# Patient Record
Sex: MALE | Race: WHITE | NOT HISPANIC OR LATINO | Employment: FULL TIME | URBAN - METROPOLITAN AREA
[De-identification: names, ages, dates, MRNs, and addresses within clinical notes are randomized per-mention and may not be internally consistent; named-entity substitution may affect disease eponyms.]

---

## 2018-11-30 ENCOUNTER — HOSPITAL ENCOUNTER (OUTPATIENT)
Dept: GENERAL RADIOLOGY | Facility: HOSPITAL | Age: 53
Discharge: HOME | End: 2018-11-30

## 2018-11-30 ENCOUNTER — HOSPITAL ENCOUNTER (OUTPATIENT)
Dept: CT IMAGING | Facility: HOSPITAL | Age: 53
Discharge: HOME | End: 2018-11-30
Admitting: INTERNAL MEDICINE

## 2018-11-30 ENCOUNTER — TRANSCRIBE ORDERS (OUTPATIENT)
Dept: ADMINISTRATIVE | Facility: HOSPITAL | Age: 53
End: 2018-11-30

## 2018-11-30 DIAGNOSIS — C19 RECTOSIGMOID CANCER (HCC): Primary | ICD-10-CM

## 2018-11-30 DIAGNOSIS — C19 RECTOSIGMOID CANCER (HCC): ICD-10-CM

## 2018-11-30 PROCEDURE — 74177 CT ABD & PELVIS W/CONTRAST: CPT

## 2018-11-30 PROCEDURE — 82565 ASSAY OF CREATININE: CPT

## 2018-11-30 PROCEDURE — 0 DIATRIZOATE MEGLUMINE & SODIUM PER 1 ML: Performed by: INTERNAL MEDICINE

## 2018-11-30 PROCEDURE — 71046 X-RAY EXAM CHEST 2 VIEWS: CPT

## 2018-11-30 PROCEDURE — 25010000002 IOPAMIDOL 61 % SOLUTION: Performed by: INTERNAL MEDICINE

## 2018-11-30 RX ADMIN — Medication 15 ML: at 16:15

## 2018-11-30 RX ADMIN — IOPAMIDOL 95 ML: 612 INJECTION, SOLUTION INTRAVENOUS at 17:59

## 2018-12-03 LAB — CREAT BLDA-MCNC: 1 MG/DL (ref 0.6–1.3)

## 2018-12-05 ENCOUNTER — TRANSCRIBE ORDERS (OUTPATIENT)
Dept: ADMINISTRATIVE | Facility: HOSPITAL | Age: 53
End: 2018-12-05

## 2018-12-05 DIAGNOSIS — C20 RECTAL CANCER (HCC): Primary | ICD-10-CM

## 2018-12-06 ENCOUNTER — HOSPITAL ENCOUNTER (OUTPATIENT)
Dept: CT IMAGING | Facility: HOSPITAL | Age: 53
Discharge: HOME | End: 2018-12-06
Attending: COLON & RECTAL SURGERY | Admitting: COLON & RECTAL SURGERY

## 2018-12-06 ENCOUNTER — HOSPITAL ENCOUNTER (OUTPATIENT)
Dept: MRI IMAGING | Facility: HOSPITAL | Age: 53
Discharge: HOME | End: 2018-12-06
Attending: COLON & RECTAL SURGERY

## 2018-12-06 DIAGNOSIS — C20 RECTAL CANCER (HCC): ICD-10-CM

## 2018-12-06 PROCEDURE — 71250 CT THORAX DX C-: CPT

## 2018-12-06 PROCEDURE — 72195 MRI PELVIS W/O DYE: CPT

## 2018-12-07 ENCOUNTER — DOCUMENTATION (OUTPATIENT)
Dept: OTHER | Facility: HOSPITAL | Age: 53
End: 2018-12-07

## 2018-12-07 NOTE — PROGRESS NOTES
AT 10:06am today, I faxed a cover sheet to [facility] asking for path slides on patient. The path slides will be reviewed in GI Tumor Board on 12/13/2018. LEWIS

## 2018-12-07 NOTE — PROGRESS NOTES
Sherron at [facility] called me and said that patient's path slides were ready for pickup. I called Josh and talked with Kimberly. I notified her that path slides were ready for pickup at [facility] and gave the address and then asked that the slides be delivered to [facility] in the [address] on the 2nd floor of the Hospital. Kimberly verbalized understanding. AG

## 2018-12-10 ENCOUNTER — LAB REQUISITION (OUTPATIENT)
Dept: LAB | Facility: HOSPITAL | Age: 53
End: 2018-12-10

## 2018-12-10 DIAGNOSIS — C19 MALIGNANT NEOPLASM OF RECTOSIGMOID JUNCTION (HCC): ICD-10-CM

## 2018-12-10 LAB
LAB AP CASE REPORT: NORMAL
LAB AP CLINICAL INFORMATION: NORMAL
LAB AP DIAGNOSIS COMMENT: NORMAL
PATH REPORT.FINAL DX SPEC: NORMAL
PATH REPORT.GROSS SPEC: NORMAL

## 2018-12-10 PROCEDURE — 88321 CONSLTJ&REPRT SLD PREP ELSWR: CPT | Performed by: COLON & RECTAL SURGERY

## 2018-12-18 ENCOUNTER — CONSULT (OUTPATIENT)
Dept: ONCOLOGY | Facility: CLINIC | Age: 53
End: 2018-12-18

## 2018-12-18 VITALS
HEART RATE: 62 BPM | HEIGHT: 74 IN | TEMPERATURE: 98.4 F | RESPIRATION RATE: 16 BRPM | DIASTOLIC BLOOD PRESSURE: 75 MMHG | WEIGHT: 221 LBS | BODY MASS INDEX: 28.36 KG/M2 | SYSTOLIC BLOOD PRESSURE: 164 MMHG

## 2018-12-18 DIAGNOSIS — C20 RECTAL CARCINOMA (HCC): Primary | ICD-10-CM

## 2018-12-18 PROCEDURE — 99245 OFF/OP CONSLTJ NEW/EST HI 55: CPT | Performed by: INTERNAL MEDICINE

## 2018-12-18 RX ORDER — LISINOPRIL 40 MG/1
40 TABLET ORAL EVERY MORNING
Refills: 0 | COMMUNITY
Start: 2018-10-31 | End: 2023-03-20 | Stop reason: DRUGHIGH

## 2018-12-18 RX ORDER — LEVOTHYROXINE SODIUM 0.05 MG/1
50 TABLET ORAL EVERY MORNING
Refills: 0 | COMMUNITY
Start: 2018-12-15

## 2018-12-18 NOTE — PROGRESS NOTES
CHIEF COMPLAINT: Management of rectal carcinoma    REASON FOR REFERRAL: Rectal carcinoma      RECORDS OBTAINED  Records of the patients history including those obtained from  Dr. Kelly were reviewed and summarized in detail.    Oncology/Hematology History    1. Stage IIIB rectal carcinoma clinical T3b N1 aM0  -11/30/18 CT abdomen pelvis and chest x-ray negative for metastasis.  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    -12/6/18 MRI of rectum showed T3b with suspicious right internal iliac lymph node and CT chest noncontrast negative except for gallstones  -Per Dr. Kelly, CEA was normal.  -12/18/18 saw me for the first time.  I reviewed her case in our multidisciplinary conference and went over that in detail with her.  She had presented with hematochezia.  Dr. Kelly repeated endoscopy for more exact detailing of the primary location and this appears to be rectal on MRI and endoscopy.  Plan is for randomization on clinical trial N 1048 to neoadjuvant FOLFOX versus standard chemoradiation.  We'll get him to radiation and our research staff for randomization and get his baseline CBC and CMP.  If he ends up on the FOLFOX arm, he will need to get a port placed.            Rectal carcinoma (CMS/HCC)    12/18/2018 Initial Diagnosis     Rectal carcinoma (CMS/HCC)            HISTORY OF PRESENT ILLNESS:  The patient is a 53 y.o.  male, referred for clinical IIIB rectal carcinoma history as above.    REVIEW OF SYSTEMS:  A 14 point review of systems was performed and is negative except as noted above.    History reviewed. No pertinent past medical history.  Past Surgical History:   Procedure Laterality Date   • LIVER BIOPSY  2003   • VASECTOMY  1998       Current Outpatient Medications on File Prior to Visit   Medication Sig Dispense Refill   • levothyroxine (SYNTHROID, LEVOTHROID) 50 MCG tablet   0   • lisinopril (PRINIVIL,ZESTRIL) 40 MG tablet Take 40 mg by  "mouth Daily.  0     No current facility-administered medications on file prior to visit.        No Known Allergies    Social History     Socioeconomic History   • Marital status:      Spouse name: Not on file   • Number of children: Not on file   • Years of education: Not on file   • Highest education level: Not on file   Tobacco Use   • Smoking status: Former Smoker     Packs/day: 1.00     Years: 14.00     Pack years: 14.00     Last attempt to quit:      Years since quittin.9   Substance and Sexual Activity   • Alcohol use: Yes   • Drug use: No       Family History   Problem Relation Age of Onset   • Breast cancer Mother    • Cancer Father        PHYSICAL EXAM:    /75   Pulse 62   Temp 98.4 °F (36.9 °C)   Resp 16   Ht 186.7 cm (73.5\")   Wt 100 kg (221 lb)   BMI 28.76 kg/m²     ECOG: (0) Fully active, able to carry on all predisease performance without restriction  General: well appearing male in no acute distress  HEENT: sclera anicteric, oropharynx clear  Lymphatics: no cervical, supraclavicular, inguinal, or axillary adenopathy  Cardiovascular: regular rate and rhythm, no murmurs  Neck: Supple; No thyromegaly  Lungs: clear to auscultation bilaterally. No respiratory distress.   Abdomen: soft, nontender, nondistended.  No palpable organomegaly  Extremities: no cyanosis, clubbing, edema, or cords  Skin: no rashes, lesions, bruising, or petechiae  Neuro: Alert and oriented x 4; Moving all extremities.  Psych: No anxiety or depression    Lab Requisition on 12/10/2018   Component Date Value Ref Range Status   • Case Report 12/10/2018    Final                    Value:Surgical Pathology Report                         Case: [id number]                                  Authorizing Provider:  Pau Kelly MD        Collected:           12/10/2018 01:05 PM          Pathologist:           Jai Julien MD      Received:            12/10/2018 01:06 PM          Specimen:    Large Intestine, " Rectum, External Consult on [facility] case [id number] (11/30/18) for                Tumor Board at the request of Dr. Kelly                                                     • Clinical Information 12/10/2018    Final                    Value:This result contains rich text formatting which cannot be displayed here.   • Final Diagnosis 12/10/2018    Final                    Value:This result contains rich text formatting which cannot be displayed here.   • Comment 12/10/2018    Final                    Value:This result contains rich text formatting which cannot be displayed here.   • Gross Description 12/10/2018    Final                    Value:This result contains rich text formatting which cannot be displayed here.   Hospital Outpatient Visit on 11/30/2018   Component Date Value Ref Range Status   • Creatinine 11/30/2018 1.00  0.60 - 1.30 mg/dL Final    Serial Number: [id number]:  [id number]       Assessment/Plan     Stage IIIB rectal carcinoma clinical T3b N1 aM0  -11/30/18 CT abdomen pelvis and chest x-ray negative for metastasis.  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    -12/6/18 MRI of rectum showed T3b with suspicious right internal iliac lymph node and CT chest noncontrast negative except for gallstones  -Per Dr. Kelly, CEA was normal.  -12/18/18 saw me for the first time.  I reviewed her case in our multidisciplinary conference and went over that in detail with her.  She had presented with hematochezia.  Dr. Kelly repeated endoscopy for more exact detailing of the primary location and this appears to be rectal on MRI and endoscopy.  Plan is for randomization on clinical trial N 1048 to neoadjuvant FOLFOX versus standard chemoradiation.  We'll get him to radiation and our research staff for randomization and get his baseline CBC and CMP.  If he ends up on the FOLFOX arm, he will need to get a port placed.    I discussed with Dr. Kelly and  with my research assistant.  We'll see him back after his radiation oncology appointment with Dr. Lennon and his randomization and hopefully in that interim.    Ousmane Moeller MD    12/18/2018

## 2018-12-19 ENCOUNTER — HOSPITAL ENCOUNTER (OUTPATIENT)
Dept: RADIATION ONCOLOGY | Facility: HOSPITAL | Age: 53
Setting detail: RADIATION/ONCOLOGY SERIES
Discharge: HOME | End: 2018-12-19

## 2018-12-19 ENCOUNTER — DOCUMENTATION (OUTPATIENT)
Dept: RADIATION ONCOLOGY | Facility: HOSPITAL | Age: 53
End: 2018-12-19

## 2018-12-19 ENCOUNTER — OFFICE VISIT (OUTPATIENT)
Dept: RADIATION ONCOLOGY | Facility: HOSPITAL | Age: 53
End: 2018-12-19

## 2018-12-19 ENCOUNTER — RESEARCH ENCOUNTER (OUTPATIENT)
Dept: ONCOLOGY | Facility: CLINIC | Age: 53
End: 2018-12-19

## 2018-12-19 VITALS
SYSTOLIC BLOOD PRESSURE: 188 MMHG | BODY MASS INDEX: 28.94 KG/M2 | RESPIRATION RATE: 18 BRPM | DIASTOLIC BLOOD PRESSURE: 84 MMHG | TEMPERATURE: 98.1 F | WEIGHT: 222.4 LBS | OXYGEN SATURATION: 98 % | HEART RATE: 74 BPM

## 2018-12-19 DIAGNOSIS — C20 RECTAL CARCINOMA (HCC): ICD-10-CM

## 2018-12-19 LAB
ALBUMIN SERPL-MCNC: 4.6 G/DL (ref 3.5–5.5)
ALBUMIN/GLOB SERPL: 1.6 {RATIO} (ref 1.2–2.2)
ALP SERPL-CCNC: 59 IU/L (ref 39–117)
ALT SERPL-CCNC: 48 IU/L (ref 0–44)
AST SERPL-CCNC: 59 IU/L (ref 0–40)
BASOPHILS # BLD AUTO: 0.1 X10E3/UL (ref 0–0.2)
BASOPHILS NFR BLD AUTO: 1 %
BILIRUB SERPL-MCNC: 0.6 MG/DL (ref 0–1.2)
BUN SERPL-MCNC: 13 MG/DL (ref 6–24)
BUN/CREAT SERPL: 14 (ref 9–20)
CALCIUM SERPL-MCNC: 9.1 MG/DL (ref 8.7–10.2)
CHLORIDE SERPL-SCNC: 99 MMOL/L (ref 96–106)
CO2 SERPL-SCNC: 21 MMOL/L (ref 20–29)
CREAT SERPL-MCNC: 0.93 MG/DL (ref 0.76–1.27)
EOSINOPHIL # BLD AUTO: 0.3 X10E3/UL (ref 0–0.4)
EOSINOPHIL NFR BLD AUTO: 7 %
ERYTHROCYTE [DISTWIDTH] IN BLOOD BY AUTOMATED COUNT: 14.1 % (ref 12.3–15.4)
GLOBULIN SER CALC-MCNC: 2.9 G/DL (ref 1.5–4.5)
GLUCOSE SERPL-MCNC: 112 MG/DL (ref 65–99)
HCT VFR BLD AUTO: 43.8 % (ref 37.5–51)
HGB BLD-MCNC: 14.8 G/DL (ref 13–17.7)
IMM GRANULOCYTES # BLD: 0 X10E3/UL (ref 0–0.1)
IMM GRANULOCYTES NFR BLD: 0 %
LYMPHOCYTES # BLD AUTO: 1.1 X10E3/UL (ref 0.7–3.1)
LYMPHOCYTES NFR BLD AUTO: 27 %
MCH RBC QN AUTO: 29.1 PG (ref 26.6–33)
MCHC RBC AUTO-ENTMCNC: 33.8 G/DL (ref 31.5–35.7)
MCV RBC AUTO: 86 FL (ref 79–97)
MONOCYTES # BLD AUTO: 0.3 X10E3/UL (ref 0.1–0.9)
MONOCYTES NFR BLD AUTO: 8 %
NEUTROPHILS # BLD AUTO: 2.3 X10E3/UL (ref 1.4–7)
NEUTROPHILS NFR BLD AUTO: 57 %
PLATELET # BLD AUTO: 109 X10E3/UL (ref 150–379)
POTASSIUM SERPL-SCNC: 4.1 MMOL/L (ref 3.5–5.2)
PROT SERPL-MCNC: 7.5 G/DL (ref 6–8.5)
RBC # BLD AUTO: 5.09 X10E6/UL (ref 4.14–5.8)
SODIUM SERPL-SCNC: 140 MMOL/L (ref 134–144)
WBC # BLD AUTO: 4 X10E3/UL (ref 3.4–10.8)

## 2018-12-19 PROCEDURE — G0463 HOSPITAL OUTPT CLINIC VISIT: HCPCS | Performed by: RADIOLOGY

## 2018-12-19 PROCEDURE — G0463 HOSPITAL OUTPT CLINIC VISIT: HCPCS

## 2018-12-19 NOTE — RESEARCH
At the request of Dr. Lennon, I came to see Mr Arevalo today in regards to the  clinical trial in Rad Onc.  Nora, the primary CRC for the study, will be following this patient in the future.  Unfortunately, she was out of the office today, so I went to see this gentleman for her, as her backup.      Mr Arevalo had already discussed the study with Dr Kelly, Dr. Lennon, and Dr. Moeller and was fairly well versed in what the trial is looking at.  I did go over all 8 elements of the ICF with the patient and he had no questions concluding our discussion.  Mr Arevalo agreed to take part in the main study and also consented to having the study keep his blood, tissue, and scans for future research.  I explained to him that we would create a screening folder for him, confirm his eligibility with Dr. Moeller, and get him enrolled/randomized.  If he is eligible and meets all screening requirements, he will be able to begin treatment when he sees Dr. Moeller again in January.     Nora will call him tomorrow and follow up regarding any questions he may have in the mean time and introduce herself.  I also gave him her card and my number as well.      Kina Smith RN

## 2018-12-19 NOTE — PROGRESS NOTES
CONSULTATION NOTE      :                                                          1965  DATE OF CONSULTATION:                       2018   REQUESTING PHYSICIAN:                   Ousmane Moeller MD  REASON FOR CONSULTATION:            Cancer Staging  Rectal carcinoma (CMS/HCC)  Staging form: Colon And Rectum, AJCC 8th Edition  - Clinical stage from 2018: Stage IIIB (cT3, cN1a, cM0) - Signed by Ousmane Moeller MD on 2018    Thank you for requesting my services in evaluation of this pleasant individual.  I am seeing them in outpatient consultation regarding a diagnosis of rectal cancer.     BRIEF HISTORY:  The patient is a very pleasant 53 y.o. male  with no specific past medical history who recently presented after he developed progressive symptoms of small caliber stool, bright red blood per rectum, and occasional mucous and puss-like discharge.  He had never underwent a colonoscopy before, and these symptoms prompted a referral to gastroenterology.  He was found to have a nearly circumferential mass at the rectosigmoid junction, 7cm from the dentate line, which was biopsy-proven to be a poorly differentiated adenocarcinoma with intact MSI on immunohistochemistry.  This was then followed with an MRI of the rectum showing a T3b tumor with a suspicious right internal iliac lymph node.  The remainder of his workup showed no evidence of other regional or distant disease and his CEA was normal.  He has already been evaluated by Dr. Ousmane Moeller and Dr. Kelly, who discussed the current N 1048 clinical trial, which randomizes patients to preoperative FOLFOX versus Radiation with Xeloda.  He is interested, and presents to my clinic today to discuss his radiation options.  Symptomatically, he continues to have small caliber stool and occasional mucopurulent discharge and/or bleeding.  He denies any fevers or chills.  He denies weight loss.  He denies pain    No Known Allergies    Social History      Socioeconomic History   • Marital status:      Spouse name: Not on file   • Number of children: Not on file   • Years of education: Not on file   • Highest education level: Not on file   Tobacco Use   • Smoking status: Former Smoker     Packs/day: 1.00     Years: 14.00     Pack years: 14.00     Last attempt to quit:      Years since quittin.9   • Smokeless tobacco: Never Used   Substance and Sexual Activity   • Alcohol use: No     Frequency: Never   • Drug use: No   • Sexual activity: Defer       Past Medical History:   Diagnosis Date   • Disease of thyroid gland    • Fatty liver    • Hypertension    • Rectal cancer (CMS/HCC)        family history includes Breast cancer in his mother; Cancer in his father.     Past Surgical History:   Procedure Laterality Date   • COLONOSCOPY         • LIVER BIOPSY     • VASECTOMY          Review of Systems   Constitutional: Positive for fatigue.   Gastrointestinal: Positive for blood in stool (daily- prior to BM) and diarrhea (intermittent).   Musculoskeletal: Positive for arthralgias (knees).   All other systems reviewed and are negative.          Objective   VITAL SIGNS:   Vitals:    18 0904   BP: (!) 188/84   Pulse: 74   Resp: 18   Temp: 98.1 °F (36.7 °C)   TempSrc: Temporal   SpO2: 98%   Weight: 101 kg (222 lb 6.4 oz)   PainSc: 0-No pain        Karnofsky score: 90        Physical Exam   Constitutional: He is oriented to person, place, and time. He appears well-developed and well-nourished. No distress.   HENT:   Head: Normocephalic and atraumatic.   Mouth/Throat: Oropharynx is clear and moist.   Eyes: Conjunctivae and EOM are normal. Pupils are equal, round, and reactive to light.   Neck: Normal range of motion. Neck supple.   Cardiovascular: Normal rate and regular rhythm. Exam reveals no friction rub.   No murmur heard.  Pulmonary/Chest: Effort normal and breath sounds normal. He has no wheezes.   Abdominal: Soft. Bowel sounds are normal.  He exhibits no distension and no mass. There is no tenderness.   Genitourinary:   Genitourinary Comments: Mass is not palpable.   Musculoskeletal: Normal range of motion. He exhibits no edema.   Lymphadenopathy:     He has no cervical adenopathy.   Neurological: He is alert and oriented to person, place, and time.   Skin: Skin is warm and dry.   Psychiatric: He has a normal mood and affect. His behavior is normal. Judgment and thought content normal.   Nursing note and vitals reviewed.    IMAGING  I have personally reviewed the relevant imaging studies, as follows:  Ct Chest Without Contrast    Result Date: 12/6/2018  1. No acute parenchymal findings or dominant nodule to suggest intrathoracic metastatic involvement. 2. Cholelithiasis.  D:  12/06/2018 E:  12/06/2018   This report was finalized on 12/6/2018 11:34 AM by Dr. Jairo Rowland.      Mri Pelvis Without Contrast    Result Date: 12/10/2018     This report was finalized on 12/10/2018 11:38 AM by DR. Naun Cruz MD.      Ct Abdomen Pelvis With Contrast    Result Date: 12/3/2018  There is a soft tissue mass in the region of the rectosigmoid colon which displaces the lumen rightward and narrows the lumen significantly consistent with the known primary malignancy. There is no evidence of local or distant metastatic disease.  DICTATED:   11/30/2018 EDITED/ls :   11/30/2018   This report was finalized on 12/3/2018 8:31 AM by Dr. Pollo Rincon MD.       PATHOLOGY  Colonoscopy with Biopsy 12/10/2018:  1. MID-DISTAL TRANSVERSE COLON POLYP (OUTSIDE CONSULT CASE [id number]):  Serrated polyp with features compatible with sessile serrated adenoma.   No cytologic atypia or high-grade dysplasia identified.   2. RECTOSIGMOID MASS BIOPSY (OUTSIDE CONSULT CASE [id number]):  Invasive moderate to poorly differentiated adenocarcinoma appearing to arise in a tubulovillous adenoma.   Tumor invades at least into submucosa.   Polypectomy margin cannot be definitively assessed as specimen  received in multiple pieces. (See comment)    The following portions of the patient's history were reviewed and updated as appropriate: allergies, current medications, past family history, past medical history, past social history, past surgical history and problem list.    Assessment  Mr. Arevalo is a 53 year old male with a new diagnosis of a [id number] rectosignmoid cancer.  I spent approximately 45 minutes today with him discussing his diagnosis, his test results, and prognosis.  I discussed and compared the current standard of care of preoperative chemoradiation followed by surgical resection.  He is a candidate for the current N 1048 clinical trial randomizing patients to FOLFOX versus Radiation concurrent with Xeloda.  He is interested and was ready to sign consent today, so I coordinated for him to see our research coordinator.  She will complete the registration and randomization process.  He is scheduled to be back to see Dr. Moeller on January 4th.  If he ends up being randomized to the chemoradiation arm, I will have him back in my clinic for a radiation setup and simulation session.    RECOMMENDATIONS:      Return in about 1 week (around 12/26/2018) for Radiation Simulation pending clinical trial randomization.  Hesham was seen today for rectal cancer.    Diagnoses and all orders for this visit:    Rectal carcinoma (CMS/HCC)    Thank you for allowing me to participate in the care of this individual.    Sincerely,       Pepe Lennon MD

## 2018-12-20 DIAGNOSIS — C20 RECTAL CARCINOMA (HCC): Primary | ICD-10-CM

## 2018-12-21 DIAGNOSIS — C20 RECTAL CARCINOMA (HCC): Primary | ICD-10-CM

## 2018-12-21 DIAGNOSIS — C20 RECTAL CARCINOMA (HCC): ICD-10-CM

## 2018-12-21 LAB
CEA SERPL-MCNC: 1.4 NG/ML (ref 0–4.7)
WRITTEN AUTHORIZATION: NORMAL

## 2018-12-21 RX ORDER — ONDANSETRON HYDROCHLORIDE 8 MG/1
8 TABLET, FILM COATED ORAL 3 TIMES DAILY PRN
Qty: 30 TABLET | Refills: 5 | Status: SHIPPED | OUTPATIENT
Start: 2018-12-21 | End: 2019-04-16

## 2018-12-23 ENCOUNTER — RESULTS ENCOUNTER (OUTPATIENT)
Dept: ONCOLOGY | Facility: CLINIC | Age: 53
End: 2018-12-23

## 2018-12-23 DIAGNOSIS — C20 RECTAL CARCINOMA (HCC): ICD-10-CM

## 2018-12-24 ENCOUNTER — OFFICE VISIT (OUTPATIENT)
Dept: RADIATION ONCOLOGY | Facility: HOSPITAL | Age: 53
End: 2018-12-24

## 2018-12-24 ENCOUNTER — HOSPITAL ENCOUNTER (OUTPATIENT)
Dept: RADIATION ONCOLOGY | Facility: HOSPITAL | Age: 53
Discharge: HOME | End: 2018-12-24

## 2018-12-24 VITALS
BODY MASS INDEX: 28.76 KG/M2 | SYSTOLIC BLOOD PRESSURE: 176 MMHG | TEMPERATURE: 98.2 F | OXYGEN SATURATION: 98 % | RESPIRATION RATE: 18 BRPM | WEIGHT: 221 LBS | HEART RATE: 75 BPM | DIASTOLIC BLOOD PRESSURE: 88 MMHG

## 2018-12-24 DIAGNOSIS — C20 RECTAL CARCINOMA (HCC): ICD-10-CM

## 2018-12-24 PROCEDURE — G0463 HOSPITAL OUTPT CLINIC VISIT: HCPCS | Performed by: RADIOLOGY

## 2018-12-24 NOTE — PROGRESS NOTES
RE-EVALUATION    PATIENT:                                                      Hesham Arevalo  :                                                          1965  DATE:                          2018   DIAGNOSIS:     Rectal cancer  Cancer Staging  Stage IIIB (cT3, cN1a, cM0)     BRIEF HISTORY:  The patient is a very pleasant 53 y.o. male  with a new diagnosis of a [id number] rectal cancer involving the upper 1/3 adjacent to the rectosigmoid junction.  I saw him for initial consultation last week, and we discussed the [place]  clinical trial, which she was very agreeable and enrolled.  He has been randomized to concurrent radiation therapy with Xeloda.  Turns to clinic today for re-evaluation and to undergo a radiation setup and simulation.  He denies any change in symptoms.    No Known Allergies    Review of Systems   Constitutional: Positive for fatigue.   All other systems reviewed and are negative.          Objective   VITAL SIGNS:   Vitals:    18 0921   BP: 176/88   Pulse: 75   Resp: 18   Temp: 98.2 °F (36.8 °C)   TempSrc: Temporal   SpO2: 98%   Weight: 100 kg (221 lb)   PainSc: 0-No pain        KPS 90%    Physical Exam   Constitutional: He is oriented to person, place, and time. He appears well-developed and well-nourished. No distress.   HENT:   Head: Normocephalic and atraumatic.   Mouth/Throat: Oropharynx is clear and moist.   Eyes: Conjunctivae and EOM are normal. Pupils are equal, round, and reactive to light.   Neck: Normal range of motion. Neck supple.   Cardiovascular: Normal rate and regular rhythm. Exam reveals no friction rub.   No murmur heard.  Pulmonary/Chest: Effort normal and breath sounds normal. He has no wheezes.   Abdominal: Soft. Bowel sounds are normal. He exhibits no distension and no mass. There is no tenderness.   Musculoskeletal: Normal range of motion. He exhibits no edema.   Lymphadenopathy:     He has no cervical adenopathy.   Neurological: He is alert and oriented  to person, place, and time.   Skin: Skin is warm and dry.   Psychiatric: He has a normal mood and affect. His behavior is normal. Judgment and thought content normal.   Nursing note and vitals reviewed.           The following portions of the patient's history were reviewed and updated as appropriate: allergies, current medications, past family history, past medical history, past social history, past surgical history and problem list.    Diagnoses and all orders for this visit:    Rectal carcinoma (CMS/[facility])      IMPRESSION:  Mr. Arevalo is a 53-year-old gentleman with T3 N1 a M0 adenocarcinoma of the rectum.  He has been randomized to external beam radiation therapy concurrent with Xeloda, and will receive 50.4 of radiation to the primary tumor, rectum, and draining lymphatics.  Due to close proximity to the small intestines in the need for a dose escalation, I intend to treat him using intensity modulated radiation therapy.  After a full explanation of risks and benefits, he signed informed consent today and we completed a radiation setup session.  I anticipate that he should be ready to begin within the next 2 weeks pending insurance authorization.  I will work to coordinate a treatment start date with medical oncology.    RECOMMENDATIONS:    Return to clinic in approximately 2 weeks to begin radiation therapy.       Pepe Lennon MD

## 2018-12-26 ENCOUNTER — SPECIALTY PHARMACY (OUTPATIENT)
Dept: ONCOLOGY | Facility: HOSPITAL | Age: 53
End: 2018-12-26

## 2018-12-26 DIAGNOSIS — C20 RECTAL CARCINOMA (HCC): Primary | ICD-10-CM

## 2018-12-26 RX ORDER — CAPECITABINE 150 MG/1
TABLET, FILM COATED ORAL
Qty: 100 TABLET | Refills: 0 | Status: SHIPPED | OUTPATIENT
Start: 2018-12-26 | End: 2019-01-29 | Stop reason: SDUPTHER

## 2018-12-26 RX ORDER — CAPECITABINE 500 MG/1
TABLET, FILM COATED ORAL
Qty: 150 TABLET | Refills: 0 | Status: SHIPPED | OUTPATIENT
Start: 2018-12-26 | End: 2019-01-29 | Stop reason: SDUPTHER

## 2018-12-26 RX ORDER — CAPECITABINE 150 MG/1
TABLET, FILM COATED ORAL
Qty: 100 TABLET | Refills: 0 | Status: SHIPPED | OUTPATIENT
Start: 2018-12-26 | End: 2019-04-16

## 2018-12-26 RX ORDER — CAPECITABINE 500 MG/1
TABLET, FILM COATED ORAL
Qty: 150 TABLET | Refills: 0 | Status: SHIPPED | OUTPATIENT
Start: 2018-12-26 | End: 2019-04-16

## 2018-12-26 NOTE — PROGRESS NOTES
Oral Chemotherapy Teaching      Patient Name/:  Hesham Arevalo   1965  Oral Chemotherapy Regimen:  Capecitabine 1800mg PO BID Monday-Friday with RT x 5 weeks  Date Started Medication: Start capecitabine first day of radiation treatment      Additional Notes:  Patient is to take capecitabine 825mg/m2 PO BID Monday-Friday with RT.  Patient's current BSA is 2.25.  Total dosing is 1856mg BID and rounded to 1800mg BID.  E-scribed capecitabine 500mg -take 3 tablets BID and capecitabine 150mg - take 2 tablets BID to total a daily dose of 1800mg BID.  E-scribed both prescriptions to [facility] retail pharmacy to begin processing.  Patient is scheduled in the oral chemotherapy tomorrow and will provide further education.

## 2018-12-27 ENCOUNTER — SPECIALTY PHARMACY (OUTPATIENT)
Dept: PHARMACY | Facility: HOSPITAL | Age: 53
End: 2018-12-27

## 2018-12-27 ENCOUNTER — HOSPITAL ENCOUNTER (OUTPATIENT)
Dept: ONCOLOGY | Facility: HOSPITAL | Age: 53
Discharge: HOME | End: 2018-12-27

## 2018-12-27 NOTE — PROGRESS NOTES
Oral Chemotherapy Teaching      Patient Name/:  Hesham Arevalo   1965  Oral Chemotherapy Regimen:  Capecitabine (Xeloda) + radiation therapy   Date Started Medication: 16    Initial Teaching Follow Up Comments     Safety     Storage instructions (away from children; away from heat/cold, sunlight, or moisture), handling - use of gloves (caregivers), washing hands after touching pills, managing waste     “How are you storing your medications?”, reminders on storage, proper handling (caregivers using gloves, washing hands, away from children, managing waste, etc.), disposal of medication with D/C or dosage change    Educated patient on storing in a place away from hot/cold/humidity. Patient typically stores their medication in kitchen in plain view. Reviewed importance of using gloves/pill cup when handling due to trace chemo. Can dispose of extra at our outpatient pharmacy drug take back bin     Adherence      patient and/or caregiver on how to take medication, take with/without food, assess their adherence potential, stress importance of adherence, ways to manage adherence (pill boxes, phone reminders, calendars), what to do if miss a dose   “How are you taking your medication?” “How are you remembering to take your medication?”, “How many doses have you missed?”, determine reasons for non-adherence (not remembering, side effects, etc), ways to improve, overadherence? Remind patient of ways to improve/maintain adherence   Patient was counseled to take within 30 minutes of a meal with a glass of water. Reviewed adherence techniques including pill boxes, phone alarms, and storing in plain view. Reviewed what to do in the case of a missed dose. Patient endorsed occassionlly forgetting to take medication- wife is going to help him remember     Side Effects/Adverse Reactions      patient on potential side effects, s/s, ways to manage, when to call MD/seek help     Determine if patient experiencing  side effects, ways to manage  Patient is a new start on any chemotherapy. Reviewed potential side effects and over the counter solutions prior to calling. If severe, advised to call without hesitation though. Side effects reviewed include diarrhea, N/V, Stomatitis, fatigue, hand/foot syndrome     Miscellaneous     Food interactions, DDIs, financial issues Determine if patient started any new medications since being placed on oral chemo (analyze for DDI) No clinically significant drug-drug interactions exist. Patient has not started any new medications when compared to ones on record.      Additional Notes:  Patient was accompanied by his wife. Have not received medication yet, still working out payment issues. Patient was counseled to begin on first day of radiation. Patient plans to use an 8am and 8pm schedule, as he works nights and will be coming in for radiation in the mornings. Also reviewed total dose of 1800mg includes 3x 500mg tablets + 2x 150mg tablets twice daily M-F. Patient and wife demonstrated proper understanding of this. Both had many questions about what to expect for side effects. We reviewed immodium as an option for diarrhea, importance of staying hydrated, importance of exercise for fatigue, importance of gloves for clean up if nausea and vomiting occur (plus additional OTC options). Patient's wife had questions regarding using same toilet/bathroom due to chemo exposure. Discussed this is perfectly acceptable, but being aware of using gloves/protection if coming into contact with bodily fluids. Patient was provided with [facility] handout and calendar.     Thanks,  Arvind Grimm, PharmD  Pharmacy Resident  12/27/2018  12:57 PM

## 2019-01-02 ENCOUNTER — HOSPITAL ENCOUNTER (OUTPATIENT)
Dept: RADIATION ONCOLOGY | Facility: HOSPITAL | Age: 54
Setting detail: RADIATION/ONCOLOGY SERIES
Discharge: HOME | End: 2019-01-02

## 2019-01-02 PROCEDURE — 77300 RADIATION THERAPY DOSE PLAN: CPT | Performed by: RADIOLOGY

## 2019-01-02 PROCEDURE — 77338 DESIGN MLC DEVICE FOR IMRT: CPT | Performed by: RADIOLOGY

## 2019-01-02 PROCEDURE — 77301 RADIOTHERAPY DOSE PLAN IMRT: CPT | Performed by: RADIOLOGY

## 2019-01-04 ENCOUNTER — OFFICE VISIT (OUTPATIENT)
Dept: ONCOLOGY | Facility: CLINIC | Age: 54
End: 2019-01-04

## 2019-01-04 VITALS
HEART RATE: 72 BPM | WEIGHT: 220 LBS | HEIGHT: 74 IN | BODY MASS INDEX: 28.23 KG/M2 | DIASTOLIC BLOOD PRESSURE: 74 MMHG | RESPIRATION RATE: 18 BRPM | TEMPERATURE: 98 F | SYSTOLIC BLOOD PRESSURE: 157 MMHG

## 2019-01-04 DIAGNOSIS — C20 RECTAL CARCINOMA (HCC): Primary | ICD-10-CM

## 2019-01-04 PROCEDURE — 99213 OFFICE O/P EST LOW 20 MIN: CPT | Performed by: INTERNAL MEDICINE

## 2019-01-07 ENCOUNTER — LAB (OUTPATIENT)
Dept: LAB | Facility: HOSPITAL | Age: 54
End: 2019-01-07

## 2019-01-07 ENCOUNTER — HOSPITAL ENCOUNTER (OUTPATIENT)
Dept: RADIATION ONCOLOGY | Facility: HOSPITAL | Age: 54
Discharge: HOME | End: 2019-01-07

## 2019-01-07 DIAGNOSIS — C20 RECTAL CARCINOMA (HCC): ICD-10-CM

## 2019-01-07 LAB
ERYTHROCYTE [DISTWIDTH] IN BLOOD BY AUTOMATED COUNT: 13.4 % (ref 11.3–14.5)
HCT VFR BLD AUTO: 41.2 % (ref 38.9–50.9)
HGB BLD-MCNC: 14.3 G/DL (ref 13.1–17.5)
LYMPHOCYTES # BLD AUTO: 1.2 10*3/MM3 (ref 0.6–4.8)
LYMPHOCYTES NFR BLD AUTO: 27.5 % (ref 24–44)
MCH RBC QN AUTO: 30 PG (ref 27–31)
MCHC RBC AUTO-ENTMCNC: 34.7 G/DL (ref 32–36)
MCV RBC AUTO: 86.5 FL (ref 80–99)
MONOCYTES # BLD AUTO: 0.2 10*3/MM3 (ref 0–1)
MONOCYTES NFR BLD AUTO: 5.3 % (ref 0–12)
NEUTROPHILS # BLD AUTO: 2.9 10*3/MM3 (ref 1.5–8.3)
NEUTROPHILS NFR BLD AUTO: 67.2 % (ref 41–71)
PLATELET # BLD AUTO: 101 10*3/MM3 (ref 150–450)
PMV BLD AUTO: 7.7 FL (ref 6–12)
RBC # BLD AUTO: 4.77 10*6/MM3 (ref 4.2–5.76)
WBC NRBC COR # BLD: 4.3 10*3/MM3 (ref 3.5–10.8)

## 2019-01-07 PROCEDURE — 85025 COMPLETE CBC W/AUTO DIFF WBC: CPT

## 2019-01-07 PROCEDURE — 36415 COLL VENOUS BLD VENIPUNCTURE: CPT

## 2019-01-08 ENCOUNTER — HOSPITAL ENCOUNTER (OUTPATIENT)
Dept: RADIATION ONCOLOGY | Facility: HOSPITAL | Age: 54
Discharge: HOME | End: 2019-01-08

## 2019-01-08 ENCOUNTER — DOCUMENTATION (OUTPATIENT)
Dept: NUTRITION | Facility: HOSPITAL | Age: 54
End: 2019-01-08

## 2019-01-08 VITALS — BODY MASS INDEX: 28.63 KG/M2 | WEIGHT: 220 LBS

## 2019-01-08 PROCEDURE — 77386: CPT | Performed by: RADIOLOGY

## 2019-01-08 NOTE — PROGRESS NOTES
Oncology Nutrition Screening    Patient Name:  Hesham Arevalo  YOB: 1965  MRN: [id number]  Date:  01/08/19  Physician:  Dr. Moeller / Dr. Lennon    Type of Cancer Treatment:     Chemotherapy: Xeloda  Radiation - 25 treatments with boost (3 treatments)     Patient Active Problem List   Diagnosis   • Rectal carcinoma (CMS/HCC)   Stage IIIB    Current Outpatient Medications   Medication Sig Dispense Refill   • capecitabine (XELODA) 150 MG chemo tablet Take 2 tab(s) by mouth in the AM and 2 tab(s) by mouth in the PM on Monday - Friday with radiation. 100 tablet 0   • capecitabine (XELODA) 150 MG chemo tablet Take 2 tab(s) by mouth in the AM and 2 tab(s) by mouth in the PM on Monday - Friday with radiation. 100 tablet 0   • capecitabine (XELODA) 500 MG chemo tablet Take 3 tab(s) by mouth in the AM and 3 tab(s) by mouth in the PM on Monday - Friday with radiation. 150 tablet 0   • capecitabine (XELODA) 500 MG chemo tablet Take 3 tab(s) by mouth in the AM and 3 tab(s) by mouth in the PM on Monday - Friday with radiation. 150 tablet 0   • levothyroxine (SYNTHROID, LEVOTHROID) 50 MCG tablet   0   • lisinopril (PRINIVIL,ZESTRIL) 40 MG tablet Take 40 mg by mouth Daily.  0   • ondansetron (ZOFRAN) 8 MG tablet Take 1 tablet by mouth 3 (Three) Times a Day As Needed for Nausea or Vomiting. 30 tablet 5     No current facility-administered medications for this visit.      Glycemic Risk:   NA    Weight:   Height: 73.5 inches Weight: 220 lbs.   BMI: 28.6  Overweight  Weight has been stable    Oral Food Intake:  Regular Diet - No Restrictions    Hydration Status:   How many 8 ounce glass of water of fluid do you drink per day?  To be assessed with consultation    Enteral Feeding:   NA    Nutrition Symptoms:   Fatigue    Activity:   Normal with no limitations     reports that he quit smoking about 24 years ago. He has a 14.00 pack-year smoking history. he has never used smokeless tobacco. He reports that he does not drink  alcohol or use drugs.    Evaluation of Nutritional Risk:   Patient identified at nutritional risk related to diagnosis and treatment plan of chemoradiation.  Will consult and follow through course of treatment.        Electronically signed by:  Bailee Dai RD  3:14 PM

## 2019-01-09 ENCOUNTER — HOSPITAL ENCOUNTER (OUTPATIENT)
Dept: RADIATION ONCOLOGY | Facility: HOSPITAL | Age: 54
Discharge: HOME | End: 2019-01-09

## 2019-01-09 PROCEDURE — 77386: CPT | Performed by: RADIOLOGY

## 2019-01-10 ENCOUNTER — HOSPITAL ENCOUNTER (OUTPATIENT)
Dept: RADIATION ONCOLOGY | Facility: HOSPITAL | Age: 54
Discharge: HOME | End: 2019-01-10

## 2019-01-10 PROCEDURE — 77386: CPT | Performed by: RADIOLOGY

## 2019-01-10 PROCEDURE — 77336 RADIATION PHYSICS CONSULT: CPT | Performed by: RADIOLOGY

## 2019-01-11 ENCOUNTER — HOSPITAL ENCOUNTER (OUTPATIENT)
Dept: RADIATION ONCOLOGY | Facility: HOSPITAL | Age: 54
Discharge: HOME | End: 2019-01-11

## 2019-01-11 PROCEDURE — 77386: CPT | Performed by: RADIOLOGY

## 2019-01-14 ENCOUNTER — HOSPITAL ENCOUNTER (OUTPATIENT)
Dept: RADIATION ONCOLOGY | Facility: HOSPITAL | Age: 54
Discharge: HOME | End: 2019-01-14

## 2019-01-14 ENCOUNTER — LAB (OUTPATIENT)
Dept: LAB | Facility: HOSPITAL | Age: 54
End: 2019-01-14

## 2019-01-14 DIAGNOSIS — C20 RECTAL CARCINOMA (HCC): ICD-10-CM

## 2019-01-14 LAB
ERYTHROCYTE [DISTWIDTH] IN BLOOD BY AUTOMATED COUNT: 13.4 % (ref 11.3–14.5)
HCT VFR BLD AUTO: 40.7 % (ref 38.9–50.9)
HGB BLD-MCNC: 14.2 G/DL (ref 13.1–17.5)
LYMPHOCYTES # BLD AUTO: 1.2 10*3/MM3 (ref 0.6–4.8)
LYMPHOCYTES NFR BLD AUTO: 29.6 % (ref 24–44)
MCH RBC QN AUTO: 30.5 PG (ref 27–31)
MCHC RBC AUTO-ENTMCNC: 35 G/DL (ref 32–36)
MCV RBC AUTO: 87.2 FL (ref 80–99)
MONOCYTES # BLD AUTO: 0.3 10*3/MM3 (ref 0–1)
MONOCYTES NFR BLD AUTO: 8.7 % (ref 0–12)
NEUTROPHILS # BLD AUTO: 2.5 10*3/MM3 (ref 1.5–8.3)
NEUTROPHILS NFR BLD AUTO: 61.7 % (ref 41–71)
PLATELET # BLD AUTO: 112 10*3/MM3 (ref 150–450)
PMV BLD AUTO: 7.1 FL (ref 6–12)
RBC # BLD AUTO: 4.67 10*6/MM3 (ref 4.2–5.76)
WBC NRBC COR # BLD: 4 10*3/MM3 (ref 3.5–10.8)

## 2019-01-14 PROCEDURE — 85025 COMPLETE CBC W/AUTO DIFF WBC: CPT

## 2019-01-14 PROCEDURE — 77386: CPT | Performed by: RADIOLOGY

## 2019-01-14 PROCEDURE — 36415 COLL VENOUS BLD VENIPUNCTURE: CPT

## 2019-01-15 ENCOUNTER — HOSPITAL ENCOUNTER (OUTPATIENT)
Dept: RADIATION ONCOLOGY | Facility: HOSPITAL | Age: 54
Discharge: HOME | End: 2019-01-15

## 2019-01-15 VITALS — WEIGHT: 220.9 LBS | BODY MASS INDEX: 28.75 KG/M2

## 2019-01-15 PROCEDURE — 77386: CPT | Performed by: RADIOLOGY

## 2019-01-16 ENCOUNTER — APPOINTMENT (OUTPATIENT)
Dept: RADIATION ONCOLOGY | Facility: HOSPITAL | Age: 54
End: 2019-01-16

## 2019-01-16 ENCOUNTER — DOCUMENTATION (OUTPATIENT)
Dept: NUTRITION | Facility: HOSPITAL | Age: 54
End: 2019-01-16

## 2019-01-16 ENCOUNTER — HOSPITAL ENCOUNTER (OUTPATIENT)
Dept: RADIATION ONCOLOGY | Facility: HOSPITAL | Age: 54
Discharge: HOME | End: 2019-01-16

## 2019-01-16 PROCEDURE — 77386: CPT | Performed by: RADIOLOGY

## 2019-01-16 NOTE — PROGRESS NOTES
ONC Nutrition    Diagnosis:  Rectal Cancer  Chemotherapy: Xeloda - M-F with radiation  Radiation - Has completed 6/28 radiation treatments      Weight 220 lbs / weight stable    Consulted with patient during status checks educating on nutritional aspects related to oral chemotherapy and radiation.  He states that he is beginning to feel more frequent BM, with soft consistency; denies diarrhea stools. Patient states that appetite and oral intake remains good; encouraged smaller, more frequent meals and snacks throughout the day, focusing on high protein, nutrient dense intake; maintaining current weight; adequate hydration, especially if he begins with more frequent loose stools.  Discussed types of fiber, encouraging focus on soluble fiber and avoidance of insoluble fiber.  Discussed use of soluble fibers additives such as Benefiber or Metamucil for BM management.    Will continue to follow patient closely during status checks.

## 2019-01-17 ENCOUNTER — HOSPITAL ENCOUNTER (OUTPATIENT)
Dept: RADIATION ONCOLOGY | Facility: HOSPITAL | Age: 54
Discharge: HOME | End: 2019-01-17

## 2019-01-17 PROCEDURE — 77336 RADIATION PHYSICS CONSULT: CPT | Performed by: RADIOLOGY

## 2019-01-17 PROCEDURE — 77386: CPT | Performed by: RADIOLOGY

## 2019-01-18 ENCOUNTER — HOSPITAL ENCOUNTER (OUTPATIENT)
Dept: RADIATION ONCOLOGY | Facility: HOSPITAL | Age: 54
Discharge: HOME | End: 2019-01-18

## 2019-01-18 PROCEDURE — 77386: CPT | Performed by: RADIOLOGY

## 2019-01-21 ENCOUNTER — HOSPITAL ENCOUNTER (OUTPATIENT)
Dept: RADIATION ONCOLOGY | Facility: HOSPITAL | Age: 54
Discharge: HOME | End: 2019-01-21

## 2019-01-21 ENCOUNTER — LAB (OUTPATIENT)
Dept: LAB | Facility: HOSPITAL | Age: 54
End: 2019-01-21

## 2019-01-21 ENCOUNTER — TELEPHONE (OUTPATIENT)
Dept: ONCOLOGY | Facility: CLINIC | Age: 54
End: 2019-01-21

## 2019-01-21 DIAGNOSIS — C20 RECTAL CARCINOMA (HCC): ICD-10-CM

## 2019-01-21 LAB
BASOPHILS # BLD AUTO: 0.03 10*3/MM3 (ref 0–0.2)
BASOPHILS NFR BLD AUTO: 0.6 % (ref 0–1)
CEA SERPL-MCNC: 1 NG/ML (ref 0–2.5)
DEPRECATED RDW RBC AUTO: 44.2 FL (ref 37–54)
EOSINOPHIL # BLD AUTO: 0.27 10*3/MM3 (ref 0–0.3)
EOSINOPHIL NFR BLD AUTO: 5.1 % (ref 0–3)
ERYTHROCYTE [DISTWIDTH] IN BLOOD BY AUTOMATED COUNT: 14.2 % (ref 11.3–14.5)
HCT VFR BLD AUTO: 43.9 % (ref 38.9–50.9)
HGB BLD-MCNC: 14.8 G/DL (ref 13.1–17.5)
IMM GRANULOCYTES # BLD AUTO: 0.02 10*3/MM3 (ref 0–0.03)
IMM GRANULOCYTES NFR BLD AUTO: 0.4 % (ref 0–0.6)
LYMPHOCYTES # BLD AUTO: 1.02 10*3/MM3 (ref 0.6–4.8)
LYMPHOCYTES NFR BLD AUTO: 19.3 % (ref 24–44)
MCH RBC QN AUTO: 30 PG (ref 27–31)
MCHC RBC AUTO-ENTMCNC: 33.7 G/DL (ref 32–36)
MCV RBC AUTO: 89 FL (ref 80–99)
MONOCYTES # BLD AUTO: 0.5 10*3/MM3 (ref 0–1)
MONOCYTES NFR BLD AUTO: 9.5 % (ref 0–12)
NEUTROPHILS # BLD AUTO: 3.46 10*3/MM3 (ref 1.5–8.3)
NEUTROPHILS NFR BLD AUTO: 65.5 % (ref 41–71)
PLATELET # BLD AUTO: 86 10*3/MM3 (ref 150–450)
PMV BLD AUTO: 10.2 FL (ref 6–12)
RBC # BLD AUTO: 4.93 10*6/MM3 (ref 4.2–5.76)
WBC NRBC COR # BLD: 5.28 10*3/MM3 (ref 3.5–10.8)

## 2019-01-21 PROCEDURE — 85025 COMPLETE CBC W/AUTO DIFF WBC: CPT

## 2019-01-21 PROCEDURE — 36415 COLL VENOUS BLD VENIPUNCTURE: CPT | Performed by: INTERNAL MEDICINE

## 2019-01-21 PROCEDURE — 82378 CARCINOEMBRYONIC ANTIGEN: CPT | Performed by: INTERNAL MEDICINE

## 2019-01-21 PROCEDURE — 77386: CPT | Performed by: RADIOLOGY

## 2019-01-21 RX ORDER — CAPECITABINE 500 MG/1
TABLET, FILM COATED ORAL
Qty: 150 TABLET | Refills: 0 | Status: CANCELLED | OUTPATIENT
Start: 2019-01-21

## 2019-01-21 RX ORDER — CAPECITABINE 150 MG/1
TABLET, FILM COATED ORAL
Qty: 100 TABLET | Refills: 0 | Status: CANCELLED | OUTPATIENT
Start: 2019-01-21

## 2019-01-22 ENCOUNTER — HOSPITAL ENCOUNTER (OUTPATIENT)
Dept: RADIATION ONCOLOGY | Facility: HOSPITAL | Age: 54
Discharge: HOME | End: 2019-01-22

## 2019-01-22 VITALS — WEIGHT: 219 LBS | BODY MASS INDEX: 28.5 KG/M2

## 2019-01-22 PROCEDURE — 77386: CPT | Performed by: RADIOLOGY

## 2019-01-23 ENCOUNTER — HOSPITAL ENCOUNTER (OUTPATIENT)
Dept: RADIATION ONCOLOGY | Facility: HOSPITAL | Age: 54
Discharge: HOME | End: 2019-01-23

## 2019-01-23 PROCEDURE — 77386: CPT | Performed by: RADIOLOGY

## 2019-01-24 ENCOUNTER — HOSPITAL ENCOUNTER (OUTPATIENT)
Dept: RADIATION ONCOLOGY | Facility: HOSPITAL | Age: 54
Discharge: HOME | End: 2019-01-24

## 2019-01-24 ENCOUNTER — OFFICE VISIT (OUTPATIENT)
Dept: ONCOLOGY | Facility: CLINIC | Age: 54
End: 2019-01-24

## 2019-01-24 VITALS
DIASTOLIC BLOOD PRESSURE: 75 MMHG | WEIGHT: 220 LBS | HEART RATE: 74 BPM | SYSTOLIC BLOOD PRESSURE: 147 MMHG | TEMPERATURE: 97.8 F | HEIGHT: 74 IN | RESPIRATION RATE: 18 BRPM | BODY MASS INDEX: 28.23 KG/M2

## 2019-01-24 DIAGNOSIS — C20 RECTAL CARCINOMA (HCC): ICD-10-CM

## 2019-01-24 PROCEDURE — 99212 OFFICE O/P EST SF 10 MIN: CPT | Performed by: NURSE PRACTITIONER

## 2019-01-24 PROCEDURE — 77336 RADIATION PHYSICS CONSULT: CPT | Performed by: RADIOLOGY

## 2019-01-24 PROCEDURE — 77386: CPT | Performed by: RADIOLOGY

## 2019-01-24 NOTE — PROGRESS NOTES
CHIEF COMPLAINT: Rectal carcinoma    Problem List:  Oncology/Hematology History    1. Stage IIIB rectal carcinoma clinical T3b N1 aM0  -11/30/18 CT abdomen pelvis and chest x-ray negative for metastasis.  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    -12/6/18 MRI of rectum showed T3b with suspicious right internal iliac lymph node and CT chest noncontrast negative except for gallstones  -Per Dr. Kelly, CEA was normal.  -12/18/18 saw me for the first time.  I reviewed her case in our multidisciplinary conference and went over that in detail with her.  She had presented with hematochezia.  Dr. Kelly repeated endoscopy for more exact detailing of the primary location and this appears to be rectal on MRI and endoscopy.  Plan is for randomization on clinical trial N 1048 to neoadjuvant FOLFOX versus standard chemoradiation.  We'll get him to radiation and our research staff for randomization and get his baseline CBC and CMP.    -1/4/19 followed up: Randomized to the Xeloda radiation arm.  He will get that and then 4-6 weeks later had his surgery, and then follow that with 6 months of adjuvant FOLFOX per protocol.  My research assistant met with him today.  He has a Xeloda prescription.            Rectal carcinoma (CMS/HCC)    12/18/2018 Initial Diagnosis     Rectal carcinoma (CMS/HCC)            HISTORY OF PRESENT ILLNESS:  The patient is a 53 y.o. male, here for follow up on management of rectal carcinoma.  Overall tolerating Xeloda plus radiation without significant side effects.  Denies any pain.  Has had some dryness of his skin but otherwise no skin changes.  States that his stool is now more formed.  Continues to work and remains active.      Past Medical History:   Diagnosis Date   • Disease of thyroid gland    • Fatty liver    • Hypertension    • Rectal cancer (CMS/HCC)      Past Surgical History:   Procedure Laterality Date   • COLONOSCOPY       "2018   • LIVER BIOPSY  2003   • VASECTOMY  1998       No Known Allergies    Family History and Social History reviewed and changed as necessary      REVIEW OF SYSTEM:   Review of Systems   Constitutional: Negative for appetite change, chills, diaphoresis, fatigue, fever and unexpected weight change.   HENT:   Negative for mouth sores, sore throat and trouble swallowing.    Eyes: Negative for icterus.   Respiratory: Negative for cough, hemoptysis and shortness of breath.    Cardiovascular: Negative for chest pain, leg swelling and palpitations.   Gastrointestinal: Negative for abdominal distention, abdominal pain, blood in stool, constipation, diarrhea, nausea and vomiting.   Endocrine: Negative for hot flashes.   Genitourinary: Negative for bladder incontinence, difficulty urinating, dysuria, frequency and hematuria.    Musculoskeletal: Negative for gait problem, neck pain and neck stiffness.   Skin: Negative for rash.   Neurological: Negative for dizziness, gait problem, headaches, light-headedness and numbness.   Hematological: Negative for adenopathy. Does not bruise/bleed easily.   Psychiatric/Behavioral: Negative for depression. The patient is not nervous/anxious.    All other systems reviewed and are negative.       PHYSICAL EXAM    Vitals:    01/24/19 1024   BP: 147/75   Pulse: 74   Resp: 18   Temp: 97.8 °F (36.6 °C)   Weight: 99.8 kg (220 lb)   Height: 186.7 cm (73.5\")     Constitutional: Appears well-developed and well-nourished. No distress.   ECOG: (0) Fully active, able to carry on all predisease performance without restriction  HENT:   Head: Normocephalic.   Mouth/Throat: Oropharynx is clear and moist.   Eyes: Conjunctivae are normal. Pupils are equal, round, and reactive to light. No scleral icterus.   Neck: Neck supple. No JVD present. No thyromegaly present.   Cardiovascular: Normal rate, regular rhythm and normal heart sounds.    Pulmonary/Chest: Breath sounds normal. No respiratory distress. "   Abdominal: Soft. Exhibits no distension. There is no tenderness.    Musculoskeletal:Exhibits no edema, tenderness or deformity.   Neurological: Alert and oriented to person, place, and time. Exhibits normal muscle tone.   Skin: No ecchymosis, no petechiae and no rash noted. Not diaphoretic. No cyanosis.    Psychiatric: Normal mood and affect.   Vitals reviewed.  Labs reviewed.      ASSESSMENT & PLAN:    Stage IIIB rectal carcinoma clinical T3b N1 aM0  -11/30/18 CT abdomen pelvis and chest x-ray negative for metastasis.  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    -12/6/18 MRI of rectum showed T3b with suspicious right internal iliac lymph node and CT chest noncontrast negative except for gallstones  -Per Dr. Kelly, CEA was normal.  -12/18/18 saw me for the first time.  I reviewed her case in our multidisciplinary conference and went over that in detail with her.  She had presented with hematochezia.  Dr. Kelly repeated endoscopy for more exact detailing of the primary location and this appears to be rectal on MRI and endoscopy.  Plan is for randomization on clinical trial N 1048 to neoadjuvant FOLFOX versus standard chemoradiation.  We'll get him to radiation and our research staff for randomization and get his baseline CBC and CMP.    -1/4/19 followed up: Randomized to the Xeloda radiation arm.  He will get that and then 4-6 weeks later had his surgery, and then follow that with 6 months of adjuvant FOLFOX per protocol.  My research assistant met with him today.  He has a Xeloda prescription.  He will get Zofran just in case but I doubt he will need antiemetic with this.  The Xeloda as taken 3 of the 500 mg and 2 of the 150 mg tablets daily with radiation and holding on the weekends.    -1/24/2019 The patient is doing well, tolerating Xeloda and radiation with no significant side effects.  Has had no adverse events, no mouth sores, no diarrhea,  no skin changes other than just dryness of the skin.  States that his stool is now more formed.  He is having no pain.  His ECOG performance status is 0, he remains active and is working full time, he works third shift.  I will see him back in 2 weeks for follow-up.  He will continue with weekly CBC.    Ana Self, APRN    01/24/2019

## 2019-01-25 ENCOUNTER — HOSPITAL ENCOUNTER (OUTPATIENT)
Dept: RADIATION ONCOLOGY | Facility: HOSPITAL | Age: 54
Discharge: HOME | End: 2019-01-25

## 2019-01-25 PROCEDURE — 77386: CPT | Performed by: RADIOLOGY

## 2019-01-28 ENCOUNTER — HOSPITAL ENCOUNTER (OUTPATIENT)
Dept: RADIATION ONCOLOGY | Facility: HOSPITAL | Age: 54
Discharge: HOME | End: 2019-01-28

## 2019-01-28 ENCOUNTER — LAB (OUTPATIENT)
Dept: LAB | Facility: HOSPITAL | Age: 54
End: 2019-01-28

## 2019-01-28 DIAGNOSIS — C20 RECTAL CARCINOMA (HCC): ICD-10-CM

## 2019-01-28 LAB
ERYTHROCYTE [DISTWIDTH] IN BLOOD BY AUTOMATED COUNT: 14.9 % (ref 11.3–14.5)
HCT VFR BLD AUTO: 40.5 % (ref 38.9–50.9)
HGB BLD-MCNC: 13.8 G/DL (ref 13.1–17.5)
LYMPHOCYTES # BLD AUTO: 0.7 10*3/MM3 (ref 0.6–4.8)
LYMPHOCYTES NFR BLD AUTO: 16.9 % (ref 24–44)
MCH RBC QN AUTO: 30.4 PG (ref 27–31)
MCHC RBC AUTO-ENTMCNC: 34 G/DL (ref 32–36)
MCV RBC AUTO: 89.5 FL (ref 80–99)
MONOCYTES # BLD AUTO: 0.4 10*3/MM3 (ref 0–1)
MONOCYTES NFR BLD AUTO: 9.7 % (ref 0–12)
NEUTROPHILS # BLD AUTO: 2.9 10*3/MM3 (ref 1.5–8.3)
NEUTROPHILS NFR BLD AUTO: 73.4 % (ref 41–71)
PLATELET # BLD AUTO: 94 10*3/MM3 (ref 150–450)
PMV BLD AUTO: 6.6 FL (ref 6–12)
RBC # BLD AUTO: 4.53 10*6/MM3 (ref 4.2–5.76)
WBC NRBC COR # BLD: 4 10*3/MM3 (ref 3.5–10.8)

## 2019-01-28 PROCEDURE — 36415 COLL VENOUS BLD VENIPUNCTURE: CPT

## 2019-01-28 PROCEDURE — 85025 COMPLETE CBC W/AUTO DIFF WBC: CPT

## 2019-01-28 PROCEDURE — 77386: CPT | Performed by: RADIOLOGY

## 2019-01-29 ENCOUNTER — HOSPITAL ENCOUNTER (OUTPATIENT)
Dept: RADIATION ONCOLOGY | Facility: HOSPITAL | Age: 54
Discharge: HOME | End: 2019-01-29

## 2019-01-29 ENCOUNTER — SPECIALTY PHARMACY (OUTPATIENT)
Dept: ONCOLOGY | Facility: HOSPITAL | Age: 54
End: 2019-01-29

## 2019-01-29 VITALS — WEIGHT: 220.4 LBS | BODY MASS INDEX: 28.68 KG/M2

## 2019-01-29 DIAGNOSIS — C20 RECTAL CARCINOMA (HCC): ICD-10-CM

## 2019-01-29 PROCEDURE — 77386: CPT | Performed by: RADIOLOGY

## 2019-01-29 RX ORDER — CAPECITABINE 500 MG/1
TABLET, FILM COATED ORAL
Qty: 18 TABLET | Refills: 0 | Status: SHIPPED | OUTPATIENT
Start: 2019-01-29 | End: 2019-04-16

## 2019-01-29 RX ORDER — CAPECITABINE 150 MG/1
TABLET, FILM COATED ORAL
Qty: 12 TABLET | Refills: 0 | Status: SHIPPED | OUTPATIENT
Start: 2019-01-29 | End: 2019-04-16

## 2019-01-30 ENCOUNTER — HOSPITAL ENCOUNTER (OUTPATIENT)
Dept: RADIATION ONCOLOGY | Facility: HOSPITAL | Age: 54
Discharge: HOME | End: 2019-01-30

## 2019-01-30 PROCEDURE — 77386: CPT | Performed by: RADIOLOGY

## 2019-01-31 ENCOUNTER — HOSPITAL ENCOUNTER (OUTPATIENT)
Dept: RADIATION ONCOLOGY | Facility: HOSPITAL | Age: 54
Discharge: HOME | End: 2019-01-31

## 2019-01-31 PROCEDURE — 77336 RADIATION PHYSICS CONSULT: CPT | Performed by: RADIOLOGY

## 2019-01-31 PROCEDURE — 77386: CPT | Performed by: RADIOLOGY

## 2019-02-01 ENCOUNTER — HOSPITAL ENCOUNTER (OUTPATIENT)
Dept: RADIATION ONCOLOGY | Facility: HOSPITAL | Age: 54
Discharge: HOME | End: 2019-02-01

## 2019-02-01 ENCOUNTER — HOSPITAL ENCOUNTER (OUTPATIENT)
Dept: RADIATION ONCOLOGY | Facility: HOSPITAL | Age: 54
Setting detail: RADIATION/ONCOLOGY SERIES
Discharge: HOME | End: 2019-02-01

## 2019-02-01 PROCEDURE — 77386: CPT | Performed by: RADIOLOGY

## 2019-02-04 ENCOUNTER — HOSPITAL ENCOUNTER (OUTPATIENT)
Dept: RADIATION ONCOLOGY | Facility: HOSPITAL | Age: 54
Discharge: HOME | End: 2019-02-04

## 2019-02-04 ENCOUNTER — LAB (OUTPATIENT)
Dept: LAB | Facility: HOSPITAL | Age: 54
End: 2019-02-04

## 2019-02-04 DIAGNOSIS — C20 RECTAL CARCINOMA (HCC): ICD-10-CM

## 2019-02-04 LAB
ERYTHROCYTE [DISTWIDTH] IN BLOOD BY AUTOMATED COUNT: 16.4 % (ref 11.3–14.5)
HCT VFR BLD AUTO: 40.2 % (ref 38.9–50.9)
HGB BLD-MCNC: 15.1 G/DL (ref 13.1–17.5)
LYMPHOCYTES # BLD AUTO: 0.6 10*3/MM3 (ref 0.6–4.8)
LYMPHOCYTES NFR BLD AUTO: 11.6 % (ref 24–44)
MCH RBC QN AUTO: 33.5 PG (ref 27–31)
MCHC RBC AUTO-ENTMCNC: 37.6 G/DL (ref 32–36)
MCV RBC AUTO: 89.2 FL (ref 80–99)
MONOCYTES # BLD AUTO: 0.2 10*3/MM3 (ref 0–1)
MONOCYTES NFR BLD AUTO: 4.5 % (ref 0–12)
NEUTROPHILS # BLD AUTO: 4.3 10*3/MM3 (ref 1.5–8.3)
NEUTROPHILS NFR BLD AUTO: 83.9 % (ref 41–71)
PLATELET # BLD AUTO: 104 10*3/MM3 (ref 150–450)
PMV BLD AUTO: 6.2 FL (ref 6–12)
RBC # BLD AUTO: 4.5 10*6/MM3 (ref 4.2–5.76)
WBC NRBC COR # BLD: 5.1 10*3/MM3 (ref 3.5–10.8)

## 2019-02-04 PROCEDURE — 36415 COLL VENOUS BLD VENIPUNCTURE: CPT

## 2019-02-04 PROCEDURE — 85025 COMPLETE CBC W/AUTO DIFF WBC: CPT

## 2019-02-04 PROCEDURE — 77386: CPT | Performed by: RADIOLOGY

## 2019-02-05 ENCOUNTER — HOSPITAL ENCOUNTER (OUTPATIENT)
Dept: RADIATION ONCOLOGY | Facility: HOSPITAL | Age: 54
Discharge: HOME | End: 2019-02-05

## 2019-02-05 VITALS — WEIGHT: 219.7 LBS | BODY MASS INDEX: 28.59 KG/M2

## 2019-02-05 PROCEDURE — 77386: CPT | Performed by: RADIOLOGY

## 2019-02-06 ENCOUNTER — DOCUMENTATION (OUTPATIENT)
Dept: NUTRITION | Facility: HOSPITAL | Age: 54
End: 2019-02-06

## 2019-02-06 ENCOUNTER — HOSPITAL ENCOUNTER (OUTPATIENT)
Dept: RADIATION ONCOLOGY | Facility: HOSPITAL | Age: 54
Discharge: HOME | End: 2019-02-06

## 2019-02-06 PROCEDURE — 77386: CPT | Performed by: RADIOLOGY

## 2019-02-06 NOTE — PROGRESS NOTES
ONC Nutrition    Radiation:  Patient has 7 remaining radiation treatments.  Chemotherapy:  Xeloda    Weight 219.7 lbs.      Patient is doing well as he nears completion of radiation; fatigued and experiencing constipation.  Discussed management of constipation including slow gradual increase in fiber intake to a goal of 30-35 grams per day; adequate hydration; use of Metamucil and other soluble fibers.  Written information provided.  Will continue to follow.

## 2019-02-07 ENCOUNTER — HOSPITAL ENCOUNTER (OUTPATIENT)
Dept: RADIATION ONCOLOGY | Facility: HOSPITAL | Age: 54
Discharge: HOME | End: 2019-02-07

## 2019-02-07 DIAGNOSIS — C20 RECTAL CARCINOMA (HCC): ICD-10-CM

## 2019-02-07 PROCEDURE — 77386: CPT | Performed by: RADIOLOGY

## 2019-02-07 PROCEDURE — 77336 RADIATION PHYSICS CONSULT: CPT | Performed by: RADIOLOGY

## 2019-02-07 RX ORDER — CAPECITABINE 150 MG/1
TABLET, FILM COATED ORAL
Qty: 12 TABLET | Refills: 0 | Status: CANCELLED | OUTPATIENT
Start: 2019-02-07

## 2019-02-07 RX ORDER — CAPECITABINE 500 MG/1
TABLET, FILM COATED ORAL
Qty: 18 TABLET | Refills: 0 | Status: CANCELLED | OUTPATIENT
Start: 2019-02-07

## 2019-02-08 ENCOUNTER — OFFICE VISIT (OUTPATIENT)
Dept: ONCOLOGY | Facility: CLINIC | Age: 54
End: 2019-02-08

## 2019-02-08 ENCOUNTER — HOSPITAL ENCOUNTER (OUTPATIENT)
Dept: RADIATION ONCOLOGY | Facility: HOSPITAL | Age: 54
Discharge: HOME | End: 2019-02-08

## 2019-02-08 VITALS
WEIGHT: 220 LBS | BODY MASS INDEX: 28.23 KG/M2 | HEART RATE: 69 BPM | HEIGHT: 74 IN | TEMPERATURE: 98.1 F | RESPIRATION RATE: 18 BRPM | OXYGEN SATURATION: 98 % | DIASTOLIC BLOOD PRESSURE: 89 MMHG | SYSTOLIC BLOOD PRESSURE: 166 MMHG

## 2019-02-08 DIAGNOSIS — C20 RECTAL CARCINOMA (HCC): Primary | ICD-10-CM

## 2019-02-08 PROCEDURE — 77386: CPT | Performed by: RADIOLOGY

## 2019-02-08 PROCEDURE — 99212 OFFICE O/P EST SF 10 MIN: CPT | Performed by: NURSE PRACTITIONER

## 2019-02-08 NOTE — PROGRESS NOTES
CHIEF COMPLAINT: Rectal carcinoma    Problem List:  Oncology/Hematology History    1. Stage IIIB rectal carcinoma clinical T3b N1 aM0  -11/30/18 CT abdomen pelvis and chest x-ray negative for metastasis.  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    -12/6/18 MRI of rectum showed T3b with suspicious right internal iliac lymph node and CT chest noncontrast negative except for gallstones  -Per Dr. Kelly, CEA was normal.  -12/18/18 saw me for the first time.  I reviewed her case in our multidisciplinary conference and went over that in detail with her.  She had presented with hematochezia.  Dr. Kelly repeated endoscopy for more exact detailing of the primary location and this appears to be rectal on MRI and endoscopy.  Plan is for randomization on clinical trial N 1048 to neoadjuvant FOLFOX versus standard chemoradiation.  We'll get him to radiation and our research staff for randomization and get his baseline CBC and CMP.    -1/4/19 followed up: Randomized to the Xeloda radiation arm.  He will get that and then 4-6 weeks later had his surgery, and then follow that with 6 months of adjuvant FOLFOX per protocol.  My research assistant met with him today.  He has a Xeloda prescription.            Rectal carcinoma (CMS/HCC)    12/18/2018 Initial Diagnosis     Rectal carcinoma (CMS/HCC)            HISTORY OF PRESENT ILLNESS:  The patient is a 53 y.o. male, here for follow up on management of rectal carcinoma.  Overall tolerating Xeloda plus radiation without significant side effects.  Denies any pain.  Has had some dryness of his skin but otherwise no skin changes.  Has some constipation, eating prunes and drinking prune juice with some relief.  Continues to work and remains active.      Past Medical History:   Diagnosis Date   • Disease of thyroid gland    • Fatty liver    • Hypertension    • Rectal cancer (CMS/HCC)      Past Surgical History:   Procedure  Laterality Date   • COLONOSCOPY      2018   • LIVER BIOPSY  2003   • VASECTOMY  1998       No Known Allergies    Family History and Social History reviewed and changed as necessary      REVIEW OF SYSTEM:   Review of Systems   Constitutional: Negative for appetite change, chills, diaphoresis, fatigue, fever and unexpected weight change.   HENT:   Negative for mouth sores, sore throat and trouble swallowing.    Eyes: Negative for icterus.   Respiratory: Negative for cough, hemoptysis and shortness of breath.    Cardiovascular: Negative for chest pain, leg swelling and palpitations.   Gastrointestinal: Negative for abdominal distention, abdominal pain, blood in stool, constipation, diarrhea, nausea and vomiting.   Endocrine: Negative for hot flashes.   Genitourinary: Negative for bladder incontinence, difficulty urinating, dysuria, frequency and hematuria.    Musculoskeletal: Negative for gait problem, neck pain and neck stiffness.   Skin: Negative for rash.   Neurological: Negative for dizziness, gait problem, headaches, light-headedness and numbness.   Hematological: Negative for adenopathy. Does not bruise/bleed easily.   Psychiatric/Behavioral: Negative for depression. The patient is not nervous/anxious.    All other systems reviewed and are negative.       PHYSICAL EXAM    There were no vitals filed for this visit.  Constitutional: Appears well-developed and well-nourished. No distress.   ECOG: (0) Fully active, able to carry on all predisease performance without restriction  HENT:   Head: Normocephalic.   Mouth/Throat: Oropharynx is clear and moist.   Eyes: Conjunctivae are normal. Pupils are equal, round, and reactive to light. No scleral icterus.   Neck: Neck supple. No JVD present. No thyromegaly present.   Cardiovascular: Normal rate, regular rhythm and normal heart sounds.    Pulmonary/Chest: Breath sounds normal. No respiratory distress.   Abdominal: Soft. Exhibits no distension. There is no tenderness.     Musculoskeletal:Exhibits no edema, tenderness or deformity.   Neurological: Alert and oriented to person, place, and time. Exhibits normal muscle tone.   Skin: No ecchymosis, no petechiae and no rash noted. Not diaphoretic. No cyanosis.    Psychiatric: Normal mood and affect.   Vitals reviewed.  Labs reviewed.      ASSESSMENT & PLAN:    Stage IIIB rectal carcinoma clinical T3b N1 aM0  -11/30/18 CT abdomen pelvis and chest x-ray negative for metastasis.  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    -12/6/18 MRI of rectum showed T3b with suspicious right internal iliac lymph node and CT chest noncontrast negative except for gallstones  -Per Dr. Kelly, CEA was normal.  -12/18/18 saw me for the first time.  I reviewed his case in our multidisciplinary conference and went over that in detail with him.  He had presented with hematochezia.  Dr. Kelly repeated endoscopy for more exact detailing of the primary location and this appears to be rectal on MRI and endoscopy.  Plan is for randomization on clinical trial N 1048 to neoadjuvant FOLFOX versus standard chemoradiation.  We'll get him to radiation and our research staff for randomization and get his baseline CBC and CMP.    -1/4/19 followed up: Randomized to the Xeloda radiation arm.  He will get that and then 4-6 weeks later had his surgery, and then follow that with 6 months of adjuvant FOLFOX per protocol.  My research assistant met with him today.  He has a Xeloda prescription.  He will get Zofran just in case but I doubt he will need antiemetic with this.  The Xeloda as taken 3 of the 500 mg and 2 of the 150 mg tablets daily with radiation and holding on the weekends.    -1/24/2019 The patient is doing well, tolerating Xeloda and radiation with no significant side effects.  Has had no adverse events, no mouth sores, no diarrhea, no skin changes other than just dryness of the skin.  States that his  stool is now more formed.  He is having no pain.  His ECOG performance status is 0, he remains active and is working full time, he works third shift.  I will see him back in 2 weeks for follow-up.  He will continue with weekly CBC.  -2/8/2019 The patient continues to do well.  His ECOG performance status is 0.  He has some constipation for which she is drinking prune juice and eating prunes with some relief.  I discussed with him that he could try adding a stool softener if needed.  He will finish up with radiation on February 14.  I will get him scheduled for MRI of the pelvis without contrast, rectal protocol, about 3 weeks out from radiation completion.  I have asked the patient to get back in touch with Dr. Dugan for an appointment for follow-up to go over MRI and surgery planning.  He states understanding and will call her office to get that appointment.  I will have him back to Dr. Moeller in about 6 weeks for follow-up postoperatively.    Ana Self, APRN    02/08/2019

## 2019-02-11 ENCOUNTER — HOSPITAL ENCOUNTER (OUTPATIENT)
Dept: RADIATION ONCOLOGY | Facility: HOSPITAL | Age: 54
Discharge: HOME | End: 2019-02-11

## 2019-02-11 PROCEDURE — 77386: CPT | Performed by: RADIOLOGY

## 2019-02-12 ENCOUNTER — HOSPITAL ENCOUNTER (OUTPATIENT)
Dept: RADIATION ONCOLOGY | Facility: HOSPITAL | Age: 54
Discharge: HOME | End: 2019-02-12

## 2019-02-12 VITALS — WEIGHT: 219.3 LBS | BODY MASS INDEX: 28.54 KG/M2

## 2019-02-12 PROCEDURE — 77387 GUIDANCE FOR RADJ TX DLVR: CPT | Performed by: RADIOLOGY

## 2019-02-12 PROCEDURE — 77412 RADIATION TX DELIVERY LVL 3: CPT | Performed by: RADIOLOGY

## 2019-02-12 NOTE — PROGRESS NOTES
ONC Nutrition    Diagnosis:  Rectal Cancer  Chemotherapy: Xeloda - M-F with radiation  Radiation -   radiation treatments / 2 remaining treatments    Weight 219.3 / weight stable    Patient nearing completion of radiation; 2 remaining treatments.  Constipation continues to be an issue, but some improvement with the use of OTC stool softeners, increasing fiber intake and hydration.  Otherwise, patient denies any nutritional impact symptoms.

## 2019-02-13 ENCOUNTER — HOSPITAL ENCOUNTER (OUTPATIENT)
Dept: RADIATION ONCOLOGY | Facility: HOSPITAL | Age: 54
Discharge: HOME | End: 2019-02-13

## 2019-02-13 PROCEDURE — 77387 GUIDANCE FOR RADJ TX DLVR: CPT | Performed by: RADIOLOGY

## 2019-02-13 PROCEDURE — 77412 RADIATION TX DELIVERY LVL 3: CPT | Performed by: RADIOLOGY

## 2019-02-14 ENCOUNTER — HOSPITAL ENCOUNTER (OUTPATIENT)
Dept: RADIATION ONCOLOGY | Facility: HOSPITAL | Age: 54
Discharge: HOME | End: 2019-02-14

## 2019-02-14 PROCEDURE — 77387 GUIDANCE FOR RADJ TX DLVR: CPT | Performed by: RADIOLOGY

## 2019-02-14 PROCEDURE — 77336 RADIATION PHYSICS CONSULT: CPT | Performed by: RADIOLOGY

## 2019-02-14 PROCEDURE — 77412 RADIATION TX DELIVERY LVL 3: CPT | Performed by: RADIOLOGY

## 2019-02-28 NOTE — RADIATION COMPLETION NOTES
DATE OF COMPLETION: 2/14/2019  DIAGNOSIS: Rectal Cancer    REFERRING: Pau Page MD        Hesham Croft completed radiation therapy today.      BACKGROUND: Hesham Arevalo is a 53 year old gentleman who has been diagnosed with a locally advanced [id number] rectal cancer.  He received preoperative radiation therapy concurrent with Capecitabine, and his radiation treatments were as detailed below:    Treatment Summary     Dates of Therapy: 1/8/2019 - 2/14/2019  Treatment Site: Pelvis  Dose: 50.4 Gy in 28 fractions of 1.8 Gy each  Technique: Mr. Arevalo's treatments were delivered using 6X photons using intensity modulated radiation therapy to treat the entire sacral hollow, involved nodes, and the gross site of disease within the rectum to 45 Gy.  He then completed a boost of an additional 5.4 Gy in 3 fractions to treat the sites of gross disease and the entire rectum.    Treatment Course and Tolerance: Mr. Arevalo tolerated radiation well.  He developed only minor fatigue and gr1 bowel changes.  He will be re-evaluated within the next 3 weeks for surgical resection.    The initial follow up visit will be in 1 month.    Hesham Arevalo knows to call if any problems or concerns develop in the meantime.     Electronically signed by: Pepe Lennon MD                    Cc: Myke Mendoza MD

## 2019-03-04 ENCOUNTER — HOSPITAL ENCOUNTER (OUTPATIENT)
Dept: MRI IMAGING | Facility: HOSPITAL | Age: 54
Discharge: HOME | End: 2019-03-04
Admitting: NURSE PRACTITIONER

## 2019-03-04 DIAGNOSIS — C20 RECTAL CARCINOMA (HCC): ICD-10-CM

## 2019-03-04 PROCEDURE — 72195 MRI PELVIS W/O DYE: CPT

## 2019-03-13 ENCOUNTER — HOSPITAL ENCOUNTER (OUTPATIENT)
Dept: RADIATION ONCOLOGY | Facility: HOSPITAL | Age: 54
Setting detail: RADIATION/ONCOLOGY SERIES
Discharge: HOME | End: 2019-03-13

## 2019-03-13 ENCOUNTER — OFFICE VISIT (OUTPATIENT)
Dept: RADIATION ONCOLOGY | Facility: HOSPITAL | Age: 54
End: 2019-03-13

## 2019-03-13 VITALS
HEART RATE: 74 BPM | BODY MASS INDEX: 28.72 KG/M2 | TEMPERATURE: 98 F | SYSTOLIC BLOOD PRESSURE: 175 MMHG | RESPIRATION RATE: 18 BRPM | OXYGEN SATURATION: 98 % | WEIGHT: 220.7 LBS | DIASTOLIC BLOOD PRESSURE: 90 MMHG

## 2019-03-13 DIAGNOSIS — C20 RECTAL CARCINOMA (HCC): ICD-10-CM

## 2019-03-13 PROCEDURE — G0463 HOSPITAL OUTPT CLINIC VISIT: HCPCS | Performed by: RADIOLOGY

## 2019-03-22 ENCOUNTER — OFFICE VISIT (OUTPATIENT)
Dept: ONCOLOGY | Facility: CLINIC | Age: 54
End: 2019-03-22

## 2019-03-22 VITALS
BODY MASS INDEX: 28.5 KG/M2 | DIASTOLIC BLOOD PRESSURE: 82 MMHG | HEART RATE: 70 BPM | WEIGHT: 219 LBS | OXYGEN SATURATION: 99 % | TEMPERATURE: 97.4 F | SYSTOLIC BLOOD PRESSURE: 176 MMHG | RESPIRATION RATE: 16 BRPM

## 2019-03-22 DIAGNOSIS — C20 RECTAL CARCINOMA (HCC): Primary | ICD-10-CM

## 2019-03-22 DIAGNOSIS — C20 RECTAL CARCINOMA (HCC): Primary | Chronic | ICD-10-CM

## 2019-03-22 PROCEDURE — 99214 OFFICE O/P EST MOD 30 MIN: CPT | Performed by: INTERNAL MEDICINE

## 2019-03-22 NOTE — PROGRESS NOTES
CHIEF COMPLAINT: Rectal carcinoma status post standard neoadjuvant Xeloda radiation on clinical trial     Problem List:  Oncology/Hematology History    1. Stage IIIB rectal carcinoma clinical T3b N1 aM0  -11/30/18 CT abdomen pelvis and chest x-ray negative for metastasis.  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    -12/6/18 MRI of rectum showed T3b with suspicious right internal iliac lymph node and CT chest noncontrast negative except for gallstones  -Per Dr. Kelly, CEA was normal.  -12/18/18 saw me for the first time.  I reviewed her case in our multidisciplinary conference and went over that in detail with her.  She had presented with hematochezia.  Dr. Kelly repeated endoscopy for more exact detailing of the primary location and this appears to be rectal on MRI and endoscopy.  Plan is for randomization on clinical trial N 1048 to neoadjuvant FOLFOX versus standard chemoradiation.  We'll get him to radiation and our research staff for randomization and get his baseline CBC and CMP.    -1/4/19 followed up: Randomized to the Xeloda radiation arm.  He will get that and then 4-6 weeks later had his surgery, and then follow that with 6 months of adjuvant FOLFOX per protocol.  My research assistant met with him today.  He has a Xeloda prescription.  -3/4/2019 MRI: Good response with T2, less likely T3 with spiculation N0 disease.            Rectal carcinoma (CMS/HCC)    12/18/2018 Initial Diagnosis     Rectal carcinoma (CMS/HCC)         1/8/2019 - 2/14/2019 Radiation     Radiation OncologyTreatment Course:  Hesham Arevalo received 5040 cGy in 28 fractions to pelvis via External Beam Radiation - EBRT.         3/4/2019 Imaging     MRI of the pelvis:  IMPRESSIONS:  MRI rectal cancer T category is: T2, LESS LIKELY EARLY T3 SPICULATIONS  Maximum EMD of invasion is: 0  Minimum tumor to MRF distance is: 12 MM  Low rectal tumor component: NO  Mesorectal  nodes/tumor deposits: NO  EMVI: NO  Extramesorectal nodes: NO            HISTORY OF PRESENT ILLNESS:  The patient is a 53 y.o. male, here for follow up on management of Rectal carcinoma status post standard neoadjuvant Xeloda radiation on clinical trial       Past Medical History:   Diagnosis Date   • Disease of thyroid gland    • Fatty liver    • Hypertension    • Rectal cancer (CMS/HCC)      Past Surgical History:   Procedure Laterality Date   • COLONOSCOPY      2018   • LIVER BIOPSY  2003   • VASECTOMY  1998       No Known Allergies    Family History and Social History reviewed and changed as necessary      REVIEW OF SYSTEM:   Review of Systems   Constitutional: Negative for appetite change, chills, diaphoresis, fatigue, fever and unexpected weight change.   HENT:   Negative for mouth sores, sore throat and trouble swallowing.    Eyes: Negative for icterus.   Respiratory: Negative for cough, hemoptysis and shortness of breath.    Cardiovascular: Negative for chest pain, leg swelling and palpitations.   Gastrointestinal: Negative for abdominal distention, abdominal pain, blood in stool, constipation, diarrhea, nausea and vomiting.   Endocrine: Negative for hot flashes.   Genitourinary: Negative for bladder incontinence, difficulty urinating, dysuria, frequency and hematuria.    Musculoskeletal: Negative for gait problem, neck pain and neck stiffness.   Skin: Negative for rash.   Neurological: Negative for dizziness, gait problem, headaches, light-headedness and numbness.   Hematological: Negative for adenopathy. Does not bruise/bleed easily.   Psychiatric/Behavioral: Negative for depression. The patient is not nervous/anxious.    All other systems reviewed and are negative.       PHYSICAL EXAM    Vitals:    03/22/19 1457   BP: 176/82   Pulse: 70   Resp: 16   Temp: 97.4 °F (36.3 °C)   TempSrc: Temporal   SpO2: 99%   Weight: 99.3 kg (219 lb)     Constitutional: Appears well-developed and well-nourished. No  distress.   ECOG: (1) Restricted in physically strenuous activity, ambulatory and able to do work of light nature  HENT:   Head: Normocephalic.   Mouth/Throat: Oropharynx is clear and moist.   Eyes: Conjunctivae are normal. Pupils are equal, round, and reactive to light. No scleral icterus.   Neck: Neck supple. No JVD present. No thyromegaly present.   Cardiovascular: Normal rate, regular rhythm and normal heart sounds.    Pulmonary/Chest: Breath sounds normal. No respiratory distress.   Abdominal: Soft. Exhibits no distension and no mass. There is no hepatosplenomegaly. There is no tenderness. There is no rebound and no guarding.   Musculoskeletal:Exhibits no edema, tenderness or deformity.   Neurological: Alert and oriented to person, place, and time. Exhibits normal muscle tone.   Skin: No ecchymosis, no petechiae and no rash noted. Not diaphoretic. No cyanosis. Nails show no clubbing.   Psychiatric: Normal mood and affect.   Vitals reviewed.      Lab Results   Component Value Date    HGB 15.1 02/04/2019    HCT 40.2 02/04/2019    MCV 89.2 02/04/2019     (L) 02/04/2019    WBC 5.10 02/04/2019    NEUTROABS 4.30 02/04/2019    LYMPHSABS 0.60 02/04/2019    MONOSABS 0.20 02/04/2019    EOSABS 0.27 01/21/2019    BASOSABS 0.03 01/21/2019       Lab Results   Component Value Date    BUN 13 12/18/2018    CREATININE 0.93 12/18/2018     12/18/2018    K 4.1 12/18/2018    CL 99 12/18/2018    CO2 21 12/18/2018    CALCIUM 9.1 12/18/2018    ALBUMIN 4.6 12/18/2018    BILITOT 0.6 12/18/2018    ALKPHOS 59 12/18/2018    AST 59 (H) 12/18/2018    ALT 48 (H) 12/18/2018                   ASSESSMENT & PLAN:    1. Current clinical T2 N0 M0 rectal carcinoma status post neoadjuvant Xeloda radiation: He is due for surgery in the next few weeks and I will see him back in 6 weeks and at that point will resume adjuvant FOLFOX/xelox on clinical trial once he has healed adequately.  Gave him the good news today given his good response to  treatment.  Has already seen Dr. Lennon who also has given him this good news.    Discussed with patient 30 minutes greater than 50% spent counseling  Ousmane Moeller MD    03/22/2019

## 2019-04-16 ENCOUNTER — APPOINTMENT (OUTPATIENT)
Dept: PREADMISSION TESTING | Facility: HOSPITAL | Age: 54
End: 2019-04-16

## 2019-04-16 VITALS — HEIGHT: 73 IN | BODY MASS INDEX: 29.42 KG/M2 | WEIGHT: 222 LBS

## 2019-04-16 DIAGNOSIS — Z01.89 LABORATORY TEST: Primary | ICD-10-CM

## 2019-04-16 LAB
CEA SERPL-MCNC: 1.11 NG/ML
DEPRECATED RDW RBC AUTO: 46.6 FL (ref 37–54)
ERYTHROCYTE [DISTWIDTH] IN BLOOD BY AUTOMATED COUNT: 14.1 % (ref 12.3–15.4)
HBA1C MFR BLD: 6 % (ref 4.8–5.6)
HCT VFR BLD AUTO: 41.5 % (ref 37.5–51)
HGB BLD-MCNC: 13.9 G/DL (ref 13–17.7)
MCH RBC QN AUTO: 30.2 PG (ref 26.6–33)
MCHC RBC AUTO-ENTMCNC: 33.5 G/DL (ref 31.5–35.7)
MCV RBC AUTO: 90 FL (ref 79–97)
PLATELET # BLD AUTO: 81 10*3/MM3 (ref 140–450)
PMV BLD AUTO: 10.3 FL (ref 6–12)
RBC # BLD AUTO: 4.61 10*6/MM3 (ref 4.14–5.8)
WBC NRBC COR # BLD: 3.24 10*3/MM3 (ref 3.4–10.8)

## 2019-04-16 PROCEDURE — 82378 CARCINOEMBRYONIC ANTIGEN: CPT | Performed by: COLON & RECTAL SURGERY

## 2019-04-16 PROCEDURE — 93010 ELECTROCARDIOGRAM REPORT: CPT | Performed by: INTERNAL MEDICINE

## 2019-04-16 PROCEDURE — 93005 ELECTROCARDIOGRAM TRACING: CPT

## 2019-04-16 PROCEDURE — 85027 COMPLETE CBC AUTOMATED: CPT | Performed by: COLON & RECTAL SURGERY

## 2019-04-16 PROCEDURE — 36415 COLL VENOUS BLD VENIPUNCTURE: CPT

## 2019-04-16 PROCEDURE — 86901 BLOOD TYPING SEROLOGIC RH(D): CPT

## 2019-04-16 PROCEDURE — 86900 BLOOD TYPING SEROLOGIC ABO: CPT

## 2019-04-16 PROCEDURE — 83036 HEMOGLOBIN GLYCOSYLATED A1C: CPT | Performed by: COLON & RECTAL SURGERY

## 2019-04-16 RX ORDER — NEOMYCIN SULFATE 500 MG/1
1000 TABLET ORAL TAKE AS DIRECTED
Refills: 0 | COMMUNITY
Start: 2019-03-25 | End: 2019-04-26 | Stop reason: HOSPADM

## 2019-04-16 RX ORDER — METRONIDAZOLE 500 MG/1
500 TABLET ORAL TAKE AS DIRECTED
Refills: 0 | COMMUNITY
Start: 2019-03-25 | End: 2019-04-26 | Stop reason: HOSPADM

## 2019-04-16 NOTE — NURSING NOTE
[facility] nurse for ileostomy education in PAT.  Stoma marking to be done in Pre-Op.  Pt to have LAR with loop ileostomy next week with Dr Kelly.  [facility] ileostomy booklets given to patient and reviewed.  Appliances - one and two piece - shown to patient.  ERAS expectations reviewed.  All questions answered to the best of my ability.  Pt was referred to [facility] recovery program website and [employer] educational website for additional information.   Will do Stoma marking in pre-op.  Please call [facility] nurse at [phone] or [phone] when patient is ready for marking.  Thank you

## 2019-04-16 NOTE — PAT
Patient instructed to drink 20 ounces (or until full) of Gatorade or 20 ounces of G2 (if diabetic) and complete 1 hour before arrival time for procedure (NO RED Gatorade or G2)    Patient verbalized understanding.    Patient to apply Chlorhexadine wipes  to surgical area (as instructed) the night before procedure and the AM of procedure. Wipes provided.    Patient did not have bowel prep instructions.  Called and spoke with Pau Skaggs at Dr Kelly's office. She faxed over instruction sheet and gave it to patient after reviewing bowel prep instructions with patient.    Stoma nurse to see patient.    BMP hemolyzed x2.  Will need to drawn in preop since patient had already left department when lab called the second time.

## 2019-04-16 NOTE — DISCHARGE INSTRUCTIONS
The following information and instructions were given:    Do not eat, drink, smoke or chew gum after midnight the night before surgery. This also includes no mints.  Take all routine, prescribed medications including heart and blood pressure medicines with a sip of water unless otherwise instructed by your physician.   Do NOT take diabetic medication unless instructed by your physician.    If you were instructed to drink 20 ounces (or until full) of Gatorade or G2 (if diabetic) the morning of surgery, the Gatorade or G2 must be completed 1 hour before arrival time on the day of surgery .  No RED Gatorade or G2.      DO NOT shave for two days before your procedure.  Do not wear makeup.      DO NOT wear fingernail polish (gel/regular) and/or acrylic/artificial nails on the day of surgery.   If you have had a recent manicure and would rather not remove polish or artificial nails, then the minimum requirement is that the polish/artificial nails must be removed from the middle finger on each hand.      If you are having surgery/procedure on an upper extremity, then fingernail polish/artificial fingernails must be removed for surgery.  NO EXCEPTIONS.      If you are having surgery/procedure on a lower extremity, then toenail polish on both extremities must be removed for surgery.  NO EXCEPTIONS.    Remove all jewelry (advise to go to jeweler if unable to remove).  Jewelry, especially rings, can no longer be taped for surgery.    Leave anything you consider valuable at home.    Leave your suitcase in the car until after your surgery.    Bring the following with you the day of your procedure (when applicable)       -picture ID and insurance cards       -Co-pay/deductible required by insurance       -Medications in the original bottles (not a list) including all over-the-counter medications if not brought to PAT       -Copy of advance directive, living will or power of  documents if not brought to PAT       -CPAP or  BIPAP mask and tubing (do not bring machine)       -Skin prep instruction(s) sheet       -PAT Pass    Education booklet, brochure, handout or binder related to procedure given to patient.    When applicable, an ERAS booklet was given to patient.    Pain Control After Surgery handout given to patient.    Respirex use (handout given to patient) and pneumonia prevention education provided.    Signs and Symptoms of infection discussed.    DVT Prevention education given.  Stressing the importance of ambulation.    Please apply Chlorhexadine wipes to surgical area (if instructed) the night before procedure and the AM of procedure and document date/time of applications on skin prep instruction sheet.        Patient to apply Chlorhexadine wipes  to surgical area (as instructed) the night before procedure and the AM of procedure. Wipes provided.    Patient instructed to drink 20 ounces (or until full) of Gatorade or 20 ounces of G2 (if diabetic) and complete 1 hour before arrival time for procedure (NO RED Gatorade or G2)    Patient verbalized understanding.    The following information and handout were given during PAT visit:    ERAS Colon    ERAS stands for Enhanced Recovery After Surgery.  Enhanced recovery protocols are evidence-based designed to standardize medical care, improve outcomes and lower health costs.    ERAS focuses on early post-op oral intake, pain management options, decreased use of narcotics, and early ambulation or walking.    The benefits of ERAS include lower post-op complications, earlier return of bowel function, shorter stay at hospital, and improved patient satisfaction.    Patient Responsibilities include:   Before surgery   -clear liquids day before surgery   -bowel prep per physician instructions   -drink 20 ounces (or until full) of Gatorade or 20 ounces of G2 (if diabetic) and complete three hours before surgery.  (NO RED Gatorade or G2).     After surgery   -clear liquids 2 hours after  surgery   -soft diet the day after surgery and advance as tolerated   -out of bed the evening of surgery-ambulate twice or up in the chair   -out of bed and walking 5 times the day after surgery   -Bring 2 packs of chewing gum; chew gum for 30 minutes three times a day after surgery if not having ileostomy.    Keep in mind your efforts to drink and walk early contribute to greater success.      The following information and instructions were given:    NPO after MN except sips of water with routine prescribed medication (except blood thinner, diabetes, or weight reducing medication) unless otherwise instructed by your physician.  Do not eat, drink, smoke or chew gum after MN the night before surgery. This also includes no mints.    DO NOT shave for two days before surgery.  Do not wear makeup.    All nail polish, artificial nails, acrylic nails, gel nails/polish must be removed for surgery.  If you are having a surgery on a lower extremity, you must remove all polish from toenails on both feet for surgery.  (If you have had a recent manicure, you may remove polish/nails from the middle fingers on each hand for surgery).    Remove all jewelry (advised to go to jeweler if unable to remove).    Leave anything you consider valuable at home.    Leave your suitcase in the car until after your surgery.    Bring the following with you (if applicable)   -picture ID and insurance cards   -Co-pay/deductible required by insurance   -Medications in the original bottles (not a list) including all over-the-counter  medications if not brought to PAT   -Copy of advance directive, living will or power of  documents if not  brought to PAT   -CPAP or BIPAP mask and tubing (do not bring machine)   -Skin prep instructions sheet   -PAT Pass  Education booklet, brochure, handout or binder given to patient including ERAS booklet.    Pain Control After Surgery handout given to patient.    Respirex use (handout given to patient) and  pneumonia prevention.    Signs and Symptoms of infection.    DVT Prevention stressing the importance of ambulation.    Patient to apply Chlorhexadine wipes to surgical area (as instructed) the night before procedure and the AM of procedure.

## 2019-04-17 LAB
ABO GROUP BLD: NORMAL
RH BLD: NEGATIVE

## 2019-04-18 ENCOUNTER — TELEPHONE (OUTPATIENT)
Dept: ONCOLOGY | Facility: CLINIC | Age: 54
End: 2019-04-18

## 2019-04-18 DIAGNOSIS — D69.6 THROMBOCYTOPENIA (HCC): ICD-10-CM

## 2019-04-18 DIAGNOSIS — C20 RECTAL CARCINOMA (HCC): Primary | Chronic | ICD-10-CM

## 2019-04-18 RX ORDER — SODIUM CHLORIDE 9 MG/ML
250 INJECTION, SOLUTION INTRAVENOUS AS NEEDED
Status: CANCELLED | OUTPATIENT
Start: 2019-04-18

## 2019-04-18 NOTE — TELEPHONE ENCOUNTER
Patient notified.  Verbalized understanding.  Will ask to have scheduled early in the morning on Monday since he will be starting his bowel prep.

## 2019-04-18 NOTE — TELEPHONE ENCOUNTER
----- Message from Ousmane Moeller MD sent at 4/17/2019 12:08 PM EDT -----  Regarding: RE: CHANCE-PLATELET COUNT AND SURGERY  Contact: [phone]  Mireille, Have him get plt count Monday morning with 2 u platelet Pheresis transfusion that day. Do 10 minute post transfusion count as well. If plt over 99k, hold transfusion. Can you arrange?  ----- Message -----  From: Mireille Son RN  Sent: 4/17/2019   8:26 AM  To: Ousmane Moeller MD  Subject: FW: CHANCE-PLATELET COUNT AND SURGERY                 ----- Message -----  From: Olena Flower  Sent: 4/17/2019   8:04 AM  To: [facility]  Subject: CHANCE-PLATELET COUNT AND SURGERY                 Pau with Dr. Pau Kelly's office called patient is going to have surgery on Tuesday and his platelet count is 81 and typically she likes it to be over 100 for surgery she wants to know what Dr. Moeller thoughts are about this. Please call ASAP.

## 2019-04-22 ENCOUNTER — HOSPITAL ENCOUNTER (OUTPATIENT)
Dept: ONCOLOGY | Facility: HOSPITAL | Age: 54
Setting detail: INFUSION SERIES
Discharge: HOME | End: 2019-04-22

## 2019-04-22 ENCOUNTER — ANESTHESIA EVENT (OUTPATIENT)
Dept: PERIOP | Facility: HOSPITAL | Age: 54
End: 2019-04-22

## 2019-04-22 VITALS
TEMPERATURE: 97.9 F | BODY MASS INDEX: 28.71 KG/M2 | DIASTOLIC BLOOD PRESSURE: 90 MMHG | SYSTOLIC BLOOD PRESSURE: 177 MMHG | RESPIRATION RATE: 18 BRPM | HEART RATE: 70 BPM | WEIGHT: 217.6 LBS

## 2019-04-22 DIAGNOSIS — C20 RECTAL CARCINOMA (HCC): ICD-10-CM

## 2019-04-22 DIAGNOSIS — D69.6 THROMBOCYTOPENIA (HCC): ICD-10-CM

## 2019-04-22 LAB
ABO GROUP BLD: NORMAL
BLD GP AB SCN SERPL QL: NEGATIVE
ERYTHROCYTE [DISTWIDTH] IN BLOOD BY AUTOMATED COUNT: 14.6 % (ref 12.3–15.4)
HCT VFR BLD AUTO: 41.1 % (ref 37.5–51)
HGB BLD-MCNC: 13.9 G/DL (ref 13–17.7)
LYMPHOCYTES # BLD AUTO: 0.6 10*3/MM3 (ref 0.7–3.1)
LYMPHOCYTES NFR BLD AUTO: 17.9 % (ref 19.6–45.3)
MCH RBC QN AUTO: 30.5 PG (ref 26.6–33)
MCHC RBC AUTO-ENTMCNC: 33.9 G/DL (ref 31.5–35.7)
MCV RBC AUTO: 89.9 FL (ref 79–97)
MONOCYTES # BLD AUTO: 0.3 10*3/MM3 (ref 0.1–0.9)
MONOCYTES NFR BLD AUTO: 8.1 % (ref 5–12)
NEUTROPHILS # BLD AUTO: 2.4 10*3/MM3 (ref 1.7–7)
NEUTROPHILS NFR BLD AUTO: 74 % (ref 42.7–76)
PLATELET # BLD AUTO: 101 10*3/MM3 (ref 140–450)
PMV BLD AUTO: 7.6 FL (ref 6–12)
RBC # BLD AUTO: 4.57 10*6/MM3 (ref 4.14–5.8)
RH BLD: NEGATIVE
T&S EXPIRATION DATE: NORMAL
WBC NRBC COR # BLD: 3.3 10*3/MM3 (ref 3.4–10.8)

## 2019-04-22 PROCEDURE — 86901 BLOOD TYPING SEROLOGIC RH(D): CPT | Performed by: INTERNAL MEDICINE

## 2019-04-22 PROCEDURE — 86900 BLOOD TYPING SEROLOGIC ABO: CPT | Performed by: INTERNAL MEDICINE

## 2019-04-22 PROCEDURE — 85025 COMPLETE CBC W/AUTO DIFF WBC: CPT | Performed by: INTERNAL MEDICINE

## 2019-04-22 PROCEDURE — 96372 THER/PROPH/DIAG INJ SC/IM: CPT

## 2019-04-22 PROCEDURE — 36415 COLL VENOUS BLD VENIPUNCTURE: CPT

## 2019-04-22 PROCEDURE — 86850 RBC ANTIBODY SCREEN: CPT | Performed by: INTERNAL MEDICINE

## 2019-04-22 NOTE — PROGRESS NOTES
Type and screen process explained-- leave red armband on for surgery tomorrow.  V/a understanding.

## 2019-04-23 ENCOUNTER — ANESTHESIA (OUTPATIENT)
Dept: PERIOP | Facility: HOSPITAL | Age: 54
End: 2019-04-23

## 2019-04-23 ENCOUNTER — HOSPITAL ENCOUNTER (INPATIENT)
Facility: HOSPITAL | Age: 54
LOS: 3 days | Discharge: HOME | End: 2019-04-26
Attending: COLON & RECTAL SURGERY | Admitting: COLON & RECTAL SURGERY

## 2019-04-23 DIAGNOSIS — C20 RECTAL CANCER (HCC): ICD-10-CM

## 2019-04-23 PROBLEM — I10 HTN (HYPERTENSION): Status: ACTIVE | Noted: 2019-04-23

## 2019-04-23 PROBLEM — Z90.49 S/P COLON RESECTION: Status: ACTIVE | Noted: 2019-04-23

## 2019-04-23 PROBLEM — E03.9 HYPOTHYROID: Status: ACTIVE | Noted: 2019-04-23

## 2019-04-23 PROBLEM — R73.03 PREDIABETES: Status: ACTIVE | Noted: 2019-04-23

## 2019-04-23 LAB
ANION GAP SERPL CALCULATED.3IONS-SCNC: 11 MMOL/L
BUN BLD-MCNC: 12 MG/DL (ref 6–20)
BUN/CREAT SERPL: 12.6 (ref 7–25)
CALCIUM SPEC-SCNC: 9 MG/DL (ref 8.6–10.5)
CHLORIDE SERPL-SCNC: 100 MMOL/L (ref 98–107)
CO2 SERPL-SCNC: 27 MMOL/L (ref 22–29)
CREAT BLD-MCNC: 0.95 MG/DL (ref 0.76–1.27)
GFR SERPL CREATININE-BSD FRML MDRD: 82 ML/MIN/1.73
GLUCOSE BLD-MCNC: 127 MG/DL (ref 65–99)
GLUCOSE BLDC GLUCOMTR-MCNC: 144 MG/DL (ref 70–130)
GLUCOSE BLDC GLUCOMTR-MCNC: 215 MG/DL (ref 70–130)
HCT VFR BLD AUTO: 41.9 % (ref 37.5–51)
HGB BLD-MCNC: 13.9 G/DL (ref 13–17.7)
POTASSIUM BLD-SCNC: 3.8 MMOL/L (ref 3.5–5.2)
SODIUM BLD-SCNC: 138 MMOL/L (ref 136–145)

## 2019-04-23 PROCEDURE — 80048 BASIC METABOLIC PNL TOTAL CA: CPT | Performed by: COLON & RECTAL SURGERY

## 2019-04-23 PROCEDURE — 88309 TISSUE EXAM BY PATHOLOGIST: CPT | Performed by: COLON & RECTAL SURGERY

## 2019-04-23 PROCEDURE — 25010000002 NEOSTIGMINE 10 MG/10ML SOLUTION: Performed by: NURSE ANESTHETIST, CERTIFIED REGISTERED

## 2019-04-23 PROCEDURE — 25010000002 KETOROLAC TROMETHAMINE PER 15 MG: Performed by: COLON & RECTAL SURGERY

## 2019-04-23 PROCEDURE — 25010000002 ONDANSETRON PER 1 MG: Performed by: NURSE ANESTHETIST, CERTIFIED REGISTERED

## 2019-04-23 PROCEDURE — 0FB20ZX EXCISION OF LEFT LOBE LIVER, OPEN APPROACH, DIAGNOSTIC: ICD-10-PCS | Performed by: COLON & RECTAL SURGERY

## 2019-04-23 PROCEDURE — 25010000002 PROPOFOL 1000 MG/ML EMULSION: Performed by: NURSE ANESTHETIST, CERTIFIED REGISTERED

## 2019-04-23 PROCEDURE — 82962 GLUCOSE BLOOD TEST: CPT

## 2019-04-23 PROCEDURE — 0D1B0Z4 BYPASS ILEUM TO CUTANEOUS, OPEN APPROACH: ICD-10-PCS | Performed by: COLON & RECTAL SURGERY

## 2019-04-23 PROCEDURE — 85014 HEMATOCRIT: CPT | Performed by: COLON & RECTAL SURGERY

## 2019-04-23 PROCEDURE — 0DBN0ZZ EXCISION OF SIGMOID COLON, OPEN APPROACH: ICD-10-PCS | Performed by: COLON & RECTAL SURGERY

## 2019-04-23 PROCEDURE — 88313 SPECIAL STAINS GROUP 2: CPT | Performed by: COLON & RECTAL SURGERY

## 2019-04-23 PROCEDURE — 63710000001 INSULIN LISPRO (HUMAN) PER 5 UNITS: Performed by: NURSE PRACTITIONER

## 2019-04-23 PROCEDURE — 25010000002 BUPRENORPHINE PER 0.1 MG: Performed by: ANESTHESIOLOGY

## 2019-04-23 PROCEDURE — 25010000002 MIDAZOLAM PER 1 MG: Performed by: NURSE ANESTHETIST, CERTIFIED REGISTERED

## 2019-04-23 PROCEDURE — 88307 TISSUE EXAM BY PATHOLOGIST: CPT | Performed by: COLON & RECTAL SURGERY

## 2019-04-23 PROCEDURE — 25010000002 FENTANYL CITRATE (PF) 100 MCG/2ML SOLUTION: Performed by: NURSE ANESTHETIST, CERTIFIED REGISTERED

## 2019-04-23 PROCEDURE — 25010000002 HEPARIN (PORCINE) PER 1000 UNITS: Performed by: COLON & RECTAL SURGERY

## 2019-04-23 PROCEDURE — 0WJP4ZZ INSPECTION OF GASTROINTESTINAL TRACT, PERCUTANEOUS ENDOSCOPIC APPROACH: ICD-10-PCS | Performed by: COLON & RECTAL SURGERY

## 2019-04-23 PROCEDURE — 25010000002 PROPOFOL 10 MG/ML EMULSION: Performed by: NURSE ANESTHETIST, CERTIFIED REGISTERED

## 2019-04-23 PROCEDURE — 25010000002 DEXAMETHASONE SODIUM PHOSPHATE 10 MG/ML SOLUTION: Performed by: ANESTHESIOLOGY

## 2019-04-23 PROCEDURE — 25010000002 ERTAPENEM 1 GM/100ML SOLUTION: Performed by: COLON & RECTAL SURGERY

## 2019-04-23 PROCEDURE — 85018 HEMOGLOBIN: CPT | Performed by: COLON & RECTAL SURGERY

## 2019-04-23 PROCEDURE — 63710000001 INSULIN LISPRO (HUMAN) PER 5 UNITS: Performed by: COLON & RECTAL SURGERY

## 2019-04-23 DEVICE — THE ECHELON, ECHELON ENDOPATH™ AND ECHELON FLEX™ FAMILIES OF ENDOSCOPIC LINEAR CUTTERS AND RELOADS ARE STERILE, SINGLE PATIENT USE INSTRUMENTS THAT SIMULTANEOUSLY CUT AND STAPLE TISSUE. THERE ARE SIX STAGGERED ROWS OF STAPLES, THREE ON EITHER SIDE OF THE CUT LINE. THE 45 MM INSTRUMENTS HAVE A STAPLE LINE THATIS APPROXIMATELY 45 MM LONG AND A CUT LINE THAT IS APPROXIMATELY 42 MM LONG. THE SHAFT CAN ROTATE FREELY IN BOTH DIRECTIONS AND AN ARTICULATION MECHANISM ON ARTICULATING INSTRUMENTS ENABLES BENDING THE DISTAL PORTIONOF THE SHAFT TO FACILITATE LATERAL ACCESS OF THE OPERATIVE SITE.THE INSTRUMENTS ARE SHIPPED WITHOUT A RELOAD AND MUST BE LOADED PRIOR TO USE. A STAPLE RETAINING CAP ON THE RELOAD PROTECTS THE STAPLE LEG POINTS DURING SHIPPING AND TRANSPORTATION. THE INSTRUMENTS’ LOCK-OUT FEATURE IS DESIGNED TO PREVENT A USED RELOAD FROM BEING REFIRED.
Type: IMPLANTABLE DEVICE | Site: SIGMOID COLON | Status: FUNCTIONAL
Brand: ECHELON ENDOPATH

## 2019-04-23 RX ORDER — SODIUM CHLORIDE 0.9 % (FLUSH) 0.9 %
1-10 SYRINGE (ML) INJECTION AS NEEDED
Status: DISCONTINUED | OUTPATIENT
Start: 2019-04-23 | End: 2019-04-23 | Stop reason: HOSPADM

## 2019-04-23 RX ORDER — SCOLOPAMINE TRANSDERMAL SYSTEM 1 MG/1
1 PATCH, EXTENDED RELEASE TRANSDERMAL CONTINUOUS
Status: DISCONTINUED | OUTPATIENT
Start: 2019-04-23 | End: 2019-04-23 | Stop reason: HOSPADM

## 2019-04-23 RX ORDER — FENTANYL CITRATE 50 UG/ML
50 INJECTION, SOLUTION INTRAMUSCULAR; INTRAVENOUS
Status: DISCONTINUED | OUTPATIENT
Start: 2019-04-23 | End: 2019-04-23

## 2019-04-23 RX ORDER — MAGNESIUM HYDROXIDE 1200 MG/15ML
LIQUID ORAL AS NEEDED
Status: DISCONTINUED | OUTPATIENT
Start: 2019-04-23 | End: 2019-04-23 | Stop reason: HOSPADM

## 2019-04-23 RX ORDER — PREGABALIN 75 MG/1
75 CAPSULE ORAL ONCE
Status: COMPLETED | OUTPATIENT
Start: 2019-04-23 | End: 2019-04-23

## 2019-04-23 RX ORDER — ROCURONIUM BROMIDE 10 MG/ML
INJECTION, SOLUTION INTRAVENOUS AS NEEDED
Status: DISCONTINUED | OUTPATIENT
Start: 2019-04-23 | End: 2019-04-23 | Stop reason: SURG

## 2019-04-23 RX ORDER — DEXTROSE MONOHYDRATE 25 G/50ML
25 INJECTION, SOLUTION INTRAVENOUS
Status: DISCONTINUED | OUTPATIENT
Start: 2019-04-23 | End: 2019-04-26 | Stop reason: HOSPADM

## 2019-04-23 RX ORDER — BUPIVACAINE HYDROCHLORIDE 2.5 MG/ML
INJECTION, SOLUTION EPIDURAL; INFILTRATION; INTRACAUDAL
Status: COMPLETED | OUTPATIENT
Start: 2019-04-23 | End: 2019-04-23

## 2019-04-23 RX ORDER — DEXAMETHASONE SODIUM PHOSPHATE 10 MG/ML
INJECTION, SOLUTION INTRAMUSCULAR; INTRAVENOUS
Status: COMPLETED | OUTPATIENT
Start: 2019-04-23 | End: 2019-04-23

## 2019-04-23 RX ORDER — ALVIMOPAN 12 MG/1
12 CAPSULE ORAL ONCE
Status: COMPLETED | OUTPATIENT
Start: 2019-04-23 | End: 2019-04-23

## 2019-04-23 RX ORDER — PROMETHAZINE HYDROCHLORIDE 25 MG/ML
12.5 INJECTION, SOLUTION INTRAMUSCULAR; INTRAVENOUS ONCE AS NEEDED
Status: DISCONTINUED | OUTPATIENT
Start: 2019-04-23 | End: 2019-04-23

## 2019-04-23 RX ORDER — ONDANSETRON 2 MG/ML
4 INJECTION INTRAMUSCULAR; INTRAVENOUS EVERY 6 HOURS PRN
Status: DISCONTINUED | OUTPATIENT
Start: 2019-04-23 | End: 2019-04-26 | Stop reason: HOSPADM

## 2019-04-23 RX ORDER — NICOTINE POLACRILEX 4 MG
15 LOZENGE BUCCAL
Status: DISCONTINUED | OUTPATIENT
Start: 2019-04-23 | End: 2019-04-26 | Stop reason: HOSPADM

## 2019-04-23 RX ORDER — NEOSTIGMINE METHYLSULFATE 1 MG/ML
INJECTION, SOLUTION INTRAVENOUS AS NEEDED
Status: DISCONTINUED | OUTPATIENT
Start: 2019-04-23 | End: 2019-04-23 | Stop reason: SURG

## 2019-04-23 RX ORDER — MIDAZOLAM HYDROCHLORIDE 1 MG/ML
INJECTION INTRAMUSCULAR; INTRAVENOUS AS NEEDED
Status: DISCONTINUED | OUTPATIENT
Start: 2019-04-23 | End: 2019-04-23 | Stop reason: SURG

## 2019-04-23 RX ORDER — GLYCOPYRROLATE 0.2 MG/ML
INJECTION INTRAMUSCULAR; INTRAVENOUS AS NEEDED
Status: DISCONTINUED | OUTPATIENT
Start: 2019-04-23 | End: 2019-04-23 | Stop reason: SURG

## 2019-04-23 RX ORDER — SODIUM CHLORIDE 0.9 % (FLUSH) 0.9 %
3 SYRINGE (ML) INJECTION EVERY 12 HOURS SCHEDULED
Status: DISCONTINUED | OUTPATIENT
Start: 2019-04-23 | End: 2019-04-23 | Stop reason: HOSPADM

## 2019-04-23 RX ORDER — LABETALOL HYDROCHLORIDE 5 MG/ML
10 INJECTION, SOLUTION INTRAVENOUS EVERY 4 HOURS PRN
Status: DISCONTINUED | OUTPATIENT
Start: 2019-04-23 | End: 2019-04-26 | Stop reason: HOSPADM

## 2019-04-23 RX ORDER — KETOROLAC TROMETHAMINE 30 MG/ML
30 INJECTION, SOLUTION INTRAMUSCULAR; INTRAVENOUS EVERY 6 HOURS
Status: COMPLETED | OUTPATIENT
Start: 2019-04-23 | End: 2019-04-25

## 2019-04-23 RX ORDER — LEVOTHYROXINE SODIUM 0.05 MG/1
50 TABLET ORAL
Status: DISCONTINUED | OUTPATIENT
Start: 2019-04-24 | End: 2019-04-26 | Stop reason: HOSPADM

## 2019-04-23 RX ORDER — FAMOTIDINE 20 MG/1
20 TABLET, FILM COATED ORAL
Status: DISCONTINUED | OUTPATIENT
Start: 2019-04-23 | End: 2019-04-23 | Stop reason: HOSPADM

## 2019-04-23 RX ORDER — ATRACURIUM BESYLATE 10 MG/ML
INJECTION, SOLUTION INTRAVENOUS AS NEEDED
Status: DISCONTINUED | OUTPATIENT
Start: 2019-04-23 | End: 2019-04-23 | Stop reason: SURG

## 2019-04-23 RX ORDER — ACETAMINOPHEN 500 MG
1000 TABLET ORAL ONCE
Status: COMPLETED | OUTPATIENT
Start: 2019-04-23 | End: 2019-04-23

## 2019-04-23 RX ORDER — PROPOFOL 10 MG/ML
VIAL (ML) INTRAVENOUS AS NEEDED
Status: DISCONTINUED | OUTPATIENT
Start: 2019-04-23 | End: 2019-04-23 | Stop reason: SURG

## 2019-04-23 RX ORDER — BUPRENORPHINE HYDROCHLORIDE 0.32 MG/ML
INJECTION INTRAMUSCULAR; INTRAVENOUS
Status: COMPLETED | OUTPATIENT
Start: 2019-04-23 | End: 2019-04-23

## 2019-04-23 RX ORDER — PROMETHAZINE HYDROCHLORIDE 25 MG/1
25 TABLET ORAL ONCE AS NEEDED
Status: DISCONTINUED | OUTPATIENT
Start: 2019-04-23 | End: 2019-04-23

## 2019-04-23 RX ORDER — SODIUM CHLORIDE, SODIUM LACTATE, POTASSIUM CHLORIDE, CALCIUM CHLORIDE 600; 310; 30; 20 MG/100ML; MG/100ML; MG/100ML; MG/100ML
9 INJECTION, SOLUTION INTRAVENOUS CONTINUOUS PRN
Status: DISCONTINUED | OUTPATIENT
Start: 2019-04-23 | End: 2019-04-23 | Stop reason: HOSPADM

## 2019-04-23 RX ORDER — ALVIMOPAN 12 MG/1
12 CAPSULE ORAL 2 TIMES DAILY
Status: DISCONTINUED | OUTPATIENT
Start: 2019-04-23 | End: 2019-04-25

## 2019-04-23 RX ORDER — SODIUM CHLORIDE 9 MG/ML
INJECTION, SOLUTION INTRAVENOUS AS NEEDED
Status: DISCONTINUED | OUTPATIENT
Start: 2019-04-23 | End: 2019-04-23 | Stop reason: HOSPADM

## 2019-04-23 RX ORDER — LIDOCAINE HYDROCHLORIDE 10 MG/ML
INJECTION, SOLUTION EPIDURAL; INFILTRATION; INTRACAUDAL; PERINEURAL AS NEEDED
Status: DISCONTINUED | OUTPATIENT
Start: 2019-04-23 | End: 2019-04-23 | Stop reason: SURG

## 2019-04-23 RX ORDER — FENTANYL CITRATE 50 UG/ML
INJECTION, SOLUTION INTRAMUSCULAR; INTRAVENOUS AS NEEDED
Status: DISCONTINUED | OUTPATIENT
Start: 2019-04-23 | End: 2019-04-23 | Stop reason: SURG

## 2019-04-23 RX ORDER — PROMETHAZINE HYDROCHLORIDE 25 MG/1
25 SUPPOSITORY RECTAL ONCE AS NEEDED
Status: DISCONTINUED | OUTPATIENT
Start: 2019-04-23 | End: 2019-04-23

## 2019-04-23 RX ORDER — ONDANSETRON 2 MG/ML
INJECTION INTRAMUSCULAR; INTRAVENOUS AS NEEDED
Status: DISCONTINUED | OUTPATIENT
Start: 2019-04-23 | End: 2019-04-23 | Stop reason: SURG

## 2019-04-23 RX ORDER — HEPARIN SODIUM 5000 [USP'U]/ML
5000 INJECTION, SOLUTION INTRAVENOUS; SUBCUTANEOUS ONCE
Status: COMPLETED | OUTPATIENT
Start: 2019-04-23 | End: 2019-04-23

## 2019-04-23 RX ORDER — DEXTROSE, SODIUM CHLORIDE, AND POTASSIUM CHLORIDE 5; .45; .15 G/100ML; G/100ML; G/100ML
100 INJECTION INTRAVENOUS CONTINUOUS
Status: DISCONTINUED | OUTPATIENT
Start: 2019-04-23 | End: 2019-04-25

## 2019-04-23 RX ORDER — HYDROMORPHONE HYDROCHLORIDE 1 MG/ML
0.5 INJECTION, SOLUTION INTRAMUSCULAR; INTRAVENOUS; SUBCUTANEOUS
Status: DISCONTINUED | OUTPATIENT
Start: 2019-04-23 | End: 2019-04-23

## 2019-04-23 RX ORDER — ONDANSETRON 2 MG/ML
4 INJECTION INTRAMUSCULAR; INTRAVENOUS ONCE AS NEEDED
Status: DISCONTINUED | OUTPATIENT
Start: 2019-04-23 | End: 2019-04-23

## 2019-04-23 RX ORDER — NALOXONE HCL 0.4 MG/ML
0.4 VIAL (ML) INJECTION
Status: DISCONTINUED | OUTPATIENT
Start: 2019-04-23 | End: 2019-04-26 | Stop reason: HOSPADM

## 2019-04-23 RX ORDER — MELOXICAM 7.5 MG/1
15 TABLET ORAL ONCE
Status: COMPLETED | OUTPATIENT
Start: 2019-04-23 | End: 2019-04-23

## 2019-04-23 RX ORDER — LIDOCAINE HYDROCHLORIDE 10 MG/ML
0.5 INJECTION, SOLUTION EPIDURAL; INFILTRATION; INTRACAUDAL; PERINEURAL ONCE AS NEEDED
Status: COMPLETED | OUTPATIENT
Start: 2019-04-23 | End: 2019-04-23

## 2019-04-23 RX ORDER — MORPHINE SULFATE 4 MG/ML
2 INJECTION, SOLUTION INTRAMUSCULAR; INTRAVENOUS EVERY 4 HOURS PRN
Status: DISCONTINUED | OUTPATIENT
Start: 2019-04-23 | End: 2019-04-26 | Stop reason: HOSPADM

## 2019-04-23 RX ORDER — DIAZEPAM 5 MG/1
5 TABLET ORAL EVERY 6 HOURS PRN
Status: DISCONTINUED | OUTPATIENT
Start: 2019-04-23 | End: 2019-04-26 | Stop reason: HOSPADM

## 2019-04-23 RX ORDER — ACETAMINOPHEN 500 MG
1000 TABLET ORAL EVERY 6 HOURS
Status: COMPLETED | OUTPATIENT
Start: 2019-04-23 | End: 2019-04-25

## 2019-04-23 RX ADMIN — POTASSIUM CHLORIDE, DEXTROSE MONOHYDRATE AND SODIUM CHLORIDE 100 ML/HR: 150; 5; 450 INJECTION, SOLUTION INTRAVENOUS at 14:03

## 2019-04-23 RX ADMIN — POTASSIUM CHLORIDE, DEXTROSE MONOHYDRATE AND SODIUM CHLORIDE 100 ML/HR: 150; 5; 450 INJECTION, SOLUTION INTRAVENOUS at 23:36

## 2019-04-23 RX ADMIN — NEOSTIGMINE METHYLSULFATE 2.5 MG: 1 INJECTION, SOLUTION INTRAVENOUS at 11:51

## 2019-04-23 RX ADMIN — MIDAZOLAM HYDROCHLORIDE 2 MG: 1 INJECTION, SOLUTION INTRAMUSCULAR; INTRAVENOUS at 07:34

## 2019-04-23 RX ADMIN — ATRACURIUM BESYLATE 50 MG: 10 INJECTION, SOLUTION INTRAVENOUS at 07:40

## 2019-04-23 RX ADMIN — PREGABALIN 75 MG: 75 CAPSULE ORAL at 06:26

## 2019-04-23 RX ADMIN — GLYCOPYRROLATE 0.4 MG: 0.2 INJECTION, SOLUTION INTRAMUSCULAR; INTRAVENOUS at 11:51

## 2019-04-23 RX ADMIN — PROPOFOL 50 MG: 10 INJECTION, EMULSION INTRAVENOUS at 11:28

## 2019-04-23 RX ADMIN — ACETAMINOPHEN 1000 MG: 500 TABLET ORAL at 06:26

## 2019-04-23 RX ADMIN — SODIUM CHLORIDE, PRESERVATIVE FREE 3 ML: 5 INJECTION INTRAVENOUS at 14:11

## 2019-04-23 RX ADMIN — SODIUM CHLORIDE, POTASSIUM CHLORIDE, SODIUM LACTATE AND CALCIUM CHLORIDE: 600; 310; 30; 20 INJECTION, SOLUTION INTRAVENOUS at 11:14

## 2019-04-23 RX ADMIN — BUPIVACAINE HYDROCHLORIDE 60 ML: 2.5 INJECTION, SOLUTION EPIDURAL; INFILTRATION; INTRACAUDAL; PERINEURAL at 08:21

## 2019-04-23 RX ADMIN — SCOPALAMINE 1 PATCH: 1 PATCH, EXTENDED RELEASE TRANSDERMAL at 06:26

## 2019-04-23 RX ADMIN — FENTANYL CITRATE 50 MCG: 50 INJECTION, SOLUTION INTRAMUSCULAR; INTRAVENOUS at 07:40

## 2019-04-23 RX ADMIN — ATRACURIUM BESYLATE 10 MG: 10 INJECTION, SOLUTION INTRAVENOUS at 08:15

## 2019-04-23 RX ADMIN — FENTANYL CITRATE 25 MCG: 50 INJECTION, SOLUTION INTRAMUSCULAR; INTRAVENOUS at 10:52

## 2019-04-23 RX ADMIN — SODIUM CHLORIDE, POTASSIUM CHLORIDE, SODIUM LACTATE AND CALCIUM CHLORIDE 9 ML/HR: 600; 310; 30; 20 INJECTION, SOLUTION INTRAVENOUS at 06:27

## 2019-04-23 RX ADMIN — ONDANSETRON 4 MG: 2 INJECTION INTRAMUSCULAR; INTRAVENOUS at 11:45

## 2019-04-23 RX ADMIN — DEXAMETHASONE SODIUM PHOSPHATE 4 MG: 10 INJECTION INTRAMUSCULAR; INTRAVENOUS at 08:21

## 2019-04-23 RX ADMIN — ALVIMOPAN 12 MG: 12 CAPSULE ORAL at 20:58

## 2019-04-23 RX ADMIN — MELOXICAM 15 MG: 7.5 TABLET ORAL at 06:26

## 2019-04-23 RX ADMIN — FAMOTIDINE 20 MG: 20 TABLET ORAL at 06:25

## 2019-04-23 RX ADMIN — ATRACURIUM BESYLATE 10 MG: 10 INJECTION, SOLUTION INTRAVENOUS at 09:15

## 2019-04-23 RX ADMIN — ACETAMINOPHEN 1000 MG: 500 TABLET, FILM COATED ORAL at 20:58

## 2019-04-23 RX ADMIN — ACETAMINOPHEN 1000 MG: 500 TABLET, FILM COATED ORAL at 14:10

## 2019-04-23 RX ADMIN — ALVIMOPAN 12 MG: 12 CAPSULE ORAL at 06:26

## 2019-04-23 RX ADMIN — ERTAPENEM SODIUM 1 G: 1 INJECTION, POWDER, LYOPHILIZED, FOR SOLUTION INTRAMUSCULAR; INTRAVENOUS at 07:34

## 2019-04-23 RX ADMIN — ATRACURIUM BESYLATE 10 MG: 10 INJECTION, SOLUTION INTRAVENOUS at 08:45

## 2019-04-23 RX ADMIN — LIDOCAINE HYDROCHLORIDE 50 MG: 10 INJECTION, SOLUTION EPIDURAL; INFILTRATION; INTRACAUDAL; PERINEURAL at 07:40

## 2019-04-23 RX ADMIN — FENTANYL CITRATE 25 MCG: 50 INJECTION, SOLUTION INTRAMUSCULAR; INTRAVENOUS at 11:45

## 2019-04-23 RX ADMIN — ROCURONIUM BROMIDE 10 MG: 10 INJECTION INTRAVENOUS at 11:10

## 2019-04-23 RX ADMIN — HEPARIN SODIUM 5000 UNITS: 5000 INJECTION INTRAVENOUS; SUBCUTANEOUS at 06:27

## 2019-04-23 RX ADMIN — BUPRENORPHINE HYDROCHLORIDE 0.3 MG: 0.32 INJECTION INTRAMUSCULAR; INTRAVENOUS at 08:21

## 2019-04-23 RX ADMIN — KETOROLAC TROMETHAMINE 30 MG: 30 INJECTION, SOLUTION INTRAMUSCULAR; INTRAVENOUS at 14:10

## 2019-04-23 RX ADMIN — PROPOFOL 200 MG: 10 INJECTION, EMULSION INTRAVENOUS at 07:40

## 2019-04-23 RX ADMIN — LIDOCAINE HYDROCHLORIDE 0.5 ML: 10 INJECTION, SOLUTION EPIDURAL; INFILTRATION; INTRACAUDAL; PERINEURAL at 06:27

## 2019-04-23 RX ADMIN — PROPOFOL 25 MCG/KG/MIN: 10 INJECTION, EMULSION INTRAVENOUS at 07:48

## 2019-04-23 RX ADMIN — KETOROLAC TROMETHAMINE 30 MG: 30 INJECTION, SOLUTION INTRAMUSCULAR; INTRAVENOUS at 20:58

## 2019-04-23 NOTE — ANESTHESIA PROCEDURE NOTES
Peripheral Block      Patient location during procedure: OR  Reason for block: at surgeon's request and post-op pain management  Performed by  CRNA: Peterson Kilgore CRNA  Assisted by: Leslee Patel SRNA  Preanesthetic Checklist  Completed: patient identified, site marked, surgical consent, pre-op evaluation, timeout performed, IV checked, risks and benefits discussed and monitors and equipment checked  Prep:  Pt Position: supine  Sterile barriers:cap, gloves, sterile barriers and mask  Prep: ChloraPrep  Patient monitoring: blood pressure monitoring, continuous pulse oximetry and EKG  Procedure  Sedation:yes  Performed under: general  Guidance:ultrasound guided  Images:still images obtained    Laterality:Bilateral  Block Type:TAP  Injection Technique:single-shot  Needle Type:short-bevel and echogenic  Needle Gauge:20 G    Medications Used: buprenorphine (BUPRENEX) injection, 0.3 mg  dexamethasone sodium phosphate injection, 4 mg  bupivacaine PF (MARCAINE) 0.25 % injection, 60 mL  Medications  Comment:Block Injection:  LA dose divided between Right and Left block       Adjuncts:  Decadron 4mg PSF, Buprenex 0.3mg (Per total volume of LA)    Post Assessment  Injection Assessment: negative aspiration for heme, incremental injection and no paresthesia on injection  Patient Tolerance:comfortable throughout block  Complications:no  Additional Notes  10ml sub costal bilat, and 20ml std tap bilat    Under Ultrasound guidance, a BBraun 4inch 360 degree needle was advanced with Normal Saline hydro dissection of tissue.  The Internal Oblique and Transversus Abdominus muscles where visualized.  At or before the aponeurosis of Internal Oblique, local anesthetic spread was visualized in the Transversus Abdominus Plane. Injection was made incrementally with aspiration every 5 mls.  There was no  intravascular injection,  injection pressure was normal, there was no neural injection, and the procedure was completed without  difficulty.  Thank You.

## 2019-04-23 NOTE — ANESTHESIA PREPROCEDURE EVALUATION
Anesthesia Evaluation     Patient summary reviewed and Nursing notes reviewed   NPO Solid Status: > 8 hours  NPO Liquid Status: > 2 hours           Airway   Mallampati: III  TM distance: <3 FB  Neck ROM: limited  Possible difficult intubation  Dental      Pulmonary    (+) a smoker (quit 89 ) Former,   (-) COPD, asthma, shortness of breath, recent URI, sleep apnea  Cardiovascular     ECG reviewed    (+) hypertension,   (-) past MI, CAD, dysrhythmias, angina, cardiac stents, CABG    ROS comment: EKG NSR     Neuro/Psych  (-) seizures, CVA  GI/Hepatic/Renal/Endo    (+)   liver disease (fatty liver ) fatty liver disease, hypothyroidism (hashimotos ),   (-) hepatitis, no renal disease, diabetes, GI bleed    ROS Comment: RECTAL CA     Musculoskeletal     Abdominal    Substance History      OB/GYN          Other   (+) arthritis   history of cancer (Rectal )    ROS/Med Hx Other: PSORIASIS                   Anesthesia Plan    ASA 2     general with block   (TAPblocks , Propofol infusion,  Opiate sparing  )  intravenous induction   Anesthetic plan, all risks, benefits, and alternatives have been provided, discussed and informed consent has been obtained with: patient.    Plan discussed with CRNA.

## 2019-04-23 NOTE — ANESTHESIA POSTPROCEDURE EVALUATION
Patient: Hehsam Arevalo Jr.    Procedure Summary     Date:  04/23/19 Room / Location:   [facility]    Anesthesia Start:  0734 Anesthesia Stop:      Procedure:  LAPAROSCOPIC LOWER ANTERIOR RESECTION WITH DIVERTING LOOP ILEOOSTOMY, SPLENIC FLEIXURE MOBILIZATION AND LIVER BIOPSY, AND PROCTOSCOPY (N/A Abdomen) Diagnosis:      Surgeon:  Pau Kelly MD Provider:  Kevin Grace MD    Anesthesia Type:  general with block ASA Status:  2          Anesthesia Type: general with block  Last vitals  BP   100/63 (04/23/19 1200)   Temp   97.1 °F (36.2 °C) (04/23/19 1200)   Pulse   62 (04/23/19 1200)   Resp   16 (04/23/19 1200)     SpO2   100 % (04/23/19 1200)     Post Anesthesia Care and Evaluation    Patient location during evaluation: PACU  Patient participation: complete - patient participated  Level of consciousness: awake and alert  Pain score: 0  Pain management: adequate  Airway patency: patent  Anesthetic complications: No anesthetic complications  PONV Status: none  Cardiovascular status: hemodynamically stable and acceptable  Respiratory status: nonlabored ventilation, acceptable, nasal cannula and oral airway  Hydration status: acceptable

## 2019-04-23 NOTE — ANESTHESIA PROCEDURE NOTES
Airway  Urgency: elective    Airway not difficult    General Information and Staff    Patient location during procedure: OR    Indications and Patient Condition  Indications for airway management: airway protection    Preoxygenated: yes  MILS not maintained throughout  Mask difficulty assessment: 1 - vent by mask    Final Airway Details  Final airway type: endotracheal airway      Successful airway: ETT  Cuffed: yes   Successful intubation technique: direct laryngoscopy  Facilitating devices/methods: cricoid pressure  Endotracheal tube insertion site: oral  Blade: Adam  Blade size: 3  ETT size (mm): 7.5  Cormack-Lehane Classification: grade III - view of epiglottis only  Placement verified by: chest auscultation and capnometry   Measured from: lips  ETT to lips (cm): 22  Number of attempts at approach: 1    Additional Comments  Negative epigastric sounds, Breath sound equal bilaterally with symmetric chest rise and fall

## 2019-04-24 PROBLEM — G89.18 ACUTE POSTOPERATIVE PAIN: Status: ACTIVE | Noted: 2019-04-24

## 2019-04-24 LAB
ANION GAP SERPL CALCULATED.3IONS-SCNC: 10 MMOL/L
APTT PPP: 30.8 SECONDS (ref 24–37)
BASOPHILS # BLD AUTO: 0 10*3/MM3 (ref 0–0.2)
BASOPHILS NFR BLD AUTO: 0 % (ref 0–1.5)
BUN BLD-MCNC: 14 MG/DL (ref 6–20)
BUN/CREAT SERPL: 15.1 (ref 7–25)
CALCIUM SPEC-SCNC: 8.6 MG/DL (ref 8.6–10.5)
CHLORIDE SERPL-SCNC: 100 MMOL/L (ref 98–107)
CO2 SERPL-SCNC: 26 MMOL/L (ref 22–29)
CREAT BLD-MCNC: 0.93 MG/DL (ref 0.76–1.27)
DEPRECATED RDW RBC AUTO: 47 FL (ref 37–54)
EOSINOPHIL # BLD AUTO: 0.01 10*3/MM3 (ref 0–0.4)
EOSINOPHIL NFR BLD AUTO: 0.1 % (ref 0.3–6.2)
ERYTHROCYTE [DISTWIDTH] IN BLOOD BY AUTOMATED COUNT: 13.9 % (ref 12.3–15.4)
GFR SERPL CREATININE-BSD FRML MDRD: 84 ML/MIN/1.73
GLUCOSE BLD-MCNC: 109 MG/DL (ref 65–99)
GLUCOSE BLDC GLUCOMTR-MCNC: 104 MG/DL (ref 70–130)
GLUCOSE BLDC GLUCOMTR-MCNC: 124 MG/DL (ref 70–130)
GLUCOSE BLDC GLUCOMTR-MCNC: 142 MG/DL (ref 70–130)
GLUCOSE BLDC GLUCOMTR-MCNC: 150 MG/DL (ref 70–130)
HCT VFR BLD AUTO: 39.7 % (ref 37.5–51)
HGB BLD-MCNC: 13 G/DL (ref 13–17.7)
IMM GRANULOCYTES # BLD AUTO: 0.01 10*3/MM3 (ref 0–0.05)
IMM GRANULOCYTES NFR BLD AUTO: 0.1 % (ref 0–0.5)
INR PPP: 1.13 (ref 0.85–1.16)
LYMPHOCYTES # BLD AUTO: 0.6 10*3/MM3 (ref 0.7–3.1)
LYMPHOCYTES NFR BLD AUTO: 6.3 % (ref 19.6–45.3)
MAGNESIUM SERPL-MCNC: 1.9 MG/DL (ref 1.6–2.6)
MCH RBC QN AUTO: 30.1 PG (ref 26.6–33)
MCHC RBC AUTO-ENTMCNC: 32.7 G/DL (ref 31.5–35.7)
MCV RBC AUTO: 91.9 FL (ref 79–97)
MONOCYTES # BLD AUTO: 0.98 10*3/MM3 (ref 0.1–0.9)
MONOCYTES NFR BLD AUTO: 10.2 % (ref 5–12)
NEUTROPHILS # BLD AUTO: 7.98 10*3/MM3 (ref 1.7–7)
NEUTROPHILS NFR BLD AUTO: 83.4 % (ref 42.7–76)
PLATELET # BLD AUTO: 102 10*3/MM3 (ref 140–450)
PMV BLD AUTO: 10.5 FL (ref 6–12)
POTASSIUM BLD-SCNC: 4.8 MMOL/L (ref 3.5–5.2)
PROTHROMBIN TIME: 14 SECONDS (ref 11.2–14.3)
RBC # BLD AUTO: 4.32 10*6/MM3 (ref 4.14–5.8)
SODIUM BLD-SCNC: 136 MMOL/L (ref 136–145)
WBC NRBC COR # BLD: 9.57 10*3/MM3 (ref 3.4–10.8)

## 2019-04-24 PROCEDURE — 80048 BASIC METABOLIC PNL TOTAL CA: CPT | Performed by: COLON & RECTAL SURGERY

## 2019-04-24 PROCEDURE — 25010000002 KETOROLAC TROMETHAMINE PER 15 MG: Performed by: COLON & RECTAL SURGERY

## 2019-04-24 PROCEDURE — 85730 THROMBOPLASTIN TIME PARTIAL: CPT | Performed by: COLON & RECTAL SURGERY

## 2019-04-24 PROCEDURE — 83735 ASSAY OF MAGNESIUM: CPT | Performed by: COLON & RECTAL SURGERY

## 2019-04-24 PROCEDURE — 25010000002 ENOXAPARIN PER 10 MG: Performed by: COLON & RECTAL SURGERY

## 2019-04-24 PROCEDURE — 85025 COMPLETE CBC W/AUTO DIFF WBC: CPT | Performed by: COLON & RECTAL SURGERY

## 2019-04-24 PROCEDURE — 82962 GLUCOSE BLOOD TEST: CPT

## 2019-04-24 PROCEDURE — 85610 PROTHROMBIN TIME: CPT | Performed by: COLON & RECTAL SURGERY

## 2019-04-24 RX ORDER — OXYCODONE HYDROCHLORIDE 5 MG/1
10 TABLET ORAL EVERY 4 HOURS PRN
Status: DISCONTINUED | OUTPATIENT
Start: 2019-04-24 | End: 2019-04-26 | Stop reason: HOSPADM

## 2019-04-24 RX ORDER — OXYCODONE HYDROCHLORIDE 5 MG/1
5 TABLET ORAL EVERY 4 HOURS PRN
Status: DISCONTINUED | OUTPATIENT
Start: 2019-04-24 | End: 2019-04-26 | Stop reason: HOSPADM

## 2019-04-24 RX ADMIN — LEVOTHYROXINE SODIUM 50 MCG: 50 TABLET ORAL at 05:27

## 2019-04-24 RX ADMIN — KETOROLAC TROMETHAMINE 30 MG: 30 INJECTION, SOLUTION INTRAMUSCULAR; INTRAVENOUS at 03:01

## 2019-04-24 RX ADMIN — ALVIMOPAN 12 MG: 12 CAPSULE ORAL at 20:17

## 2019-04-24 RX ADMIN — KETOROLAC TROMETHAMINE 30 MG: 30 INJECTION, SOLUTION INTRAMUSCULAR; INTRAVENOUS at 20:17

## 2019-04-24 RX ADMIN — ACETAMINOPHEN 1000 MG: 500 TABLET, FILM COATED ORAL at 03:01

## 2019-04-24 RX ADMIN — ACETAMINOPHEN 1000 MG: 500 TABLET, FILM COATED ORAL at 20:17

## 2019-04-24 RX ADMIN — ACETAMINOPHEN 1000 MG: 500 TABLET, FILM COATED ORAL at 14:06

## 2019-04-24 RX ADMIN — KETOROLAC TROMETHAMINE 30 MG: 30 INJECTION, SOLUTION INTRAMUSCULAR; INTRAVENOUS at 14:07

## 2019-04-24 RX ADMIN — ENOXAPARIN SODIUM 30 MG: 30 INJECTION SUBCUTANEOUS at 08:27

## 2019-04-24 RX ADMIN — KETOROLAC TROMETHAMINE 30 MG: 30 INJECTION, SOLUTION INTRAMUSCULAR; INTRAVENOUS at 08:27

## 2019-04-24 RX ADMIN — ACETAMINOPHEN 1000 MG: 500 TABLET, FILM COATED ORAL at 08:27

## 2019-04-24 RX ADMIN — OXYCODONE HYDROCHLORIDE 10 MG: 5 TABLET ORAL at 15:23

## 2019-04-24 RX ADMIN — ALVIMOPAN 12 MG: 12 CAPSULE ORAL at 08:27

## 2019-04-25 ENCOUNTER — DOCUMENTATION (OUTPATIENT)
Dept: ONCOLOGY | Facility: CLINIC | Age: 54
End: 2019-04-25

## 2019-04-25 LAB
ANION GAP SERPL CALCULATED.3IONS-SCNC: 10 MMOL/L
BASOPHILS # BLD AUTO: 0.02 10*3/MM3 (ref 0–0.2)
BASOPHILS NFR BLD AUTO: 0.3 % (ref 0–1.5)
BUN BLD-MCNC: 11 MG/DL (ref 6–20)
BUN/CREAT SERPL: 11.7 (ref 7–25)
CALCIUM SPEC-SCNC: 8.5 MG/DL (ref 8.6–10.5)
CHLORIDE SERPL-SCNC: 100 MMOL/L (ref 98–107)
CO2 SERPL-SCNC: 27 MMOL/L (ref 22–29)
CREAT BLD-MCNC: 0.94 MG/DL (ref 0.76–1.27)
DEPRECATED RDW RBC AUTO: 47.9 FL (ref 37–54)
EOSINOPHIL # BLD AUTO: 0.11 10*3/MM3 (ref 0–0.4)
EOSINOPHIL NFR BLD AUTO: 1.4 % (ref 0.3–6.2)
ERYTHROCYTE [DISTWIDTH] IN BLOOD BY AUTOMATED COUNT: 13.9 % (ref 12.3–15.4)
GFR SERPL CREATININE-BSD FRML MDRD: 83 ML/MIN/1.73
GLUCOSE BLD-MCNC: 120 MG/DL (ref 65–99)
GLUCOSE BLDC GLUCOMTR-MCNC: 129 MG/DL (ref 70–130)
GLUCOSE BLDC GLUCOMTR-MCNC: 138 MG/DL (ref 70–130)
GLUCOSE BLDC GLUCOMTR-MCNC: 155 MG/DL (ref 70–130)
GLUCOSE BLDC GLUCOMTR-MCNC: 162 MG/DL (ref 70–130)
HCT VFR BLD AUTO: 37.2 % (ref 37.5–51)
HGB BLD-MCNC: 12.1 G/DL (ref 13–17.7)
IMM GRANULOCYTES # BLD AUTO: 0.02 10*3/MM3 (ref 0–0.05)
IMM GRANULOCYTES NFR BLD AUTO: 0.3 % (ref 0–0.5)
LYMPHOCYTES # BLD AUTO: 0.69 10*3/MM3 (ref 0.7–3.1)
LYMPHOCYTES NFR BLD AUTO: 9.1 % (ref 19.6–45.3)
MCH RBC QN AUTO: 30.3 PG (ref 26.6–33)
MCHC RBC AUTO-ENTMCNC: 32.5 G/DL (ref 31.5–35.7)
MCV RBC AUTO: 93.2 FL (ref 79–97)
MONOCYTES # BLD AUTO: 0.68 10*3/MM3 (ref 0.1–0.9)
MONOCYTES NFR BLD AUTO: 8.9 % (ref 5–12)
NEUTROPHILS # BLD AUTO: 6.12 10*3/MM3 (ref 1.7–7)
NEUTROPHILS NFR BLD AUTO: 80.3 % (ref 42.7–76)
PLATELET # BLD AUTO: 84 10*3/MM3 (ref 140–450)
PMV BLD AUTO: 10.2 FL (ref 6–12)
POTASSIUM BLD-SCNC: 4.3 MMOL/L (ref 3.5–5.2)
RBC # BLD AUTO: 3.99 10*6/MM3 (ref 4.14–5.8)
SODIUM BLD-SCNC: 137 MMOL/L (ref 136–145)
WBC NRBC COR # BLD: 7.62 10*3/MM3 (ref 3.4–10.8)

## 2019-04-25 PROCEDURE — 25010000002 KETOROLAC TROMETHAMINE PER 15 MG: Performed by: COLON & RECTAL SURGERY

## 2019-04-25 PROCEDURE — 25010000002 ENOXAPARIN PER 10 MG: Performed by: COLON & RECTAL SURGERY

## 2019-04-25 PROCEDURE — 85025 COMPLETE CBC W/AUTO DIFF WBC: CPT | Performed by: COLON & RECTAL SURGERY

## 2019-04-25 PROCEDURE — 82962 GLUCOSE BLOOD TEST: CPT

## 2019-04-25 PROCEDURE — 80048 BASIC METABOLIC PNL TOTAL CA: CPT | Performed by: COLON & RECTAL SURGERY

## 2019-04-25 RX ORDER — IBUPROFEN 600 MG/1
600 TABLET ORAL
Status: DISCONTINUED | OUTPATIENT
Start: 2019-04-25 | End: 2019-04-26 | Stop reason: HOSPADM

## 2019-04-25 RX ADMIN — IBUPROFEN 600 MG: 600 TABLET ORAL at 11:24

## 2019-04-25 RX ADMIN — ACETAMINOPHEN 1000 MG: 500 TABLET, FILM COATED ORAL at 08:32

## 2019-04-25 RX ADMIN — ENOXAPARIN SODIUM 30 MG: 30 INJECTION SUBCUTANEOUS at 08:32

## 2019-04-25 RX ADMIN — KETOROLAC TROMETHAMINE 30 MG: 30 INJECTION, SOLUTION INTRAMUSCULAR; INTRAVENOUS at 08:32

## 2019-04-25 RX ADMIN — OXYCODONE HYDROCHLORIDE 10 MG: 5 TABLET ORAL at 15:39

## 2019-04-25 RX ADMIN — IBUPROFEN 600 MG: 600 TABLET ORAL at 21:29

## 2019-04-25 RX ADMIN — KETOROLAC TROMETHAMINE 30 MG: 30 INJECTION, SOLUTION INTRAMUSCULAR; INTRAVENOUS at 03:23

## 2019-04-25 RX ADMIN — IBUPROFEN 600 MG: 600 TABLET ORAL at 17:16

## 2019-04-25 RX ADMIN — LEVOTHYROXINE SODIUM 50 MCG: 50 TABLET ORAL at 05:25

## 2019-04-25 RX ADMIN — ALVIMOPAN 12 MG: 12 CAPSULE ORAL at 08:32

## 2019-04-25 RX ADMIN — ACETAMINOPHEN 1000 MG: 500 TABLET, FILM COATED ORAL at 03:23

## 2019-04-26 ENCOUNTER — DOCUMENTATION (OUTPATIENT)
Dept: ONCOLOGY | Facility: CLINIC | Age: 54
End: 2019-04-26

## 2019-04-26 VITALS
SYSTOLIC BLOOD PRESSURE: 128 MMHG | OXYGEN SATURATION: 95 % | TEMPERATURE: 98.5 F | RESPIRATION RATE: 18 BRPM | HEIGHT: 73 IN | WEIGHT: 216.05 LBS | BODY MASS INDEX: 28.63 KG/M2 | HEART RATE: 80 BPM | DIASTOLIC BLOOD PRESSURE: 84 MMHG

## 2019-04-26 LAB
ANION GAP SERPL CALCULATED.3IONS-SCNC: 10 MMOL/L
BUN BLD-MCNC: 11 MG/DL (ref 6–20)
BUN/CREAT SERPL: 13.9 (ref 7–25)
CALCIUM SPEC-SCNC: 8.9 MG/DL (ref 8.6–10.5)
CHLORIDE SERPL-SCNC: 98 MMOL/L (ref 98–107)
CO2 SERPL-SCNC: 27 MMOL/L (ref 22–29)
CREAT BLD-MCNC: 0.79 MG/DL (ref 0.76–1.27)
GFR SERPL CREATININE-BSD FRML MDRD: 102 ML/MIN/1.73
GLUCOSE BLD-MCNC: 117 MG/DL (ref 65–99)
GLUCOSE BLDC GLUCOMTR-MCNC: 108 MG/DL (ref 70–130)
GLUCOSE BLDC GLUCOMTR-MCNC: 127 MG/DL (ref 70–130)
POTASSIUM BLD-SCNC: 4.5 MMOL/L (ref 3.5–5.2)
SODIUM BLD-SCNC: 135 MMOL/L (ref 136–145)

## 2019-04-26 PROCEDURE — 80048 BASIC METABOLIC PNL TOTAL CA: CPT | Performed by: COLON & RECTAL SURGERY

## 2019-04-26 PROCEDURE — 25010000002 ENOXAPARIN PER 10 MG: Performed by: COLON & RECTAL SURGERY

## 2019-04-26 PROCEDURE — 82962 GLUCOSE BLOOD TEST: CPT

## 2019-04-26 RX ORDER — OXYCODONE HYDROCHLORIDE 5 MG/1
5 TABLET ORAL EVERY 4 HOURS PRN
Qty: 50 TABLET | Refills: 0 | Status: SHIPPED | OUTPATIENT
Start: 2019-04-26 | End: 2019-05-04

## 2019-04-26 RX ADMIN — IBUPROFEN 600 MG: 600 TABLET ORAL at 11:20

## 2019-04-26 RX ADMIN — IBUPROFEN 600 MG: 600 TABLET ORAL at 08:01

## 2019-04-26 RX ADMIN — LEVOTHYROXINE SODIUM 50 MCG: 50 TABLET ORAL at 06:13

## 2019-04-26 RX ADMIN — OXYCODONE HYDROCHLORIDE 10 MG: 5 TABLET ORAL at 10:21

## 2019-04-26 RX ADMIN — ENOXAPARIN SODIUM 30 MG: 30 INJECTION SUBCUTANEOUS at 08:01

## 2019-04-26 NOTE — PROGRESS NOTES
I went to see patient in the hospital as requested per Dr. Kelly. I first spoke with his spouse while patient was walking in the halls with his daughter. Patient came back in the room and we talked. Patient said that he is sore, but moving around slowly. I introduced myself and explained what a Navigator does and provided him with my contact card. Patient is hoping to go home today. I will continue to be available as needed. AG

## 2019-04-27 ENCOUNTER — READMISSION MANAGEMENT (OUTPATIENT)
Dept: CALL CENTER | Facility: HOSPITAL | Age: 54
End: 2019-04-27

## 2019-04-27 NOTE — OUTREACH NOTE
Prep Survey      Responses   Facility patient discharged from?  [facility]   Is patient eligible?  Yes   Discharge diagnosis  S/P low anterior resection with loop ileostomy, liver biopsy and proctoscopy   Does the patient have one of the following disease processes/diagnoses(primary or secondary)?  General Surgery   Does the patient have Home health ordered?  No   Is there a DME ordered?  No   Prep survey completed?  Yes          Do Nicole RN

## 2019-04-29 ENCOUNTER — LAB REQUISITION (OUTPATIENT)
Dept: LAB | Facility: HOSPITAL | Age: 54
End: 2019-04-29

## 2019-04-29 DIAGNOSIS — C20 MALIGNANT NEOPLASM OF RECTUM (HCC): ICD-10-CM

## 2019-04-29 LAB
CYTO UR: NORMAL
LAB AP CASE REPORT: NORMAL
LAB AP CLINICAL INFORMATION: NORMAL
LAB AP DIAGNOSIS COMMENT: NORMAL
PATH REPORT.FINAL DX SPEC: NORMAL
PATH REPORT.GROSS SPEC: NORMAL

## 2019-04-29 PROCEDURE — 88321 CONSLTJ&REPRT SLD PREP ELSWR: CPT | Performed by: SURGERY

## 2019-04-29 NOTE — PROGRESS NOTES
Request sent to [facility] for path slides for GI TB on 5/2/19. Biopsy was done on 3/18/19 at [facility]. AG

## 2019-04-30 ENCOUNTER — READMISSION MANAGEMENT (OUTPATIENT)
Dept: CALL CENTER | Facility: HOSPITAL | Age: 54
End: 2019-04-30

## 2019-04-30 ENCOUNTER — MDT ASSESSMENT (OUTPATIENT)
Dept: ONCOLOGY | Facility: CLINIC | Age: 54
End: 2019-04-30

## 2019-04-30 LAB
CYTO UR: NORMAL
LAB AP CASE REPORT: NORMAL
LAB AP CLINICAL INFORMATION: NORMAL
PATH REPORT.FINAL DX SPEC: NORMAL
PATH REPORT.GROSS SPEC: NORMAL

## 2019-04-30 NOTE — OUTREACH NOTE
General Surgery Week 1 Survey      Responses   Facility patient discharged from?  [facility]   Does the patient have one of the following disease processes/diagnoses(primary or secondary)?  General Surgery   Is there a successful TCM telephone encounter documented?  No   Week 1 attempt successful?  Yes   Call start time  1538   Call end time  1542   Discharge diagnosis  S/P low anterior resection with loop ileostomy, liver biopsy and proctoscopy   Meds reviewed with patient/caregiver?  Yes   Is the patient having any side effects they believe may be caused by any medication additions or changes?  No   Does the patient have all medications related to this admission filled (includes all antibiotics, pain medications, etc.)  Yes   Is the patient taking all medications as directed (includes completed medication regime)?  Yes   Does the patient have a follow up appointment scheduled with their surgeon?  Yes   Has the patient kept scheduled appointments due by today?  Yes   Has home health visited the patient within 72 hours of discharge?  N/A   Psychosocial issues?  No   Did the patient receive a copy of their discharge instructions?  Yes   Nursing interventions  Reviewed instructions with patient   What is the patient's perception of their health status since discharge?  Improving   Nursing interventions  Nurse provided patient education   Is the patient /caregiver able to teach back basic post-op care?  Take showers only when approved by MD-sponge bathe until then, Lifting as instructed by MD in discharge instructions, No tub bath, swimming, or hot tub until instructed by MD, Keep incision areas clean,dry and protected   Is the patient/caregiver able to teach back signs and symptoms of incisional infection?  Increased drainage or bleeding, Incisional warmth, Pus or odor from incision, Increased redness, swelling or pain at the incisonal site, Fever   Is the patient/caregiver able to teach back steps to recovery at home?   Set small, achievable goals for return to baseline health, Rest and rebuild strength, gradually increase activity   Is the patient/caregiver able to teach back the hierarchy of who to call/visit for symptoms/problems? PCP, Specialist, Home health nurse, Urgent Care, ED, 911  Yes   Week 1 call completed?  Yes          Radames Ortega RN

## 2019-05-03 ENCOUNTER — OFFICE VISIT (OUTPATIENT)
Dept: ONCOLOGY | Facility: CLINIC | Age: 54
End: 2019-05-03

## 2019-05-03 VITALS
OXYGEN SATURATION: 98 % | TEMPERATURE: 97.5 F | BODY MASS INDEX: 26.64 KG/M2 | RESPIRATION RATE: 16 BRPM | HEIGHT: 73 IN | SYSTOLIC BLOOD PRESSURE: 161 MMHG | WEIGHT: 201 LBS | DIASTOLIC BLOOD PRESSURE: 89 MMHG | HEART RATE: 86 BPM

## 2019-05-03 DIAGNOSIS — C20 RECTAL CARCINOMA (HCC): Primary | ICD-10-CM

## 2019-05-03 PROCEDURE — 99214 OFFICE O/P EST MOD 30 MIN: CPT | Performed by: INTERNAL MEDICINE

## 2019-05-03 RX ORDER — PALONOSETRON 0.05 MG/ML
0.25 INJECTION, SOLUTION INTRAVENOUS ONCE
Status: CANCELLED | OUTPATIENT
Start: 2019-05-20

## 2019-05-03 RX ORDER — FAMOTIDINE 10 MG/ML
20 INJECTION, SOLUTION INTRAVENOUS AS NEEDED
Status: CANCELLED | OUTPATIENT
Start: 2019-07-22

## 2019-05-03 RX ORDER — ONDANSETRON HYDROCHLORIDE 8 MG/1
8 TABLET, FILM COATED ORAL 3 TIMES DAILY PRN
Qty: 30 TABLET | Refills: 5 | Status: SHIPPED | OUTPATIENT
Start: 2019-05-03 | End: 2019-08-12 | Stop reason: SDUPTHER

## 2019-05-03 RX ORDER — FLUOROURACIL 50 MG/ML
400 INJECTION, SOLUTION INTRAVENOUS ONCE
Status: CANCELLED | OUTPATIENT
Start: 2019-05-20

## 2019-05-03 RX ORDER — PALONOSETRON 0.05 MG/ML
0.25 INJECTION, SOLUTION INTRAVENOUS ONCE
Status: CANCELLED | OUTPATIENT
Start: 2019-07-22

## 2019-05-03 RX ORDER — LIDOCAINE AND PRILOCAINE 25; 25 MG/G; MG/G
CREAM TOPICAL AS NEEDED
Qty: 30 G | Refills: 3 | Status: SHIPPED | OUTPATIENT
Start: 2019-05-03 | End: 2022-09-16

## 2019-05-03 RX ORDER — DEXTROSE MONOHYDRATE 50 MG/ML
250 INJECTION, SOLUTION INTRAVENOUS ONCE
Status: CANCELLED | OUTPATIENT
Start: 2019-07-22

## 2019-05-03 RX ORDER — DIPHENHYDRAMINE HYDROCHLORIDE 50 MG/ML
50 INJECTION INTRAMUSCULAR; INTRAVENOUS AS NEEDED
Status: CANCELLED | OUTPATIENT
Start: 2019-07-22

## 2019-05-03 RX ORDER — FLUOROURACIL 50 MG/ML
400 INJECTION, SOLUTION INTRAVENOUS ONCE
Status: CANCELLED | OUTPATIENT
Start: 2019-06-17

## 2019-05-03 RX ORDER — DEXTROSE MONOHYDRATE 50 MG/ML
250 INJECTION, SOLUTION INTRAVENOUS ONCE
Status: CANCELLED | OUTPATIENT
Start: 2019-05-20

## 2019-05-03 NOTE — PROGRESS NOTES
CHIEF COMPLAINT: Stage IIIb rectal carcinoma    Problem List:  Oncology/Hematology History    1. Stage IIIB rectal carcinoma clinical T3b N1 aM0  -11/30/18 CT abdomen pelvis and chest x-ray negative for metastasis.  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    -12/6/18 MRI of rectum showed T3b with suspicious right internal iliac lymph node and CT chest noncontrast negative except for gallstones  -Per Dr. Kelly, CEA was normal.  -12/18/18 saw me for the first time.  I reviewed her case in our multidisciplinary conference and went over that in detail with her.  She had presented with hematochezia.  Dr. Kelly repeated endoscopy for more exact detailing of the primary location and this appears to be rectal on MRI and endoscopy.  Plan is for randomization on clinical trial N 1048 to neoadjuvant FOLFOX versus standard chemoradiation.  We'll get him to radiation and our research staff for randomization and get his baseline CBC and CMP.    -1/4/19 followed up: Randomized to the Xeloda radiation arm.  He will get that and then 4-6 weeks later had his surgery, and then follow that with 6 months of adjuvant FOLFOX per protocol.  My research assistant met with him today.  He has a Xeloda prescription.  -3/4/2019 MRI: Good response with T2, less likely T3 with spiculation N0 disease.  -4/23/2019 pathology: Laparoscopic assisted converted to lower anterior resection open with stapled coloanal anastomosis and diverting loop ileostomy with liver biopsy showed residual adenoma with focal high-grade dysplasia but no residual invasive carcinoma.  0 out of 17 lymph nodes.  Mild steatosis on liver biopsy with bridging fibrosis.            Rectal carcinoma (CMS/HCC)    12/18/2018 Initial Diagnosis     Rectal carcinoma (CMS/HCC)         1/7/2019 - 2/4/2019 Chemotherapy     Xeloda radiation on protocol         1/8/2019 - 2/14/2019 Radiation     Radiation OncologyTreatment  Course:  Hesham Arevalo received 5040 cGy in 28 fractions to pelvis via External Beam Radiation - EBRT.         3/4/2019 Imaging     MRI of the pelvis:  IMPRESSIONS:  MRI rectal cancer T category is: T2, LESS LIKELY EARLY T3 SPICULATIONS  Maximum EMD of invasion is: 0  Minimum tumor to MRF distance is: 12 MM  Low rectal tumor component: NO  Mesorectal nodes/tumor deposits: NO  EMVI: NO  Extramesorectal nodes: NO            HISTORY OF PRESENT ILLNESS:  The patient is a 53 y.o. male, here for follow up on management of stage IIIb rectal carcinoma clinically now with Y0 stage 0 disease post neoadjuvant Xeloda radiation followed by total mesorectal excision.      Past Medical History:   Diagnosis Date   • Arthritis     knees    • Disease of thyroid gland    • Fatty liver    • Fatty liver    • Hashimoto's disease    • Hypertension    • Psoriasis    • Rectal cancer (CMS/HCC) 11/2018    rectal; took chemo and radiation    • Wears glasses      Past Surgical History:   Procedure Laterality Date   • COLON RESECTION N/A 4/23/2019    Procedure: LAPAROSCOPIC LOWER ANTERIOR RESECTION WITH DIVERTING LOOP ILEOOSTOMY, SPLENIC FLEIXURE MOBILIZATION AND LIVER BIOPSY, AND PROCTOSCOPY;  Surgeon: Pau Kelly MD;  Location: [facility];  Service: General   • COLONOSCOPY      2018   • LIVER BIOPSY  2003   • VASECTOMY  1998       No Known Allergies    Family History and Social History reviewed and changed as necessary      REVIEW OF SYSTEM:   Review of Systems   Constitutional: Negative for appetite change, chills, diaphoresis, fatigue, fever and unexpected weight change.   HENT:   Negative for mouth sores, sore throat and trouble swallowing.    Eyes: Negative for icterus.   Respiratory: Negative for cough, hemoptysis and shortness of breath.    Cardiovascular: Negative for chest pain, leg swelling and palpitations.   Gastrointestinal: Negative for abdominal distention, abdominal pain, blood in stool, constipation, diarrhea, nausea and  "vomiting.   Endocrine: Negative for hot flashes.   Genitourinary: Negative for bladder incontinence, difficulty urinating, dysuria, frequency and hematuria.    Musculoskeletal: Negative for gait problem, neck pain and neck stiffness.   Skin: Negative for rash.   Neurological: Negative for dizziness, gait problem, headaches, light-headedness and numbness.   Hematological: Negative for adenopathy. Does not bruise/bleed easily.   Psychiatric/Behavioral: Negative for depression. The patient is not nervous/anxious.    All other systems reviewed and are negative.       PHYSICAL EXAM    Vitals:    05/03/19 1453   BP: 161/89   Pulse: 86   Resp: 16   Temp: 97.5 °F (36.4 °C)   SpO2: 98%   Weight: 91.2 kg (201 lb)   Height: 185.4 cm (73\")     Constitutional: Appears well-developed and well-nourished. No distress.   ECOG: (1) Restricted in physically strenuous activity, ambulatory and able to do work of light nature  HENT:   Head: Normocephalic.   Mouth/Throat: Oropharynx is clear and moist.   Eyes: Conjunctivae are normal. Pupils are equal, round, and reactive to light. No scleral icterus.   Neck: Neck supple. No JVD present. No thyromegaly present.   Cardiovascular: Normal rate, regular rhythm and normal heart sounds.    Pulmonary/Chest: Breath sounds normal. No respiratory distress.   Abdominal: Soft. Exhibits no distension and no mass. There is no hepatosplenomegaly. There is no tenderness. There is no rebound and no guarding. Right lower quadrant ileostomy functioning well.  Musculoskeletal:Exhibits no edema, tenderness or deformity.   Neurological: Alert and oriented to person, place, and time. Exhibits normal muscle tone.   Skin: No ecchymosis, no petechiae and no rash noted. Not diaphoretic. No cyanosis. Nails show no clubbing.   Psychiatric: Normal mood and affect.   Vitals reviewed.      Lab Results   Component Value Date    HGB 12.1 (L) 04/25/2019    HCT 37.2 (L) 04/25/2019    MCV 93.2 04/25/2019    PLT 84 (L) " 04/25/2019    WBC 7.62 04/25/2019    NEUTROABS 6.12 04/25/2019    LYMPHSABS 0.69 (L) 04/25/2019    MONOSABS 0.68 04/25/2019    EOSABS 0.11 04/25/2019    BASOSABS 0.02 04/25/2019       Lab Results   Component Value Date    GLUCOSE 117 (H) 04/26/2019    BUN 11 04/26/2019    CREATININE 0.79 04/26/2019     (L) 04/26/2019    K 4.5 04/26/2019    CL 98 04/26/2019    CO2 27.0 04/26/2019    CALCIUM 8.9 04/26/2019    ALBUMIN 4.6 12/18/2018    BILITOT 0.6 12/18/2018    ALKPHOS 59 12/18/2018    AST 59 (H) 12/18/2018    ALT 48 (H) 12/18/2018                   ASSESSMENT & PLAN:    1. Pathologic stage 0 rectal carcinoma/clinical stage III with no residual carcinoma invasive on pathology post total mesorectal excision after neoadjuvant Xeloda radiation  2. Fatty liver disease with early fibrosis on liver biopsy but no obvious cirrhosis  3. Mild thrombocytopenia due to problems 1 and 2    Discussion: tolerated his surgery well.  Per protocol needs 6 courses of FOLFOX.  We will get for chemotherapy ration visit and start treatment 5/20/2019 and follow-up with nurse practitioner 6/3/2019.  Discussed with patient 40 minutes greater than 50% spent counseling          Ousmane Moeller MD    05/03/2019

## 2019-05-07 ENCOUNTER — OFFICE VISIT (OUTPATIENT)
Dept: ONCOLOGY | Facility: CLINIC | Age: 54
End: 2019-05-07

## 2019-05-07 VITALS
HEART RATE: 90 BPM | DIASTOLIC BLOOD PRESSURE: 84 MMHG | HEIGHT: 73 IN | SYSTOLIC BLOOD PRESSURE: 150 MMHG | TEMPERATURE: 97.9 F | BODY MASS INDEX: 26.64 KG/M2 | OXYGEN SATURATION: 98 % | WEIGHT: 201 LBS | RESPIRATION RATE: 16 BRPM

## 2019-05-07 DIAGNOSIS — C20 RECTAL CARCINOMA (HCC): Primary | Chronic | ICD-10-CM

## 2019-05-07 PROCEDURE — 99215 OFFICE O/P EST HI 40 MIN: CPT | Performed by: NURSE PRACTITIONER

## 2019-05-07 NOTE — PROGRESS NOTES
CHEMOTHERAPY PREPARATION    Hesham Arevalo Jr.  [id number]  1965    Chief Complaint: Rectal carcinoma    History of present illness:  Hesham Arevalo Jr. is a 53 y.o. year old male who is here today for chemotherapy preparation and needs assessment. The patient has been diagnosed with rectal carcinoma and is scheduled to begin treatment with FOLFOX.     Oncology History:    Oncology/Hematology History    1. Stage IIIB rectal carcinoma clinical T3b N1 aM0  -11/30/18 CT abdomen pelvis and chest x-ray negative for metastasis.  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    -12/6/18 MRI of rectum showed T3b with suspicious right internal iliac lymph node and CT chest noncontrast negative except for gallstones  -Per Dr. Kelly, CEA was normal.  -12/18/18 saw me for the first time.  I reviewed her case in our multidisciplinary conference and went over that in detail with her.  She had presented with hematochezia.  Dr. Kelly repeated endoscopy for more exact detailing of the primary location and this appears to be rectal on MRI and endoscopy.  Plan is for randomization on clinical trial N 1048 to neoadjuvant FOLFOX versus standard chemoradiation.  We'll get him to radiation and our research staff for randomization and get his baseline CBC and CMP.    -1/4/19 followed up: Randomized to the Xeloda radiation arm.  He will get that and then 4-6 weeks later had his surgery, and then follow that with 6 months of adjuvant FOLFOX per protocol.  My research assistant met with him today.  He has a Xeloda prescription.  -3/4/2019 MRI: Good response with T2, less likely T3 with spiculation N0 disease.  -4/23/2019 pathology: Laparoscopic assisted converted to lower anterior resection open with stapled coloanal anastomosis and diverting loop ileostomy with liver biopsy showed residual adenoma with focal high-grade dysplasia but no residual invasive carcinoma.   0 out of 17 lymph nodes.  Mild steatosis on liver biopsy with bridging fibrosis.            Rectal carcinoma (CMS/HCC)    12/18/2018 Initial Diagnosis     Rectal carcinoma (CMS/HCC)         1/7/2019 - 2/4/2019 Chemotherapy     Xeloda radiation on protocol         1/8/2019 - 2/14/2019 Radiation     Radiation OncologyTreatment Course:  Hesham Arevalo received 5040 cGy in 28 fractions to pelvis via External Beam Radiation - EBRT.         3/4/2019 Imaging     MRI of the pelvis:  IMPRESSIONS:  MRI rectal cancer T category is: T2, LESS LIKELY EARLY T3 SPICULATIONS  Maximum EMD of invasion is: 0  Minimum tumor to MRF distance is: 12 MM  Low rectal tumor component: NO  Mesorectal nodes/tumor deposits: NO  EMVI: NO  Extramesorectal nodes: NO            Past Medical History:   Diagnosis Date   • Arthritis     knees    • Disease of thyroid gland    • Fatty liver    • Fatty liver    • Hashimoto's disease    • Hypertension    • Psoriasis    • Rectal cancer (CMS/HCC) 11/2018    rectal; took chemo and radiation    • Wears glasses        Past Surgical History:   Procedure Laterality Date   • COLON RESECTION N/A 4/23/2019    Procedure: LAPAROSCOPIC LOWER ANTERIOR RESECTION WITH DIVERTING LOOP ILEOOSTOMY, SPLENIC FLEIXURE MOBILIZATION AND LIVER BIOPSY, AND PROCTOSCOPY;  Surgeon: Pau Kelly MD;  Location: [facility];  Service: General   • COLONOSCOPY      2018   • LIVER BIOPSY  2003   • VASECTOMY  1998       MEDICATIONS: The current medication list was reviewed and reconciled.     Allergies:  has No Known Allergies.    Family History   Problem Relation Age of Onset   • Breast cancer Mother    • Cancer Father          Review of Systems   Constitutional: Positive for fatigue. Negative for fever and unexpected weight change.   HENT: Negative.  Negative for congestion, hearing loss, sore throat and trouble swallowing.    Eyes: Negative.  Negative for visual disturbance.   Respiratory: Positive for shortness of breath. Negative for  "cough and wheezing.    Cardiovascular: Negative.  Negative for chest pain and leg swelling.   Gastrointestinal: Negative.  Negative for abdominal distention, abdominal pain, constipation, diarrhea, nausea and vomiting.   Endocrine: Negative.    Genitourinary: Negative.    Musculoskeletal: Negative.  Negative for arthralgias, back pain and gait problem.   Skin: Negative.    Allergic/Immunologic: Negative.    Neurological: Negative for dizziness, weakness, numbness and headaches.   Hematological: Negative for adenopathy. Does not bruise/bleed easily.   Psychiatric/Behavioral: Negative.    All other systems reviewed and are negative.      Physical Exam  Vital Signs: /84 Comment: No BP meds this AM  Pulse 90   Temp 97.9 °F (36.6 °C)   Resp 16   Ht 185.4 cm (73\")   Wt 91.2 kg (201 lb)   SpO2 98%   BMI 26.52 kg/m²    General Appearance:  alert, cooperative, no apparent distress, appears stated age and normal weight   Neurologic/Psychiatric: A&O x 3, gait steady, appropriate affect   HEENT:  Normocephalic, without obvious abnormality, mucous membranes moist   Lungs:   Clear to auscultation bilaterally; respirations regular, even, and unlabored bilaterally   Heart:  Regular rate and rhythm, no murmurs appreciated   Extremities: Normal, atraumatic; no clubbing, cyanosis, or edema    Skin: No rashes, lesions, or abnormal coloration noted     ECOG Performance Status: (1) Restricted in physically strenuous activity, ambulatory and able to do work of light nature          NEEDS ASSESSMENTS    Genetics  The patient's new diagnosis and family history have been reviewed for genetic counseling needs. A genetic referral is not recommended.     Psychosocial  The patient has completed a PHQ-9 Depression Screening and the Distress Thermometer (DT) today.   PHQ-9 results show 1-4 (Minimal Depression). The patient scored their distress today as 2 on a scale of 0-10 with 0 being no distress and 10 being extreme distress. " "  Problems marked by the patient as being an issue for them within the last week include no problems.   Results were reviewed along with psychosocial resources offered by our cancer center. Our oncology social worker will be flagged for a DT score of 4 or above, and a same day call will be made for a score of 9 or 10. A mental health referral is not recommended at this time. The patient is not accepting of a referral to [facility].   Copies of patient's questionnaires will be scanned into EMR for details and further reference.    Barriers to care  A barriers form was also completed by the patient today. We discussed services offered by our facility to help him have adequate access to care. The patient was given the name and card for our Oncology Social Worker, Mary Anne Pang. Based upon barriers assessment today, the patient will not require a follow-up call from the  to further discuss needs.   A copy of the barriers form will also be scanned into EMR for details and further reference.     VAD Assessment  The patient and I discussed planned intervenous chemotherapy as well as other IV treatments that are often needed throughout the course of treatment. These may include, but are not limited to blood transfusions, antibiotics, and IV hydration. The vasculature does not appear to be adequate for multiple peripheral IVs throughout their treatment course. Discussed risks and benefits of VADs. The patient would like to pursue Port-A-Cath insertion prior to initiation of treatment.     Advanced Care Planning  The patient and I discussed advanced care planning, \"Conversations that Matter\".   This service was offered, free of charge, for development of advance directives with a certified ACP facilitator.  The patient does have an up-to-date advanced directive. This document is not on file with our office. The patient is not interested in an appointment with one of our facilitators to create or update " their advanced directives.      Palliative Care  The patient and I discussed palliative care services. Palliative care is not the same as Hospice care. This is specialized medical care for people living with serious illness with the goal of improving quality of life for the patient and their family. [facility] has partnered with [facility] to offer our patients outpatient palliative care early along with their treatment to assist in coordination of care, symptom management, pain management, and medical decision making.  Oncology criteria for palliative care referral is not met at this time. The patient is not interested in a palliative care consultation.     Additional Referral needs  none      CHEMOTHERAPY EDUCATION    Booklets Given: Chemotherapy and You [x]  Eating Hints [x]    Sexuality/Fertility Books []      Chemotherapy/Biotherapy Education Sheets: (list all that apply)  nausea management, acid reflux management, diarrhea management, Cancer resourse contacts information, skin and mouth care and vaccination information                                                                                                                                                                 Chemotherapy Regimen:   Treatment Plans     Name Type Plan dates Plan Provider         Active    OP COLON [id number] OXALIplatin / Leucovorin / Fluorouracil ONCOLOGY TREATMENT  5/19/2019 - Present Ousmane Moeller MD                    TOPICS EDUCATION PROVIDED COMMENTS   ANEMIA:  role of RBC, cause, s/s, ways to manage, role of transfusion [x]    THROMBOCYTOPENIA:  role of platelet, cause, s/s, ways to prevent bleeding, things to avoid, when to seek help [x]    NEUTROPENIA:  role of WBC, cause, infection precautions, s/s of infection, when to call MD [x]    NUTRITION & APPETITE CHANGES:  importance of maintaining healthy diet & weight, ways to manage to improve intake, dietary consult, exercise regimen [x]    DIARRHEA:  causes, s/s  of dehydration, ways to manage, dietary changes, when to call MD [x]    CONSTIPATION:  causes, ways to manage, dietary changes, when to call MD [x]    NAUSEA & VOMITING:  cause, use of antiemetics, dietary changes, when to call MD [x]    MOUTH SORES:  causes, oral care, ways to manage [x]    ALOPECIA:  cause, ways to manage, resources [x]    INFERTILITY & SEXUALITY:  causes, fertility preservation options, sexuality changes, ways to manage, importance of birth control []    NERVOUS SYSTEM CHANGES:  causes, s/s, neuropathies, cognitive changes, ways to manage [x]    PAIN:  causes, ways to manage [x] ????   SKIN & NAIL CHANGES:  cause, s/s, ways to manage [x]    ORGAN TOXICITIES:  cause, s/s, need for diagnostic tests, labs, when to notify MD [x]    HOME CARE:  use of spill kits, storing of PO chemo, how to manage bodily fluids [x]    MISCELLANEOUS:  drug interactions, administration, vesicant, et [x]        Assessment and Plan:    Diagnoses and all orders for this visit:    Rectal carcinoma (CMS/HCC)        This was a 50 minute face-to-face visit with 40 minutes spent in  counseling and coordination of care as documented above.   The patient and I have reviewed their new cancer diagnosis and scheduled treatment plan. Needs assessment was completed including genetics, psychosocial needs, barriers to care, VAD evaluation, advanced care planning, and palliative care services. Referrals have been ordered as appropriate based upon our evaluation and patient desires.     Chemotherapy teaching was also completed today as documented above. Adequate time was given to answer all questions to his satisfaction. Patient and family are aware of their care team members and contact information if they have questions or problems throughout the treatment course. Needs assessments and education has been completed. The patient is adequately prepared to begin treatment as scheduled.       Ana Self, APRN  05/07/2019

## 2019-05-08 ENCOUNTER — READMISSION MANAGEMENT (OUTPATIENT)
Dept: CALL CENTER | Facility: HOSPITAL | Age: 54
End: 2019-05-08

## 2019-05-08 ENCOUNTER — HOSPITAL ENCOUNTER (OUTPATIENT)
Facility: HOSPITAL | Age: 54
Setting detail: HOSPITAL OUTPATIENT SURGERY
End: 2019-05-08
Attending: SURGERY | Admitting: SURGERY

## 2019-05-08 NOTE — OUTREACH NOTE
General Surgery Week 2 Survey      Responses   Facility patient discharged from?  [facility]   Does the patient have one of the following disease processes/diagnoses(primary or secondary)?  General Surgery   Week 2 attempt successful?  Yes   Call start time  1524   Call end time  1530   Discharge diagnosis  S/P low anterior resection with loop ileostomy, liver biopsy and proctoscopy   Is patient permission given to speak with other caregiver?  Yes   List who call center can speak with  wife   Meds reviewed with patient/caregiver?  Yes   Is the patient having any side effects they believe may be caused by any medication additions or changes?  No   Is the patient taking all medications as directed (includes completed medication regime)?  Yes   Medication comments  has not used pain meds since DC   Does the patient have a follow up appointment scheduled with their surgeon?  Yes   Has the patient kept scheduled appointments due by today?  Yes   Psychosocial issues?  No   Comments  Having port placed 5-16 for chemo, will start chemo 5-20 x3mo. Appetite is steady. Have lost some wt. Having quite a bit of stool output but is soft.    What is the patient's perception of their health status since discharge?  Improving   Nursing interventions  Nurse provided patient education   Is the patient /caregiver able to teach back basic post-op care?  Practice 'cough and deep breath', Take showers only when approved by MD-sponge bathe until then, Keep incision areas clean,dry and protected   Is the patient/caregiver able to teach back signs and symptoms of incisional infection?  Incisional warmth, Increased redness, swelling or pain at the incisonal site   Is the patient/caregiver able to teach back the hierarchy of who to call/visit for symptoms/problems? PCP, Specialist, Home health nurse, Urgent Care, ED, 911  Yes   Week 2 call completed?  Yes   Revoked  No further contact(revokes)-requires comment          Elizabeth Cleary RN

## 2019-05-09 ENCOUNTER — APPOINTMENT (OUTPATIENT)
Dept: CT IMAGING | Facility: HOSPITAL | Age: 54
End: 2019-05-09

## 2019-05-09 ENCOUNTER — HOSPITAL ENCOUNTER (INPATIENT)
Facility: HOSPITAL | Age: 54
LOS: 6 days | Discharge: HOME | End: 2019-05-15
Attending: EMERGENCY MEDICINE | Admitting: INTERNAL MEDICINE

## 2019-05-09 DIAGNOSIS — N32.0 BLADDER OUTFLOW OBSTRUCTION: ICD-10-CM

## 2019-05-09 DIAGNOSIS — Z93.2 STATUS POST ILEOSTOMY (HCC): ICD-10-CM

## 2019-05-09 DIAGNOSIS — K65.1 INTRA-ABDOMINAL ABSCESS (HCC): Primary | ICD-10-CM

## 2019-05-09 LAB
ALBUMIN SERPL-MCNC: 4.1 G/DL (ref 3.5–5.2)
ALBUMIN/GLOB SERPL: 1 G/DL
ALP SERPL-CCNC: 116 U/L (ref 39–117)
ALT SERPL W P-5'-P-CCNC: 29 U/L (ref 1–41)
ANION GAP SERPL CALCULATED.3IONS-SCNC: 14 MMOL/L
AST SERPL-CCNC: 29 U/L (ref 1–40)
BASOPHILS # BLD AUTO: 0.02 10*3/MM3 (ref 0–0.2)
BASOPHILS NFR BLD AUTO: 0.2 % (ref 0–1.5)
BILIRUB SERPL-MCNC: 1.3 MG/DL (ref 0.2–1.2)
BILIRUB UR QL STRIP: NEGATIVE
BUN BLD-MCNC: 12 MG/DL (ref 6–20)
BUN/CREAT SERPL: 15.2 (ref 7–25)
CALCIUM SPEC-SCNC: 9.4 MG/DL (ref 8.6–10.5)
CHLORIDE SERPL-SCNC: 96 MMOL/L (ref 98–107)
CLARITY UR: CLEAR
CO2 SERPL-SCNC: 24 MMOL/L (ref 22–29)
COLOR UR: YELLOW
CREAT BLD-MCNC: 0.79 MG/DL (ref 0.76–1.27)
DEPRECATED RDW RBC AUTO: 41.3 FL (ref 37–54)
EOSINOPHIL # BLD AUTO: 0.01 10*3/MM3 (ref 0–0.4)
EOSINOPHIL NFR BLD AUTO: 0.1 % (ref 0.3–6.2)
ERYTHROCYTE [DISTWIDTH] IN BLOOD BY AUTOMATED COUNT: 13 % (ref 12.3–15.4)
GFR SERPL CREATININE-BSD FRML MDRD: 102 ML/MIN/1.73
GLOBULIN UR ELPH-MCNC: 4.1 GM/DL
GLUCOSE BLD-MCNC: 158 MG/DL (ref 65–99)
GLUCOSE UR STRIP-MCNC: NEGATIVE MG/DL
HCT VFR BLD AUTO: 39.8 % (ref 37.5–51)
HGB BLD-MCNC: 13.8 G/DL (ref 13–17.7)
HGB UR QL STRIP.AUTO: NEGATIVE
HOLD SPECIMEN: NORMAL
HOLD SPECIMEN: NORMAL
IMM GRANULOCYTES # BLD AUTO: 0.02 10*3/MM3 (ref 0–0.05)
IMM GRANULOCYTES NFR BLD AUTO: 0.2 % (ref 0–0.5)
KETONES UR QL STRIP: NEGATIVE
LEUKOCYTE ESTERASE UR QL STRIP.AUTO: NEGATIVE
LYMPHOCYTES # BLD AUTO: 0.74 10*3/MM3 (ref 0.7–3.1)
LYMPHOCYTES NFR BLD AUTO: 7.7 % (ref 19.6–45.3)
MCH RBC QN AUTO: 29.9 PG (ref 26.6–33)
MCHC RBC AUTO-ENTMCNC: 34.7 G/DL (ref 31.5–35.7)
MCV RBC AUTO: 86.3 FL (ref 79–97)
MONOCYTES # BLD AUTO: 0.48 10*3/MM3 (ref 0.1–0.9)
MONOCYTES NFR BLD AUTO: 5 % (ref 5–12)
NEUTROPHILS # BLD AUTO: 8.29 10*3/MM3 (ref 1.7–7)
NEUTROPHILS NFR BLD AUTO: 86.8 % (ref 42.7–76)
NITRITE UR QL STRIP: NEGATIVE
PH UR STRIP.AUTO: <=5 [PH] (ref 5–8)
PLATELET # BLD AUTO: 179 10*3/MM3 (ref 140–450)
PMV BLD AUTO: 8.5 FL (ref 6–12)
POTASSIUM BLD-SCNC: 3.9 MMOL/L (ref 3.5–5.2)
PROT SERPL-MCNC: 8.2 G/DL (ref 6–8.5)
PROT UR QL STRIP: NEGATIVE
RBC # BLD AUTO: 4.61 10*6/MM3 (ref 4.14–5.8)
SODIUM BLD-SCNC: 134 MMOL/L (ref 136–145)
SP GR UR STRIP: 1.02 (ref 1–1.03)
UROBILINOGEN UR QL STRIP: NORMAL
WBC NRBC COR # BLD: 9.56 10*3/MM3 (ref 3.4–10.8)
WHOLE BLOOD HOLD SPECIMEN: NORMAL
WHOLE BLOOD HOLD SPECIMEN: NORMAL

## 2019-05-09 PROCEDURE — 51702 INSERT TEMP BLADDER CATH: CPT

## 2019-05-09 PROCEDURE — 25010000002 ONDANSETRON PER 1 MG: Performed by: EMERGENCY MEDICINE

## 2019-05-09 PROCEDURE — 99285 EMERGENCY DEPT VISIT HI MDM: CPT

## 2019-05-09 PROCEDURE — 87040 BLOOD CULTURE FOR BACTERIA: CPT | Performed by: EMERGENCY MEDICINE

## 2019-05-09 PROCEDURE — 81003 URINALYSIS AUTO W/O SCOPE: CPT | Performed by: EMERGENCY MEDICINE

## 2019-05-09 PROCEDURE — 25010000002 PIPERACILLIN SOD-TAZOBACTAM PER 1 G: Performed by: EMERGENCY MEDICINE

## 2019-05-09 PROCEDURE — 25010000002 MORPHINE PER 10 MG: Performed by: EMERGENCY MEDICINE

## 2019-05-09 PROCEDURE — 85025 COMPLETE CBC W/AUTO DIFF WBC: CPT | Performed by: EMERGENCY MEDICINE

## 2019-05-09 PROCEDURE — 74176 CT ABD & PELVIS W/O CONTRAST: CPT

## 2019-05-09 PROCEDURE — 80053 COMPREHEN METABOLIC PANEL: CPT | Performed by: EMERGENCY MEDICINE

## 2019-05-09 PROCEDURE — 83605 ASSAY OF LACTIC ACID: CPT | Performed by: EMERGENCY MEDICINE

## 2019-05-09 RX ORDER — ONDANSETRON 2 MG/ML
4 INJECTION INTRAMUSCULAR; INTRAVENOUS ONCE
Status: COMPLETED | OUTPATIENT
Start: 2019-05-09 | End: 2019-05-09

## 2019-05-09 RX ORDER — CEFAZOLIN SODIUM 2 G/100ML
2 INJECTION, SOLUTION INTRAVENOUS ONCE
Status: DISCONTINUED | OUTPATIENT
Start: 2019-05-09 | End: 2019-05-09

## 2019-05-09 RX ORDER — MORPHINE SULFATE 4 MG/ML
4 INJECTION, SOLUTION INTRAMUSCULAR; INTRAVENOUS ONCE
Status: COMPLETED | OUTPATIENT
Start: 2019-05-09 | End: 2019-05-10

## 2019-05-09 RX ORDER — SODIUM CHLORIDE 0.9 % (FLUSH) 0.9 %
10 SYRINGE (ML) INJECTION AS NEEDED
Status: DISCONTINUED | OUTPATIENT
Start: 2019-05-09 | End: 2019-05-15 | Stop reason: HOSPADM

## 2019-05-09 RX ORDER — LORAZEPAM 2 MG/ML
0.5 INJECTION INTRAMUSCULAR ONCE
Status: COMPLETED | OUTPATIENT
Start: 2019-05-09 | End: 2019-05-10

## 2019-05-09 RX ORDER — MORPHINE SULFATE 4 MG/ML
4 INJECTION, SOLUTION INTRAMUSCULAR; INTRAVENOUS ONCE
Status: COMPLETED | OUTPATIENT
Start: 2019-05-09 | End: 2019-05-09

## 2019-05-09 RX ADMIN — ONDANSETRON 4 MG: 2 INJECTION INTRAMUSCULAR; INTRAVENOUS at 22:37

## 2019-05-09 RX ADMIN — SODIUM CHLORIDE 1000 ML: 9 INJECTION, SOLUTION INTRAVENOUS at 23:26

## 2019-05-09 RX ADMIN — TAZOBACTAM SODIUM AND PIPERACILLIN SODIUM 3.38 G: 375; 3 INJECTION, SOLUTION INTRAVENOUS at 23:34

## 2019-05-09 RX ADMIN — MORPHINE SULFATE 4 MG: 4 INJECTION INTRAVENOUS at 22:39

## 2019-05-10 ENCOUNTER — APPOINTMENT (OUTPATIENT)
Dept: CT IMAGING | Facility: HOSPITAL | Age: 54
End: 2019-05-10

## 2019-05-10 PROBLEM — K65.1 ABDOMINOPELVIC ABSCESS (HCC): Status: ACTIVE | Noted: 2019-05-10

## 2019-05-10 PROBLEM — R50.9 FEVER: Status: ACTIVE | Noted: 2019-05-10

## 2019-05-10 PROBLEM — R33.9 URINE RETENTION: Status: ACTIVE | Noted: 2019-05-10

## 2019-05-10 PROBLEM — K65.1 INTRA-ABDOMINAL ABSCESS (HCC): Status: ACTIVE | Noted: 2019-05-10

## 2019-05-10 LAB
D-LACTATE SERPL-SCNC: 1.8 MMOL/L (ref 0.5–2)
D-LACTATE SERPL-SCNC: 2.3 MMOL/L (ref 0.5–2)
GLUCOSE BLDC GLUCOMTR-MCNC: 72 MG/DL (ref 70–130)
GLUCOSE BLDC GLUCOMTR-MCNC: 79 MG/DL (ref 70–130)
GLUCOSE BLDC GLUCOMTR-MCNC: 89 MG/DL (ref 70–130)
HOLD SPECIMEN: NORMAL

## 2019-05-10 PROCEDURE — 87205 SMEAR GRAM STAIN: CPT | Performed by: INTERNAL MEDICINE

## 2019-05-10 PROCEDURE — C1729 CATH, DRAINAGE: HCPCS

## 2019-05-10 PROCEDURE — 63710000001 INSULIN LISPRO (HUMAN) PER 5 UNITS: Performed by: NURSE PRACTITIONER

## 2019-05-10 PROCEDURE — 82962 GLUCOSE BLOOD TEST: CPT

## 2019-05-10 PROCEDURE — 25010000002 ENOXAPARIN PER 10 MG: Performed by: NURSE PRACTITIONER

## 2019-05-10 PROCEDURE — 25010000002 LORAZEPAM PER 2 MG: Performed by: EMERGENCY MEDICINE

## 2019-05-10 PROCEDURE — 25010000002 PIPERACILLIN SOD-TAZOBACTAM PER 1 G: Performed by: INTERNAL MEDICINE

## 2019-05-10 PROCEDURE — 87102 FUNGUS ISOLATION CULTURE: CPT | Performed by: INTERNAL MEDICINE

## 2019-05-10 PROCEDURE — 0W9G30Z DRAINAGE OF PERITONEAL CAVITY WITH DRAINAGE DEVICE, PERCUTANEOUS APPROACH: ICD-10-PCS | Performed by: RADIOLOGY

## 2019-05-10 PROCEDURE — 87075 CULTR BACTERIA EXCEPT BLOOD: CPT | Performed by: COLON & RECTAL SURGERY

## 2019-05-10 PROCEDURE — 25010000002 PIPERACILLIN SOD-TAZOBACTAM PER 1 G: Performed by: PHYSICIAN ASSISTANT

## 2019-05-10 PROCEDURE — 87070 CULTURE OTHR SPECIMN AEROBIC: CPT | Performed by: INTERNAL MEDICINE

## 2019-05-10 PROCEDURE — 83605 ASSAY OF LACTIC ACID: CPT | Performed by: EMERGENCY MEDICINE

## 2019-05-10 PROCEDURE — 25010000002 VANCOMYCIN 10 G RECONSTITUTED SOLUTION

## 2019-05-10 PROCEDURE — 25010000002 MORPHINE PER 10 MG: Performed by: EMERGENCY MEDICINE

## 2019-05-10 PROCEDURE — 75989 ABSCESS DRAINAGE UNDER X-RAY: CPT

## 2019-05-10 PROCEDURE — 87076 CULTURE ANAEROBE IDENT EACH: CPT | Performed by: COLON & RECTAL SURGERY

## 2019-05-10 PROCEDURE — 25010000002 HYDROMORPHONE PER 4 MG: Performed by: INTERNAL MEDICINE

## 2019-05-10 RX ORDER — LEVOTHYROXINE SODIUM 0.05 MG/1
50 TABLET ORAL EVERY MORNING
Status: DISCONTINUED | OUTPATIENT
Start: 2019-05-10 | End: 2019-05-15 | Stop reason: HOSPADM

## 2019-05-10 RX ORDER — NICOTINE POLACRILEX 4 MG
15 LOZENGE BUCCAL
Status: DISCONTINUED | OUTPATIENT
Start: 2019-05-10 | End: 2019-05-15 | Stop reason: HOSPADM

## 2019-05-10 RX ORDER — LABETALOL HYDROCHLORIDE 5 MG/ML
10 INJECTION, SOLUTION INTRAVENOUS EVERY 4 HOURS PRN
Status: DISCONTINUED | OUTPATIENT
Start: 2019-05-10 | End: 2019-05-15 | Stop reason: HOSPADM

## 2019-05-10 RX ORDER — LIDOCAINE HYDROCHLORIDE 10 MG/ML
10 INJECTION, SOLUTION INFILTRATION; PERINEURAL ONCE
Status: COMPLETED | OUTPATIENT
Start: 2019-05-10 | End: 2019-05-10

## 2019-05-10 RX ORDER — FENTANYL CITRATE 50 UG/ML
50 INJECTION, SOLUTION INTRAMUSCULAR; INTRAVENOUS
Status: DISCONTINUED | OUTPATIENT
Start: 2019-05-10 | End: 2019-05-15 | Stop reason: HOSPADM

## 2019-05-10 RX ORDER — MIDAZOLAM HYDROCHLORIDE 1 MG/ML
2 INJECTION INTRAMUSCULAR; INTRAVENOUS
Status: DISCONTINUED | OUTPATIENT
Start: 2019-05-10 | End: 2019-05-15 | Stop reason: HOSPADM

## 2019-05-10 RX ORDER — ACETAMINOPHEN 325 MG/1
650 TABLET ORAL EVERY 6 HOURS SCHEDULED
Status: DISCONTINUED | OUTPATIENT
Start: 2019-05-10 | End: 2019-05-15 | Stop reason: HOSPADM

## 2019-05-10 RX ORDER — TAMSULOSIN HYDROCHLORIDE 0.4 MG/1
0.4 CAPSULE ORAL DAILY
Status: DISCONTINUED | OUTPATIENT
Start: 2019-05-10 | End: 2019-05-15 | Stop reason: HOSPADM

## 2019-05-10 RX ORDER — FLUCONAZOLE 200 MG/1
400 TABLET ORAL EVERY 24 HOURS
Status: DISCONTINUED | OUTPATIENT
Start: 2019-05-10 | End: 2019-05-12

## 2019-05-10 RX ORDER — ACETAMINOPHEN 500 MG
1000 TABLET ORAL ONCE
Status: COMPLETED | OUTPATIENT
Start: 2019-05-10 | End: 2019-05-10

## 2019-05-10 RX ORDER — ACETAMINOPHEN 500 MG
1000 TABLET ORAL ONCE
Status: DISCONTINUED | OUTPATIENT
Start: 2019-05-10 | End: 2019-05-10 | Stop reason: SDUPTHER

## 2019-05-10 RX ORDER — SODIUM CHLORIDE 0.9 % (FLUSH) 0.9 %
3-10 SYRINGE (ML) INJECTION AS NEEDED
Status: DISCONTINUED | OUTPATIENT
Start: 2019-05-10 | End: 2019-05-15 | Stop reason: HOSPADM

## 2019-05-10 RX ORDER — HYDROMORPHONE HYDROCHLORIDE 1 MG/ML
0.5 INJECTION, SOLUTION INTRAMUSCULAR; INTRAVENOUS; SUBCUTANEOUS
Status: DISCONTINUED | OUTPATIENT
Start: 2019-05-10 | End: 2019-05-15 | Stop reason: HOSPADM

## 2019-05-10 RX ORDER — ONDANSETRON 2 MG/ML
4 INJECTION INTRAMUSCULAR; INTRAVENOUS EVERY 6 HOURS PRN
Status: DISCONTINUED | OUTPATIENT
Start: 2019-05-10 | End: 2019-05-11

## 2019-05-10 RX ORDER — HYDROCODONE BITARTRATE AND ACETAMINOPHEN 7.5; 325 MG/1; MG/1
1 TABLET ORAL EVERY 6 HOURS PRN
Status: DISCONTINUED | OUTPATIENT
Start: 2019-05-10 | End: 2019-05-15 | Stop reason: HOSPADM

## 2019-05-10 RX ORDER — IBUPROFEN 600 MG/1
600 TABLET ORAL EVERY 6 HOURS PRN
Status: DISCONTINUED | OUTPATIENT
Start: 2019-05-10 | End: 2019-05-11

## 2019-05-10 RX ORDER — ONDANSETRON 2 MG/ML
4 INJECTION INTRAMUSCULAR; INTRAVENOUS EVERY 6 HOURS PRN
Status: DISCONTINUED | OUTPATIENT
Start: 2019-05-10 | End: 2019-05-15 | Stop reason: HOSPADM

## 2019-05-10 RX ORDER — ACETAMINOPHEN 500 MG
1000 TABLET ORAL ONCE
Status: DISCONTINUED | OUTPATIENT
Start: 2019-05-10 | End: 2019-05-10

## 2019-05-10 RX ORDER — DEXTROSE MONOHYDRATE 25 G/50ML
25 INJECTION, SOLUTION INTRAVENOUS
Status: DISCONTINUED | OUTPATIENT
Start: 2019-05-10 | End: 2019-05-15 | Stop reason: HOSPADM

## 2019-05-10 RX ORDER — SODIUM CHLORIDE 0.9 % (FLUSH) 0.9 %
3 SYRINGE (ML) INJECTION EVERY 12 HOURS SCHEDULED
Status: DISCONTINUED | OUTPATIENT
Start: 2019-05-10 | End: 2019-05-15 | Stop reason: HOSPADM

## 2019-05-10 RX ORDER — SODIUM CHLORIDE 9 MG/ML
125 INJECTION, SOLUTION INTRAVENOUS CONTINUOUS
Status: DISCONTINUED | OUTPATIENT
Start: 2019-05-10 | End: 2019-05-15 | Stop reason: HOSPADM

## 2019-05-10 RX ADMIN — HYDROMORPHONE HYDROCHLORIDE 0.5 MG: 1 INJECTION, SOLUTION INTRAMUSCULAR; INTRAVENOUS; SUBCUTANEOUS at 03:41

## 2019-05-10 RX ADMIN — FLUCONAZOLE 400 MG: 200 TABLET ORAL at 15:24

## 2019-05-10 RX ADMIN — SODIUM CHLORIDE 500 ML: 9 INJECTION, SOLUTION INTRAVENOUS at 10:56

## 2019-05-10 RX ADMIN — TAZOBACTAM SODIUM AND PIPERACILLIN SODIUM 3.38 G: 375; 3 INJECTION, SOLUTION INTRAVENOUS at 21:04

## 2019-05-10 RX ADMIN — TAZOBACTAM SODIUM AND PIPERACILLIN SODIUM 3.38 G: 375; 3 INJECTION, SOLUTION INTRAVENOUS at 05:55

## 2019-05-10 RX ADMIN — LIDOCAINE HYDROCHLORIDE 10 ML: 10 INJECTION, SOLUTION INFILTRATION; PERINEURAL at 14:25

## 2019-05-10 RX ADMIN — VANCOMYCIN HYDROCHLORIDE 2000 MG: 10 INJECTION, POWDER, LYOPHILIZED, FOR SOLUTION INTRAVENOUS at 07:44

## 2019-05-10 RX ADMIN — MORPHINE SULFATE 4 MG: 4 INJECTION INTRAVENOUS at 01:10

## 2019-05-10 RX ADMIN — ACETAMINOPHEN 1000 MG: 500 TABLET, FILM COATED ORAL at 07:44

## 2019-05-10 RX ADMIN — SODIUM CHLORIDE 150 ML/HR: 9 INJECTION, SOLUTION INTRAVENOUS at 03:41

## 2019-05-10 RX ADMIN — SODIUM CHLORIDE 1000 ML: 9 INJECTION, SOLUTION INTRAVENOUS at 00:26

## 2019-05-10 RX ADMIN — TAMSULOSIN HYDROCHLORIDE 0.4 MG: 0.4 CAPSULE ORAL at 11:33

## 2019-05-10 RX ADMIN — ACETAMINOPHEN 1000 MG: 500 TABLET, FILM COATED ORAL at 00:25

## 2019-05-10 RX ADMIN — ENOXAPARIN SODIUM 40 MG: 40 INJECTION SUBCUTANEOUS at 15:23

## 2019-05-10 RX ADMIN — SODIUM CHLORIDE 500 ML: 9 INJECTION, SOLUTION INTRAVENOUS at 18:23

## 2019-05-10 RX ADMIN — LEVOTHYROXINE SODIUM 50 MCG: 50 TABLET ORAL at 11:33

## 2019-05-10 RX ADMIN — ACETAMINOPHEN 650 MG: 325 TABLET ORAL at 11:33

## 2019-05-10 RX ADMIN — ACETAMINOPHEN 650 MG: 325 TABLET ORAL at 18:16

## 2019-05-10 RX ADMIN — TAZOBACTAM SODIUM AND PIPERACILLIN SODIUM 3.38 G: 375; 3 INJECTION, SOLUTION INTRAVENOUS at 15:24

## 2019-05-10 RX ADMIN — IBUPROFEN 600 MG: 600 TABLET ORAL at 03:40

## 2019-05-10 RX ADMIN — SODIUM CHLORIDE 500 ML: 9 INJECTION, SOLUTION INTRAVENOUS at 13:11

## 2019-05-10 RX ADMIN — SODIUM CHLORIDE, PRESERVATIVE FREE 3 ML: 5 INJECTION INTRAVENOUS at 20:24

## 2019-05-10 RX ADMIN — LORAZEPAM 0.5 MG: 2 INJECTION INTRAMUSCULAR; INTRAVENOUS at 00:01

## 2019-05-10 RX ADMIN — SODIUM CHLORIDE, PRESERVATIVE FREE 3 ML: 5 INJECTION INTRAVENOUS at 11:35

## 2019-05-10 RX ADMIN — HYDROCODONE BITARTRATE AND ACETAMINOPHEN 1 TABLET: 7.5; 325 TABLET ORAL at 20:35

## 2019-05-11 PROBLEM — E83.42 HYPOMAGNESEMIA: Status: ACTIVE | Noted: 2019-05-11

## 2019-05-11 PROBLEM — N17.9 ACUTE KIDNEY FAILURE (HCC): Status: ACTIVE | Noted: 2019-05-11

## 2019-05-11 LAB
ANION GAP SERPL CALCULATED.3IONS-SCNC: 13 MMOL/L
BUN BLD-MCNC: 24 MG/DL (ref 6–20)
BUN/CREAT SERPL: 10.9 (ref 7–25)
CALCIUM SPEC-SCNC: 7.4 MG/DL (ref 8.6–10.5)
CHLORIDE SERPL-SCNC: 101 MMOL/L (ref 98–107)
CO2 SERPL-SCNC: 20 MMOL/L (ref 22–29)
CREAT BLD-MCNC: 2.21 MG/DL (ref 0.76–1.27)
DEPRECATED RDW RBC AUTO: 44.6 FL (ref 37–54)
EOSINOPHIL SPEC QL MICRO: 0 % EOS/100 CELLS (ref 0–0)
ERYTHROCYTE [DISTWIDTH] IN BLOOD BY AUTOMATED COUNT: 13.6 % (ref 12.3–15.4)
GFR SERPL CREATININE-BSD FRML MDRD: 31 ML/MIN/1.73
GLUCOSE BLD-MCNC: 82 MG/DL (ref 65–99)
GLUCOSE BLDC GLUCOMTR-MCNC: 111 MG/DL (ref 70–130)
GLUCOSE BLDC GLUCOMTR-MCNC: 112 MG/DL (ref 70–130)
GLUCOSE BLDC GLUCOMTR-MCNC: 137 MG/DL (ref 70–130)
GLUCOSE BLDC GLUCOMTR-MCNC: 75 MG/DL (ref 70–130)
HCT VFR BLD AUTO: 31.9 % (ref 37.5–51)
HGB BLD-MCNC: 10.8 G/DL (ref 13–17.7)
LDH SERPL-CCNC: 144 U/L (ref 135–225)
MAGNESIUM SERPL-MCNC: 1.4 MG/DL (ref 1.6–2.6)
MCH RBC QN AUTO: 29.9 PG (ref 26.6–33)
MCHC RBC AUTO-ENTMCNC: 33.9 G/DL (ref 31.5–35.7)
MCV RBC AUTO: 88.4 FL (ref 79–97)
PLATELET # BLD AUTO: 70 10*3/MM3 (ref 140–450)
PMV BLD AUTO: 9.1 FL (ref 6–12)
POTASSIUM BLD-SCNC: 4.2 MMOL/L (ref 3.5–5.2)
RBC # BLD AUTO: 3.61 10*6/MM3 (ref 4.14–5.8)
SODIUM BLD-SCNC: 134 MMOL/L (ref 136–145)
WBC NRBC COR # BLD: 9.55 10*3/MM3 (ref 3.4–10.8)

## 2019-05-11 PROCEDURE — 82962 GLUCOSE BLOOD TEST: CPT

## 2019-05-11 PROCEDURE — 80048 BASIC METABOLIC PNL TOTAL CA: CPT | Performed by: NURSE PRACTITIONER

## 2019-05-11 PROCEDURE — 83735 ASSAY OF MAGNESIUM: CPT

## 2019-05-11 PROCEDURE — 25010000002 ENOXAPARIN PER 10 MG: Performed by: NURSE PRACTITIONER

## 2019-05-11 PROCEDURE — 83615 LACTATE (LD) (LDH) ENZYME: CPT | Performed by: NURSE PRACTITIONER

## 2019-05-11 PROCEDURE — 87205 SMEAR GRAM STAIN: CPT | Performed by: NURSE PRACTITIONER

## 2019-05-11 PROCEDURE — 25010000002 PIPERACILLIN SOD-TAZOBACTAM PER 1 G: Performed by: PHYSICIAN ASSISTANT

## 2019-05-11 PROCEDURE — 85027 COMPLETE CBC AUTOMATED: CPT | Performed by: NURSE PRACTITIONER

## 2019-05-11 PROCEDURE — 25010000002 MAGNESIUM SULFATE 2 GM/50ML SOLUTION

## 2019-05-11 PROCEDURE — 82570 ASSAY OF URINE CREATININE: CPT | Performed by: NURSE PRACTITIONER

## 2019-05-11 RX ORDER — MAGNESIUM SULFATE HEPTAHYDRATE 40 MG/ML
2 INJECTION, SOLUTION INTRAVENOUS
Status: DISCONTINUED | OUTPATIENT
Start: 2019-05-11 | End: 2019-05-11

## 2019-05-11 RX ORDER — CHOLECALCIFEROL (VITAMIN D3) 125 MCG
5 CAPSULE ORAL NIGHTLY PRN
Status: DISCONTINUED | OUTPATIENT
Start: 2019-05-11 | End: 2019-05-15 | Stop reason: HOSPADM

## 2019-05-11 RX ORDER — MAGNESIUM SULFATE HEPTAHYDRATE 40 MG/ML
2 INJECTION, SOLUTION INTRAVENOUS AS NEEDED
Status: DISCONTINUED | OUTPATIENT
Start: 2019-05-11 | End: 2019-05-12

## 2019-05-11 RX ORDER — MAGNESIUM SULFATE HEPTAHYDRATE 40 MG/ML
4 INJECTION, SOLUTION INTRAVENOUS AS NEEDED
Status: DISCONTINUED | OUTPATIENT
Start: 2019-05-11 | End: 2019-05-12

## 2019-05-11 RX ORDER — MAGNESIUM SULFATE HEPTAHYDRATE 40 MG/ML
2 INJECTION, SOLUTION INTRAVENOUS
Status: COMPLETED | OUTPATIENT
Start: 2019-05-11 | End: 2019-05-11

## 2019-05-11 RX ADMIN — ACETAMINOPHEN 650 MG: 325 TABLET ORAL at 12:23

## 2019-05-11 RX ADMIN — MELATONIN TAB 5 MG 5 MG: 5 TAB at 21:35

## 2019-05-11 RX ADMIN — ACETAMINOPHEN 650 MG: 325 TABLET ORAL at 05:37

## 2019-05-11 RX ADMIN — TAZOBACTAM SODIUM AND PIPERACILLIN SODIUM 3.38 G: 375; 3 INJECTION, SOLUTION INTRAVENOUS at 21:35

## 2019-05-11 RX ADMIN — ACETAMINOPHEN 650 MG: 325 TABLET ORAL at 17:26

## 2019-05-11 RX ADMIN — ACETAMINOPHEN 650 MG: 325 TABLET ORAL at 23:40

## 2019-05-11 RX ADMIN — MELATONIN TAB 5 MG 5 MG: 5 TAB at 00:43

## 2019-05-11 RX ADMIN — MAGNESIUM SULFATE HEPTAHYDRATE 2 G: 40 INJECTION, SOLUTION INTRAVENOUS at 15:39

## 2019-05-11 RX ADMIN — TAMSULOSIN HYDROCHLORIDE 0.4 MG: 0.4 CAPSULE ORAL at 08:50

## 2019-05-11 RX ADMIN — FLUCONAZOLE 400 MG: 200 TABLET ORAL at 12:23

## 2019-05-11 RX ADMIN — TAZOBACTAM SODIUM AND PIPERACILLIN SODIUM 3.38 G: 375; 3 INJECTION, SOLUTION INTRAVENOUS at 05:37

## 2019-05-11 RX ADMIN — SODIUM CHLORIDE 150 ML/HR: 9 INJECTION, SOLUTION INTRAVENOUS at 10:50

## 2019-05-11 RX ADMIN — LEVOTHYROXINE SODIUM 50 MCG: 50 TABLET ORAL at 08:50

## 2019-05-11 RX ADMIN — HYDROCODONE BITARTRATE AND ACETAMINOPHEN 1 TABLET: 7.5; 325 TABLET ORAL at 21:35

## 2019-05-11 RX ADMIN — MAGNESIUM SULFATE HEPTAHYDRATE 2 G: 40 INJECTION, SOLUTION INTRAVENOUS at 13:33

## 2019-05-11 RX ADMIN — ACETAMINOPHEN 650 MG: 325 TABLET ORAL at 00:27

## 2019-05-11 RX ADMIN — TAZOBACTAM SODIUM AND PIPERACILLIN SODIUM 3.38 G: 375; 3 INJECTION, SOLUTION INTRAVENOUS at 13:32

## 2019-05-11 RX ADMIN — ENOXAPARIN SODIUM 40 MG: 40 INJECTION SUBCUTANEOUS at 13:33

## 2019-05-11 RX ADMIN — SODIUM CHLORIDE, PRESERVATIVE FREE 3 ML: 5 INJECTION INTRAVENOUS at 20:08

## 2019-05-12 LAB
ANION GAP SERPL CALCULATED.3IONS-SCNC: 12 MMOL/L
BUN BLD-MCNC: 33 MG/DL (ref 6–20)
BUN/CREAT SERPL: 12.2 (ref 7–25)
CALCIUM SPEC-SCNC: 7.7 MG/DL (ref 8.6–10.5)
CHLORIDE SERPL-SCNC: 105 MMOL/L (ref 98–107)
CO2 SERPL-SCNC: 19 MMOL/L (ref 22–29)
CREAT BLD-MCNC: 2.71 MG/DL (ref 0.76–1.27)
CREAT UR-MCNC: 50.8 MG/DL
DEPRECATED RDW RBC AUTO: 44.4 FL (ref 37–54)
ERYTHROCYTE [DISTWIDTH] IN BLOOD BY AUTOMATED COUNT: 13.6 % (ref 12.3–15.4)
GFR SERPL CREATININE-BSD FRML MDRD: 25 ML/MIN/1.73
GLUCOSE BLD-MCNC: 92 MG/DL (ref 65–99)
GLUCOSE BLDC GLUCOMTR-MCNC: 108 MG/DL (ref 70–130)
GLUCOSE BLDC GLUCOMTR-MCNC: 116 MG/DL (ref 70–130)
GLUCOSE BLDC GLUCOMTR-MCNC: 135 MG/DL (ref 70–130)
GLUCOSE BLDC GLUCOMTR-MCNC: 162 MG/DL (ref 70–130)
HCT VFR BLD AUTO: 33.2 % (ref 37.5–51)
HGB BLD-MCNC: 11.2 G/DL (ref 13–17.7)
MAGNESIUM SERPL-MCNC: 2.8 MG/DL (ref 1.6–2.6)
MCH RBC QN AUTO: 29.9 PG (ref 26.6–33)
MCHC RBC AUTO-ENTMCNC: 33.7 G/DL (ref 31.5–35.7)
MCV RBC AUTO: 88.8 FL (ref 79–97)
PLATELET # BLD AUTO: 104 10*3/MM3 (ref 140–450)
PMV BLD AUTO: 9.3 FL (ref 6–12)
POTASSIUM BLD-SCNC: 3.5 MMOL/L (ref 3.5–5.2)
RBC # BLD AUTO: 3.74 10*6/MM3 (ref 4.14–5.8)
SODIUM BLD-SCNC: 136 MMOL/L (ref 136–145)
WBC NRBC COR # BLD: 8.02 10*3/MM3 (ref 3.4–10.8)

## 2019-05-12 PROCEDURE — 25010000002 ENOXAPARIN PER 10 MG: Performed by: INTERNAL MEDICINE

## 2019-05-12 PROCEDURE — 82962 GLUCOSE BLOOD TEST: CPT

## 2019-05-12 PROCEDURE — 85027 COMPLETE CBC AUTOMATED: CPT | Performed by: NURSE PRACTITIONER

## 2019-05-12 PROCEDURE — 83735 ASSAY OF MAGNESIUM: CPT

## 2019-05-12 PROCEDURE — 25010000002 PIPERACILLIN SOD-TAZOBACTAM PER 1 G: Performed by: PHYSICIAN ASSISTANT

## 2019-05-12 PROCEDURE — 25010000002 PIPERACILLIN SOD-TAZOBACTAM PER 1 G: Performed by: INTERNAL MEDICINE

## 2019-05-12 PROCEDURE — 25010000002 PIPERACILLIN-TAZOBACTAM: Performed by: INTERNAL MEDICINE

## 2019-05-12 PROCEDURE — 80048 BASIC METABOLIC PNL TOTAL CA: CPT | Performed by: NURSE PRACTITIONER

## 2019-05-12 RX ORDER — MAGNESIUM SULFATE 1 G/100ML
1 INJECTION INTRAVENOUS AS NEEDED
Status: DISCONTINUED | OUTPATIENT
Start: 2019-05-12 | End: 2019-05-15 | Stop reason: HOSPADM

## 2019-05-12 RX ORDER — MAGNESIUM SULFATE HEPTAHYDRATE 40 MG/ML
2 INJECTION, SOLUTION INTRAVENOUS AS NEEDED
Status: DISCONTINUED | OUTPATIENT
Start: 2019-05-12 | End: 2019-05-15 | Stop reason: HOSPADM

## 2019-05-12 RX ORDER — LOPERAMIDE HYDROCHLORIDE 2 MG/1
2 CAPSULE ORAL 4 TIMES DAILY PRN
Status: DISCONTINUED | OUTPATIENT
Start: 2019-05-12 | End: 2019-05-15 | Stop reason: HOSPADM

## 2019-05-12 RX ORDER — MAGNESIUM SULFATE HEPTAHYDRATE 40 MG/ML
4 INJECTION, SOLUTION INTRAVENOUS AS NEEDED
Status: DISCONTINUED | OUTPATIENT
Start: 2019-05-12 | End: 2019-05-15 | Stop reason: HOSPADM

## 2019-05-12 RX ORDER — FLUCONAZOLE 200 MG/1
200 TABLET ORAL EVERY 24 HOURS
Status: DISCONTINUED | OUTPATIENT
Start: 2019-05-12 | End: 2019-05-15 | Stop reason: HOSPADM

## 2019-05-12 RX ADMIN — ACETAMINOPHEN 650 MG: 325 TABLET ORAL at 17:19

## 2019-05-12 RX ADMIN — SODIUM CHLORIDE 125 ML/HR: 9 INJECTION, SOLUTION INTRAVENOUS at 23:14

## 2019-05-12 RX ADMIN — ACETAMINOPHEN 650 MG: 325 TABLET ORAL at 12:43

## 2019-05-12 RX ADMIN — SODIUM CHLORIDE 125 ML/HR: 9 INJECTION, SOLUTION INTRAVENOUS at 05:00

## 2019-05-12 RX ADMIN — TAMSULOSIN HYDROCHLORIDE 0.4 MG: 0.4 CAPSULE ORAL at 08:12

## 2019-05-12 RX ADMIN — TAZOBACTAM SODIUM AND PIPERACILLIN SODIUM 2.25 G: 250; 2 INJECTION, SOLUTION INTRAVENOUS at 13:57

## 2019-05-12 RX ADMIN — ACETAMINOPHEN 650 MG: 325 TABLET ORAL at 05:48

## 2019-05-12 RX ADMIN — ENOXAPARIN SODIUM 30 MG: 30 INJECTION SUBCUTANEOUS at 12:43

## 2019-05-12 RX ADMIN — ACETAMINOPHEN 650 MG: 325 TABLET ORAL at 23:14

## 2019-05-12 RX ADMIN — FLUCONAZOLE 200 MG: 200 TABLET ORAL at 12:44

## 2019-05-12 RX ADMIN — SODIUM CHLORIDE, PRESERVATIVE FREE 3 ML: 5 INJECTION INTRAVENOUS at 21:23

## 2019-05-12 RX ADMIN — PIPERACILLIN AND TAZOBACTAM 2.25 G: 2; .25 INJECTION, POWDER, LYOPHILIZED, FOR SOLUTION INTRAVENOUS; PARENTERAL at 18:29

## 2019-05-12 RX ADMIN — SODIUM CHLORIDE, POTASSIUM CHLORIDE, SODIUM LACTATE AND CALCIUM CHLORIDE 1000 ML: 600; 310; 30; 20 INJECTION, SOLUTION INTRAVENOUS at 12:44

## 2019-05-12 RX ADMIN — PIPERACILLIN AND TAZOBACTAM 2.25 G: 2; .25 INJECTION, POWDER, LYOPHILIZED, FOR SOLUTION INTRAVENOUS; PARENTERAL at 23:14

## 2019-05-12 RX ADMIN — TAZOBACTAM SODIUM AND PIPERACILLIN SODIUM 3.38 G: 375; 3 INJECTION, SOLUTION INTRAVENOUS at 05:47

## 2019-05-12 RX ADMIN — LEVOTHYROXINE SODIUM 50 MCG: 50 TABLET ORAL at 05:48

## 2019-05-13 PROBLEM — D69.6 THROMBOCYTOPENIA: Status: ACTIVE | Noted: 2019-05-13

## 2019-05-13 LAB
ANION GAP SERPL CALCULATED.3IONS-SCNC: 11 MMOL/L
BACTERIA SPEC AEROBE CULT: NORMAL
BASOPHILS # BLD AUTO: 0.01 10*3/MM3 (ref 0–0.2)
BASOPHILS NFR BLD AUTO: 0.2 % (ref 0–1.5)
BUN BLD-MCNC: 28 MG/DL (ref 6–20)
BUN/CREAT SERPL: 11.8 (ref 7–25)
CALCIUM SPEC-SCNC: 7.8 MG/DL (ref 8.6–10.5)
CHLORIDE SERPL-SCNC: 109 MMOL/L (ref 98–107)
CO2 SERPL-SCNC: 19 MMOL/L (ref 22–29)
CREAT BLD-MCNC: 2.37 MG/DL (ref 0.76–1.27)
DEPRECATED RDW RBC AUTO: 44.3 FL (ref 37–54)
EOSINOPHIL # BLD AUTO: 0.13 10*3/MM3 (ref 0–0.4)
EOSINOPHIL NFR BLD AUTO: 2 % (ref 0.3–6.2)
ERYTHROCYTE [DISTWIDTH] IN BLOOD BY AUTOMATED COUNT: 13.5 % (ref 12.3–15.4)
GFR SERPL CREATININE-BSD FRML MDRD: 29 ML/MIN/1.73
GLUCOSE BLD-MCNC: 90 MG/DL (ref 65–99)
GLUCOSE BLDC GLUCOMTR-MCNC: 163 MG/DL (ref 70–130)
GLUCOSE BLDC GLUCOMTR-MCNC: 87 MG/DL (ref 70–130)
GLUCOSE BLDC GLUCOMTR-MCNC: 92 MG/DL (ref 70–130)
GLUCOSE BLDC GLUCOMTR-MCNC: 96 MG/DL (ref 70–130)
GRAM STN SPEC: NORMAL
HCT VFR BLD AUTO: 33.6 % (ref 37.5–51)
HGB BLD-MCNC: 11.2 G/DL (ref 13–17.7)
IMM GRANULOCYTES # BLD AUTO: 0.01 10*3/MM3 (ref 0–0.05)
IMM GRANULOCYTES NFR BLD AUTO: 0.2 % (ref 0–0.5)
LYMPHOCYTES # BLD AUTO: 0.32 10*3/MM3 (ref 0.7–3.1)
LYMPHOCYTES NFR BLD AUTO: 4.8 % (ref 19.6–45.3)
MCH RBC QN AUTO: 29.6 PG (ref 26.6–33)
MCHC RBC AUTO-ENTMCNC: 33.3 G/DL (ref 31.5–35.7)
MCV RBC AUTO: 88.9 FL (ref 79–97)
MONOCYTES # BLD AUTO: 0.55 10*3/MM3 (ref 0.1–0.9)
MONOCYTES NFR BLD AUTO: 8.3 % (ref 5–12)
NEUTROPHILS # BLD AUTO: 5.59 10*3/MM3 (ref 1.7–7)
NEUTROPHILS NFR BLD AUTO: 84.5 % (ref 42.7–76)
PLATELET # BLD AUTO: 103 10*3/MM3 (ref 140–450)
PMV BLD AUTO: 9.4 FL (ref 6–12)
POTASSIUM BLD-SCNC: 4 MMOL/L (ref 3.5–5.2)
RBC # BLD AUTO: 3.78 10*6/MM3 (ref 4.14–5.8)
SODIUM BLD-SCNC: 139 MMOL/L (ref 136–145)
WBC NRBC COR # BLD: 6.61 10*3/MM3 (ref 3.4–10.8)

## 2019-05-13 PROCEDURE — 25010000002 ERTAPENEM PER 500 MG: Performed by: INTERNAL MEDICINE

## 2019-05-13 PROCEDURE — 25010000002 ENOXAPARIN PER 10 MG: Performed by: INTERNAL MEDICINE

## 2019-05-13 PROCEDURE — 80048 BASIC METABOLIC PNL TOTAL CA: CPT | Performed by: COLON & RECTAL SURGERY

## 2019-05-13 PROCEDURE — 85025 COMPLETE CBC W/AUTO DIFF WBC: CPT | Performed by: COLON & RECTAL SURGERY

## 2019-05-13 PROCEDURE — 82962 GLUCOSE BLOOD TEST: CPT

## 2019-05-13 PROCEDURE — 25010000002 PIPERACILLIN-TAZOBACTAM: Performed by: INTERNAL MEDICINE

## 2019-05-13 RX ADMIN — LOPERAMIDE HYDROCHLORIDE 2 MG: 2 CAPSULE ORAL at 11:17

## 2019-05-13 RX ADMIN — TAMSULOSIN HYDROCHLORIDE 0.4 MG: 0.4 CAPSULE ORAL at 08:21

## 2019-05-13 RX ADMIN — ENOXAPARIN SODIUM 30 MG: 30 INJECTION SUBCUTANEOUS at 13:12

## 2019-05-13 RX ADMIN — SODIUM CHLORIDE 125 ML/HR: 9 INJECTION, SOLUTION INTRAVENOUS at 06:33

## 2019-05-13 RX ADMIN — ACETAMINOPHEN 650 MG: 325 TABLET ORAL at 23:42

## 2019-05-13 RX ADMIN — ACETAMINOPHEN 650 MG: 325 TABLET ORAL at 13:12

## 2019-05-13 RX ADMIN — SODIUM CHLORIDE, PRESERVATIVE FREE 3 ML: 5 INJECTION INTRAVENOUS at 08:22

## 2019-05-13 RX ADMIN — ACETAMINOPHEN 650 MG: 325 TABLET ORAL at 17:47

## 2019-05-13 RX ADMIN — ERTAPENEM SODIUM 1 G: 1 INJECTION, POWDER, LYOPHILIZED, FOR SOLUTION INTRAMUSCULAR; INTRAVENOUS at 10:42

## 2019-05-13 RX ADMIN — FLUCONAZOLE 200 MG: 200 TABLET ORAL at 13:12

## 2019-05-13 RX ADMIN — LOPERAMIDE HYDROCHLORIDE 2 MG: 2 CAPSULE ORAL at 20:48

## 2019-05-13 RX ADMIN — SODIUM CHLORIDE 125 ML/HR: 9 INJECTION, SOLUTION INTRAVENOUS at 23:42

## 2019-05-13 RX ADMIN — SODIUM CHLORIDE, PRESERVATIVE FREE 3 ML: 5 INJECTION INTRAVENOUS at 23:42

## 2019-05-13 RX ADMIN — ACETAMINOPHEN 650 MG: 325 TABLET ORAL at 06:21

## 2019-05-13 RX ADMIN — LEVOTHYROXINE SODIUM 50 MCG: 50 TABLET ORAL at 06:21

## 2019-05-13 RX ADMIN — PIPERACILLIN AND TAZOBACTAM 2.25 G: 2; .25 INJECTION, POWDER, LYOPHILIZED, FOR SOLUTION INTRAVENOUS; PARENTERAL at 06:21

## 2019-05-14 ENCOUNTER — APPOINTMENT (OUTPATIENT)
Dept: PREADMISSION TESTING | Facility: HOSPITAL | Age: 54
End: 2019-05-14

## 2019-05-14 LAB
ANION GAP SERPL CALCULATED.3IONS-SCNC: 9 MMOL/L
BACTERIA SPEC AEROBE CULT: NORMAL
BACTERIA SPEC AEROBE CULT: NORMAL
BACTERIA SPEC ANAEROBE CULT: ABNORMAL
BUN BLD-MCNC: 25 MG/DL (ref 6–20)
BUN/CREAT SERPL: 10.5 (ref 7–25)
CALCIUM SPEC-SCNC: 8.2 MG/DL (ref 8.6–10.5)
CHLORIDE SERPL-SCNC: 112 MMOL/L (ref 98–107)
CO2 SERPL-SCNC: 21 MMOL/L (ref 22–29)
CREAT BLD-MCNC: 2.37 MG/DL (ref 0.76–1.27)
DEPRECATED RDW RBC AUTO: 44.5 FL (ref 37–54)
ERYTHROCYTE [DISTWIDTH] IN BLOOD BY AUTOMATED COUNT: 13.6 % (ref 12.3–15.4)
GFR SERPL CREATININE-BSD FRML MDRD: 29 ML/MIN/1.73
GLUCOSE BLD-MCNC: 91 MG/DL (ref 65–99)
GLUCOSE BLDC GLUCOMTR-MCNC: 103 MG/DL (ref 70–130)
GLUCOSE BLDC GLUCOMTR-MCNC: 85 MG/DL (ref 70–130)
GLUCOSE BLDC GLUCOMTR-MCNC: 85 MG/DL (ref 70–130)
GLUCOSE BLDC GLUCOMTR-MCNC: 91 MG/DL (ref 70–130)
HCT VFR BLD AUTO: 33.9 % (ref 37.5–51)
HGB BLD-MCNC: 11.1 G/DL (ref 13–17.7)
MCH RBC QN AUTO: 29.6 PG (ref 26.6–33)
MCHC RBC AUTO-ENTMCNC: 32.7 G/DL (ref 31.5–35.7)
MCV RBC AUTO: 90.4 FL (ref 79–97)
PLATELET # BLD AUTO: 101 10*3/MM3 (ref 140–450)
PMV BLD AUTO: 9.6 FL (ref 6–12)
POTASSIUM BLD-SCNC: 4.2 MMOL/L (ref 3.5–5.2)
RBC # BLD AUTO: 3.75 10*6/MM3 (ref 4.14–5.8)
SODIUM BLD-SCNC: 142 MMOL/L (ref 136–145)
WBC NRBC COR # BLD: 6.43 10*3/MM3 (ref 3.4–10.8)

## 2019-05-14 PROCEDURE — 80048 BASIC METABOLIC PNL TOTAL CA: CPT | Performed by: NURSE PRACTITIONER

## 2019-05-14 PROCEDURE — 25010000002 ENOXAPARIN PER 10 MG: Performed by: INTERNAL MEDICINE

## 2019-05-14 PROCEDURE — 25010000002 ERTAPENEM PER 500 MG: Performed by: INTERNAL MEDICINE

## 2019-05-14 PROCEDURE — 85027 COMPLETE CBC AUTOMATED: CPT | Performed by: NURSE PRACTITIONER

## 2019-05-14 PROCEDURE — 82962 GLUCOSE BLOOD TEST: CPT

## 2019-05-14 RX ADMIN — LEVOTHYROXINE SODIUM 50 MCG: 50 TABLET ORAL at 05:33

## 2019-05-14 RX ADMIN — SODIUM CHLORIDE 125 ML/HR: 9 INJECTION, SOLUTION INTRAVENOUS at 05:32

## 2019-05-14 RX ADMIN — ACETAMINOPHEN 650 MG: 325 TABLET ORAL at 13:52

## 2019-05-14 RX ADMIN — ERTAPENEM SODIUM 1 G: 1 INJECTION, POWDER, LYOPHILIZED, FOR SOLUTION INTRAMUSCULAR; INTRAVENOUS at 08:57

## 2019-05-14 RX ADMIN — LOPERAMIDE HYDROCHLORIDE 2 MG: 2 CAPSULE ORAL at 05:59

## 2019-05-14 RX ADMIN — ENOXAPARIN SODIUM 30 MG: 30 INJECTION SUBCUTANEOUS at 15:32

## 2019-05-14 RX ADMIN — TAMSULOSIN HYDROCHLORIDE 0.4 MG: 0.4 CAPSULE ORAL at 08:57

## 2019-05-14 RX ADMIN — ACETAMINOPHEN 650 MG: 325 TABLET ORAL at 23:47

## 2019-05-14 RX ADMIN — FLUCONAZOLE 200 MG: 200 TABLET ORAL at 15:35

## 2019-05-14 RX ADMIN — ACETAMINOPHEN 650 MG: 325 TABLET ORAL at 05:32

## 2019-05-15 ENCOUNTER — ANESTHESIA EVENT (OUTPATIENT)
Dept: PERIOP | Facility: HOSPITAL | Age: 54
End: 2019-05-15

## 2019-05-15 VITALS
OXYGEN SATURATION: 99 % | BODY MASS INDEX: 28.68 KG/M2 | HEIGHT: 73 IN | DIASTOLIC BLOOD PRESSURE: 88 MMHG | RESPIRATION RATE: 18 BRPM | SYSTOLIC BLOOD PRESSURE: 161 MMHG | WEIGHT: 216.38 LBS | HEART RATE: 66 BPM | TEMPERATURE: 98.5 F

## 2019-05-15 LAB
ANION GAP SERPL CALCULATED.3IONS-SCNC: 10 MMOL/L
BUN BLD-MCNC: 24 MG/DL (ref 6–20)
BUN/CREAT SERPL: 13.3 (ref 7–25)
CALCIUM SPEC-SCNC: 8.7 MG/DL (ref 8.6–10.5)
CHLORIDE SERPL-SCNC: 110 MMOL/L (ref 98–107)
CO2 SERPL-SCNC: 21 MMOL/L (ref 22–29)
CREAT BLD-MCNC: 1.81 MG/DL (ref 0.76–1.27)
GFR SERPL CREATININE-BSD FRML MDRD: 39 ML/MIN/1.73
GLUCOSE BLD-MCNC: 98 MG/DL (ref 65–99)
GLUCOSE BLDC GLUCOMTR-MCNC: 103 MG/DL (ref 70–130)
GLUCOSE BLDC GLUCOMTR-MCNC: 89 MG/DL (ref 70–130)
POTASSIUM BLD-SCNC: 4 MMOL/L (ref 3.5–5.2)
SODIUM BLD-SCNC: 141 MMOL/L (ref 136–145)

## 2019-05-15 PROCEDURE — 25010000002 ERTAPENEM PER 500 MG: Performed by: INTERNAL MEDICINE

## 2019-05-15 PROCEDURE — 82962 GLUCOSE BLOOD TEST: CPT

## 2019-05-15 PROCEDURE — 80048 BASIC METABOLIC PNL TOTAL CA: CPT | Performed by: INTERNAL MEDICINE

## 2019-05-15 RX ORDER — TAMSULOSIN HYDROCHLORIDE 0.4 MG/1
0.4 CAPSULE ORAL DAILY
Qty: 30 CAPSULE | Refills: 0 | Status: ON HOLD | OUTPATIENT
Start: 2019-05-16 | End: 2019-07-18

## 2019-05-15 RX ORDER — FAMOTIDINE 20 MG/1
20 TABLET, FILM COATED ORAL ONCE
Status: CANCELLED | OUTPATIENT
Start: 2019-05-15 | End: 2019-05-15

## 2019-05-15 RX ORDER — LIDOCAINE HYDROCHLORIDE 10 MG/ML
0.5 INJECTION, SOLUTION EPIDURAL; INFILTRATION; INTRACAUDAL; PERINEURAL ONCE AS NEEDED
Status: CANCELLED | OUTPATIENT
Start: 2019-05-15

## 2019-05-15 RX ORDER — FLUCONAZOLE 200 MG/1
200 TABLET ORAL EVERY 24 HOURS
Qty: 9 TABLET | Refills: 0
Start: 2019-05-15 | End: 2019-05-24

## 2019-05-15 RX ORDER — SODIUM CHLORIDE 0.9 % (FLUSH) 0.9 %
3 SYRINGE (ML) INJECTION EVERY 12 HOURS SCHEDULED
Status: CANCELLED | OUTPATIENT
Start: 2019-05-15

## 2019-05-15 RX ORDER — SODIUM CHLORIDE 0.9 % (FLUSH) 0.9 %
3-10 SYRINGE (ML) INJECTION AS NEEDED
Status: CANCELLED | OUTPATIENT
Start: 2019-05-15

## 2019-05-15 RX ORDER — SODIUM CHLORIDE, SODIUM LACTATE, POTASSIUM CHLORIDE, CALCIUM CHLORIDE 600; 310; 30; 20 MG/100ML; MG/100ML; MG/100ML; MG/100ML
9 INJECTION, SOLUTION INTRAVENOUS CONTINUOUS
Status: CANCELLED | OUTPATIENT
Start: 2019-05-15

## 2019-05-15 RX ORDER — FAMOTIDINE 10 MG/ML
20 INJECTION, SOLUTION INTRAVENOUS ONCE
Status: CANCELLED | OUTPATIENT
Start: 2019-05-15 | End: 2019-05-15

## 2019-05-15 RX ADMIN — ACETAMINOPHEN 650 MG: 325 TABLET ORAL at 06:09

## 2019-05-15 RX ADMIN — ACETAMINOPHEN 650 MG: 325 TABLET ORAL at 12:26

## 2019-05-15 RX ADMIN — FLUCONAZOLE 200 MG: 200 TABLET ORAL at 12:25

## 2019-05-15 RX ADMIN — TAMSULOSIN HYDROCHLORIDE 0.4 MG: 0.4 CAPSULE ORAL at 08:40

## 2019-05-15 RX ADMIN — LEVOTHYROXINE SODIUM 50 MCG: 50 TABLET ORAL at 06:09

## 2019-05-15 RX ADMIN — ERTAPENEM SODIUM 1 G: 1 INJECTION, POWDER, LYOPHILIZED, FOR SOLUTION INTRAMUSCULAR; INTRAVENOUS at 08:40

## 2019-05-16 ENCOUNTER — LAB (OUTPATIENT)
Dept: LAB | Facility: HOSPITAL | Age: 54
End: 2019-05-16

## 2019-05-16 ENCOUNTER — ANESTHESIA (OUTPATIENT)
Dept: PERIOP | Facility: HOSPITAL | Age: 54
End: 2019-05-16

## 2019-05-16 ENCOUNTER — TRANSCRIBE ORDERS (OUTPATIENT)
Dept: LAB | Facility: HOSPITAL | Age: 54
End: 2019-05-16

## 2019-05-16 ENCOUNTER — READMISSION MANAGEMENT (OUTPATIENT)
Dept: CALL CENTER | Facility: HOSPITAL | Age: 54
End: 2019-05-16

## 2019-05-16 DIAGNOSIS — E06.3 CHRONIC LYMPHOCYTIC THYROIDITIS: ICD-10-CM

## 2019-05-16 DIAGNOSIS — C18.9 MALIGNANT NEOPLASM OF COLON, UNSPECIFIED PART OF COLON (HCC): ICD-10-CM

## 2019-05-16 DIAGNOSIS — N17.0 ACUTE KIDNEY FAILURE WITH LESION OF TUBULAR NECROSIS (HCC): ICD-10-CM

## 2019-05-16 DIAGNOSIS — T81.43XD INFECTION OF ORGAN OR ORGAN SPACE AFTER SURGERY, SUBSEQUENT ENCOUNTER: Primary | ICD-10-CM

## 2019-05-16 DIAGNOSIS — T81.43XD INFECTION OF ORGAN OR ORGAN SPACE AFTER SURGERY, SUBSEQUENT ENCOUNTER: ICD-10-CM

## 2019-05-16 LAB
ALBUMIN SERPL-MCNC: 3.1 G/DL (ref 3.5–5.2)
ALBUMIN/GLOB SERPL: 0.8 G/DL
ALP SERPL-CCNC: 143 U/L (ref 39–117)
ALT SERPL W P-5'-P-CCNC: 21 U/L (ref 1–41)
ANION GAP SERPL CALCULATED.3IONS-SCNC: 14 MMOL/L
AST SERPL-CCNC: 31 U/L (ref 1–40)
BASOPHILS # BLD MANUAL: 0 10*3/MM3 (ref 0–0.2)
BASOPHILS NFR BLD AUTO: 0 % (ref 0–1.5)
BILIRUB SERPL-MCNC: 0.6 MG/DL (ref 0.2–1.2)
BUN BLD-MCNC: 26 MG/DL (ref 6–20)
BUN/CREAT SERPL: 15.2 (ref 7–25)
CALCIUM SPEC-SCNC: 9.4 MG/DL (ref 8.6–10.5)
CHLORIDE SERPL-SCNC: 105 MMOL/L (ref 98–107)
CO2 SERPL-SCNC: 22 MMOL/L (ref 22–29)
CREAT BLD-MCNC: 1.71 MG/DL (ref 0.76–1.27)
CRP SERPL-MCNC: 1.86 MG/DL (ref 0–0.5)
DEPRECATED RDW RBC AUTO: 43.6 FL (ref 37–54)
EOSINOPHIL # BLD MANUAL: 0 10*3/MM3 (ref 0–0.4)
EOSINOPHIL NFR BLD MANUAL: 0 % (ref 0.3–6.2)
ERYTHROCYTE [DISTWIDTH] IN BLOOD BY AUTOMATED COUNT: 13.6 % (ref 12.3–15.4)
ERYTHROCYTE [SEDIMENTATION RATE] IN BLOOD: 62 MM/HR (ref 0–20)
GFR SERPL CREATININE-BSD FRML MDRD: 42 ML/MIN/1.73
GLOBULIN UR ELPH-MCNC: 3.9 GM/DL
GLUCOSE BLD-MCNC: 96 MG/DL (ref 65–99)
HCT VFR BLD AUTO: 37.7 % (ref 37.5–51)
HGB BLD-MCNC: 12.5 G/DL (ref 13–17.7)
LYMPHOCYTES # BLD MANUAL: 0.44 10*3/MM3 (ref 0.7–3.1)
LYMPHOCYTES NFR BLD MANUAL: 14 % (ref 5–12)
LYMPHOCYTES NFR BLD MANUAL: 7 % (ref 19.6–45.3)
MCH RBC QN AUTO: 29.2 PG (ref 26.6–33)
MCHC RBC AUTO-ENTMCNC: 33.2 G/DL (ref 31.5–35.7)
MCV RBC AUTO: 88.1 FL (ref 79–97)
METAMYELOCYTES NFR BLD MANUAL: 3 % (ref 0–0)
MONOCYTES # BLD AUTO: 0.88 10*3/MM3 (ref 0.1–0.9)
MYELOCYTES NFR BLD MANUAL: 3 % (ref 0–0)
NEUTROPHILS # BLD AUTO: 4.51 10*3/MM3 (ref 1.7–7)
NEUTROPHILS NFR BLD MANUAL: 64 % (ref 42.7–76)
NEUTS BAND NFR BLD MANUAL: 8 % (ref 0–5)
PLAT MORPH BLD: NORMAL
PLATELET # BLD AUTO: 120 10*3/MM3 (ref 140–450)
PMV BLD AUTO: 8.8 FL (ref 6–12)
POTASSIUM BLD-SCNC: 4.2 MMOL/L (ref 3.5–5.2)
PROT SERPL-MCNC: 7 G/DL (ref 6–8.5)
RBC # BLD AUTO: 4.28 10*6/MM3 (ref 4.14–5.8)
RBC MORPH BLD: NORMAL
SODIUM BLD-SCNC: 141 MMOL/L (ref 136–145)
TOXIC GRANULATION: ABNORMAL
VARIANT LYMPHS NFR BLD MANUAL: 1 % (ref 0–5)
WBC NRBC COR # BLD: 6.26 10*3/MM3 (ref 3.4–10.8)

## 2019-05-16 PROCEDURE — 85652 RBC SED RATE AUTOMATED: CPT

## 2019-05-16 PROCEDURE — 36415 COLL VENOUS BLD VENIPUNCTURE: CPT | Performed by: INTERNAL MEDICINE

## 2019-05-16 PROCEDURE — 85025 COMPLETE CBC W/AUTO DIFF WBC: CPT

## 2019-05-16 PROCEDURE — 86140 C-REACTIVE PROTEIN: CPT | Performed by: INTERNAL MEDICINE

## 2019-05-16 PROCEDURE — 85007 BL SMEAR W/DIFF WBC COUNT: CPT

## 2019-05-16 PROCEDURE — 80053 COMPREHEN METABOLIC PANEL: CPT

## 2019-05-16 RX ORDER — CEFAZOLIN SODIUM 2 G/100ML
2 INJECTION, SOLUTION INTRAVENOUS ONCE
Status: CANCELLED | OUTPATIENT
Start: 2019-05-16

## 2019-05-16 NOTE — OUTREACH NOTE
Prep Survey      Responses   Facility patient discharged from?  [facility]   Is patient eligible?  Yes   Discharge diagnosis  Abdominopelvic abscess, Fever, Urine retention, HTN   Does the patient have one of the following disease processes/diagnoses(primary or secondary)?  General Surgery   Does the patient have Home health ordered?  Yes   What is the Home health agency?   pt to start infusion at [facility] office   Is there a DME ordered?  No   Prep survey completed?  Yes          Erika Hunter RN

## 2019-05-17 ENCOUNTER — READMISSION MANAGEMENT (OUTPATIENT)
Dept: CALL CENTER | Facility: HOSPITAL | Age: 54
End: 2019-05-17

## 2019-05-17 NOTE — OUTREACH NOTE
General Surgery Week 1 Survey      Responses   Facility patient discharged from?  [place]   Does the patient have one of the following disease processes/diagnoses(primary or secondary)?  General Surgery   Is there a successful TCM telephone encounter documented?  No   Week 1 attempt successful?  Yes   Call start time  1323   Call end time  1333   Discharge diagnosis  Abdominopelvic abscess, Fever, Urine retention, HTN   Meds reviewed with patient/caregiver?  Yes   Is the patient having any side effects they believe may be caused by any medication additions or changes?  No   Does the patient have all medications related to this admission filled (includes all antibiotics, pain medications, etc.)  Yes   Is the patient taking all medications as directed (includes completed medication regime)?  Yes   Medication comments  Flomax has improved length of urination.   Does the patient have a follow up appointment scheduled with their surgeon?  Yes   Has the patient kept scheduled appointments due by today?  Yes   Comments  Daily infusions   Has home health visited the patient within 72 hours of discharge?  N/A   Psychosocial issues?  No   Comments  Weight has come back, appetite is good.  Abdomen is improving.   Did the patient receive a copy of their discharge instructions?  Yes   Nursing interventions  Reviewed instructions with patient   What is the patient's perception of their health status since discharge?  Improving   Nursing interventions  Nurse provided patient education   Is the patient /caregiver able to teach back basic post-op care?  Practice 'cough and deep breath', Take showers only when approved by MD-sponge bathe until then, Keep incision areas clean,dry and protected, Lifting as instructed by MD in discharge instructions   Is the patient/caregiver able to teach back signs and symptoms of incisional infection?  Increased redness, swelling or pain at the incisonal site, Increased drainage or bleeding,  Incisional warmth, Pus or odor from incision, Fever   Is the patient/caregiver able to teach back steps to recovery at home?  Set small, achievable goals for return to baseline health, Rest and rebuild strength, gradually increase activity, Eat a well-balance diet, Make a list of questions for surgeon's appointment   Is the patient/caregiver able to teach back the hierarchy of who to call/visit for symptoms/problems? PCP, Specialist, Home health nurse, Urgent Care, ED, 911  Yes   Additional teach back comments  He says he is doing ok.  Port will happen after ABX are complete.  He drives daily from [place].   Week 1 call completed?  Yes          Do Nicole RN

## 2019-05-20 ENCOUNTER — APPOINTMENT (OUTPATIENT)
Dept: ONCOLOGY | Facility: HOSPITAL | Age: 54
End: 2019-05-20

## 2019-05-23 ENCOUNTER — LAB (OUTPATIENT)
Dept: LAB | Facility: HOSPITAL | Age: 54
End: 2019-05-23

## 2019-05-23 ENCOUNTER — TRANSCRIBE ORDERS (OUTPATIENT)
Dept: LAB | Facility: HOSPITAL | Age: 54
End: 2019-05-23

## 2019-05-23 DIAGNOSIS — E06.3 CHRONIC LYMPHOCYTIC THYROIDITIS: ICD-10-CM

## 2019-05-23 DIAGNOSIS — T81.43XD INFECTION OF ORGAN OR ORGAN SPACE AFTER SURGERY, SUBSEQUENT ENCOUNTER: ICD-10-CM

## 2019-05-23 DIAGNOSIS — N17.0 ACUTE KIDNEY FAILURE WITH LESION OF TUBULAR NECROSIS (HCC): ICD-10-CM

## 2019-05-23 DIAGNOSIS — C18.9 MALIGNANT NEOPLASM OF COLON, UNSPECIFIED PART OF COLON (HCC): ICD-10-CM

## 2019-05-23 DIAGNOSIS — T81.43XD INFECTION OF ORGAN OR ORGAN SPACE AFTER SURGERY, SUBSEQUENT ENCOUNTER: Primary | ICD-10-CM

## 2019-05-23 LAB
ALBUMIN SERPL-MCNC: 3.7 G/DL (ref 3.5–5.2)
ALBUMIN/GLOB SERPL: 0.9 G/DL
ALP SERPL-CCNC: 155 U/L (ref 39–117)
ALT SERPL W P-5'-P-CCNC: 39 U/L (ref 1–41)
ANION GAP SERPL CALCULATED.3IONS-SCNC: 12 MMOL/L
AST SERPL-CCNC: 45 U/L (ref 1–40)
BASOPHILS # BLD AUTO: 0.02 10*3/MM3 (ref 0–0.2)
BASOPHILS NFR BLD AUTO: 0.3 % (ref 0–1.5)
BILIRUB SERPL-MCNC: 0.5 MG/DL (ref 0.2–1.2)
BUN BLD-MCNC: 41 MG/DL (ref 6–20)
BUN/CREAT SERPL: 16.9 (ref 7–25)
CALCIUM SPEC-SCNC: 9.7 MG/DL (ref 8.6–10.5)
CHLORIDE SERPL-SCNC: 106 MMOL/L (ref 98–107)
CO2 SERPL-SCNC: 19 MMOL/L (ref 22–29)
CREAT BLD-MCNC: 2.43 MG/DL (ref 0.76–1.27)
CRP SERPL-MCNC: 0.55 MG/DL (ref 0–0.5)
DEPRECATED RDW RBC AUTO: 44.6 FL (ref 37–54)
EOSINOPHIL # BLD AUTO: 0.07 10*3/MM3 (ref 0–0.4)
EOSINOPHIL NFR BLD AUTO: 1.2 % (ref 0.3–6.2)
ERYTHROCYTE [DISTWIDTH] IN BLOOD BY AUTOMATED COUNT: 13.6 % (ref 12.3–15.4)
ERYTHROCYTE [SEDIMENTATION RATE] IN BLOOD: 46 MM/HR (ref 0–20)
GFR SERPL CREATININE-BSD FRML MDRD: 28 ML/MIN/1.73
GLOBULIN UR ELPH-MCNC: 4.3 GM/DL
GLUCOSE BLD-MCNC: 103 MG/DL (ref 65–99)
HCT VFR BLD AUTO: 39 % (ref 37.5–51)
HGB BLD-MCNC: 12.6 G/DL (ref 13–17.7)
IMM GRANULOCYTES # BLD AUTO: 0.03 10*3/MM3 (ref 0–0.05)
IMM GRANULOCYTES NFR BLD AUTO: 0.5 % (ref 0–0.5)
LYMPHOCYTES # BLD AUTO: 0.85 10*3/MM3 (ref 0.7–3.1)
LYMPHOCYTES NFR BLD AUTO: 14 % (ref 19.6–45.3)
MCH RBC QN AUTO: 28.8 PG (ref 26.6–33)
MCHC RBC AUTO-ENTMCNC: 32.3 G/DL (ref 31.5–35.7)
MCV RBC AUTO: 89 FL (ref 79–97)
MONOCYTES # BLD AUTO: 0.54 10*3/MM3 (ref 0.1–0.9)
MONOCYTES NFR BLD AUTO: 8.9 % (ref 5–12)
NEUTROPHILS # BLD AUTO: 4.55 10*3/MM3 (ref 1.7–7)
NEUTROPHILS NFR BLD AUTO: 75.1 % (ref 42.7–76)
PLATELET # BLD AUTO: 165 10*3/MM3 (ref 140–450)
PMV BLD AUTO: 9.4 FL (ref 6–12)
POTASSIUM BLD-SCNC: 5.3 MMOL/L (ref 3.5–5.2)
PROT SERPL-MCNC: 8 G/DL (ref 6–8.5)
RBC # BLD AUTO: 4.38 10*6/MM3 (ref 4.14–5.8)
SODIUM BLD-SCNC: 137 MMOL/L (ref 136–145)
WBC NRBC COR # BLD: 6.06 10*3/MM3 (ref 3.4–10.8)

## 2019-05-23 PROCEDURE — 85025 COMPLETE CBC W/AUTO DIFF WBC: CPT

## 2019-05-23 PROCEDURE — 80053 COMPREHEN METABOLIC PANEL: CPT

## 2019-05-23 PROCEDURE — 85652 RBC SED RATE AUTOMATED: CPT

## 2019-05-23 PROCEDURE — 86140 C-REACTIVE PROTEIN: CPT

## 2019-05-23 PROCEDURE — 36415 COLL VENOUS BLD VENIPUNCTURE: CPT

## 2019-05-24 ENCOUNTER — READMISSION MANAGEMENT (OUTPATIENT)
Dept: CALL CENTER | Facility: HOSPITAL | Age: 54
End: 2019-05-24

## 2019-05-24 NOTE — OUTREACH NOTE
General Surgery Week 2 Survey      Responses   Facility patient discharged from?  [place]   Does the patient have one of the following disease processes/diagnoses(primary or secondary)?  General Surgery   Week 2 attempt successful?  No   Unsuccessful attempts  Attempt 1          Treva Howard RN

## 2019-05-26 ENCOUNTER — LAB (OUTPATIENT)
Dept: LAB | Facility: HOSPITAL | Age: 54
End: 2019-05-26

## 2019-05-26 ENCOUNTER — TRANSCRIBE ORDERS (OUTPATIENT)
Dept: LAB | Facility: HOSPITAL | Age: 54
End: 2019-05-26

## 2019-05-26 DIAGNOSIS — C20 RECTAL CARCINOMA (HCC): ICD-10-CM

## 2019-05-26 DIAGNOSIS — C20 MALIGNANT NEOPLASM OF RECTUM (HCC): ICD-10-CM

## 2019-05-26 DIAGNOSIS — N17.0 ACUTE KIDNEY FAILURE WITH LESION OF TUBULAR NECROSIS (HCC): ICD-10-CM

## 2019-05-26 DIAGNOSIS — C18.9 MALIGNANT NEOPLASM OF COLON, UNSPECIFIED PART OF COLON (HCC): ICD-10-CM

## 2019-05-26 DIAGNOSIS — T81.43XD INFECTION OF ORGAN OR ORGAN SPACE AFTER SURGERY, SUBSEQUENT ENCOUNTER: Primary | ICD-10-CM

## 2019-05-26 DIAGNOSIS — C19 MALIGNANT NEOPLASM OF RECTOSIGMOID JUNCTION (HCC): ICD-10-CM

## 2019-05-26 DIAGNOSIS — E06.3 CHRONIC LYMPHOCYTIC THYROIDITIS: ICD-10-CM

## 2019-05-26 DIAGNOSIS — T81.43XD INFECTION OF ORGAN OR ORGAN SPACE AFTER SURGERY, SUBSEQUENT ENCOUNTER: ICD-10-CM

## 2019-05-26 LAB
ANION GAP SERPL CALCULATED.3IONS-SCNC: 12 MMOL/L
BUN BLD-MCNC: 36 MG/DL (ref 6–20)
BUN/CREAT SERPL: 20.5 (ref 7–25)
CALCIUM SPEC-SCNC: 9.7 MG/DL (ref 8.6–10.5)
CHLORIDE SERPL-SCNC: 101 MMOL/L (ref 98–107)
CO2 SERPL-SCNC: 20 MMOL/L (ref 22–29)
CREAT BLD-MCNC: 1.76 MG/DL (ref 0.76–1.27)
GFR SERPL CREATININE-BSD FRML MDRD: 40 ML/MIN/1.73
GLUCOSE BLD-MCNC: 114 MG/DL (ref 65–99)
POTASSIUM BLD-SCNC: 5.2 MMOL/L (ref 3.5–5.2)
SODIUM BLD-SCNC: 133 MMOL/L (ref 136–145)

## 2019-05-26 PROCEDURE — 36415 COLL VENOUS BLD VENIPUNCTURE: CPT | Performed by: INTERNAL MEDICINE

## 2019-05-26 PROCEDURE — 80048 BASIC METABOLIC PNL TOTAL CA: CPT | Performed by: INTERNAL MEDICINE

## 2019-05-28 ENCOUNTER — READMISSION MANAGEMENT (OUTPATIENT)
Dept: CALL CENTER | Facility: HOSPITAL | Age: 54
End: 2019-05-28

## 2019-05-28 NOTE — OUTREACH NOTE
General Surgery Week 2 Survey      Responses   Facility patient discharged from?  [place]   Does the patient have one of the following disease processes/diagnoses(primary or secondary)?  General Surgery   Week 2 attempt successful?  Yes   Call start time  1225   Call end time  1230   Discharge diagnosis  Abdominopelvic abscess, Fever, Urine retention, HTN   Medication alerts for this patient  IV Invanz. Pt reports that he is getting it thru an IV not a PICC.    Meds reviewed with patient/caregiver?  Yes   Is the patient having any side effects they believe may be caused by any medication additions or changes?  No   Is the patient taking all medications as directed (includes completed medication regime)?  Yes   Medication comments  .   Has the patient kept scheduled appointments due by today?  Yes   Psychosocial issues?  No   Comments  Denies fever or pain. Appetite is WNL.    What is the patient's perception of their health status since discharge?  Improving   Nursing interventions  Nurse provided patient education   Is the patient/caregiver able to teach back signs and symptoms of incisional infection?  Increased redness, swelling or pain at the incisonal site, Pus or odor from incision   Is the patient/caregiver able to teach back steps to recovery at home?  Set small, achievable goals for return to baseline health, Weigh daily   Is the patient/caregiver able to teach back the hierarchy of who to call/visit for symptoms/problems? PCP, Specialist, Home health nurse, Urgent Care, ED, 911  Yes   Additional teach back comments  .   Week 2 call completed?  Yes          Elizabeth Cleary RN

## 2019-05-30 ENCOUNTER — TRANSCRIBE ORDERS (OUTPATIENT)
Dept: LAB | Facility: HOSPITAL | Age: 54
End: 2019-05-30

## 2019-05-30 ENCOUNTER — LAB (OUTPATIENT)
Dept: LAB | Facility: HOSPITAL | Age: 54
End: 2019-05-30

## 2019-05-30 DIAGNOSIS — K65.1 PERITONEAL ABSCESS (HCC): ICD-10-CM

## 2019-05-30 DIAGNOSIS — T81.43XD INFECTION OF ORGAN OR ORGAN SPACE AFTER SURGERY, SUBSEQUENT ENCOUNTER: Primary | ICD-10-CM

## 2019-05-30 DIAGNOSIS — N17.0 ACUTE KIDNEY FAILURE WITH LESION OF TUBULAR NECROSIS (HCC): ICD-10-CM

## 2019-05-30 DIAGNOSIS — C18.9 MALIGNANT NEOPLASM OF COLON, UNSPECIFIED PART OF COLON (HCC): ICD-10-CM

## 2019-05-30 DIAGNOSIS — T81.43XD INFECTION OF ORGAN OR ORGAN SPACE AFTER SURGERY, SUBSEQUENT ENCOUNTER: ICD-10-CM

## 2019-05-30 LAB
ALBUMIN SERPL-MCNC: 3.9 G/DL (ref 3.5–5.2)
ALBUMIN/GLOB SERPL: 0.9 G/DL
ALP SERPL-CCNC: 147 U/L (ref 39–117)
ALT SERPL W P-5'-P-CCNC: 43 U/L (ref 1–41)
ANION GAP SERPL CALCULATED.3IONS-SCNC: 13 MMOL/L
AST SERPL-CCNC: 49 U/L (ref 1–40)
BASOPHILS # BLD AUTO: 0.05 10*3/MM3 (ref 0–0.2)
BASOPHILS NFR BLD AUTO: 1.2 % (ref 0–1.5)
BILIRUB SERPL-MCNC: 0.5 MG/DL (ref 0.2–1.2)
BUN BLD-MCNC: 22 MG/DL (ref 6–20)
BUN/CREAT SERPL: 13.5 (ref 7–25)
CALCIUM SPEC-SCNC: 9.6 MG/DL (ref 8.6–10.5)
CHLORIDE SERPL-SCNC: 97 MMOL/L (ref 98–107)
CO2 SERPL-SCNC: 21 MMOL/L (ref 22–29)
CREAT BLD-MCNC: 1.63 MG/DL (ref 0.76–1.27)
CRP SERPL-MCNC: 0.26 MG/DL (ref 0–0.5)
DEPRECATED RDW RBC AUTO: 43.4 FL (ref 37–54)
EOSINOPHIL # BLD AUTO: 0.15 10*3/MM3 (ref 0–0.4)
EOSINOPHIL NFR BLD AUTO: 3.6 % (ref 0.3–6.2)
ERYTHROCYTE [DISTWIDTH] IN BLOOD BY AUTOMATED COUNT: 13.4 % (ref 12.3–15.4)
ERYTHROCYTE [SEDIMENTATION RATE] IN BLOOD: 45 MM/HR (ref 0–20)
GFR SERPL CREATININE-BSD FRML MDRD: 44 ML/MIN/1.73
GLOBULIN UR ELPH-MCNC: 4.2 GM/DL
GLUCOSE BLD-MCNC: 94 MG/DL (ref 65–99)
HCT VFR BLD AUTO: 38 % (ref 37.5–51)
HGB BLD-MCNC: 12.5 G/DL (ref 13–17.7)
IMM GRANULOCYTES # BLD AUTO: 0.03 10*3/MM3 (ref 0–0.05)
IMM GRANULOCYTES NFR BLD AUTO: 0.7 % (ref 0–0.5)
LYMPHOCYTES # BLD AUTO: 0.67 10*3/MM3 (ref 0.7–3.1)
LYMPHOCYTES NFR BLD AUTO: 15.9 % (ref 19.6–45.3)
MCH RBC QN AUTO: 28.7 PG (ref 26.6–33)
MCHC RBC AUTO-ENTMCNC: 32.9 G/DL (ref 31.5–35.7)
MCV RBC AUTO: 87.4 FL (ref 79–97)
MONOCYTES # BLD AUTO: 0.47 10*3/MM3 (ref 0.1–0.9)
MONOCYTES NFR BLD AUTO: 11.1 % (ref 5–12)
NEUTROPHILS # BLD AUTO: 2.88 10*3/MM3 (ref 1.7–7)
NEUTROPHILS NFR BLD AUTO: 68.2 % (ref 42.7–76)
PLATELET # BLD AUTO: 123 10*3/MM3 (ref 140–450)
PMV BLD AUTO: 9.5 FL (ref 6–12)
POTASSIUM BLD-SCNC: 4.8 MMOL/L (ref 3.5–5.2)
PROT SERPL-MCNC: 8.1 G/DL (ref 6–8.5)
RBC # BLD AUTO: 4.35 10*6/MM3 (ref 4.14–5.8)
SODIUM BLD-SCNC: 131 MMOL/L (ref 136–145)
WBC NRBC COR # BLD: 4.22 10*3/MM3 (ref 3.4–10.8)

## 2019-05-30 PROCEDURE — 80053 COMPREHEN METABOLIC PANEL: CPT

## 2019-05-30 PROCEDURE — 85652 RBC SED RATE AUTOMATED: CPT

## 2019-05-30 PROCEDURE — 36415 COLL VENOUS BLD VENIPUNCTURE: CPT

## 2019-05-30 PROCEDURE — 85025 COMPLETE CBC W/AUTO DIFF WBC: CPT

## 2019-05-30 PROCEDURE — 86140 C-REACTIVE PROTEIN: CPT

## 2019-05-31 ENCOUNTER — TRANSCRIBE ORDERS (OUTPATIENT)
Dept: ADMINISTRATIVE | Facility: HOSPITAL | Age: 54
End: 2019-05-31

## 2019-05-31 ENCOUNTER — OFFICE VISIT (OUTPATIENT)
Dept: ONCOLOGY | Facility: CLINIC | Age: 54
End: 2019-05-31

## 2019-05-31 VITALS
OXYGEN SATURATION: 100 % | DIASTOLIC BLOOD PRESSURE: 89 MMHG | RESPIRATION RATE: 16 BRPM | BODY MASS INDEX: 25.05 KG/M2 | HEIGHT: 73 IN | WEIGHT: 189 LBS | HEART RATE: 77 BPM | SYSTOLIC BLOOD PRESSURE: 156 MMHG | TEMPERATURE: 97.6 F

## 2019-05-31 DIAGNOSIS — C20 RECTAL CARCINOMA (HCC): Primary | Chronic | ICD-10-CM

## 2019-05-31 DIAGNOSIS — K65.1 INTRA-ABDOMINAL ABSCESS (HCC): Primary | ICD-10-CM

## 2019-05-31 PROCEDURE — 99214 OFFICE O/P EST MOD 30 MIN: CPT | Performed by: INTERNAL MEDICINE

## 2019-05-31 RX ORDER — DM/P-EPHED/ACETAMINOPH/DOXYLAM 15-500/15
LIQUID (ML) ORAL
Refills: 0 | COMMUNITY
Start: 2019-05-22

## 2019-05-31 NOTE — PROGRESS NOTES
CHIEF COMPLAINT: Y0 resection for rectal carcinoma status post neoadjuvant Xeloda radiation  Clinical trial.    Problem List:  Oncology/Hematology History    1. Stage IIIB rectal carcinoma clinical T3b N1 aM0  -11/30/18 CT abdomen pelvis and chest x-ray negative for metastasis.  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    -12/6/18 MRI of rectum showed T3b with suspicious right internal iliac lymph node and CT chest noncontrast negative except for gallstones  -Per Dr. Kelly, CEA was normal.  -12/18/18 saw me for the first time.  I reviewed her case in our multidisciplinary conference and went over that in detail with her.  She had presented with hematochezia.  Dr. Kelly repeated endoscopy for more exact detailing of the primary location and this appears to be rectal on MRI and endoscopy.  Plan is for randomization on clinical trial N 1048 to neoadjuvant FOLFOX versus standard chemoradiation.  We'll get him to radiation and our research staff for randomization and get his baseline CBC and CMP.    -1/4/19 followed up: Randomized to the Xeloda radiation arm.  He will get that and then 4-6 weeks later had his surgery, and then follow that with 6 months of adjuvant FOLFOX per protocol.  My research assistant met with him today.  He has a Xeloda prescription.  -3/4/2019 MRI: Good response with T2, less likely T3 with spiculation N0 disease.  -4/23/2019 pathology: Laparoscopic assisted converted to lower anterior resection open with stapled coloanal anastomosis and diverting loop ileostomy with liver biopsy showed residual adenoma with focal high-grade dysplasia but no residual invasive carcinoma.  0 out of 17 lymph nodes.  Mild steatosis on liver biopsy with bridging fibrosis.  -5/9/2019 to 5/15/2019 admitted with abdominal pelvic abscess status post CT-guided percutaneous drain placement presenting with sepsis improved with antibiotics and drainage.  Temp  of 104 on presentation.  2 weeks of protracted IV antibiotics with Radames Sosa planned at discharge.          Rectal carcinoma (CMS/HCC)    12/18/2018 Initial Diagnosis     Rectal carcinoma (CMS/HCC)         1/7/2019 - 2/4/2019 Chemotherapy     Xeloda radiation on protocol         1/8/2019 - 2/14/2019 Radiation     Radiation OncologyTreatment Course:  Hesham Arevalo received 5040 cGy in 28 fractions to pelvis via External Beam Radiation - EBRT.         3/4/2019 Imaging     MRI of the pelvis:  IMPRESSIONS:  MRI rectal cancer T category is: T2, LESS LIKELY EARLY T3 SPICULATIONS  Maximum EMD of invasion is: 0  Minimum tumor to MRF distance is: 12 MM  Low rectal tumor component: NO  Mesorectal nodes/tumor deposits: NO  EMVI: NO  Extramesorectal nodes: NO         5/15/2019 Adverse Reaction     -5/9/2019 to 5/15/2019 admitted with abdominal pelvic abscess status post CT-guided percutaneous drain placement presenting with sepsis improved with antibiotics and drainage.  Temp of 104 on presentation.  2 weeks of protracted IV antibiotics with Radames Sosa planned at discharge            HISTORY OF PRESENT ILLNESS:  The patient is a 53 y.o. male, here for follow up on management of  Y0 resection for rectal carcinoma status post neoadjuvant Xeloda radiation  Clinical trial.  Postoperative course complicated by localized abscess/infection with sepsis subsequently improved with drainage and antibiotics.  Due to his protracted postoperative complications, he has lost down from 216 to the 180s.  He thinks his weight loss has stabilized now and he is eating well with no nausea or vomiting but is still fatigued.        Past Medical History:   Diagnosis Date   • Arthritis     knees    • Cancer (CMS/HCC) 11/2018    colon   • Disease of thyroid gland    • Fatty liver    • Fatty liver    • Hashimoto's disease    • Hypertension    • Psoriasis    • Wears glasses      Past Surgical History:   Procedure Laterality Date   • COLON  "RESECTION N/A 4/23/2019    Procedure: LAPAROSCOPIC LOWER ANTERIOR RESECTION WITH DIVERTING LOOP ILEOOSTOMY, SPLENIC FLEIXURE MOBILIZATION AND LIVER BIOPSY, AND PROCTOSCOPY;  Surgeon: Pau Kelly MD;  Location: [facility];  Service: General   • COLONOSCOPY      2018   • ILEOSTOMY  04/2019   • LIVER BIOPSY  2003   • VASECTOMY  1998       No Known Allergies    Family History and Social History reviewed and changed as necessary      REVIEW OF SYSTEM:   Review of Systems   Constitutional: Negative for appetite change, chills, diaphoresis, fatigue, fever and unexpected weight change.   HENT:   Negative for mouth sores, sore throat and trouble swallowing.    Eyes: Negative for icterus.   Respiratory: Negative for cough, hemoptysis and shortness of breath.    Cardiovascular: Negative for chest pain, leg swelling and palpitations.   Gastrointestinal: Negative for abdominal distention, abdominal pain, blood in stool, constipation, diarrhea, nausea and vomiting.   Endocrine: Negative for hot flashes.   Genitourinary: Negative for bladder incontinence, difficulty urinating, dysuria, frequency and hematuria.    Musculoskeletal: Negative for gait problem, neck pain and neck stiffness.   Skin: Negative for rash.   Neurological: Negative for dizziness, gait problem, headaches, light-headedness and numbness.   Hematological: Negative for adenopathy. Does not bruise/bleed easily.   Psychiatric/Behavioral: Negative for depression. The patient is not nervous/anxious.    All other systems reviewed and are negative.       PHYSICAL EXAM    Vitals:    05/31/19 1108   BP: 156/89   Pulse: 77   Resp: 16   Temp: 97.6 °F (36.4 °C)   SpO2: 100%   Weight: 85.7 kg (189 lb)   Height: 185.4 cm (73\")     Constitutional: Appears well-developed and well-nourished. No distress.   ECOG: (1) Restricted in physically strenuous activity, ambulatory and able to do work of light nature  HENT:   Head: Normocephalic.   Mouth/Throat: Oropharynx is clear and " moist.   Eyes: Conjunctivae are normal. Pupils are equal, round, and reactive to light. No scleral icterus.   Neck: Neck supple. No JVD present. No thyromegaly present.   Cardiovascular: Normal rate, regular rhythm and normal heart sounds.    Pulmonary/Chest: Breath sounds normal. No respiratory distress.   Abdominal: Soft. Exhibits no distension and no mass. There is no hepatosplenomegaly. There is no tenderness. There is no rebound and no guarding.   Musculoskeletal:Exhibits no edema, tenderness or deformity.   Neurological: Alert and oriented to person, place, and time. Exhibits normal muscle tone.   Skin: No ecchymosis, no petechiae and no rash noted. Not diaphoretic. No cyanosis. Nails show no clubbing.   Psychiatric: Normal mood and affect.   Vitals reviewed.      Lab Results   Component Value Date    HGB 12.5 (L) 05/30/2019    HCT 38.0 05/30/2019    MCV 87.4 05/30/2019     (L) 05/30/2019    WBC 4.22 05/30/2019    NEUTROABS 2.88 05/30/2019    LYMPHSABS 0.67 (L) 05/30/2019    MONOSABS 0.47 05/30/2019    EOSABS 0.15 05/30/2019    BASOSABS 0.05 05/30/2019       Lab Results   Component Value Date    GLUCOSE 94 05/30/2019    BUN 22 (H) 05/30/2019    CREATININE 1.63 (H) 05/30/2019     (L) 05/30/2019    K 4.8 05/30/2019    CL 97 (L) 05/30/2019    CO2 21.0 (L) 05/30/2019    CALCIUM 9.6 05/30/2019    PROTEINTOT 8.1 05/30/2019    ALBUMIN 3.90 05/30/2019    BILITOT 0.5 05/30/2019    ALKPHOS 147 (H) 05/30/2019    AST 49 (H) 05/30/2019    ALT 43 (H) 05/30/2019                   ASSESSMENT & PLAN:    1. Y0 resection of rectal carcinoma status post neoadjuvant Xeloda radiation on clinical trial  2. Postoperative abscess  3. Renal dysfunction    Discussion: assuming his weight loss has stabilized and his creatinine has stabilized (1.6 as of yesterday), we will plan adjuvant FOLFOX in a little over 2 weeks with my nurse practitioner.  In the meantime he will see Dr. Radames Sosa next week to be sure that his  infection has truly cleared and he is ordering follow-up scans to that end.  My nurse practitioner will also review those scans and assuming he is feeling up to it then we will start his adjuvant therapy on return to clinic a little over 2 weeks.  Discussed with patient 25 minutes greater than 50% spent counseling      Ousmane Moeller MD    05/31/2019

## 2019-06-02 ENCOUNTER — LAB (OUTPATIENT)
Dept: LAB | Facility: HOSPITAL | Age: 54
End: 2019-06-02

## 2019-06-02 ENCOUNTER — TRANSCRIBE ORDERS (OUTPATIENT)
Dept: LAB | Facility: HOSPITAL | Age: 54
End: 2019-06-02

## 2019-06-02 DIAGNOSIS — K91.89 POSTGASTRECTOMY PHYTOBEZOAR: ICD-10-CM

## 2019-06-02 DIAGNOSIS — E87.5 HYPERPOTASSEMIA: ICD-10-CM

## 2019-06-02 DIAGNOSIS — N17.9 ACUTE RENAL FAILURE, UNSPECIFIED ACUTE RENAL FAILURE TYPE (HCC): ICD-10-CM

## 2019-06-02 DIAGNOSIS — T18.9XXA POSTGASTRECTOMY PHYTOBEZOAR: ICD-10-CM

## 2019-06-02 DIAGNOSIS — E87.5 HYPERPOTASSEMIA: Primary | ICD-10-CM

## 2019-06-02 LAB
ANION GAP SERPL CALCULATED.3IONS-SCNC: 13 MMOL/L
BUN BLD-MCNC: 22 MG/DL (ref 6–20)
BUN/CREAT SERPL: 16.1 (ref 7–25)
CALCIUM SPEC-SCNC: 9.9 MG/DL (ref 8.6–10.5)
CHLORIDE SERPL-SCNC: 102 MMOL/L (ref 98–107)
CO2 SERPL-SCNC: 22 MMOL/L (ref 22–29)
CREAT BLD-MCNC: 1.37 MG/DL (ref 0.76–1.27)
GFR SERPL CREATININE-BSD FRML MDRD: 54 ML/MIN/1.73
GLUCOSE BLD-MCNC: 101 MG/DL (ref 65–99)
POTASSIUM BLD-SCNC: 4.8 MMOL/L (ref 3.5–5.2)
SODIUM BLD-SCNC: 137 MMOL/L (ref 136–145)

## 2019-06-02 PROCEDURE — 36415 COLL VENOUS BLD VENIPUNCTURE: CPT

## 2019-06-02 PROCEDURE — 80048 BASIC METABOLIC PNL TOTAL CA: CPT

## 2019-06-05 ENCOUNTER — READMISSION MANAGEMENT (OUTPATIENT)
Dept: CALL CENTER | Facility: HOSPITAL | Age: 54
End: 2019-06-05

## 2019-06-05 ENCOUNTER — HOSPITAL ENCOUNTER (OUTPATIENT)
Dept: CT IMAGING | Facility: HOSPITAL | Age: 54
Discharge: HOME | End: 2019-06-05
Admitting: INTERNAL MEDICINE

## 2019-06-05 DIAGNOSIS — K65.1 INTRA-ABDOMINAL ABSCESS (HCC): ICD-10-CM

## 2019-06-05 PROCEDURE — 25010000002 IOPAMIDOL 61 % SOLUTION: Performed by: INTERNAL MEDICINE

## 2019-06-05 PROCEDURE — 74177 CT ABD & PELVIS W/CONTRAST: CPT

## 2019-06-05 RX ADMIN — IOPAMIDOL 95 ML: 612 INJECTION, SOLUTION INTRAVENOUS at 11:32

## 2019-06-05 NOTE — OUTREACH NOTE
General Surgery Week 3 Survey      Responses   Facility patient discharged from?  [place]   Does the patient have one of the following disease processes/diagnoses(primary or secondary)?  General Surgery   Week 3 attempt successful?  No   Unsuccessful attempts  Attempt 1          Treva Howard RN

## 2019-06-07 LAB — FUNGUS WND CULT: NORMAL

## 2019-06-10 ENCOUNTER — LAB (OUTPATIENT)
Dept: LAB | Facility: HOSPITAL | Age: 54
End: 2019-06-10

## 2019-06-10 ENCOUNTER — TRANSCRIBE ORDERS (OUTPATIENT)
Dept: LAB | Facility: HOSPITAL | Age: 54
End: 2019-06-10

## 2019-06-10 DIAGNOSIS — N17.0 ACUTE KIDNEY FAILURE WITH LESION OF TUBULAR NECROSIS (HCC): ICD-10-CM

## 2019-06-10 DIAGNOSIS — N17.9 ACUTE RENAL FAILURE, UNSPECIFIED ACUTE RENAL FAILURE TYPE (HCC): ICD-10-CM

## 2019-06-10 DIAGNOSIS — I10 ESSENTIAL HYPERTENSION, BENIGN: ICD-10-CM

## 2019-06-10 DIAGNOSIS — K65.1 PERITONEAL ABSCESS (HCC): ICD-10-CM

## 2019-06-10 DIAGNOSIS — E87.5 HYPERPOTASSEMIA: Primary | ICD-10-CM

## 2019-06-10 DIAGNOSIS — E87.5 HYPERPOTASSEMIA: ICD-10-CM

## 2019-06-10 DIAGNOSIS — T81.43XD INFECTION OF ORGAN OR ORGAN SPACE AFTER SURGERY, SUBSEQUENT ENCOUNTER: ICD-10-CM

## 2019-06-10 LAB
ALBUMIN SERPL-MCNC: 3.7 G/DL (ref 3.5–5.2)
ALBUMIN/GLOB SERPL: 1.1 G/DL
ALP SERPL-CCNC: 108 U/L (ref 39–117)
ALT SERPL W P-5'-P-CCNC: 21 U/L (ref 1–41)
ANION GAP SERPL CALCULATED.3IONS-SCNC: 8 MMOL/L
AST SERPL-CCNC: 31 U/L (ref 1–40)
BASOPHILS # BLD AUTO: 0.04 10*3/MM3 (ref 0–0.2)
BASOPHILS NFR BLD AUTO: 0.6 % (ref 0–1.5)
BILIRUB SERPL-MCNC: 0.5 MG/DL (ref 0.2–1.2)
BUN BLD-MCNC: 13 MG/DL (ref 6–20)
BUN/CREAT SERPL: 10.7 (ref 7–25)
CALCIUM SPEC-SCNC: 9 MG/DL (ref 8.6–10.5)
CHLORIDE SERPL-SCNC: 105 MMOL/L (ref 98–107)
CO2 SERPL-SCNC: 27 MMOL/L (ref 22–29)
CREAT BLD-MCNC: 1.22 MG/DL (ref 0.76–1.27)
CRP SERPL-MCNC: 0.13 MG/DL (ref 0–0.5)
DEPRECATED RDW RBC AUTO: 43.8 FL (ref 37–54)
EOSINOPHIL # BLD AUTO: 0.25 10*3/MM3 (ref 0–0.4)
EOSINOPHIL NFR BLD AUTO: 3.8 % (ref 0.3–6.2)
ERYTHROCYTE [DISTWIDTH] IN BLOOD BY AUTOMATED COUNT: 13.6 % (ref 12.3–15.4)
ERYTHROCYTE [SEDIMENTATION RATE] IN BLOOD: 12 MM/HR (ref 0–20)
GFR SERPL CREATININE-BSD FRML MDRD: 62 ML/MIN/1.73
GLOBULIN UR ELPH-MCNC: 3.4 GM/DL
GLUCOSE BLD-MCNC: 95 MG/DL (ref 65–99)
HCT VFR BLD AUTO: 36.6 % (ref 37.5–51)
HGB BLD-MCNC: 12.2 G/DL (ref 13–17.7)
IMM GRANULOCYTES # BLD AUTO: 0.03 10*3/MM3 (ref 0–0.05)
IMM GRANULOCYTES NFR BLD AUTO: 0.5 % (ref 0–0.5)
LYMPHOCYTES # BLD AUTO: 0.85 10*3/MM3 (ref 0.7–3.1)
LYMPHOCYTES NFR BLD AUTO: 12.9 % (ref 19.6–45.3)
MCH RBC QN AUTO: 29.6 PG (ref 26.6–33)
MCHC RBC AUTO-ENTMCNC: 33.3 G/DL (ref 31.5–35.7)
MCV RBC AUTO: 88.8 FL (ref 79–97)
MONOCYTES # BLD AUTO: 0.59 10*3/MM3 (ref 0.1–0.9)
MONOCYTES NFR BLD AUTO: 9 % (ref 5–12)
NEUTROPHILS # BLD AUTO: 4.82 10*3/MM3 (ref 1.7–7)
NEUTROPHILS NFR BLD AUTO: 73.2 % (ref 42.7–76)
NRBC BLD AUTO-RTO: 0 /100 WBC (ref 0–0.2)
PLATELET # BLD AUTO: 111 10*3/MM3 (ref 140–450)
PMV BLD AUTO: 10.1 FL (ref 6–12)
POTASSIUM BLD-SCNC: 4.1 MMOL/L (ref 3.5–5.2)
PROT SERPL-MCNC: 7.1 G/DL (ref 6–8.5)
RBC # BLD AUTO: 4.12 10*6/MM3 (ref 4.14–5.8)
SODIUM BLD-SCNC: 140 MMOL/L (ref 136–145)
WBC NRBC COR # BLD: 6.58 10*3/MM3 (ref 3.4–10.8)

## 2019-06-10 PROCEDURE — 86140 C-REACTIVE PROTEIN: CPT

## 2019-06-10 PROCEDURE — 36415 COLL VENOUS BLD VENIPUNCTURE: CPT

## 2019-06-10 PROCEDURE — 80053 COMPREHEN METABOLIC PANEL: CPT

## 2019-06-10 PROCEDURE — 85652 RBC SED RATE AUTOMATED: CPT

## 2019-06-10 PROCEDURE — 85025 COMPLETE CBC W/AUTO DIFF WBC: CPT

## 2019-06-11 ENCOUNTER — TRANSCRIBE ORDERS (OUTPATIENT)
Dept: ADMINISTRATIVE | Facility: HOSPITAL | Age: 54
End: 2019-06-11

## 2019-06-11 DIAGNOSIS — C20 RECTAL CANCER (HCC): ICD-10-CM

## 2019-06-11 DIAGNOSIS — K65.1 ABSCESS OF MALE PELVIS (HCC): Primary | ICD-10-CM

## 2019-06-13 ENCOUNTER — TRANSCRIBE ORDERS (OUTPATIENT)
Dept: ADMINISTRATIVE | Facility: HOSPITAL | Age: 54
End: 2019-06-13

## 2019-06-13 ENCOUNTER — HOSPITAL ENCOUNTER (OUTPATIENT)
Dept: CT IMAGING | Facility: HOSPITAL | Age: 54
Discharge: HOME | End: 2019-06-13
Admitting: COLON & RECTAL SURGERY

## 2019-06-13 VITALS
RESPIRATION RATE: 18 BRPM | OXYGEN SATURATION: 99 % | SYSTOLIC BLOOD PRESSURE: 124 MMHG | DIASTOLIC BLOOD PRESSURE: 85 MMHG | WEIGHT: 194 LBS | TEMPERATURE: 98.3 F | HEART RATE: 77 BPM | BODY MASS INDEX: 25.71 KG/M2 | HEIGHT: 73 IN

## 2019-06-13 DIAGNOSIS — K65.1 PERITONEAL ABSCESS (HCC): Primary | ICD-10-CM

## 2019-06-13 DIAGNOSIS — C20 RECTAL CANCER (HCC): ICD-10-CM

## 2019-06-13 DIAGNOSIS — K65.1 ABSCESS OF MALE PELVIS (HCC): ICD-10-CM

## 2019-06-13 PROCEDURE — 77012 CT SCAN FOR NEEDLE BIOPSY: CPT

## 2019-06-13 NOTE — NURSING NOTE
0800- Pt. Here for possible aspiration pelvic abcess. Pt asymptomatic, states here to see if procedure needed, awaiting chemo related to abcess. Pt without fever or feeling poorly per pt.  Telephone report from Dr Cruz. Pelvic abcess resolving, smaller than last scan. No aspiration today. Pt dismissed, will be seeing Dr Sosa this a.m. And discuss further treatment plan.

## 2019-06-17 ENCOUNTER — LAB (OUTPATIENT)
Dept: LAB | Facility: HOSPITAL | Age: 54
End: 2019-06-17

## 2019-06-17 ENCOUNTER — TRANSCRIBE ORDERS (OUTPATIENT)
Dept: LAB | Facility: HOSPITAL | Age: 54
End: 2019-06-17

## 2019-06-17 DIAGNOSIS — K65.1 PERITONEAL ABSCESS (HCC): ICD-10-CM

## 2019-06-17 DIAGNOSIS — C18.9 MALIGNANT NEOPLASM OF COLON, UNSPECIFIED PART OF COLON (HCC): ICD-10-CM

## 2019-06-17 DIAGNOSIS — T81.43XD INFECTION OF ORGAN OR ORGAN SPACE AFTER SURGERY, SUBSEQUENT ENCOUNTER: ICD-10-CM

## 2019-06-17 DIAGNOSIS — T81.43XD INFECTION OF ORGAN OR ORGAN SPACE AFTER SURGERY, SUBSEQUENT ENCOUNTER: Primary | ICD-10-CM

## 2019-06-17 DIAGNOSIS — N17.0 ACUTE KIDNEY FAILURE WITH LESION OF TUBULAR NECROSIS (HCC): ICD-10-CM

## 2019-06-17 DIAGNOSIS — E06.3 CHRONIC LYMPHOCYTIC THYROIDITIS: ICD-10-CM

## 2019-06-17 LAB
ALBUMIN SERPL-MCNC: 3.8 G/DL (ref 3.5–5.2)
ALBUMIN/GLOB SERPL: 1.1 G/DL
ALP SERPL-CCNC: 106 U/L (ref 39–117)
ALT SERPL W P-5'-P-CCNC: 37 U/L (ref 1–41)
ANION GAP SERPL CALCULATED.3IONS-SCNC: 12 MMOL/L
AST SERPL-CCNC: 50 U/L (ref 1–40)
BASOPHILS # BLD AUTO: 0.04 10*3/MM3 (ref 0–0.2)
BASOPHILS NFR BLD AUTO: 0.7 % (ref 0–1.5)
BILIRUB SERPL-MCNC: 0.6 MG/DL (ref 0.2–1.2)
BUN BLD-MCNC: 15 MG/DL (ref 6–20)
BUN/CREAT SERPL: 12 (ref 7–25)
CALCIUM SPEC-SCNC: 9.1 MG/DL (ref 8.6–10.5)
CHLORIDE SERPL-SCNC: 102 MMOL/L (ref 98–107)
CO2 SERPL-SCNC: 24 MMOL/L (ref 22–29)
CREAT BLD-MCNC: 1.25 MG/DL (ref 0.76–1.27)
CRP SERPL-MCNC: 0.14 MG/DL (ref 0–0.5)
DEPRECATED RDW RBC AUTO: 44.9 FL (ref 37–54)
EOSINOPHIL # BLD AUTO: 0.31 10*3/MM3 (ref 0–0.4)
EOSINOPHIL NFR BLD AUTO: 5.1 % (ref 0.3–6.2)
ERYTHROCYTE [DISTWIDTH] IN BLOOD BY AUTOMATED COUNT: 14 % (ref 12.3–15.4)
ERYTHROCYTE [SEDIMENTATION RATE] IN BLOOD: 19 MM/HR (ref 0–20)
GFR SERPL CREATININE-BSD FRML MDRD: 60 ML/MIN/1.73
GLOBULIN UR ELPH-MCNC: 3.5 GM/DL
GLUCOSE BLD-MCNC: 109 MG/DL (ref 65–99)
HCT VFR BLD AUTO: 39.8 % (ref 37.5–51)
HGB BLD-MCNC: 13 G/DL (ref 13–17.7)
IMM GRANULOCYTES # BLD AUTO: 0.03 10*3/MM3 (ref 0–0.05)
IMM GRANULOCYTES NFR BLD AUTO: 0.5 % (ref 0–0.5)
LYMPHOCYTES # BLD AUTO: 0.95 10*3/MM3 (ref 0.7–3.1)
LYMPHOCYTES NFR BLD AUTO: 15.7 % (ref 19.6–45.3)
MCH RBC QN AUTO: 28.9 PG (ref 26.6–33)
MCHC RBC AUTO-ENTMCNC: 32.7 G/DL (ref 31.5–35.7)
MCV RBC AUTO: 88.4 FL (ref 79–97)
MONOCYTES # BLD AUTO: 0.59 10*3/MM3 (ref 0.1–0.9)
MONOCYTES NFR BLD AUTO: 9.8 % (ref 5–12)
NEUTROPHILS # BLD AUTO: 4.13 10*3/MM3 (ref 1.7–7)
NEUTROPHILS NFR BLD AUTO: 68.2 % (ref 42.7–76)
NRBC BLD AUTO-RTO: 0 /100 WBC (ref 0–0.2)
PLATELET # BLD AUTO: 164 10*3/MM3 (ref 140–450)
PMV BLD AUTO: 9.6 FL (ref 6–12)
POTASSIUM BLD-SCNC: 4.6 MMOL/L (ref 3.5–5.2)
PROT SERPL-MCNC: 7.3 G/DL (ref 6–8.5)
RBC # BLD AUTO: 4.5 10*6/MM3 (ref 4.14–5.8)
SODIUM BLD-SCNC: 138 MMOL/L (ref 136–145)
WBC NRBC COR # BLD: 6.05 10*3/MM3 (ref 3.4–10.8)

## 2019-06-17 PROCEDURE — 86140 C-REACTIVE PROTEIN: CPT

## 2019-06-17 PROCEDURE — 85025 COMPLETE CBC W/AUTO DIFF WBC: CPT

## 2019-06-17 PROCEDURE — 85652 RBC SED RATE AUTOMATED: CPT

## 2019-06-17 PROCEDURE — 80053 COMPREHEN METABOLIC PANEL: CPT

## 2019-06-17 PROCEDURE — 36415 COLL VENOUS BLD VENIPUNCTURE: CPT

## 2019-06-26 ENCOUNTER — TELEPHONE (OUTPATIENT)
Dept: ONCOLOGY | Facility: CLINIC | Age: 54
End: 2019-06-26

## 2019-06-26 ENCOUNTER — TRANSCRIBE ORDERS (OUTPATIENT)
Dept: ADMINISTRATIVE | Facility: HOSPITAL | Age: 54
End: 2019-06-26

## 2019-06-26 ENCOUNTER — HOSPITAL ENCOUNTER (OUTPATIENT)
Dept: CT IMAGING | Facility: HOSPITAL | Age: 54
Discharge: HOME | End: 2019-06-26
Admitting: INTERNAL MEDICINE

## 2019-06-26 DIAGNOSIS — K65.1 PERITONEAL ABSCESS (HCC): ICD-10-CM

## 2019-06-26 DIAGNOSIS — K57.20 PERFORATED DIVERTICULUM OF LARGE INTESTINE: Primary | ICD-10-CM

## 2019-06-26 PROCEDURE — 74177 CT ABD & PELVIS W/CONTRAST: CPT

## 2019-06-26 PROCEDURE — 25010000002 IOPAMIDOL 61 % SOLUTION: Performed by: INTERNAL MEDICINE

## 2019-06-26 RX ADMIN — IOPAMIDOL 85 ML: 612 INJECTION, SOLUTION INTRAVENOUS at 10:25

## 2019-06-26 NOTE — TELEPHONE ENCOUNTER
----- Message from Rosa Maria Ford sent at 6/26/2019  1:37 PM EDT -----  This patient is not having his CT and follow up with Dr. Sosa until 7/10 so he moved his follow up with Dr. Moeller to 7/11. ANNEL

## 2019-07-01 ENCOUNTER — APPOINTMENT (OUTPATIENT)
Dept: CT IMAGING | Facility: HOSPITAL | Age: 54
End: 2019-07-01

## 2019-07-10 ENCOUNTER — HOSPITAL ENCOUNTER (OUTPATIENT)
Dept: CT IMAGING | Facility: HOSPITAL | Age: 54
Discharge: HOME | End: 2019-07-10
Admitting: INTERNAL MEDICINE

## 2019-07-10 DIAGNOSIS — K57.20 PERFORATED DIVERTICULUM OF LARGE INTESTINE: ICD-10-CM

## 2019-07-10 LAB — CREAT BLDA-MCNC: 1.1 MG/DL (ref 0.6–1.3)

## 2019-07-10 PROCEDURE — 82565 ASSAY OF CREATININE: CPT

## 2019-07-10 PROCEDURE — 74177 CT ABD & PELVIS W/CONTRAST: CPT

## 2019-07-10 PROCEDURE — 25010000002 IOPAMIDOL 61 % SOLUTION: Performed by: INTERNAL MEDICINE

## 2019-07-10 RX ADMIN — IOPAMIDOL 85 ML: 612 INJECTION, SOLUTION INTRAVENOUS at 11:41

## 2019-07-11 ENCOUNTER — OFFICE VISIT (OUTPATIENT)
Dept: ONCOLOGY | Facility: CLINIC | Age: 54
End: 2019-07-11

## 2019-07-11 VITALS
RESPIRATION RATE: 16 BRPM | BODY MASS INDEX: 27.17 KG/M2 | HEIGHT: 73 IN | SYSTOLIC BLOOD PRESSURE: 165 MMHG | WEIGHT: 205 LBS | DIASTOLIC BLOOD PRESSURE: 81 MMHG | HEART RATE: 75 BPM | OXYGEN SATURATION: 99 % | TEMPERATURE: 98.2 F

## 2019-07-11 DIAGNOSIS — C20 RECTAL CARCINOMA (HCC): Primary | Chronic | ICD-10-CM

## 2019-07-11 PROCEDURE — 99214 OFFICE O/P EST MOD 30 MIN: CPT | Performed by: INTERNAL MEDICINE

## 2019-07-11 RX ORDER — FLUCONAZOLE 200 MG/1
400 TABLET ORAL DAILY
Refills: 0 | Status: ON HOLD | COMMUNITY
Start: 2019-06-17 | End: 2019-07-18

## 2019-07-11 RX ORDER — FLUOROURACIL 50 MG/ML
400 INJECTION, SOLUTION INTRAVENOUS ONCE
Status: CANCELLED | OUTPATIENT
Start: 2019-07-22

## 2019-07-11 NOTE — PROGRESS NOTES
CHIEF COMPLAINT: Management of adjuvant therapy rectal carcinoma    Problem List:  Oncology/Hematology History    1. Stage IIIB rectal carcinoma clinical T3b N1 aM0  -11/30/18 CT abdomen pelvis and chest x-ray negative for metastasis.  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    -12/6/18 MRI of rectum showed T3b with suspicious right internal iliac lymph node and CT chest noncontrast negative except for gallstones  -Per Dr. Kelly, CEA was normal.  -12/18/18 saw me for the first time.  I reviewed her case in our multidisciplinary conference and went over that in detail with her.  She had presented with hematochezia.  Dr. Kelly repeated endoscopy for more exact detailing of the primary location and this appears to be rectal on MRI and endoscopy.  Plan is for randomization on clinical trial N 1048 to neoadjuvant FOLFOX versus standard chemoradiation.  We'll get him to radiation and our research staff for randomization and get his baseline CBC and CMP.    -1/4/19 followed up: Randomized to the Xeloda radiation arm.  He will get that and then 4-6 weeks later had his surgery, and then follow that with 6 months of adjuvant FOLFOX per protocol.  My research assistant met with him today.  He has a Xeloda prescription.  -3/4/2019 MRI: Good response with T2, less likely T3 with spiculation N0 disease.  -4/23/2019 pathology: Laparoscopic assisted converted to lower anterior resection open with stapled coloanal anastomosis and diverting loop ileostomy with liver biopsy showed residual adenoma with focal high-grade dysplasia but no residual invasive carcinoma.  0 out of 17 lymph nodes.  Mild steatosis on liver biopsy with bridging fibrosis.  -5/9/2019 to 5/15/2019 admitted with abdominal pelvic abscess status post CT-guided percutaneous drain placement presenting with sepsis improved with antibiotics and drainage.  Temp of 104 on presentation.  2 weeks of  protracted IV antibiotics with Radames Sosa planned at discharge.  -7/11/2019 follow-up visit:Start of adjuvant therapy delayed due to the above postoperative pelvic abscess.  This was treated surgically initially but then by CT-guided percutaneous drainage as above.  Serial follow-up with Radames Sosa including CTs and antibiotics eventually cleared him adequately to consider resumption of plans for adjuvant therapy.  CTs of the pelvis done on 6/5/2019, 6/13/2019, 6/26/2019 monitoring for this abscess and possible drainage but not able to drain due to decreasing size and minimal residual edema with CT 7/10/2019 showingStable to slight decrease in size of presacral perirectal abscess with presacral edema measuring 3.9 x 1.9 previously 4.1 x 2.5 cm. No new sites of focal fluid collection or loculated fluid.  Hence, modified FOLFOX 6 started at this junction.        Rectal carcinoma (CMS/HCC)    12/18/2018 Initial Diagnosis     Rectal carcinoma (CMS/HCC)         1/7/2019 - 2/4/2019 Chemotherapy     Xeloda radiation on protocol         1/8/2019 - 2/14/2019 Radiation     Radiation OncologyTreatment Course:  Hesham Arevalo received 5040 cGy in 28 fractions to pelvis via External Beam Radiation - EBRT.         3/4/2019 Imaging     MRI of the pelvis:  IMPRESSIONS:  MRI rectal cancer T category is: T2, LESS LIKELY EARLY T3 SPICULATIONS  Maximum EMD of invasion is: 0  Minimum tumor to MRF distance is: 12 MM  Low rectal tumor component: NO  Mesorectal nodes/tumor deposits: NO  EMVI: NO  Extramesorectal nodes: NO         5/15/2019 Adverse Reaction     -5/9/2019 to 5/15/2019 admitted with abdominal pelvic abscess status post CT-guided percutaneous drain placement presenting with sepsis improved with antibiotics and drainage.  Temp of 104 on presentation.  2 weeks of protracted IV antibiotics with Radames Sosa planned at discharge         7/11/2019 -  Chemotherapy     OP COLON mFOLFOX6 OXALIplatin / Leucovorin /  Fluorouracil  Start of adjuvant therapy delayed due to the above postoperative pelvic abscess.  This was treated surgically initially but then by CT-guided percutaneous drainage as above.  Serial follow-up with Radames Sosa including CTs and antibiotics eventually cleared him adequately to consider resumption of plans for adjuvant therapy.  CTs of the pelvis done on 6/5/2019, 6/13/2019, 6/26/2019 monitoring for this abscess and possible drainage but not able to drain due to decreasing size and minimal residual edema with CT 7/10/2019 showingStable to slight decrease in size of presacral perirectal abscess with presacral edema measuring 3.9 x 1.9 previously 4.1 x 2.5 cm. No new sites of focal fluid collection or loculated fluid.            HISTORY OF PRESENT ILLNESS:  The patient is a 53 y.o. male, here for follow up on management of rectal carcinoma with delayed postoperative adjuvant therapy due to pelvic abscess postoperatively following neoadjuvant Xeloda radiation on clinical trial.  Now due for adjuvant FOLFOX.      Past Medical History:   Diagnosis Date   • Arthritis     knees    • Cancer (CMS/HCC) 11/2018    colon   • Disease of thyroid gland    • Fatty liver    • Fatty liver    • Hashimoto's disease    • Hypertension    • Psoriasis    • Wears glasses      Past Surgical History:   Procedure Laterality Date   • COLON RESECTION N/A 4/23/2019    Procedure: LAPAROSCOPIC LOWER ANTERIOR RESECTION WITH DIVERTING LOOP ILEOOSTOMY, SPLENIC FLEIXURE MOBILIZATION AND LIVER BIOPSY, AND PROCTOSCOPY;  Surgeon: Pau Kelly MD;  Location: [facility];  Service: General   • COLONOSCOPY      2018   • ILEOSTOMY  04/2019   • LIVER BIOPSY  2003   • VASECTOMY  1998       No Known Allergies    Family History and Social History reviewed and changed as necessary      REVIEW OF SYSTEM:   Review of Systems   Constitutional: Negative for appetite change, chills, diaphoresis, fatigue, fever and unexpected weight change.   HENT:    Negative for mouth sores, sore throat and trouble swallowing.    Eyes: Negative for icterus.   Respiratory: Negative for cough, hemoptysis and shortness of breath.    Cardiovascular: Negative for chest pain, leg swelling and palpitations.   Gastrointestinal: Negative for abdominal distention, abdominal pain, blood in stool, constipation, diarrhea, nausea and vomiting.   Endocrine: Negative for hot flashes.   Genitourinary: Negative for bladder incontinence, difficulty urinating, dysuria, frequency and hematuria.    Musculoskeletal: Negative for gait problem, neck pain and neck stiffness.   Skin: Negative for rash.   Neurological: Negative for dizziness, gait problem, headaches, light-headedness and numbness.   Hematological: Negative for adenopathy. Does not bruise/bleed easily.   Psychiatric/Behavioral: Negative for depression. The patient is not nervous/anxious.    All other systems reviewed and are negative.       PHYSICAL EXAM    There were no vitals filed for this visit.  Constitutional: Appears well-developed and well-nourished. No distress.   ECOG: (1) Restricted in physically strenuous activity, ambulatory and able to do work of light nature  HENT:   Head: Normocephalic.   Mouth/Throat: Oropharynx is clear and moist.   Eyes: Conjunctivae are normal. Pupils are equal, round, and reactive to light. No scleral icterus.   Neck: Neck supple. No JVD present. No thyromegaly present.   Cardiovascular: Normal rate, regular rhythm and normal heart sounds.    Pulmonary/Chest: Breath sounds normal. No respiratory distress.   Abdominal: Soft. Exhibits no distension and no mass. There is no hepatosplenomegaly. There is no tenderness. There is no rebound and no guarding.   Musculoskeletal:Exhibits no edema, tenderness or deformity.   Neurological: Alert and oriented to person, place, and time. Exhibits normal muscle tone.   Skin: No ecchymosis, no petechiae and no rash noted. Not diaphoretic. No cyanosis. Nails show no  clubbing.   Psychiatric: Normal mood and affect.   Vitals reviewed.      Lab Results   Component Value Date    HGB 13.0 06/17/2019    HCT 39.8 06/17/2019    MCV 88.4 06/17/2019     06/17/2019    WBC 6.05 06/17/2019    NEUTROABS 4.13 06/17/2019    LYMPHSABS 0.95 06/17/2019    MONOSABS 0.59 06/17/2019    EOSABS 0.31 06/17/2019    BASOSABS 0.04 06/17/2019       Lab Results   Component Value Date    GLUCOSE 109 (H) 06/17/2019    BUN 15 06/17/2019    CREATININE 1.10 07/10/2019     06/17/2019    K 4.6 06/17/2019     06/17/2019    CO2 24.0 06/17/2019    CALCIUM 9.1 06/17/2019    PROTEINTOT 7.3 06/17/2019    ALBUMIN 3.80 06/17/2019    BILITOT 0.6 06/17/2019    ALKPHOS 106 06/17/2019    AST 50 (H) 06/17/2019    ALT 37 06/17/2019                   ASSESSMENT & PLAN:    1. Rectal carcinoma with no residual cancer after adjuvant Xeloda radiation but with protracted postoperative course  2. Protracted postoperative course due to pelvic abscess    Discussion:Start of adjuvant therapy delayed due to the above postoperative pelvic abscess.  This was treated surgically initially but then by CT-guided percutaneous drainage as above.  Serial follow-up with Radames Sosa including CTs and antibiotics eventually cleared him adequately to consider resumption of plans for adjuvant therapy.  CTs of the pelvis done on 6/5/2019, 6/13/2019, 6/26/2019 monitoring for this abscess and possible drainage but not able to drain due to decreasing size and minimal residual edema with CT 7/10/2019 showingStable to slight decrease in size of presacral perirectal abscess with presacral edema measuring 3.9 x 1.9 previously 4.1 x 2.5 cm. No new sites of focal fluid collection or loculated fluid.  Hence, modified FOLFOX 6 started at this junction.  We need to get a port placed.  He has had enough abdominal scanning in recent months that I would plan on routine surveillance scans again in October.  That would go along with his CBC CMP  and CEA for routine cancer surveillance.  Discussed previously with Dr. Sosa and with patient 25 minutes greater than 50% spent counseling      Ousmane Moeller MD    07/11/2019

## 2019-07-18 ENCOUNTER — APPOINTMENT (OUTPATIENT)
Dept: GENERAL RADIOLOGY | Facility: HOSPITAL | Age: 54
End: 2019-07-18

## 2019-07-18 ENCOUNTER — HOSPITAL ENCOUNTER (OUTPATIENT)
Facility: HOSPITAL | Age: 54
Setting detail: HOSPITAL OUTPATIENT SURGERY
Discharge: HOME | End: 2019-07-18
Attending: SURGERY | Admitting: SURGERY

## 2019-07-18 ENCOUNTER — ANESTHESIA (OUTPATIENT)
Dept: PERIOP | Facility: HOSPITAL | Age: 54
End: 2019-07-18

## 2019-07-18 ENCOUNTER — ANESTHESIA EVENT (OUTPATIENT)
Dept: PERIOP | Facility: HOSPITAL | Age: 54
End: 2019-07-18

## 2019-07-18 VITALS
SYSTOLIC BLOOD PRESSURE: 145 MMHG | RESPIRATION RATE: 16 BRPM | HEIGHT: 73 IN | DIASTOLIC BLOOD PRESSURE: 84 MMHG | HEART RATE: 75 BPM | WEIGHT: 205 LBS | OXYGEN SATURATION: 100 % | BODY MASS INDEX: 27.17 KG/M2 | TEMPERATURE: 98 F

## 2019-07-18 LAB
ANION GAP SERPL CALCULATED.3IONS-SCNC: 12 MMOL/L (ref 5–15)
BUN BLD-MCNC: 12 MG/DL (ref 6–20)
BUN/CREAT SERPL: 11.2 (ref 7–25)
CALCIUM SPEC-SCNC: 9.6 MG/DL (ref 8.6–10.5)
CHLORIDE SERPL-SCNC: 103 MMOL/L (ref 98–107)
CO2 SERPL-SCNC: 26 MMOL/L (ref 22–29)
CREAT BLD-MCNC: 1.07 MG/DL (ref 0.76–1.27)
DEPRECATED RDW RBC AUTO: 48.3 FL (ref 37–54)
ERYTHROCYTE [DISTWIDTH] IN BLOOD BY AUTOMATED COUNT: 15 % (ref 12.3–15.4)
GFR SERPL CREATININE-BSD FRML MDRD: 72 ML/MIN/1.73
GLUCOSE BLD-MCNC: 118 MG/DL (ref 65–99)
HCT VFR BLD AUTO: 37 % (ref 37.5–51)
HGB BLD-MCNC: 12.1 G/DL (ref 13–17.7)
INR PPP: 1.01 (ref 0.85–1.16)
MCH RBC QN AUTO: 29.1 PG (ref 26.6–33)
MCHC RBC AUTO-ENTMCNC: 32.7 G/DL (ref 31.5–35.7)
MCV RBC AUTO: 88.9 FL (ref 79–97)
PLATELET # BLD AUTO: 100 10*3/MM3 (ref 140–450)
PMV BLD AUTO: 9 FL (ref 6–12)
POTASSIUM BLD-SCNC: 4.2 MMOL/L (ref 3.5–5.2)
PROTHROMBIN TIME: 12.8 SECONDS (ref 11.2–14.3)
RBC # BLD AUTO: 4.16 10*6/MM3 (ref 4.14–5.8)
SODIUM BLD-SCNC: 141 MMOL/L (ref 136–145)
WBC NRBC COR # BLD: 4.08 10*3/MM3 (ref 3.4–10.8)

## 2019-07-18 PROCEDURE — 25010000002 HEPARIN (PORCINE) PER 1000 UNITS: Performed by: SURGERY

## 2019-07-18 PROCEDURE — 85027 COMPLETE CBC AUTOMATED: CPT | Performed by: SURGERY

## 2019-07-18 PROCEDURE — 25010000002 PROPOFOL 1000 MG/ML EMULSION: Performed by: NURSE ANESTHETIST, CERTIFIED REGISTERED

## 2019-07-18 PROCEDURE — 25010000002 PROPOFOL 10 MG/ML EMULSION: Performed by: NURSE ANESTHETIST, CERTIFIED REGISTERED

## 2019-07-18 PROCEDURE — 76000 FLUOROSCOPY <1 HR PHYS/QHP: CPT

## 2019-07-18 PROCEDURE — C1788 PORT, INDWELLING, IMP: HCPCS | Performed by: SURGERY

## 2019-07-18 PROCEDURE — 80048 BASIC METABOLIC PNL TOTAL CA: CPT | Performed by: SURGERY

## 2019-07-18 PROCEDURE — 85610 PROTHROMBIN TIME: CPT | Performed by: SURGERY

## 2019-07-18 PROCEDURE — 71045 X-RAY EXAM CHEST 1 VIEW: CPT

## 2019-07-18 PROCEDURE — 25010000003 CEFAZOLIN IN DEXTROSE 2-4 GM/100ML-% SOLUTION: Performed by: SURGERY

## 2019-07-18 DEVICE — POWERPORT CLEARVUE ISP IMPLANTABLE PORT WITH ATTACHABLE 8F POLYURETHANE OPEN-ENDED SINGLE-LUMEN VENOUS CATHETER PROCEDURAL KIT
Type: IMPLANTABLE DEVICE | Site: CHEST | Status: FUNCTIONAL
Brand: POWERPORT CLEARVUE

## 2019-07-18 RX ORDER — SODIUM CHLORIDE 0.9 % (FLUSH) 0.9 %
3 SYRINGE (ML) INJECTION EVERY 12 HOURS SCHEDULED
Status: DISCONTINUED | OUTPATIENT
Start: 2019-07-18 | End: 2019-07-18

## 2019-07-18 RX ORDER — FAMOTIDINE 20 MG/1
20 TABLET, FILM COATED ORAL ONCE
Status: DISCONTINUED | OUTPATIENT
Start: 2019-07-18 | End: 2019-07-18

## 2019-07-18 RX ORDER — ONDANSETRON 2 MG/ML
4 INJECTION INTRAMUSCULAR; INTRAVENOUS ONCE AS NEEDED
Status: DISCONTINUED | OUTPATIENT
Start: 2019-07-18 | End: 2019-07-18 | Stop reason: HOSPADM

## 2019-07-18 RX ORDER — OXYCODONE AND ACETAMINOPHEN 7.5; 325 MG/1; MG/1
1 TABLET ORAL ONCE AS NEEDED
Status: DISCONTINUED | OUTPATIENT
Start: 2019-07-18 | End: 2019-07-18 | Stop reason: HOSPADM

## 2019-07-18 RX ORDER — SODIUM CHLORIDE 0.9 % (FLUSH) 0.9 %
3-10 SYRINGE (ML) INJECTION AS NEEDED
Status: DISCONTINUED | OUTPATIENT
Start: 2019-07-18 | End: 2019-07-18

## 2019-07-18 RX ORDER — BUPIVACAINE HYDROCHLORIDE AND EPINEPHRINE 5; 5 MG/ML; UG/ML
INJECTION, SOLUTION PERINEURAL AS NEEDED
Status: DISCONTINUED | OUTPATIENT
Start: 2019-07-18 | End: 2019-07-18 | Stop reason: HOSPADM

## 2019-07-18 RX ORDER — LIDOCAINE HYDROCHLORIDE 10 MG/ML
INJECTION, SOLUTION EPIDURAL; INFILTRATION; INTRACAUDAL; PERINEURAL AS NEEDED
Status: DISCONTINUED | OUTPATIENT
Start: 2019-07-18 | End: 2019-07-18 | Stop reason: SURG

## 2019-07-18 RX ORDER — LIDOCAINE HYDROCHLORIDE 10 MG/ML
0.5 INJECTION, SOLUTION EPIDURAL; INFILTRATION; INTRACAUDAL; PERINEURAL ONCE AS NEEDED
Status: COMPLETED | OUTPATIENT
Start: 2019-07-18 | End: 2019-07-18

## 2019-07-18 RX ORDER — CEFAZOLIN SODIUM 2 G/100ML
2 INJECTION, SOLUTION INTRAVENOUS ONCE
Status: COMPLETED | OUTPATIENT
Start: 2019-07-18 | End: 2019-07-18

## 2019-07-18 RX ORDER — FAMOTIDINE 10 MG/ML
20 INJECTION, SOLUTION INTRAVENOUS ONCE
Status: DISCONTINUED | OUTPATIENT
Start: 2019-07-18 | End: 2019-07-18

## 2019-07-18 RX ORDER — SODIUM CHLORIDE, SODIUM LACTATE, POTASSIUM CHLORIDE, CALCIUM CHLORIDE 600; 310; 30; 20 MG/100ML; MG/100ML; MG/100ML; MG/100ML
9 INJECTION, SOLUTION INTRAVENOUS CONTINUOUS PRN
Status: DISCONTINUED | OUTPATIENT
Start: 2019-07-18 | End: 2019-07-18 | Stop reason: HOSPADM

## 2019-07-18 RX ORDER — IBUPROFEN 600 MG/1
600 TABLET ORAL EVERY 6 HOURS PRN
Status: DISCONTINUED | OUTPATIENT
Start: 2019-07-18 | End: 2019-07-18 | Stop reason: HOSPADM

## 2019-07-18 RX ORDER — SODIUM CHLORIDE, SODIUM LACTATE, POTASSIUM CHLORIDE, CALCIUM CHLORIDE 600; 310; 30; 20 MG/100ML; MG/100ML; MG/100ML; MG/100ML
9 INJECTION, SOLUTION INTRAVENOUS CONTINUOUS
Status: DISCONTINUED | OUTPATIENT
Start: 2019-07-18 | End: 2019-07-18 | Stop reason: HOSPADM

## 2019-07-18 RX ORDER — FAMOTIDINE 20 MG/1
20 TABLET, FILM COATED ORAL
Status: COMPLETED | OUTPATIENT
Start: 2019-07-18 | End: 2019-07-18

## 2019-07-18 RX ORDER — FENTANYL CITRATE 50 UG/ML
50 INJECTION, SOLUTION INTRAMUSCULAR; INTRAVENOUS
Status: DISCONTINUED | OUTPATIENT
Start: 2019-07-18 | End: 2019-07-18 | Stop reason: HOSPADM

## 2019-07-18 RX ORDER — LIDOCAINE HYDROCHLORIDE 10 MG/ML
0.5 INJECTION, SOLUTION EPIDURAL; INFILTRATION; INTRACAUDAL; PERINEURAL ONCE AS NEEDED
Status: DISCONTINUED | OUTPATIENT
Start: 2019-07-18 | End: 2019-07-18

## 2019-07-18 RX ORDER — PROPOFOL 10 MG/ML
VIAL (ML) INTRAVENOUS AS NEEDED
Status: DISCONTINUED | OUTPATIENT
Start: 2019-07-18 | End: 2019-07-18 | Stop reason: SURG

## 2019-07-18 RX ADMIN — FAMOTIDINE 20 MG: 20 TABLET ORAL at 11:31

## 2019-07-18 RX ADMIN — LIDOCAINE HYDROCHLORIDE 0.5 ML: 10 INJECTION, SOLUTION EPIDURAL; INFILTRATION; INTRACAUDAL; PERINEURAL at 11:32

## 2019-07-18 RX ADMIN — CEFAZOLIN SODIUM 2 G: 2 INJECTION, SOLUTION INTRAVENOUS at 13:25

## 2019-07-18 RX ADMIN — SODIUM CHLORIDE, POTASSIUM CHLORIDE, SODIUM LACTATE AND CALCIUM CHLORIDE 9 ML/HR: 600; 310; 30; 20 INJECTION, SOLUTION INTRAVENOUS at 11:32

## 2019-07-18 RX ADMIN — LIDOCAINE HYDROCHLORIDE 50 MG: 10 INJECTION, SOLUTION EPIDURAL; INFILTRATION; INTRACAUDAL; PERINEURAL at 13:27

## 2019-07-18 RX ADMIN — PROPOFOL 100 MCG/KG/MIN: 10 INJECTION, EMULSION INTRAVENOUS at 13:27

## 2019-07-18 RX ADMIN — PROPOFOL 50 MG: 10 INJECTION, EMULSION INTRAVENOUS at 13:27

## 2019-07-18 NOTE — ANESTHESIA POSTPROCEDURE EVALUATION
Patient: Hesham Arevalo Jr.    Procedure Summary     Date:  07/18/19 Room / Location:   [facility] / [facility]    Anesthesia Start:  1322 Anesthesia Stop:      Procedure:  PORT PLACEMENT (N/A ) Diagnosis:      Surgeon:  Franky Cazares MD Provider:  Pollo High MD    Anesthesia Type:  MAC ASA Status:  2          Anesthesia Type: MAC  Last vitals  BP   118/66   Temp   98.6   Pulse   88   Resp   18     SpO2   98%     Post Anesthesia Care and Evaluation    Patient location during evaluation: PACU  Patient participation: complete - patient participated  Level of consciousness: awake  Pain score: 0  Pain management: adequate  Airway patency: patent  Anesthetic complications: No anesthetic complications  PONV Status: none  Cardiovascular status: acceptable and stable  Respiratory status: nasal cannula, unassisted, acceptable and spontaneous ventilation  Hydration status: acceptable

## 2019-07-18 NOTE — OP NOTE
INSERTION VENOUS ACCESS DEVICE  Procedure Report    Patient Name:  Hesham Arevalo Jr.  YOB: 1965    Date of Surgery:  07/18/19 2:01 PM     Indications: This patient was recently diagnosed with rectal cancer in need of durable central venous access for chemotherapy.  He was explained at length the risks and benefits of PowerPort placement.  He expressed understanding was anxious to proceed    Pre-op Diagnosis: Rectal cancer    Post-op Diagnosis: Same    Procedure:  Right subclavian PowerPort placement  Direct fluoroscopy    Surgeon: Franky Cazares MD    Staff:  Surgeon(s):  Franky Cazares MD         Anesthesia: Monitor Anesthesia Care    Estimated Blood Loss: minimal    Implants:    Implant Name Type Inv. Item Serial No.  Lot No. LRB No. Used   POWERPORT ISP CLEARVUE CATH [id number] [id number] - [id number] Implant POWERPORT ISP CLEARVUE CATH [id number] 45CM  [employer] [id number] Right 1       Specimen:          None      Findings: Right subclavian approach  Fluoroscopy guided modified Seldinger technique for placement  Port easily withdrew venous blood and flushed with heparinized saline at completion    Complications: None immediate    Description of Procedure: After obtaining informed consent for the patient he was brought to the operating where he underwent monitored anesthesia care with sedation.  A shoulder roll was placed in both arms were tucked.  All pressure points were cushioned.  Bilateral neck and chest were prepped and draped in sterile fashion.  Intravenous antibiotics were given prior to incision in his knee implant procedure verified prior to surgery.  Local anesthesia was infused and soft tissues of the right neck and chest.  Using an introducer needle the subclavian vein was accessed and a guidewire placed in the right heart system with its position verified with fluoroscopy.  A subcutaneous pocket was developed Bovie cautery.  2 stay sutures of Prolene  were placed at the 10 and 2:00 positions and secured to the port.  The catheter was then tunneled up to the exit site of the guidewire measured to appropriate length with fluoroscopy.  Then using constant fluoroscopic guidance and modified Seldinger technique an introducer sheath was placed over the wire to the right heart system.  The wire was removed and catheter placed through the sheath sheath was removed and fluoroscopy verified appropriate position with no kinking.  The port was accessed and was easily a withdrawal venous blood and flushed with heparinized saline therefore the 2 Prolene sutures were secured.  The incision closed with 2 layers of suture and skin affix.  Stab incision with a single suture and skin affix.  The patient awoke and was transferred to recovery room in stable condition.  All counts were correct at operation completion.  I performed the entire procedure      Franky Cazares MD     Date: 7/18/2019  Time: 2:01 PM

## 2019-07-18 NOTE — H&P
Pre-Op H&P  Hesham Arevalo Jr.  [id number]  1965    Chief complaint: Rectal cancer    HPI:    Patient is a 53 y.o.male who presents with Stage IIIB rectal carcinoma clinical T3b N1 aM0 s/p XRT, open LAR with ileostomy with liver biopsy showing residual adenoma with focal high grade dysplasia.  Prolonged post operative course due to abdominal pelvic abscess/ sepsis followed by ID requiring  Percutaneous drainage and IV antibiotic treatment.  Follows with Dr. Moeller and plans for initiation of chemotherapy FOLFOX 6.  Here today for port placement.       Review of Systems:  General ROS: negative for chills, fever or skin lesions;  No changes since last office visit  Cardiovascular ROS: no chest pain or dyspnea on exertion  Respiratory ROS: no cough, shortness of breath, or wheezing    Allergies: No Known Allergies    Home Meds:    No current facility-administered medications on file prior to encounter.      Current Outpatient Medications on File Prior to Encounter   Medication Sig Dispense Refill   • lisinopril (PRINIVIL,ZESTRIL) 40 MG tablet Take 40 mg by mouth Every Morning.  0   • Multiple Vitamins-Minerals (MULTIVITAMIN ADULT PO) Take 1 dose by mouth Every Morning.     • oxyCODONE HCl (OXYCONTIN PO) Take  by mouth. Prn pain     • levothyroxine (SYNTHROID, LEVOTHROID) 50 MCG tablet Take 50 mcg by mouth Every Morning.  0   • lidocaine-prilocaine (EMLA) 2.5-2.5 % cream Apply  topically to the appropriate area as directed As Needed (45-60 minutes prior to port access.  Cover with saran/plastic wrap.). 30 g 3   • ondansetron (ZOFRAN) 8 MG tablet Take 1 tablet by mouth 3 (Three) Times a Day As Needed for Nausea or Vomiting. 30 tablet 5   • RA ANTI-DIARRHEAL 2 MG tablet take 1 tablet by mouth before meals and at bedtime  0   • [DISCONTINUED] fluconazole (DIFLUCAN) 200 MG tablet Take 400 mg by mouth Daily.  0   • [DISCONTINUED] tamsulosin (FLOMAX) 0.4 MG capsule 24 hr capsule Take 1 capsule by mouth Daily. 30  "capsule 0       PMH:   Past Medical History:   Diagnosis Date   • Arthritis     knees    • Cancer (CMS/HCC) 2018    colon   • Disease of thyroid gland    • Fatty liver    • Fatty liver    • Hashimoto's disease    • Hypertension    • Psoriasis    • Wears glasses    Rectal cancer    PSH:    Past Surgical History:   Procedure Laterality Date   • COLON RESECTION N/A 2019    Procedure: LAPAROSCOPIC LOWER ANTERIOR RESECTION WITH DIVERTING LOOP ILEOOSTOMY, SPLENIC FLEIXURE MOBILIZATION AND LIVER BIOPSY, AND PROCTOSCOPY;  Surgeon: Pau Kelly MD;  Location: [facility];  Service: General   • COLONOSCOPY         • ILEOSTOMY  2019   • LIVER BIOPSY     • VASECTOMY       Social History:   Tobacco:   Social History     Tobacco Use   Smoking Status Former Smoker   • Packs/day: 1.00   • Years: 14.00   • Pack years: 14.00   • Types: Cigarettes   • Last attempt to quit:    • Years since quittin.5   Smokeless Tobacco Never Used      Alcohol:     Social History     Substance and Sexual Activity   Alcohol Use No   • Frequency: Never       Vitals:           Pulse 81   Temp 99 °F (37.2 °C) (Tympanic)   Resp 18   Ht 185.4 cm (73\")   Wt 93 kg (205 lb)   SpO2 100%   BMI 27.05 kg/m²     Physical Exam:  General Appearance:    Alert, cooperative, no distress, appears stated age   Head:    Normocephalic, without obvious abnormality, atraumatic   Lungs:     Clear to auscultation bilaterally, respirations unlabored    Heart:   Regular rate and rhythm, S1 and S2 normal, no murmur, rub    or gallop    Abdomen:    Soft, non-tender.  +bowel sounds.  +ileostomy   Breast Exam:    deferred   Genitalia:    deferred   Extremities:   Extremities normal, atraumatic, no cyanosis or edema   Skin:   Skin color, texture, turgor normal, no rashes or lesions   Neurologic:   Grossly intact   Results Review  LABS:  Lab Results   Component Value Date    WBC 4.08 2019    HGB 12.1 (L) 2019    HCT 37.0 (L) 2019 "    MCV 88.9 07/18/2019     (L) 07/18/2019    NEUTROABS 4.13 06/17/2019    GLUCOSE 118 (H) 07/18/2019    BUN 12 07/18/2019    CREATININE 1.07 07/18/2019    EGFRIFNONA 72 07/18/2019    EGFRIFAFRI 106 12/18/2018     07/18/2019    K 4.2 07/18/2019     07/18/2019    CO2 26.0 07/18/2019    MG 2.8 (H) 05/12/2019    CALCIUM 9.6 07/18/2019    ALBUMIN 3.80 06/17/2019    AST 50 (H) 06/17/2019    ALT 37 06/17/2019    BILITOT 0.6 06/17/2019       RADIOLOGY:  Imaging Results (last 72 hours)     ** No results found for the last 72 hours. **        I reviewed the patient's new clinical results.    Cancer Staging (if applicable)  Cancer Patient: _x_ yes __no __unknown; If yes, clinical stage T:__ N:__M:__, stage group or __N/A    Impression: Stage IIIB rectal carcinoma clinical T3b N1 aM0 s/p XRT/LAR 4/23/19    Plan: Port placement    Kelsey Joe, AMIRAH   7/18/2019   12:18 PM

## 2019-07-18 NOTE — ANESTHESIA PREPROCEDURE EVALUATION
Anesthesia Evaluation     Patient summary reviewed and Nursing notes reviewed   NPO Solid Status: > 8 hours  NPO Liquid Status: > 8 hours           Airway   Mallampati: II  TM distance: >3 FB  Neck ROM: full  No difficulty expected  Dental      Pulmonary - normal exam   Cardiovascular   Exercise tolerance: good (4-7 METS)    Rhythm: regular  Rate: normal    (+) hypertension,       Neuro/Psych  GI/Hepatic/Renal/Endo    (+)   liver disease,     Musculoskeletal     Abdominal    Substance History      OB/GYN          Other                      Anesthesia Plan    ASA 2     MAC

## 2019-07-19 LAB
ALBUMIN SERPL-MCNC: 4.1 G/DL (ref 3.5–5.2)
ALBUMIN/GLOB SERPL: 1.2 G/DL
ALP SERPL-CCNC: 85 U/L (ref 39–117)
ALT SERPL W P-5'-P-CCNC: 54 U/L (ref 1–41)
ANION GAP SERPL CALCULATED.3IONS-SCNC: 20 MMOL/L (ref 5–15)
AST SERPL-CCNC: 60 U/L (ref 1–40)
BILIRUB SERPL-MCNC: 1.1 MG/DL (ref 0.2–1.2)
BUN BLD-MCNC: 12 MG/DL (ref 6–20)
BUN/CREAT SERPL: 10.1 (ref 7–25)
CALCIUM SPEC-SCNC: 9.7 MG/DL (ref 8.6–10.5)
CHLORIDE SERPL-SCNC: 103 MMOL/L (ref 98–107)
CO2 SERPL-SCNC: 20 MMOL/L (ref 22–29)
CREAT BLD-MCNC: 1.19 MG/DL (ref 0.76–1.27)
GFR SERPL CREATININE-BSD FRML MDRD: 63 ML/MIN/1.73
GLOBULIN UR ELPH-MCNC: 3.5 GM/DL
GLUCOSE BLD-MCNC: 118 MG/DL (ref 65–99)
POTASSIUM BLD-SCNC: 4.3 MMOL/L (ref 3.5–5.2)
PROT SERPL-MCNC: 7.6 G/DL (ref 6–8.5)
SODIUM BLD-SCNC: 143 MMOL/L (ref 136–145)

## 2019-07-19 PROCEDURE — 80053 COMPREHEN METABOLIC PANEL: CPT | Performed by: INTERNAL MEDICINE

## 2019-07-22 ENCOUNTER — DOCUMENTATION (OUTPATIENT)
Dept: SOCIAL WORK | Facility: HOSPITAL | Age: 54
End: 2019-07-22

## 2019-07-22 ENCOUNTER — HOSPITAL ENCOUNTER (OUTPATIENT)
Dept: ONCOLOGY | Facility: HOSPITAL | Age: 54
Setting detail: INFUSION SERIES
Discharge: HOME | End: 2019-07-22

## 2019-07-22 ENCOUNTER — DOCUMENTATION (OUTPATIENT)
Dept: NUTRITION | Facility: HOSPITAL | Age: 54
End: 2019-07-22

## 2019-07-22 ENCOUNTER — EDUCATION (OUTPATIENT)
Dept: ONCOLOGY | Facility: HOSPITAL | Age: 54
End: 2019-07-22

## 2019-07-22 VITALS
HEIGHT: 73 IN | WEIGHT: 200 LBS | BODY MASS INDEX: 26.51 KG/M2 | HEART RATE: 78 BPM | RESPIRATION RATE: 20 BRPM | TEMPERATURE: 97.7 F | DIASTOLIC BLOOD PRESSURE: 77 MMHG | SYSTOLIC BLOOD PRESSURE: 139 MMHG

## 2019-07-22 DIAGNOSIS — C20 RECTAL CARCINOMA (HCC): Primary | ICD-10-CM

## 2019-07-22 LAB
ERYTHROCYTE [DISTWIDTH] IN BLOOD BY AUTOMATED COUNT: 15.5 % (ref 12.3–15.4)
HCT VFR BLD AUTO: 34.3 % (ref 37.5–51)
HGB BLD-MCNC: 12.2 G/DL (ref 13–17.7)
LYMPHOCYTES # BLD AUTO: 0.9 10*3/MM3 (ref 0.7–3.1)
LYMPHOCYTES NFR BLD AUTO: 14.7 % (ref 19.6–45.3)
MCH RBC QN AUTO: 30.7 PG (ref 26.6–33)
MCHC RBC AUTO-ENTMCNC: 35.4 G/DL (ref 31.5–35.7)
MCV RBC AUTO: 86.5 FL (ref 79–97)
MONOCYTES # BLD AUTO: 0.5 10*3/MM3 (ref 0.1–0.9)
MONOCYTES NFR BLD AUTO: 8.1 % (ref 5–12)
NEUTROPHILS # BLD AUTO: 4.6 10*3/MM3 (ref 1.7–7)
NEUTROPHILS NFR BLD AUTO: 77.2 % (ref 42.7–76)
PLATELET # BLD AUTO: 161 10*3/MM3 (ref 140–450)
PMV BLD AUTO: 6.9 FL (ref 6–12)
RBC # BLD AUTO: 3.97 10*6/MM3 (ref 4.14–5.8)
WBC NRBC COR # BLD: 5.9 10*3/MM3 (ref 3.4–10.8)

## 2019-07-22 PROCEDURE — 25010000002 LEUCOVORIN CALCIUM PER 50 MG: Performed by: INTERNAL MEDICINE

## 2019-07-22 PROCEDURE — 96368 THER/DIAG CONCURRENT INF: CPT

## 2019-07-22 PROCEDURE — 96416 CHEMO PROLONG INFUSE W/PUMP: CPT

## 2019-07-22 PROCEDURE — 96411 CHEMO IV PUSH ADDL DRUG: CPT

## 2019-07-22 PROCEDURE — 25010000002 DEXAMETHASONE PER 1 MG: Performed by: INTERNAL MEDICINE

## 2019-07-22 PROCEDURE — 96521 REFILL/MAINT PORTABLE PUMP: CPT

## 2019-07-22 PROCEDURE — 25010000002 PALONOSETRON 0.25 MG/5ML SOLUTION PREFILLED SYRINGE: Performed by: INTERNAL MEDICINE

## 2019-07-22 PROCEDURE — 25010000002 FLUOROURACIL PER 500 MG: Performed by: INTERNAL MEDICINE

## 2019-07-22 PROCEDURE — 96413 CHEMO IV INFUSION 1 HR: CPT

## 2019-07-22 PROCEDURE — 96415 CHEMO IV INFUSION ADDL HR: CPT

## 2019-07-22 PROCEDURE — 85025 COMPLETE CBC W/AUTO DIFF WBC: CPT | Performed by: INTERNAL MEDICINE

## 2019-07-22 PROCEDURE — 96375 TX/PRO/DX INJ NEW DRUG ADDON: CPT

## 2019-07-22 PROCEDURE — 25010000002 OXALIPLATIN PER 0.5 MG: Performed by: INTERNAL MEDICINE

## 2019-07-22 PROCEDURE — 96549 UNLISTED CHEMOTHERAPY PX: CPT

## 2019-07-22 RX ORDER — FLUOROURACIL 50 MG/ML
400 INJECTION, SOLUTION INTRAVENOUS ONCE
Status: COMPLETED | OUTPATIENT
Start: 2019-07-22 | End: 2019-07-22

## 2019-07-22 RX ORDER — PALONOSETRON 0.05 MG/ML
0.25 INJECTION, SOLUTION INTRAVENOUS ONCE
Status: COMPLETED | OUTPATIENT
Start: 2019-07-22 | End: 2019-07-22

## 2019-07-22 RX ORDER — DEXTROSE MONOHYDRATE 50 MG/ML
250 INJECTION, SOLUTION INTRAVENOUS ONCE
Status: COMPLETED | OUTPATIENT
Start: 2019-07-22 | End: 2019-07-22

## 2019-07-22 RX ADMIN — LEUCOVORIN CALCIUM 840 MG: 350 INJECTION, POWDER, LYOPHILIZED, FOR SOLUTION INTRAMUSCULAR; INTRAVENOUS at 09:55

## 2019-07-22 RX ADMIN — PALONOSETRON 0.25 MG: 0.25 INJECTION, SOLUTION INTRAVENOUS at 09:16

## 2019-07-22 RX ADMIN — FLUOROURACIL 840 MG: 50 INJECTION, SOLUTION INTRAVENOUS at 12:00

## 2019-07-22 RX ADMIN — DEXAMETHASONE SODIUM PHOSPHATE 12 MG: 4 INJECTION, SOLUTION INTRAMUSCULAR; INTRAVENOUS at 09:17

## 2019-07-22 RX ADMIN — OXALIPLATIN 180 MG: 5 INJECTION, SOLUTION, CONCENTRATE INTRAVENOUS at 09:55

## 2019-07-22 RX ADMIN — FLUOROURACIL 5000 MG: 50 INJECTION, SOLUTION INTRAVENOUS at 12:04

## 2019-07-22 RX ADMIN — DEXTROSE MONOHYDRATE 250 ML: 50 INJECTION, SOLUTION INTRAVENOUS at 09:53

## 2019-07-22 NOTE — PLAN OF CARE
Outpatient Infusion • [address] [facility] • [address] • [place] [id number] • [phone]      CHEMOTHERAPY EDUCATION SHEET    NAME:  Hesham Arevalo      : 1965           DATE: 19    Booklets Given: Chemotherapy and You []  Eating Hints []    Sexuality/Fertility Books []     Chemotherapy/Biotherapy Education Sheets: (list all that apply)  [email or web address] monographs for fluorouracil and oxaliplatin    Chemotherapy Regimen:  FOLFOX (fluorouracil, leucovorin and oxaliplatin)    TOPICS EDUCATION PROVIDED EDUCATION REINFORCED COMMENTS   ANEMIA:  role of RBC, cause, s/s, ways to manage, role of transfusion [] [x] Weakness and lethargy.   THROMBOCYTOPENIA:  role of platelet, cause, s/s, ways to prevent bleeding, things to avoid, when to seek help [] [x] Increased bruising and prolonged bleeding.   NEUTROPENIA:  role of WBC, cause, infection precautions, s/s of infection, when to call MD [] [x] Avoid sick contacts, call MD if temperature >100.4F.   NUTRITION & APPETITE CHANGES:  importance of maintaining healthy diet & weight, ways to manage to improve intake, dietary consult, exercise regimen [] [x] Patient may experience metallic taste, advised plastic utensils should this occur.   DIARRHEA:  causes, s/s of dehydration, ways to manage, dietary changes, when to call MD [] [x] May use loperamide OTC for diarrhea, maintain adequate fluid intake to avoid dehydration.   CONSTIPATION:  causes, ways to manage, dietary changes, when to call MD [] [x]    NAUSEA & VOMITING:  cause, use of antiemetics, dietary changes, when to call MD [] [x] Reviewed Zofran use.   MOUTH SORES:  causes, oral care, ways to manage [] [x] Patient can swish and spit water mixed with baking soda and salt. Avoid acidic, spicy and hot foods to avoid irritation.   ALOPECIA:  cause, ways to manage, resources [] [x]    INFERTILITY & SEXUALITY:  causes, fertility preservation options, sexuality changes, ways to manage, importance of birth  control [] [x] Condom use in all sexual activity to avoid accidental chemotherapy exposure.   NERVOUS SYSTEM CHANGES:  causes, s/s, neuropathies, cognitive changes, ways to manage [] [x] Cold induced peripheral neuropathy, avoid extremely cold climates and foods. Patient may experience confusion, mental fogginess.   PAIN:  causes, ways to manage [] [x] ????   SKIN & NAIL CHANGES:  cause, s/s, ways to manage [] [x] Increased sensitivity to sun. Use sun block and cover up when outdoors. Patient may experience rash.   ORGAN TOXICITIES:  cause, s/s, need for diagnostic tests, labs, when to notify MD [] [x] Will monitor liver and renal function as well as respiratory and neurologic symptoms.   SURVIVORSHIP:  distress, distress assessment, secondary malignancies, early/late effects, follow-up, social issues, social support [] [x]    HOME CARE:  use of spill kits, storing of PO chemo, how to manage bodily fluids [] [x]    MISCELLANEOUS:  drug interactions, administration, vesicant, et [] [x] Patient may experience shortness of breath and extremity swelling.     Referrals:    N/A    Notes:   Discussed common side effects and the management of such. Answered all questions. Patient was given [email or web address] handouts as well as treatment calendar.    Fior Fernandez, PharmD  Pharmacy Resident  7/22/2019  9:40 AM

## 2019-07-22 NOTE — PROGRESS NOTES
SW met with pt and his wife during his first infusion to provide support and resources.  Pt states that he has taken the oral chemotherapy and had radiation treatments, and after the IV chemotherapy, he will have his surgery.  Pt states that everything has gone well during his treatments and that he is anxious to get through this part.  SW provided support and informed pt of the role of oncology social work.  SW provided contact information for future needs or concerns. SW available for ongoing support and resource needs.

## 2019-07-22 NOTE — PROGRESS NOTES
"Onc Nutrition Follow Up     Patient:  Hesham Arevalo Jr.  YOB: 1965    Diagnosis: rectal carcinoma  Treatment: adjuvant FOLFOX - every 14 days x 6 cycles    Weight: 200#; patient reports he lost ~30# but has recently regained ~10#   Nutrition Symptoms:  none    Met with patient and his wife during his chemotherapy infusion appointment.  Note patient has started on his adjuvant treatment plan today.  Patient reports his appetite is improving recently which has resulted in weight gain as above.  He states his ostomy is functioning well and he denies significant nutritional complaints at this time.    Reviewed the importance of good nutrition during his treatment course.  Advised him to be eating smaller more frequent meals/snacks throughout the day with an emphasis on high protein foods and adequate hydration.  Provided and reviewed written diet material \"Oxaliplatin\" and discussed tips to avoid cold sensitivity.      Answered their questions and both voiced understanding of information discussed.  Encouraged them to call RD with questions.  Will monitor as needed during his treatment course.    Gayatri Hill RD  07/22/19    "

## 2019-07-24 ENCOUNTER — INFUSION (OUTPATIENT)
Dept: ONCOLOGY | Facility: HOSPITAL | Age: 54
End: 2019-07-24

## 2019-07-24 VITALS
BODY MASS INDEX: 26.76 KG/M2 | HEART RATE: 71 BPM | SYSTOLIC BLOOD PRESSURE: 131 MMHG | WEIGHT: 202.8 LBS | DIASTOLIC BLOOD PRESSURE: 60 MMHG | RESPIRATION RATE: 18 BRPM | TEMPERATURE: 97.9 F

## 2019-07-24 DIAGNOSIS — C20 RECTAL CANCER (HCC): Primary | ICD-10-CM

## 2019-07-24 PROCEDURE — 96523 IRRIG DRUG DELIVERY DEVICE: CPT

## 2019-07-24 RX ADMIN — HEPARIN 500 UNITS: 100 SYRINGE at 10:15

## 2019-08-05 ENCOUNTER — HOSPITAL ENCOUNTER (OUTPATIENT)
Dept: ONCOLOGY | Facility: HOSPITAL | Age: 54
Setting detail: INFUSION SERIES
Discharge: HOME | End: 2019-08-05

## 2019-08-05 ENCOUNTER — OFFICE VISIT (OUTPATIENT)
Dept: ONCOLOGY | Facility: CLINIC | Age: 54
End: 2019-08-05

## 2019-08-05 VITALS
HEIGHT: 73 IN | WEIGHT: 198 LBS | TEMPERATURE: 97.6 F | BODY MASS INDEX: 26.24 KG/M2 | OXYGEN SATURATION: 100 % | DIASTOLIC BLOOD PRESSURE: 64 MMHG | HEART RATE: 74 BPM | RESPIRATION RATE: 18 BRPM | SYSTOLIC BLOOD PRESSURE: 129 MMHG

## 2019-08-05 DIAGNOSIS — C20 RECTAL CARCINOMA (HCC): Primary | ICD-10-CM

## 2019-08-05 DIAGNOSIS — R11.0 CHEMOTHERAPY-INDUCED NAUSEA: ICD-10-CM

## 2019-08-05 DIAGNOSIS — T45.1X5A CHEMOTHERAPY-INDUCED NAUSEA: ICD-10-CM

## 2019-08-05 LAB
ALBUMIN SERPL-MCNC: 3.7 G/DL (ref 3.5–5.2)
ALBUMIN/GLOB SERPL: 1.1 G/DL
ALP SERPL-CCNC: 112 U/L (ref 39–117)
ALT SERPL W P-5'-P-CCNC: 56 U/L (ref 1–41)
ANION GAP SERPL CALCULATED.3IONS-SCNC: 11 MMOL/L (ref 5–15)
AST SERPL-CCNC: 65 U/L (ref 1–40)
BILIRUB SERPL-MCNC: 0.7 MG/DL (ref 0.2–1.2)
BUN BLD-MCNC: 19 MG/DL (ref 6–20)
BUN/CREAT SERPL: 12.4 (ref 7–25)
CALCIUM SPEC-SCNC: 9.3 MG/DL (ref 8.6–10.5)
CHLORIDE SERPL-SCNC: 102 MMOL/L (ref 98–107)
CO2 SERPL-SCNC: 22 MMOL/L (ref 22–29)
CREAT BLD-MCNC: 1.53 MG/DL (ref 0.76–1.27)
CREAT SERPL-MCNC: 1.8 MG/DL
ERYTHROCYTE [DISTWIDTH] IN BLOOD BY AUTOMATED COUNT: 16.5 % (ref 12.3–15.4)
GFR SERPL CREATININE-BSD FRML MDRD: 47 ML/MIN/1.73
GLOBULIN UR ELPH-MCNC: 3.4 GM/DL
GLUCOSE BLD-MCNC: 114 MG/DL (ref 65–99)
HCT VFR BLD AUTO: 30.8 % (ref 37.5–51)
HGB BLD-MCNC: 10.8 G/DL (ref 13–17.7)
LYMPHOCYTES # BLD AUTO: 0.7 10*3/MM3 (ref 0.7–3.1)
LYMPHOCYTES NFR BLD AUTO: 16.7 % (ref 19.6–45.3)
MCH RBC QN AUTO: 31 PG (ref 26.6–33)
MCHC RBC AUTO-ENTMCNC: 35.1 G/DL (ref 31.5–35.7)
MCV RBC AUTO: 88.4 FL (ref 79–97)
MONOCYTES # BLD AUTO: 0.4 10*3/MM3 (ref 0.1–0.9)
MONOCYTES NFR BLD AUTO: 9.1 % (ref 5–12)
NEUTROPHILS # BLD AUTO: 3 10*3/MM3 (ref 1.7–7)
NEUTROPHILS NFR BLD AUTO: 74.2 % (ref 42.7–76)
PLATELET # BLD AUTO: 104 10*3/MM3 (ref 140–450)
PMV BLD AUTO: 7 FL (ref 6–12)
POTASSIUM BLD-SCNC: 4.7 MMOL/L (ref 3.5–5.2)
PROT SERPL-MCNC: 7.1 G/DL (ref 6–8.5)
RBC # BLD AUTO: 3.48 10*6/MM3 (ref 4.14–5.8)
SODIUM BLD-SCNC: 135 MMOL/L (ref 136–145)
WBC NRBC COR # BLD: 4.1 10*3/MM3 (ref 3.4–10.8)

## 2019-08-05 PROCEDURE — 85025 COMPLETE CBC W/AUTO DIFF WBC: CPT | Performed by: INTERNAL MEDICINE

## 2019-08-05 PROCEDURE — 82565 ASSAY OF CREATININE: CPT

## 2019-08-05 PROCEDURE — 25010000002 LEUCOVORIN 500 MG RECONSTITUTED SOLUTION 1 EACH VIAL: Performed by: NURSE PRACTITIONER

## 2019-08-05 PROCEDURE — 25010000002 FLUOROURACIL PER 500 MG: Performed by: NURSE PRACTITIONER

## 2019-08-05 PROCEDURE — 25010000002 PALONOSETRON 0.25 MG/5ML SOLUTION PREFILLED SYRINGE: Performed by: NURSE PRACTITIONER

## 2019-08-05 PROCEDURE — 96368 THER/DIAG CONCURRENT INF: CPT

## 2019-08-05 PROCEDURE — 96413 CHEMO IV INFUSION 1 HR: CPT

## 2019-08-05 PROCEDURE — 99213 OFFICE O/P EST LOW 20 MIN: CPT | Performed by: NURSE PRACTITIONER

## 2019-08-05 PROCEDURE — 25010000002 LEUCOVORIN CALCIUM PER 50 MG: Performed by: NURSE PRACTITIONER

## 2019-08-05 PROCEDURE — 96375 TX/PRO/DX INJ NEW DRUG ADDON: CPT

## 2019-08-05 PROCEDURE — 96415 CHEMO IV INFUSION ADDL HR: CPT

## 2019-08-05 PROCEDURE — 96411 CHEMO IV PUSH ADDL DRUG: CPT

## 2019-08-05 PROCEDURE — 25010000002 OXALIPLATIN PER 0.5 MG: Performed by: NURSE PRACTITIONER

## 2019-08-05 PROCEDURE — 96416 CHEMO PROLONG INFUSE W/PUMP: CPT

## 2019-08-05 PROCEDURE — 80053 COMPREHEN METABOLIC PANEL: CPT | Performed by: INTERNAL MEDICINE

## 2019-08-05 PROCEDURE — 25010000002 DEXAMETHASONE PER 1 MG: Performed by: NURSE PRACTITIONER

## 2019-08-05 RX ORDER — DEXTROSE MONOHYDRATE 50 MG/ML
250 INJECTION, SOLUTION INTRAVENOUS ONCE
Status: CANCELLED | OUTPATIENT
Start: 2019-08-05

## 2019-08-05 RX ORDER — PALONOSETRON 0.05 MG/ML
0.25 INJECTION, SOLUTION INTRAVENOUS ONCE
Status: CANCELLED | OUTPATIENT
Start: 2019-08-05

## 2019-08-05 RX ORDER — FAMOTIDINE 10 MG/ML
20 INJECTION, SOLUTION INTRAVENOUS AS NEEDED
Status: CANCELLED | OUTPATIENT
Start: 2019-08-05

## 2019-08-05 RX ORDER — DEXTROSE MONOHYDRATE 50 MG/ML
250 INJECTION, SOLUTION INTRAVENOUS ONCE
Status: COMPLETED | OUTPATIENT
Start: 2019-08-05 | End: 2019-08-05

## 2019-08-05 RX ORDER — PALONOSETRON 0.05 MG/ML
0.25 INJECTION, SOLUTION INTRAVENOUS ONCE
Status: COMPLETED | OUTPATIENT
Start: 2019-08-05 | End: 2019-08-05

## 2019-08-05 RX ORDER — DIPHENHYDRAMINE HYDROCHLORIDE 50 MG/ML
50 INJECTION INTRAMUSCULAR; INTRAVENOUS AS NEEDED
Status: CANCELLED | OUTPATIENT
Start: 2019-08-05

## 2019-08-05 RX ORDER — FLUOROURACIL 50 MG/ML
400 INJECTION, SOLUTION INTRAVENOUS ONCE
Status: CANCELLED | OUTPATIENT
Start: 2019-08-05

## 2019-08-05 RX ORDER — FLUOROURACIL 50 MG/ML
400 INJECTION, SOLUTION INTRAVENOUS ONCE
Status: COMPLETED | OUTPATIENT
Start: 2019-08-05 | End: 2019-08-05

## 2019-08-05 RX ADMIN — FLUOROURACIL 5000 MG: 50 INJECTION, SOLUTION INTRAVENOUS at 11:52

## 2019-08-05 RX ADMIN — DEXAMETHASONE SODIUM PHOSPHATE 12 MG: 4 INJECTION, SOLUTION INTRAMUSCULAR; INTRAVENOUS at 09:16

## 2019-08-05 RX ADMIN — FLUOROURACIL 840 MG: 50 INJECTION, SOLUTION INTRAVENOUS at 11:50

## 2019-08-05 RX ADMIN — OXALIPLATIN 180 MG: 5 INJECTION, SOLUTION, CONCENTRATE INTRAVENOUS at 09:40

## 2019-08-05 RX ADMIN — PALONOSETRON 0.25 MG: 0.25 INJECTION, SOLUTION INTRAVENOUS at 09:14

## 2019-08-05 RX ADMIN — DEXTROSE MONOHYDRATE 250 ML: 50 INJECTION, SOLUTION INTRAVENOUS at 09:39

## 2019-08-05 RX ADMIN — LEUCOVORIN CALCIUM 840 MG: 500 INJECTION, POWDER, LYOPHILIZED, FOR SOLUTION INTRAMUSCULAR; INTRAVENOUS at 09:41

## 2019-08-05 NOTE — PROGRESS NOTES
CHIEF COMPLAINT: Management of adjuvant therapy rectal carcinoma    Problem List:  Oncology/Hematology History    1. Stage IIIB rectal carcinoma clinical T3b N1 aM0  2. Protracted postoperative course due to abdominal pelvic abscess following neoadjuvant Xeloda radiation  -11/30/18 CT abdomen pelvis and chest x-ray negative for metastasis.  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    -12/6/18 MRI of rectum showed T3b with suspicious right internal iliac lymph node and CT chest noncontrast negative except for gallstones  -Per Dr. Kelly, CEA was normal.  -12/18/18 saw me for the first time.  I reviewed her case in our multidisciplinary conference and went over that in detail with her.  She had presented with hematochezia.  Dr. Kelly repeated endoscopy for more exact detailing of the primary location and this appears to be rectal on MRI and endoscopy.  Plan is for randomization on clinical trial N 1048 to neoadjuvant FOLFOX versus standard chemoradiation.  We'll get him to radiation and our research staff for randomization and get his baseline CBC and CMP.    -1/4/19 followed up: Randomized to the Xeloda radiation arm.  He will get that and then 4-6 weeks later had his surgery, and then follow that with 6 months of adjuvant FOLFOX per protocol.  My research assistant met with him today.  He has a Xeloda prescription.  -3/4/2019 MRI: Good response with T2, less likely T3 with spiculation N0 disease.  -4/23/2019 pathology: Laparoscopic assisted converted to lower anterior resection open with stapled coloanal anastomosis and diverting loop ileostomy with liver biopsy showed residual adenoma with focal high-grade dysplasia but no residual invasive carcinoma.  0 out of 17 lymph nodes.  Mild steatosis on liver biopsy with bridging fibrosis.  -5/9/2019 to 5/15/2019 admitted with abdominal pelvic abscess status post CT-guided percutaneous drain placement  presenting with sepsis improved with antibiotics and drainage.  Temp of 104 on presentation.  2 weeks of protracted IV antibiotics with Radames Sosa planned at discharge.  -7/11/2019 follow-up visit:Start of adjuvant therapy delayed due to the above postoperative pelvic abscess.  This was treated surgically initially but then by CT-guided percutaneous drainage as above.  Serial follow-up with Radames Sosa including CTs and antibiotics eventually cleared him adequately to consider resumption of plans for adjuvant therapy.  CTs of the pelvis done on 6/5/2019, 6/13/2019, 6/26/2019 monitoring for this abscess and possible drainage but not able to drain due to decreasing size and minimal residual edema with CT 7/10/2019 showingStable to slight decrease in size of presacral perirectal abscess with presacral edema measuring 3.9 x 1.9 previously 4.1 x 2.5 cm. No new sites of focal fluid collection or loculated fluid.  Hence, modified FOLFOX 6 started at this junction.        Rectal carcinoma (CMS/HCC)    12/18/2018 Initial Diagnosis     Rectal carcinoma (CMS/HCC)         1/7/2019 - 2/4/2019 Chemotherapy     Xeloda radiation on protocol         1/8/2019 - 2/14/2019 Radiation     Radiation OncologyTreatment Course:  Hesham Arevalo received 5040 cGy in 28 fractions to pelvis via External Beam Radiation - EBRT.         3/4/2019 Imaging     MRI of the pelvis:  IMPRESSIONS:  MRI rectal cancer T category is: T2, LESS LIKELY EARLY T3 SPICULATIONS  Maximum EMD of invasion is: 0  Minimum tumor to MRF distance is: 12 MM  Low rectal tumor component: NO  Mesorectal nodes/tumor deposits: NO  EMVI: NO  Extramesorectal nodes: NO         4/23/2019 Surgery     Surgery rectosigmoidectomy pathology report:  Final Diagnosis    1. RECTOSIGMOID COLON, RECTOSIGMOID RESECTION:  Residual adenoma with focal high grade dysplasia with no residual invasive carcinoma identified post-treatment (see comment).  Seventeen lymph nodes negative for  carcinoma (0/17)   2. LIVER, CORE NEEDLE BIOPSY:  Mild steatosis with mild chronic inflammation and increased fibrosis including bridging fibrosis (Stage 3-4) (see comment)        COLON AND RECTUM TEMPLATE:  TYPE OF SPECIMEN/PROCEDURE: Rectosigmoid resection.   SPECIMEN INTEGRITY:  Intact.  TUMOR SITE:  Rectum.  TUMOR SIZE (greatest dimension):  Indeterminate  TUMOR LOCATION (applicable only to rectal primaries): Entirely below anterior peritoneal reflection.  MACROSCOPIC INTACTNESS OF MESORECTUM (If applicable): Intact.  MACROSCOPIC TUMOR PERFORATION: Not identified.  TUMOR CONFIGURATION:  Ulcerated.  HISTOLOGIC TYPE:  Adenocarcinoma.  HISTOLOGIC GRADE:  G3-4 (per previous biopsy).  MICROSCOPIC DEPTH OF TUMOR INVASION/EXTENSION:  No residual tumor identified.  PERITONEAL INVOLVEMENT:  Not identified.  LYMPHVASCULAR INVASION:  Not identified.  PERINEURAL INVASION: Not identified.  SURGICAL MARGINS: Uninvolved.  STATUS AND DISTANCE FROM PROXIMAL MUCOSAL MARGIN:  Uninvolved, 13.0 cm.  STATUS AND DISTANCE FROM DISTAL MUCOSAL MARGIN: Uninvolved, 1.2 cm.  STATUS AND DISTANCE FROM MESENTERIC MARGIN: Not applicable.  STATUS AND DISTANCE FROM RADIAL MARGIN: Uninvolved, 1.7 cm.  DISTANCE FROM DENTATE LINE (RECTAL TUMOR ONLY):  Indeterminate.  TUMOR DEPOSITS (DISCONTINUOUS EXTRAMURAL EXTENSION):  Not identified.  NUMBER OF LYMPH NODES INVOLVED BY METASTATIC NEOPLASM: 0.  NUMBER OF REGIONAL LYMPH NODES EXAMINED: 17.  EXTRANODAL EXTENSION:  Not applicable.  TREATMENT EFFECT:  Present, no viable cancer cells identified (complete response, score 0).  ADDITIONAL PATHOLOGIC FINDINGS:  None.  MSI TESTING:  No evidence of MSI based on reported IHC on outside biopsy  OTHER ANCILLARY STUDIES (BRAF, KRAS, ETC.):  Should be performed on initial diagnostic biopsy if needed.  AJCC PATHOLOGIC STAGE:  (COMPLETED BY PATHOLOGIST, BASED ONLY ON TISSUE FINDINGS, MORE EXTENSIVE DISEASE MAY NOT BE KNOWN TO THE PATHOLOGIST)  ypT=  0  ypN=  0  AJCC  PATHOLOGIC STAGE:  y0.                    5/15/2019 Adverse Reaction     -5/9/2019 to 5/15/2019 admitted with abdominal pelvic abscess status post CT-guided percutaneous drain placement presenting with sepsis improved with antibiotics and drainage.  Temp of 104 on presentation.  2 weeks of protracted IV antibiotics with Radames Sosa planned at discharge         7/11/2019 -  Chemotherapy     OP COLON mFOLFOX6 OXALIplatin / Leucovorin / Fluorouracil  Start of adjuvant therapy delayed due to the above postoperative pelvic abscess.  This was treated surgically initially but then by CT-guided percutaneous drainage as above.  Serial follow-up with Radames Sosa including CTs and antibiotics eventually cleared him adequately to consider resumption of plans for adjuvant therapy.  CTs of the pelvis done on 6/5/2019, 6/13/2019, 6/26/2019 monitoring for this abscess and possible drainage but not able to drain due to decreasing size and minimal residual edema with CT 7/10/2019 showingStable to slight decrease in size of presacral perirectal abscess with presacral edema measuring 3.9 x 1.9 previously 4.1 x 2.5 cm. No new sites of focal fluid collection or loculated fluid.            HISTORY OF PRESENT ILLNESS:  The patient is a 53 y.o. male, here for follow up on management of rectal carcinoma with delayed postoperative adjuvant therapy due to pelvic abscess postoperatively following neoadjuvant Xeloda radiation on clinical trial.  Now on adjuvant FOLFOX.  The patient tolerated his first cycle of therapy fairly well, he does report nausea that was better controlled once he started taking his Zofran twice a day.  He had some increased output from his ostomy that slow down with the use of Imodium.  No fevers or chills.  Denies any mouth sores.  Mild-moderate fatigue.      Past Medical History:   Diagnosis Date   • Arthritis     knees    • Cancer (CMS/HCC) 11/2018    colon   • Disease of thyroid gland    • Fatty liver    •  Fatty liver    • Hashimoto's disease    • Hypertension    • Psoriasis    • Wears glasses      Past Surgical History:   Procedure Laterality Date   • COLON RESECTION N/A 4/23/2019    Procedure: LAPAROSCOPIC LOWER ANTERIOR RESECTION WITH DIVERTING LOOP ILEOOSTOMY, SPLENIC FLEIXURE MOBILIZATION AND LIVER BIOPSY, AND PROCTOSCOPY;  Surgeon: Pau Kelly MD;  Location: [facility] OR;  Service: General   • COLONOSCOPY      2018   • ILEOSTOMY  04/2019   • LIVER BIOPSY  2003   • VASECTOMY  1998   • VENOUS ACCESS DEVICE (PORT) INSERTION N/A 7/18/2019    Procedure: PORT PLACEMENT;  Surgeon: Franky Cazares MD;  Location: [facility] OR;  Service: General       No Known Allergies    Family History and Social History reviewed and changed as necessary      REVIEW OF SYSTEM:   Review of Systems   Constitutional: Negative for appetite change, chills, diaphoresis, fever and unexpected weight change. Positive for fatigue.  HENT:   Negative for mouth sores, sore throat and trouble swallowing.    Eyes: Negative for icterus.   Respiratory: Negative for cough, hemoptysis and shortness of breath.    Cardiovascular: Negative for chest pain, leg swelling and palpitations.   Gastrointestinal: Negative for abdominal distention, abdominal pain, blood in stool, constipation, diarrhea.  Positive for nausea without vomiting.   Endocrine: Negative for hot flashes.   Genitourinary: Negative for bladder incontinence, difficulty urinating, dysuria, frequency and hematuria.    Musculoskeletal: Negative for gait problem, neck pain and neck stiffness.   Skin: Negative for rash.   Neurological: Negative for dizziness, gait problem, headaches, light-headedness and numbness.   Hematological: Negative for adenopathy. Does not bruise/bleed easily.   Psychiatric/Behavioral: Negative for depression. The patient is not nervous/anxious.    All other systems reviewed and are negative.       PHYSICAL EXAM    Vitals:    08/05/19 0800   BP: 129/64   Pulse: 74  "  Resp: 18   Temp: 97.6 °F (36.4 °C)   TempSrc: Temporal   SpO2: 100%   Weight: 89.8 kg (198 lb)   Height: 185.4 cm (73\")     Constitutional: Appears well-developed and well-nourished. No distress.   ECOG: (1) Restricted in physically strenuous activity, ambulatory and able to do work of light nature  HENT:   Head: Normocephalic.   Mouth/Throat: Oropharynx is clear and moist.   Eyes: Conjunctivae are normal. Pupils are equal, round, and reactive. No scleral icterus.   Neck: Neck supple. No JVD present.   Cardiovascular: Normal rate, regular rhythm and normal heart sounds.    Pulmonary/Chest: Breath sounds normal. No respiratory distress.   Abdominal: Soft. Exhibits no distension. There is no tenderness.   Musculoskeletal:Exhibits no edema, tenderness or deformity.   Neurological: Alert and oriented to person, place, and time. Exhibits normal muscle tone.   Skin: No ecchymosis, no petechiae and no rash noted. Not diaphoretic. No cyanosis.   Psychiatric: Normal mood and affect.   Vitals reviewed.  Labs reviewed.    Lab Results   Component Value Date    HGB 12.2 (L) 07/22/2019    HCT 34.3 (L) 07/22/2019    MCV 86.5 07/22/2019     07/22/2019    WBC 5.90 07/22/2019    NEUTROABS 4.60 07/22/2019    LYMPHSABS 0.90 07/22/2019    MONOSABS 0.50 07/22/2019    EOSABS 0.31 06/17/2019    BASOSABS 0.04 06/17/2019       Lab Results   Component Value Date    GLUCOSE 118 (H) 07/19/2019    BUN 12 07/19/2019    CREATININE 1.19 07/19/2019     07/19/2019    K 4.3 07/19/2019     07/19/2019    CO2 20.0 (L) 07/19/2019    CALCIUM 9.7 07/19/2019    PROTEINTOT 7.6 07/19/2019    ALBUMIN 4.10 07/19/2019    BILITOT 1.1 07/19/2019    ALKPHOS 85 07/19/2019    AST 60 (H) 07/19/2019    ALT 54 (H) 07/19/2019         ASSESSMENT & PLAN:    1. Rectal carcinoma with no residual cancer after adjuvant Xeloda radiation but with protracted postoperative course  2. Protracted postoperative course due to pelvic abscess  3. Chemotherapy " associated nausea    Discussion: Tolerating FOLFOX fairly well, did have some nausea without vomiting that improved once he took Zofran twice daily fairly regularly.  Had some increased output from his ostomy that slowed down with Imodium.  We will continue on with therapy unchanged today for cycle 2 of a planned 6 courses.  We plan on routine surveillance scans again in October.  That would go along with his CBC CMP and CEA for routine cancer surveillance.  I will see him back in 2 weeks for follow-up.    I spent 15 minutes with the patient. I spent > 50% percent of this time counseling and discussing prognosis, diagnostic testing, evaluation, current status and management.    Ana Self, APRN    08/05/2019

## 2019-08-07 ENCOUNTER — INFUSION (OUTPATIENT)
Dept: ONCOLOGY | Facility: HOSPITAL | Age: 54
End: 2019-08-07

## 2019-08-07 VITALS
BODY MASS INDEX: 26.31 KG/M2 | TEMPERATURE: 97.6 F | DIASTOLIC BLOOD PRESSURE: 58 MMHG | WEIGHT: 199.4 LBS | RESPIRATION RATE: 18 BRPM | SYSTOLIC BLOOD PRESSURE: 95 MMHG | HEART RATE: 77 BPM

## 2019-08-07 DIAGNOSIS — C20 RECTAL CANCER (HCC): Primary | ICD-10-CM

## 2019-08-07 DIAGNOSIS — C20 RECTAL CARCINOMA (HCC): ICD-10-CM

## 2019-08-07 PROCEDURE — G0463 HOSPITAL OUTPT CLINIC VISIT: HCPCS

## 2019-08-07 RX ADMIN — HEPARIN 500 UNITS: 100 SYRINGE at 10:05

## 2019-08-12 DIAGNOSIS — C20 RECTAL CARCINOMA (HCC): ICD-10-CM

## 2019-08-12 RX ORDER — ONDANSETRON HYDROCHLORIDE 8 MG/1
8 TABLET, FILM COATED ORAL 3 TIMES DAILY PRN
Qty: 30 TABLET | Refills: 5 | Status: SHIPPED | OUTPATIENT
Start: 2019-08-12 | End: 2020-10-29

## 2019-08-12 NOTE — TELEPHONE ENCOUNTER
[phone]    Spoke with Pharmacist and it is too soon for RX refill.  PT can obtain but will have to pay for it out of pocket.  They do not take Good RX coupons.    [phone]    Spoke with PT and he would like to send RX to [facility] in [place] and will use the Good RX coupon there.      [phone]    E-scribed RX and LMOM of Good RX coupon information (Pharmacy was closed until 1400).  Ondansetron 8mg  30 orally disintegrating tabs  Discounted price: 18.47  This is your estimated price at [facility]; the pharmacy will provide the exact pricing. Pharmacist Info: Member ID [id number]  [employer]  [id number]  Rx[facility]  [id number]    [facility]  [id number]    [phone]    Sent Good RX information to PT via TEXT    [phone] [id number]  ~Shell

## 2019-08-12 NOTE — TELEPHONE ENCOUNTER
----- Message from Jessica Conklin sent at 8/12/2019 11:52 AM EDT -----  Regarding: [facility]- RX   Contact: [phone]  PT CALLED NEEDS SCRIPT  FOR NAUSEA  MEDS ONDANSETRON MG. PT CALLED  THE PHARMACY AND THEY CANT FILL THIS PT USES  [facility]  IN [place] [phone].

## 2019-08-13 LAB — CREAT BLDA-MCNC: 1.8 MG/DL (ref 0.6–1.3)

## 2019-08-19 ENCOUNTER — OFFICE VISIT (OUTPATIENT)
Dept: ONCOLOGY | Facility: CLINIC | Age: 54
End: 2019-08-19

## 2019-08-19 ENCOUNTER — HOSPITAL ENCOUNTER (OUTPATIENT)
Dept: ONCOLOGY | Facility: HOSPITAL | Age: 54
Setting detail: INFUSION SERIES
Discharge: HOME | End: 2019-08-19

## 2019-08-19 VITALS
BODY MASS INDEX: 25.84 KG/M2 | OXYGEN SATURATION: 100 % | HEIGHT: 73 IN | WEIGHT: 195 LBS | DIASTOLIC BLOOD PRESSURE: 68 MMHG | RESPIRATION RATE: 16 BRPM | SYSTOLIC BLOOD PRESSURE: 91 MMHG | TEMPERATURE: 97.5 F | HEART RATE: 81 BPM

## 2019-08-19 DIAGNOSIS — C20 RECTAL CARCINOMA (HCC): ICD-10-CM

## 2019-08-19 DIAGNOSIS — C20 RECTAL CANCER (HCC): ICD-10-CM

## 2019-08-19 DIAGNOSIS — C20 RECTAL CARCINOMA (HCC): Primary | ICD-10-CM

## 2019-08-19 DIAGNOSIS — N28.9 RENAL INSUFFICIENCY: Primary | ICD-10-CM

## 2019-08-19 DIAGNOSIS — I95.9 HYPOTENSION, UNSPECIFIED HYPOTENSION TYPE: ICD-10-CM

## 2019-08-19 LAB
ALBUMIN SERPL-MCNC: 3.9 G/DL (ref 3.5–5.2)
ALBUMIN/GLOB SERPL: 1.2 G/DL
ALP SERPL-CCNC: 106 U/L (ref 39–117)
ALT SERPL W P-5'-P-CCNC: 34 U/L (ref 1–41)
ANION GAP SERPL CALCULATED.3IONS-SCNC: 13 MMOL/L (ref 5–15)
AST SERPL-CCNC: 55 U/L (ref 1–40)
BILIRUB SERPL-MCNC: 1.4 MG/DL (ref 0.2–1.2)
BUN BLD-MCNC: 14 MG/DL (ref 6–20)
BUN/CREAT SERPL: 12.5 (ref 7–25)
CALCIUM SPEC-SCNC: 9.5 MG/DL (ref 8.6–10.5)
CEA SERPL-MCNC: 1.98 NG/ML
CHLORIDE SERPL-SCNC: 98 MMOL/L (ref 98–107)
CO2 SERPL-SCNC: 24 MMOL/L (ref 22–29)
CREAT BLD-MCNC: 1.12 MG/DL (ref 0.76–1.27)
CREAT BLDA-MCNC: 1.1 MG/DL
CREAT BLDA-MCNC: 1.1 MG/DL (ref 0.6–1.3)
ERYTHROCYTE [DISTWIDTH] IN BLOOD BY AUTOMATED COUNT: 16.9 % (ref 12.3–15.4)
GFR SERPL CREATININE-BSD FRML MDRD: 68 ML/MIN/1.73
GLOBULIN UR ELPH-MCNC: 3.2 GM/DL
GLUCOSE BLD-MCNC: 102 MG/DL (ref 65–99)
HCT VFR BLD AUTO: 29.2 % (ref 37.5–51)
HGB BLD-MCNC: 9.9 G/DL (ref 13–17.7)
LYMPHOCYTES # BLD AUTO: 0.7 10*3/MM3 (ref 0.7–3.1)
LYMPHOCYTES NFR BLD AUTO: 21.8 % (ref 19.6–45.3)
MCH RBC QN AUTO: 31.7 PG (ref 26.6–33)
MCHC RBC AUTO-ENTMCNC: 33.9 G/DL (ref 31.5–35.7)
MCV RBC AUTO: 93.3 FL (ref 79–97)
MONOCYTES # BLD AUTO: 0.4 10*3/MM3 (ref 0.1–0.9)
MONOCYTES NFR BLD AUTO: 14.5 % (ref 5–12)
NEUTROPHILS # BLD AUTO: 1.9 10*3/MM3 (ref 1.7–7)
NEUTROPHILS NFR BLD AUTO: 63.7 % (ref 42.7–76)
PLATELET # BLD AUTO: 81 10*3/MM3 (ref 140–450)
PMV BLD AUTO: 6 FL (ref 6–12)
POTASSIUM BLD-SCNC: 3.9 MMOL/L (ref 3.5–5.2)
PROT SERPL-MCNC: 7.1 G/DL (ref 6–8.5)
RBC # BLD AUTO: 3.13 10*6/MM3 (ref 4.14–5.8)
SODIUM BLD-SCNC: 135 MMOL/L (ref 136–145)
WBC NRBC COR # BLD: 3 10*3/MM3 (ref 3.4–10.8)

## 2019-08-19 PROCEDURE — 99214 OFFICE O/P EST MOD 30 MIN: CPT | Performed by: NURSE PRACTITIONER

## 2019-08-19 PROCEDURE — 80053 COMPREHEN METABOLIC PANEL: CPT | Performed by: NURSE PRACTITIONER

## 2019-08-19 PROCEDURE — 85025 COMPLETE CBC W/AUTO DIFF WBC: CPT | Performed by: NURSE PRACTITIONER

## 2019-08-19 PROCEDURE — 82565 ASSAY OF CREATININE: CPT

## 2019-08-19 PROCEDURE — 36591 DRAW BLOOD OFF VENOUS DEVICE: CPT

## 2019-08-19 PROCEDURE — 82378 CARCINOEMBRYONIC ANTIGEN: CPT | Performed by: NURSE PRACTITIONER

## 2019-08-19 RX ORDER — DEXTROSE MONOHYDRATE 50 MG/ML
250 INJECTION, SOLUTION INTRAVENOUS ONCE
Status: CANCELLED | OUTPATIENT
Start: 2019-08-26

## 2019-08-19 RX ORDER — PALONOSETRON 0.05 MG/ML
0.25 INJECTION, SOLUTION INTRAVENOUS ONCE
Status: CANCELLED | OUTPATIENT
Start: 2019-08-26

## 2019-08-19 RX ORDER — EPINEPHRINE 0.3 MG/.3ML
INJECTION SUBCUTANEOUS
COMMUNITY
Start: 2019-07-19 | End: 2023-03-20

## 2019-08-19 RX ORDER — FLUOROURACIL 50 MG/ML
400 INJECTION, SOLUTION INTRAVENOUS ONCE
Status: CANCELLED | OUTPATIENT
Start: 2019-08-26

## 2019-08-19 RX ORDER — FAMOTIDINE 10 MG/ML
20 INJECTION, SOLUTION INTRAVENOUS AS NEEDED
Status: CANCELLED | OUTPATIENT
Start: 2019-08-26

## 2019-08-19 RX ORDER — DIPHENHYDRAMINE HYDROCHLORIDE 50 MG/ML
50 INJECTION INTRAMUSCULAR; INTRAVENOUS AS NEEDED
Status: CANCELLED | OUTPATIENT
Start: 2019-08-26

## 2019-08-19 RX ADMIN — HEPARIN 500 UNITS: 100 SYRINGE at 11:05

## 2019-08-19 NOTE — PROGRESS NOTES
CHIEF COMPLAINT: 1.  Hypotension  2.  Management of adjuvant therapy rectal carcinoma    Problem List:  Oncology/Hematology History    1. Stage IIIB rectal carcinoma clinical T3b N1 aM0  2. Protracted postoperative course due to abdominal pelvic abscess following neoadjuvant Xeloda radiation  -11/30/18 CT abdomen pelvis and chest x-ray negative for metastasis.  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    -12/6/18 MRI of rectum showed T3b with suspicious right internal iliac lymph node and CT chest noncontrast negative except for gallstones  -Per Dr. Kelly, CEA was normal.  -12/18/18 saw me for the first time.  I reviewed her case in our multidisciplinary conference and went over that in detail with her.  She had presented with hematochezia.  Dr. Kelly repeated endoscopy for more exact detailing of the primary location and this appears to be rectal on MRI and endoscopy.  Plan is for randomization on clinical trial N 1048 to neoadjuvant FOLFOX versus standard chemoradiation.  We'll get him to radiation and our research staff for randomization and get his baseline CBC and CMP.    -1/4/19 followed up: Randomized to the Xeloda radiation arm.  He will get that and then 4-6 weeks later had his surgery, and then follow that with 6 months of adjuvant FOLFOX per protocol.  My research assistant met with him today.  He has a Xeloda prescription.  -3/4/2019 MRI: Good response with T2, less likely T3 with spiculation N0 disease.  -4/23/2019 pathology: Laparoscopic assisted converted to lower anterior resection open with stapled coloanal anastomosis and diverting loop ileostomy with liver biopsy showed residual adenoma with focal high-grade dysplasia but no residual invasive carcinoma.  0 out of 17 lymph nodes.  Mild steatosis on liver biopsy with bridging fibrosis.  -5/9/2019 to 5/15/2019 admitted with abdominal pelvic abscess status post CT-guided percutaneous  drain placement presenting with sepsis improved with antibiotics and drainage.  Temp of 104 on presentation.  2 weeks of protracted IV antibiotics with Radames Sosa planned at discharge.  -7/11/2019 follow-up visit:Start of adjuvant therapy delayed due to the above postoperative pelvic abscess.  This was treated surgically initially but then by CT-guided percutaneous drainage as above.  Serial follow-up with Radames Sosa including CTs and antibiotics eventually cleared him adequately to consider resumption of plans for adjuvant therapy.  CTs of the pelvis done on 6/5/2019, 6/13/2019, 6/26/2019 monitoring for this abscess and possible drainage but not able to drain due to decreasing size and minimal residual edema with CT 7/10/2019 showingStable to slight decrease in size of presacral perirectal abscess with presacral edema measuring 3.9 x 1.9 previously 4.1 x 2.5 cm. No new sites of focal fluid collection or loculated fluid.  Hence, modified FOLFOX 6 started at this junction.        Rectal carcinoma (CMS/HCC)    12/18/2018 Initial Diagnosis     Rectal carcinoma (CMS/HCC)         1/7/2019 - 2/4/2019 Chemotherapy     Xeloda radiation on protocol         1/8/2019 - 2/14/2019 Radiation     Radiation OncologyTreatment Course:  Hesham Arevalo received 5040 cGy in 28 fractions to pelvis via External Beam Radiation - EBRT.         3/4/2019 Imaging     MRI of the pelvis:  IMPRESSIONS:  MRI rectal cancer T category is: T2, LESS LIKELY EARLY T3 SPICULATIONS  Maximum EMD of invasion is: 0  Minimum tumor to MRF distance is: 12 MM  Low rectal tumor component: NO  Mesorectal nodes/tumor deposits: NO  EMVI: NO  Extramesorectal nodes: NO         4/23/2019 Surgery     Surgery rectosigmoidectomy pathology report:  Final Diagnosis    1. RECTOSIGMOID COLON, RECTOSIGMOID RESECTION:  Residual adenoma with focal high grade dysplasia with no residual invasive carcinoma identified post-treatment (see comment).  Seventeen lymph nodes  negative for carcinoma (0/17)   2. LIVER, CORE NEEDLE BIOPSY:  Mild steatosis with mild chronic inflammation and increased fibrosis including bridging fibrosis (Stage 3-4) (see comment)        COLON AND RECTUM TEMPLATE:  TYPE OF SPECIMEN/PROCEDURE: Rectosigmoid resection.   SPECIMEN INTEGRITY:  Intact.  TUMOR SITE:  Rectum.  TUMOR SIZE (greatest dimension):  Indeterminate  TUMOR LOCATION (applicable only to rectal primaries): Entirely below anterior peritoneal reflection.  MACROSCOPIC INTACTNESS OF MESORECTUM (If applicable): Intact.  MACROSCOPIC TUMOR PERFORATION: Not identified.  TUMOR CONFIGURATION:  Ulcerated.  HISTOLOGIC TYPE:  Adenocarcinoma.  HISTOLOGIC GRADE:  G3-4 (per previous biopsy).  MICROSCOPIC DEPTH OF TUMOR INVASION/EXTENSION:  No residual tumor identified.  PERITONEAL INVOLVEMENT:  Not identified.  LYMPHVASCULAR INVASION:  Not identified.  PERINEURAL INVASION: Not identified.  SURGICAL MARGINS: Uninvolved.  STATUS AND DISTANCE FROM PROXIMAL MUCOSAL MARGIN:  Uninvolved, 13.0 cm.  STATUS AND DISTANCE FROM DISTAL MUCOSAL MARGIN: Uninvolved, 1.2 cm.  STATUS AND DISTANCE FROM MESENTERIC MARGIN: Not applicable.  STATUS AND DISTANCE FROM RADIAL MARGIN: Uninvolved, 1.7 cm.  DISTANCE FROM DENTATE LINE (RECTAL TUMOR ONLY):  Indeterminate.  TUMOR DEPOSITS (DISCONTINUOUS EXTRAMURAL EXTENSION):  Not identified.  NUMBER OF LYMPH NODES INVOLVED BY METASTATIC NEOPLASM: 0.  NUMBER OF REGIONAL LYMPH NODES EXAMINED: 17.  EXTRANODAL EXTENSION:  Not applicable.  TREATMENT EFFECT:  Present, no viable cancer cells identified (complete response, score 0).  ADDITIONAL PATHOLOGIC FINDINGS:  None.  MSI TESTING:  No evidence of MSI based on reported IHC on outside biopsy  OTHER ANCILLARY STUDIES (BRAF, KRAS, ETC.):  Should be performed on initial diagnostic biopsy if needed.  AJCC PATHOLOGIC STAGE:  (COMPLETED BY PATHOLOGIST, BASED ONLY ON TISSUE FINDINGS, MORE EXTENSIVE DISEASE MAY NOT BE KNOWN TO THE PATHOLOGIST)  ypT=   0  ypN=  0  AJCC PATHOLOGIC STAGE:  y0.                    5/15/2019 Adverse Reaction     -5/9/2019 to 5/15/2019 admitted with abdominal pelvic abscess status post CT-guided percutaneous drain placement presenting with sepsis improved with antibiotics and drainage.  Temp of 104 on presentation.  2 weeks of protracted IV antibiotics with Radames Sosa planned at discharge         7/11/2019 -  Chemotherapy     OP COLON [id number] OXALIplatin / Leucovorin / Fluorouracil  Start of adjuvant therapy delayed due to the above postoperative pelvic abscess.  This was treated surgically initially but then by CT-guided percutaneous drainage as above.  Serial follow-up with Radames Sosa including CTs and antibiotics eventually cleared him adequately to consider resumption of plans for adjuvant therapy.  CTs of the pelvis done on 6/5/2019, 6/13/2019, 6/26/2019 monitoring for this abscess and possible drainage but not able to drain due to decreasing size and minimal residual edema with CT 7/10/2019 showingStable to slight decrease in size of presacral perirectal abscess with presacral edema measuring 3.9 x 1.9 previously 4.1 x 2.5 cm. No new sites of focal fluid collection or loculated fluid.            HISTORY OF PRESENT ILLNESS:  The patient is a 53 y.o. male, here for follow up on management of rectal carcinoma with delayed postoperative adjuvant therapy due to pelvic abscess postoperatively following neoadjuvant Xeloda radiation on clinical trial.  Now on adjuvant FOLFOX.  The patient is tolerating therapy fairly well.  Does have some lightheadedness when standing at times.  Blood pressure running low this morning, 91/68.  Taking lisinopril 40 mg daily.  No significant neuropathies.  States ostomy output is normal.  No significant nausea.    Past Medical History:   Diagnosis Date   • Arthritis     knees    • Cancer (CMS/HCC) 11/2018    colon   • Disease of thyroid gland    • Fatty liver    • Fatty liver    • Hashimoto's  disease    • Hypertension    • Psoriasis    • Wears glasses      Past Surgical History:   Procedure Laterality Date   • COLON RESECTION N/A 4/23/2019    Procedure: LAPAROSCOPIC LOWER ANTERIOR RESECTION WITH DIVERTING LOOP ILEOOSTOMY, SPLENIC FLEIXURE MOBILIZATION AND LIVER BIOPSY, AND PROCTOSCOPY;  Surgeon: Pau Kelly MD;  Location: [facility] OR;  Service: General   • COLONOSCOPY      2018   • ILEOSTOMY  04/2019   • LIVER BIOPSY  2003   • VASECTOMY  1998   • VENOUS ACCESS DEVICE (PORT) INSERTION N/A 7/18/2019    Procedure: PORT PLACEMENT;  Surgeon: Franky Cazares MD;  Location: [facility] OR;  Service: General       No Known Allergies    Family History and Social History reviewed and changed as necessary      REVIEW OF SYSTEM:   Review of Systems   Constitutional: Negative for appetite change, chills, diaphoresis, fever and unexpected weight change. Positive for fatigue.  HENT:   Negative for mouth sores, sore throat and trouble swallowing.    Eyes: Negative for icterus.   Respiratory: Negative for cough, hemoptysis and shortness of breath.    Cardiovascular: Negative for chest pain, leg swelling and palpitations.   Gastrointestinal: Negative for abdominal distention, abdominal pain, blood in stool, constipation, diarrhea.    Endocrine: Negative for hot flashes.   Genitourinary: Negative for bladder incontinence, difficulty urinating, dysuria, frequency and hematuria.    Musculoskeletal: Negative for gait problem, neck pain and neck stiffness.   Skin: Negative for rash.   Neurological: Negative for dizziness, gait problem, headaches.  Positive for occasional light-headedness with position changes.   Hematological: Negative for adenopathy. Does not bruise/bleed easily.   Psychiatric/Behavioral: Negative for depression. The patient is not nervous/anxious.    All other systems reviewed and are negative.       PHYSICAL EXAM    Vitals:    08/19/19 0844   BP: 91/68   Pulse: 81   Resp: 16   Temp: 97.5 °F (36.4 °C)  "  SpO2: 100%   Weight: 88.5 kg (195 lb)   Height: 185.4 cm (73\")     Constitutional: Appears well-developed and well-nourished. No distress.   ECOG: (1) Restricted in physically strenuous activity, ambulatory and able to do work of light nature  HENT:   Head: Normocephalic.   Mouth/Throat: Oropharynx is clear and moist.   Eyes: Conjunctivae are normal. Pupils are equal, round, and reactive. No scleral icterus.   Neck: Neck supple. No JVD present.   Cardiovascular: Normal rate, regular rhythm and normal heart sounds.    Pulmonary/Chest: Breath sounds normal. No respiratory distress.   Abdominal: Soft. Exhibits no distension. There is no tenderness.   Musculoskeletal:Exhibits no edema, tenderness or deformity.   Neurological: Alert and oriented to person, place, and time. Exhibits normal muscle tone.   Skin: No ecchymosis, no petechiae and no rash noted. Not diaphoretic. No cyanosis.   Psychiatric: Normal mood and affect.   Vitals reviewed.  Labs reviewed.    Lab Results   Component Value Date    HGB 10.8 (L) 08/05/2019    HCT 30.8 (L) 08/05/2019    MCV 88.4 08/05/2019     (L) 08/05/2019    WBC 4.10 08/05/2019    NEUTROABS 3.00 08/05/2019    LYMPHSABS 0.70 08/05/2019    MONOSABS 0.40 08/05/2019    EOSABS 0.31 06/17/2019    BASOSABS 0.04 06/17/2019       Lab Results   Component Value Date    GLUCOSE 114 (H) 08/05/2019    BUN 19 08/05/2019    CREATININE 1.80 (H) 08/05/2019     (L) 08/05/2019    K 4.7 08/05/2019     08/05/2019    CO2 22.0 08/05/2019    CALCIUM 9.3 08/05/2019    PROTEINTOT 7.1 08/05/2019    ALBUMIN 3.70 08/05/2019    BILITOT 0.7 08/05/2019    ALKPHOS 112 08/05/2019    AST 65 (H) 08/05/2019    ALT 56 (H) 08/05/2019         ASSESSMENT & PLAN:    1. Rectal carcinoma with no residual cancer after adjuvant Xeloda radiation but with protracted postoperative course  2. Hypotension  3. Renal insufficiency.  4. Protracted postoperative course due to pelvic abscess, resolved  5. Chemotherapy " associated nausea, resolved    Discussion: Tolerating FOLFOX fairly well, no complaints of nausea after this last course.  He does have some mild lightheadedness occasionally with position changes, he is a little hypotensive today with blood pressure 91/68, heart rate normal at 81.  He is on lisinopril 40 mg daily.  I will have him hold that for now and we will recheck his blood pressure Wednesday when he comes in for his bulb unhooked.  I have asked him to get back in with Dr. Mendoza to see if he needs to continue antihypertensive medication or not.  The patient has lost a fair amount of weight since being diagnosed and states he was able to come off of his metformin, now may not need antihypertensives.  Creatinine was up with last treatment, will keep a close eye on recheck before treating today but otherwise will not make any adjustments at this time.  I will repeat BMP next week to ensure it is not continuing to increase.  Per research protocol I will add CEA to labs today.  We will continue on with therapy unchanged today for cycle 3 of a planned 6 courses.  We plan on routine surveillance scans again in October.    That would go along with his CBC CMP and CEA for routine cancer surveillance.  I will see him back in our [place] office next Thursday prior to his fourth cycle.    Ana Self, APRN    08/19/2019

## 2019-08-26 ENCOUNTER — HOSPITAL ENCOUNTER (OUTPATIENT)
Dept: ONCOLOGY | Facility: HOSPITAL | Age: 54
Setting detail: INFUSION SERIES
Discharge: HOME | End: 2019-08-26

## 2019-08-26 VITALS
WEIGHT: 201 LBS | SYSTOLIC BLOOD PRESSURE: 140 MMHG | RESPIRATION RATE: 16 BRPM | HEIGHT: 73 IN | HEART RATE: 77 BPM | DIASTOLIC BLOOD PRESSURE: 73 MMHG | BODY MASS INDEX: 26.64 KG/M2 | TEMPERATURE: 97.9 F

## 2019-08-26 DIAGNOSIS — C20 RECTAL CARCINOMA (HCC): Primary | ICD-10-CM

## 2019-08-26 LAB
ALBUMIN SERPL-MCNC: 3.3 G/DL (ref 3.5–5.2)
ALBUMIN/GLOB SERPL: 1.1 G/DL
ALP SERPL-CCNC: 101 U/L (ref 39–117)
ALT SERPL W P-5'-P-CCNC: 25 U/L (ref 1–41)
ANION GAP SERPL CALCULATED.3IONS-SCNC: 13 MMOL/L (ref 5–15)
AST SERPL-CCNC: 44 U/L (ref 1–40)
BILIRUB SERPL-MCNC: 0.8 MG/DL (ref 0.2–1.2)
BUN BLD-MCNC: 12 MG/DL (ref 6–20)
BUN/CREAT SERPL: 11.1 (ref 7–25)
CALCIUM SPEC-SCNC: 8.8 MG/DL (ref 8.6–10.5)
CEA SERPL-MCNC: 1.8 NG/ML
CHLORIDE SERPL-SCNC: 103 MMOL/L (ref 98–107)
CO2 SERPL-SCNC: 23 MMOL/L (ref 22–29)
CREAT BLD-MCNC: 1.08 MG/DL (ref 0.76–1.27)
CREAT BLDA-MCNC: 1.1 MG/DL (ref 0.6–1.3)
ERYTHROCYTE [DISTWIDTH] IN BLOOD BY AUTOMATED COUNT: 17.5 % (ref 12.3–15.4)
GFR SERPL CREATININE-BSD FRML MDRD: 71 ML/MIN/1.73
GLOBULIN UR ELPH-MCNC: 3.1 GM/DL
GLUCOSE BLD-MCNC: 104 MG/DL (ref 65–99)
HCT VFR BLD AUTO: 28.5 % (ref 37.5–51)
HGB BLD-MCNC: 9.7 G/DL (ref 13–17.7)
LYMPHOCYTES # BLD AUTO: 0.5 10*3/MM3 (ref 0.7–3.1)
LYMPHOCYTES NFR BLD AUTO: 22.6 % (ref 19.6–45.3)
MCH RBC QN AUTO: 32.1 PG (ref 26.6–33)
MCHC RBC AUTO-ENTMCNC: 34 G/DL (ref 31.5–35.7)
MCV RBC AUTO: 94.4 FL (ref 79–97)
MONOCYTES # BLD AUTO: 0.3 10*3/MM3 (ref 0.1–0.9)
MONOCYTES NFR BLD AUTO: 16.2 % (ref 5–12)
NEUTROPHILS # BLD AUTO: 1.2 10*3/MM3 (ref 1.7–7)
NEUTROPHILS NFR BLD AUTO: 61.2 % (ref 42.7–76)
PLATELET # BLD AUTO: 88 10*3/MM3 (ref 140–450)
PMV BLD AUTO: 6.2 FL (ref 6–12)
POTASSIUM BLD-SCNC: 4.4 MMOL/L (ref 3.5–5.2)
PROT SERPL-MCNC: 6.4 G/DL (ref 6–8.5)
RBC # BLD AUTO: 3.02 10*6/MM3 (ref 4.14–5.8)
SODIUM BLD-SCNC: 139 MMOL/L (ref 136–145)
WBC NRBC COR # BLD: 2 10*3/MM3 (ref 3.4–10.8)

## 2019-08-26 PROCEDURE — 80053 COMPREHEN METABOLIC PANEL: CPT | Performed by: NURSE PRACTITIONER

## 2019-08-26 PROCEDURE — 96368 THER/DIAG CONCURRENT INF: CPT

## 2019-08-26 PROCEDURE — 82378 CARCINOEMBRYONIC ANTIGEN: CPT | Performed by: INTERNAL MEDICINE

## 2019-08-26 PROCEDURE — 25010000002 PALONOSETRON 0.25 MG/5ML SOLUTION PREFILLED SYRINGE: Performed by: NURSE PRACTITIONER

## 2019-08-26 PROCEDURE — 96409 CHEMO IV PUSH SNGL DRUG: CPT

## 2019-08-26 PROCEDURE — 96416 CHEMO PROLONG INFUSE W/PUMP: CPT

## 2019-08-26 PROCEDURE — 96415 CHEMO IV INFUSION ADDL HR: CPT

## 2019-08-26 PROCEDURE — 96411 CHEMO IV PUSH ADDL DRUG: CPT

## 2019-08-26 PROCEDURE — 25010000002 LEUCOVORIN CALCIUM PER 50 MG: Performed by: NURSE PRACTITIONER

## 2019-08-26 PROCEDURE — 25010000002 LEUCOVORIN 500 MG RECONSTITUTED SOLUTION 1 EACH VIAL: Performed by: NURSE PRACTITIONER

## 2019-08-26 PROCEDURE — 96549 UNLISTED CHEMOTHERAPY PX: CPT

## 2019-08-26 PROCEDURE — 25010000002 OXALIPLATIN PER 0.5 MG: Performed by: NURSE PRACTITIONER

## 2019-08-26 PROCEDURE — 85025 COMPLETE CBC W/AUTO DIFF WBC: CPT | Performed by: NURSE PRACTITIONER

## 2019-08-26 PROCEDURE — 96375 TX/PRO/DX INJ NEW DRUG ADDON: CPT

## 2019-08-26 PROCEDURE — 82565 ASSAY OF CREATININE: CPT

## 2019-08-26 PROCEDURE — 96413 CHEMO IV INFUSION 1 HR: CPT

## 2019-08-26 PROCEDURE — 25010000002 DEXAMETHASONE PER 1 MG: Performed by: NURSE PRACTITIONER

## 2019-08-26 PROCEDURE — 25010000002 FLUOROURACIL PER 500 MG: Performed by: NURSE PRACTITIONER

## 2019-08-26 RX ORDER — DEXTROSE MONOHYDRATE 50 MG/ML
250 INJECTION, SOLUTION INTRAVENOUS ONCE
Status: COMPLETED | OUTPATIENT
Start: 2019-08-26 | End: 2019-08-26

## 2019-08-26 RX ORDER — PALONOSETRON 0.05 MG/ML
0.25 INJECTION, SOLUTION INTRAVENOUS ONCE
Status: COMPLETED | OUTPATIENT
Start: 2019-08-26 | End: 2019-08-26

## 2019-08-26 RX ORDER — FLUOROURACIL 50 MG/ML
400 INJECTION, SOLUTION INTRAVENOUS ONCE
Status: COMPLETED | OUTPATIENT
Start: 2019-08-26 | End: 2019-08-26

## 2019-08-26 RX ADMIN — FLUOROURACIL 5000 MG: 50 INJECTION, SOLUTION INTRAVENOUS at 15:27

## 2019-08-26 RX ADMIN — DEXAMETHASONE SODIUM PHOSPHATE 12 MG: 4 INJECTION, SOLUTION INTRAMUSCULAR; INTRAVENOUS at 12:28

## 2019-08-26 RX ADMIN — FLUOROURACIL 840 MG: 50 INJECTION, SOLUTION INTRAVENOUS at 15:12

## 2019-08-26 RX ADMIN — OXALIPLATIN 180 MG: 5 INJECTION, SOLUTION, CONCENTRATE INTRAVENOUS at 12:57

## 2019-08-26 RX ADMIN — LEUCOVORIN CALCIUM 840 MG: 100 INJECTION, POWDER, LYOPHILIZED, FOR SUSPENSION INTRAMUSCULAR; INTRAVENOUS at 12:57

## 2019-08-26 RX ADMIN — DEXTROSE MONOHYDRATE 250 ML: 50 INJECTION, SOLUTION INTRAVENOUS at 12:26

## 2019-08-26 RX ADMIN — PALONOSETRON 0.25 MG: 0.25 INJECTION, SOLUTION INTRAVENOUS at 12:19

## 2019-08-28 ENCOUNTER — INFUSION (OUTPATIENT)
Dept: ONCOLOGY | Facility: HOSPITAL | Age: 54
End: 2019-08-28

## 2019-08-28 VITALS
TEMPERATURE: 98 F | DIASTOLIC BLOOD PRESSURE: 67 MMHG | WEIGHT: 206.8 LBS | SYSTOLIC BLOOD PRESSURE: 141 MMHG | BODY MASS INDEX: 27.28 KG/M2 | RESPIRATION RATE: 16 BRPM | HEART RATE: 78 BPM

## 2019-08-28 DIAGNOSIS — C20 RECTAL CARCINOMA (HCC): Primary | ICD-10-CM

## 2019-08-28 DIAGNOSIS — C20 RECTAL CANCER (HCC): ICD-10-CM

## 2019-08-28 PROCEDURE — 96523 IRRIG DRUG DELIVERY DEVICE: CPT

## 2019-08-28 RX ADMIN — HEPARIN 500 UNITS: 100 SYRINGE at 13:30

## 2019-09-09 ENCOUNTER — APPOINTMENT (OUTPATIENT)
Dept: ONCOLOGY | Facility: HOSPITAL | Age: 54
End: 2019-09-09

## 2019-09-09 ENCOUNTER — LAB (OUTPATIENT)
Dept: LAB | Facility: HOSPITAL | Age: 54
End: 2019-09-09

## 2019-09-09 ENCOUNTER — OFFICE VISIT (OUTPATIENT)
Dept: ONCOLOGY | Facility: CLINIC | Age: 54
End: 2019-09-09

## 2019-09-09 VITALS
DIASTOLIC BLOOD PRESSURE: 76 MMHG | RESPIRATION RATE: 16 BRPM | TEMPERATURE: 98.8 F | HEART RATE: 82 BPM | BODY MASS INDEX: 27.17 KG/M2 | HEIGHT: 73 IN | SYSTOLIC BLOOD PRESSURE: 151 MMHG | WEIGHT: 205 LBS | OXYGEN SATURATION: 100 %

## 2019-09-09 DIAGNOSIS — C20 RECTAL CARCINOMA (HCC): ICD-10-CM

## 2019-09-09 DIAGNOSIS — I10 ESSENTIAL HYPERTENSION: ICD-10-CM

## 2019-09-09 DIAGNOSIS — T45.1X5A CHEMOTHERAPY-INDUCED THROMBOCYTOPENIA: ICD-10-CM

## 2019-09-09 DIAGNOSIS — N28.9 RENAL INSUFFICIENCY: ICD-10-CM

## 2019-09-09 DIAGNOSIS — D69.59 CHEMOTHERAPY-INDUCED THROMBOCYTOPENIA: ICD-10-CM

## 2019-09-09 LAB
ALBUMIN SERPL-MCNC: 3.3 G/DL (ref 3.5–5.2)
ALBUMIN/GLOB SERPL: 0.9 G/DL
ALP SERPL-CCNC: 115 U/L (ref 39–117)
ALT SERPL W P-5'-P-CCNC: 27 U/L (ref 1–41)
ANION GAP SERPL CALCULATED.3IONS-SCNC: 12 MMOL/L (ref 5–15)
AST SERPL-CCNC: 51 U/L (ref 1–40)
BILIRUB SERPL-MCNC: 1.6 MG/DL (ref 0.2–1.2)
BUN BLD-MCNC: 10 MG/DL (ref 6–20)
BUN/CREAT SERPL: 10.6 (ref 7–25)
CALCIUM SPEC-SCNC: 9.4 MG/DL (ref 8.6–10.5)
CHLORIDE SERPL-SCNC: 105 MMOL/L (ref 98–107)
CO2 SERPL-SCNC: 25 MMOL/L (ref 22–29)
CREAT BLD-MCNC: 0.94 MG/DL (ref 0.76–1.27)
ERYTHROCYTE [DISTWIDTH] IN BLOOD BY AUTOMATED COUNT: 16.6 % (ref 12.3–15.4)
GFR SERPL CREATININE-BSD FRML MDRD: 83 ML/MIN/1.73
GLOBULIN UR ELPH-MCNC: 3.6 GM/DL
GLUCOSE BLD-MCNC: 127 MG/DL (ref 65–99)
HCT VFR BLD AUTO: 30.6 % (ref 37.5–51)
HGB BLD-MCNC: 10 G/DL (ref 13–17.7)
LYMPHOCYTES # BLD AUTO: 0.5 10*3/MM3 (ref 0.7–3.1)
LYMPHOCYTES NFR BLD AUTO: 21.3 % (ref 19.6–45.3)
MCH RBC QN AUTO: 31 PG (ref 26.6–33)
MCHC RBC AUTO-ENTMCNC: 32.7 G/DL (ref 31.5–35.7)
MCV RBC AUTO: 94.8 FL (ref 79–97)
MONOCYTES # BLD AUTO: 0.4 10*3/MM3 (ref 0.1–0.9)
MONOCYTES NFR BLD AUTO: 16.2 % (ref 5–12)
NEUTROPHILS # BLD AUTO: 1.6 10*3/MM3 (ref 1.7–7)
NEUTROPHILS NFR BLD AUTO: 62.5 % (ref 42.7–76)
PLATELET # BLD AUTO: 61 10*3/MM3 (ref 140–450)
PMV BLD AUTO: 7.3 FL (ref 6–12)
POTASSIUM BLD-SCNC: 4.2 MMOL/L (ref 3.5–5.2)
PROT SERPL-MCNC: 6.9 G/DL (ref 6–8.5)
RBC # BLD AUTO: 3.23 10*6/MM3 (ref 4.14–5.8)
SODIUM BLD-SCNC: 142 MMOL/L (ref 136–145)
WBC NRBC COR # BLD: 2.5 10*3/MM3 (ref 3.4–10.8)

## 2019-09-09 PROCEDURE — 80053 COMPREHEN METABOLIC PANEL: CPT

## 2019-09-09 PROCEDURE — 85025 COMPLETE CBC W/AUTO DIFF WBC: CPT

## 2019-09-09 PROCEDURE — 99214 OFFICE O/P EST MOD 30 MIN: CPT | Performed by: NURSE PRACTITIONER

## 2019-09-09 PROCEDURE — 36415 COLL VENOUS BLD VENIPUNCTURE: CPT

## 2019-09-09 RX ORDER — PALONOSETRON 0.05 MG/ML
0.25 INJECTION, SOLUTION INTRAVENOUS ONCE
Status: CANCELLED | OUTPATIENT
Start: 2019-09-16

## 2019-09-09 RX ORDER — FLUOROURACIL 50 MG/ML
400 INJECTION, SOLUTION INTRAVENOUS ONCE
Status: CANCELLED | OUTPATIENT
Start: 2019-09-16

## 2019-09-09 RX ORDER — DIPHENHYDRAMINE HYDROCHLORIDE 50 MG/ML
50 INJECTION INTRAMUSCULAR; INTRAVENOUS AS NEEDED
Status: CANCELLED | OUTPATIENT
Start: 2019-09-16

## 2019-09-09 RX ORDER — FAMOTIDINE 10 MG/ML
20 INJECTION, SOLUTION INTRAVENOUS AS NEEDED
Status: CANCELLED | OUTPATIENT
Start: 2019-09-16

## 2019-09-09 RX ORDER — DEXTROSE MONOHYDRATE 50 MG/ML
250 INJECTION, SOLUTION INTRAVENOUS ONCE
Status: CANCELLED | OUTPATIENT
Start: 2019-09-16

## 2019-09-09 NOTE — PROGRESS NOTES
CHIEF COMPLAINT: 1.  Management of adjuvant therapy rectal carcinoma    Problem List:  Oncology/Hematology History    1. Stage IIIB rectal carcinoma clinical T3b N1 aM0  2. Protracted postoperative course due to abdominal pelvic abscess following neoadjuvant Xeloda radiation  -11/30/18 CT abdomen pelvis and chest x-ray negative for metastasis.  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    -12/6/18 MRI of rectum showed T3b with suspicious right internal iliac lymph node and CT chest noncontrast negative except for gallstones  -Per Dr. Kelly, CEA was normal.  -12/18/18 saw me for the first time.  I reviewed her case in our multidisciplinary conference and went over that in detail with her.  She had presented with hematochezia.  Dr. Kelly repeated endoscopy for more exact detailing of the primary location and this appears to be rectal on MRI and endoscopy.  Plan is for randomization on clinical trial N 1048 to neoadjuvant FOLFOX versus standard chemoradiation.  We'll get him to radiation and our research staff for randomization and get his baseline CBC and CMP.    -1/4/19 followed up: Randomized to the Xeloda radiation arm.  He will get that and then 4-6 weeks later had his surgery, and then follow that with 6 months of adjuvant FOLFOX per protocol.  My research assistant met with him today.  He has a Xeloda prescription.  -3/4/2019 MRI: Good response with T2, less likely T3 with spiculation N0 disease.  -4/23/2019 pathology: Laparoscopic assisted converted to lower anterior resection open with stapled coloanal anastomosis and diverting loop ileostomy with liver biopsy showed residual adenoma with focal high-grade dysplasia but no residual invasive carcinoma.  0 out of 17 lymph nodes.  Mild steatosis on liver biopsy with bridging fibrosis.  -5/9/2019 to 5/15/2019 admitted with abdominal pelvic abscess status post CT-guided percutaneous drain placement  presenting with sepsis improved with antibiotics and drainage.  Temp of 104 on presentation.  2 weeks of protracted IV antibiotics with Radames Sosa planned at discharge.  -7/11/2019 follow-up visit:Start of adjuvant therapy delayed due to the above postoperative pelvic abscess.  This was treated surgically initially but then by CT-guided percutaneous drainage as above.  Serial follow-up with Radames Sosa including CTs and antibiotics eventually cleared him adequately to consider resumption of plans for adjuvant therapy.  CTs of the pelvis done on 6/5/2019, 6/13/2019, 6/26/2019 monitoring for this abscess and possible drainage but not able to drain due to decreasing size and minimal residual edema with CT 7/10/2019 showingStable to slight decrease in size of presacral perirectal abscess with presacral edema measuring 3.9 x 1.9 previously 4.1 x 2.5 cm. No new sites of focal fluid collection or loculated fluid.  Hence, modified FOLFOX 6 started at this junction.        Rectal carcinoma (CMS/HCC)    12/18/2018 Initial Diagnosis     Rectal carcinoma (CMS/HCC)         1/7/2019 - 2/4/2019 Chemotherapy     Xeloda radiation on protocol         1/8/2019 - 2/14/2019 Radiation     Radiation OncologyTreatment Course:  Hesham Arevalo received 5040 cGy in 28 fractions to pelvis via External Beam Radiation - EBRT.         3/4/2019 Imaging     MRI of the pelvis:  IMPRESSIONS:  MRI rectal cancer T category is: T2, LESS LIKELY EARLY T3 SPICULATIONS  Maximum EMD of invasion is: 0  Minimum tumor to MRF distance is: 12 MM  Low rectal tumor component: NO  Mesorectal nodes/tumor deposits: NO  EMVI: NO  Extramesorectal nodes: NO         4/23/2019 Surgery     Surgery rectosigmoidectomy pathology report:  Final Diagnosis    1. RECTOSIGMOID COLON, RECTOSIGMOID RESECTION:  Residual adenoma with focal high grade dysplasia with no residual invasive carcinoma identified post-treatment (see comment).  Seventeen lymph nodes negative for  carcinoma (0/17)   2. LIVER, CORE NEEDLE BIOPSY:  Mild steatosis with mild chronic inflammation and increased fibrosis including bridging fibrosis (Stage 3-4) (see comment)        COLON AND RECTUM TEMPLATE:  TYPE OF SPECIMEN/PROCEDURE: Rectosigmoid resection.   SPECIMEN INTEGRITY:  Intact.  TUMOR SITE:  Rectum.  TUMOR SIZE (greatest dimension):  Indeterminate  TUMOR LOCATION (applicable only to rectal primaries): Entirely below anterior peritoneal reflection.  MACROSCOPIC INTACTNESS OF MESORECTUM (If applicable): Intact.  MACROSCOPIC TUMOR PERFORATION: Not identified.  TUMOR CONFIGURATION:  Ulcerated.  HISTOLOGIC TYPE:  Adenocarcinoma.  HISTOLOGIC GRADE:  G3-4 (per previous biopsy).  MICROSCOPIC DEPTH OF TUMOR INVASION/EXTENSION:  No residual tumor identified.  PERITONEAL INVOLVEMENT:  Not identified.  LYMPHVASCULAR INVASION:  Not identified.  PERINEURAL INVASION: Not identified.  SURGICAL MARGINS: Uninvolved.  STATUS AND DISTANCE FROM PROXIMAL MUCOSAL MARGIN:  Uninvolved, 13.0 cm.  STATUS AND DISTANCE FROM DISTAL MUCOSAL MARGIN: Uninvolved, 1.2 cm.  STATUS AND DISTANCE FROM MESENTERIC MARGIN: Not applicable.  STATUS AND DISTANCE FROM RADIAL MARGIN: Uninvolved, 1.7 cm.  DISTANCE FROM DENTATE LINE (RECTAL TUMOR ONLY):  Indeterminate.  TUMOR DEPOSITS (DISCONTINUOUS EXTRAMURAL EXTENSION):  Not identified.  NUMBER OF LYMPH NODES INVOLVED BY METASTATIC NEOPLASM: 0.  NUMBER OF REGIONAL LYMPH NODES EXAMINED: 17.  EXTRANODAL EXTENSION:  Not applicable.  TREATMENT EFFECT:  Present, no viable cancer cells identified (complete response, score 0).  ADDITIONAL PATHOLOGIC FINDINGS:  None.  MSI TESTING:  No evidence of MSI based on reported IHC on outside biopsy  OTHER ANCILLARY STUDIES (BRAF, KRAS, ETC.):  Should be performed on initial diagnostic biopsy if needed.  AJCC PATHOLOGIC STAGE:  (COMPLETED BY PATHOLOGIST, BASED ONLY ON TISSUE FINDINGS, MORE EXTENSIVE DISEASE MAY NOT BE KNOWN TO THE PATHOLOGIST)  ypT=  0  ypN=  0  AJCC  PATHOLOGIC STAGE:  y0.                    5/15/2019 Adverse Reaction     -5/9/2019 to 5/15/2019 admitted with abdominal pelvic abscess status post CT-guided percutaneous drain placement presenting with sepsis improved with antibiotics and drainage.  Temp of 104 on presentation.  2 weeks of protracted IV antibiotics with Radames Sosa planned at discharge         7/22/2019 -  Chemotherapy     OP COLON [id number] OXALIplatin / Leucovorin / Fluorouracil  Start of adjuvant therapy delayed due to the above postoperative pelvic abscess.  This was treated surgically initially but then by CT-guided percutaneous drainage as above.  Serial follow-up with Radames Sosa including CTs and antibiotics eventually cleared him adequately to consider resumption of plans for adjuvant therapy.  CTs of the pelvis done on 6/5/2019, 6/13/2019, 6/26/2019 monitoring for this abscess and possible drainage but not able to drain due to decreasing size and minimal residual edema with CT 7/10/2019 showingStable to slight decrease in size of presacral perirectal abscess with presacral edema measuring 3.9 x 1.9 previously 4.1 x 2.5 cm. No new sites of focal fluid collection or loculated fluid.            HISTORY OF PRESENT ILLNESS:  The patient is a 53 y.o. male, here for follow up on management of rectal carcinoma with delayed postoperative adjuvant therapy due to pelvic abscess postoperatively following neoadjuvant Xeloda radiation on clinical trial, now on adjuvant FOLFOX.  The patient is tolerating therapy well.  His blood pressure had been running low and he has been holding his lisinopril, no longer having any lightheadedness when standing.  Blood pressure this morning 151/76.  States he is feeling much better and having more energy.  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  No significant neuropathies.  States ostomy output is normal.  No nausea.  We had to delay his last treatment due to  thrombocytopenia, no bleeding issues.  We are also monitoring renal function closely as early in August it had increased but has since returned to normal.    Past Medical History:   Diagnosis Date   • Arthritis     knees    • Cancer (CMS/HCC) 11/2018    colon   • Disease of thyroid gland    • Fatty liver    • Fatty liver    • Hashimoto's disease    • Hypertension    • Psoriasis    • Wears glasses      Past Surgical History:   Procedure Laterality Date   • COLON RESECTION N/A 4/23/2019    Procedure: LAPAROSCOPIC LOWER ANTERIOR RESECTION WITH DIVERTING LOOP ILEOOSTOMY, SPLENIC FLEIXURE MOBILIZATION AND LIVER BIOPSY, AND PROCTOSCOPY;  Surgeon: Pau Kelly MD;  Location:  [facility];  Service: General   • COLONOSCOPY      2018   • ILEOSTOMY  04/2019   • LIVER BIOPSY  2003   • VASECTOMY  1998   • VENOUS ACCESS DEVICE (PORT) INSERTION N/A 7/18/2019    Procedure: PORT PLACEMENT;  Surgeon: Franky Cazares MD;  Location:  [facility];  Service: General       No Known Allergies    Family History and Social History reviewed and changed as necessary      REVIEW OF SYSTEM:   Review of Systems   Constitutional: Negative for appetite change, chills, diaphoresis, fever and unexpected weight change. Positive for mild fatigue.  HENT:   Negative for mouth sores, sore throat and trouble swallowing.    Eyes: Negative for icterus.   Respiratory: Negative for cough, hemoptysis and shortness of breath.    Cardiovascular: Negative for chest pain, leg swelling and palpitations.   Gastrointestinal: Negative for abdominal distention, abdominal pain, blood in stool, constipation, diarrhea.    Endocrine: Negative for hot flashes.   Genitourinary: Negative for bladder incontinence, difficulty urinating, dysuria, frequency and hematuria.    Musculoskeletal: Negative for gait problem, neck pain and neck stiffness.   Skin: Negative for rash.   Neurological: Negative for dizziness, gait problem, headaches.     Hematological: Negative for  "adenopathy. Does not bruise/bleed easily.   Psychiatric/Behavioral: Negative for depression. The patient is not nervous/anxious.    All other systems reviewed and are negative.       PHYSICAL EXAM    Vitals:    09/09/19 0837   BP: 151/76   Pulse: 82   Resp: 16   Temp: 98.8 °F (37.1 °C)   SpO2: 100%   Weight: 93 kg (205 lb)   Height: 185.4 cm (73\")     Constitutional: Appears well-developed and well-nourished. No distress.   ECOG: (1) Restricted in physically strenuous activity, ambulatory and able to do work of light nature  HENT:   Head: Normocephalic.   Mouth/Throat: Oropharynx is clear and moist.   Eyes: Conjunctivae are normal. Pupils are equal, round, and reactive. No scleral icterus.   Neck: Neck supple. No JVD present.   Cardiovascular: Normal rate, regular rhythm and normal heart sounds.    Pulmonary/Chest: Breath sounds normal. No respiratory distress.   Abdominal: Soft. Exhibits no distension. There is no tenderness.   Musculoskeletal:Exhibits no edema, tenderness or deformity.   Neurological: Alert and oriented to person, place, and time. Exhibits normal muscle tone.   Skin: No ecchymosis, no petechiae and no rash noted. Not diaphoretic. No cyanosis.   Psychiatric: Normal mood and affect.   Vitals reviewed.  Labs reviewed.    Lab Results   Component Value Date    HGB 9.7 (L) 08/26/2019    HCT 28.5 (L) 08/26/2019    MCV 94.4 08/26/2019    PLT 88 (L) 08/26/2019    WBC 2.00 (L) 08/26/2019    NEUTROABS 1.20 (L) 08/26/2019    LYMPHSABS 0.50 (L) 08/26/2019    MONOSABS 0.30 08/26/2019    EOSABS 0.31 06/17/2019    BASOSABS 0.04 06/17/2019       Lab Results   Component Value Date    GLUCOSE 104 (H) 08/26/2019    BUN 12 08/26/2019    CREATININE 1.10 08/26/2019     08/26/2019    K 4.4 08/26/2019     08/26/2019    CO2 23.0 08/26/2019    CALCIUM 8.8 08/26/2019    PROTEINTOT 6.4 08/26/2019    ALBUMIN 3.30 (L) 08/26/2019    BILITOT 0.8 08/26/2019    ALKPHOS 101 08/26/2019    AST 44 (H) 08/26/2019    ALT 25 " 08/26/2019         ASSESSMENT & PLAN:    1. Rectal carcinoma with no residual cancer after adjuvant Xeloda radiation but with protracted postoperative course  2. Hypotension, resolved  3. Renal insufficiency, improved.  4. Protracted postoperative course due to pelvic abscess, resolved  5.  Thrombocytopenia    Discussion: Tolerating FOLFOX well.  No lingering neuropathies.  No longer having any nausea.  Energy level is improving.  His lightheadedness has resolved after holding his lisinopril.  Blood pressure had been running in the 90s, today is 151/76.  I asked him to get in touch with Dr. Mendoza, he may need to go back on 20 mg daily dosage.  Creatinine was up early August, since has returned to normal, we will continue to monitor closely.  Per research protocol I will add CEA to labs today, was normal on 8/26 at 1.8.  We will continue on with therapy unchanged today for cycle 4 of a planned 6 courses if his counts allow.  We did have to delay the last treatment due to thrombocytopenia.  We plan on routine surveillance scans again in October.    I will see him back in 2 weeks for follow-up.    Interim: Patient CBC returned with platelet count of 61,000, ANC 1600.  Due to thrombocytopenia we will hold treatment this week and reschedule for 1 week, we will repeat CBC next week and will resume treatment if counts are adequate.    Ana Self, APRN    09/09/2019

## 2019-09-16 ENCOUNTER — DOCUMENTATION (OUTPATIENT)
Dept: NUTRITION | Facility: HOSPITAL | Age: 54
End: 2019-09-16

## 2019-09-16 ENCOUNTER — HOSPITAL ENCOUNTER (OUTPATIENT)
Dept: ONCOLOGY | Facility: HOSPITAL | Age: 54
Setting detail: INFUSION SERIES
Discharge: HOME | End: 2019-09-16

## 2019-09-16 VITALS
SYSTOLIC BLOOD PRESSURE: 148 MMHG | WEIGHT: 200 LBS | RESPIRATION RATE: 20 BRPM | HEIGHT: 73 IN | TEMPERATURE: 98.4 F | BODY MASS INDEX: 26.51 KG/M2 | HEART RATE: 78 BPM | DIASTOLIC BLOOD PRESSURE: 79 MMHG

## 2019-09-16 DIAGNOSIS — C20 RECTAL CANCER (HCC): ICD-10-CM

## 2019-09-16 DIAGNOSIS — C20 RECTAL CARCINOMA (HCC): Primary | ICD-10-CM

## 2019-09-16 LAB
ALBUMIN SERPL-MCNC: 3.3 G/DL (ref 3.5–5.2)
ALBUMIN/GLOB SERPL: 0.9 G/DL
ALP SERPL-CCNC: 166 U/L (ref 39–117)
ALT SERPL W P-5'-P-CCNC: 28 U/L (ref 1–41)
ANION GAP SERPL CALCULATED.3IONS-SCNC: 11 MMOL/L (ref 5–15)
AST SERPL-CCNC: 55 U/L (ref 1–40)
BILIRUB SERPL-MCNC: 1.3 MG/DL (ref 0.2–1.2)
BUN BLD-MCNC: 12 MG/DL (ref 6–20)
BUN/CREAT SERPL: 13.6 (ref 7–25)
CALCIUM SPEC-SCNC: 9.1 MG/DL (ref 8.6–10.5)
CEA SERPL-MCNC: 1.75 NG/ML
CHLORIDE SERPL-SCNC: 104 MMOL/L (ref 98–107)
CO2 SERPL-SCNC: 24 MMOL/L (ref 22–29)
CREAT BLD-MCNC: 0.88 MG/DL (ref 0.76–1.27)
CREAT BLDA-MCNC: 0.9 MG/DL
CREAT BLDA-MCNC: 0.9 MG/DL (ref 0.6–1.3)
ERYTHROCYTE [DISTWIDTH] IN BLOOD BY AUTOMATED COUNT: 16.1 % (ref 12.3–15.4)
GFR SERPL CREATININE-BSD FRML MDRD: 90 ML/MIN/1.73
GLOBULIN UR ELPH-MCNC: 3.8 GM/DL
GLUCOSE BLD-MCNC: 143 MG/DL (ref 65–99)
HCT VFR BLD AUTO: 31 % (ref 37.5–51)
HGB BLD-MCNC: 10.2 G/DL (ref 13–17.7)
LYMPHOCYTES # BLD AUTO: 0.5 10*3/MM3 (ref 0.7–3.1)
LYMPHOCYTES NFR BLD AUTO: 22.6 % (ref 19.6–45.3)
MCH RBC QN AUTO: 31.6 PG (ref 26.6–33)
MCHC RBC AUTO-ENTMCNC: 33 G/DL (ref 31.5–35.7)
MCV RBC AUTO: 95.7 FL (ref 79–97)
MONOCYTES # BLD AUTO: 0.4 10*3/MM3 (ref 0.1–0.9)
MONOCYTES NFR BLD AUTO: 19.3 % (ref 5–12)
NEUTROPHILS # BLD AUTO: 1.2 10*3/MM3 (ref 1.7–7)
NEUTROPHILS NFR BLD AUTO: 58.1 % (ref 42.7–76)
PLATELET # BLD AUTO: 75 10*3/MM3 (ref 140–450)
PMV BLD AUTO: 7 FL (ref 6–12)
POTASSIUM BLD-SCNC: 4.1 MMOL/L (ref 3.5–5.2)
PROT SERPL-MCNC: 7.1 G/DL (ref 6–8.5)
RBC # BLD AUTO: 3.24 10*6/MM3 (ref 4.14–5.8)
SODIUM BLD-SCNC: 139 MMOL/L (ref 136–145)
WBC NRBC COR # BLD: 2 10*3/MM3 (ref 3.4–10.8)

## 2019-09-16 PROCEDURE — 80053 COMPREHEN METABOLIC PANEL: CPT | Performed by: NURSE PRACTITIONER

## 2019-09-16 PROCEDURE — 96416 CHEMO PROLONG INFUSE W/PUMP: CPT

## 2019-09-16 PROCEDURE — 25010000002 FLUOROURACIL PER 500 MG: Performed by: NURSE PRACTITIONER

## 2019-09-16 PROCEDURE — 25010000002 OXALIPLATIN PER 0.5 MG: Performed by: NURSE PRACTITIONER

## 2019-09-16 PROCEDURE — 96368 THER/DIAG CONCURRENT INF: CPT

## 2019-09-16 PROCEDURE — 96413 CHEMO IV INFUSION 1 HR: CPT

## 2019-09-16 PROCEDURE — 96367 TX/PROPH/DG ADDL SEQ IV INF: CPT

## 2019-09-16 PROCEDURE — 25010000002 LEUCOVORIN 500 MG RECONSTITUTED SOLUTION 1 EACH VIAL: Performed by: NURSE PRACTITIONER

## 2019-09-16 PROCEDURE — 96411 CHEMO IV PUSH ADDL DRUG: CPT

## 2019-09-16 PROCEDURE — 96417 CHEMO IV INFUS EACH ADDL SEQ: CPT

## 2019-09-16 PROCEDURE — 82378 CARCINOEMBRYONIC ANTIGEN: CPT | Performed by: NURSE PRACTITIONER

## 2019-09-16 PROCEDURE — 85025 COMPLETE CBC W/AUTO DIFF WBC: CPT | Performed by: NURSE PRACTITIONER

## 2019-09-16 PROCEDURE — 82565 ASSAY OF CREATININE: CPT

## 2019-09-16 PROCEDURE — 96375 TX/PRO/DX INJ NEW DRUG ADDON: CPT

## 2019-09-16 PROCEDURE — 25010000002 DEXAMETHASONE PER 1 MG: Performed by: NURSE PRACTITIONER

## 2019-09-16 PROCEDURE — 96415 CHEMO IV INFUSION ADDL HR: CPT

## 2019-09-16 PROCEDURE — 25010000002 PALONOSETRON 0.25 MG/5ML SOLUTION PREFILLED SYRINGE: Performed by: NURSE PRACTITIONER

## 2019-09-16 PROCEDURE — 25010000002 LEUCOVORIN CALCIUM PER 50 MG: Performed by: NURSE PRACTITIONER

## 2019-09-16 RX ORDER — PALONOSETRON 0.05 MG/ML
0.25 INJECTION, SOLUTION INTRAVENOUS ONCE
Status: COMPLETED | OUTPATIENT
Start: 2019-09-16 | End: 2019-09-16

## 2019-09-16 RX ORDER — DEXTROSE MONOHYDRATE 50 MG/ML
250 INJECTION, SOLUTION INTRAVENOUS ONCE
Status: COMPLETED | OUTPATIENT
Start: 2019-09-16 | End: 2019-09-16

## 2019-09-16 RX ORDER — FLUOROURACIL 50 MG/ML
400 INJECTION, SOLUTION INTRAVENOUS ONCE
Status: COMPLETED | OUTPATIENT
Start: 2019-09-16 | End: 2019-09-16

## 2019-09-16 RX ADMIN — FLUOROURACIL 840 MG: 50 INJECTION, SOLUTION INTRAVENOUS at 12:02

## 2019-09-16 RX ADMIN — DEXTROSE MONOHYDRATE 250 ML: 50 INJECTION, SOLUTION INTRAVENOUS at 09:31

## 2019-09-16 RX ADMIN — FLUOROURACIL 5000 MG: 50 INJECTION, SOLUTION INTRAVENOUS at 12:12

## 2019-09-16 RX ADMIN — PALONOSETRON 0.25 MG: 0.25 INJECTION, SOLUTION INTRAVENOUS at 09:41

## 2019-09-16 RX ADMIN — DEXAMETHASONE SODIUM PHOSPHATE 12 MG: 4 INJECTION, SOLUTION INTRAMUSCULAR; INTRAVENOUS at 09:31

## 2019-09-16 RX ADMIN — LEUCOVORIN CALCIUM 840 MG: 500 INJECTION, POWDER, LYOPHILIZED, FOR SOLUTION INTRAMUSCULAR; INTRAVENOUS at 09:43

## 2019-09-16 RX ADMIN — OXALIPLATIN 180 MG: 5 INJECTION, SOLUTION, CONCENTRATE INTRAVENOUS at 09:43

## 2019-09-16 NOTE — ADDENDUM NOTE
Encounter addended by: Rae Murphy RN on: 9/16/2019 1:03 PM   Actions taken: Charge Capture section accepted

## 2019-09-17 NOTE — PROGRESS NOTES
Onc Nutrition    Patient: Hesham Arevalo Jr.  YOB: 1965    Weight: 200#; stable x 8 weeks  Nutrition Symptoms: occasional heartburn/reflux like symptoms    Met with patient and his wife during his infusion appointment.  Overall patient reports tolerating his chemotherapy pretty well.  He states his appetite has been good and no recent weight changes reported.  He does complain of occasional reflux type symptoms.    Encouraged him to be eating smaller portions more often throughout the day to aid with symptom management.  Advised him to avoid fried/fatty/greasy and spicy/acidic/heavily seasoned foods to aid with reflux symptoms.  Also recommended he avoid laying flat after meals/snacks.  Written diet material provided to reinforce information discussed.    Answered their questions and both voiced understanding of information discussed.  Encouraged to call RD with questions.  Will follow up as indicated.  RD available to assist prn.    Gayatri Hill, DIGNA  09/17/19

## 2019-09-18 ENCOUNTER — INFUSION (OUTPATIENT)
Dept: ONCOLOGY | Facility: HOSPITAL | Age: 54
End: 2019-09-18

## 2019-09-18 DIAGNOSIS — C20 RECTAL CARCINOMA (HCC): ICD-10-CM

## 2019-09-18 DIAGNOSIS — C20 RECTAL CANCER (HCC): Primary | ICD-10-CM

## 2019-09-18 PROCEDURE — 96523 IRRIG DRUG DELIVERY DEVICE: CPT

## 2019-09-18 RX ADMIN — HEPARIN 500 UNITS: 100 SYRINGE at 10:15

## 2019-09-30 ENCOUNTER — APPOINTMENT (OUTPATIENT)
Dept: LAB | Facility: HOSPITAL | Age: 54
End: 2019-09-30

## 2019-09-30 ENCOUNTER — OFFICE VISIT (OUTPATIENT)
Dept: ONCOLOGY | Facility: CLINIC | Age: 54
End: 2019-09-30

## 2019-09-30 ENCOUNTER — HOSPITAL ENCOUNTER (OUTPATIENT)
Dept: ONCOLOGY | Facility: HOSPITAL | Age: 54
Setting detail: INFUSION SERIES
Discharge: HOME | End: 2019-09-30

## 2019-09-30 VITALS
WEIGHT: 198 LBS | HEIGHT: 73 IN | HEART RATE: 78 BPM | BODY MASS INDEX: 26.24 KG/M2 | OXYGEN SATURATION: 100 % | SYSTOLIC BLOOD PRESSURE: 160 MMHG | RESPIRATION RATE: 16 BRPM | TEMPERATURE: 98.1 F | DIASTOLIC BLOOD PRESSURE: 80 MMHG

## 2019-09-30 DIAGNOSIS — D69.6 THROMBOCYTOPENIA (HCC): Primary | ICD-10-CM

## 2019-09-30 DIAGNOSIS — C20 RECTAL CARCINOMA (HCC): ICD-10-CM

## 2019-09-30 DIAGNOSIS — C20 RECTAL CARCINOMA (HCC): Primary | ICD-10-CM

## 2019-09-30 LAB
ALBUMIN SERPL-MCNC: 3.3 G/DL (ref 3.5–5.2)
ALBUMIN/GLOB SERPL: 0.8 G/DL
ALP SERPL-CCNC: 135 U/L (ref 39–117)
ALT SERPL W P-5'-P-CCNC: 24 U/L (ref 1–41)
ANION GAP SERPL CALCULATED.3IONS-SCNC: 12 MMOL/L (ref 5–15)
AST SERPL-CCNC: 57 U/L (ref 1–40)
BILIRUB SERPL-MCNC: 1.4 MG/DL (ref 0.2–1.2)
BUN BLD-MCNC: 12 MG/DL (ref 6–20)
BUN/CREAT SERPL: 13.2 (ref 7–25)
CALCIUM SPEC-SCNC: 9.4 MG/DL (ref 8.6–10.5)
CHLORIDE SERPL-SCNC: 103 MMOL/L (ref 98–107)
CO2 SERPL-SCNC: 25 MMOL/L (ref 22–29)
CREAT BLD-MCNC: 0.91 MG/DL (ref 0.76–1.27)
CREAT BLDA-MCNC: 0.8 MG/DL (ref 0.6–1.3)
ERYTHROCYTE [DISTWIDTH] IN BLOOD BY AUTOMATED COUNT: 14.8 % (ref 12.3–15.4)
GFR SERPL CREATININE-BSD FRML MDRD: 86 ML/MIN/1.73
GLOBULIN UR ELPH-MCNC: 3.9 GM/DL
GLUCOSE BLD-MCNC: 133 MG/DL (ref 65–99)
HCT VFR BLD AUTO: 31 % (ref 37.5–51)
HGB BLD-MCNC: 10.3 G/DL (ref 13–17.7)
LYMPHOCYTES # BLD AUTO: 0.5 10*3/MM3 (ref 0.7–3.1)
LYMPHOCYTES NFR BLD AUTO: 22 % (ref 19.6–45.3)
MCH RBC QN AUTO: 31.5 PG (ref 26.6–33)
MCHC RBC AUTO-ENTMCNC: 33.3 G/DL (ref 31.5–35.7)
MCV RBC AUTO: 94.6 FL (ref 79–97)
MONOCYTES # BLD AUTO: 0.3 10*3/MM3 (ref 0.1–0.9)
MONOCYTES NFR BLD AUTO: 14.3 % (ref 5–12)
NEUTROPHILS # BLD AUTO: 1.3 10*3/MM3 (ref 1.7–7)
NEUTROPHILS NFR BLD AUTO: 63.7 % (ref 42.7–76)
PLATELET # BLD AUTO: 59 10*3/MM3 (ref 140–450)
PMV BLD AUTO: 7 FL (ref 6–12)
POTASSIUM BLD-SCNC: 3.9 MMOL/L (ref 3.5–5.2)
PROT SERPL-MCNC: 7.2 G/DL (ref 6–8.5)
RBC # BLD AUTO: 3.28 10*6/MM3 (ref 4.14–5.8)
SODIUM BLD-SCNC: 140 MMOL/L (ref 136–145)
WBC NRBC COR # BLD: 2.1 10*3/MM3 (ref 3.4–10.8)

## 2019-09-30 PROCEDURE — 96416 CHEMO PROLONG INFUSE W/PUMP: CPT

## 2019-09-30 PROCEDURE — 36415 COLL VENOUS BLD VENIPUNCTURE: CPT | Performed by: INTERNAL MEDICINE

## 2019-09-30 PROCEDURE — 85025 COMPLETE CBC W/AUTO DIFF WBC: CPT | Performed by: INTERNAL MEDICINE

## 2019-09-30 PROCEDURE — 25010000002 FLUOROURACIL PER 500 MG: Performed by: NURSE PRACTITIONER

## 2019-09-30 PROCEDURE — 99214 OFFICE O/P EST MOD 30 MIN: CPT | Performed by: NURSE PRACTITIONER

## 2019-09-30 PROCEDURE — 96409 CHEMO IV PUSH SNGL DRUG: CPT

## 2019-09-30 PROCEDURE — 96375 TX/PRO/DX INJ NEW DRUG ADDON: CPT

## 2019-09-30 PROCEDURE — 80053 COMPREHEN METABOLIC PANEL: CPT | Performed by: INTERNAL MEDICINE

## 2019-09-30 PROCEDURE — 25010000002 LEUCOVORIN CALCIUM PER 50 MG: Performed by: NURSE PRACTITIONER

## 2019-09-30 PROCEDURE — 82565 ASSAY OF CREATININE: CPT

## 2019-09-30 PROCEDURE — 25010000002 DEXAMETHASONE PER 1 MG: Performed by: NURSE PRACTITIONER

## 2019-09-30 PROCEDURE — 25010000002 LEUCOVORIN 200 MG RECONSTITUTED SOLUTION 200 MG VIAL: Performed by: NURSE PRACTITIONER

## 2019-09-30 PROCEDURE — 25010000002 LEUCOVORIN 500 MG RECONSTITUTED SOLUTION 1 EACH VIAL: Performed by: NURSE PRACTITIONER

## 2019-09-30 PROCEDURE — 96367 TX/PROPH/DG ADDL SEQ IV INF: CPT

## 2019-09-30 RX ORDER — DEXTROSE MONOHYDRATE 50 MG/ML
250 INJECTION, SOLUTION INTRAVENOUS ONCE
Status: COMPLETED | OUTPATIENT
Start: 2019-09-30 | End: 2019-09-30

## 2019-09-30 RX ORDER — DEXTROSE MONOHYDRATE 50 MG/ML
250 INJECTION, SOLUTION INTRAVENOUS ONCE
Status: CANCELLED | OUTPATIENT
Start: 2019-09-30

## 2019-09-30 RX ORDER — FAMOTIDINE 10 MG/ML
20 INJECTION, SOLUTION INTRAVENOUS AS NEEDED
Status: CANCELLED | OUTPATIENT
Start: 2019-09-30

## 2019-09-30 RX ORDER — FLUOROURACIL 50 MG/ML
400 INJECTION, SOLUTION INTRAVENOUS ONCE
Status: COMPLETED | OUTPATIENT
Start: 2019-09-30 | End: 2019-09-30

## 2019-09-30 RX ORDER — FAMOTIDINE 10 MG/ML
20 INJECTION, SOLUTION INTRAVENOUS AS NEEDED
Status: CANCELLED | OUTPATIENT
Start: 2019-10-21

## 2019-09-30 RX ORDER — FLUOROURACIL 50 MG/ML
400 INJECTION, SOLUTION INTRAVENOUS ONCE
Status: CANCELLED | OUTPATIENT
Start: 2019-09-30

## 2019-09-30 RX ORDER — FLUOROURACIL 50 MG/ML
400 INJECTION, SOLUTION INTRAVENOUS ONCE
Status: CANCELLED | OUTPATIENT
Start: 2019-10-21

## 2019-09-30 RX ORDER — DIPHENHYDRAMINE HYDROCHLORIDE 50 MG/ML
50 INJECTION INTRAMUSCULAR; INTRAVENOUS AS NEEDED
Status: CANCELLED | OUTPATIENT
Start: 2019-09-30

## 2019-09-30 RX ORDER — PALONOSETRON 0.05 MG/ML
0.25 INJECTION, SOLUTION INTRAVENOUS ONCE
Status: CANCELLED | OUTPATIENT
Start: 2019-09-30

## 2019-09-30 RX ORDER — DIPHENHYDRAMINE HYDROCHLORIDE 50 MG/ML
50 INJECTION INTRAMUSCULAR; INTRAVENOUS AS NEEDED
Status: CANCELLED | OUTPATIENT
Start: 2019-10-21

## 2019-09-30 RX ORDER — DEXTROSE MONOHYDRATE 50 MG/ML
250 INJECTION, SOLUTION INTRAVENOUS ONCE
Status: CANCELLED | OUTPATIENT
Start: 2019-10-21

## 2019-09-30 RX ADMIN — LEUCOVORIN CALCIUM 840 MG: 100 INJECTION, POWDER, LYOPHILIZED, FOR SUSPENSION INTRAMUSCULAR; INTRAVENOUS at 10:23

## 2019-09-30 RX ADMIN — DEXTROSE MONOHYDRATE 250 ML: 50 INJECTION, SOLUTION INTRAVENOUS at 09:44

## 2019-09-30 RX ADMIN — DEXAMETHASONE SODIUM PHOSPHATE 12 MG: 4 INJECTION, SOLUTION INTRAMUSCULAR; INTRAVENOUS at 09:45

## 2019-09-30 RX ADMIN — FLUOROURACIL 840 MG: 50 INJECTION, SOLUTION INTRAVENOUS at 10:59

## 2019-09-30 RX ADMIN — FLUOROURACIL 5000 MG: 50 INJECTION, SOLUTION INTRAVENOUS at 11:06

## 2019-09-30 NOTE — PROGRESS NOTES
CHIEF COMPLAINT: 1.  Thrombocytopenia  2.  Management of adjuvant therapy rectal carcinoma    Problem List:  Oncology/Hematology History    1. Stage IIIB rectal carcinoma clinical T3b N1 aM0  2. Protracted postoperative course due to abdominal pelvic abscess following neoadjuvant Xeloda radiation  -11/30/18 CT abdomen pelvis and chest x-ray negative for metastasis.  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    -12/6/18 MRI of rectum showed T3b with suspicious right internal iliac lymph node and CT chest noncontrast negative except for gallstones  -Per Dr. Kelly, CEA was normal.  -12/18/18 saw me for the first time.  I reviewed her case in our multidisciplinary conference and went over that in detail with her.  She had presented with hematochezia.  Dr. Kelly repeated endoscopy for more exact detailing of the primary location and this appears to be rectal on MRI and endoscopy.  Plan is for randomization on clinical trial N 1048 to neoadjuvant FOLFOX versus standard chemoradiation.  We'll get him to radiation and our research staff for randomization and get his baseline CBC and CMP.    -1/4/19 followed up: Randomized to the Xeloda radiation arm.  He will get that and then 4-6 weeks later had his surgery, and then follow that with 6 months of adjuvant FOLFOX per protocol.  My research assistant met with him today.  He has a Xeloda prescription.  -3/4/2019 MRI: Good response with T2, less likely T3 with spiculation N0 disease.  -4/23/2019 pathology: Laparoscopic assisted converted to lower anterior resection open with stapled coloanal anastomosis and diverting loop ileostomy with liver biopsy showed residual adenoma with focal high-grade dysplasia but no residual invasive carcinoma.  0 out of 17 lymph nodes.  Mild steatosis on liver biopsy with bridging fibrosis.  -5/9/2019 to 5/15/2019 admitted with abdominal pelvic abscess status post CT-guided  percutaneous drain placement presenting with sepsis improved with antibiotics and drainage.  Temp of 104 on presentation.  2 weeks of protracted IV antibiotics with Radames Sosa planned at discharge.  -7/11/2019 follow-up visit:Start of adjuvant therapy delayed due to the above postoperative pelvic abscess.  This was treated surgically initially but then by CT-guided percutaneous drainage as above.  Serial follow-up with Radames Sosa including CTs and antibiotics eventually cleared him adequately to consider resumption of plans for adjuvant therapy.  CTs of the pelvis done on 6/5/2019, 6/13/2019, 6/26/2019 monitoring for this abscess and possible drainage but not able to drain due to decreasing size and minimal residual edema with CT 7/10/2019 showingStable to slight decrease in size of presacral perirectal abscess with presacral edema measuring 3.9 x 1.9 previously 4.1 x 2.5 cm. No new sites of focal fluid collection or loculated fluid.  Hence, modified FOLFOX 6 started at this junction.  -9/30/2019 office visit: Oxaliplatin discontinued due to ongoing thrombocytopenia.  Platelet count today 59,000.  We will continue course #5 and 6 with 5-FU and leucovorin alone to complete adjuvant therapy.        Rectal carcinoma (CMS/HCC)    12/18/2018 Initial Diagnosis     Rectal carcinoma (CMS/HCC)         1/7/2019 - 2/4/2019 Chemotherapy     Xeloda radiation on protocol         1/8/2019 - 2/14/2019 Radiation     Radiation OncologyTreatment Course:  Hesham Arevalo received 5040 cGy in 28 fractions to pelvis via External Beam Radiation - EBRT.         3/4/2019 Imaging     MRI of the pelvis:  IMPRESSIONS:  MRI rectal cancer T category is: T2, LESS LIKELY EARLY T3 SPICULATIONS  Maximum EMD of invasion is: 0  Minimum tumor to MRF distance is: 12 MM  Low rectal tumor component: NO  Mesorectal nodes/tumor deposits: NO  EMVI: NO  Extramesorectal nodes: NO         4/23/2019 Surgery     Surgery rectosigmoidectomy pathology  report:  Final Diagnosis    1. RECTOSIGMOID COLON, RECTOSIGMOID RESECTION:  Residual adenoma with focal high grade dysplasia with no residual invasive carcinoma identified post-treatment (see comment).  Seventeen lymph nodes negative for carcinoma (0/17)   2. LIVER, CORE NEEDLE BIOPSY:  Mild steatosis with mild chronic inflammation and increased fibrosis including bridging fibrosis (Stage 3-4) (see comment)        COLON AND RECTUM TEMPLATE:  TYPE OF SPECIMEN/PROCEDURE: Rectosigmoid resection.   SPECIMEN INTEGRITY:  Intact.  TUMOR SITE:  Rectum.  TUMOR SIZE (greatest dimension):  Indeterminate  TUMOR LOCATION (applicable only to rectal primaries): Entirely below anterior peritoneal reflection.  MACROSCOPIC INTACTNESS OF MESORECTUM (If applicable): Intact.  MACROSCOPIC TUMOR PERFORATION: Not identified.  TUMOR CONFIGURATION:  Ulcerated.  HISTOLOGIC TYPE:  Adenocarcinoma.  HISTOLOGIC GRADE:  G3-4 (per previous biopsy).  MICROSCOPIC DEPTH OF TUMOR INVASION/EXTENSION:  No residual tumor identified.  PERITONEAL INVOLVEMENT:  Not identified.  LYMPHVASCULAR INVASION:  Not identified.  PERINEURAL INVASION: Not identified.  SURGICAL MARGINS: Uninvolved.  STATUS AND DISTANCE FROM PROXIMAL MUCOSAL MARGIN:  Uninvolved, 13.0 cm.  STATUS AND DISTANCE FROM DISTAL MUCOSAL MARGIN: Uninvolved, 1.2 cm.  STATUS AND DISTANCE FROM MESENTERIC MARGIN: Not applicable.  STATUS AND DISTANCE FROM RADIAL MARGIN: Uninvolved, 1.7 cm.  DISTANCE FROM DENTATE LINE (RECTAL TUMOR ONLY):  Indeterminate.  TUMOR DEPOSITS (DISCONTINUOUS EXTRAMURAL EXTENSION):  Not identified.  NUMBER OF LYMPH NODES INVOLVED BY METASTATIC NEOPLASM: 0.  NUMBER OF REGIONAL LYMPH NODES EXAMINED: 17.  EXTRANODAL EXTENSION:  Not applicable.  TREATMENT EFFECT:  Present, no viable cancer cells identified (complete response, score 0).  ADDITIONAL PATHOLOGIC FINDINGS:  None.  MSI TESTING:  No evidence of MSI based on reported IHC on outside biopsy  OTHER ANCILLARY STUDIES (BRAF,  KRAS, ETC.):  Should be performed on initial diagnostic biopsy if needed.  AJCC PATHOLOGIC STAGE:  (COMPLETED BY PATHOLOGIST, BASED ONLY ON TISSUE FINDINGS, MORE EXTENSIVE DISEASE MAY NOT BE KNOWN TO THE PATHOLOGIST)  ypT=  0  ypN=  0  AJCC PATHOLOGIC STAGE:  y0.                    5/15/2019 Adverse Reaction     -5/9/2019 to 5/15/2019 admitted with abdominal pelvic abscess status post CT-guided percutaneous drain placement presenting with sepsis improved with antibiotics and drainage.  Temp of 104 on presentation.  2 weeks of protracted IV antibiotics with Radames Sosa planned at discharge         7/22/2019 -  Chemotherapy     OP COLON [id number] OXALIplatin / Leucovorin / Fluorouracil  Start of adjuvant therapy delayed due to the above postoperative pelvic abscess.  This was treated surgically initially but then by CT-guided percutaneous drainage as above.  Serial follow-up with Radames Sosa including CTs and antibiotics eventually cleared him adequately to consider resumption of plans for adjuvant therapy.  CTs of the pelvis done on 6/5/2019, 6/13/2019, 6/26/2019 monitoring for this abscess and possible drainage but not able to drain due to decreasing size and minimal residual edema with CT 7/10/2019 showingStable to slight decrease in size of presacral perirectal abscess with presacral edema measuring 3.9 x 1.9 previously 4.1 x 2.5 cm. No new sites of focal fluid collection or loculated fluid.         9/30/2019 Adverse Reaction     Oxaliplatin discontinued due to ongoing thrombocytopenia.            HISTORY OF PRESENT ILLNESS:  The patient is a 54 y.o. male, here for follow up on management of rectal carcinoma with delayed postoperative adjuvant therapy due to pelvic abscess postoperatively following neoadjuvant Xeloda radiation on clinical trial, now on adjuvant FOLFOX.  The patient is tolerating therapy well other than for thrombocytopenia were we have had to delay some cycles.  Platelet count today 59,000,  no bleeding issues.  No neuropathies.  States ostomy output is normal.  No nausea.  We are also monitoring renal function closely as early in August it had increased but has since returned to normal.  Blood pressure had been running low but today it is elevated at 160/80.  He had been holding his lisinopril as he had previously been hypotensive.    Past Medical History:   Diagnosis Date   • Arthritis     knees    • Cancer (CMS/HCC) 11/2018    colon   • Disease of thyroid gland    • Fatty liver    • Fatty liver    • Hashimoto's disease    • Hypertension    • Psoriasis    • Wears glasses      Past Surgical History:   Procedure Laterality Date   • COLON RESECTION N/A 4/23/2019    Procedure: LAPAROSCOPIC LOWER ANTERIOR RESECTION WITH DIVERTING LOOP ILEOOSTOMY, SPLENIC FLEIXURE MOBILIZATION AND LIVER BIOPSY, AND PROCTOSCOPY;  Surgeon: Pau Kelly MD;  Location:  [facility];  Service: General   • COLONOSCOPY      2018   • ILEOSTOMY  04/2019   • LIVER BIOPSY  2003   • VASECTOMY  1998   • VENOUS ACCESS DEVICE (PORT) INSERTION N/A 7/18/2019    Procedure: PORT PLACEMENT;  Surgeon: Franky Cazares MD;  Location:  [facility];  Service: General       No Known Allergies    Family History and Social History reviewed and changed as necessary      REVIEW OF SYSTEM:   Review of Systems   Constitutional: Negative for appetite change, chills, diaphoresis, fever and unexpected weight change. Positive for mild fatigue.  HENT:   Negative for mouth sores, sore throat and trouble swallowing.    Eyes: Negative for icterus.   Respiratory: Negative for cough, hemoptysis and shortness of breath.    Cardiovascular: Negative for chest pain, leg swelling and palpitations.   Gastrointestinal: Negative for abdominal distention, abdominal pain, blood in stool, constipation, diarrhea.    Endocrine: Negative for hot flashes.   Genitourinary: Negative for bladder incontinence, difficulty urinating, dysuria, frequency and hematuria.   "  Musculoskeletal: Negative for gait problem, neck pain and neck stiffness.   Skin: Negative for rash.   Neurological: Negative for dizziness, gait problem, headaches.     Hematological: Negative for adenopathy. Does not bruise/bleed easily.   Psychiatric/Behavioral: Negative for depression. The patient is not nervous/anxious.    All other systems reviewed and are negative.       PHYSICAL EXAM    Vitals:    09/30/19 0820   BP: 160/80   Pulse: 78   Resp: 16   Temp: 98.1 °F (36.7 °C)   SpO2: 100%   Weight: 89.8 kg (198 lb)   Height: 185.4 cm (73\")     Constitutional: Appears well-developed and well-nourished. No distress.   ECOG: (1) Restricted in physically strenuous activity, ambulatory and able to do work of light nature  HENT:   Head: Normocephalic.   Mouth/Throat: Oropharynx is clear and moist.   Eyes: Conjunctivae are normal. Pupils are equal, round, and reactive. No scleral icterus.   Neck: Neck supple. No JVD present.   Cardiovascular: Normal rate, regular rhythm and normal heart sounds.    Pulmonary/Chest: Breath sounds normal. No respiratory distress.   Abdominal: Soft. Exhibits no distension. There is no tenderness.   Musculoskeletal:Exhibits no edema, tenderness or deformity.   Neurological: Alert and oriented to person, place, and time. Exhibits normal muscle tone.   Skin: No ecchymosis, no petechiae and no rash noted. Not diaphoretic. No cyanosis.   Psychiatric: Normal mood and affect.   Vitals reviewed.  Labs reviewed.    Lab Results   Component Value Date    HGB 10.3 (L) 09/30/2019    HCT 31.0 (L) 09/30/2019    MCV 94.6 09/30/2019    PLT 59 (L) 09/30/2019    WBC 2.10 (L) 09/30/2019    NEUTROABS 1.30 (L) 09/30/2019    LYMPHSABS 0.50 (L) 09/30/2019    MONOSABS 0.30 09/30/2019    EOSABS 0.31 06/17/2019    BASOSABS 0.04 06/17/2019       Lab Results   Component Value Date    GLUCOSE 143 (H) 09/16/2019    BUN 12 09/16/2019    CREATININE 0.90 09/16/2019     09/16/2019    K 4.1 09/16/2019     " 09/16/2019    CO2 24.0 09/16/2019    CALCIUM 9.1 09/16/2019    PROTEINTOT 7.1 09/16/2019    ALBUMIN 3.30 (L) 09/16/2019    BILITOT 1.3 (H) 09/16/2019    ALKPHOS 166 (H) 09/16/2019    AST 55 (H) 09/16/2019    ALT 28 09/16/2019         ASSESSMENT & PLAN:    1. Rectal carcinoma with no residual cancer after adjuvant Xeloda radiation but with protracted postoperative course  2. Thrombocytopenia  3. Renal insufficiency, resolved  4. Hypertension  5.   Protracted postoperative course due to pelvic abscess, resolved    Discussion: Tolerating FOLFOX well but has continued thrombocytopenia.  He has 2 more courses to go to complete 6 courses of adjuvant therapy.  We have had to delay multiple times throughout his treatment due to thrombocytopenia.  Plan of care discussed with Dr. Ousmane Moeller, we will complete the last 2 courses without oxaliplatin.  Platelet count today is 59,000, we will go ahead and treat with 5-FU and leucovorin.  No lingering neuropathies.  Blood pressure had been running in the 90s, today is 160/80.  I asked him to get in touch with Dr. Mendoza, he may need to go back on 20 mg daily dosage.  Creatinine was up early August, since has returned to normal, we will continue to monitor closely.  CEA on 916 was normal at 1.75.  We will treat him again in 2 weeks which will be his sixth and final course of adjuvant therapy.  We will then repeat CT chest abdomen and pelvis with contrast and I have ordered those today.  We will see him back following that to go over scans and then will go to surveillance mode.  Per NCCN guidelines:                    Ana Self, APRN    09/30/2019

## 2019-10-02 ENCOUNTER — INFUSION (OUTPATIENT)
Dept: ONCOLOGY | Facility: HOSPITAL | Age: 54
End: 2019-10-02

## 2019-10-02 VITALS
HEART RATE: 77 BPM | WEIGHT: 202.8 LBS | SYSTOLIC BLOOD PRESSURE: 138 MMHG | RESPIRATION RATE: 16 BRPM | TEMPERATURE: 98.5 F | BODY MASS INDEX: 26.76 KG/M2 | DIASTOLIC BLOOD PRESSURE: 69 MMHG

## 2019-10-02 DIAGNOSIS — C20 RECTAL CANCER (HCC): Primary | ICD-10-CM

## 2019-10-02 PROCEDURE — 96523 IRRIG DRUG DELIVERY DEVICE: CPT

## 2019-10-02 RX ADMIN — HEPARIN SODIUM (PORCINE) LOCK FLUSH IV SOLN 100 UNIT/ML 500 UNITS: 100 SOLUTION at 09:15

## 2019-10-14 ENCOUNTER — HOSPITAL ENCOUNTER (OUTPATIENT)
Dept: ONCOLOGY | Facility: HOSPITAL | Age: 54
Setting detail: INFUSION SERIES
Discharge: HOME | End: 2019-10-14

## 2019-10-14 VITALS
HEART RATE: 82 BPM | BODY MASS INDEX: 25.84 KG/M2 | RESPIRATION RATE: 16 BRPM | SYSTOLIC BLOOD PRESSURE: 149 MMHG | HEIGHT: 73 IN | TEMPERATURE: 98.3 F | WEIGHT: 195 LBS | DIASTOLIC BLOOD PRESSURE: 74 MMHG

## 2019-10-14 DIAGNOSIS — C20 RECTAL CARCINOMA (HCC): Primary | ICD-10-CM

## 2019-10-14 DIAGNOSIS — C20 RECTAL CANCER (HCC): ICD-10-CM

## 2019-10-14 DIAGNOSIS — C20 RECTAL CARCINOMA (HCC): ICD-10-CM

## 2019-10-14 LAB
ALBUMIN SERPL-MCNC: 3.6 G/DL (ref 3.5–5.2)
ALBUMIN/GLOB SERPL: 1 G/DL
ALP SERPL-CCNC: 130 U/L (ref 39–117)
ALT SERPL W P-5'-P-CCNC: 26 U/L (ref 1–41)
ANION GAP SERPL CALCULATED.3IONS-SCNC: 9 MMOL/L (ref 5–15)
AST SERPL-CCNC: 51 U/L (ref 1–40)
BILIRUB SERPL-MCNC: 1.4 MG/DL (ref 0.2–1.2)
BUN BLD-MCNC: 11 MG/DL (ref 6–20)
BUN/CREAT SERPL: 12.9 (ref 7–25)
CALCIUM SPEC-SCNC: 9.2 MG/DL (ref 8.6–10.5)
CEA SERPL-MCNC: 1.58 NG/ML
CHLORIDE SERPL-SCNC: 105 MMOL/L (ref 98–107)
CO2 SERPL-SCNC: 26 MMOL/L (ref 22–29)
CREAT BLD-MCNC: 0.85 MG/DL (ref 0.76–1.27)
CREAT BLDA-MCNC: 0.8 MG/DL
CREAT BLDA-MCNC: 0.8 MG/DL (ref 0.6–1.3)
ERYTHROCYTE [DISTWIDTH] IN BLOOD BY AUTOMATED COUNT: 15.3 % (ref 12.3–15.4)
GFR SERPL CREATININE-BSD FRML MDRD: 93 ML/MIN/1.73
GLOBULIN UR ELPH-MCNC: 3.5 GM/DL
GLUCOSE BLD-MCNC: 149 MG/DL (ref 65–99)
HCT VFR BLD AUTO: 29.8 % (ref 37.5–51)
HGB BLD-MCNC: 10.1 G/DL (ref 13–17.7)
LYMPHOCYTES # BLD AUTO: 0.4 10*3/MM3 (ref 0.7–3.1)
LYMPHOCYTES NFR BLD AUTO: 12.6 % (ref 19.6–45.3)
MCH RBC QN AUTO: 31.7 PG (ref 26.6–33)
MCHC RBC AUTO-ENTMCNC: 34 G/DL (ref 31.5–35.7)
MCV RBC AUTO: 93.3 FL (ref 79–97)
MONOCYTES # BLD AUTO: 0.4 10*3/MM3 (ref 0.1–0.9)
MONOCYTES NFR BLD AUTO: 12.9 % (ref 5–12)
NEUTROPHILS # BLD AUTO: 2.2 10*3/MM3 (ref 1.7–7)
NEUTROPHILS NFR BLD AUTO: 74.5 % (ref 42.7–76)
PLATELET # BLD AUTO: 51 10*3/MM3 (ref 140–450)
PMV BLD AUTO: 7.3 FL (ref 6–12)
POTASSIUM BLD-SCNC: 4 MMOL/L (ref 3.5–5.2)
PROT SERPL-MCNC: 7.1 G/DL (ref 6–8.5)
RBC # BLD AUTO: 3.19 10*6/MM3 (ref 4.14–5.8)
SODIUM BLD-SCNC: 140 MMOL/L (ref 136–145)
WBC NRBC COR # BLD: 3 10*3/MM3 (ref 3.4–10.8)

## 2019-10-14 PROCEDURE — 36591 DRAW BLOOD OFF VENOUS DEVICE: CPT

## 2019-10-14 PROCEDURE — 80053 COMPREHEN METABOLIC PANEL: CPT | Performed by: NURSE PRACTITIONER

## 2019-10-14 PROCEDURE — 82378 CARCINOEMBRYONIC ANTIGEN: CPT | Performed by: NURSE PRACTITIONER

## 2019-10-14 PROCEDURE — 82565 ASSAY OF CREATININE: CPT

## 2019-10-14 PROCEDURE — 85025 COMPLETE CBC W/AUTO DIFF WBC: CPT | Performed by: NURSE PRACTITIONER

## 2019-10-14 RX ADMIN — HEPARIN 500 UNITS: 100 SYRINGE at 09:12

## 2019-10-14 NOTE — PROGRESS NOTES
Cycle 6 Day 1 held 10/14/2019. Platelet count 51. Return one week on 10/21/2019 for treatment. Repeated and verified. No further concerns. Rae Murphy RN

## 2019-10-21 ENCOUNTER — HOSPITAL ENCOUNTER (OUTPATIENT)
Dept: ONCOLOGY | Facility: HOSPITAL | Age: 54
Setting detail: INFUSION SERIES
Discharge: HOME | End: 2019-10-21

## 2019-10-21 VITALS
WEIGHT: 195.1 LBS | HEART RATE: 71 BPM | TEMPERATURE: 97.1 F | RESPIRATION RATE: 18 BRPM | SYSTOLIC BLOOD PRESSURE: 146 MMHG | DIASTOLIC BLOOD PRESSURE: 74 MMHG | BODY MASS INDEX: 25.74 KG/M2

## 2019-10-21 DIAGNOSIS — C20 RECTAL CARCINOMA (HCC): Primary | ICD-10-CM

## 2019-10-21 LAB
ALBUMIN SERPL-MCNC: 3.6 G/DL (ref 3.5–5.2)
ALBUMIN/GLOB SERPL: 1 G/DL
ALP SERPL-CCNC: 135 U/L (ref 39–117)
ALT SERPL W P-5'-P-CCNC: 22 U/L (ref 1–41)
ANION GAP SERPL CALCULATED.3IONS-SCNC: 10 MMOL/L (ref 5–15)
AST SERPL-CCNC: 46 U/L (ref 1–40)
BILIRUB SERPL-MCNC: 1.4 MG/DL (ref 0.2–1.2)
BUN BLD-MCNC: 12 MG/DL (ref 6–20)
BUN/CREAT SERPL: 13.3 (ref 7–25)
CALCIUM SPEC-SCNC: 9.2 MG/DL (ref 8.6–10.5)
CEA SERPL-MCNC: 1.53 NG/ML
CHLORIDE SERPL-SCNC: 104 MMOL/L (ref 98–107)
CO2 SERPL-SCNC: 21 MMOL/L (ref 22–29)
CREAT BLD-MCNC: 0.9 MG/DL (ref 0.76–1.27)
CREAT BLDA-MCNC: 1 MG/DL (ref 0.6–1.3)
ERYTHROCYTE [DISTWIDTH] IN BLOOD BY AUTOMATED COUNT: 14.8 % (ref 12.3–15.4)
GFR SERPL CREATININE-BSD FRML MDRD: 87 ML/MIN/1.73
GLOBULIN UR ELPH-MCNC: 3.6 GM/DL
GLUCOSE BLD-MCNC: 129 MG/DL (ref 65–99)
HCT VFR BLD AUTO: 29.3 % (ref 37.5–51)
HGB BLD-MCNC: 9.8 G/DL (ref 13–17.7)
LYMPHOCYTES # BLD AUTO: 0.4 10*3/MM3 (ref 0.7–3.1)
LYMPHOCYTES NFR BLD AUTO: 16.9 % (ref 19.6–45.3)
MCH RBC QN AUTO: 31.2 PG (ref 26.6–33)
MCHC RBC AUTO-ENTMCNC: 33.5 G/DL (ref 31.5–35.7)
MCV RBC AUTO: 93.1 FL (ref 79–97)
MONOCYTES # BLD AUTO: 0.3 10*3/MM3 (ref 0.1–0.9)
MONOCYTES NFR BLD AUTO: 10.1 % (ref 5–12)
NEUTROPHILS # BLD AUTO: 1.8 10*3/MM3 (ref 1.7–7)
NEUTROPHILS NFR BLD AUTO: 73 % (ref 42.7–76)
PLATELET # BLD AUTO: 62 10*3/MM3 (ref 140–450)
PMV BLD AUTO: 7 FL (ref 6–12)
POTASSIUM BLD-SCNC: 3.9 MMOL/L (ref 3.5–5.2)
PROT SERPL-MCNC: 7.2 G/DL (ref 6–8.5)
RBC # BLD AUTO: 3.15 10*6/MM3 (ref 4.14–5.8)
SODIUM BLD-SCNC: 135 MMOL/L (ref 136–145)
WBC NRBC COR # BLD: 2.5 10*3/MM3 (ref 3.4–10.8)

## 2019-10-21 PROCEDURE — 96375 TX/PRO/DX INJ NEW DRUG ADDON: CPT

## 2019-10-21 PROCEDURE — 25010000002 DEXAMETHASONE PER 1 MG: Performed by: NURSE PRACTITIONER

## 2019-10-21 PROCEDURE — 85025 COMPLETE CBC W/AUTO DIFF WBC: CPT | Performed by: NURSE PRACTITIONER

## 2019-10-21 PROCEDURE — 25010000002 LEUCOVORIN 500 MG RECONSTITUTED SOLUTION 1 EACH VIAL: Performed by: NURSE PRACTITIONER

## 2019-10-21 PROCEDURE — 25010000002 FLUOROURACIL PER 500 MG: Performed by: NURSE PRACTITIONER

## 2019-10-21 PROCEDURE — 82565 ASSAY OF CREATININE: CPT

## 2019-10-21 PROCEDURE — 96416 CHEMO PROLONG INFUSE W/PUMP: CPT

## 2019-10-21 PROCEDURE — 96409 CHEMO IV PUSH SNGL DRUG: CPT

## 2019-10-21 PROCEDURE — 96367 TX/PROPH/DG ADDL SEQ IV INF: CPT

## 2019-10-21 PROCEDURE — 25010000002 LEUCOVORIN 200 MG RECONSTITUTED SOLUTION 200 MG VIAL: Performed by: NURSE PRACTITIONER

## 2019-10-21 PROCEDURE — 80053 COMPREHEN METABOLIC PANEL: CPT | Performed by: NURSE PRACTITIONER

## 2019-10-21 PROCEDURE — 82378 CARCINOEMBRYONIC ANTIGEN: CPT | Performed by: NURSE PRACTITIONER

## 2019-10-21 RX ORDER — SODIUM CHLORIDE 9 MG/ML
250 INJECTION, SOLUTION INTRAVENOUS ONCE
Status: COMPLETED | OUTPATIENT
Start: 2019-10-21 | End: 2019-10-21

## 2019-10-21 RX ORDER — FLUOROURACIL 50 MG/ML
400 INJECTION, SOLUTION INTRAVENOUS ONCE
Status: COMPLETED | OUTPATIENT
Start: 2019-10-21 | End: 2019-10-21

## 2019-10-21 RX ADMIN — DEXAMETHASONE SODIUM PHOSPHATE 12 MG: 4 INJECTION, SOLUTION INTRAMUSCULAR; INTRAVENOUS at 09:14

## 2019-10-21 RX ADMIN — FLUOROURACIL 840 MG: 50 INJECTION, SOLUTION INTRAVENOUS at 10:19

## 2019-10-21 RX ADMIN — SODIUM CHLORIDE 250 ML: 9 INJECTION, SOLUTION INTRAVENOUS at 09:20

## 2019-10-21 RX ADMIN — LEUCOVORIN CALCIUM 840 MG: 500 INJECTION, POWDER, LYOPHILIZED, FOR SOLUTION INTRAMUSCULAR; INTRAVENOUS at 09:34

## 2019-10-21 RX ADMIN — FLUOROURACIL 5000 MG: 50 INJECTION, SOLUTION INTRAVENOUS at 10:26

## 2019-10-23 ENCOUNTER — INFUSION (OUTPATIENT)
Dept: ONCOLOGY | Facility: HOSPITAL | Age: 54
End: 2019-10-23

## 2019-10-23 VITALS
SYSTOLIC BLOOD PRESSURE: 154 MMHG | BODY MASS INDEX: 25.96 KG/M2 | DIASTOLIC BLOOD PRESSURE: 68 MMHG | TEMPERATURE: 98.2 F | WEIGHT: 196.8 LBS | RESPIRATION RATE: 20 BRPM | HEART RATE: 76 BPM

## 2019-10-23 DIAGNOSIS — C20 RECTAL CANCER (HCC): Primary | ICD-10-CM

## 2019-10-23 PROCEDURE — 96523 IRRIG DRUG DELIVERY DEVICE: CPT

## 2019-10-23 RX ADMIN — HEPARIN SODIUM (PORCINE) LOCK FLUSH IV SOLN 100 UNIT/ML 500 UNITS: 100 SOLUTION at 08:30

## 2019-10-28 ENCOUNTER — HOSPITAL ENCOUNTER (OUTPATIENT)
Dept: CT IMAGING | Facility: HOSPITAL | Age: 54
Discharge: HOME | End: 2019-10-28
Admitting: NURSE PRACTITIONER

## 2019-10-28 DIAGNOSIS — C20 RECTAL CARCINOMA (HCC): ICD-10-CM

## 2019-10-28 PROCEDURE — 71260 CT THORAX DX C+: CPT

## 2019-10-28 PROCEDURE — 74177 CT ABD & PELVIS W/CONTRAST: CPT

## 2019-10-28 PROCEDURE — 25010000002 IOPAMIDOL 61 % SOLUTION: Performed by: NURSE PRACTITIONER

## 2019-10-28 RX ADMIN — IOPAMIDOL 95 ML: 612 INJECTION, SOLUTION INTRAVENOUS at 09:44

## 2019-10-28 RX ADMIN — BARIUM SULFATE 450 ML: 21 SUSPENSION ORAL at 08:16

## 2019-10-28 RX ADMIN — BARIUM SULFATE 450 ML: 21 SUSPENSION ORAL at 09:42

## 2019-11-01 ENCOUNTER — OFFICE VISIT (OUTPATIENT)
Dept: ONCOLOGY | Facility: CLINIC | Age: 54
End: 2019-11-01

## 2019-11-01 ENCOUNTER — LAB (OUTPATIENT)
Dept: LAB | Facility: HOSPITAL | Age: 54
End: 2019-11-01

## 2019-11-01 ENCOUNTER — HOSPITAL ENCOUNTER (OUTPATIENT)
Dept: RADIATION ONCOLOGY | Facility: HOSPITAL | Age: 54
Setting detail: RADIATION/ONCOLOGY SERIES
Discharge: HOME | End: 2019-11-01

## 2019-11-01 ENCOUNTER — OFFICE VISIT (OUTPATIENT)
Dept: RADIATION ONCOLOGY | Facility: HOSPITAL | Age: 54
End: 2019-11-01

## 2019-11-01 VITALS
WEIGHT: 197 LBS | TEMPERATURE: 98.1 F | HEIGHT: 73 IN | DIASTOLIC BLOOD PRESSURE: 82 MMHG | BODY MASS INDEX: 26.11 KG/M2 | HEART RATE: 76 BPM | OXYGEN SATURATION: 99 % | RESPIRATION RATE: 20 BRPM | SYSTOLIC BLOOD PRESSURE: 159 MMHG

## 2019-11-01 VITALS
DIASTOLIC BLOOD PRESSURE: 77 MMHG | OXYGEN SATURATION: 98 % | SYSTOLIC BLOOD PRESSURE: 162 MMHG | RESPIRATION RATE: 18 BRPM | TEMPERATURE: 98.7 F | BODY MASS INDEX: 26.03 KG/M2 | WEIGHT: 197.3 LBS | HEART RATE: 87 BPM

## 2019-11-01 DIAGNOSIS — C20 RECTAL CARCINOMA (HCC): Chronic | ICD-10-CM

## 2019-11-01 DIAGNOSIS — C20 RECTAL CARCINOMA (HCC): Primary | Chronic | ICD-10-CM

## 2019-11-01 DIAGNOSIS — C20 RECTAL CARCINOMA (HCC): ICD-10-CM

## 2019-11-01 LAB
ALBUMIN SERPL-MCNC: 3.6 G/DL (ref 3.5–5.2)
ALBUMIN/GLOB SERPL: 0.9 G/DL
ALP SERPL-CCNC: 118 U/L (ref 39–117)
ALT SERPL W P-5'-P-CCNC: 39 U/L (ref 1–41)
ANION GAP SERPL CALCULATED.3IONS-SCNC: 11 MMOL/L (ref 5–15)
AST SERPL-CCNC: 52 U/L (ref 1–40)
BILIRUB SERPL-MCNC: 1.1 MG/DL (ref 0.2–1.2)
BUN BLD-MCNC: 12 MG/DL (ref 6–20)
BUN/CREAT SERPL: 15.4 (ref 7–25)
CALCIUM SPEC-SCNC: 9.4 MG/DL (ref 8.6–10.5)
CEA SERPL-MCNC: 1.55 NG/ML
CHLORIDE SERPL-SCNC: 106 MMOL/L (ref 98–107)
CO2 SERPL-SCNC: 25 MMOL/L (ref 22–29)
CREAT BLD-MCNC: 0.78 MG/DL (ref 0.76–1.27)
ERYTHROCYTE [DISTWIDTH] IN BLOOD BY AUTOMATED COUNT: 15.5 % (ref 12.3–15.4)
GFR SERPL CREATININE-BSD FRML MDRD: 103 ML/MIN/1.73
GLOBULIN UR ELPH-MCNC: 3.8 GM/DL
GLUCOSE BLD-MCNC: 191 MG/DL (ref 65–99)
HCT VFR BLD AUTO: 33.2 % (ref 37.5–51)
HGB BLD-MCNC: 11.1 G/DL (ref 13–17.7)
LYMPHOCYTES # BLD AUTO: 0.4 10*3/MM3 (ref 0.7–3.1)
LYMPHOCYTES NFR BLD AUTO: 13.6 % (ref 19.6–45.3)
MCH RBC QN AUTO: 30.8 PG (ref 26.6–33)
MCHC RBC AUTO-ENTMCNC: 33.4 G/DL (ref 31.5–35.7)
MCV RBC AUTO: 92.4 FL (ref 79–97)
MONOCYTES # BLD AUTO: 0.3 10*3/MM3 (ref 0.1–0.9)
MONOCYTES NFR BLD AUTO: 9.8 % (ref 5–12)
NEUTROPHILS # BLD AUTO: 2.4 10*3/MM3 (ref 1.7–7)
NEUTROPHILS NFR BLD AUTO: 76.6 % (ref 42.7–76)
PLATELET # BLD AUTO: 64 10*3/MM3 (ref 140–450)
PMV BLD AUTO: 6.4 FL (ref 6–12)
POTASSIUM BLD-SCNC: 4.2 MMOL/L (ref 3.5–5.2)
PROT SERPL-MCNC: 7.4 G/DL (ref 6–8.5)
RBC # BLD AUTO: 3.59 10*6/MM3 (ref 4.14–5.8)
SODIUM BLD-SCNC: 142 MMOL/L (ref 136–145)
WBC NRBC COR # BLD: 3.1 10*3/MM3 (ref 3.4–10.8)

## 2019-11-01 PROCEDURE — G0463 HOSPITAL OUTPT CLINIC VISIT: HCPCS | Performed by: RADIOLOGY

## 2019-11-01 PROCEDURE — 80053 COMPREHEN METABOLIC PANEL: CPT

## 2019-11-01 PROCEDURE — 36415 COLL VENOUS BLD VENIPUNCTURE: CPT

## 2019-11-01 PROCEDURE — 99214 OFFICE O/P EST MOD 30 MIN: CPT | Performed by: INTERNAL MEDICINE

## 2019-11-01 PROCEDURE — 82378 CARCINOEMBRYONIC ANTIGEN: CPT

## 2019-11-01 PROCEDURE — 85025 COMPLETE CBC W/AUTO DIFF WBC: CPT

## 2019-11-01 NOTE — PROGRESS NOTES
CHIEF COMPLAINT: Follow-up rectal carcinoma with possible portal hypertension as well.    Problem List:  Oncology/Hematology History    1. Stage IIIB rectal carcinoma clinical T3b N1 aM0  2. Protracted postoperative course due to abdominal pelvic abscess following neoadjuvant Xeloda radiation  3. 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    -12/6/18 MRI of rectum showed T3b with suspicious right internal iliac lymph node and CT chest noncontrast negative except for gallstones  -Per Dr. Kelly, CEA was normal.  -12/18/18 saw me for the first time.  I reviewed her case in our multidisciplinary conference and went over that in detail with her.  She had presented with hematochezia.  Dr. Kelly repeated endoscopy for more exact detailing of the primary location and this appears to be rectal on MRI and endoscopy.  Plan is for randomization on clinical trial N 1048 to neoadjuvant FOLFOX versus standard chemoradiation.  We'll get him to radiation and our research staff for randomization and get his baseline CBC and CMP.    -1/4/19 followed up: Randomized to the Xeloda radiation arm.  He will get that and then 4-6 weeks later had his surgery, and then follow that with 6 months of adjuvant FOLFOX per protocol.  My research assistant met with him today.  He has a Xeloda prescription.  -3/4/2019 MRI: Good response with T2, less likely T3 with spiculation N0 disease.  -4/23/2019 pathology: Laparoscopic assisted converted to lower anterior resection open with stapled coloanal anastomosis and diverting loop ileostomy with liver biopsy showed residual adenoma with focal high-grade dysplasia but no  residual invasive carcinoma.  0 out of 17 lymph nodes.  Mild steatosis on liver biopsy with bridging fibrosis.  -5/9/2019 to 5/15/2019 admitted with abdominal pelvic abscess status post CT-guided percutaneous drain placement presenting with sepsis improved with antibiotics and drainage.  Temp of 104 on presentation.  2 weeks of protracted IV antibiotics with Radames Sosa planned at discharge.  -7/11/2019 follow-up visit:Start of adjuvant therapy delayed due to the above postoperative pelvic abscess.  This was treated surgically initially but then by CT-guided percutaneous drainage as above.  Serial follow-up with Radames Sosa including CTs and antibiotics eventually cleared him adequately to consider resumption of plans for adjuvant therapy.  CTs of the pelvis done on 6/5/2019, 6/13/2019, 6/26/2019 monitoring for this abscess and possible drainage but not able to drain due to decreasing size and minimal residual edema with CT 7/10/2019 showingStable to slight decrease in size of presacral perirectal abscess with presacral edema measuring 3.9 x 1.9 previously 4.1 x 2.5 cm. No new sites of focal fluid collection or loculated fluid.  Hence, modified FOLFOX 6 started at this junction.  -9/30/2019 office visit: Oxaliplatin discontinued due to ongoing thrombocytopenia.  Platelet count today 59,000.  We will continue course #5 and 6 with 5-FU and leucovorin alone to complete adjuvant therapy.  -10/23/2019 completed adjuvant chemotherapy.  -10/28/2019 CT chest abdomen pelvis with contrast showed decreased and fluid in the presacral fat.  Decrease in surrounding soft tissue stranding.  Gallstone seen with enlargement of spleen.  Stranding of the mesenteric roots with varices in the mesentery slightly increased compared to July 2019.  Chest is unremarkable.  CEA 1.53 with bilirubin 1.4, AST 46, alkaline phosphatase 135, white count 2500, hemoglobin 9.8, and platelets 62,000 on 10/21/2019.        Rectal carcinoma  (CMS/HCC)    12/18/2018 Initial Diagnosis     Rectal carcinoma (CMS/HCC)         1/7/2019 - 2/4/2019 Chemotherapy     Xeloda radiation on protocol         1/8/2019 - 2/14/2019 Radiation     Radiation OncologyTreatment Course:  Hesham Arevalo received 5040 cGy in 28 fractions to pelvis via External Beam Radiation - EBRT.         3/4/2019 Imaging     MRI of the pelvis:  IMPRESSIONS:  MRI rectal cancer T category is: T2, LESS LIKELY EARLY T3 SPICULATIONS  Maximum EMD of invasion is: 0  Minimum tumor to MRF distance is: 12 MM  Low rectal tumor component: NO  Mesorectal nodes/tumor deposits: NO  EMVI: NO  Extramesorectal nodes: NO         4/23/2019 Surgery     Surgery rectosigmoidectomy pathology report:  Final Diagnosis    1. RECTOSIGMOID COLON, RECTOSIGMOID RESECTION:  Residual adenoma with focal high grade dysplasia with no residual invasive carcinoma identified post-treatment (see comment).  Seventeen lymph nodes negative for carcinoma (0/17)   2. LIVER, CORE NEEDLE BIOPSY:  Mild steatosis with mild chronic inflammation and increased fibrosis including bridging fibrosis (Stage 3-4) (see comment)        COLON AND RECTUM TEMPLATE:  TYPE OF SPECIMEN/PROCEDURE: Rectosigmoid resection.   SPECIMEN INTEGRITY:  Intact.  TUMOR SITE:  Rectum.  TUMOR SIZE (greatest dimension):  Indeterminate  TUMOR LOCATION (applicable only to rectal primaries): Entirely below anterior peritoneal reflection.  MACROSCOPIC INTACTNESS OF MESORECTUM (If applicable): Intact.  MACROSCOPIC TUMOR PERFORATION: Not identified.  TUMOR CONFIGURATION:  Ulcerated.  HISTOLOGIC TYPE:  Adenocarcinoma.  HISTOLOGIC GRADE:  G3-4 (per previous biopsy).  MICROSCOPIC DEPTH OF TUMOR INVASION/EXTENSION:  No residual tumor identified.  PERITONEAL INVOLVEMENT:  Not identified.  LYMPHVASCULAR INVASION:  Not identified.  PERINEURAL INVASION: Not identified.  SURGICAL MARGINS: Uninvolved.  STATUS AND DISTANCE FROM PROXIMAL MUCOSAL MARGIN:  Uninvolved, 13.0 cm.  STATUS AND  DISTANCE FROM DISTAL MUCOSAL MARGIN: Uninvolved, 1.2 cm.  STATUS AND DISTANCE FROM MESENTERIC MARGIN: Not applicable.  STATUS AND DISTANCE FROM RADIAL MARGIN: Uninvolved, 1.7 cm.  DISTANCE FROM DENTATE LINE (RECTAL TUMOR ONLY):  Indeterminate.  TUMOR DEPOSITS (DISCONTINUOUS EXTRAMURAL EXTENSION):  Not identified.  NUMBER OF LYMPH NODES INVOLVED BY METASTATIC NEOPLASM: 0.  NUMBER OF REGIONAL LYMPH NODES EXAMINED: 17.  EXTRANODAL EXTENSION:  Not applicable.  TREATMENT EFFECT:  Present, no viable cancer cells identified (complete response, score 0).  ADDITIONAL PATHOLOGIC FINDINGS:  None.  MSI TESTING:  No evidence of MSI based on reported IHC on outside biopsy  OTHER ANCILLARY STUDIES (BRAF, KRAS, ETC.):  Should be performed on initial diagnostic biopsy if needed.  AJCC PATHOLOGIC STAGE:  (COMPLETED BY PATHOLOGIST, BASED ONLY ON TISSUE FINDINGS, MORE EXTENSIVE DISEASE MAY NOT BE KNOWN TO THE PATHOLOGIST)  ypT=  0  ypN=  0  AJCC PATHOLOGIC STAGE:  y0.                    5/15/2019 Adverse Reaction     -5/9/2019 to 5/15/2019 admitted with abdominal pelvic abscess status post CT-guided percutaneous drain placement presenting with sepsis improved with antibiotics and drainage.  Temp of 104 on presentation.  2 weeks of protracted IV antibiotics with Radames Sosa planned at discharge         7/22/2019 - 10/23/2019 Chemotherapy     OP COLON [id number] OXALIplatin / Leucovorin / Fluorouracil  Start of adjuvant therapy delayed due to the above postoperative pelvic abscess.  This was treated surgically initially but then by CT-guided percutaneous drainage as above.  Serial follow-up with Radames Sosa including CTs and antibiotics eventually cleared him adequately to consider resumption of plans for adjuvant therapy.  CTs of the pelvis done on 6/5/2019, 6/13/2019, 6/26/2019 monitoring for this abscess and possible drainage but not able to drain due to decreasing size and minimal residual edema with CT 7/10/2019 showingStable  to slight decrease in size of presacral perirectal abscess with presacral edema measuring 3.9 x 1.9 previously 4.1 x 2.5 cm. No new sites of focal fluid collection or loculated fluid.         9/30/2019 Adverse Reaction     Oxaliplatin discontinued due to ongoing thrombocytopenia.         10/28/2019 Imaging     10/28/2019 CT chest abdomen pelvis with contrast showed decreased and fluid in the presacral fat.  Decrease in surrounding soft tissue stranding.  Gallstone seen with enlargement of spleen.  Stranding of the mesenteric roots with varices in the mesentery slightly increased compared to July 2019.  Chest is unremarkable.  CEA 1.53 with bilirubin 1.4, AST 46, alkaline phosphatase 135, white count 2500, hemoglobin 9.8, and platelets 62,000 on 10/21/2019.            HISTORY OF PRESENT ILLNESS:  The patient is a 54 y.o. male, here for follow up on management of rectal carcinoma.  No evidence of recurrence on current CTs.  Does have splenomegaly with varices slightly enlarging over time with pancytopenia on chemotherapy.Has completed his adjuvant therapy.      Past Medical History:   Diagnosis Date   • Arthritis     knees    • Cancer (CMS/HCC) 11/2018    colon   • Disease of thyroid gland    • Fatty liver    • Fatty liver    • Hashimoto's disease    • Hypertension    • Psoriasis    • Wears glasses      Past Surgical History:   Procedure Laterality Date   • COLON RESECTION N/A 4/23/2019    Procedure: LAPAROSCOPIC LOWER ANTERIOR RESECTION WITH DIVERTING LOOP ILEOOSTOMY, SPLENIC FLEIXURE MOBILIZATION AND LIVER BIOPSY, AND PROCTOSCOPY;  Surgeon: Pau Kelly MD;  Location:  [facility];  Service: General   • COLONOSCOPY      2018   • ILEOSTOMY  04/2019   • LIVER BIOPSY  2003   • VASECTOMY  1998   • VENOUS ACCESS DEVICE (PORT) INSERTION N/A 7/18/2019    Procedure: PORT PLACEMENT;  Surgeon: Franky Cazares MD;  Location:  [facility] OR;  Service: General       No Known Allergies    Family History and Social History  "reviewed and changed as necessary      REVIEW OF SYSTEM:   Review of Systems   Constitutional: Negative for appetite change, chills, diaphoresis, fatigue, fever and unexpected weight change.   HENT:   Negative for mouth sores, sore throat and trouble swallowing.    Eyes: Negative for icterus.   Respiratory: Negative for cough, hemoptysis and shortness of breath.    Cardiovascular: Negative for chest pain, leg swelling and palpitations.   Gastrointestinal: Negative for abdominal distention, abdominal pain, blood in stool, constipation, diarrhea, nausea and vomiting.   Endocrine: Negative for hot flashes.   Genitourinary: Negative for bladder incontinence, difficulty urinating, dysuria, frequency and hematuria.    Musculoskeletal: Negative for gait problem, neck pain and neck stiffness.   Skin: Negative for rash.   Neurological: Negative for dizziness, gait problem, headaches, light-headedness and numbness.   Hematological: Negative for adenopathy. Does not bruise/bleed easily.   Psychiatric/Behavioral: Negative for depression. The patient is not nervous/anxious.    All other systems reviewed and are negative.       PHYSICAL EXAM    Vitals:    11/01/19 0804   BP: 159/82   Pulse: 76   Resp: 20   Temp: 98.1 °F (36.7 °C)   TempSrc: Temporal   SpO2: 99%   Weight: 89.4 kg (197 lb)   Height: 185.4 cm (73\")     Constitutional: Appears well-developed and well-nourished. No distress.   ECOG: (0) Fully active, able to carry on all predisease performance without restriction  HENT:   Head: Normocephalic.   Mouth/Throat: Oropharynx is clear and moist.   Eyes: Conjunctivae are normal. Pupils are equal, round, and reactive to light. No scleral icterus.   Neck: Neck supple. No JVD present. No thyromegaly present.   Cardiovascular: Normal rate, regular rhythm and normal heart sounds.    Pulmonary/Chest: Breath sounds normal. No respiratory distress.   Abdominal: Soft. Exhibits no distension and no mass. There is no hepatosplenomegaly. " There is no tenderness. There is no rebound and no guarding.   Musculoskeletal:Exhibits no edema, tenderness or deformity.   Neurological: Alert and oriented to person, place, and time. Exhibits normal muscle tone.   Skin: No ecchymosis, no petechiae and no rash noted. Not diaphoretic. No cyanosis. Nails show no clubbing.   Psychiatric: Normal mood and affect.   Vitals reviewed.      Lab Results   Component Value Date    HGB 11.1 (L) 11/01/2019    HCT 33.2 (L) 11/01/2019    MCV 92.4 11/01/2019    PLT 64 (L) 11/01/2019    WBC 3.10 (L) 11/01/2019    NEUTROABS 2.40 11/01/2019    LYMPHSABS 0.40 (L) 11/01/2019    MONOSABS 0.30 11/01/2019    EOSABS 0.31 06/17/2019    BASOSABS 0.04 06/17/2019       Lab Results   Component Value Date    GLUCOSE 129 (H) 10/21/2019    BUN 12 10/21/2019    CREATININE 1.00 10/21/2019     (L) 10/21/2019    K 3.9 10/21/2019     10/21/2019    CO2 21.0 (L) 10/21/2019    CALCIUM 9.2 10/21/2019    PROTEINTOT 7.2 10/21/2019    ALBUMIN 3.60 10/21/2019    BILITOT 1.4 (H) 10/21/2019    ALKPHOS 135 (H) 10/21/2019    AST 46 (H) 10/21/2019    ALT 22 10/21/2019                   ASSESSMENT & PLAN:     1. Stage III rectal cancer status post neoadjuvant Xeloda radiation and resection followed by adjuvant Xeloda oxaliplatin  2. Bridging fibrosis with hepatic steatosis on liver biopsy  at time of colon surgeryand varices as well as splenomegaly on serial CT  3. Pancytopenia likely due to chemotherapy as well as portal hypertension.    Discussion: Today's white count slightly better 3100 with hemoglobin up to 11.1 with platelets of 64,000.  Suspect his pancytopenia not only related to the chemotherapy but due to hepatic steatosis with bridging fibrosis and splenomegaly and varices on CTs.  We will get liver spleen ultrasound and have him follow-up with Dr. Kelly and colleagues for colonoscopy as well as management of possible portal hypertension.  From a cancer standpoint we will repeat CAT scans again  in 3 months and will do so every 3 months for this first year and then follow NCCN guidelines.  He continues on clinical trial follow-up N-[id number].  We will see my nurse practitioner early March for survivorship visit.  I discussed with patient face-to-face greater than 25 minutes regarding complexity of the above findings, scan results which I reviewed the images and reports thereof and the plan for investigation of possible portal hypertension.  Ousmane Moeller MD    11/01/2019

## 2019-11-01 NOTE — PROGRESS NOTES
FOLLOW UP NOTE    PATIENT:                                                      Hesham Arevalo Jr.  MEDICAL RECORD #:                        [id number]  :                                                          1965  COMPLETION DATE:    2019    DIAGNOSIS:     Cancer Staging  Rectal carcinoma (CMS/HCC)  Staging form: Colon And Rectum, AJCC 8th Edition  - Clinical stage from 2018: Stage IIIB (cT3, cN1a, cM0) - Signed by Ousmane Moeller MD on 2018  - Pathologic: pT0, pN0, cM0 - Signed by Ousmane Moeller MD on 5/3/2019    BRIEF HISTORY:  Mr. Arevalo returns to clinic today for follow-up of his locally advanced and lymph node positive rectal cancer.  He completed a course of neoadjuvant chemoradiation therapy followed by a low anterior resection.  His surgery was in 2019 and showed a complete response to treatment with no residual invasive adenocarcinoma.  Unfortunately, he developed a pelvic abscess requiring placement of percutaneous drain and IV antibiotics that initially delayed adjuvant chemotherapy.  After his pelvic abscess improved, he was able to receive adjuvant FOLFOX chemotherapy that did require dose reductions particularly involving Oxaliplatin due to thrombocytopenia.  He completed all chemotherapy on 2019.  He returns today stating he is feeling much better. He denies any symptoms of pelvic pain or discomfort.  He denies having any fevers or chills.  His only complaint is related to chronic fatigue which he feels like is slowly improving as well.    MEDICATIONS: Medication reconciliation for the patient was reviewed and confirmed in the electronic medical record.    Review of Systems   Constitutional: Positive for fatigue.   Gastrointestinal:        Ileostomy   All other systems reviewed and are negative.      KPS 80%    Physical Exam   Constitutional: He is oriented to person, place, and time. He appears well-developed and well-nourished. No distress.   HENT:    Head: Normocephalic and atraumatic.   Mouth/Throat: Oropharynx is clear and moist.   Eyes: Conjunctivae and EOM are normal. Pupils are equal, round, and reactive to light.   Neck: Normal range of motion. Neck supple.   Cardiovascular: Normal rate and regular rhythm. Exam reveals no friction rub.   No murmur heard.  Pulmonary/Chest: Effort normal and breath sounds normal. He has no wheezes.   Abdominal: Soft. Bowel sounds are normal. He exhibits no distension and no mass. There is no tenderness.   RLQ ostomy   Musculoskeletal: Normal range of motion. He exhibits no edema.   Lymphadenopathy:     He has no cervical adenopathy.   Neurological: He is alert and oriented to person, place, and time.   Skin: Skin is warm and dry.   Psychiatric: He has a normal mood and affect. His behavior is normal. Judgment and thought content normal.   Nursing note and vitals reviewed.      VITAL SIGNS:   Vitals:    11/01/19 0903   BP: 162/77   Pulse: 87   Resp: 18   Temp: 98.7 °F (37.1 °C)   TempSrc: Temporal   SpO2: 98%   Weight: 89.5 kg (197 lb 4.8 oz)   PainSc: 0-No pain     IMAGING  I have personally reviewed the relevant imaging studies, as follows:  Ct Chest With Contrast    Result Date: 10/30/2019  Decrease seen in size of the fluid collection in the presacral fat. The surrounding stranding of the soft tissues is also improved. Stone again seen within the gallbladder with enlargement of the spleen. Stranding identified within the roots of the mesentery with varices identified again inferiorly within the mesentery. The varices have slightly increased in size when compared to the prior study. Imaging of the chest is unremarkable.  D:  10/28/2019 E:  10/28/2019  This report was finalized on 10/30/2019 4:46 PM by Dr. Gayatri Cabral MD.      Ct Abdomen Pelvis With Contrast    Result Date: 10/30/2019  Decrease seen in size of the fluid collection in the presacral fat. The surrounding stranding of the soft tissues is also improved.  Stone again seen within the gallbladder with enlargement of the spleen. Stranding identified within the roots of the mesentery with varices identified again inferiorly within the mesentery. The varices have slightly increased in size when compared to the prior study. Imaging of the chest is unremarkable.  D:  10/28/2019 E:  10/28/2019  This report was finalized on 10/30/2019 4:46 PM by Dr. Gayatri Cabral MD.      Ct Abdomen Pelvis With Contrast    Result Date: 7/10/2019  Stable to slight decrease in size of presacral perirectal abscess with presacral edema measuring 3.9 x 1.9 previously 4.1 x 2.5 cm. No new sites of focal fluid collection or loculated fluid.  D:  07/10/2019 E:  07/10/2019  This report was finalized on 7/10/2019 3:30 PM by Dr. Jairo Rowland.      The following portions of the patient's history were reviewed and updated as appropriate: allergies, current medications, past family history, past medical history, past social history, past surgical history and problem list.         Hesham was seen today for rectal cancer.    Diagnoses and all orders for this visit:    Rectal carcinoma (CMS/HCC)         IMPRESSION:  Mr. Arevalo is a 54 year old male with a clinical T3N1 rectal cancer, now treated with neoadjuvant chemotherapy and radiation with a complete response at the time of surgery.  He completed adjuvant chemotherapy earlier this week.  Clinically, he appears to be doing very well without evidence of recurrent disease. His CEA is very low and stable, and his scans do not show any evidence of recurrent disease.  He is being referred to vascular surgery by Dr. Moeller for evaluation of the varices and liver changes possibly related to portal hypertension.  Otherwise, he is very anxious to see if he can undergo a ileostomy takedown and is scheduled to see Dr. Kelly next week.  I discussed the ongoing follow-up plans with him. Considering he is still being followed very closely by Dr. Moeller and Dr. Kelly, I  scheduled him to return to my clinic in 1 year.    RECOMMENDATIONS:      Return in about 1 year (around 11/1/2020) for Office Visit.    Pepe Lennon MD

## 2019-11-06 ENCOUNTER — TRANSCRIBE ORDERS (OUTPATIENT)
Dept: ADMINISTRATIVE | Facility: HOSPITAL | Age: 54
End: 2019-11-06

## 2019-11-06 DIAGNOSIS — C20 RECTAL CANCER (HCC): Primary | ICD-10-CM

## 2019-11-07 ENCOUNTER — TELEPHONE (OUTPATIENT)
Dept: ONCOLOGY | Facility: CLINIC | Age: 54
End: 2019-11-07

## 2019-11-07 NOTE — TELEPHONE ENCOUNTER
Ok per Dr. Moeller to proceed now if they do not anticipate much excavating.  Otherwise, wait about 2 weeks.  Renetta notified.  Verbalized understanding.

## 2019-11-07 NOTE — TELEPHONE ENCOUNTER
Renetta with Dr. Cody's office called patient had last chemo on 11/28/19 wants to know when he can come in to get a teeth cleaning? Please call.

## 2019-11-18 ENCOUNTER — HOSPITAL ENCOUNTER (OUTPATIENT)
Dept: GENERAL RADIOLOGY | Facility: HOSPITAL | Age: 54
Discharge: HOME | End: 2019-11-18
Admitting: COLON & RECTAL SURGERY

## 2019-11-18 DIAGNOSIS — C20 RECTAL CANCER (HCC): ICD-10-CM

## 2019-11-18 PROCEDURE — 0 DIATRIZOATE MEGLUMINE & SODIUM PER 1 ML: Performed by: COLON & RECTAL SURGERY

## 2019-11-18 PROCEDURE — 74270 X-RAY XM COLON 1CNTRST STD: CPT

## 2019-11-18 RX ADMIN — DIATRIZOATE MEGLUMINE AND DIATRIZOATE SODIUM 480 ML: 660; 100 LIQUID ORAL; RECTAL at 08:50

## 2019-11-25 ENCOUNTER — TELEPHONE (OUTPATIENT)
Dept: ONCOLOGY | Facility: CLINIC | Age: 54
End: 2019-11-25

## 2019-11-25 NOTE — TELEPHONE ENCOUNTER
Patient called he needs an order to get his port flushed. Wants to get this done in [place]. Please call patient to set this up.

## 2019-11-25 NOTE — TELEPHONE ENCOUNTER
He already has orders for port flush.  He just needs an appointment.  Can you get him set up, please?  Thanks!

## 2019-11-26 ENCOUNTER — INFUSION (OUTPATIENT)
Dept: ONCOLOGY | Facility: HOSPITAL | Age: 54
End: 2019-11-26

## 2019-11-26 VITALS
TEMPERATURE: 97.9 F | BODY MASS INDEX: 26.73 KG/M2 | WEIGHT: 202.6 LBS | HEART RATE: 74 BPM | DIASTOLIC BLOOD PRESSURE: 65 MMHG | SYSTOLIC BLOOD PRESSURE: 127 MMHG | RESPIRATION RATE: 20 BRPM

## 2019-11-26 DIAGNOSIS — C20 RECTAL CANCER (HCC): Primary | ICD-10-CM

## 2019-11-26 PROCEDURE — 25010000003 HEPARIN LOCK FLUCH PER 10 UNITS: Performed by: INTERNAL MEDICINE

## 2019-11-26 PROCEDURE — 96523 IRRIG DRUG DELIVERY DEVICE: CPT

## 2019-11-26 RX ADMIN — HEPARIN SODIUM (PORCINE) LOCK FLUSH IV SOLN 100 UNIT/ML 500 UNITS: 100 SOLUTION at 08:30

## 2019-12-26 ENCOUNTER — INFUSION (OUTPATIENT)
Dept: ONCOLOGY | Facility: HOSPITAL | Age: 54
End: 2019-12-26

## 2019-12-26 VITALS
SYSTOLIC BLOOD PRESSURE: 132 MMHG | RESPIRATION RATE: 18 BRPM | HEART RATE: 62 BPM | DIASTOLIC BLOOD PRESSURE: 66 MMHG | TEMPERATURE: 98.4 F | BODY MASS INDEX: 26.65 KG/M2 | WEIGHT: 202 LBS

## 2019-12-26 DIAGNOSIS — C20 RECTAL CANCER (HCC): Primary | ICD-10-CM

## 2019-12-26 PROCEDURE — G0463 HOSPITAL OUTPT CLINIC VISIT: HCPCS

## 2019-12-26 RX ORDER — HEPARIN SODIUM (PORCINE) LOCK FLUSH IV SOLN 100 UNIT/ML 100 UNIT/ML
500 SOLUTION INTRAVENOUS AS NEEDED
Status: CANCELLED | OUTPATIENT
Start: 2019-12-26

## 2020-01-23 ENCOUNTER — INFUSION (OUTPATIENT)
Dept: ONCOLOGY | Facility: HOSPITAL | Age: 55
End: 2020-01-23

## 2020-01-23 VITALS
HEART RATE: 71 BPM | DIASTOLIC BLOOD PRESSURE: 49 MMHG | RESPIRATION RATE: 16 BRPM | TEMPERATURE: 98.2 F | WEIGHT: 197.7 LBS | BODY MASS INDEX: 26.08 KG/M2 | SYSTOLIC BLOOD PRESSURE: 113 MMHG

## 2020-01-23 DIAGNOSIS — C20 RECTAL CANCER (HCC): Primary | ICD-10-CM

## 2020-01-23 PROCEDURE — G0463 HOSPITAL OUTPT CLINIC VISIT: HCPCS

## 2020-01-23 PROCEDURE — 25010000003 HEPARIN LOCK FLUCH PER 10 UNITS: Performed by: INTERNAL MEDICINE

## 2020-01-23 PROCEDURE — 96523 IRRIG DRUG DELIVERY DEVICE: CPT

## 2020-01-23 RX ORDER — HEPARIN SODIUM (PORCINE) LOCK FLUSH IV SOLN 100 UNIT/ML 100 UNIT/ML
500 SOLUTION INTRAVENOUS AS NEEDED
Status: CANCELLED | OUTPATIENT
Start: 2020-01-23

## 2020-01-23 RX ORDER — HEPARIN SODIUM (PORCINE) LOCK FLUSH IV SOLN 100 UNIT/ML 100 UNIT/ML
500 SOLUTION INTRAVENOUS AS NEEDED
Status: DISCONTINUED | OUTPATIENT
Start: 2020-01-23 | End: 2020-01-23 | Stop reason: HOSPADM

## 2020-01-23 RX ADMIN — HEPARIN 500 UNITS: 100 SYRINGE at 08:10

## 2020-02-26 ENCOUNTER — HOSPITAL ENCOUNTER (OUTPATIENT)
Dept: CT IMAGING | Facility: HOSPITAL | Age: 55
Discharge: HOME | End: 2020-02-26
Admitting: INTERNAL MEDICINE

## 2020-02-26 ENCOUNTER — LAB (OUTPATIENT)
Dept: LAB | Facility: HOSPITAL | Age: 55
End: 2020-02-26

## 2020-02-26 DIAGNOSIS — C20 RECTAL CARCINOMA (HCC): ICD-10-CM

## 2020-02-26 DIAGNOSIS — C20 RECTAL CARCINOMA (HCC): Chronic | ICD-10-CM

## 2020-02-26 LAB
ALBUMIN SERPL-MCNC: 3.7 G/DL (ref 3.5–5.2)
ALBUMIN/GLOB SERPL: 1 G/DL
ALP SERPL-CCNC: 99 U/L (ref 39–117)
ALT SERPL W P-5'-P-CCNC: 33 U/L (ref 1–41)
ANION GAP SERPL CALCULATED.3IONS-SCNC: 11 MMOL/L (ref 5–15)
AST SERPL-CCNC: 51 U/L (ref 1–40)
BASOPHILS # BLD AUTO: 0.03 10*3/MM3 (ref 0–0.2)
BASOPHILS NFR BLD AUTO: 0.8 % (ref 0–1.5)
BILIRUB SERPL-MCNC: 1 MG/DL (ref 0.2–1.2)
BUN BLD-MCNC: 13 MG/DL (ref 6–20)
BUN/CREAT SERPL: 12.9 (ref 7–25)
CALCIUM SPEC-SCNC: 9.4 MG/DL (ref 8.6–10.5)
CEA SERPL-MCNC: 1.1 NG/ML
CHLORIDE SERPL-SCNC: 104 MMOL/L (ref 98–107)
CO2 SERPL-SCNC: 24 MMOL/L (ref 22–29)
CREAT BLD-MCNC: 1.01 MG/DL (ref 0.76–1.27)
DEPRECATED RDW RBC AUTO: 46.8 FL (ref 37–54)
EOSINOPHIL # BLD AUTO: 0.25 10*3/MM3 (ref 0–0.4)
EOSINOPHIL NFR BLD AUTO: 6.3 % (ref 0.3–6.2)
ERYTHROCYTE [DISTWIDTH] IN BLOOD BY AUTOMATED COUNT: 14.6 % (ref 12.3–15.4)
GFR SERPL CREATININE-BSD FRML MDRD: 76 ML/MIN/1.73
GLOBULIN UR ELPH-MCNC: 3.8 GM/DL
GLUCOSE BLD-MCNC: 120 MG/DL (ref 65–99)
HCT VFR BLD AUTO: 33.4 % (ref 37.5–51)
HGB BLD-MCNC: 10.9 G/DL (ref 13–17.7)
IMM GRANULOCYTES # BLD AUTO: 0.01 10*3/MM3 (ref 0–0.05)
IMM GRANULOCYTES NFR BLD AUTO: 0.3 % (ref 0–0.5)
LYMPHOCYTES # BLD AUTO: 0.49 10*3/MM3 (ref 0.7–3.1)
LYMPHOCYTES NFR BLD AUTO: 12.3 % (ref 19.6–45.3)
MCH RBC QN AUTO: 28.8 PG (ref 26.6–33)
MCHC RBC AUTO-ENTMCNC: 32.6 G/DL (ref 31.5–35.7)
MCV RBC AUTO: 88.4 FL (ref 79–97)
MONOCYTES # BLD AUTO: 0.45 10*3/MM3 (ref 0.1–0.9)
MONOCYTES NFR BLD AUTO: 11.3 % (ref 5–12)
NEUTROPHILS # BLD AUTO: 2.74 10*3/MM3 (ref 1.7–7)
NEUTROPHILS NFR BLD AUTO: 69 % (ref 42.7–76)
NRBC BLD AUTO-RTO: 0 /100 WBC (ref 0–0.2)
PLATELET # BLD AUTO: 81 10*3/MM3 (ref 140–450)
PMV BLD AUTO: 10.3 FL (ref 6–12)
POTASSIUM BLD-SCNC: 4.4 MMOL/L (ref 3.5–5.2)
PROT SERPL-MCNC: 7.5 G/DL (ref 6–8.5)
RBC # BLD AUTO: 3.78 10*6/MM3 (ref 4.14–5.8)
SODIUM BLD-SCNC: 139 MMOL/L (ref 136–145)
WBC NRBC COR # BLD: 3.97 10*3/MM3 (ref 3.4–10.8)

## 2020-02-26 PROCEDURE — 85025 COMPLETE CBC W/AUTO DIFF WBC: CPT

## 2020-02-26 PROCEDURE — 80053 COMPREHEN METABOLIC PANEL: CPT

## 2020-02-26 PROCEDURE — 25010000002 IOPAMIDOL 61 % SOLUTION: Performed by: INTERNAL MEDICINE

## 2020-02-26 PROCEDURE — 36415 COLL VENOUS BLD VENIPUNCTURE: CPT

## 2020-02-26 PROCEDURE — 82378 CARCINOEMBRYONIC ANTIGEN: CPT

## 2020-02-26 PROCEDURE — 71260 CT THORAX DX C+: CPT

## 2020-02-26 PROCEDURE — 74177 CT ABD & PELVIS W/CONTRAST: CPT

## 2020-02-26 PROCEDURE — 82565 ASSAY OF CREATININE: CPT

## 2020-02-26 RX ADMIN — IOPAMIDOL 99 ML: 612 INJECTION, SOLUTION INTRAVENOUS at 09:49

## 2020-03-02 ENCOUNTER — OFFICE VISIT (OUTPATIENT)
Dept: ONCOLOGY | Facility: CLINIC | Age: 55
End: 2020-03-02

## 2020-03-02 ENCOUNTER — APPOINTMENT (OUTPATIENT)
Dept: ONCOLOGY | Facility: HOSPITAL | Age: 55
End: 2020-03-02

## 2020-03-02 VITALS
HEIGHT: 73 IN | WEIGHT: 198 LBS | TEMPERATURE: 97.8 F | RESPIRATION RATE: 16 BRPM | BODY MASS INDEX: 26.24 KG/M2 | OXYGEN SATURATION: 100 % | DIASTOLIC BLOOD PRESSURE: 70 MMHG | SYSTOLIC BLOOD PRESSURE: 124 MMHG | HEART RATE: 59 BPM

## 2020-03-02 DIAGNOSIS — C20 RECTAL CARCINOMA (HCC): Primary | Chronic | ICD-10-CM

## 2020-03-02 LAB — CREAT BLDA-MCNC: 1 MG/DL (ref 0.6–1.3)

## 2020-03-02 PROCEDURE — 99213 OFFICE O/P EST LOW 20 MIN: CPT | Performed by: INTERNAL MEDICINE

## 2020-03-02 RX ORDER — NADOLOL 40 MG/1
40 TABLET ORAL DAILY
COMMUNITY
Start: 2020-02-12 | End: 2023-03-20

## 2020-03-02 RX ORDER — LISINOPRIL 10 MG/1
TABLET ORAL DAILY
COMMUNITY
Start: 2020-01-29

## 2020-03-02 RX ORDER — LEVOTHYROXINE SODIUM 0.03 MG/1
TABLET ORAL
COMMUNITY
Start: 2020-01-31 | End: 2023-03-20 | Stop reason: DRUGHIGH

## 2020-03-02 NOTE — PROGRESS NOTES
CHIEF COMPLAINT: Follow-up rectal cancer    Problem List:  Oncology/Hematology History    1. Stage IIIB rectal carcinoma clinical T3b N1 aM0  2. Protracted postoperative course due to abdominal pelvic abscess following neoadjuvant Xeloda radiation  3. 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    -12/6/18 MRI of rectum showed T3b with suspicious right internal iliac lymph node and CT chest noncontrast negative except for gallstones  -Per Dr. Kelly, CEA was normal.  -12/18/18 saw me for the first time.  I reviewed her case in our multidisciplinary conference and went over that in detail with her.  She had presented with hematochezia.  Dr. Kelly repeated endoscopy for more exact detailing of the primary location and this appears to be rectal on MRI and endoscopy.  Plan is for randomization on clinical trial N 1048 to neoadjuvant FOLFOX versus standard chemoradiation.  We'll get him to radiation and our research staff for randomization and get his baseline CBC and CMP.    -1/4/19 followed up: Randomized to the Xeloda radiation arm.  He will get that and then 4-6 weeks later had his surgery, and then follow that with 6 months of adjuvant FOLFOX per protocol.  My research assistant met with him today.  He has a Xeloda prescription.  -3/4/2019 MRI: Good response with T2, less likely T3 with spiculation N0 disease.  -4/23/2019 pathology: Laparoscopic assisted converted to lower anterior resection open with stapled coloanal anastomosis and diverting loop ileostomy with liver biopsy showed residual adenoma with focal high-grade dysplasia but no  residual invasive carcinoma.  0 out of 17 lymph nodes.  Mild steatosis on liver biopsy with bridging fibrosis.  -5/9/2019 to 5/15/2019 admitted with abdominal pelvic abscess status post CT-guided percutaneous drain placement presenting with sepsis improved with antibiotics and drainage.  Temp of 104 on presentation.  2 weeks of protracted IV antibiotics with Radames Sosa planned at discharge.  -7/11/2019 follow-up visit:Start of adjuvant therapy delayed due to the above postoperative pelvic abscess.  This was treated surgically initially but then by CT-guided percutaneous drainage as above.  Serial follow-up with Radames Sosa including CTs and antibiotics eventually cleared him adequately to consider resumption of plans for adjuvant therapy.  CTs of the pelvis done on 6/5/2019, 6/13/2019, 6/26/2019 monitoring for this abscess and possible drainage but not able to drain due to decreasing size and minimal residual edema with CT 7/10/2019 showingStable to slight decrease in size of presacral perirectal abscess with presacral edema measuring 3.9 x 1.9 previously 4.1 x 2.5 cm. No new sites of focal fluid collection or loculated fluid.  Hence, modified FOLFOX 6 started at this junction.  -9/30/2019 office visit: Oxaliplatin discontinued due to ongoing thrombocytopenia.  Platelet count today 59,000.  We will continue course #5 and 6 with 5-FU and leucovorin alone to complete adjuvant therapy.  -10/23/2019 completed adjuvant chemotherapy.  -10/28/2019 CT chest abdomen pelvis with contrast showed decreased and fluid in the presacral fat.  Decrease in surrounding soft tissue stranding.  Gallstone seen with enlargement of spleen.  Stranding of the mesenteric roots with varices in the mesentery slightly increased compared to July 2019.  Chest is unremarkable.  CEA 1.53 with bilirubin 1.4, AST 46, alkaline phosphatase 135, white count 2500, hemoglobin 9.8, and platelets 62,000 on 10/21/2019.  -2/23/2020: Saw Dr. Lebron  regarding [place] cirrhosis-induced portal hypertension.  Did not recommend TIPS.  Started nadolol  -2/25/20 20 CT chest abdomen pelvis negative for metastasis but with portal hypertension and varices.  CEA 1.1.  ALT 51.  White count 3970 with hemoglobin 10.9.        Rectal carcinoma (CMS/HCC)    12/18/2018 Initial Diagnosis     Rectal carcinoma (CMS/HCC)      1/7/2019 - 2/4/2019 Chemotherapy     Xeloda radiation on protocol      1/8/2019 - 2/14/2019 Radiation     Radiation OncologyTreatment Course:  Hesham Arevalo received 5040 cGy in 28 fractions to pelvis via External Beam Radiation - EBRT.      3/4/2019 Imaging     MRI of the pelvis:  IMPRESSIONS:  MRI rectal cancer T category is: T2, LESS LIKELY EARLY T3 SPICULATIONS  Maximum EMD of invasion is: 0  Minimum tumor to MRF distance is: 12 MM  Low rectal tumor component: NO  Mesorectal nodes/tumor deposits: NO  EMVI: NO  Extramesorectal nodes: NO      4/23/2019 Surgery     Surgery rectosigmoidectomy pathology report:  Final Diagnosis    1. RECTOSIGMOID COLON, RECTOSIGMOID RESECTION:  Residual adenoma with focal high grade dysplasia with no residual invasive carcinoma identified post-treatment (see comment).  Seventeen lymph nodes negative for carcinoma (0/17)   2. LIVER, CORE NEEDLE BIOPSY:  Mild steatosis with mild chronic inflammation and increased fibrosis including bridging fibrosis (Stage 3-4) (see comment)        COLON AND RECTUM TEMPLATE:  TYPE OF SPECIMEN/PROCEDURE: Rectosigmoid resection.   SPECIMEN INTEGRITY:  Intact.  TUMOR SITE:  Rectum.  TUMOR SIZE (greatest dimension):  Indeterminate  TUMOR LOCATION (applicable only to rectal primaries): Entirely below anterior peritoneal reflection.  MACROSCOPIC INTACTNESS OF MESORECTUM (If applicable): Intact.  MACROSCOPIC TUMOR PERFORATION: Not identified.  TUMOR CONFIGURATION:  Ulcerated.  HISTOLOGIC TYPE:  Adenocarcinoma.  HISTOLOGIC GRADE:  G3-4 (per previous biopsy).  MICROSCOPIC DEPTH OF TUMOR INVASION/EXTENSION:   No residual tumor identified.  PERITONEAL INVOLVEMENT:  Not identified.  LYMPHVASCULAR INVASION:  Not identified.  PERINEURAL INVASION: Not identified.  SURGICAL MARGINS: Uninvolved.  STATUS AND DISTANCE FROM PROXIMAL MUCOSAL MARGIN:  Uninvolved, 13.0 cm.  STATUS AND DISTANCE FROM DISTAL MUCOSAL MARGIN: Uninvolved, 1.2 cm.  STATUS AND DISTANCE FROM MESENTERIC MARGIN: Not applicable.  STATUS AND DISTANCE FROM RADIAL MARGIN: Uninvolved, 1.7 cm.  DISTANCE FROM DENTATE LINE (RECTAL TUMOR ONLY):  Indeterminate.  TUMOR DEPOSITS (DISCONTINUOUS EXTRAMURAL EXTENSION):  Not identified.  NUMBER OF LYMPH NODES INVOLVED BY METASTATIC NEOPLASM: 0.  NUMBER OF REGIONAL LYMPH NODES EXAMINED: 17.  EXTRANODAL EXTENSION:  Not applicable.  TREATMENT EFFECT:  Present, no viable cancer cells identified (complete response, score 0).  ADDITIONAL PATHOLOGIC FINDINGS:  None.  MSI TESTING:  No evidence of MSI based on reported IHC on outside biopsy  OTHER ANCILLARY STUDIES (BRAF, KRAS, ETC.):  Should be performed on initial diagnostic biopsy if needed.  AJCC PATHOLOGIC STAGE:  (COMPLETED BY PATHOLOGIST, BASED ONLY ON TISSUE FINDINGS, MORE EXTENSIVE DISEASE MAY NOT BE KNOWN TO THE PATHOLOGIST)  ypT=  0  ypN=  0  AJCC PATHOLOGIC STAGE:  y0.                 5/15/2019 Adverse Reaction     -5/9/2019 to 5/15/2019 admitted with abdominal pelvic abscess status post CT-guided percutaneous drain placement presenting with sepsis improved with antibiotics and drainage.  Temp of 104 on presentation.  2 weeks of protracted IV antibiotics with Radames Sosa planned at discharge      7/22/2019 - 10/23/2019 Chemotherapy     OP COLON mFOLFOX6 OXALIplatin / Leucovorin / Fluorouracil  Start of adjuvant therapy delayed due to the above postoperative pelvic abscess.  This was treated surgically initially but then by CT-guided percutaneous drainage as above.  Serial follow-up with Radames Sosa including CTs and antibiotics eventually cleared him adequately to  consider resumption of plans for adjuvant therapy.  CTs of the pelvis done on 6/5/2019, 6/13/2019, 6/26/2019 monitoring for this abscess and possible drainage but not able to drain due to decreasing size and minimal residual edema with CT 7/10/2019 showingStable to slight decrease in size of presacral perirectal abscess with presacral edema measuring 3.9 x 1.9 previously 4.1 x 2.5 cm. No new sites of focal fluid collection or loculated fluid.      9/30/2019 Adverse Reaction     Oxaliplatin discontinued due to ongoing thrombocytopenia.      10/28/2019 Imaging     10/28/2019 CT chest abdomen pelvis with contrast showed decreased and fluid in the presacral fat.  Decrease in surrounding soft tissue stranding.  Gallstone seen with enlargement of spleen.  Stranding of the mesenteric roots with varices in the mesentery slightly increased compared to July 2019.  Chest is unremarkable.  CEA 1.53 with bilirubin 1.4, AST 46, alkaline phosphatase 135, white count 2500, hemoglobin 9.8, and platelets 62,000 on 10/21/2019.      2/25/2020 Imaging     CT chest abdomen pelvis negative for metastasis but with portal hypertension and varices.  CEA 1.1.  ALT 51.  White count 3970 hemoglobin 10.9 platelets 81,000.         HISTORY OF PRESENT ILLNESS:  The patient is a 54 y.o. male, here for follow up on management of rectal cancer status post adjuvant therapy as outlined above with pancytopenia secondary to portal hypertension from [place] induced cirrhosis.  Has seen Dr. Colin Lebron who is watching the portal hypertension.  I reviewed his CTs and images thereof.  No evidence of recurrence.      Past Medical History:   Diagnosis Date   • Arthritis     knees    • Cancer (CMS/HCC) 11/2018    colon   • Disease of thyroid gland    • Fatty liver    • Fatty liver    • Hashimoto's disease    • Hypertension    • Ileostomy in place (CMS/HCC)    • Psoriasis    • Wears glasses      Past Surgical History:   Procedure Laterality Date   • COLON RESECTION  "N/A 4/23/2019    Procedure: LAPAROSCOPIC LOWER ANTERIOR RESECTION WITH DIVERTING LOOP ILEOOSTOMY, SPLENIC FLEIXURE MOBILIZATION AND LIVER BIOPSY, AND PROCTOSCOPY;  Surgeon: Pau Kelly MD;  Location: [facility] OR;  Service: General   • COLONOSCOPY      2018   • ILEOSTOMY  04/2019   • LIVER BIOPSY  2003   • VASECTOMY  1998   • VENOUS ACCESS DEVICE (PORT) INSERTION N/A 7/18/2019    Procedure: PORT PLACEMENT;  Surgeon: Franky Cazares MD;  Location: [facility] OR;  Service: General       No Known Allergies    Family History and Social History reviewed and changed as necessary      REVIEW OF SYSTEM:   Review of Systems   Constitutional: Negative for appetite change, chills, diaphoresis, fatigue, fever and unexpected weight change.   HENT:   Negative for mouth sores, sore throat and trouble swallowing.    Eyes: Negative for icterus.   Respiratory: Negative for cough, hemoptysis and shortness of breath.    Cardiovascular: Negative for chest pain, leg swelling and palpitations.   Gastrointestinal: Negative for abdominal distention, abdominal pain, blood in stool, constipation, diarrhea, nausea and vomiting.   Endocrine: Negative for hot flashes.   Genitourinary: Negative for bladder incontinence, difficulty urinating, dysuria, frequency and hematuria.    Musculoskeletal: Negative for gait problem, neck pain and neck stiffness.   Skin: Negative for rash.   Neurological: Negative for dizziness, gait problem, headaches, light-headedness and numbness.   Hematological: Negative for adenopathy. Does not bruise/bleed easily.   Psychiatric/Behavioral: Negative for depression. The patient is not nervous/anxious.    All other systems reviewed and are negative.       PHYSICAL EXAM    Vitals:    03/02/20 0818   BP: 124/70   Pulse: 59   Resp: 16   Temp: 97.8 °F (36.6 °C)   SpO2: 100%   Weight: 89.8 kg (198 lb)   Height: 185.4 cm (73\")     Constitutional: Appears well-developed and well-nourished. No distress.   ECOG: (0) Fully " Active - Able to Carry On All Pre-disease Performance Without Restriction  HENT:   Head: Normocephalic.   Mouth/Throat: Oropharynx is clear and moist.   Eyes: Conjunctivae are normal. Pupils are equal, round, and reactive to light. No scleral icterus.   Neck: Neck supple. No JVD present. No thyromegaly present.   Cardiovascular: Normal rate, regular rhythm and normal heart sounds.    Pulmonary/Chest: Breath sounds normal. No respiratory distress.   Abdominal: Soft. Exhibits no distension and no mass. There is no hepatosplenomegaly. There is no tenderness. There is no rebound and no guarding.   Musculoskeletal:Exhibits no edema, tenderness or deformity.   Neurological: Alert and oriented to person, place, and time. Exhibits normal muscle tone.   Skin: No ecchymosis, no petechiae and no rash noted. Not diaphoretic. No cyanosis. Nails show no clubbing.   Psychiatric: Normal mood and affect.   Vitals reviewed.      Lab Results   Component Value Date    HGB 10.9 (L) 02/26/2020    HCT 33.4 (L) 02/26/2020    MCV 88.4 02/26/2020    PLT 81 (L) 02/26/2020    WBC 3.97 02/26/2020    NEUTROABS 2.74 02/26/2020    LYMPHSABS 0.49 (L) 02/26/2020    MONOSABS 0.45 02/26/2020    EOSABS 0.25 02/26/2020    BASOSABS 0.03 02/26/2020       Lab Results   Component Value Date    GLUCOSE 120 (H) 02/26/2020    BUN 13 02/26/2020    CREATININE 1.01 02/26/2020     02/26/2020    K 4.4 02/26/2020     02/26/2020    CO2 24.0 02/26/2020    CALCIUM 9.4 02/26/2020    PROTEINTOT 7.5 02/26/2020    ALBUMIN 3.70 02/26/2020    BILITOT 1.0 02/26/2020    ALKPHOS 99 02/26/2020    AST 51 (H) 02/26/2020    ALT 33 02/26/2020                   ASSESSMENT & PLAN:  1. Stage III rectal cancer status post neoadjuvant Xeloda radiation resection followed by adjuvant Xeloda oxaliplatin  2. [place] induced cirrhosis with portal hypertension and varices with splenomegaly  3. Pancytopenia due to problem #2    Discussion: Still pancytopenic with white count 3970  hemoglobin 10.9 and platelets 81,000.  2/25/2020 CT chest abdomen pelvis negative for metastasis but has portal hypertension and varices.  CEA is 1.1.  ALT 51.  Continuing to follow-up on clinical trial .  Had colonoscopy in November with Dr. Lebron according to the patient.  Dr. Kelly and Dr. Lebron to decide on timing of reversal.  We will get CT chest abdomen pelvis, CEA, CBC, and CMP every 3 months until November 2020 and then every 6 months till November 2024.  We will see my nurse practitioner back in 3 months for survivorship visit.  Discussed with patient face-to-face 15 minutes greater than 50% spent counseling regarding this plan.        Ousmane Moeller MD    03/02/2020

## 2020-06-02 ENCOUNTER — HOSPITAL ENCOUNTER (OUTPATIENT)
Dept: CT IMAGING | Facility: HOSPITAL | Age: 55
Discharge: HOME | End: 2020-06-02
Admitting: INTERNAL MEDICINE

## 2020-06-02 ENCOUNTER — LAB (OUTPATIENT)
Dept: LAB | Facility: HOSPITAL | Age: 55
End: 2020-06-02

## 2020-06-02 DIAGNOSIS — C20 RECTAL CARCINOMA (HCC): Chronic | ICD-10-CM

## 2020-06-02 DIAGNOSIS — C20 RECTAL CARCINOMA (HCC): ICD-10-CM

## 2020-06-02 LAB
ALBUMIN SERPL-MCNC: 4.1 G/DL (ref 3.5–5.2)
ALBUMIN/GLOB SERPL: 1.1 G/DL
ALP SERPL-CCNC: 97 U/L (ref 39–117)
ALT SERPL W P-5'-P-CCNC: 22 U/L (ref 1–41)
ANION GAP SERPL CALCULATED.3IONS-SCNC: 15 MMOL/L (ref 5–15)
AST SERPL-CCNC: 59 U/L (ref 1–40)
BASOPHILS # BLD AUTO: 0.03 10*3/MM3 (ref 0–0.2)
BASOPHILS NFR BLD AUTO: 0.9 % (ref 0–1.5)
BILIRUB SERPL-MCNC: 1.2 MG/DL (ref 0.2–1.2)
BUN BLD-MCNC: 17 MG/DL (ref 6–20)
BUN/CREAT SERPL: 16 (ref 7–25)
CALCIUM SPEC-SCNC: 9.4 MG/DL (ref 8.6–10.5)
CEA SERPL-MCNC: 0.86 NG/ML
CHLORIDE SERPL-SCNC: 101 MMOL/L (ref 98–107)
CO2 SERPL-SCNC: 22 MMOL/L (ref 22–29)
CREAT BLD-MCNC: 1.06 MG/DL (ref 0.76–1.27)
CREAT BLDA-MCNC: 1.1 MG/DL (ref 0.6–1.3)
DEPRECATED RDW RBC AUTO: 48.3 FL (ref 37–54)
EOSINOPHIL # BLD AUTO: 0.26 10*3/MM3 (ref 0–0.4)
EOSINOPHIL NFR BLD AUTO: 7.9 % (ref 0.3–6.2)
ERYTHROCYTE [DISTWIDTH] IN BLOOD BY AUTOMATED COUNT: 15 % (ref 12.3–15.4)
GFR SERPL CREATININE-BSD FRML MDRD: 72 ML/MIN/1.73
GLOBULIN UR ELPH-MCNC: 3.6 GM/DL
GLUCOSE BLD-MCNC: 116 MG/DL (ref 65–99)
HCT VFR BLD AUTO: 35.6 % (ref 37.5–51)
HGB BLD-MCNC: 11.3 G/DL (ref 13–17.7)
IMM GRANULOCYTES # BLD AUTO: 0.01 10*3/MM3 (ref 0–0.05)
IMM GRANULOCYTES NFR BLD AUTO: 0.3 % (ref 0–0.5)
LYMPHOCYTES # BLD AUTO: 0.52 10*3/MM3 (ref 0.7–3.1)
LYMPHOCYTES NFR BLD AUTO: 15.9 % (ref 19.6–45.3)
MCH RBC QN AUTO: 27.8 PG (ref 26.6–33)
MCHC RBC AUTO-ENTMCNC: 31.7 G/DL (ref 31.5–35.7)
MCV RBC AUTO: 87.5 FL (ref 79–97)
MONOCYTES # BLD AUTO: 0.32 10*3/MM3 (ref 0.1–0.9)
MONOCYTES NFR BLD AUTO: 9.8 % (ref 5–12)
NEUTROPHILS # BLD AUTO: 2.14 10*3/MM3 (ref 1.7–7)
NEUTROPHILS NFR BLD AUTO: 65.2 % (ref 42.7–76)
NRBC BLD AUTO-RTO: 0 /100 WBC (ref 0–0.2)
PLATELET # BLD AUTO: 74 10*3/MM3 (ref 140–450)
PMV BLD AUTO: 10.3 FL (ref 6–12)
POTASSIUM BLD-SCNC: 4.6 MMOL/L (ref 3.5–5.2)
PROT SERPL-MCNC: 7.7 G/DL (ref 6–8.5)
RBC # BLD AUTO: 4.07 10*6/MM3 (ref 4.14–5.8)
SODIUM BLD-SCNC: 138 MMOL/L (ref 136–145)
WBC NRBC COR # BLD: 3.28 10*3/MM3 (ref 3.4–10.8)

## 2020-06-02 PROCEDURE — 71260 CT THORAX DX C+: CPT

## 2020-06-02 PROCEDURE — 36415 COLL VENOUS BLD VENIPUNCTURE: CPT

## 2020-06-02 PROCEDURE — 85025 COMPLETE CBC W/AUTO DIFF WBC: CPT

## 2020-06-02 PROCEDURE — 74177 CT ABD & PELVIS W/CONTRAST: CPT

## 2020-06-02 PROCEDURE — 80053 COMPREHEN METABOLIC PANEL: CPT

## 2020-06-02 PROCEDURE — 82565 ASSAY OF CREATININE: CPT

## 2020-06-02 PROCEDURE — 82378 CARCINOEMBRYONIC ANTIGEN: CPT

## 2020-06-02 PROCEDURE — 25010000002 IOPAMIDOL 61 % SOLUTION: Performed by: INTERNAL MEDICINE

## 2020-06-02 RX ADMIN — IOPAMIDOL 95 ML: 612 INJECTION, SOLUTION INTRAVENOUS at 09:42

## 2020-06-09 ENCOUNTER — CLINICAL SUPPORT (OUTPATIENT)
Dept: ONCOLOGY | Facility: CLINIC | Age: 55
End: 2020-06-09

## 2020-06-09 VITALS
OXYGEN SATURATION: 99 % | HEIGHT: 73 IN | WEIGHT: 200 LBS | RESPIRATION RATE: 16 BRPM | TEMPERATURE: 98 F | BODY MASS INDEX: 26.51 KG/M2 | HEART RATE: 54 BPM | SYSTOLIC BLOOD PRESSURE: 123 MMHG | DIASTOLIC BLOOD PRESSURE: 69 MMHG

## 2020-06-09 DIAGNOSIS — C20 RECTAL CARCINOMA (HCC): Primary | Chronic | ICD-10-CM

## 2020-06-09 DIAGNOSIS — K76.0 HEPATIC STEATOSIS: ICD-10-CM

## 2020-06-09 DIAGNOSIS — D61.818 PANCYTOPENIA (HCC): ICD-10-CM

## 2020-06-09 PROCEDURE — 99214 OFFICE O/P EST MOD 30 MIN: CPT | Performed by: NURSE PRACTITIONER

## 2020-06-09 NOTE — PROGRESS NOTES
MEDICAL ONCOLOGY CANCER SURVIVORSHIP VISIT    Hesham Arevalo Jr.  [id number]  1965    Chief Complaint: Rectal cancer      History of present illness:  Hesham Arevalo Jr. is a 54 y.o. year old male who is here today for the Cancer Survivorship visit, see oncology history below. The patient has been doing well since we saw him last with no new complaints.  No change in his health.  Still awaiting ileostomy reversal.  Follows up with Dr. Lebron in August regarding hepatic steatosis/cirrhosis.  Appetite is good.  No change in his bowel or bladder habits, ostomy is working without difficulty.  Here today to go over his recent CT scans and labs and for survivorship visit.     Cancer History:   Oncology/Hematology History    1. Stage IIIB rectal carcinoma clinical T3b N1 aM0  2. Protracted postoperative course due to abdominal pelvic abscess following neoadjuvant Xeloda radiation  3. 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    -12/6/18 MRI of rectum showed T3b with suspicious right internal iliac lymph node and CT chest noncontrast negative except for gallstones  -Per Dr. Kelly, CEA was normal.  -12/18/18 saw me for the first time.  I reviewed her case in our multidisciplinary conference and went over that in detail with her.  She had presented with hematochezia.  Dr. Kelly repeated endoscopy for more exact detailing of the primary location and this appears to be rectal on MRI and endoscopy.  Plan is for randomization on clinical trial N 1048 to neoadjuvant FOLFOX versus standard chemoradiation.  We'll get him to radiation and our  research staff for randomization and get his baseline CBC and CMP.    -1/4/19 followed up: Randomized to the Xeloda radiation arm.  He will get that and then 4-6 weeks later had his surgery, and then follow that with 6 months of adjuvant FOLFOX per protocol.  My research assistant met with him today.  He has a Xeloda prescription.  -3/4/2019 MRI: Good response with T2, less likely T3 with spiculation N0 disease.  -4/23/2019 pathology: Laparoscopic assisted converted to lower anterior resection open with stapled coloanal anastomosis and diverting loop ileostomy with liver biopsy showed residual adenoma with focal high-grade dysplasia but no residual invasive carcinoma.  0 out of 17 lymph nodes.  Mild steatosis on liver biopsy with bridging fibrosis.  -5/9/2019 to 5/15/2019 admitted with abdominal pelvic abscess status post CT-guided percutaneous drain placement presenting with sepsis improved with antibiotics and drainage.  Temp of 104 on presentation.  2 weeks of protracted IV antibiotics with Radames Sosa planned at discharge.  -7/11/2019 follow-up visit:Start of adjuvant therapy delayed due to the above postoperative pelvic abscess.  This was treated surgically initially but then by CT-guided percutaneous drainage as above.  Serial follow-up with Radames Sosa including CTs and antibiotics eventually cleared him adequately to consider resumption of plans for adjuvant therapy.  CTs of the pelvis done on 6/5/2019, 6/13/2019, 6/26/2019 monitoring for this abscess and possible drainage but not able to drain due to decreasing size and minimal residual edema with CT 7/10/2019 showingStable to slight decrease in size of presacral perirectal abscess with presacral edema measuring 3.9 x 1.9 previously 4.1 x 2.5 cm. No new sites of focal fluid collection or loculated fluid.  Hence, modified FOLFOX 6 started at this junction.  -9/30/2019 office visit: Oxaliplatin discontinued due to ongoing thrombocytopenia.  Platelet  count today 59,000.  We will continue course #5 and 6 with 5-FU and leucovorin alone to complete adjuvant therapy.  -10/23/2019 completed adjuvant chemotherapy.  -10/28/2019 CT chest abdomen pelvis with contrast showed decreased and fluid in the presacral fat.  Decrease in surrounding soft tissue stranding.  Gallstone seen with enlargement of spleen.  Stranding of the mesenteric roots with varices in the mesentery slightly increased compared to July 2019.  Chest is unremarkable.  CEA 1.53 with bilirubin 1.4, AST 46, alkaline phosphatase 135, white count 2500, hemoglobin 9.8, and platelets 62,000 on 10/21/2019.  -11/2019 colonoscopy with Dr. Lebron negative according to patient.  -2/23/2020: Saw Dr. Lebron regarding [place] cirrhosis-induced portal hypertension.  Did not recommend TIPS.  Started nadolol  -2/25/20 20 CT chest abdomen pelvis negative for metastasis but with portal hypertension and varices.  CEA 1.1.  ALT 51.  White count 3970 with hemoglobin 10.9.        Rectal carcinoma (CMS/HCC)    12/18/2018 Initial Diagnosis     Rectal carcinoma (CMS/HCC)      1/7/2019 - 2/4/2019 Chemotherapy     Xeloda radiation on protocol      1/8/2019 - 2/14/2019 Radiation     Radiation OncologyTreatment Course:  Hesham Arevlao received 5040 cGy in 28 fractions to pelvis via External Beam Radiation - EBRT.      3/4/2019 Imaging     MRI of the pelvis:  IMPRESSIONS:  MRI rectal cancer T category is: T2, LESS LIKELY EARLY T3 SPICULATIONS  Maximum EMD of invasion is: 0  Minimum tumor to MRF distance is: 12 MM  Low rectal tumor component: NO  Mesorectal nodes/tumor deposits: NO  EMVI: NO  Extramesorectal nodes: NO      4/23/2019 Surgery     Surgery rectosigmoidectomy pathology report:  Final Diagnosis    1. RECTOSIGMOID COLON, RECTOSIGMOID RESECTION:  Residual adenoma with focal high grade dysplasia with no residual invasive carcinoma identified post-treatment (see comment).  Seventeen lymph nodes negative for carcinoma (0/17)   2. LIVER,  CORE NEEDLE BIOPSY:  Mild steatosis with mild chronic inflammation and increased fibrosis including bridging fibrosis (Stage 3-4) (see comment)        COLON AND RECTUM TEMPLATE:  TYPE OF SPECIMEN/PROCEDURE: Rectosigmoid resection.   SPECIMEN INTEGRITY:  Intact.  TUMOR SITE:  Rectum.  TUMOR SIZE (greatest dimension):  Indeterminate  TUMOR LOCATION (applicable only to rectal primaries): Entirely below anterior peritoneal reflection.  MACROSCOPIC INTACTNESS OF MESORECTUM (If applicable): Intact.  MACROSCOPIC TUMOR PERFORATION: Not identified.  TUMOR CONFIGURATION:  Ulcerated.  HISTOLOGIC TYPE:  Adenocarcinoma.  HISTOLOGIC GRADE:  G3-4 (per previous biopsy).  MICROSCOPIC DEPTH OF TUMOR INVASION/EXTENSION:  No residual tumor identified.  PERITONEAL INVOLVEMENT:  Not identified.  LYMPHVASCULAR INVASION:  Not identified.  PERINEURAL INVASION: Not identified.  SURGICAL MARGINS: Uninvolved.  STATUS AND DISTANCE FROM PROXIMAL MUCOSAL MARGIN:  Uninvolved, 13.0 cm.  STATUS AND DISTANCE FROM DISTAL MUCOSAL MARGIN: Uninvolved, 1.2 cm.  STATUS AND DISTANCE FROM MESENTERIC MARGIN: Not applicable.  STATUS AND DISTANCE FROM RADIAL MARGIN: Uninvolved, 1.7 cm.  DISTANCE FROM DENTATE LINE (RECTAL TUMOR ONLY):  Indeterminate.  TUMOR DEPOSITS (DISCONTINUOUS EXTRAMURAL EXTENSION):  Not identified.  NUMBER OF LYMPH NODES INVOLVED BY METASTATIC NEOPLASM: 0.  NUMBER OF REGIONAL LYMPH NODES EXAMINED: 17.  EXTRANODAL EXTENSION:  Not applicable.  TREATMENT EFFECT:  Present, no viable cancer cells identified (complete response, score 0).  ADDITIONAL PATHOLOGIC FINDINGS:  None.  MSI TESTING:  No evidence of MSI based on reported IHC on outside biopsy  OTHER ANCILLARY STUDIES (BRAF, KRAS, ETC.):  Should be performed on initial diagnostic biopsy if needed.  AJCC PATHOLOGIC STAGE:  (COMPLETED BY PATHOLOGIST, BASED ONLY ON TISSUE FINDINGS, MORE EXTENSIVE DISEASE MAY NOT BE KNOWN TO THE PATHOLOGIST)  ypT=  0  ypN=  0  AJCC PATHOLOGIC STAGE:  y0.                  5/15/2019 Adverse Reaction     -5/9/2019 to 5/15/2019 admitted with abdominal pelvic abscess status post CT-guided percutaneous drain placement presenting with sepsis improved with antibiotics and drainage.  Temp of 104 on presentation.  2 weeks of protracted IV antibiotics with Radames Sosa planned at discharge      7/22/2019 - 10/23/2019 Chemotherapy     OP COLON mFOLFOX6 OXALIplatin / Leucovorin / Fluorouracil  Start of adjuvant therapy delayed due to the above postoperative pelvic abscess.  This was treated surgically initially but then by CT-guided percutaneous drainage as above.  Serial follow-up with Radames Sosa including CTs and antibiotics eventually cleared him adequately to consider resumption of plans for adjuvant therapy.  CTs of the pelvis done on 6/5/2019, 6/13/2019, 6/26/2019 monitoring for this abscess and possible drainage but not able to drain due to decreasing size and minimal residual edema with CT 7/10/2019 showingStable to slight decrease in size of presacral perirectal abscess with presacral edema measuring 3.9 x 1.9 previously 4.1 x 2.5 cm. No new sites of focal fluid collection or loculated fluid.      9/30/2019 Adverse Reaction     Oxaliplatin discontinued due to ongoing thrombocytopenia.      10/28/2019 Imaging     10/28/2019 CT chest abdomen pelvis with contrast showed decreased and fluid in the presacral fat.  Decrease in surrounding soft tissue stranding.  Gallstone seen with enlargement of spleen.  Stranding of the mesenteric roots with varices in the mesentery slightly increased compared to July 2019.  Chest is unremarkable.  CEA 1.53 with bilirubin 1.4, AST 46, alkaline phosphatase 135, white count 2500, hemoglobin 9.8, and platelets 62,000 on 10/21/2019.      2/25/2020 Imaging     CT chest abdomen pelvis negative for metastasis but with portal hypertension and varices.  CEA 1.1.  ALT 51.  White count 3970 hemoglobin 10.9 platelets 81,000.      6/2/2020  Imaging     CT chest, abdomen and pelvis IMPRESSION:  1. Stable appearance of the chest without metastatic involvement or  acute pathology.  2. Stable appearance of postsurgical changes in the region of the rectum  and presacral edema stable from prior without evidence for local  recurrence or progression of involvement.  3. Cholelithiasis.  4. Hepatic steatosis.  CEA 0.86         Past Medical History:   Diagnosis Date   • Arthritis     knees    • Cancer (CMS/HCC) 11/2018    colon   • Disease of thyroid gland    • Fatty liver    • Fatty liver    • Hashimoto's disease    • Hypertension    • Ileostomy in place (CMS/HCC)    • Psoriasis    • Wears glasses        Past Surgical History:   Procedure Laterality Date   • COLON RESECTION N/A 4/23/2019    Procedure: LAPAROSCOPIC LOWER ANTERIOR RESECTION WITH DIVERTING LOOP ILEOOSTOMY, SPLENIC FLEIXURE MOBILIZATION AND LIVER BIOPSY, AND PROCTOSCOPY;  Surgeon: Pau Kelly MD;  Location:  [facility];  Service: General   • COLONOSCOPY      2018   • ILEOSTOMY  04/2019   • LIVER BIOPSY  2003   • VASECTOMY  1998   • VENOUS ACCESS DEVICE (PORT) INSERTION N/A 7/18/2019    Procedure: PORT PLACEMENT;  Surgeon: Franky Cazares MD;  Location:  [facility];  Service: General       MEDICATIONS: The current medication list was reviewed and reconciled.     Allergies:  has No Known Allergies.    Family History   Problem Relation Age of Onset   • Breast cancer Mother    • Cancer Father          Review of Systems   Constitutional: Negative for fatigue, fever and unexpected weight change.   HENT: Negative for congestion, hearing loss, sore throat and trouble swallowing.    Eyes: Negative for visual disturbance.   Respiratory: Negative for cough, shortness of breath and wheezing.    Cardiovascular: Negative for chest pain and leg swelling.   Gastrointestinal: Negative for abdominal distention, abdominal pain, constipation, diarrhea, nausea and vomiting.   Endocrine: Negative.   "  Genitourinary: Negative.    Musculoskeletal: Negative for arthralgias, back pain and gait problem.   Skin: Negative.    Allergic/Immunologic: Negative.    Neurological: Negative for dizziness, weakness, numbness and headaches.   Hematological: Negative for adenopathy. Does not bruise/bleed easily.   Psychiatric/Behavioral: Negative.    All other systems reviewed and are negative.      Physical Exam  Vital Signs: /69   Pulse 54   Temp 98 °F (36.7 °C)   Resp 16   Ht 185.4 cm (73\")   Wt 90.7 kg (200 lb)   SpO2 99%   BMI 26.39 kg/m²    General Appearance:  alert, cooperative, no apparent distress, appears stated age and normal weight   Neurologic/Psychiatric: A&O x 3, gait steady, appropriate affect   HEENT:  Normocephalic, without obvious abnormality, mucous membranes moist   Neck: Supple, symmetrical, trachea midline, no adenopathy;  No thyromegaly, masses, or tenderness   Back:   Symmetric, no curvature, ROM normal, no CVA tenderness   Lungs:   Clear to auscultation bilaterally; respirations regular, even, and unlabored bilaterally   Heart:  Regular rate and rhythm, no murmurs appreciated   Abdomen:   Soft, non-tender and non-distended   Lymph nodes: No cervical or supraclavicular adenopathy noted   Extremities: Normal, atraumatic; no clubbing, cyanosis, or edema      ECOG Performance Status: (1) Restricted in Physically Strenuous Activity, Ambulatory & Able to Do Work of Light Nature    Recent Results (from the past 336 hour(s))   POC Creatinine    Collection Time: 06/02/20  9:37 AM   Result Value Ref Range    Creatinine 1.10 0.60 - 1.30 mg/dL   CEA    Collection Time: 06/02/20  9:42 AM   Result Value Ref Range    CEA 0.86 ng/mL   Comprehensive Metabolic Panel    Collection Time: 06/02/20  9:42 AM   Result Value Ref Range    Glucose 116 (H) 65 - 99 mg/dL    BUN 17 6 - 20 mg/dL    Creatinine 1.06 0.76 - 1.27 mg/dL    Sodium 138 136 - 145 mmol/L    Potassium 4.6 3.5 - 5.2 mmol/L    Chloride 101 98 - 107 " mmol/L    CO2 22.0 22.0 - 29.0 mmol/L    Calcium 9.4 8.6 - 10.5 mg/dL    Total Protein 7.7 6.0 - 8.5 g/dL    Albumin 4.10 3.50 - 5.20 g/dL    ALT (SGPT) 22 1 - 41 U/L    AST (SGOT) 59 (H) 1 - 40 U/L    Alkaline Phosphatase 97 39 - 117 U/L    Total Bilirubin 1.2 0.2 - 1.2 mg/dL    eGFR Non African Amer 72 >60 mL/min/1.73    Globulin 3.6 gm/dL    A/G Ratio 1.1 g/dL    BUN/Creatinine Ratio 16.0 7.0 - 25.0    Anion Gap 15.0 5.0 - 15.0 mmol/L   CBC Auto Differential    Collection Time: 06/02/20  9:42 AM   Result Value Ref Range    WBC 3.28 (L) 3.40 - 10.80 10*3/mm3    RBC 4.07 (L) 4.14 - 5.80 10*6/mm3    Hemoglobin 11.3 (L) 13.0 - 17.7 g/dL    Hematocrit 35.6 (L) 37.5 - 51.0 %    MCV 87.5 79.0 - 97.0 fL    MCH 27.8 26.6 - 33.0 pg    MCHC 31.7 31.5 - 35.7 g/dL    RDW 15.0 12.3 - 15.4 %    RDW-SD 48.3 37.0 - 54.0 fl    MPV 10.3 6.0 - 12.0 fL    Platelets 74 (L) 140 - 450 10*3/mm3    Neutrophil % 65.2 42.7 - 76.0 %    Lymphocyte % 15.9 (L) 19.6 - 45.3 %    Monocyte % 9.8 5.0 - 12.0 %    Eosinophil % 7.9 (H) 0.3 - 6.2 %    Basophil % 0.9 0.0 - 1.5 %    Immature Grans % 0.3 0.0 - 0.5 %    Neutrophils, Absolute 2.14 1.70 - 7.00 10*3/mm3    Lymphocytes, Absolute 0.52 (L) 0.70 - 3.10 10*3/mm3    Monocytes, Absolute 0.32 0.10 - 0.90 10*3/mm3    Eosinophils, Absolute 0.26 0.00 - 0.40 10*3/mm3    Basophils, Absolute 0.03 0.00 - 0.20 10*3/mm3    Immature Grans, Absolute 0.01 0.00 - 0.05 10*3/mm3    nRBC 0.0 0.0 - 0.2 /100 WBC     Ct Chest With Contrast    Result Date: 6/2/2020  1. Stable appearance of the chest without metastatic involvement or acute pathology. 2. Stable appearance of postsurgical changes in the region of the rectum and presacral edema stable from prior without evidence for local recurrence or progression of involvement. 3. Cholelithiasis. 4. Hepatic steatosis.  D:  06/02/2020 E:  06/02/2020  This report was finalized on 6/2/2020 7:12 PM by Dr. Jairo Rowland.      Ct Abdomen Pelvis With Contrast    Result Date:  6/2/2020  1. Stable appearance of the chest without metastatic involvement or acute pathology. 2. Stable appearance of postsurgical changes in the region of the rectum and presacral edema stable from prior without evidence for local recurrence or progression of involvement. 3. Cholelithiasis. 4. Hepatic steatosis.  D:  06/02/2020 E:  06/02/2020  This report was finalized on 6/2/2020 7:12 PM by Dr. Jairo Rowland.              Assessment and Plan:  Diagnoses and all orders for this visit:    Rectal carcinoma (CMS/HCC)  -     CBC & Differential; Future  -     CEA; Future  -     Comprehensive Metabolic Panel; Future  -     CT Chest With Contrast; Future  -     CT Abdomen Pelvis With Contrast; Future  -     Ambulatory Referral to Colorectal Surgery    Hepatic steatosis    Pancytopenia (CMS/HCC)        Discussion:    Current CT scans show no evidence of disease recurrence, he has no new worrisome symptoms.  CEA was normal at 0.86.  I will get him back to Dr. Kelly as he states he has not had his 1 year colonoscopy, he also would like to discuss potential ileostomy reversal.  He sees Dr. Lebron in August regarding hepatic steatosis/cirrhosis.  Liver function studies from 6/2/2020 showed normal ALT, AST slightly elevated at 59 with upper limits of normal being 40, normal bilirubin at 1.2.    His pancytopenia is secondary to [place] induced cirrhosis with portal hypertension and varices with splenomegaly.  Current CBC shows WBC low decreased at 3.28, normal ANC 2140.  Platelet count stable at 74,000, hemoglobin slightly improved at 11.3 with hematocrit of 35.6%.    Plan of care outlined previously by Dr. Moeller: we will get CT chest abdomen pelvis, CEA, CBC, and CMP every 3 months until November 2020 and then every 6 months till November 2024.  I have ordered repeat CT scans prior to return in 3 months, if those are negative and labs are stable along with normal CEA I will likely then go to every 6 months per NCCN guidelines.    The  patient and I have reviewed holdener personal Survivorship Care Plan in detail. We discussed diagnosis, pathology, histology, all treatments, and ongoing surveillance recommendations. All questions were answered to his satisfaction. The patient is in agreement with our plan for ongoing surveillance as outlined in the plan. A copy of this document was provided at the completion of our visit.  A copy has also been sent to the patient's primary care provider.    This was a 30 minute visit with 25 minutes spent in direct face to face review of the Survivorship Care Plan.    Return to clinic in 3 months for ongoing cancer surveillance.      Ana Self, APRN  06/09/2020

## 2020-09-08 ENCOUNTER — HOSPITAL ENCOUNTER (OUTPATIENT)
Dept: CT IMAGING | Facility: HOSPITAL | Age: 55
Discharge: HOME | End: 2020-09-08

## 2020-09-08 ENCOUNTER — LAB (OUTPATIENT)
Dept: LAB | Facility: HOSPITAL | Age: 55
End: 2020-09-08

## 2020-09-08 DIAGNOSIS — C20 RECTAL CARCINOMA (HCC): Chronic | ICD-10-CM

## 2020-09-08 DIAGNOSIS — C20 RECTAL CARCINOMA (HCC): ICD-10-CM

## 2020-09-08 LAB
ALBUMIN SERPL-MCNC: 3.8 G/DL (ref 3.5–5.2)
ALBUMIN/GLOB SERPL: 1.1 G/DL
ALP SERPL-CCNC: 92 U/L (ref 39–117)
ALT SERPL W P-5'-P-CCNC: 33 U/L (ref 1–41)
ANION GAP SERPL CALCULATED.3IONS-SCNC: 8 MMOL/L (ref 5–15)
AST SERPL-CCNC: 49 U/L (ref 1–40)
BASOPHILS # BLD AUTO: 0.02 10*3/MM3 (ref 0–0.2)
BASOPHILS NFR BLD AUTO: 0.7 % (ref 0–1.5)
BILIRUB SERPL-MCNC: 1.1 MG/DL (ref 0–1.2)
BUN SERPL-MCNC: 13 MG/DL (ref 6–20)
BUN/CREAT SERPL: 12.9 (ref 7–25)
CALCIUM SPEC-SCNC: 9.3 MG/DL (ref 8.6–10.5)
CEA SERPL-MCNC: 0.87 NG/ML
CHLORIDE SERPL-SCNC: 101 MMOL/L (ref 98–107)
CO2 SERPL-SCNC: 27 MMOL/L (ref 22–29)
CREAT BLDA-MCNC: 1 MG/DL (ref 0.6–1.3)
CREAT SERPL-MCNC: 1.01 MG/DL (ref 0.76–1.27)
DEPRECATED RDW RBC AUTO: 46.4 FL (ref 37–54)
EOSINOPHIL # BLD AUTO: 0.19 10*3/MM3 (ref 0–0.4)
EOSINOPHIL NFR BLD AUTO: 6.3 % (ref 0.3–6.2)
ERYTHROCYTE [DISTWIDTH] IN BLOOD BY AUTOMATED COUNT: 14.8 % (ref 12.3–15.4)
GFR SERPL CREATININE-BSD FRML MDRD: 76 ML/MIN/1.73
GLOBULIN UR ELPH-MCNC: 3.5 GM/DL
GLUCOSE SERPL-MCNC: 135 MG/DL (ref 65–99)
HCT VFR BLD AUTO: 35.2 % (ref 37.5–51)
HGB BLD-MCNC: 11 G/DL (ref 13–17.7)
IMM GRANULOCYTES # BLD AUTO: 0.01 10*3/MM3 (ref 0–0.05)
IMM GRANULOCYTES NFR BLD AUTO: 0.3 % (ref 0–0.5)
LYMPHOCYTES # BLD AUTO: 0.51 10*3/MM3 (ref 0.7–3.1)
LYMPHOCYTES NFR BLD AUTO: 16.9 % (ref 19.6–45.3)
MCH RBC QN AUTO: 26.9 PG (ref 26.6–33)
MCHC RBC AUTO-ENTMCNC: 31.3 G/DL (ref 31.5–35.7)
MCV RBC AUTO: 86.1 FL (ref 79–97)
MONOCYTES # BLD AUTO: 0.31 10*3/MM3 (ref 0.1–0.9)
MONOCYTES NFR BLD AUTO: 10.3 % (ref 5–12)
NEUTROPHILS NFR BLD AUTO: 1.98 10*3/MM3 (ref 1.7–7)
NEUTROPHILS NFR BLD AUTO: 65.5 % (ref 42.7–76)
NRBC BLD AUTO-RTO: 0 /100 WBC (ref 0–0.2)
PLATELET # BLD AUTO: 77 10*3/MM3 (ref 140–450)
PMV BLD AUTO: 10.7 FL (ref 6–12)
POTASSIUM SERPL-SCNC: 4.3 MMOL/L (ref 3.5–5.2)
PROT SERPL-MCNC: 7.3 G/DL (ref 6–8.5)
RBC # BLD AUTO: 4.09 10*6/MM3 (ref 4.14–5.8)
SODIUM SERPL-SCNC: 136 MMOL/L (ref 136–145)
WBC # BLD AUTO: 3.02 10*3/MM3 (ref 3.4–10.8)

## 2020-09-08 PROCEDURE — 80053 COMPREHEN METABOLIC PANEL: CPT

## 2020-09-08 PROCEDURE — 82378 CARCINOEMBRYONIC ANTIGEN: CPT

## 2020-09-08 PROCEDURE — 25010000002 IOPAMIDOL 61 % SOLUTION: Performed by: NURSE PRACTITIONER

## 2020-09-08 PROCEDURE — 85025 COMPLETE CBC W/AUTO DIFF WBC: CPT

## 2020-09-08 PROCEDURE — 71260 CT THORAX DX C+: CPT

## 2020-09-08 PROCEDURE — 74177 CT ABD & PELVIS W/CONTRAST: CPT

## 2020-09-08 PROCEDURE — 82565 ASSAY OF CREATININE: CPT

## 2020-09-08 PROCEDURE — 36415 COLL VENOUS BLD VENIPUNCTURE: CPT

## 2020-09-08 RX ADMIN — IOPAMIDOL 95 ML: 612 INJECTION, SOLUTION INTRAVENOUS at 09:52

## 2020-09-15 ENCOUNTER — OFFICE VISIT (OUTPATIENT)
Dept: ONCOLOGY | Facility: CLINIC | Age: 55
End: 2020-09-15

## 2020-09-15 VITALS
BODY MASS INDEX: 27.57 KG/M2 | HEART RATE: 59 BPM | SYSTOLIC BLOOD PRESSURE: 143 MMHG | HEIGHT: 73 IN | WEIGHT: 208 LBS | OXYGEN SATURATION: 100 % | RESPIRATION RATE: 14 BRPM | TEMPERATURE: 97.7 F | DIASTOLIC BLOOD PRESSURE: 78 MMHG

## 2020-09-15 DIAGNOSIS — C20 RECTAL CARCINOMA (HCC): Primary | ICD-10-CM

## 2020-09-15 PROCEDURE — 99213 OFFICE O/P EST LOW 20 MIN: CPT | Performed by: NURSE PRACTITIONER

## 2020-09-15 NOTE — PROGRESS NOTES
CHIEF COMPLAINT: Follow-up history of rectal cancer    Problem List:  Oncology/Hematology History Overview Note   1. Stage IIIB rectal carcinoma clinical T3b N1 aM0  2. Protracted postoperative course due to abdominal pelvic abscess following neoadjuvant Xeloda radiation  3. 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    -12/6/18 MRI of rectum showed T3b with suspicious right internal iliac lymph node and CT chest noncontrast negative except for gallstones  -Per Dr. Kelly, CEA was normal.  -12/18/18 saw me for the first time.  I reviewed her case in our multidisciplinary conference and went over that in detail with her.  She had presented with hematochezia.  Dr. Kelly repeated endoscopy for more exact detailing of the primary location and this appears to be rectal on MRI and endoscopy.  Plan is for randomization on clinical trial N 1048 to neoadjuvant FOLFOX versus standard chemoradiation.  We'll get him to radiation and our research staff for randomization and get his baseline CBC and CMP.    -1/4/19 followed up: Randomized to the Xeloda radiation arm.  He will get that and then 4-6 weeks later had his surgery, and then follow that with 6 months of adjuvant FOLFOX per protocol.  My research assistant met with him today.  He has a Xeloda prescription.  -3/4/2019 MRI: Good response with T2, less likely T3 with spiculation N0 disease.  -4/23/2019 pathology: Laparoscopic assisted converted to lower anterior resection open with stapled coloanal anastomosis and diverting loop ileostomy with liver biopsy showed residual adenoma with focal  high-grade dysplasia but no residual invasive carcinoma.  0 out of 17 lymph nodes.  Mild steatosis on liver biopsy with bridging fibrosis.  -5/9/2019 to 5/15/2019 admitted with abdominal pelvic abscess status post CT-guided percutaneous drain placement presenting with sepsis improved with antibiotics and drainage.  Temp of 104 on presentation.  2 weeks of protracted IV antibiotics with Radames Sosa planned at discharge.  -7/11/2019 follow-up visit:Start of adjuvant therapy delayed due to the above postoperative pelvic abscess.  This was treated surgically initially but then by CT-guided percutaneous drainage as above.  Serial follow-up with Radames Sosa including CTs and antibiotics eventually cleared him adequately to consider resumption of plans for adjuvant therapy.  CTs of the pelvis done on 6/5/2019, 6/13/2019, 6/26/2019 monitoring for this abscess and possible drainage but not able to drain due to decreasing size and minimal residual edema with CT 7/10/2019 showingStable to slight decrease in size of presacral perirectal abscess with presacral edema measuring 3.9 x 1.9 previously 4.1 x 2.5 cm. No new sites of focal fluid collection or loculated fluid.  Hence, modified FOLFOX 6 started at this junction.  -9/30/2019 office visit: Oxaliplatin discontinued due to ongoing thrombocytopenia.  Platelet count today 59,000.  We will continue course #5 and 6 with 5-FU and leucovorin alone to complete adjuvant therapy.  -10/23/2019 completed adjuvant chemotherapy.  -10/28/2019 CT chest abdomen pelvis with contrast showed decreased and fluid in the presacral fat.  Decrease in surrounding soft tissue stranding.  Gallstone seen with enlargement of spleen.  Stranding of the mesenteric roots with varices in the mesentery slightly increased compared to July 2019.  Chest is unremarkable.  CEA 1.53 with bilirubin 1.4, AST 46, alkaline phosphatase 135, white count 2500, hemoglobin 9.8, and platelets 62,000 on  10/21/2019.  -11/2019 colonoscopy with Dr. Lebron negative according to patient.  -2/23/2020: Saw Dr. Lebron regarding [place] cirrhosis-induced portal hypertension.  Did not recommend TIPS.  Started nadolol  -2/25/20 20 CT chest abdomen pelvis negative for metastasis but with portal hypertension and varices.  CEA 1.1.  ALT 51.  White count 3970 with hemoglobin 10.9.     Rectal carcinoma (CMS/HCC)   12/18/2018 Initial Diagnosis    Rectal carcinoma (CMS/HCC)     1/7/2019 - 2/4/2019 Chemotherapy    Xeloda radiation on protocol     1/8/2019 - 2/14/2019 Radiation    Radiation OncologyTreatment Course:  Hesham Arevalo received 5040 cGy in 28 fractions to pelvis via External Beam Radiation - EBRT.     3/4/2019 Imaging    MRI of the pelvis:  IMPRESSIONS:  MRI rectal cancer T category is: T2, LESS LIKELY EARLY T3 SPICULATIONS  Maximum EMD of invasion is: 0  Minimum tumor to MRF distance is: 12 MM  Low rectal tumor component: NO  Mesorectal nodes/tumor deposits: NO  EMVI: NO  Extramesorectal nodes: NO     4/23/2019 Surgery    Surgery rectosigmoidectomy pathology report:  Final Diagnosis    1. RECTOSIGMOID COLON, RECTOSIGMOID RESECTION:  Residual adenoma with focal high grade dysplasia with no residual invasive carcinoma identified post-treatment (see comment).  Seventeen lymph nodes negative for carcinoma (0/17)   2. LIVER, CORE NEEDLE BIOPSY:  Mild steatosis with mild chronic inflammation and increased fibrosis including bridging fibrosis (Stage 3-4) (see comment)        COLON AND RECTUM TEMPLATE:  TYPE OF SPECIMEN/PROCEDURE: Rectosigmoid resection.   SPECIMEN INTEGRITY:  Intact.  TUMOR SITE:  Rectum.  TUMOR SIZE (greatest dimension):  Indeterminate  TUMOR LOCATION (applicable only to rectal primaries): Entirely below anterior peritoneal reflection.  MACROSCOPIC INTACTNESS OF MESORECTUM (If applicable): Intact.  MACROSCOPIC TUMOR PERFORATION: Not identified.  TUMOR CONFIGURATION:  Ulcerated.  HISTOLOGIC TYPE:   Adenocarcinoma.  HISTOLOGIC GRADE:  G3-4 (per previous biopsy).  MICROSCOPIC DEPTH OF TUMOR INVASION/EXTENSION:  No residual tumor identified.  PERITONEAL INVOLVEMENT:  Not identified.  LYMPHVASCULAR INVASION:  Not identified.  PERINEURAL INVASION: Not identified.  SURGICAL MARGINS: Uninvolved.  STATUS AND DISTANCE FROM PROXIMAL MUCOSAL MARGIN:  Uninvolved, 13.0 cm.  STATUS AND DISTANCE FROM DISTAL MUCOSAL MARGIN: Uninvolved, 1.2 cm.  STATUS AND DISTANCE FROM MESENTERIC MARGIN: Not applicable.  STATUS AND DISTANCE FROM RADIAL MARGIN: Uninvolved, 1.7 cm.  DISTANCE FROM DENTATE LINE (RECTAL TUMOR ONLY):  Indeterminate.  TUMOR DEPOSITS (DISCONTINUOUS EXTRAMURAL EXTENSION):  Not identified.  NUMBER OF LYMPH NODES INVOLVED BY METASTATIC NEOPLASM: 0.  NUMBER OF REGIONAL LYMPH NODES EXAMINED: 17.  EXTRANODAL EXTENSION:  Not applicable.  TREATMENT EFFECT:  Present, no viable cancer cells identified (complete response, score 0).  ADDITIONAL PATHOLOGIC FINDINGS:  None.  MSI TESTING:  No evidence of MSI based on reported IHC on outside biopsy  OTHER ANCILLARY STUDIES (BRAF, KRAS, ETC.):  Should be performed on initial diagnostic biopsy if needed.  AJCC PATHOLOGIC STAGE:  (COMPLETED BY PATHOLOGIST, BASED ONLY ON TISSUE FINDINGS, MORE EXTENSIVE DISEASE MAY NOT BE KNOWN TO THE PATHOLOGIST)  ypT=  0  ypN=  0  AJCC PATHOLOGIC STAGE:  y0.                5/15/2019 Adverse Reaction    -5/9/2019 to 5/15/2019 admitted with abdominal pelvic abscess status post CT-guided percutaneous drain placement presenting with sepsis improved with antibiotics and drainage.  Temp of 104 on presentation.  2 weeks of protracted IV antibiotics with Radames Sosa planned at discharge     7/22/2019 - 10/23/2019 Chemotherapy    OP COLON [id number] OXALIplatin / Leucovorin / Fluorouracil  Start of adjuvant therapy delayed due to the above postoperative pelvic abscess.  This was treated surgically initially but then by CT-guided percutaneous drainage as  above.  Serial follow-up with Radames Sosa including CTs and antibiotics eventually cleared him adequately to consider resumption of plans for adjuvant therapy.  CTs of the pelvis done on 6/5/2019, 6/13/2019, 6/26/2019 monitoring for this abscess and possible drainage but not able to drain due to decreasing size and minimal residual edema with CT 7/10/2019 showingStable to slight decrease in size of presacral perirectal abscess with presacral edema measuring 3.9 x 1.9 previously 4.1 x 2.5 cm. No new sites of focal fluid collection or loculated fluid.     9/30/2019 Adverse Reaction    Oxaliplatin discontinued due to ongoing thrombocytopenia.     10/28/2019 Imaging    10/28/2019 CT chest abdomen pelvis with contrast showed decreased and fluid in the presacral fat.  Decrease in surrounding soft tissue stranding.  Gallstone seen with enlargement of spleen.  Stranding of the mesenteric roots with varices in the mesentery slightly increased compared to July 2019.  Chest is unremarkable.  CEA 1.53 with bilirubin 1.4, AST 46, alkaline phosphatase 135, white count 2500, hemoglobin 9.8, and platelets 62,000 on 10/21/2019.     2/25/2020 Imaging    CT chest abdomen pelvis negative for metastasis but with portal hypertension and varices.  CEA 1.1.  ALT 51.  White count 3970 hemoglobin 10.9 platelets 81,000.     6/2/2020 Imaging    CT chest, abdomen and pelvis IMPRESSION:  1. Stable appearance of the chest without metastatic involvement or  acute pathology.  2. Stable appearance of postsurgical changes in the region of the rectum  and presacral edema stable from prior without evidence for local  recurrence or progression of involvement.  3. Cholelithiasis.  4. Hepatic steatosis.  CEA 0.86     8/28/2020 Procedure    Ileocolonoscopy     9/8/2020 Imaging    CT chest, abdomen and pelvis IMPRESSION:  1. Stable appearance of the chest without metastatic involvement.  2. Stable appearance of the posterior cervical changes in the  regions of  the rectum and presacral edema without evidence for local recurrence,  nodularity or mass lesion.  3. Cholelithiasis.  4. Hepatic steatosis.  CEA 0.87         HISTORY OF PRESENT ILLNESS:  The patient is a 54 y.o. male, here for follow up on management of history of rectal cancer.  The patient has been doing well since we saw him last with no new complaints.  He is feeling well, energy level is returning to normal posttreatment.  He remains active.  Appetite is normal, no change in his bowel or bladder habits.  He underwent ileocolonoscopy 8/2820 with Dr. Lebron, he has been followed for , had portal vein study on 8/12/2020 that he reports was stable.      Past Medical History:   Diagnosis Date   • Arthritis     knees    • Cancer (CMS/HCC) 11/2018    colon   • Disease of thyroid gland    • Fatty liver    • Fatty liver    • Hashimoto's disease    • Hypertension    • Ileostomy in place (CMS/HCC)    • Psoriasis    • Wears glasses      Past Surgical History:   Procedure Laterality Date   • COLON RESECTION N/A 4/23/2019    Procedure: LAPAROSCOPIC LOWER ANTERIOR RESECTION WITH DIVERTING LOOP ILEOOSTOMY, SPLENIC FLEIXURE MOBILIZATION AND LIVER BIOPSY, AND PROCTOSCOPY;  Surgeon: Pau Kelly MD;  Location: [facility] OR;  Service: General   • COLONOSCOPY      2018   • ILEOSTOMY  04/2019   • LIVER BIOPSY  2003   • VASECTOMY  1998   • VENOUS ACCESS DEVICE (PORT) INSERTION N/A 7/18/2019    Procedure: PORT PLACEMENT;  Surgeon: Franky Cazares MD;  Location: [facility] OR;  Service: General       No Known Allergies    Family History and Social History reviewed and changed as necessary      REVIEW OF SYSTEM:   Review of Systems   Constitutional: Negative for appetite change, chills, diaphoresis, fatigue, fever and unexpected weight change.   HENT:   Negative for mouth sores, sore throat and trouble swallowing.    Eyes: Negative for icterus.   Respiratory: Negative for cough, hemoptysis and shortness of breath.   "  Cardiovascular: Negative for chest pain, leg swelling and palpitations.   Gastrointestinal: Negative for abdominal distention, abdominal pain, blood in stool, constipation, diarrhea, nausea and vomiting.   Endocrine: Negative for hot flashes.   Genitourinary: Negative for bladder incontinence, difficulty urinating, dysuria, frequency and hematuria.    Musculoskeletal: Negative for gait problem, neck pain and neck stiffness.   Skin: Negative for rash.   Neurological: Negative for dizziness, gait problem, headaches, light-headedness and numbness.   Hematological: Negative for adenopathy. Does not bruise/bleed easily.   Psychiatric/Behavioral: Negative for depression. The patient is not nervous/anxious.    All other systems reviewed and are negative.       PHYSICAL EXAM    Vitals:    09/15/20 0753   BP: 143/78   Pulse: 59   Resp: 14   Temp: 97.7 °F (36.5 °C)   SpO2: 100%   Weight: 94.3 kg (208 lb)   Height: 185.4 cm (73\")     Vitals:    09/15/20 0753   PainSc: 0-No pain        Constitutional: Appears well-developed and well-nourished. No distress.   ECOG: (0) Fully Active - Able to Carry On All Pre-disease Performance Without Restriction  HENT:   Head: Normocephalic.   Mouth/Throat: Oropharynx is clear and moist.   Eyes: Conjunctivae are normal. Pupils are equal, round, and reactive to light. No scleral icterus.   Neck: Neck supple. No JVD present.   Cardiovascular: Normal rate, regular rhythm and normal heart sounds.    Pulmonary/Chest: Breath sounds normal. No respiratory distress.   Abdominal: Soft. Exhibits no distension. There is no tenderness.   Musculoskeletal:Exhibits no edema, tenderness or deformity.   Neurological: Alert and oriented to person, place, and time. Exhibits normal muscle tone.   Skin: No ecchymosis, no petechiae and no rash noted. Not diaphoretic. No cyanosis. Nails show no clubbing.   Psychiatric: Normal mood and affect.   Vitals reviewed.  Labs reviewed.    Lab Results   Component Value Date "    HGB 11.0 (L) 09/08/2020    HCT 35.2 (L) 09/08/2020    MCV 86.1 09/08/2020    PLT 77 (L) 09/08/2020    WBC 3.02 (L) 09/08/2020    NEUTROABS 1.98 09/08/2020    LYMPHSABS 0.51 (L) 09/08/2020    MONOSABS 0.31 09/08/2020    EOSABS 0.19 09/08/2020    BASOSABS 0.02 09/08/2020       Lab Results   Component Value Date    GLUCOSE 135 (H) 09/08/2020    BUN 13 09/08/2020    CREATININE 1.01 09/08/2020     09/08/2020    K 4.3 09/08/2020     09/08/2020    CO2 27.0 09/08/2020    CALCIUM 9.3 09/08/2020    PROTEINTOT 7.3 09/08/2020    ALBUMIN 3.80 09/08/2020    BILITOT 1.1 09/08/2020    ALKPHOS 92 09/08/2020    AST 49 (H) 09/08/2020    ALT 33 09/08/2020     CEA 0.87    Ct Chest With Contrast    Result Date: 9/8/2020  1. Stable appearance of the chest without metastatic involvement. 2. Stable appearance of the posterior cervical changes in the regions of the rectum and presacral edema without evidence for local recurrence, nodularity or mass lesion. 3. Cholelithiasis. 4. Hepatic steatosis.  D:  09/08/2020 E:  09/08/2020  This report was finalized on 9/8/2020 3:46 PM by Dr. Jairo Rowland.      Ct Abdomen Pelvis With Contrast    Result Date: 9/8/2020  1. Stable appearance of the chest without metastatic involvement. 2. Stable appearance of the posterior cervical changes in the regions of the rectum and presacral edema without evidence for local recurrence, nodularity or mass lesion. 3. Cholelithiasis. 4. Hepatic steatosis.  D:  09/08/2020 E:  09/08/2020  This report was finalized on 9/8/2020 3:46 PM by Dr. Jairo Rowland.          ASSESSMENT & PLAN:    1.  History of stage III rectal cancer status post neoadjuvant Xeloda and radiation, resection followed by adjuvant Xeloda oxalic platinum.  2.   followed by Dr. Lebron  3.  Pancytopenia secondary to #2    Discussion: Patient is doing well, no evidence of disease on clinical exam, current CT chest, abdomen and pelvis were negative, CBC unremarkable, platelet count stable at  77,000.  CEA normal at 0.87.  We will continue to follow per NCCN guidelines with repeat CT chest, abdomen and pelvis in 6 months along with serum testing.  He is up-to-date having had ileocolonoscopy 8/28/2020, also had portal vein study with Dr. Head 8/12/2020, will continue to follow with Dr. Lebron for .    Return to clinic in 6 months.    I spent a total of 15 minutes in direct patient care, greater than 9  minutes face to face, time was spent in coordination of care, and counseling the patient regarding reviewing current CT scans and labs, discussion of plan of care going forward.  Answered any questions patient had regarding medications and plan of care.     Ana Self, APRN    09/15/2020

## 2020-10-29 ENCOUNTER — OFFICE VISIT (OUTPATIENT)
Dept: RADIATION ONCOLOGY | Facility: HOSPITAL | Age: 55
End: 2020-10-29

## 2020-10-29 ENCOUNTER — HOSPITAL ENCOUNTER (OUTPATIENT)
Dept: RADIATION ONCOLOGY | Facility: HOSPITAL | Age: 55
Setting detail: RADIATION/ONCOLOGY SERIES
Discharge: HOME | End: 2020-10-29

## 2020-10-29 VITALS
OXYGEN SATURATION: 99 % | DIASTOLIC BLOOD PRESSURE: 79 MMHG | HEIGHT: 73 IN | BODY MASS INDEX: 28.26 KG/M2 | RESPIRATION RATE: 16 BRPM | SYSTOLIC BLOOD PRESSURE: 125 MMHG | WEIGHT: 213.2 LBS | TEMPERATURE: 97.6 F | HEART RATE: 60 BPM

## 2020-10-29 DIAGNOSIS — C20 RECTAL CARCINOMA (HCC): ICD-10-CM

## 2020-10-29 PROCEDURE — G0463 HOSPITAL OUTPT CLINIC VISIT: HCPCS

## 2020-10-29 NOTE — PROGRESS NOTES
FOLLOW UP NOTE    PATIENT:                                                      Hesham Arevalo Jr.  MEDICAL RECORD #:                        [id number]  :                                                          1965  COMPLETION DATE:   2019  DIAGNOSIS:     Rectal carcinoma (CMS/HCC)  - Stage IIIB (cT3, cN1a, cM0)  - pT0, pN0, cM0      BRIEF HISTORY:  Mr. Arevalo is a 55 y.o. gentleman returning today for routine follow-up of his locally advanced and lymph node positive rectal cancer.  He completed a course of neoadjuvant chemoradiation therapy followed by a low anterior resection.  His surgery was in 2019 and showed a complete response to treatment with no residual invasive adenocarcinoma.  He has since completed adjuvant Xeloda chemotherapy.  He has done well since that time.  From a symptom standpoint, he notes a good appetite and stable weight.  His ileostomy is functioning well.  Output is soft and often affected by certain foods/liquids.  He takes Imodium as needed.  He denies hematochezia, nausea, vomiting, distention, or abdominal pain.  No issues with stoma or surrounding skin.  He does note occasional rectal urgency with leaking from the rectum about once per month.  Otherwise, he denies any acute concerns today.  He is also followed for .         MEDICATIONS: Medication reconciliation for the patient was reviewed and confirmed in the electronic medical record.    Review of Systems   Constitutional: Negative.    Respiratory: Negative.    Cardiovascular: Negative.    Gastrointestinal:        Ileostomy   Genitourinary: Negative.     Musculoskeletal: Negative.    Skin: Negative.    Neurological: Negative.    Psychiatric/Behavioral: Negative.    All other systems reviewed and are negative.      KPS 90%    Physical Exam  Vitals signs and nursing note reviewed.   Constitutional:       General: He is not in acute distress.     Appearance: Normal appearance. He is well-developed.   HENT:  "     Head: Normocephalic and atraumatic.   Eyes:      General: No scleral icterus.     Conjunctiva/sclera: Conjunctivae normal.      Pupils: Pupils are equal, round, and reactive to light.   Neck:      Musculoskeletal: Normal range of motion and neck supple.   Cardiovascular:      Rate and Rhythm: Normal rate and regular rhythm.      Heart sounds: No murmur. No friction rub.   Pulmonary:      Effort: Pulmonary effort is normal. No respiratory distress.      Breath sounds: Normal breath sounds. No wheezing.   Abdominal:      General: Bowel sounds are normal. There is no distension.      Palpations: Abdomen is soft. There is no mass.      Tenderness: There is no abdominal tenderness.      Comments: RLQ ileostomy   Musculoskeletal: Normal range of motion.   Lymphadenopathy:      Cervical: No cervical adenopathy.      Upper Body:      Right upper body: No supraclavicular adenopathy.      Left upper body: No supraclavicular adenopathy.   Skin:     General: Skin is warm and dry.      Coloration: Skin is not jaundiced.   Neurological:      Mental Status: He is alert and oriented to person, place, and time.   Psychiatric:         Behavior: Behavior normal.         Thought Content: Thought content normal.         Judgment: Judgment normal.         VITAL SIGNS:   Vitals:    10/29/20 0913   BP: 125/79   Pulse: 60   Resp: 16   Temp: 97.6 °F (36.4 °C)   TempSrc: Temporal   SpO2: 99%  Comment: RA   Weight: 96.7 kg (213 lb 3.2 oz)   Height: 185.4 cm (73\")   PainSc: 0-No pain       The following portions of the patient's history were reviewed and updated as appropriate: allergies, current medications, past family history, past medical history, past social history, past surgical history and problem list.    IMAGING:  I have personally reviewed the following imaging studies:    CT chest/abdomen/pelvis 9/8/2020:  FINDINGS: CHEST: Thyroid is homogeneous in attenuation. No bulky  mediastinal adenopathy. Central airways are patent. " Esophagus in normal  course and caliber. Patent nonaneurysmal thoracic aorta with patent  great vessel origins. Central pulmonary arteries are grossly patent.  Cardiac size within normal limits without pericardial effusion. Extended  lung windows without focal opacification or consolidation. No dominant  nodule or mass lesion. No pleural effusion. Degenerative changes of the  thoracic spine without aggressive osseous lesion. No soft tissue body  wall findings of concern.     ABDOMEN AND PELVIS: Liver demonstrates diffuse low-attenuation  throughout of hepatic steatosis without focal liver lesion. Gallbladder  contains multiple calcifications of cholelithiasis without focal wall  thickening evident. No biliary dilatation. Pancreas and spleen  unremarkable. Adrenals without distinct nodule. Kidneys without  hydronephrosis or hydroureter. No bulky retroperitoneal adenopathy.  Atherosclerotic nonaneurysmal patent abdominal aorta with patent celiac  and SMA origins. Portal veins and IVC patent. GI tract evaluation  without focal thickening or disproportionate dilatation of bowel to  suggest mechanical obstructive process, however, haziness at the root of  the mesentery with multiple dilated vessels in the central and right  lower quadrant mesentery as seen on prior. No free fluid or  intraabdominal free air with stable appearance of right lower quadrant  ostomy. Presacral edema adjacent to postsurgical changes in the rectum  similar to prior without bulky pelvic adenopathy or new fluid  collection. Degenerative changes of the spine without aggressive osseous  lesion.     IMPRESSION:  1. Stable appearance of the chest without metastatic involvement.  2. Stable appearance of the posterior cervical changes in the regions of  the rectum and presacral edema without evidence for local recurrence,  nodularity or mass lesion.  3. Cholelithiasis.  4. Hepatic steatosis.        This report was finalized on 9/8/2020 3:46 PM by   Jairo Rowland.         Diagnoses and all orders for this visit:    1. Rectal carcinoma (CMS/HCC)         IMPRESSION:  Mr. Arevalo is a 55 y.o. gentleman with history of a clinical T3N1 rectal cancer, treated with neoadjuvant Xeloda and radiation, resection, and adjuvant chemotherapy.  He has had a complete response to treatment based on recent clinical exam, ileocolonoscopy 8/28/2020, and CT chest/abdomen/pelvis 9/8/2020, all of which were without evidence of residual or recurrent disease.  CEA is normal at 0.87.  He continues routine endoscopic exams and follow-up with Dr. Lebron, with appointment next month.  He is hoping to discuss possibility of ileostomy takedown in the near future with Dr. Kelly.  6-month surveillance CT chest/abdomen/pelvis are scheduled for March 2021 prior to medical oncology follow-up.  He will have repeat labwork at that time.  We reviewed NCCN guidelines including follow-up intervals, surveillance imaging and serum testing, and endoscopic exams.    RECOMMENDATIONS:  Mr. Arevalo continues oncologic surveillance under the care of Dr. Moeller, with follow-up in March 2021.  He is followed by Dr. Lebron regarding  with appointment next month, and will continue routine colorectal follow-up with Dr. Kelly.  He is followed closely by his other physicians, and we will be happy to be available as needed.    Return if symptoms worsen or fail to improve, for Office Visit.    Philippe Borrero, APRN

## 2021-03-09 ENCOUNTER — LAB (OUTPATIENT)
Dept: LAB | Facility: HOSPITAL | Age: 56
End: 2021-03-09

## 2021-03-09 ENCOUNTER — HOSPITAL ENCOUNTER (OUTPATIENT)
Dept: CT IMAGING | Facility: HOSPITAL | Age: 56
Discharge: HOME | End: 2021-03-09

## 2021-03-09 DIAGNOSIS — C20 RECTAL CARCINOMA (HCC): ICD-10-CM

## 2021-03-09 LAB
ALBUMIN SERPL-MCNC: 3.9 G/DL (ref 3.5–5.2)
ALBUMIN/GLOB SERPL: 1.2 G/DL
ALP SERPL-CCNC: 83 U/L (ref 39–117)
ALT SERPL W P-5'-P-CCNC: 40 U/L (ref 1–41)
ANION GAP SERPL CALCULATED.3IONS-SCNC: 11 MMOL/L (ref 5–15)
AST SERPL-CCNC: 54 U/L (ref 1–40)
BASOPHILS # BLD AUTO: 0.02 10*3/MM3 (ref 0–0.2)
BASOPHILS NFR BLD AUTO: 0.7 % (ref 0–1.5)
BILIRUB SERPL-MCNC: 0.9 MG/DL (ref 0–1.2)
BUN SERPL-MCNC: 14 MG/DL (ref 6–20)
BUN/CREAT SERPL: 14.7 (ref 7–25)
CALCIUM SPEC-SCNC: 9 MG/DL (ref 8.6–10.5)
CEA SERPL-MCNC: 1.21 NG/ML
CHLORIDE SERPL-SCNC: 103 MMOL/L (ref 98–107)
CO2 SERPL-SCNC: 23 MMOL/L (ref 22–29)
CREAT BLDA-MCNC: 1 MG/DL (ref 0.6–1.3)
CREAT SERPL-MCNC: 0.95 MG/DL (ref 0.76–1.27)
DEPRECATED RDW RBC AUTO: 48.3 FL (ref 37–54)
EOSINOPHIL # BLD AUTO: 0.2 10*3/MM3 (ref 0–0.4)
EOSINOPHIL NFR BLD AUTO: 6.8 % (ref 0.3–6.2)
ERYTHROCYTE [DISTWIDTH] IN BLOOD BY AUTOMATED COUNT: 16 % (ref 12.3–15.4)
GFR SERPL CREATININE-BSD FRML MDRD: 82 ML/MIN/1.73
GLOBULIN UR ELPH-MCNC: 3.2 GM/DL
GLUCOSE SERPL-MCNC: 149 MG/DL (ref 65–99)
HCT VFR BLD AUTO: 34.6 % (ref 37.5–51)
HGB BLD-MCNC: 10.7 G/DL (ref 13–17.7)
IMM GRANULOCYTES # BLD AUTO: 0.01 10*3/MM3 (ref 0–0.05)
IMM GRANULOCYTES NFR BLD AUTO: 0.3 % (ref 0–0.5)
LYMPHOCYTES # BLD AUTO: 0.55 10*3/MM3 (ref 0.7–3.1)
LYMPHOCYTES NFR BLD AUTO: 18.6 % (ref 19.6–45.3)
MCH RBC QN AUTO: 25.8 PG (ref 26.6–33)
MCHC RBC AUTO-ENTMCNC: 30.9 G/DL (ref 31.5–35.7)
MCV RBC AUTO: 83.4 FL (ref 79–97)
MONOCYTES # BLD AUTO: 0.31 10*3/MM3 (ref 0.1–0.9)
MONOCYTES NFR BLD AUTO: 10.5 % (ref 5–12)
NEUTROPHILS NFR BLD AUTO: 1.87 10*3/MM3 (ref 1.7–7)
NEUTROPHILS NFR BLD AUTO: 63.1 % (ref 42.7–76)
NRBC BLD AUTO-RTO: 0 /100 WBC (ref 0–0.2)
PLATELET # BLD AUTO: 65 10*3/MM3 (ref 140–450)
PMV BLD AUTO: 10.7 FL (ref 6–12)
POTASSIUM SERPL-SCNC: 4.3 MMOL/L (ref 3.5–5.2)
PROT SERPL-MCNC: 7.1 G/DL (ref 6–8.5)
RBC # BLD AUTO: 4.15 10*6/MM3 (ref 4.14–5.8)
SODIUM SERPL-SCNC: 137 MMOL/L (ref 136–145)
WBC # BLD AUTO: 2.96 10*3/MM3 (ref 3.4–10.8)

## 2021-03-09 PROCEDURE — 82565 ASSAY OF CREATININE: CPT

## 2021-03-09 PROCEDURE — 74177 CT ABD & PELVIS W/CONTRAST: CPT

## 2021-03-09 PROCEDURE — 85025 COMPLETE CBC W/AUTO DIFF WBC: CPT

## 2021-03-09 PROCEDURE — 71260 CT THORAX DX C+: CPT

## 2021-03-09 PROCEDURE — 36415 COLL VENOUS BLD VENIPUNCTURE: CPT

## 2021-03-09 PROCEDURE — 82378 CARCINOEMBRYONIC ANTIGEN: CPT

## 2021-03-09 PROCEDURE — 25010000002 IOPAMIDOL 61 % SOLUTION: Performed by: NURSE PRACTITIONER

## 2021-03-09 PROCEDURE — 80053 COMPREHEN METABOLIC PANEL: CPT

## 2021-03-09 RX ADMIN — IOPAMIDOL 95 ML: 612 INJECTION, SOLUTION INTRAVENOUS at 09:22

## 2021-03-15 ENCOUNTER — OFFICE VISIT (OUTPATIENT)
Dept: ONCOLOGY | Facility: CLINIC | Age: 56
End: 2021-03-15

## 2021-03-15 VITALS
BODY MASS INDEX: 28.23 KG/M2 | TEMPERATURE: 96.9 F | OXYGEN SATURATION: 100 % | DIASTOLIC BLOOD PRESSURE: 75 MMHG | RESPIRATION RATE: 16 BRPM | HEIGHT: 73 IN | HEART RATE: 62 BPM | SYSTOLIC BLOOD PRESSURE: 145 MMHG | WEIGHT: 213 LBS

## 2021-03-15 DIAGNOSIS — Z85.048 HISTORY OF RECTAL CANCER: Primary | ICD-10-CM

## 2021-03-15 PROCEDURE — 99213 OFFICE O/P EST LOW 20 MIN: CPT | Performed by: NURSE PRACTITIONER

## 2021-03-15 RX ORDER — FENOFIBRATE 145 MG/1
145 TABLET, COATED ORAL DAILY
COMMUNITY
Start: 2021-02-02

## 2021-03-15 NOTE — PROGRESS NOTES
CHIEF COMPLAINT: History of rectal cancer    Problem List:  Oncology/Hematology History Overview Note   1. Stage IIIB rectal carcinoma clinical T3b N1 aM0  2. Protracted postoperative course due to abdominal pelvic abscess following neoadjuvant Xeloda radiation  3. 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    -12/6/18 MRI of rectum showed T3b with suspicious right internal iliac lymph node and CT chest noncontrast negative except for gallstones  -Per Dr. Kelly, CEA was normal.  -12/18/18 saw me for the first time.  I reviewed her case in our multidisciplinary conference and went over that in detail with her.  She had presented with hematochezia.  Dr. Kelly repeated endoscopy for more exact detailing of the primary location and this appears to be rectal on MRI and endoscopy.  Plan is for randomization on clinical trial N 1048 to neoadjuvant FOLFOX versus standard chemoradiation.  We'll get him to radiation and our research staff for randomization and get his baseline CBC and CMP.    -1/4/19 followed up: Randomized to the Xeloda radiation arm.  He will get that and then 4-6 weeks later had his surgery, and then follow that with 6 months of adjuvant FOLFOX per protocol.  My research assistant met with him today.  He has a Xeloda prescription.  -3/4/2019 MRI: Good response with T2, less likely T3 with spiculation N0 disease.  -4/23/2019 pathology: Laparoscopic assisted converted to lower anterior resection open with stapled coloanal anastomosis and diverting loop ileostomy with liver biopsy showed residual adenoma with focal high-grade  dysplasia but no residual invasive carcinoma.  0 out of 17 lymph nodes.  Mild steatosis on liver biopsy with bridging fibrosis.  -5/9/2019 to 5/15/2019 admitted with abdominal pelvic abscess status post CT-guided percutaneous drain placement presenting with sepsis improved with antibiotics and drainage.  Temp of 104 on presentation.  2 weeks of protracted IV antibiotics with Radames Sosa planned at discharge.  -7/11/2019 follow-up visit:Start of adjuvant therapy delayed due to the above postoperative pelvic abscess.  This was treated surgically initially but then by CT-guided percutaneous drainage as above.  Serial follow-up with Radames Sosa including CTs and antibiotics eventually cleared him adequately to consider resumption of plans for adjuvant therapy.  CTs of the pelvis done on 6/5/2019, 6/13/2019, 6/26/2019 monitoring for this abscess and possible drainage but not able to drain due to decreasing size and minimal residual edema with CT 7/10/2019 showingStable to slight decrease in size of presacral perirectal abscess with presacral edema measuring 3.9 x 1.9 previously 4.1 x 2.5 cm. No new sites of focal fluid collection or loculated fluid.  Hence, modified FOLFOX 6 started at this junction.  -9/30/2019 office visit: Oxaliplatin discontinued due to ongoing thrombocytopenia.  Platelet count today 59,000.  We will continue course #5 and 6 with 5-FU and leucovorin alone to complete adjuvant therapy.  -10/23/2019 completed adjuvant chemotherapy.  -10/28/2019 CT chest abdomen pelvis with contrast showed decreased and fluid in the presacral fat.  Decrease in surrounding soft tissue stranding.  Gallstone seen with enlargement of spleen.  Stranding of the mesenteric roots with varices in the mesentery slightly increased compared to July 2019.  Chest is unremarkable.  CEA 1.53 with bilirubin 1.4, AST 46, alkaline phosphatase 135, white count 2500, hemoglobin 9.8, and platelets 62,000 on 10/21/2019.  -11/2019  colonoscopy with Dr. Lebron negative according to patient.  -2/23/2020: Saw Dr. Lebron regarding [place] cirrhosis-induced portal hypertension.  Did not recommend TIPS.  Started nadolol  -2/25/20 20 CT chest abdomen pelvis negative for metastasis but with portal hypertension and varices.  CEA 1.1.  ALT 51.  White count 3970 with hemoglobin 10.9.     Rectal carcinoma (CMS/HCC)   12/18/2018 Initial Diagnosis    Rectal carcinoma (CMS/HCC)     1/7/2019 - 2/4/2019 Chemotherapy    Xeloda radiation on protocol     1/8/2019 - 2/14/2019 Radiation    Radiation OncologyTreatment Course:  Hesham Arevalo received 5040 cGy in 28 fractions to pelvis via External Beam Radiation - EBRT.     3/4/2019 Imaging    MRI of the pelvis:  IMPRESSIONS:  MRI rectal cancer T category is: T2, LESS LIKELY EARLY T3 SPICULATIONS  Maximum EMD of invasion is: 0  Minimum tumor to MRF distance is: 12 MM  Low rectal tumor component: NO  Mesorectal nodes/tumor deposits: NO  EMVI: NO  Extramesorectal nodes: NO     4/23/2019 Surgery    Surgery rectosigmoidectomy pathology report:  Final Diagnosis    1. RECTOSIGMOID COLON, RECTOSIGMOID RESECTION:  Residual adenoma with focal high grade dysplasia with no residual invasive carcinoma identified post-treatment (see comment).  Seventeen lymph nodes negative for carcinoma (0/17)   2. LIVER, CORE NEEDLE BIOPSY:  Mild steatosis with mild chronic inflammation and increased fibrosis including bridging fibrosis (Stage 3-4) (see comment)        COLON AND RECTUM TEMPLATE:  TYPE OF SPECIMEN/PROCEDURE: Rectosigmoid resection.   SPECIMEN INTEGRITY:  Intact.  TUMOR SITE:  Rectum.  TUMOR SIZE (greatest dimension):  Indeterminate  TUMOR LOCATION (applicable only to rectal primaries): Entirely below anterior peritoneal reflection.  MACROSCOPIC INTACTNESS OF MESORECTUM (If applicable): Intact.  MACROSCOPIC TUMOR PERFORATION: Not identified.  TUMOR CONFIGURATION:  Ulcerated.  HISTOLOGIC TYPE:  Adenocarcinoma.  HISTOLOGIC GRADE:  G3-4  (per previous biopsy).  MICROSCOPIC DEPTH OF TUMOR INVASION/EXTENSION:  No residual tumor identified.  PERITONEAL INVOLVEMENT:  Not identified.  LYMPHVASCULAR INVASION:  Not identified.  PERINEURAL INVASION: Not identified.  SURGICAL MARGINS: Uninvolved.  STATUS AND DISTANCE FROM PROXIMAL MUCOSAL MARGIN:  Uninvolved, 13.0 cm.  STATUS AND DISTANCE FROM DISTAL MUCOSAL MARGIN: Uninvolved, 1.2 cm.  STATUS AND DISTANCE FROM MESENTERIC MARGIN: Not applicable.  STATUS AND DISTANCE FROM RADIAL MARGIN: Uninvolved, 1.7 cm.  DISTANCE FROM DENTATE LINE (RECTAL TUMOR ONLY):  Indeterminate.  TUMOR DEPOSITS (DISCONTINUOUS EXTRAMURAL EXTENSION):  Not identified.  NUMBER OF LYMPH NODES INVOLVED BY METASTATIC NEOPLASM: 0.  NUMBER OF REGIONAL LYMPH NODES EXAMINED: 17.  EXTRANODAL EXTENSION:  Not applicable.  TREATMENT EFFECT:  Present, no viable cancer cells identified (complete response, score 0).  ADDITIONAL PATHOLOGIC FINDINGS:  None.  MSI TESTING:  No evidence of MSI based on reported IHC on outside biopsy  OTHER ANCILLARY STUDIES (BRAF, KRAS, ETC.):  Should be performed on initial diagnostic biopsy if needed.  AJCC PATHOLOGIC STAGE:  (COMPLETED BY PATHOLOGIST, BASED ONLY ON TISSUE FINDINGS, MORE EXTENSIVE DISEASE MAY NOT BE KNOWN TO THE PATHOLOGIST)  ypT=  0  ypN=  0  AJCC PATHOLOGIC STAGE:  y0.                5/15/2019 Adverse Reaction    -5/9/2019 to 5/15/2019 admitted with abdominal pelvic abscess status post CT-guided percutaneous drain placement presenting with sepsis improved with antibiotics and drainage.  Temp of 104 on presentation.  2 weeks of protracted IV antibiotics with Radames Sosa planned at discharge     7/22/2019 - 10/23/2019 Chemotherapy    OP COLON [id number] OXALIplatin / Leucovorin / Fluorouracil  Start of adjuvant therapy delayed due to the above postoperative pelvic abscess.  This was treated surgically initially but then by CT-guided percutaneous drainage as above.  Serial follow-up with Radames Sosa  including CTs and antibiotics eventually cleared him adequately to consider resumption of plans for adjuvant therapy.  CTs of the pelvis done on 6/5/2019, 6/13/2019, 6/26/2019 monitoring for this abscess and possible drainage but not able to drain due to decreasing size and minimal residual edema with CT 7/10/2019 showingStable to slight decrease in size of presacral perirectal abscess with presacral edema measuring 3.9 x 1.9 previously 4.1 x 2.5 cm. No new sites of focal fluid collection or loculated fluid.     9/30/2019 Adverse Reaction    Oxaliplatin discontinued due to ongoing thrombocytopenia.     10/28/2019 Imaging    10/28/2019 CT chest abdomen pelvis with contrast showed decreased and fluid in the presacral fat.  Decrease in surrounding soft tissue stranding.  Gallstone seen with enlargement of spleen.  Stranding of the mesenteric roots with varices in the mesentery slightly increased compared to July 2019.  Chest is unremarkable.  CEA 1.53 with bilirubin 1.4, AST 46, alkaline phosphatase 135, white count 2500, hemoglobin 9.8, and platelets 62,000 on 10/21/2019.     2/25/2020 Imaging    CT chest abdomen pelvis negative for metastasis but with portal hypertension and varices.  CEA 1.1.  ALT 51.  White count 3970 hemoglobin 10.9 platelets 81,000.     6/2/2020 Imaging    CT chest, abdomen and pelvis IMPRESSION:  1. Stable appearance of the chest without metastatic involvement or  acute pathology.  2. Stable appearance of postsurgical changes in the region of the rectum  and presacral edema stable from prior without evidence for local  recurrence or progression of involvement.  3. Cholelithiasis.  4. Hepatic steatosis.  CEA 0.86     8/28/2020 Procedure    Ileocolonoscopy     9/8/2020 Imaging    CT chest, abdomen and pelvis IMPRESSION:  1. Stable appearance of the chest without metastatic involvement.  2. Stable appearance of the posterior cervical changes in the regions of  the rectum and presacral edema without  evidence for local recurrence,  nodularity or mass lesion.  3. Cholelithiasis.  4. Hepatic steatosis.  CEA 0.87     3/9/2021 Imaging    CT chest, abdomen and pelvis IMPRESSION:  Stable changes seen in the presacral soft tissues. There is  no progression of disease. No new findings. No evidence of local  recurrence. Chest, abdomen and pelvis reveals no acute findings.  CEA 1.21         HISTORY OF PRESENT ILLNESS:  The patient is a 55 y.o. male, here for follow up on management of history of rectal cancer.  Mr. Arevalo has been doing well since we saw him last with no new concerns.  He has had no change in his health.  He is feeling well, denies any pain.  Ostomy is working normally.  He is following with Dr. Lebron for his .      Past Medical History:   Diagnosis Date   • Arthritis     knees    • Cancer (CMS/HCC) 11/2018    colon   • Disease of thyroid gland    • Fatty liver    • Fatty liver    • Hashimoto's disease    • History of radiation therapy 02/14/2019    rectal cancer   • Hypertension    • Ileostomy in place (CMS/HCC)    • Psoriasis    • Wears glasses      Past Surgical History:   Procedure Laterality Date   • COLON RESECTION N/A 4/23/2019    Procedure: LAPAROSCOPIC LOWER ANTERIOR RESECTION WITH DIVERTING LOOP ILEOOSTOMY, SPLENIC FLEIXURE MOBILIZATION AND LIVER BIOPSY, AND PROCTOSCOPY;  Surgeon: Pau Kelly MD;  Location:  [facility];  Service: General   • COLONOSCOPY      2018   • ILEOSTOMY  04/2019   • LIVER BIOPSY  2003   • VASECTOMY  1998   • VENOUS ACCESS DEVICE (PORT) INSERTION N/A 7/18/2019    Procedure: PORT PLACEMENT;  Surgeon: Franky Cazares MD;  Location:  [facility];  Service: General       No Known Allergies    Family History and Social History reviewed and changed as necessary    REVIEW OF SYSTEM:   Negative    PHYSICAL EXAM:  General: Well-developed, well-nourished appearing gentleman in no distress.  Nodes: No palpable lymphadenopathy  Abdomen: Nondistended, nontender.  Ileostomy  "intact.    Vitals:    03/15/21 0801   BP: 145/75   Pulse: 62   Resp: 16   Temp: 96.9 °F (36.1 °C)   SpO2: 100%   Weight: 96.6 kg (213 lb)   Height: 185.4 cm (73\")     Vitals:    03/15/21 0801   PainSc: 0-No pain          ECOG score: 0           Vitals reviewed.  Labs reviewed.      Lab Results   Component Value Date    HGB 10.7 (L) 03/09/2021    HCT 34.6 (L) 03/09/2021    MCV 83.4 03/09/2021    PLT 65 (L) 03/09/2021    WBC 2.96 (L) 03/09/2021    NEUTROABS 1.87 03/09/2021    LYMPHSABS 0.55 (L) 03/09/2021    MONOSABS 0.31 03/09/2021    EOSABS 0.20 03/09/2021    BASOSABS 0.02 03/09/2021       Lab Results   Component Value Date    GLUCOSE 149 (H) 03/09/2021    BUN 14 03/09/2021    CREATININE 0.95 03/09/2021     03/09/2021    K 4.3 03/09/2021     03/09/2021    CO2 23.0 03/09/2021    CALCIUM 9.0 03/09/2021    PROTEINTOT 7.1 03/09/2021    ALBUMIN 3.90 03/09/2021    BILITOT 0.9 03/09/2021    ALKPHOS 83 03/09/2021    AST 54 (H) 03/09/2021    ALT 40 03/09/2021     3/9/2021 CEA 1.21        ASSESSMENT & PLAN:    1.  History of stage III rectal cancer status post neoadjuvant Xeloda and radiation, resection followed by adjuvant Xeloda with oxaliplatin  2.   followed by Dr. Lebron  3.  Pancytopenia secondary to #2    Discussion: Mr. Arevalo is doing well, no evidence of disease on clinical exam and no new worrisome symptoms.  Current CT chest, abdomen and pelvis are negative, CBC unremarkable, CEA normal at 1.21.  He will be due for ileocolonoscopy in 6 months with Dr. Lebron.  He had recent ultrasound vascular study which showed no significant change, spleen remains enlarged and portal vein remains dilated consistent with portal hypertension with compensated liver cirrhosis.  No ascites.  We will continue to follow per NCCN guidelines with repeat CT chest, abdomen and pelvis in 6 months along with CEA, CBC and CMP and I have ordered those today.    This was a level 3, limited [facility] visit with stable chronic illness, " review of current CBC, CMP, CEA and CT chest, abdomen and pelvis and ordering of same for follow-up in 6 months.  Also reviewed outside notes from Dr. Lebron dated 2/9/2021 related to .  Ana Self, APRN    03/15/2021

## 2021-05-13 ENCOUNTER — TRANSCRIBE ORDERS (OUTPATIENT)
Dept: ADMINISTRATIVE | Facility: HOSPITAL | Age: 56
End: 2021-05-13

## 2021-05-13 ENCOUNTER — HOSPITAL ENCOUNTER (OUTPATIENT)
Dept: GENERAL RADIOLOGY | Facility: HOSPITAL | Age: 56
Discharge: HOME | End: 2021-05-13
Admitting: OPTOMETRIST

## 2021-05-13 DIAGNOSIS — C20 MALIGNANT NEOPLASM OF RECTUM (HCC): Primary | ICD-10-CM

## 2021-05-13 DIAGNOSIS — C20 MALIGNANT NEOPLASM OF RECTUM (HCC): ICD-10-CM

## 2021-05-13 PROCEDURE — 74270 X-RAY XM COLON 1CNTRST STD: CPT

## 2021-05-13 PROCEDURE — 0 DIATRIZOATE MEGLUMINE & SODIUM PER 1 ML: Performed by: OPTOMETRIST

## 2021-05-13 RX ADMIN — DIATRIZOATE MEGLUMINE AND DIATRIZOATE SODIUM 480 ML: 660; 100 LIQUID ORAL; RECTAL at 15:04

## 2021-08-30 ENCOUNTER — HOSPITAL ENCOUNTER (OUTPATIENT)
Dept: CT IMAGING | Facility: HOSPITAL | Age: 56
Discharge: HOME | End: 2021-08-30

## 2021-08-30 ENCOUNTER — LAB (OUTPATIENT)
Dept: LAB | Facility: HOSPITAL | Age: 56
End: 2021-08-30

## 2021-08-30 DIAGNOSIS — Z85.048 HISTORY OF RECTAL CANCER: ICD-10-CM

## 2021-08-30 LAB
ALBUMIN SERPL-MCNC: 4 G/DL (ref 3.5–5.2)
ALBUMIN/GLOB SERPL: 1.3 G/DL
ALP SERPL-CCNC: 60 U/L (ref 39–117)
ALT SERPL W P-5'-P-CCNC: 34 U/L (ref 1–41)
ANION GAP SERPL CALCULATED.3IONS-SCNC: 9 MMOL/L (ref 5–15)
AST SERPL-CCNC: 60 U/L (ref 1–40)
BASOPHILS # BLD AUTO: 0.04 10*3/MM3 (ref 0–0.2)
BASOPHILS NFR BLD AUTO: 1.1 % (ref 0–1.5)
BILIRUB SERPL-MCNC: 1.1 MG/DL (ref 0–1.2)
BUN SERPL-MCNC: 17 MG/DL (ref 6–20)
BUN/CREAT SERPL: 14.8 (ref 7–25)
CALCIUM SPEC-SCNC: 9.3 MG/DL (ref 8.6–10.5)
CEA SERPL-MCNC: 1.13 NG/ML
CHLORIDE SERPL-SCNC: 105 MMOL/L (ref 98–107)
CO2 SERPL-SCNC: 25 MMOL/L (ref 22–29)
CREAT BLDA-MCNC: 1.3 MG/DL (ref 0.6–1.3)
CREAT SERPL-MCNC: 1.15 MG/DL (ref 0.76–1.27)
DEPRECATED RDW RBC AUTO: 51.9 FL (ref 37–54)
EOSINOPHIL # BLD AUTO: 0.24 10*3/MM3 (ref 0–0.4)
EOSINOPHIL NFR BLD AUTO: 6.6 % (ref 0.3–6.2)
ERYTHROCYTE [DISTWIDTH] IN BLOOD BY AUTOMATED COUNT: 17.5 % (ref 12.3–15.4)
GFR SERPL CREATININE-BSD FRML MDRD: 66 ML/MIN/1.73
GLOBULIN UR ELPH-MCNC: 3.2 GM/DL
GLUCOSE SERPL-MCNC: 126 MG/DL (ref 65–99)
HCT VFR BLD AUTO: 34.6 % (ref 37.5–51)
HGB BLD-MCNC: 10.7 G/DL (ref 13–17.7)
IMM GRANULOCYTES # BLD AUTO: 0.01 10*3/MM3 (ref 0–0.05)
IMM GRANULOCYTES NFR BLD AUTO: 0.3 % (ref 0–0.5)
LYMPHOCYTES # BLD AUTO: 0.72 10*3/MM3 (ref 0.7–3.1)
LYMPHOCYTES NFR BLD AUTO: 19.8 % (ref 19.6–45.3)
MCH RBC QN AUTO: 25.7 PG (ref 26.6–33)
MCHC RBC AUTO-ENTMCNC: 30.9 G/DL (ref 31.5–35.7)
MCV RBC AUTO: 83 FL (ref 79–97)
MONOCYTES # BLD AUTO: 0.5 10*3/MM3 (ref 0.1–0.9)
MONOCYTES NFR BLD AUTO: 13.8 % (ref 5–12)
NEUTROPHILS NFR BLD AUTO: 2.12 10*3/MM3 (ref 1.7–7)
NEUTROPHILS NFR BLD AUTO: 58.4 % (ref 42.7–76)
NRBC BLD AUTO-RTO: 0 /100 WBC (ref 0–0.2)
PLATELET # BLD AUTO: 73 10*3/MM3 (ref 140–450)
PMV BLD AUTO: 10.2 FL (ref 6–12)
POTASSIUM SERPL-SCNC: 4.6 MMOL/L (ref 3.5–5.2)
PROT SERPL-MCNC: 7.2 G/DL (ref 6–8.5)
RBC # BLD AUTO: 4.17 10*6/MM3 (ref 4.14–5.8)
SODIUM SERPL-SCNC: 139 MMOL/L (ref 136–145)
WBC # BLD AUTO: 3.63 10*3/MM3 (ref 3.4–10.8)

## 2021-08-30 PROCEDURE — 82378 CARCINOEMBRYONIC ANTIGEN: CPT

## 2021-08-30 PROCEDURE — 25010000002 IOPAMIDOL 61 % SOLUTION: Performed by: NURSE PRACTITIONER

## 2021-08-30 PROCEDURE — 82565 ASSAY OF CREATININE: CPT

## 2021-08-30 PROCEDURE — 71260 CT THORAX DX C+: CPT

## 2021-08-30 PROCEDURE — 85025 COMPLETE CBC W/AUTO DIFF WBC: CPT

## 2021-08-30 PROCEDURE — 74177 CT ABD & PELVIS W/CONTRAST: CPT

## 2021-08-30 PROCEDURE — 80053 COMPREHEN METABOLIC PANEL: CPT

## 2021-08-30 PROCEDURE — 36415 COLL VENOUS BLD VENIPUNCTURE: CPT

## 2021-08-30 RX ADMIN — IOPAMIDOL 100 ML: 612 INJECTION, SOLUTION INTRAVENOUS at 09:40

## 2021-09-17 ENCOUNTER — OFFICE VISIT (OUTPATIENT)
Dept: ONCOLOGY | Facility: CLINIC | Age: 56
End: 2021-09-17

## 2021-09-17 VITALS
HEART RATE: 56 BPM | SYSTOLIC BLOOD PRESSURE: 152 MMHG | HEIGHT: 73 IN | WEIGHT: 215 LBS | DIASTOLIC BLOOD PRESSURE: 68 MMHG | RESPIRATION RATE: 18 BRPM | OXYGEN SATURATION: 100 % | TEMPERATURE: 97.7 F | BODY MASS INDEX: 28.49 KG/M2

## 2021-09-17 DIAGNOSIS — Z85.048 HISTORY OF RECTAL CANCER: Primary | ICD-10-CM

## 2021-09-17 PROCEDURE — 99214 OFFICE O/P EST MOD 30 MIN: CPT | Performed by: NURSE PRACTITIONER

## 2021-09-17 NOTE — PROGRESS NOTES
CHIEF COMPLAINT: Follow-up history of rectal carcinoma    Problem List:  Oncology/Hematology History Overview Note   1. Stage IIIB rectal carcinoma clinical T3b N1 aM0  2. Protracted postoperative course due to abdominal pelvic abscess following neoadjuvant Xeloda radiation  3. 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    -12/6/18 MRI of rectum showed T3b with suspicious right internal iliac lymph node and CT chest noncontrast negative except for gallstones  -Per Dr. Kelly, CEA was normal.  -12/18/18 saw me for the first time.  I reviewed her case in our multidisciplinary conference and went over that in detail with her.  She had presented with hematochezia.  Dr. Kelly repeated endoscopy for more exact detailing of the primary location and this appears to be rectal on MRI and endoscopy.  Plan is for randomization on clinical trial N 1048 to neoadjuvant FOLFOX versus standard chemoradiation.  We'll get him to radiation and our research staff for randomization and get his baseline CBC and CMP.    -1/4/19 followed up: Randomized to the Xeloda radiation arm.  He will get that and then 4-6 weeks later had his surgery, and then follow that with 6 months of adjuvant FOLFOX per protocol.  My research assistant met with him today.  He has a Xeloda prescription.  -3/4/2019 MRI: Good response with T2, less likely T3 with spiculation N0 disease.  -4/23/2019 pathology: Laparoscopic assisted converted to lower anterior resection open with stapled coloanal anastomosis and diverting loop ileostomy with liver biopsy showed residual adenoma with focal  high-grade dysplasia but no residual invasive carcinoma.  0 out of 17 lymph nodes.  Mild steatosis on liver biopsy with bridging fibrosis.  -5/9/2019 to 5/15/2019 admitted with abdominal pelvic abscess status post CT-guided percutaneous drain placement presenting with sepsis improved with antibiotics and drainage.  Temp of 104 on presentation.  2 weeks of protracted IV antibiotics with Radames Sosa planned at discharge.  -7/11/2019 follow-up visit:Start of adjuvant therapy delayed due to the above postoperative pelvic abscess.  This was treated surgically initially but then by CT-guided percutaneous drainage as above.  Serial follow-up with Radames Sosa including CTs and antibiotics eventually cleared him adequately to consider resumption of plans for adjuvant therapy.  CTs of the pelvis done on 6/5/2019, 6/13/2019, 6/26/2019 monitoring for this abscess and possible drainage but not able to drain due to decreasing size and minimal residual edema with CT 7/10/2019 showingStable to slight decrease in size of presacral perirectal abscess with presacral edema measuring 3.9 x 1.9 previously 4.1 x 2.5 cm. No new sites of focal fluid collection or loculated fluid.  Hence, modified FOLFOX 6 started at this junction.  -9/30/2019 office visit: Oxaliplatin discontinued due to ongoing thrombocytopenia.  Platelet count today 59,000.  We will continue course #5 and 6 with 5-FU and leucovorin alone to complete adjuvant therapy.  -10/23/2019 completed adjuvant chemotherapy.  -10/28/2019 CT chest abdomen pelvis with contrast showed decreased and fluid in the presacral fat.  Decrease in surrounding soft tissue stranding.  Gallstone seen with enlargement of spleen.  Stranding of the mesenteric roots with varices in the mesentery slightly increased compared to July 2019.  Chest is unremarkable.  CEA 1.53 with bilirubin 1.4, AST 46, alkaline phosphatase 135, white count 2500, hemoglobin 9.8, and platelets 62,000 on  10/21/2019.  -11/2019 colonoscopy with Dr. Lebron negative according to patient.  -2/23/2020: Saw Dr. Lebron regarding [place] cirrhosis-induced portal hypertension.  Did not recommend TIPS.  Started nadolol  -2/25/20 20 CT chest abdomen pelvis negative for metastasis but with portal hypertension and varices.  CEA 1.1.  ALT 51.  White count 3970 with hemoglobin 10.9.  -6/2/2020 CT abdomen and pelvis with stable postsurgical changes, no evidence of disease progression, CEA 0.86.  -8/28/2020 ileocolonoscopy  -9/8/2020 CT abdomen and pelvis without evidence of disease recurrence, CEA 0.87.  -3/9/2021 CT abdomen and pelvis stable changes, no evidence of progression of disease or recurrence.  CEA 1.21  -8/30/2021 CT chest, abdomen and pelvis with nonspecific small area of groundglass nodularity near the periphery of the right lower lobe favored infectious/inflammatory, otherwise CT scan stable without specific evidence of disease progression.  Of note, patient did have Covid infection 2 months ago.  Currently no respiratory concerns.  Has seen Dr. Gerardo at  for evaluation regarding his desire for ileostomy reversal, he reports that he had liver ultrasound that showed questionable liver lesion, he is scheduled for additional CT on 9/24 and then follow-up with Dr. Gerardo.  I discussed with the patient that current CT scan shows no abnormalities in the liver, he does have known cirrhosis with [place].  We will continue surveillance as per NCCN guidelines with repeat CT scans and serum testing in 6 months.  CEA currently normal at 1.13, platelet count stable at 73,000.     Rectal carcinoma (CMS/HCC)   12/18/2018 Initial Diagnosis    Rectal carcinoma (CMS/HCC)     1/7/2019 - 2/4/2019 Chemotherapy    Xeloda radiation on protocol     1/8/2019 - 2/14/2019 Radiation    Radiation OncologyTreatment Course:  Hesham Arevalo received 5040 cGy in 28 fractions to pelvis via External Beam Radiation - EBRT.     3/4/2019 Imaging    MRI of the  pelvis:  IMPRESSIONS:  MRI rectal cancer T category is: T2, LESS LIKELY EARLY T3 SPICULATIONS  Maximum EMD of invasion is: 0  Minimum tumor to MRF distance is: 12 MM  Low rectal tumor component: NO  Mesorectal nodes/tumor deposits: NO  EMVI: NO  Extramesorectal nodes: NO     4/23/2019 Surgery    Surgery rectosigmoidectomy pathology report:  Final Diagnosis    1. RECTOSIGMOID COLON, RECTOSIGMOID RESECTION:  Residual adenoma with focal high grade dysplasia with no residual invasive carcinoma identified post-treatment (see comment).  Seventeen lymph nodes negative for carcinoma (0/17)   2. LIVER, CORE NEEDLE BIOPSY:  Mild steatosis with mild chronic inflammation and increased fibrosis including bridging fibrosis (Stage 3-4) (see comment)        COLON AND RECTUM TEMPLATE:  TYPE OF SPECIMEN/PROCEDURE: Rectosigmoid resection.   SPECIMEN INTEGRITY:  Intact.  TUMOR SITE:  Rectum.  TUMOR SIZE (greatest dimension):  Indeterminate  TUMOR LOCATION (applicable only to rectal primaries): Entirely below anterior peritoneal reflection.  MACROSCOPIC INTACTNESS OF MESORECTUM (If applicable): Intact.  MACROSCOPIC TUMOR PERFORATION: Not identified.  TUMOR CONFIGURATION:  Ulcerated.  HISTOLOGIC TYPE:  Adenocarcinoma.  HISTOLOGIC GRADE:  G3-4 (per previous biopsy).  MICROSCOPIC DEPTH OF TUMOR INVASION/EXTENSION:  No residual tumor identified.  PERITONEAL INVOLVEMENT:  Not identified.  LYMPHVASCULAR INVASION:  Not identified.  PERINEURAL INVASION: Not identified.  SURGICAL MARGINS: Uninvolved.  STATUS AND DISTANCE FROM PROXIMAL MUCOSAL MARGIN:  Uninvolved, 13.0 cm.  STATUS AND DISTANCE FROM DISTAL MUCOSAL MARGIN: Uninvolved, 1.2 cm.  STATUS AND DISTANCE FROM MESENTERIC MARGIN: Not applicable.  STATUS AND DISTANCE FROM RADIAL MARGIN: Uninvolved, 1.7 cm.  DISTANCE FROM DENTATE LINE (RECTAL TUMOR ONLY):  Indeterminate.  TUMOR DEPOSITS (DISCONTINUOUS EXTRAMURAL EXTENSION):  Not identified.  NUMBER OF LYMPH NODES INVOLVED BY METASTATIC  NEOPLASM: 0.  NUMBER OF REGIONAL LYMPH NODES EXAMINED: 17.  EXTRANODAL EXTENSION:  Not applicable.  TREATMENT EFFECT:  Present, no viable cancer cells identified (complete response, score 0).  ADDITIONAL PATHOLOGIC FINDINGS:  None.  MSI TESTING:  No evidence of MSI based on reported IHC on outside biopsy  OTHER ANCILLARY STUDIES (BRAF, KRAS, ETC.):  Should be performed on initial diagnostic biopsy if needed.  AJCC PATHOLOGIC STAGE:  (COMPLETED BY PATHOLOGIST, BASED ONLY ON TISSUE FINDINGS, MORE EXTENSIVE DISEASE MAY NOT BE KNOWN TO THE PATHOLOGIST)  ypT=  0  ypN=  0  AJCC PATHOLOGIC STAGE:  y0.                5/15/2019 Adverse Reaction    -5/9/2019 to 5/15/2019 admitted with abdominal pelvic abscess status post CT-guided percutaneous drain placement presenting with sepsis improved with antibiotics and drainage.  Temp of 104 on presentation.  2 weeks of protracted IV antibiotics with Radames Sosa planned at discharge     7/22/2019 - 10/23/2019 Chemotherapy    OP COLON [id number] OXALIplatin / Leucovorin / Fluorouracil  Start of adjuvant therapy delayed due to the above postoperative pelvic abscess.  This was treated surgically initially but then by CT-guided percutaneous drainage as above.  Serial follow-up with Radames Sosa including CTs and antibiotics eventually cleared him adequately to consider resumption of plans for adjuvant therapy.  CTs of the pelvis done on 6/5/2019, 6/13/2019, 6/26/2019 monitoring for this abscess and possible drainage but not able to drain due to decreasing size and minimal residual edema with CT 7/10/2019 showingStable to slight decrease in size of presacral perirectal abscess with presacral edema measuring 3.9 x 1.9 previously 4.1 x 2.5 cm. No new sites of focal fluid collection or loculated fluid.     9/30/2019 Adverse Reaction    Oxaliplatin discontinued due to ongoing thrombocytopenia.     10/28/2019 Imaging    10/28/2019 CT chest abdomen pelvis with contrast showed decreased and  fluid in the presacral fat.  Decrease in surrounding soft tissue stranding.  Gallstone seen with enlargement of spleen.  Stranding of the mesenteric roots with varices in the mesentery slightly increased compared to July 2019.  Chest is unremarkable.  CEA 1.53 with bilirubin 1.4, AST 46, alkaline phosphatase 135, white count 2500, hemoglobin 9.8, and platelets 62,000 on 10/21/2019.     2/25/2020 Imaging    CT chest abdomen pelvis negative for metastasis but with portal hypertension and varices.  CEA 1.1.  ALT 51.  White count 3970 hemoglobin 10.9 platelets 81,000.     6/2/2020 Imaging    CT chest, abdomen and pelvis IMPRESSION:  1. Stable appearance of the chest without metastatic involvement or  acute pathology.  2. Stable appearance of postsurgical changes in the region of the rectum  and presacral edema stable from prior without evidence for local  recurrence or progression of involvement.  3. Cholelithiasis.  4. Hepatic steatosis.  CEA 0.86     8/28/2020 Procedure    Ileocolonoscopy     9/8/2020 Imaging    CT chest, abdomen and pelvis IMPRESSION:  1. Stable appearance of the chest without metastatic involvement.  2. Stable appearance of the posterior cervical changes in the regions of  the rectum and presacral edema without evidence for local recurrence,  nodularity or mass lesion.  3. Cholelithiasis.  4. Hepatic steatosis.  CEA 0.87     3/9/2021 Imaging    CT chest, abdomen and pelvis IMPRESSION:  Stable changes seen in the presacral soft tissues. There is  no progression of disease. No new findings. No evidence of local  recurrence. Chest, abdomen and pelvis reveals no acute findings.  CEA 1.21         HISTORY OF PRESENT ILLNESS:  The patient is a 55 y.o. male, here for follow up on management of history of rectal cancer.  Mr. Arevalo reports currently he feels well with no new concerns.  He does report having had Covid about 2 months ago after exposure to his son who was positive.  He states that he has had  "both Pfizer Covid vaccinations.  He had mild symptoms with Covid and did quarantine for the recommended amount of time.  Currently he is feeling well.  He denies any shortness of breath or cough.  No fevers or chills.  Ileostomy is, he has met with Dr. Kelly to discuss ileostomy reversal, she referred him to  where he has been seen by Dr. Gerardo for evaluation in light of his risk with known cirrhosis with .  He reports that an ultrasound was done that showed something questionable in his liver, he is scheduled for a CT liver with and without contrast 9/24/2021 at .    Past Medical History:   Diagnosis Date   • Arthritis     knees    • Cancer (CMS/HCC) 11/2018    colon   • Disease of thyroid gland    • Fatty liver    • Fatty liver    • Hashimoto's disease    • History of radiation therapy 02/14/2019    rectal cancer   • Hypertension    • Ileostomy in place (CMS/HCC)    • Psoriasis    • Wears glasses      Past Surgical History:   Procedure Laterality Date   • COLON RESECTION N/A 4/23/2019    Procedure: LAPAROSCOPIC LOWER ANTERIOR RESECTION WITH DIVERTING LOOP ILEOOSTOMY, SPLENIC FLEIXURE MOBILIZATION AND LIVER BIOPSY, AND PROCTOSCOPY;  Surgeon: Pau Kelly MD;  Location:  [facility];  Service: General   • COLONOSCOPY      2018   • ILEOSTOMY  04/2019   • LIVER BIOPSY  2003   • VASECTOMY  1998   • VENOUS ACCESS DEVICE (PORT) INSERTION N/A 7/18/2019    Procedure: PORT PLACEMENT;  Surgeon: Franky Cazares MD;  Location:  [facility];  Service: General       No Known Allergies    Family History and Social History reviewed and changed as necessary    REVIEW OF SYSTEM:   Negative for new concerns    PHYSICAL EXAM:  General: Pleasant, well-developed male in no distress  Respirations: Lungs clear, respirations even and unlabored    Vitals:    09/17/21 1026   BP: 152/68   Pulse: 56   Resp: 18   Temp: 97.7 °F (36.5 °C)   SpO2: 100%   Weight: 97.5 kg (215 lb)   Height: 185.4 cm (73\")     Vitals:    09/17/21 1026 "   PainSc: 0-No pain          ECOG score: 0           Vitals reviewed.    ECOG: (0) Fully Active - Able to Carry On All Pre-disease Performance Without Restriction    Lab Results   Component Value Date    HGB 10.7 (L) 08/30/2021    HCT 34.6 (L) 08/30/2021    MCV 83.0 08/30/2021    PLT 73 (L) 08/30/2021    WBC 3.63 08/30/2021    NEUTROABS 2.12 08/30/2021    LYMPHSABS 0.72 08/30/2021    MONOSABS 0.50 08/30/2021    EOSABS 0.24 08/30/2021    BASOSABS 0.04 08/30/2021       Lab Results   Component Value Date    GLUCOSE 126 (H) 08/30/2021    BUN 17 08/30/2021    CREATININE 1.15 08/30/2021     08/30/2021    K 4.6 08/30/2021     08/30/2021    CO2 25.0 08/30/2021    CALCIUM 9.3 08/30/2021    PROTEINTOT 7.2 08/30/2021    ALBUMIN 4.00 08/30/2021    BILITOT 1.1 08/30/2021    ALKPHOS 60 08/30/2021    AST 60 (H) 08/30/2021    ALT 34 08/30/2021     CEA 1.13 8/30/2021        ASSESSMENT & PLAN:    1.  Stage IIIB rectal carcinoma clinical T3b N1 aM0  2.  Protracted postoperative course due to abdominal pelvic abscess following neoadjuvant Xeloda radiation  3.  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  Thrombocytopenia secondary to #3    -11/30/18 CT abdomen pelvis and chest x-ray negative for metastasis.  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    -12/6/18 MRI of rectum showed T3b with suspicious right internal iliac lymph node and CT chest noncontrast negative except for gallstones  -Per Dr. Kelly, CEA was normal.  -12/18/18 saw me for the first time.  I reviewed her case in our multidisciplinary conference and went over that in detail with her.  She had presented with hematochezia.  Dr. Kelly repeated endoscopy for more exact detailing of the primary  location and this appears to be rectal on MRI and endoscopy.  Plan is for randomization on clinical trial N 1048 to neoadjuvant FOLFOX versus standard chemoradiation.  We'll get him to radiation and our research staff for randomization and get his baseline CBC and CMP.    -1/4/19 followed up: Randomized to the Xeloda radiation arm.  He will get that and then 4-6 weeks later had his surgery, and then follow that with 6 months of adjuvant FOLFOX per protocol.  My research assistant met with him today.  He has a Xeloda prescription.  -3/4/2019 MRI: Good response with T2, less likely T3 with spiculation N0 disease.  -4/23/2019 pathology: Laparoscopic assisted converted to lower anterior resection open with stapled coloanal anastomosis and diverting loop ileostomy with liver biopsy showed residual adenoma with focal high-grade dysplasia but no residual invasive carcinoma.  0 out of 17 lymph nodes.  Mild steatosis on liver biopsy with bridging fibrosis.  -5/9/2019 to 5/15/2019 admitted with abdominal pelvic abscess status post CT-guided percutaneous drain placement presenting with sepsis improved with antibiotics and drainage.  Temp of 104 on presentation.  2 weeks of protracted IV antibiotics with Radames Sosa planned at discharge.  -7/11/2019 follow-up visit:Start of adjuvant therapy delayed due to the above postoperative pelvic abscess.  This was treated surgically initially but then by CT-guided percutaneous drainage as above.  Serial follow-up with Radames Sosa including CTs and antibiotics eventually cleared him adequately to consider resumption of plans for adjuvant therapy.  CTs of the pelvis done on 6/5/2019, 6/13/2019, 6/26/2019 monitoring for this abscess and possible drainage but not able to drain due to decreasing size and minimal residual edema with CT 7/10/2019 showingStable to slight decrease in size of presacral perirectal abscess with presacral edema measuring 3.9 x 1.9 previously 4.1 x 2.5 cm. No  new sites of focal fluid collection or loculated fluid.  Hence, modified FOLFOX 6 started at this junction.  -9/30/2019 office visit: Oxaliplatin discontinued due to ongoing thrombocytopenia.  Platelet count today 59,000.  We will continue course #5 and 6 with 5-FU and leucovorin alone to complete adjuvant therapy.  -10/23/2019 completed adjuvant chemotherapy.  -10/28/2019 CT chest abdomen pelvis with contrast showed decreased and fluid in the presacral fat.  Decrease in surrounding soft tissue stranding.  Gallstone seen with enlargement of spleen.  Stranding of the mesenteric roots with varices in the mesentery slightly increased compared to July 2019.  Chest is unremarkable.  CEA 1.53 with bilirubin 1.4, AST 46, alkaline phosphatase 135, white count 2500, hemoglobin 9.8, and platelets 62,000 on 10/21/2019.  -11/2019 colonoscopy with Dr. Lebron negative according to patient.  -2/23/2020: Saw Dr. Lebron regarding [place] cirrhosis-induced portal hypertension.  Did not recommend TIPS.  Started nadolol  -2/25/20 20 CT chest abdomen pelvis negative for metastasis but with portal hypertension and varices.  CEA 1.1.  ALT 51.  White count 3970 with hemoglobin 10.9.  -6/2/2020 CT abdomen and pelvis with stable postsurgical changes, no evidence of disease progression, CEA 0.86.  -8/28/2020 ileocolonoscopy  -9/8/2020 CT abdomen and pelvis without evidence of disease recurrence, CEA 0.87.  -3/9/2021 CT abdomen and pelvis stable changes, no evidence of progression of disease or recurrence.  CEA 1.21    9/17/2021 [facility] follow-up: Mr. Arevalo overall is doing well, no evidence of disease recurrence on current CT scans and no new worrisome symptoms.  His 8/30/2021 CT chest, abdomen and pelvis did show nonspecific small area of groundglass nodularity near the periphery of the right lower lobe favored infectious/inflammatory, otherwise CT scan stable without specific evidence of disease progression.  Of note, patient did  have Covid infection 2 months ago and I suspect this is what is showing on current CT chest.  Currently no respiratory concerns, O2 saturation 100% on room air.    Has seen Dr. Gerardo at  for evaluation regarding his desire for ileostomy reversal, he reports that he had liver ultrasound that showed questionable liver lesion, he is scheduled for additional CT on 9/24 and then follow-up with Dr. Gerardo.  I discussed with the patient that current CT scan shows no abnormalities in the liver, he does have known cirrhosis with [place].    We will continue surveillance per NCCN guidelines with repeat CT scans and serum testing in 6 months.  CEA currently normal at 1.13, platelet count stable at 73,000.  We discussed that if anything is found on his repeat CT and work-up with Dr. Gerardo we would want to see him sooner, he states understanding and will keep us posted.    I spent 35 minutes caring for Hesham on this date of service. This time includes time spent by me in the following activities: preparing for the visit, reviewing tests, obtaining and/or reviewing a separately obtained history, performing a medically appropriate examination and/or evaluation, counseling and educating the patient/family/caregiver, ordering medications, tests, or procedures, referring and communicating with other health care professionals, documenting information in the medical record and Records available in [facility] Everywhere regarding abdominal ultrasound and follow-up with Dr. Gerardo reviewed at time of visit, plan of care also discussed with Dr. Ousmane Moeller.     Ana Self, APRN    09/17/2021

## 2021-10-21 ENCOUNTER — APPOINTMENT (OUTPATIENT)
Dept: CT IMAGING | Facility: HOSPITAL | Age: 56
End: 2021-10-21

## 2022-03-10 ENCOUNTER — HOSPITAL ENCOUNTER (OUTPATIENT)
Dept: CT IMAGING | Facility: HOSPITAL | Age: 57
Discharge: HOME | End: 2022-03-10

## 2022-03-10 ENCOUNTER — LAB (OUTPATIENT)
Dept: LAB | Facility: HOSPITAL | Age: 57
End: 2022-03-10

## 2022-03-10 DIAGNOSIS — Z85.048 HISTORY OF RECTAL CANCER: ICD-10-CM

## 2022-03-10 LAB
ALBUMIN SERPL-MCNC: 4 G/DL (ref 3.5–5.2)
ALBUMIN/GLOB SERPL: 1.3 G/DL
ALP SERPL-CCNC: 52 U/L (ref 39–117)
ALT SERPL W P-5'-P-CCNC: 28 U/L (ref 1–41)
ANION GAP SERPL CALCULATED.3IONS-SCNC: 9 MMOL/L (ref 5–15)
AST SERPL-CCNC: 48 U/L (ref 1–40)
BASOPHILS # BLD AUTO: 0.04 10*3/MM3 (ref 0–0.2)
BASOPHILS NFR BLD AUTO: 1.2 % (ref 0–1.5)
BILIRUB SERPL-MCNC: 1.4 MG/DL (ref 0–1.2)
BUN SERPL-MCNC: 15 MG/DL (ref 6–20)
BUN/CREAT SERPL: 12.9 (ref 7–25)
CALCIUM SPEC-SCNC: 9.1 MG/DL (ref 8.6–10.5)
CEA SERPL-MCNC: 1.22 NG/ML
CHLORIDE SERPL-SCNC: 104 MMOL/L (ref 98–107)
CO2 SERPL-SCNC: 24 MMOL/L (ref 22–29)
CREAT BLDA-MCNC: 1.2 MG/DL (ref 0.6–1.3)
CREAT SERPL-MCNC: 1.16 MG/DL (ref 0.76–1.27)
DEPRECATED RDW RBC AUTO: 48.3 FL (ref 37–54)
EGFRCR SERPLBLD CKD-EPI 2021: 73 ML/MIN/1.73
EOSINOPHIL # BLD AUTO: 0.22 10*3/MM3 (ref 0–0.4)
EOSINOPHIL NFR BLD AUTO: 6.8 % (ref 0.3–6.2)
ERYTHROCYTE [DISTWIDTH] IN BLOOD BY AUTOMATED COUNT: 17.4 % (ref 12.3–15.4)
GLOBULIN UR ELPH-MCNC: 3.2 GM/DL
GLUCOSE SERPL-MCNC: 156 MG/DL (ref 65–99)
HCT VFR BLD AUTO: 28.6 % (ref 37.5–51)
HGB BLD-MCNC: 8.5 G/DL (ref 13–17.7)
IMM GRANULOCYTES # BLD AUTO: 0 10*3/MM3 (ref 0–0.05)
IMM GRANULOCYTES NFR BLD AUTO: 0 % (ref 0–0.5)
LYMPHOCYTES # BLD AUTO: 0.6 10*3/MM3 (ref 0.7–3.1)
LYMPHOCYTES NFR BLD AUTO: 18.6 % (ref 19.6–45.3)
MCH RBC QN AUTO: 22.7 PG (ref 26.6–33)
MCHC RBC AUTO-ENTMCNC: 29.7 G/DL (ref 31.5–35.7)
MCV RBC AUTO: 76.3 FL (ref 79–97)
MONOCYTES # BLD AUTO: 0.31 10*3/MM3 (ref 0.1–0.9)
MONOCYTES NFR BLD AUTO: 9.6 % (ref 5–12)
NEUTROPHILS NFR BLD AUTO: 2.06 10*3/MM3 (ref 1.7–7)
NEUTROPHILS NFR BLD AUTO: 63.8 % (ref 42.7–76)
NRBC BLD AUTO-RTO: 0 /100 WBC (ref 0–0.2)
PLATELET # BLD AUTO: 72 10*3/MM3 (ref 140–450)
PMV BLD AUTO: 9.7 FL (ref 6–12)
POTASSIUM SERPL-SCNC: 4.4 MMOL/L (ref 3.5–5.2)
PROT SERPL-MCNC: 7.2 G/DL (ref 6–8.5)
RBC # BLD AUTO: 3.75 10*6/MM3 (ref 4.14–5.8)
SODIUM SERPL-SCNC: 137 MMOL/L (ref 136–145)
WBC NRBC COR # BLD: 3.23 10*3/MM3 (ref 3.4–10.8)

## 2022-03-10 PROCEDURE — 71260 CT THORAX DX C+: CPT

## 2022-03-10 PROCEDURE — 85025 COMPLETE CBC W/AUTO DIFF WBC: CPT

## 2022-03-10 PROCEDURE — 36415 COLL VENOUS BLD VENIPUNCTURE: CPT

## 2022-03-10 PROCEDURE — 25010000002 IOPAMIDOL 61 % SOLUTION: Performed by: NURSE PRACTITIONER

## 2022-03-10 PROCEDURE — 82565 ASSAY OF CREATININE: CPT

## 2022-03-10 PROCEDURE — 74177 CT ABD & PELVIS W/CONTRAST: CPT

## 2022-03-10 PROCEDURE — 80053 COMPREHEN METABOLIC PANEL: CPT

## 2022-03-10 PROCEDURE — 82378 CARCINOEMBRYONIC ANTIGEN: CPT

## 2022-03-10 RX ADMIN — IOPAMIDOL 98 ML: 612 INJECTION, SOLUTION INTRAVENOUS at 09:44

## 2022-03-18 ENCOUNTER — OFFICE VISIT (OUTPATIENT)
Dept: ONCOLOGY | Facility: CLINIC | Age: 57
End: 2022-03-18

## 2022-03-18 VITALS
SYSTOLIC BLOOD PRESSURE: 93 MMHG | WEIGHT: 208 LBS | BODY MASS INDEX: 27.57 KG/M2 | HEART RATE: 67 BPM | RESPIRATION RATE: 18 BRPM | TEMPERATURE: 98.4 F | DIASTOLIC BLOOD PRESSURE: 49 MMHG | OXYGEN SATURATION: 100 % | HEIGHT: 73 IN

## 2022-03-18 DIAGNOSIS — C20 RECTAL CARCINOMA: Primary | ICD-10-CM

## 2022-03-18 DIAGNOSIS — D51.3 OTHER DIETARY VITAMIN B12 DEFICIENCY ANEMIA: ICD-10-CM

## 2022-03-18 DIAGNOSIS — D50.0 IRON DEFICIENCY ANEMIA DUE TO CHRONIC BLOOD LOSS: ICD-10-CM

## 2022-03-18 PROCEDURE — 99215 OFFICE O/P EST HI 40 MIN: CPT | Performed by: INTERNAL MEDICINE

## 2022-03-18 RX ORDER — FERROUS SULFATE 325(65) MG
TABLET ORAL
Qty: 30 TABLET | Refills: 11 | Status: SHIPPED | OUTPATIENT
Start: 2022-03-18

## 2022-03-18 RX ORDER — WARFARIN SODIUM 5 MG/1
TABLET ORAL
COMMUNITY
Start: 2021-12-30 | End: 2023-03-20

## 2022-03-18 RX ORDER — CARVEDILOL 6.25 MG/1
TABLET ORAL
COMMUNITY
Start: 2022-02-24

## 2022-03-18 RX ORDER — EZETIMIBE 10 MG/1
10 TABLET ORAL DAILY
COMMUNITY
Start: 2022-02-11

## 2022-03-18 RX ORDER — CYANOCOBALAMIN 1000 UG/ML
INJECTION, SOLUTION INTRAMUSCULAR; SUBCUTANEOUS
COMMUNITY
Start: 2022-03-11

## 2022-03-18 NOTE — PROGRESS NOTES
CHIEF COMPLAINT: Stage IIIb rectal cancer    Problem List:  Oncology/Hematology History Overview Note   1.  Stage IIIB rectal carcinoma clinical T3b N1 aM0  2.  Protracted postoperative course due to abdominal pelvic abscess following neoadjuvant Xeloda radiation  3.  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  B12 deficiency anemia  5.  Iron deficiency anemia    -11/30/18 CT abdomen pelvis and chest x-ray negative for metastasis.  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    -12/6/18 MRI of rectum showed T3b with suspicious right internal iliac lymph node and CT chest noncontrast negative except for gallstones  -Per Dr. Kelly, CEA was normal.  -12/18/18 saw me for the first time.  I reviewed her case in our multidisciplinary conference and went over that in detail with her.  She had presented with hematochezia.  Dr. Kelly repeated endoscopy for more exact detailing of the primary location and this appears to be rectal on MRI and endoscopy.  Plan is for randomization on clinical trial N 1048 to neoadjuvant FOLFOX versus standard chemoradiation.  We'll get him to radiation and our research staff for randomization and get his baseline CBC and CMP.    -1/4/19 followed up: Randomized to the Xeloda radiation arm.  He will get that and then 4-6 weeks later had his surgery, and then follow that with 6 months of adjuvant FOLFOX per protocol.  My research assistant met with him today.  He has a Xeloda prescription.  -3/4/2019 MRI: Good response with T2, less likely T3 with spiculation N0 disease.  -4/23/2019 pathology: Laparoscopic assisted converted to lower anterior resection open with stapled coloanal anastomosis and diverting loop ileostomy with liver  biopsy showed residual adenoma with focal high-grade dysplasia but no residual invasive carcinoma.  0 out of 17 lymph nodes.  Mild steatosis on liver biopsy with bridging fibrosis.  -5/9/2019 to 5/15/2019 admitted with abdominal pelvic abscess status post CT-guided percutaneous drain placement presenting with sepsis improved with antibiotics and drainage.  Temp of 104 on presentation.  2 weeks of protracted IV antibiotics with Radames Sosa planned at discharge.  -7/11/2019 follow-up visit:Start of adjuvant therapy delayed due to the above postoperative pelvic abscess.  This was treated surgically initially but then by CT-guided percutaneous drainage as above.  Serial follow-up with Radames Sosa including CTs and antibiotics eventually cleared him adequately to consider resumption of plans for adjuvant therapy.  CTs of the pelvis done on 6/5/2019, 6/13/2019, 6/26/2019 monitoring for this abscess and possible drainage but not able to drain due to decreasing size and minimal residual edema with CT 7/10/2019 showingStable to slight decrease in size of presacral perirectal abscess with presacral edema measuring 3.9 x 1.9 previously 4.1 x 2.5 cm. No new sites of focal fluid collection or loculated fluid.  Hence, modified FOLFOX 6 started at this junction.  -9/30/2019 office visit: Oxaliplatin discontinued due to ongoing thrombocytopenia.  Platelet count today 59,000.  We will continue course #5 and 6 with 5-FU and leucovorin alone to complete adjuvant therapy.  -10/23/2019 completed adjuvant chemotherapy.  -10/28/2019 CT chest abdomen pelvis with contrast showed decreased and fluid in the presacral fat.  Decrease in surrounding soft tissue stranding.  Gallstone seen with enlargement of spleen.  Stranding of the mesenteric roots with varices in the mesentery slightly increased compared to July 2019.  Chest is unremarkable.  CEA 1.53 with bilirubin 1.4, AST 46, alkaline phosphatase 135, white count 2500, hemoglobin  9.8, and platelets 62,000 on 10/21/2019.  -11/2019 colonoscopy with Dr. Lebron negative according to patient.  -2/23/2020: Saw Dr. Lebron regarding [place] cirrhosis-induced portal hypertension.  Did not recommend TIPS.  Started nadolol  -2/25/20 20 CT chest abdomen pelvis negative for metastasis but with portal hypertension and varices.  CEA 1.1.  ALT 51.  White count 3970 with hemoglobin 10.9.  -6/2/2020 CT abdomen and pelvis with stable postsurgical changes, no evidence of disease progression, CEA 0.86.  -8/28/2020 ileocolonoscopy  -9/8/2020 CT abdomen and pelvis without evidence of disease recurrence, CEA 0.87.  -3/9/2021 CT abdomen and pelvis stable changes, no evidence of progression of disease or recurrence.  CEA 1.21  -8/30/2021 CT chest, abdomen and pelvis with nonspecific small area of groundglass nodularity near the periphery of the right lower lobe favored infectious/inflammatory, otherwise CT scan stable without specific evidence of disease progression.  Of note, patient did have Covid infection 2 months ago.  Currently no respiratory concerns.  Has seen Dr. Gerardo at  for evaluation regarding his desire for ileostomy reversal, he reports that he had liver ultrasound that showed questionable liver lesion, he is scheduled for additional CT on 9/24 and then follow-up with Dr. Gerardo.  I discussed with the patient that current CT scan shows no abnormalities in the liver, he does have known cirrhosis with [place].  We will continue surveillance as per NCCN guidelines with repeat CT scans and serum testing in 6 months.  CEA currently normal at 1.13, platelet count stable at 73,000.    -2/24/2022 follow-up Dr. Racheal Gerardo Hepatology.  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    -2/28/2022 data from : Ferritin low at 12 with iron low at 38 and decreased transferrin  saturation 8% with increased total iron-binding capacity 498 and increased transferrin 398 consistent with iron deficiency.  B12 level immeasurably low less than 150.  Folic acid normal 15.5.  Alpha-fetoprotein 2.5.  INR 2.3.  Hemoglobin 8.9 MCV 77 with white count 3250 and platelets 77k.    -3/10/2022 data:  White count stable 3230.  Hemoglobin down to 8.5 with MCV down to 76.3 with platelets stable 72,000.  CEA 1.22.  AST 48 with bilirubin 1.4Glucose 156.  Otherwise unremarkable CMP.   CT chest abdomen pelvisShows resolution of right lower lobe groundglass opacities.  Fatty liver infiltration.  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  Cholelithiasis    -3/18/2022 [facility] medical oncology follow-up visit: I reviewed multiple data sets from  and current CTs and labs and CEA from [facility] it both images of the scans as well as reports and went over the above with the patient.  He started B12 already and has iron deficiency anemia as well documented at  and I have started him on ferrous sulfate 325 twice a day every other day with vitamin C and we will in 6 months repeat his CTs and CBC and CEA per NCCN guidelines from the cancer standpoint.  He will continue to follow with  liver specialists from the portal hypertension standpoint and is currently on Coumadin.  He had EGD and colonoscopy according to the patient yesterday at  and the varices were improved.  There was still some thrombus in the current CT but I suspect it is improving.  Coumadin is a Catch-22 and may actually complicate variceal bleeding but it also may diminish the likelihood of bleeding if it helps decompress the portal system by treating the thrombus and I leave that decision to the capable hands of his gastroenterologist/liver specialist at      Rectal carcinoma (HCC)   12/18/2018 Initial Diagnosis    Rectal carcinoma  (CMS/HCC)     1/7/2019 - 2/4/2019 Chemotherapy    Xeloda radiation on protocol     1/8/2019 - 2/14/2019 Radiation    Radiation OncologyTreatment Course:  Hesham Arevalo received 5040 cGy in 28 fractions to pelvis via External Beam Radiation - EBRT.     3/4/2019 Imaging    MRI of the pelvis:  IMPRESSIONS:  MRI rectal cancer T category is: T2, LESS LIKELY EARLY T3 SPICULATIONS  Maximum EMD of invasion is: 0  Minimum tumor to MRF distance is: 12 MM  Low rectal tumor component: NO  Mesorectal nodes/tumor deposits: NO  EMVI: NO  Extramesorectal nodes: NO     4/23/2019 Surgery    Surgery rectosigmoidectomy pathology report:  Final Diagnosis    1. RECTOSIGMOID COLON, RECTOSIGMOID RESECTION:  Residual adenoma with focal high grade dysplasia with no residual invasive carcinoma identified post-treatment (see comment).  Seventeen lymph nodes negative for carcinoma (0/17)   2. LIVER, CORE NEEDLE BIOPSY:  Mild steatosis with mild chronic inflammation and increased fibrosis including bridging fibrosis (Stage 3-4) (see comment)        COLON AND RECTUM TEMPLATE:  TYPE OF SPECIMEN/PROCEDURE: Rectosigmoid resection.   SPECIMEN INTEGRITY:  Intact.  TUMOR SITE:  Rectum.  TUMOR SIZE (greatest dimension):  Indeterminate  TUMOR LOCATION (applicable only to rectal primaries): Entirely below anterior peritoneal reflection.  MACROSCOPIC INTACTNESS OF MESORECTUM (If applicable): Intact.  MACROSCOPIC TUMOR PERFORATION: Not identified.  TUMOR CONFIGURATION:  Ulcerated.  HISTOLOGIC TYPE:  Adenocarcinoma.  HISTOLOGIC GRADE:  G3-4 (per previous biopsy).  MICROSCOPIC DEPTH OF TUMOR INVASION/EXTENSION:  No residual tumor identified.  PERITONEAL INVOLVEMENT:  Not identified.  LYMPHVASCULAR INVASION:  Not identified.  PERINEURAL INVASION: Not identified.  SURGICAL MARGINS: Uninvolved.  STATUS AND DISTANCE FROM PROXIMAL MUCOSAL MARGIN:  Uninvolved, 13.0 cm.  STATUS AND DISTANCE FROM DISTAL MUCOSAL MARGIN: Uninvolved, 1.2 cm.  STATUS AND DISTANCE FROM  MESENTERIC MARGIN: Not applicable.  STATUS AND DISTANCE FROM RADIAL MARGIN: Uninvolved, 1.7 cm.  DISTANCE FROM DENTATE LINE (RECTAL TUMOR ONLY):  Indeterminate.  TUMOR DEPOSITS (DISCONTINUOUS EXTRAMURAL EXTENSION):  Not identified.  NUMBER OF LYMPH NODES INVOLVED BY METASTATIC NEOPLASM: 0.  NUMBER OF REGIONAL LYMPH NODES EXAMINED: 17.  EXTRANODAL EXTENSION:  Not applicable.  TREATMENT EFFECT:  Present, no viable cancer cells identified (complete response, score 0).  ADDITIONAL PATHOLOGIC FINDINGS:  None.  MSI TESTING:  No evidence of MSI based on reported IHC on outside biopsy  OTHER ANCILLARY STUDIES (BRAF, KRAS, ETC.):  Should be performed on initial diagnostic biopsy if needed.  AJCC PATHOLOGIC STAGE:  (COMPLETED BY PATHOLOGIST, BASED ONLY ON TISSUE FINDINGS, MORE EXTENSIVE DISEASE MAY NOT BE KNOWN TO THE PATHOLOGIST)  ypT=  0  ypN=  0  AJCC PATHOLOGIC STAGE:  y0.                5/15/2019 Adverse Reaction    -5/9/2019 to 5/15/2019 admitted with abdominal pelvic abscess status post CT-guided percutaneous drain placement presenting with sepsis improved with antibiotics and drainage.  Temp of 104 on presentation.  2 weeks of protracted IV antibiotics with Radames Sosa planned at discharge     7/22/2019 - 10/23/2019 Chemotherapy    OP COLON [id number] OXALIplatin / Leucovorin / Fluorouracil  Start of adjuvant therapy delayed due to the above postoperative pelvic abscess.  This was treated surgically initially but then by CT-guided percutaneous drainage as above.  Serial follow-up with Radames Sosa including CTs and antibiotics eventually cleared him adequately to consider resumption of plans for adjuvant therapy.  CTs of the pelvis done on 6/5/2019, 6/13/2019, 6/26/2019 monitoring for this abscess and possible drainage but not able to drain due to decreasing size and minimal residual edema with CT 7/10/2019 showingStable to slight decrease in size of presacral perirectal abscess with presacral edema measuring 3.9  x 1.9 previously 4.1 x 2.5 cm. No new sites of focal fluid collection or loculated fluid.     9/30/2019 Adverse Reaction    Oxaliplatin discontinued due to ongoing thrombocytopenia.     10/28/2019 Imaging    10/28/2019 CT chest abdomen pelvis with contrast showed decreased and fluid in the presacral fat.  Decrease in surrounding soft tissue stranding.  Gallstone seen with enlargement of spleen.  Stranding of the mesenteric roots with varices in the mesentery slightly increased compared to July 2019.  Chest is unremarkable.  CEA 1.53 with bilirubin 1.4, AST 46, alkaline phosphatase 135, white count 2500, hemoglobin 9.8, and platelets 62,000 on 10/21/2019.     8/28/2020 Procedure    Ileocolonoscopy     3/10/2022 Imaging    -3/10/2022 data:  White count stable 3230.  Hemoglobin down to 8.5 with MCV down to 76.3 with platelets stable 72,000.  CEA 1.22.  AST 48 with bilirubin 1.4Glucose 156.  Otherwise unremarkable CMP.   CT chest abdomen pelvisShows resolution of right lower lobe groundglass opacities.  Fatty liver infiltration.  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  Cholelithiasis         HISTORY OF PRESENT ILLNESS:  The patient is a 56 y.o. male, here for follow up on management of stage IIIb rectal cancer history with [place] and portal hypertension with variceal bleeding and B12 and folate deficiency    Past Medical History:   Diagnosis Date   • Arthritis     knees    • Cancer (HCC) 11/2018    colon   • Disease of thyroid gland    • Fatty liver    • Fatty liver    • Hashimoto's disease    • History of radiation therapy 02/14/2019    rectal cancer   • Hypertension    • Ileostomy in place (HCC)    • Psoriasis    • Wears glasses      Past Surgical History:   Procedure Laterality Date   • COLON RESECTION N/A 4/23/2019    Procedure: LAPAROSCOPIC LOWER ANTERIOR RESECTION WITH DIVERTING LOOP ILEOOSTOMY, SPLENIC  "FLEIXURE MOBILIZATION AND LIVER BIOPSY, AND PROCTOSCOPY;  Surgeon: Pau Kelly MD;  Location:  [facility];  Service: General   • COLONOSCOPY      2018   • ILEOSTOMY  04/2019   • LIVER BIOPSY  2003   • VASECTOMY  1998   • VENOUS ACCESS DEVICE (PORT) INSERTION N/A 7/18/2019    Procedure: PORT PLACEMENT;  Surgeon: Franky Cazares MD;  Location:  [facility];  Service: General       No Known Allergies    Family History and Social History reviewed and changed as necessary    REVIEW OF SYSTEM:   No new somatic complaints    PHYSICAL EXAM:  No jaundice or icterus.  No petechiae or ecchymoses.    Vitals:    03/18/22 0812   BP: 93/49   Pulse: 67   Resp: 18   Temp: 98.4 °F (36.9 °C)   SpO2: 100%   Weight: 94.3 kg (208 lb)   Height: 185.4 cm (73\")     Vitals:    03/18/22 0812   PainSc: 0-No pain          ECOG score: 0           Vitals reviewed.    ECOG: (0) Fully Active - Able to Carry On All Pre-disease Performance Without Restriction    Lab Results   Component Value Date    HGB 8.5 (L) 03/10/2022    HCT 28.6 (L) 03/10/2022    MCV 76.3 (L) 03/10/2022    PLT 72 (L) 03/10/2022    WBC 3.23 (L) 03/10/2022    NEUTROABS 2.06 03/10/2022    LYMPHSABS 0.60 (L) 03/10/2022    MONOSABS 0.31 03/10/2022    EOSABS 0.22 03/10/2022    BASOSABS 0.04 03/10/2022       Lab Results   Component Value Date    GLUCOSE 156 (H) 03/10/2022    BUN 15 03/10/2022    CREATININE 1.16 03/10/2022     03/10/2022    K 4.4 03/10/2022     03/10/2022    CO2 24.0 03/10/2022    CALCIUM 9.1 03/10/2022    PROTEINTOT 7.2 03/10/2022    ALBUMIN 4.00 03/10/2022    BILITOT 1.4 (H) 03/10/2022    ALKPHOS 52 03/10/2022    AST 48 (H) 03/10/2022    ALT 28 03/10/2022             ASSESSMENT & PLAN:  1.  Stage IIIB rectal carcinoma clinical T3b N1 aM0  2.  Protracted postoperative course due to abdominal pelvic abscess following neoadjuvant Xeloda radiation  3.  Hepatic steatosis/cirrhosis resulting in portal hypertension with bridging fibrosis on liver biopsy " at time of colon surgery and mesenteric varices on CT some splenic enlargement possibly exacerbating thrombocytopenia and anemia on chemotherapy  4.  B12 deficiency anemia  5.  Iron deficiency anemia    -11/30/18 CT abdomen pelvis and chest x-ray negative for metastasis.  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    -12/6/18 MRI of rectum showed T3b with suspicious right internal iliac lymph node and CT chest noncontrast negative except for gallstones  -Per Dr. Kelly, CEA was normal.  -12/18/18 saw me for the first time.  I reviewed her case in our multidisciplinary conference and went over that in detail with her.  She had presented with hematochezia.  Dr. Kelly repeated endoscopy for more exact detailing of the primary location and this appears to be rectal on MRI and endoscopy.  Plan is for randomization on clinical trial N 1048 to neoadjuvant FOLFOX versus standard chemoradiation.  We'll get him to radiation and our research staff for randomization and get his baseline CBC and CMP.    -1/4/19 followed up: Randomized to the Xeloda radiation arm.  He will get that and then 4-6 weeks later had his surgery, and then follow that with 6 months of adjuvant FOLFOX per protocol.  My research assistant met with him today.  He has a Xeloda prescription.  -3/4/2019 MRI: Good response with T2, less likely T3 with spiculation N0 disease.  -4/23/2019 pathology: Laparoscopic assisted converted to lower anterior resection open with stapled coloanal anastomosis and diverting loop ileostomy with liver biopsy showed residual adenoma with focal high-grade dysplasia but no residual invasive carcinoma.  0 out of 17 lymph nodes.  Mild steatosis on liver biopsy with bridging fibrosis.  -5/9/2019 to 5/15/2019 admitted with abdominal pelvic abscess status post CT-guided percutaneous drain placement presenting with sepsis improved with antibiotics and drainage.  Temp of 104  on presentation.  2 weeks of protracted IV antibiotics with Radames Sosa planned at discharge.  -7/11/2019 follow-up visit:Start of adjuvant therapy delayed due to the above postoperative pelvic abscess.  This was treated surgically initially but then by CT-guided percutaneous drainage as above.  Serial follow-up with Radames Sosa including CTs and antibiotics eventually cleared him adequately to consider resumption of plans for adjuvant therapy.  CTs of the pelvis done on 6/5/2019, 6/13/2019, 6/26/2019 monitoring for this abscess and possible drainage but not able to drain due to decreasing size and minimal residual edema with CT 7/10/2019 showingStable to slight decrease in size of presacral perirectal abscess with presacral edema measuring 3.9 x 1.9 previously 4.1 x 2.5 cm. No new sites of focal fluid collection or loculated fluid.  Hence, modified FOLFOX 6 started at this junction.  -9/30/2019 office visit: Oxaliplatin discontinued due to ongoing thrombocytopenia.  Platelet count today 59,000.  We will continue course #5 and 6 with 5-FU and leucovorin alone to complete adjuvant therapy.  -10/23/2019 completed adjuvant chemotherapy.  -10/28/2019 CT chest abdomen pelvis with contrast showed decreased and fluid in the presacral fat.  Decrease in surrounding soft tissue stranding.  Gallstone seen with enlargement of spleen.  Stranding of the mesenteric roots with varices in the mesentery slightly increased compared to July 2019.  Chest is unremarkable.  CEA 1.53 with bilirubin 1.4, AST 46, alkaline phosphatase 135, white count 2500, hemoglobin 9.8, and platelets 62,000 on 10/21/2019.  -11/2019 colonoscopy with Dr. Lebron negative according to patient.  -2/23/2020: Saw Dr. Lebron regarding [place] cirrhosis-induced portal hypertension.  Did not recommend TIPS.  Started nadolol  -2/25/20 20 CT chest abdomen pelvis negative for metastasis but with portal hypertension and varices.  CEA 1.1.  ALT 51.  White count 3970  with hemoglobin 10.9.  -6/2/2020 CT abdomen and pelvis with stable postsurgical changes, no evidence of disease progression, CEA 0.86.  -8/28/2020 ileocolonoscopy  -9/8/2020 CT abdomen and pelvis without evidence of disease recurrence, CEA 0.87.  -3/9/2021 CT abdomen and pelvis stable changes, no evidence of progression of disease or recurrence.  CEA 1.21  -8/30/2021 CT chest, abdomen and pelvis with nonspecific small area of groundglass nodularity near the periphery of the right lower lobe favored infectious/inflammatory, otherwise CT scan stable without specific evidence of disease progression.  Of note, patient did have Covid infection 2 months ago.  Currently no respiratory concerns.  Has seen Dr. Gerardo at  for evaluation regarding his desire for ileostomy reversal, he reports that he had liver ultrasound that showed questionable liver lesion, he is scheduled for additional CT on 9/24 and then follow-up with Dr. Gerardo.  I discussed with the patient that current CT scan shows no abnormalities in the liver, he does have known cirrhosis with [place].  We will continue surveillance as per NCCN guidelines with repeat CT scans and serum testing in 6 months.  CEA currently normal at 1.13, platelet count stable at 73,000.    -2/24/2022 follow-up Dr. Racheal Gerardo Hepatology.  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    -2/28/2022 data from : Ferritin low at 12 with iron low at 38 and decreased transferrin saturation 8% with increased total iron-binding capacity 498 and increased transferrin 398 consistent with iron deficiency.  B12 level immeasurably low less than 150.  Folic acid normal 15.5.  Alpha-fetoprotein 2.5.  INR 2.3.  Hemoglobin 8.9 MCV 77 with white count 3250 and platelets 77k.    -3/10/2022 data:  White count stable 3230.  Hemoglobin down to 8.5 with MCV  down to 76.3 with platelets stable 72,000.  CEA 1.22.  AST 48 with bilirubin 1.4Glucose 156.  Otherwise unremarkable CMP.   CT chest abdomen pelvisShows resolution of right lower lobe groundglass opacities.  Fatty liver infiltration.  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  Cholelithiasis    -3/18/2022 [facility] medical oncology follow-up visit: I reviewed multiple data sets from  and current CTs and labs and CEA from [facility] it both images of the scans as well as reports and went over the above with the patient.  He started B12 already and has iron deficiency anemia as well documented at  and I have started him on ferrous sulfate 325 twice a day every other day with vitamin C and we will in 6 months repeat his CTs and CBC and CEA per NCCN guidelines from the cancer standpoint.  He will continue to follow with  liver specialists from the portal hypertension standpoint and is currently on Coumadin.  He had EGD and colonoscopy according to the patient yesterday at  and the varices were improved.  There was still some thrombus in the current CT but I suspect it is improving.  Coumadin is a Catch-22 and may actually complicate variceal bleeding but it also may diminish the likelihood of bleeding if it helps decompress the portal system by treating the thrombus and I leave that decision to the capable hands of his gastroenterologist/liver specialist at     Total time of care today inclusive of time spent today prior to patient's arrival reviewing interval data both at  and [facility] and during visit translating all that to him and after visit putting forth the plan as outlined above took 45 minutes of total patient care time throughout the day today.    Ousmane Moeller MD    03/18/2022

## 2022-09-12 ENCOUNTER — HOSPITAL ENCOUNTER (OUTPATIENT)
Dept: CT IMAGING | Facility: HOSPITAL | Age: 57
Discharge: HOME | End: 2022-09-12
Admitting: INTERNAL MEDICINE

## 2022-09-12 DIAGNOSIS — D51.3 OTHER DIETARY VITAMIN B12 DEFICIENCY ANEMIA: ICD-10-CM

## 2022-09-12 DIAGNOSIS — D50.0 IRON DEFICIENCY ANEMIA DUE TO CHRONIC BLOOD LOSS: ICD-10-CM

## 2022-09-12 DIAGNOSIS — C20 RECTAL CARCINOMA: ICD-10-CM

## 2022-09-12 LAB — CREAT BLDA-MCNC: 1.2 MG/DL (ref 0.6–1.3)

## 2022-09-12 PROCEDURE — 71260 CT THORAX DX C+: CPT

## 2022-09-12 PROCEDURE — 25010000002 IOPAMIDOL 61 % SOLUTION: Performed by: INTERNAL MEDICINE

## 2022-09-12 PROCEDURE — 74177 CT ABD & PELVIS W/CONTRAST: CPT

## 2022-09-12 PROCEDURE — 82565 ASSAY OF CREATININE: CPT

## 2022-09-12 RX ADMIN — IOPAMIDOL 90 ML: 612 INJECTION, SOLUTION INTRAVENOUS at 09:13

## 2022-09-16 ENCOUNTER — LAB (OUTPATIENT)
Dept: LAB | Facility: HOSPITAL | Age: 57
End: 2022-09-16

## 2022-09-16 ENCOUNTER — OFFICE VISIT (OUTPATIENT)
Dept: ONCOLOGY | Facility: CLINIC | Age: 57
End: 2022-09-16

## 2022-09-16 VITALS
WEIGHT: 212 LBS | DIASTOLIC BLOOD PRESSURE: 63 MMHG | HEART RATE: 99 BPM | TEMPERATURE: 97.1 F | RESPIRATION RATE: 18 BRPM | OXYGEN SATURATION: 97 % | SYSTOLIC BLOOD PRESSURE: 133 MMHG | BODY MASS INDEX: 28.1 KG/M2 | HEIGHT: 73 IN

## 2022-09-16 DIAGNOSIS — C20 RECTAL CARCINOMA: ICD-10-CM

## 2022-09-16 DIAGNOSIS — C20 RECTAL CARCINOMA: Primary | ICD-10-CM

## 2022-09-16 DIAGNOSIS — D50.0 IRON DEFICIENCY ANEMIA DUE TO CHRONIC BLOOD LOSS: ICD-10-CM

## 2022-09-16 DIAGNOSIS — D51.3 OTHER DIETARY VITAMIN B12 DEFICIENCY ANEMIA: ICD-10-CM

## 2022-09-16 LAB
ALBUMIN SERPL-MCNC: 3.8 G/DL (ref 3.5–5.2)
ALBUMIN/GLOB SERPL: 1.2 G/DL
ALP SERPL-CCNC: 56 U/L (ref 39–117)
ALT SERPL W P-5'-P-CCNC: 35 U/L (ref 1–41)
ANION GAP SERPL CALCULATED.3IONS-SCNC: 13 MMOL/L (ref 5–15)
AST SERPL-CCNC: 53 U/L (ref 1–40)
BASOPHILS # BLD AUTO: 0.03 10*3/MM3 (ref 0–0.2)
BASOPHILS NFR BLD AUTO: 1.1 % (ref 0–1.5)
BILIRUB SERPL-MCNC: 1.2 MG/DL (ref 0–1.2)
BUN SERPL-MCNC: 17 MG/DL (ref 6–20)
BUN/CREAT SERPL: 13.9 (ref 7–25)
CALCIUM SPEC-SCNC: 8.9 MG/DL (ref 8.6–10.5)
CEA SERPL-MCNC: 0.87 NG/ML
CHLORIDE SERPL-SCNC: 104 MMOL/L (ref 98–107)
CO2 SERPL-SCNC: 21 MMOL/L (ref 22–29)
CREAT SERPL-MCNC: 1.22 MG/DL (ref 0.76–1.27)
DEPRECATED RDW RBC AUTO: 49.9 FL (ref 37–54)
EGFRCR SERPLBLD CKD-EPI 2021: 68.7 ML/MIN/1.73
EOSINOPHIL # BLD AUTO: 0.23 10*3/MM3 (ref 0–0.4)
EOSINOPHIL NFR BLD AUTO: 8.4 % (ref 0.3–6.2)
ERYTHROCYTE [DISTWIDTH] IN BLOOD BY AUTOMATED COUNT: 15.4 % (ref 12.3–15.4)
FERRITIN SERPL-MCNC: 22.79 NG/ML (ref 30–400)
GLOBULIN UR ELPH-MCNC: 3.3 GM/DL
GLUCOSE SERPL-MCNC: 146 MG/DL (ref 65–99)
HCT VFR BLD AUTO: 34.5 % (ref 37.5–51)
HGB BLD-MCNC: 11.3 G/DL (ref 13–17.7)
IMM GRANULOCYTES # BLD AUTO: 0 10*3/MM3 (ref 0–0.05)
IMM GRANULOCYTES NFR BLD AUTO: 0 % (ref 0–0.5)
IRON 24H UR-MRATE: 198 MCG/DL (ref 59–158)
IRON SATN MFR SERPL: 39 % (ref 20–50)
LYMPHOCYTES # BLD AUTO: 0.51 10*3/MM3 (ref 0.7–3.1)
LYMPHOCYTES NFR BLD AUTO: 18.6 % (ref 19.6–45.3)
MCH RBC QN AUTO: 28.8 PG (ref 26.6–33)
MCHC RBC AUTO-ENTMCNC: 32.8 G/DL (ref 31.5–35.7)
MCV RBC AUTO: 87.8 FL (ref 79–97)
MONOCYTES # BLD AUTO: 0.28 10*3/MM3 (ref 0.1–0.9)
MONOCYTES NFR BLD AUTO: 10.2 % (ref 5–12)
NEUTROPHILS NFR BLD AUTO: 1.69 10*3/MM3 (ref 1.7–7)
NEUTROPHILS NFR BLD AUTO: 61.7 % (ref 42.7–76)
PLATELET # BLD AUTO: 64 10*3/MM3 (ref 140–450)
PMV BLD AUTO: 9.8 FL (ref 6–12)
POTASSIUM SERPL-SCNC: 4.2 MMOL/L (ref 3.5–5.2)
PROT SERPL-MCNC: 7.1 G/DL (ref 6–8.5)
RBC # BLD AUTO: 3.93 10*6/MM3 (ref 4.14–5.8)
SODIUM SERPL-SCNC: 138 MMOL/L (ref 136–145)
TIBC SERPL-MCNC: 513 MCG/DL (ref 298–536)
TRANSFERRIN SERPL-MCNC: 344 MG/DL (ref 200–360)
VIT B12 BLD-MCNC: 1411 PG/ML (ref 211–946)
WBC NRBC COR # BLD: 2.74 10*3/MM3 (ref 3.4–10.8)

## 2022-09-16 PROCEDURE — 80053 COMPREHEN METABOLIC PANEL: CPT

## 2022-09-16 PROCEDURE — 83540 ASSAY OF IRON: CPT

## 2022-09-16 PROCEDURE — 85025 COMPLETE CBC W/AUTO DIFF WBC: CPT

## 2022-09-16 PROCEDURE — 82607 VITAMIN B-12: CPT

## 2022-09-16 PROCEDURE — 84466 ASSAY OF TRANSFERRIN: CPT

## 2022-09-16 PROCEDURE — 82378 CARCINOEMBRYONIC ANTIGEN: CPT

## 2022-09-16 PROCEDURE — 83921 ORGANIC ACID SINGLE QUANT: CPT

## 2022-09-16 PROCEDURE — 99215 OFFICE O/P EST HI 40 MIN: CPT | Performed by: INTERNAL MEDICINE

## 2022-09-16 PROCEDURE — 82728 ASSAY OF FERRITIN: CPT

## 2022-09-16 PROCEDURE — 36415 COLL VENOUS BLD VENIPUNCTURE: CPT

## 2022-09-16 NOTE — PROGRESS NOTES
CHIEF COMPLAINT: No new somatic complaints    Problem List:  Oncology/Hematology History Overview Note   1.  Stage IIIB rectal carcinoma clinical T3b N1 aM0  2.  Protracted postoperative course due to abdominal pelvic abscess following neoadjuvant Xeloda radiation  3.  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  B12 deficiency anemia  5.  Iron deficiency anemia  6.  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  7.  Intestinal metaplasia on EGD    -11/30/18 CT abdomen pelvis and chest x-ray negative for metastasis.  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    -12/6/18 MRI of rectum showed T3b with suspicious right internal iliac lymph node and CT chest noncontrast negative except for gallstones  -Per Dr. Kelly, CEA was normal.  -12/18/18 saw me for the first time.  I reviewed her case in our multidisciplinary conference and went over that in detail with her.  She had presented with hematochezia.  Dr. Kelly repeated endoscopy for more exact detailing of the primary location and this appears to be rectal on MRI and endoscopy.  Plan is for randomization on clinical trial N 1048 to neoadjuvant FOLFOX versus standard chemoradiation.  We'll get him to radiation and our research staff for randomization and get his baseline CBC and CMP.    -1/4/19 followed up: Randomized to the Xeloda radiation arm.  He will get that and then 4-6 weeks later had his surgery, and then follow that with 6 months of adjuvant FOLFOX per protocol.  My research assistant met with him today.  He has a Xeloda prescription.  -3/4/2019 MRI: Good response with T2, less likely T3 with spiculation N0 disease.  -4/23/2019 pathology:  Laparoscopic assisted converted to lower anterior resection open with stapled coloanal anastomosis and diverting loop ileostomy with liver biopsy showed residual adenoma with focal high-grade dysplasia but no residual invasive carcinoma.  0 out of 17 lymph nodes.  Mild steatosis on liver biopsy with bridging fibrosis.  -5/9/2019 to 5/15/2019 admitted with abdominal pelvic abscess status post CT-guided percutaneous drain placement presenting with sepsis improved with antibiotics and drainage.  Temp of 104 on presentation.  2 weeks of protracted IV antibiotics with Radames Sosa planned at discharge.  -7/11/2019 follow-up visit:Start of adjuvant therapy delayed due to the above postoperative pelvic abscess.  This was treated surgically initially but then by CT-guided percutaneous drainage as above.  Serial follow-up with Radames Sosa including CTs and antibiotics eventually cleared him adequately to consider resumption of plans for adjuvant therapy.  CTs of the pelvis done on 6/5/2019, 6/13/2019, 6/26/2019 monitoring for this abscess and possible drainage but not able to drain due to decreasing size and minimal residual edema with CT 7/10/2019 showingStable to slight decrease in size of presacral perirectal abscess with presacral edema measuring 3.9 x 1.9 previously 4.1 x 2.5 cm. No new sites of focal fluid collection or loculated fluid.  Hence, modified FOLFOX 6 started at this junction.  -9/30/2019 office visit: Oxaliplatin discontinued due to ongoing thrombocytopenia.  Platelet count today 59,000.  We will continue course #5 and 6 with 5-FU and leucovorin alone to complete adjuvant therapy.  -10/23/2019 completed adjuvant chemotherapy.  -10/28/2019 CT chest abdomen pelvis with contrast showed decreased and fluid in the presacral fat.  Decrease in surrounding soft tissue stranding.  Gallstone seen with enlargement of spleen.  Stranding of the mesenteric roots with varices in the mesentery slightly increased  compared to July 2019.  Chest is unremarkable.  CEA 1.53 with bilirubin 1.4, AST 46, alkaline phosphatase 135, white count 2500, hemoglobin 9.8, and platelets 62,000 on 10/21/2019.  -11/2019 colonoscopy with Dr. Lebron negative according to patient.  -2/23/2020: Saw Dr. Lebron regarding [place] cirrhosis-induced portal hypertension.  Did not recommend TIPS.  Started nadolol  -2/25/20 20 CT chest abdomen pelvis negative for metastasis but with portal hypertension and varices.  CEA 1.1.  ALT 51.  White count 3970 with hemoglobin 10.9.  -6/2/2020 CT abdomen and pelvis with stable postsurgical changes, no evidence of disease progression, CEA 0.86.  -8/28/2020 ileocolonoscopy  -9/8/2020 CT abdomen and pelvis without evidence of disease recurrence, CEA 0.87.  -3/9/2021 CT abdomen and pelvis stable changes, no evidence of progression of disease or recurrence.  CEA 1.21  -8/30/2021 CT chest, abdomen and pelvis with nonspecific small area of groundglass nodularity near the periphery of the right lower lobe favored infectious/inflammatory, otherwise CT scan stable without specific evidence of disease progression.  Of note, patient did have Covid infection 2 months ago.  Currently no respiratory concerns.  Has seen Dr. Gerardo at  for evaluation regarding his desire for ileostomy reversal, he reports that he had liver ultrasound that showed questionable liver lesion, he is scheduled for additional CT on 9/24 and then follow-up with Dr. Gerardo.  I discussed with the patient that current CT scan shows no abnormalities in the liver, he does have known cirrhosis with [place].  We will continue surveillance as per NCCN guidelines with repeat CT scans and serum testing in 6 months.  CEA currently normal at 1.13, platelet count stable at 73,000.    -2/24/2022 follow-up Dr. Racheal Gerardo Hepatology.  Given extension of thrombus into the superior mesenteric vein, they decided to start Coumadin November 2021 followed in the coagulation  clinic there with follow-up on repeat CT planned.    -2/28/2022 data from : Ferritin low at 12 with iron low at 38 and decreased transferrin saturation 8% with increased total iron-binding capacity 498 and increased transferrin 398 consistent with iron deficiency.  B12 level immeasurably low less than 150.  Folic acid normal 15.5.  Alpha-fetoprotein 2.5.  INR 2.3.  Hemoglobin 8.9 MCV 77 with white count 3250 and platelets 77k.    -3/10/2022 data:  White count stable 3230.  Hemoglobin down to 8.5 with MCV down to 76.3 with platelets stable 72,000.  CEA 1.22.  AST 48 with bilirubin 1.4Glucose 156.  Otherwise unremarkable CMP.   CT chest abdomen pelvisShows resolution of right lower lobe groundglass opacities.  Fatty liver infiltration.  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  Cholelithiasis    -3/18/2022 [facility] medical oncology follow-up visit: I reviewed multiple data sets from  and current CTs and labs and CEA from [facility].  Both images of the scans as well as reports reviewed and went over the above with the patient.  He started B12 already and has iron deficiency anemia as well documented at  and I have started him on ferrous sulfate 325 twice a day every other day with vitamin C and we will in 6 months repeat his CTs and CBC and CEA per NCCN guidelines from the cancer standpoint.  He will continue to follow with  liver specialists from the portal hypertension standpoint and is currently on Coumadin.  He had EGD and colonoscopy according to the patient yesterday at  and the varices were improved.  There was still some thrombus in the current CT but I suspect it is improving.  Coumadin is a Catch-22 and may actually complicate variceal bleeding but it also may diminish the likelihood of bleeding if it helps decompress the portal system by treating the thrombus and I leave that decision to  the capable hands of his gastroenterologist/liver specialist at     - 8/26/2022 [facility] liver clinic note.  [place] cirrhosis.  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  Following with anticoagulation clinic.  They plan to arrange for MRI liver protocol to assess for possible resolution of thrombus as surgeon planning ileostomy reversal once liver clinic clears him for surgery.  AST 49.  ALT 29.  Bilirubin 1.0.  Alkaline phosphatase 52.  White count 2980.  Hemoglobin 11.4.  Platelets 77,000.  Alpha-fetoprotein 2.6.  INR 2.0.    -9/12/2022 CT chest abdomen pelvis shows cirrhotic changes with portal hypertension stable no ascites.  No acute cardiopulmonary process.  No metastatic disease.  Stable presacral soft tissue thickening posttreatment related most likely.  Spleen is enlarged.      -9/16/2022 [facility], [place], operated by [facility] medical oncology follow-up visit: I reviewed the above liver clinic note and CT images and reports with patient.  No evidence of recurrence.  Today's white count is 2740 with hemoglobin 11.3 platelets 64,000 generally with platelets in the 70,000's over the last year.  MCV however has improved now 87.8 on iron and B12 in the hemoglobin has gone from 8.5 up to 11.3 in the last 6 months.  He did have gastritis and intestinal metaplasia without dysplasia with Dr. Lebron February 2022 with atrophic gastric body.  Iron, B12, folate, CEA pending at time of dictation.  We will have my nurse practitioner call him the results of the pending lab and if the CEA is rising he may need to see me sooner but otherwise the plan is to repeat his tumor markers and labs and CTs again in 6 months and will do so every 6 months through January 2024.  He is contemplating reversal of the ostomy and I left a message with Dr. Kelly that Avatrombopag might be helpful but his platelets are close to 60,000 which is the general goal for most preoperative clearances  but I will defer to her on that and of course the portal hypertension itself increases the risk of surgery simply from vascular engorgement and an MRI liver is planned by the liver surgeons to see if he can safely come off of the Coumadin that they are managing through the [facility].  Asked him to touch base with Dr. Lebron as to when to get the next EGD relative to the intestinal metaplasia on EGD.         Rectal carcinoma (HCC)   12/18/2018 Initial Diagnosis    Rectal carcinoma (CMS/HCC)     1/7/2019 - 2/4/2019 Chemotherapy    Xeloda radiation on protocol     1/8/2019 - 2/14/2019 Radiation    Radiation OncologyTreatment Course:  Hesham Arevalo received 5040 cGy in 28 fractions to pelvis via External Beam Radiation - EBRT.     3/4/2019 Imaging    MRI of the pelvis:  IMPRESSIONS:  MRI rectal cancer T category is: T2, LESS LIKELY EARLY T3 SPICULATIONS  Maximum EMD of invasion is: 0  Minimum tumor to MRF distance is: 12 MM  Low rectal tumor component: NO  Mesorectal nodes/tumor deposits: NO  EMVI: NO  Extramesorectal nodes: NO     4/23/2019 Surgery    Surgery rectosigmoidectomy pathology report:  Final Diagnosis    1. RECTOSIGMOID COLON, RECTOSIGMOID RESECTION:  Residual adenoma with focal high grade dysplasia with no residual invasive carcinoma identified post-treatment (see comment).  Seventeen lymph nodes negative for carcinoma (0/17)   2. LIVER, CORE NEEDLE BIOPSY:  Mild steatosis with mild chronic inflammation and increased fibrosis including bridging fibrosis (Stage 3-4) (see comment)        COLON AND RECTUM TEMPLATE:  TYPE OF SPECIMEN/PROCEDURE: Rectosigmoid resection.   SPECIMEN INTEGRITY:  Intact.  TUMOR SITE:  Rectum.  TUMOR SIZE (greatest dimension):  Indeterminate  TUMOR LOCATION (applicable only to rectal primaries): Entirely below anterior peritoneal reflection.  MACROSCOPIC INTACTNESS OF MESORECTUM (If applicable): Intact.  MACROSCOPIC TUMOR PERFORATION: Not identified.  TUMOR CONFIGURATION:   Ulcerated.  HISTOLOGIC TYPE:  Adenocarcinoma.  HISTOLOGIC GRADE:  G3-4 (per previous biopsy).  MICROSCOPIC DEPTH OF TUMOR INVASION/EXTENSION:  No residual tumor identified.  PERITONEAL INVOLVEMENT:  Not identified.  LYMPHVASCULAR INVASION:  Not identified.  PERINEURAL INVASION: Not identified.  SURGICAL MARGINS: Uninvolved.  STATUS AND DISTANCE FROM PROXIMAL MUCOSAL MARGIN:  Uninvolved, 13.0 cm.  STATUS AND DISTANCE FROM DISTAL MUCOSAL MARGIN: Uninvolved, 1.2 cm.  STATUS AND DISTANCE FROM MESENTERIC MARGIN: Not applicable.  STATUS AND DISTANCE FROM RADIAL MARGIN: Uninvolved, 1.7 cm.  DISTANCE FROM DENTATE LINE (RECTAL TUMOR ONLY):  Indeterminate.  TUMOR DEPOSITS (DISCONTINUOUS EXTRAMURAL EXTENSION):  Not identified.  NUMBER OF LYMPH NODES INVOLVED BY METASTATIC NEOPLASM: 0.  NUMBER OF REGIONAL LYMPH NODES EXAMINED: 17.  EXTRANODAL EXTENSION:  Not applicable.  TREATMENT EFFECT:  Present, no viable cancer cells identified (complete response, score 0).  ADDITIONAL PATHOLOGIC FINDINGS:  None.  MSI TESTING:  No evidence of MSI based on reported IHC on outside biopsy  OTHER ANCILLARY STUDIES (BRAF, KRAS, ETC.):  Should be performed on initial diagnostic biopsy if needed.  AJCC PATHOLOGIC STAGE:  (COMPLETED BY PATHOLOGIST, BASED ONLY ON TISSUE FINDINGS, MORE EXTENSIVE DISEASE MAY NOT BE KNOWN TO THE PATHOLOGIST)  ypT=  0  ypN=  0  AJCC PATHOLOGIC STAGE:  y0.                5/15/2019 Adverse Reaction    -5/9/2019 to 5/15/2019 admitted with abdominal pelvic abscess status post CT-guided percutaneous drain placement presenting with sepsis improved with antibiotics and drainage.  Temp of 104 on presentation.  2 weeks of protracted IV antibiotics with Radames Sosa planned at discharge     7/22/2019 - 10/23/2019 Chemotherapy    OP COLON [id number] OXALIplatin / Leucovorin / Fluorouracil  Start of adjuvant therapy delayed due to the above postoperative pelvic abscess.  This was treated surgically initially but then by CT-guided  percutaneous drainage as above.  Serial follow-up with Radames Sosa including CTs and antibiotics eventually cleared him adequately to consider resumption of plans for adjuvant therapy.  CTs of the pelvis done on 6/5/2019, 6/13/2019, 6/26/2019 monitoring for this abscess and possible drainage but not able to drain due to decreasing size and minimal residual edema with CT 7/10/2019 showingStable to slight decrease in size of presacral perirectal abscess with presacral edema measuring 3.9 x 1.9 previously 4.1 x 2.5 cm. No new sites of focal fluid collection or loculated fluid.     9/30/2019 Adverse Reaction    Oxaliplatin discontinued due to ongoing thrombocytopenia.     10/28/2019 Imaging    10/28/2019 CT chest abdomen pelvis with contrast showed decreased and fluid in the presacral fat.  Decrease in surrounding soft tissue stranding.  Gallstone seen with enlargement of spleen.  Stranding of the mesenteric roots with varices in the mesentery slightly increased compared to July 2019.  Chest is unremarkable.  CEA 1.53 with bilirubin 1.4, AST 46, alkaline phosphatase 135, white count 2500, hemoglobin 9.8, and platelets 62,000 on 10/21/2019.     8/28/2020 Procedure    Ileocolonoscopy     3/10/2022 Imaging    -3/10/2022 data:  White count stable 3230.  Hemoglobin down to 8.5 with MCV down to 76.3 with platelets stable 72,000.  CEA 1.22.  AST 48 with bilirubin 1.4Glucose 156.  Otherwise unremarkable CMP.   CT chest abdomen pelvisShows resolution of right lower lobe groundglass opacities.  Fatty liver infiltration.  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  Cholelithiasis     9/16/2022 Imaging    CT chest abdomen pelvis shows cirrhotic changes with portal hypertension stable no ascites.  No acute cardiopulmonary process.  No metastatic disease.  Stable presacral soft tissue thickening posttreatment related  "most likely.  Spleen is enlarged.         HISTORY OF PRESENT ILLNESS:  The patient is a 56 y.o. male, here for follow up on management of follow-up rectal cancer with [place] cirrhosis and portal vein and superior mesenteric vein thrombosis with portal hypertension and splenomegaly with pancytopenia persistent.  No mihir bleeding.    Past Medical History:   Diagnosis Date   • Arthritis     knees    • Cancer (HCC) 11/2018    colon   • Disease of thyroid gland    • Fatty liver    • Fatty liver    • Hashimoto's disease    • History of radiation therapy 02/14/2019    rectal cancer   • Hypertension    • Ileostomy in place (HCC)    • Psoriasis    • Wears glasses      Past Surgical History:   Procedure Laterality Date   • COLON RESECTION N/A 4/23/2019    Procedure: LAPAROSCOPIC LOWER ANTERIOR RESECTION WITH DIVERTING LOOP ILEOOSTOMY, SPLENIC FLEIXURE MOBILIZATION AND LIVER BIOPSY, AND PROCTOSCOPY;  Surgeon: Pau Kelly MD;  Location: [facility] OR;  Service: General   • COLONOSCOPY      2018   • ILEOSTOMY  04/2019   • LIVER BIOPSY  2003   • VASECTOMY  1998   • VENOUS ACCESS DEVICE (PORT) INSERTION N/A 7/18/2019    Procedure: PORT PLACEMENT;  Surgeon: Franky Cazares MD;  Location: [facility] OR;  Service: General       No Known Allergies    Family History and Social History reviewed and changed as necessary    REVIEW OF SYSTEM:   No new somatic complaints    PHYSICAL EXAM:  Lungs clear heart regular rate and rhythm.    Vitals:    09/16/22 0810   BP: 133/63   Pulse: 99   Resp: 18   Temp: 97.1 °F (36.2 °C)   TempSrc: Temporal   SpO2: 97%   Weight: 96.2 kg (212 lb)   Height: 185.4 cm (72.99\")     Vitals:    09/16/22 0810   PainSc: 0-No pain          ECOG score: 0           Vitals reviewed.    ECOG: (0) Fully Active - Able to Carry On All Pre-disease Performance Without Restriction    Lab Results   Component Value Date    HGB 11.3 (L) 09/16/2022    HCT 34.5 (L) 09/16/2022    MCV 87.8 09/16/2022    PLT 64 (L) 09/16/2022    " WBC 2.74 (L) 09/16/2022    NEUTROABS 1.69 (L) 09/16/2022    LYMPHSABS 0.51 (L) 09/16/2022    MONOSABS 0.28 09/16/2022    EOSABS 0.23 09/16/2022    BASOSABS 0.03 09/16/2022       Lab Results   Component Value Date    GLUCOSE 156 (H) 03/10/2022    BUN 15 03/10/2022    CREATININE 1.20 09/12/2022     03/10/2022    K 4.4 03/10/2022     03/10/2022    CO2 24.0 03/10/2022    CALCIUM 9.1 03/10/2022    PROTEINTOT 7.2 03/10/2022    ALBUMIN 4.00 03/10/2022    BILITOT 1.4 (H) 03/10/2022    ALKPHOS 52 03/10/2022    AST 48 (H) 03/10/2022    ALT 28 03/10/2022             ASSESSMENT & PLAN:  1.  Stage IIIB rectal carcinoma clinical T3b N1 aM0  2.  Protracted postoperative course due to abdominal pelvic abscess following neoadjuvant Xeloda radiation  3.  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  B12 deficiency anemia  5.  Iron deficiency anemia  6.  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  7.  Intestinal metaplasia on EGD    -11/30/18 CT abdomen pelvis and chest x-ray negative for metastasis.  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    -12/6/18 MRI of rectum showed T3b with suspicious right internal iliac lymph node and CT chest noncontrast negative except for gallstones  -Per Dr. Kelly, CEA was normal.  -12/18/18 saw me for the first time.  I reviewed her case in our multidisciplinary conference and went over that in detail with her.  She had presented with hematochezia.  Dr. Kelly repeated endoscopy for more exact detailing of the primary location and this appears to be rectal on MRI and endoscopy.  Plan is for randomization on clinical trial N 1048 to neoadjuvant FOLFOX versus standard  chemoradiation.  We'll get him to radiation and our research staff for randomization and get his baseline CBC and CMP.    -1/4/19 followed up: Randomized to the Xeloda radiation arm.  He will get that and then 4-6 weeks later had his surgery, and then follow that with 6 months of adjuvant FOLFOX per protocol.  My research assistant met with him today.  He has a Xeloda prescription.  -3/4/2019 MRI: Good response with T2, less likely T3 with spiculation N0 disease.  -4/23/2019 pathology: Laparoscopic assisted converted to lower anterior resection open with stapled coloanal anastomosis and diverting loop ileostomy with liver biopsy showed residual adenoma with focal high-grade dysplasia but no residual invasive carcinoma.  0 out of 17 lymph nodes.  Mild steatosis on liver biopsy with bridging fibrosis.  -5/9/2019 to 5/15/2019 admitted with abdominal pelvic abscess status post CT-guided percutaneous drain placement presenting with sepsis improved with antibiotics and drainage.  Temp of 104 on presentation.  2 weeks of protracted IV antibiotics with Radames Sosa planned at discharge.  -7/11/2019 follow-up visit:Start of adjuvant therapy delayed due to the above postoperative pelvic abscess.  This was treated surgically initially but then by CT-guided percutaneous drainage as above.  Serial follow-up with Radames Sosa including CTs and antibiotics eventually cleared him adequately to consider resumption of plans for adjuvant therapy.  CTs of the pelvis done on 6/5/2019, 6/13/2019, 6/26/2019 monitoring for this abscess and possible drainage but not able to drain due to decreasing size and minimal residual edema with CT 7/10/2019 showingStable to slight decrease in size of presacral perirectal abscess with presacral edema measuring 3.9 x 1.9 previously 4.1 x 2.5 cm. No new sites of focal fluid collection or loculated fluid.  Hence, modified FOLFOX 6 started at this junction.  -9/30/2019 office visit: Oxaliplatin  discontinued due to ongoing thrombocytopenia.  Platelet count today 59,000.  We will continue course #5 and 6 with 5-FU and leucovorin alone to complete adjuvant therapy.  -10/23/2019 completed adjuvant chemotherapy.  -10/28/2019 CT chest abdomen pelvis with contrast showed decreased and fluid in the presacral fat.  Decrease in surrounding soft tissue stranding.  Gallstone seen with enlargement of spleen.  Stranding of the mesenteric roots with varices in the mesentery slightly increased compared to July 2019.  Chest is unremarkable.  CEA 1.53 with bilirubin 1.4, AST 46, alkaline phosphatase 135, white count 2500, hemoglobin 9.8, and platelets 62,000 on 10/21/2019.  -11/2019 colonoscopy with Dr. Lebron negative according to patient.  -2/23/2020: Saw Dr. Lebron regarding [place] cirrhosis-induced portal hypertension.  Did not recommend TIPS.  Started nadolol  -2/25/20 20 CT chest abdomen pelvis negative for metastasis but with portal hypertension and varices.  CEA 1.1.  ALT 51.  White count 3970 with hemoglobin 10.9.  -6/2/2020 CT abdomen and pelvis with stable postsurgical changes, no evidence of disease progression, CEA 0.86.  -8/28/2020 ileocolonoscopy  -9/8/2020 CT abdomen and pelvis without evidence of disease recurrence, CEA 0.87.  -3/9/2021 CT abdomen and pelvis stable changes, no evidence of progression of disease or recurrence.  CEA 1.21  -8/30/2021 CT chest, abdomen and pelvis with nonspecific small area of groundglass nodularity near the periphery of the right lower lobe favored infectious/inflammatory, otherwise CT scan stable without specific evidence of disease progression.  Of note, patient did have Covid infection 2 months ago.  Currently no respiratory concerns.  Has seen Dr. Gerardo at  for evaluation regarding his desire for ileostomy reversal, he reports that he had liver ultrasound that showed questionable liver lesion, he is scheduled for additional CT on 9/24 and then follow-up with Dr. Gerardo.   I discussed with the patient that current CT scan shows no abnormalities in the liver, he does have known cirrhosis with [place].  We will continue surveillance as per NCCN guidelines with repeat CT scans and serum testing in 6 months.  CEA currently normal at 1.13, platelet count stable at 73,000.    -2/24/2022 follow-up Dr. Racheal Gerardo Hepatology.  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    -2/28/2022 data from : Ferritin low at 12 with iron low at 38 and decreased transferrin saturation 8% with increased total iron-binding capacity 498 and increased transferrin 398 consistent with iron deficiency.  B12 level immeasurably low less than 150.  Folic acid normal 15.5.  Alpha-fetoprotein 2.5.  INR 2.3.  Hemoglobin 8.9 MCV 77 with white count 3250 and platelets 77k.    -3/10/2022 data:  White count stable 3230.  Hemoglobin down to 8.5 with MCV down to 76.3 with platelets stable 72,000.  CEA 1.22.  AST 48 with bilirubin 1.4Glucose 156.  Otherwise unremarkable CMP.   CT chest abdomen pelvisShows resolution of right lower lobe groundglass opacities.  Fatty liver infiltration.  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  Cholelithiasis    -3/18/2022 [facility] [place] medical oncology follow-up visit: I reviewed multiple data sets from  and current CTs and labs and CEA from [facility], [place].  Both images of the scans as well as reports reviewed and went over the above with the patient.  He started B12 already and has iron deficiency anemia as well documented at  and I have started him on ferrous sulfate 325 twice a day every other day with vitamin C and we will in 6 months repeat his CTs and CBC and CEA per NCCN guidelines from the cancer standpoint.  He will continue to follow with   liver specialists from the portal hypertension standpoint and is currently on Coumadin.  He had EGD and colonoscopy according to the patient yesterday at  and the varices were improved.  There was still some thrombus in the current CT but I suspect it is improving.  Coumadin is a Catch-22 and may actually complicate variceal bleeding but it also may diminish the likelihood of bleeding if it helps decompress the portal system by treating the thrombus and I leave that decision to the capable hands of his gastroenterologist/liver specialist at     - 8/26/2022 [facility] liver clinic note.  [place] cirrhosis.  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  Following with anticoagulation clinic.  They plan to arrange for MRI liver protocol to assess for possible resolution of thrombus as surgeon planning ileostomy reversal once liver clinic clears him for surgery.  AST 49.  ALT 29.  Bilirubin 1.0.  Alkaline phosphatase 52.  White count 2980.  Hemoglobin 11.4.  Platelets 77,000.  Alpha-fetoprotein 2.6.  INR 2.0.    -9/12/2022 CT chest abdomen pelvis shows cirrhotic changes with portal hypertension stable no ascites.  No acute cardiopulmonary process.  No metastatic disease.  Stable presacral soft tissue thickening posttreatment related most likely.  Spleen is enlarged.    -9/16/2022 [facility] medical oncology follow-up visit: I reviewed the above liver clinic note and CT images and reports with patient.  No evidence of recurrence.  Today's white count is 2740 with hemoglobin 11.3 platelets 64,000 generally with platelets in the 70,000's over the last year.  MCV however has improved now 87.8 on iron and B12 in the hemoglobin has gone from 8.5 up to 11.3 in the last 6 months.  He did have gastritis and intestinal metaplasia without dysplasia with Dr. Lebron February 2022 with atrophic gastric body.  Iron, B12, folate, CEA pending at time of  dictation.  We will have my nurse practitioner call him the results of the pending lab and if the CEA is rising he may need to see me sooner but otherwise the plan is to repeat his tumor markers and labs and CTs again in 6 months and will do so every 6 months through January 2024.  He is contemplating reversal of the ostomy and I left a message with Dr. Kelly that Avatrombopag might be helpful but his platelets are close to 60,000 which is the general goal for most preoperative clearances but I will defer to her on that and of course the portal hypertension itself increases the risk of surgery simply from vascular engorgement and an MRI liver is planned by the liver surgeons to see if he can safely come off of the Coumadin that they are managing through the [facility].  Asked him to touch base with Dr. Lebron to follow-up on the intestinal metaplasia on EGD.    Total time of care today inclusive of time spent today prior to patient's arrival reviewing interval data from liver clinic and interval labs and CTs as outlined above and during visit translating that information to the patient and after visit putting forth a plan as outlined above and communicating a message to Dr. Kelly's phone took 45 minutes of patient care time throughout the day today.  Ousmane Moeller MD    09/16/2022

## 2022-09-21 LAB — METHYLMALONATE SERPL-SCNC: 149 NMOL/L (ref 0–378)

## 2023-03-14 ENCOUNTER — LAB (OUTPATIENT)
Dept: LAB | Facility: HOSPITAL | Age: 58
End: 2023-03-14
Payer: COMMERCIAL

## 2023-03-14 ENCOUNTER — HOSPITAL ENCOUNTER (OUTPATIENT)
Dept: CT IMAGING | Facility: HOSPITAL | Age: 58
Discharge: HOME | End: 2023-03-14
Payer: COMMERCIAL

## 2023-03-14 DIAGNOSIS — C20 RECTAL CARCINOMA: ICD-10-CM

## 2023-03-14 LAB
ALBUMIN SERPL-MCNC: 4.1 G/DL (ref 3.5–5.2)
ALBUMIN/GLOB SERPL: 1.1 G/DL
ALP SERPL-CCNC: 54 U/L (ref 39–117)
ALT SERPL W P-5'-P-CCNC: 33 U/L (ref 1–41)
ANION GAP SERPL CALCULATED.3IONS-SCNC: 10 MMOL/L (ref 5–15)
AST SERPL-CCNC: 34 U/L (ref 1–40)
BASOPHILS # BLD AUTO: 0.04 10*3/MM3 (ref 0–0.2)
BASOPHILS NFR BLD AUTO: 1 % (ref 0–1.5)
BILIRUB SERPL-MCNC: 1.3 MG/DL (ref 0–1.2)
BUN SERPL-MCNC: 22 MG/DL (ref 6–20)
BUN/CREAT SERPL: 17.2 (ref 7–25)
CALCIUM SPEC-SCNC: 9.6 MG/DL (ref 8.6–10.5)
CEA SERPL-MCNC: 1.05 NG/ML
CHLORIDE SERPL-SCNC: 103 MMOL/L (ref 98–107)
CO2 SERPL-SCNC: 23 MMOL/L (ref 22–29)
CREAT BLDA-MCNC: 1.5 MG/DL (ref 0.6–1.3)
CREAT SERPL-MCNC: 1.28 MG/DL (ref 0.76–1.27)
DEPRECATED RDW RBC AUTO: 46.2 FL (ref 37–54)
EGFRCR SERPLBLD CKD-EPI 2021: 64.5 ML/MIN/1.73
EOSINOPHIL # BLD AUTO: 0.3 10*3/MM3 (ref 0–0.4)
EOSINOPHIL NFR BLD AUTO: 7.7 % (ref 0.3–6.2)
ERYTHROCYTE [DISTWIDTH] IN BLOOD BY AUTOMATED COUNT: 14.4 % (ref 12.3–15.4)
GLOBULIN UR ELPH-MCNC: 3.6 GM/DL
GLUCOSE SERPL-MCNC: 128 MG/DL (ref 65–99)
HCT VFR BLD AUTO: 38.7 % (ref 37.5–51)
HGB BLD-MCNC: 12.8 G/DL (ref 13–17.7)
IMM GRANULOCYTES # BLD AUTO: 0.01 10*3/MM3 (ref 0–0.05)
IMM GRANULOCYTES NFR BLD AUTO: 0.3 % (ref 0–0.5)
LYMPHOCYTES # BLD AUTO: 0.76 10*3/MM3 (ref 0.7–3.1)
LYMPHOCYTES NFR BLD AUTO: 19.6 % (ref 19.6–45.3)
MCH RBC QN AUTO: 29.2 PG (ref 26.6–33)
MCHC RBC AUTO-ENTMCNC: 33.1 G/DL (ref 31.5–35.7)
MCV RBC AUTO: 88.4 FL (ref 79–97)
MONOCYTES # BLD AUTO: 0.3 10*3/MM3 (ref 0.1–0.9)
MONOCYTES NFR BLD AUTO: 7.7 % (ref 5–12)
NEUTROPHILS NFR BLD AUTO: 2.47 10*3/MM3 (ref 1.7–7)
NEUTROPHILS NFR BLD AUTO: 63.7 % (ref 42.7–76)
NRBC BLD AUTO-RTO: 0 /100 WBC (ref 0–0.2)
PLATELET # BLD AUTO: 75 10*3/MM3 (ref 140–450)
PMV BLD AUTO: 10.2 FL (ref 6–12)
POTASSIUM SERPL-SCNC: 4.4 MMOL/L (ref 3.5–5.2)
PROT SERPL-MCNC: 7.7 G/DL (ref 6–8.5)
RBC # BLD AUTO: 4.38 10*6/MM3 (ref 4.14–5.8)
SODIUM SERPL-SCNC: 136 MMOL/L (ref 136–145)
WBC NRBC COR # BLD: 3.88 10*3/MM3 (ref 3.4–10.8)

## 2023-03-14 PROCEDURE — 82565 ASSAY OF CREATININE: CPT

## 2023-03-14 PROCEDURE — 25510000001 IOPAMIDOL 61 % SOLUTION: Performed by: INTERNAL MEDICINE

## 2023-03-14 PROCEDURE — 74177 CT ABD & PELVIS W/CONTRAST: CPT

## 2023-03-14 PROCEDURE — 71260 CT THORAX DX C+: CPT

## 2023-03-14 PROCEDURE — 36415 COLL VENOUS BLD VENIPUNCTURE: CPT

## 2023-03-14 PROCEDURE — 80053 COMPREHEN METABOLIC PANEL: CPT

## 2023-03-14 PROCEDURE — 82378 CARCINOEMBRYONIC ANTIGEN: CPT

## 2023-03-14 PROCEDURE — 85025 COMPLETE CBC W/AUTO DIFF WBC: CPT

## 2023-03-14 RX ADMIN — IOPAMIDOL 95 ML: 612 INJECTION, SOLUTION INTRAVENOUS at 09:09

## 2023-03-20 ENCOUNTER — OFFICE VISIT (OUTPATIENT)
Dept: ONCOLOGY | Facility: CLINIC | Age: 58
End: 2023-03-20
Payer: COMMERCIAL

## 2023-03-20 VITALS
HEART RATE: 70 BPM | SYSTOLIC BLOOD PRESSURE: 116 MMHG | WEIGHT: 210.8 LBS | BODY MASS INDEX: 27.82 KG/M2 | OXYGEN SATURATION: 98 % | RESPIRATION RATE: 16 BRPM | DIASTOLIC BLOOD PRESSURE: 70 MMHG

## 2023-03-20 DIAGNOSIS — Z85.048 HISTORY OF RECTAL CANCER: Primary | ICD-10-CM

## 2023-03-20 PROBLEM — N17.9 ACUTE KIDNEY FAILURE (HCC): Status: RESOLVED | Noted: 2019-05-11 | Resolved: 2023-03-20

## 2023-03-20 PROCEDURE — 99214 OFFICE O/P EST MOD 30 MIN: CPT | Performed by: NURSE PRACTITIONER

## 2023-03-20 RX ORDER — CARVEDILOL 6.25 MG/1
6.25 TABLET ORAL
COMMUNITY
Start: 2023-02-23 | End: 2024-02-23

## 2023-03-20 NOTE — PROGRESS NOTES
CHIEF COMPLAINT: History of rectal cancer    Problem List:  Oncology/Hematology History Overview Note   1.  Stage IIIB rectal carcinoma clinical T3b N1 aM0  2.  Protracted postoperative course due to abdominal pelvic abscess following neoadjuvant Xeloda radiation  3.  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  B12 deficiency anemia  5.  Iron deficiency anemia  6.  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  7.  Intestinal metaplasia on EGD    -11/30/18 CT abdomen pelvis and chest x-ray negative for metastasis.  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    -12/6/18 MRI of rectum showed T3b with suspicious right internal iliac lymph node and CT chest noncontrast negative except for gallstones  -Per Dr. Kelly, CEA was normal.  -12/18/18 saw me for the first time.  I reviewed her case in our multidisciplinary conference and went over that in detail with her.  She had presented with hematochezia.  Dr. Kelly repeated endoscopy for more exact detailing of the primary location and this appears to be rectal on MRI and endoscopy.  Plan is for randomization on clinical trial N 1048 to neoadjuvant FOLFOX versus standard chemoradiation.  We'll get him to radiation and our research staff for randomization and get his baseline CBC and CMP.    -1/4/19 followed up: Randomized to the Xeloda radiation arm.  He will get that and then 4-6 weeks later had his surgery, and then follow that with 6 months of adjuvant FOLFOX per protocol.  My research assistant met with him today.  He has a Xeloda prescription.  -3/4/2019 MRI: Good response with T2, less likely T3 with spiculation N0 disease.  -4/23/2019 pathology:  Laparoscopic assisted converted to lower anterior resection open with stapled coloanal anastomosis and diverting loop ileostomy with liver biopsy showed residual adenoma with focal high-grade dysplasia but no residual invasive carcinoma.  0 out of 17 lymph nodes.  Mild steatosis on liver biopsy with bridging fibrosis.  -5/9/2019 to 5/15/2019 admitted with abdominal pelvic abscess status post CT-guided percutaneous drain placement presenting with sepsis improved with antibiotics and drainage.  Temp of 104 on presentation.  2 weeks of protracted IV antibiotics with Radames Sosa planned at discharge.  -7/11/2019 follow-up visit:Start of adjuvant therapy delayed due to the above postoperative pelvic abscess.  This was treated surgically initially but then by CT-guided percutaneous drainage as above.  Serial follow-up with Radames Sosa including CTs and antibiotics eventually cleared him adequately to consider resumption of plans for adjuvant therapy.  CTs of the pelvis done on 6/5/2019, 6/13/2019, 6/26/2019 monitoring for this abscess and possible drainage but not able to drain due to decreasing size and minimal residual edema with CT 7/10/2019 showingStable to slight decrease in size of presacral perirectal abscess with presacral edema measuring 3.9 x 1.9 previously 4.1 x 2.5 cm. No new sites of focal fluid collection or loculated fluid.  Hence, modified FOLFOX 6 started at this junction.  -9/30/2019 office visit: Oxaliplatin discontinued due to ongoing thrombocytopenia.  Platelet count today 59,000.  We will continue course #5 and 6 with 5-FU and leucovorin alone to complete adjuvant therapy.  -10/23/2019 completed adjuvant chemotherapy.  -10/28/2019 CT chest abdomen pelvis with contrast showed decreased and fluid in the presacral fat.  Decrease in surrounding soft tissue stranding.  Gallstone seen with enlargement of spleen.  Stranding of the mesenteric roots with varices in the mesentery slightly increased  compared to July 2019.  Chest is unremarkable.  CEA 1.53 with bilirubin 1.4, AST 46, alkaline phosphatase 135, white count 2500, hemoglobin 9.8, and platelets 62,000 on 10/21/2019.  -11/2019 colonoscopy with Dr. Lebron negative according to patient.  -2/23/2020: Saw Dr. Lebron regarding [place] cirrhosis-induced portal hypertension.  Did not recommend TIPS.  Started nadolol  -2/25/20 20 CT chest abdomen pelvis negative for metastasis but with portal hypertension and varices.  CEA 1.1.  ALT 51.  White count 3970 with hemoglobin 10.9.  -6/2/2020 CT abdomen and pelvis with stable postsurgical changes, no evidence of disease progression, CEA 0.86.  -8/28/2020 ileocolonoscopy  -9/8/2020 CT abdomen and pelvis without evidence of disease recurrence, CEA 0.87.  -3/9/2021 CT abdomen and pelvis stable changes, no evidence of progression of disease or recurrence.  CEA 1.21  -8/30/2021 CT chest, abdomen and pelvis with nonspecific small area of groundglass nodularity near the periphery of the right lower lobe favored infectious/inflammatory, otherwise CT scan stable without specific evidence of disease progression.  Of note, patient did have Covid infection 2 months ago.  Currently no respiratory concerns.  Has seen Dr. Gerardo at  for evaluation regarding his desire for ileostomy reversal, he reports that he had liver ultrasound that showed questionable liver lesion, he is scheduled for additional CT on 9/24 and then follow-up with Dr. Gerardo.  I discussed with the patient that current CT scan shows no abnormalities in the liver, he does have known cirrhosis with [place].  We will continue surveillance as per NCCN guidelines with repeat CT scans and serum testing in 6 months.  CEA currently normal at 1.13, platelet count stable at 73,000.    -2/24/2022 follow-up Dr. Racheal Gerardo Hepatology.  Given extension of thrombus into the superior mesenteric vein, they decided to start Coumadin November 2021 followed in the coagulation  clinic there with follow-up on repeat CT planned.    -2/28/2022 data from : Ferritin low at 12 with iron low at 38 and decreased transferrin saturation 8% with increased total iron-binding capacity 498 and increased transferrin 398 consistent with iron deficiency.  B12 level immeasurably low less than 150.  Folic acid normal 15.5.  Alpha-fetoprotein 2.5.  INR 2.3.  Hemoglobin 8.9 MCV 77 with white count 3250 and platelets 77k.    -3/10/2022 data:  White count stable 3230.  Hemoglobin down to 8.5 with MCV down to 76.3 with platelets stable 72,000.  CEA 1.22.  AST 48 with bilirubin 1.4Glucose 156.  Otherwise unremarkable CMP.   CT chest abdomen pelvisShows resolution of right lower lobe groundglass opacities.  Fatty liver infiltration.  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  Cholelithiasis    -3/18/2022 [facility] medical oncology follow-up visit: I reviewed multiple data sets from  and current CTs and labs and CEA from [facility].  Both images of the scans as well as reports reviewed and went over the above with the patient.  He started B12 already and has iron deficiency anemia as well documented at  and I have started him on ferrous sulfate 325 twice a day every other day with vitamin C and we will in 6 months repeat his CTs and CBC and CEA per NCCN guidelines from the cancer standpoint.  He will continue to follow with  liver specialists from the portal hypertension standpoint and is currently on Coumadin.  He had EGD and colonoscopy according to the patient yesterday at  and the varices were improved.  There was still some thrombus in the current CT but I suspect it is improving.  Coumadin is a Catch-22 and may actually complicate variceal bleeding but it also may diminish the likelihood of bleeding if it helps decompress the portal system by treating the thrombus and I leave that decision to  the capable hands of his gastroenterologist/liver specialist at     - 8/26/2022 [facility] liver clinic note.  [place] cirrhosis.  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  Following with anticoagulation clinic.  They plan to arrange for MRI liver protocol to assess for possible resolution of thrombus as surgeon planning ileostomy reversal once liver clinic clears him for surgery.  AST 49.  ALT 29.  Bilirubin 1.0.  Alkaline phosphatase 52.  White count 2980.  Hemoglobin 11.4.  Platelets 77,000.  Alpha-fetoprotein 2.6.  INR 2.0.    -9/12/2022 CT chest abdomen pelvis shows cirrhotic changes with portal hypertension stable no ascites.  No acute cardiopulmonary process.  No metastatic disease.  Stable presacral soft tissue thickening posttreatment related most likely.  Spleen is enlarged.      -9/16/2022 [facility] medical oncology follow-up visit: I reviewed the above liver clinic note and CT images and reports with patient.  No evidence of recurrence.  Today's white count is 2740 with hemoglobin 11.3 platelets 64,000 generally with platelets in the 70,000's over the last year.  MCV however has improved now 87.8 on iron and B12 in the hemoglobin has gone from 8.5 up to 11.3 in the last 6 months.  He did have gastritis and intestinal metaplasia without dysplasia with Dr. Lebron February 2022 with atrophic gastric body.  Iron, B12, folate, CEA pending at time of dictation.  We will have my nurse practitioner call him the results of the pending lab and if the CEA is rising he may need to see me sooner but otherwise the plan is to repeat his tumor markers and labs and CTs again in 6 months and will do so every 6 months through January 2024.  He is contemplating reversal of the ostomy and I left a message with Dr. Kelly that Avatrombopag might be helpful but his platelets are close to 60,000 which is the general goal for most preoperative clearances  but I will defer to her on that and of course the portal hypertension itself increases the risk of surgery simply from vascular engorgement and an MRI liver is planned by the liver surgeons to see if he can safely come off of the Coumadin that they are managing through the [facility].  Asked him to touch base with Dr. Lebron as to when to get the next EGD relative to the intestinal metaplasia on EGD.         Rectal carcinoma (HCC)   12/18/2018 Initial Diagnosis    Rectal carcinoma (CMS/HCC)     1/7/2019 - 2/4/2019 Chemotherapy    Xeloda radiation on protocol     1/8/2019 - 2/14/2019 Radiation    Radiation OncologyTreatment Course:  Hesham Arevalo received 5040 cGy in 28 fractions to pelvis via External Beam Radiation - EBRT.     3/4/2019 Imaging    MRI of the pelvis:  IMPRESSIONS:  MRI rectal cancer T category is: T2, LESS LIKELY EARLY T3 SPICULATIONS  Maximum EMD of invasion is: 0  Minimum tumor to MRF distance is: 12 MM  Low rectal tumor component: NO  Mesorectal nodes/tumor deposits: NO  EMVI: NO  Extramesorectal nodes: NO     4/23/2019 Surgery    Surgery rectosigmoidectomy pathology report:  Final Diagnosis    1. RECTOSIGMOID COLON, RECTOSIGMOID RESECTION:  Residual adenoma with focal high grade dysplasia with no residual invasive carcinoma identified post-treatment (see comment).  Seventeen lymph nodes negative for carcinoma (0/17)   2. LIVER, CORE NEEDLE BIOPSY:  Mild steatosis with mild chronic inflammation and increased fibrosis including bridging fibrosis (Stage 3-4) (see comment)        COLON AND RECTUM TEMPLATE:  TYPE OF SPECIMEN/PROCEDURE: Rectosigmoid resection.   SPECIMEN INTEGRITY:  Intact.  TUMOR SITE:  Rectum.  TUMOR SIZE (greatest dimension):  Indeterminate  TUMOR LOCATION (applicable only to rectal primaries): Entirely below anterior peritoneal reflection.  MACROSCOPIC INTACTNESS OF MESORECTUM (If applicable): Intact.  MACROSCOPIC TUMOR PERFORATION: Not identified.  TUMOR CONFIGURATION:   Ulcerated.  HISTOLOGIC TYPE:  Adenocarcinoma.  HISTOLOGIC GRADE:  G3-4 (per previous biopsy).  MICROSCOPIC DEPTH OF TUMOR INVASION/EXTENSION:  No residual tumor identified.  PERITONEAL INVOLVEMENT:  Not identified.  LYMPHVASCULAR INVASION:  Not identified.  PERINEURAL INVASION: Not identified.  SURGICAL MARGINS: Uninvolved.  STATUS AND DISTANCE FROM PROXIMAL MUCOSAL MARGIN:  Uninvolved, 13.0 cm.  STATUS AND DISTANCE FROM DISTAL MUCOSAL MARGIN: Uninvolved, 1.2 cm.  STATUS AND DISTANCE FROM MESENTERIC MARGIN: Not applicable.  STATUS AND DISTANCE FROM RADIAL MARGIN: Uninvolved, 1.7 cm.  DISTANCE FROM DENTATE LINE (RECTAL TUMOR ONLY):  Indeterminate.  TUMOR DEPOSITS (DISCONTINUOUS EXTRAMURAL EXTENSION):  Not identified.  NUMBER OF LYMPH NODES INVOLVED BY METASTATIC NEOPLASM: 0.  NUMBER OF REGIONAL LYMPH NODES EXAMINED: 17.  EXTRANODAL EXTENSION:  Not applicable.  TREATMENT EFFECT:  Present, no viable cancer cells identified (complete response, score 0).  ADDITIONAL PATHOLOGIC FINDINGS:  None.  MSI TESTING:  No evidence of MSI based on reported IHC on outside biopsy  OTHER ANCILLARY STUDIES (BRAF, KRAS, ETC.):  Should be performed on initial diagnostic biopsy if needed.  AJCC PATHOLOGIC STAGE:  (COMPLETED BY PATHOLOGIST, BASED ONLY ON TISSUE FINDINGS, MORE EXTENSIVE DISEASE MAY NOT BE KNOWN TO THE PATHOLOGIST)  ypT=  0  ypN=  0  AJCC PATHOLOGIC STAGE:  y0.                5/15/2019 Adverse Reaction    -5/9/2019 to 5/15/2019 admitted with abdominal pelvic abscess status post CT-guided percutaneous drain placement presenting with sepsis improved with antibiotics and drainage.  Temp of 104 on presentation.  2 weeks of protracted IV antibiotics with Radames Sosa planned at discharge     7/22/2019 - 10/23/2019 Chemotherapy    OP COLON [id number] OXALIplatin / Leucovorin / Fluorouracil  Start of adjuvant therapy delayed due to the above postoperative pelvic abscess.  This was treated surgically initially but then by CT-guided  percutaneous drainage as above.  Serial follow-up with Radames Sosa including CTs and antibiotics eventually cleared him adequately to consider resumption of plans for adjuvant therapy.  CTs of the pelvis done on 6/5/2019, 6/13/2019, 6/26/2019 monitoring for this abscess and possible drainage but not able to drain due to decreasing size and minimal residual edema with CT 7/10/2019 showingStable to slight decrease in size of presacral perirectal abscess with presacral edema measuring 3.9 x 1.9 previously 4.1 x 2.5 cm. No new sites of focal fluid collection or loculated fluid.     9/30/2019 Adverse Reaction    Oxaliplatin discontinued due to ongoing thrombocytopenia.     10/28/2019 Imaging    10/28/2019 CT chest abdomen pelvis with contrast showed decreased and fluid in the presacral fat.  Decrease in surrounding soft tissue stranding.  Gallstone seen with enlargement of spleen.  Stranding of the mesenteric roots with varices in the mesentery slightly increased compared to July 2019.  Chest is unremarkable.  CEA 1.53 with bilirubin 1.4, AST 46, alkaline phosphatase 135, white count 2500, hemoglobin 9.8, and platelets 62,000 on 10/21/2019.     8/28/2020 Procedure    Ileocolonoscopy     3/10/2022 Imaging    -3/10/2022 data:  White count stable 3230.  Hemoglobin down to 8.5 with MCV down to 76.3 with platelets stable 72,000.  CEA 1.22.  AST 48 with bilirubin 1.4Glucose 156.  Otherwise unremarkable CMP.   CT chest abdomen pelvisShows resolution of right lower lobe groundglass opacities.  Fatty liver infiltration.  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  Cholelithiasis     9/16/2022 Imaging    CT chest abdomen pelvis shows cirrhotic changes with portal hypertension stable no ascites.  No acute cardiopulmonary process.  No metastatic disease.  Stable presacral soft tissue thickening posttreatment related  most likely.  Spleen is enlarged.         HISTORY OF PRESENT ILLNESS:  The patient is a 57 y.o. male, here for follow up on management of follow-up rectal cancer with [place] cirrhosis and portal vein and superior mesenteric vein thrombosis with portal hypertension and splenomegaly with pancytopenia persistent.  Mr. Arevalo has been doing well since we saw him last.  He reports that he is now off of Coumadin under the direction of the GI clinic at  for which he follows for his cirrhosis.  Appetite is normal, weight is stable.  No change in his bowel or bladder habits.  He has not had ileostomy reversal.  Ostomy is functioning normally.  Has not had follow-up with Dr. Kelly since March of last year.    Past Medical History:   Diagnosis Date   • Arthritis     knees    • Cancer (HCC) 11/2018    colon   • Disease of thyroid gland    • Fatty liver    • Fatty liver    • Hashimoto's disease    • History of radiation therapy 02/14/2019    rectal cancer   • Hypertension    • Ileostomy in place (HCC)    • Psoriasis    • Wears glasses      Past Surgical History:   Procedure Laterality Date   • COLON RESECTION N/A 4/23/2019    Procedure: LAPAROSCOPIC LOWER ANTERIOR RESECTION WITH DIVERTING LOOP ILEOOSTOMY, SPLENIC FLEIXURE MOBILIZATION AND LIVER BIOPSY, AND PROCTOSCOPY;  Surgeon: Pau Kelly MD;  [facility];  Service: General   • COLONOSCOPY      2018   • ILEOSTOMY  04/2019   • LIVER BIOPSY  2003   • VASECTOMY  1998   • VENOUS ACCESS DEVICE (PORT) INSERTION N/A 7/18/2019    Procedure: PORT PLACEMENT;  Surgeon: Franky Cazares MD;  [facility];  Service: General       No Known Allergies    Family History and Social History reviewed and changed as necessary    REVIEW OF SYSTEM:   No new somatic complaints    PHYSICAL EXAM:  Well-developed, well-nourished male in no distress  Lungs clear   Heart regular rate and rhythm.    Vitals:    03/20/23 0809   BP: 116/70   Pulse: 70   Resp: 16   SpO2: 98%   Weight:  95.6 kg (210 lb 12.8 oz)     Vitals:    03/20/23 0809   PainSc: 0-No pain          ECOG score: 1           Vitals reviewed.  Labs and CT scans from 3/14/2023 reviewed at time of visit    ECOG: (0) Fully Active - Able to Carry On All Pre-disease Performance Without Restriction    Lab Results   Component Value Date    HGB 12.8 (L) 03/14/2023    HCT 38.7 03/14/2023    MCV 88.4 03/14/2023    PLT 75 (L) 03/14/2023    WBC 3.88 03/14/2023    NEUTROABS 2.47 03/14/2023    LYMPHSABS 0.76 03/14/2023    MONOSABS 0.30 03/14/2023    EOSABS 0.30 03/14/2023    BASOSABS 0.04 03/14/2023       Lab Results   Component Value Date    GLUCOSE 128 (H) 03/14/2023    BUN 22 (H) 03/14/2023    CREATININE 1.28 (H) 03/14/2023     03/14/2023    K 4.4 03/14/2023     03/14/2023    CO2 23.0 03/14/2023    CALCIUM 9.6 03/14/2023    PROTEINTOT 7.7 03/14/2023    ALBUMIN 4.1 03/14/2023    BILITOT 1.3 (H) 03/14/2023    ALKPHOS 54 03/14/2023    AST 34 03/14/2023    ALT 33 03/14/2023     Lab Results   Component Value Date    CEA 1.05 03/14/2023    CEA 0.87 09/16/2022    CEA 1.22 03/10/2022    CEA 1.13 08/30/2021    CEA 1.21 03/09/2021    CEA 0.87 09/08/2020     CT Chest With Contrast Diagnostic    Result Date: 3/15/2023  Impression: 1. No evidence of metastatic disease within the chest. Electronically Signed: Dominic Chauhan  3/15/2023 11:32 AM EDT  Workstation ID: [id number]    CT Abdomen Pelvis With Contrast    Result Date: 3/15/2023  Impression: 1.Stable postsurgical changes from bowel resection and right lower quadrant ostomy. Soft tissue in the presacral space is similar to the prior study. 2.Cirrhosis of the liver with splenomegaly and multiple right renal varices. 3.Cholelithiasis. Electronically Signed: Peterson Rosenberg  3/15/2023 11:38 AM EDT  Workstation ID: [id number]      ASSESSMENT & PLAN:  1.  Stage IIIB rectal carcinoma clinical T3b N1 aM0  2.  Protracted postoperative course due to abdominal pelvic abscess following neoadjuvant Xeloda  radiation  3.  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  B12 deficiency anemia  5.  Iron deficiency anemia  6.  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  7.  Intestinal metaplasia on EGD    -11/30/18 CT abdomen pelvis and chest x-ray negative for metastasis.  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    -12/6/18 MRI of rectum showed T3b with suspicious right internal iliac lymph node and CT chest noncontrast negative except for gallstones  -Per Dr. Kelly, CEA was normal.  -12/18/18 saw me for the first time.  I reviewed her case in our multidisciplinary conference and went over that in detail with her.  She had presented with hematochezia.  Dr. Kelly repeated endoscopy for more exact detailing of the primary location and this appears to be rectal on MRI and endoscopy.  Plan is for randomization on clinical trial N 1048 to neoadjuvant FOLFOX versus standard chemoradiation.  We'll get him to radiation and our research staff for randomization and get his baseline CBC and CMP.    -1/4/19 followed up: Randomized to the Xeloda radiation arm.  He will get that and then 4-6 weeks later had his surgery, and then follow that with 6 months of adjuvant FOLFOX per protocol.  My research assistant met with him today.  He has a Xeloda prescription.  -3/4/2019 MRI: Good response with T2, less likely T3 with spiculation N0 disease.  -4/23/2019 pathology: Laparoscopic assisted converted to lower anterior resection open with stapled coloanal anastomosis and diverting loop ileostomy with liver biopsy showed residual adenoma with focal high-grade dysplasia but no residual invasive carcinoma.  0 out of 17 lymph nodes.   Mild steatosis on liver biopsy with bridging fibrosis.  -5/9/2019 to 5/15/2019 admitted with abdominal pelvic abscess status post CT-guided percutaneous drain placement presenting with sepsis improved with antibiotics and drainage.  Temp of 104 on presentation.  2 weeks of protracted IV antibiotics with Radames Sosa planned at discharge.  -7/11/2019 follow-up visit:Start of adjuvant therapy delayed due to the above postoperative pelvic abscess.  This was treated surgically initially but then by CT-guided percutaneous drainage as above.  Serial follow-up with Radames Sosa including CTs and antibiotics eventually cleared him adequately to consider resumption of plans for adjuvant therapy.  CTs of the pelvis done on 6/5/2019, 6/13/2019, 6/26/2019 monitoring for this abscess and possible drainage but not able to drain due to decreasing size and minimal residual edema with CT 7/10/2019 showingStable to slight decrease in size of presacral perirectal abscess with presacral edema measuring 3.9 x 1.9 previously 4.1 x 2.5 cm. No new sites of focal fluid collection or loculated fluid.  Hence, modified FOLFOX 6 started at this junction.  -9/30/2019 office visit: Oxaliplatin discontinued due to ongoing thrombocytopenia.  Platelet count today 59,000.  We will continue course #5 and 6 with 5-FU and leucovorin alone to complete adjuvant therapy.  -10/23/2019 completed adjuvant chemotherapy.  -10/28/2019 CT chest abdomen pelvis with contrast showed decreased and fluid in the presacral fat.  Decrease in surrounding soft tissue stranding.  Gallstone seen with enlargement of spleen.  Stranding of the mesenteric roots with varices in the mesentery slightly increased compared to July 2019.  Chest is unremarkable.  CEA 1.53 with bilirubin 1.4, AST 46, alkaline phosphatase 135, white count 2500, hemoglobin 9.8, and platelets 62,000 on 10/21/2019.  -11/2019 colonoscopy with Dr. Lebron negative according to patient.  -2/23/2020: Saw  Dr. Lebron regarding [place] cirrhosis-induced portal hypertension.  Did not recommend TIPS.  Started nadolol  -2/25/20 20 CT chest abdomen pelvis negative for metastasis but with portal hypertension and varices.  CEA 1.1.  ALT 51.  White count 3970 with hemoglobin 10.9.  -6/2/2020 CT abdomen and pelvis with stable postsurgical changes, no evidence of disease progression, CEA 0.86.  -8/28/2020 ileocolonoscopy  -9/8/2020 CT abdomen and pelvis without evidence of disease recurrence, CEA 0.87.  -3/9/2021 CT abdomen and pelvis stable changes, no evidence of progression of disease or recurrence.  CEA 1.21  -8/30/2021 CT chest, abdomen and pelvis with nonspecific small area of groundglass nodularity near the periphery of the right lower lobe favored infectious/inflammatory, otherwise CT scan stable without specific evidence of disease progression.  Of note, patient did have Covid infection 2 months ago.  Currently no respiratory concerns.  Has seen Dr. Gerardo at  for evaluation regarding his desire for ileostomy reversal, he reports that he had liver ultrasound that showed questionable liver lesion, he is scheduled for additional CT on 9/24 and then follow-up with Dr. Gerardo.  I discussed with the patient that current CT scan shows no abnormalities in the liver, he does have known cirrhosis with [place].  We will continue surveillance as per NCCN guidelines with repeat CT scans and serum testing in 6 months.  CEA currently normal at 1.13, platelet count stable at 73,000.    -2/24/2022 follow-up Dr. Racheal Gerardo Hepatology.  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    -2/28/2022 data from : Ferritin low at 12 with iron low at 38 and decreased transferrin saturation 8% with increased total iron-binding capacity 498 and increased transferrin 398 consistent with iron deficiency.   B12 level immeasurably low less than 150.  Folic acid normal 15.5.  Alpha-fetoprotein 2.5.  INR 2.3.  Hemoglobin 8.9 MCV 77 with white count 3250 and platelets 77k.    -3/10/2022 data:  White count stable 3230.  Hemoglobin down to 8.5 with MCV down to 76.3 with platelets stable 72,000.  CEA 1.22.  AST 48 with bilirubin 1.4Glucose 156.  Otherwise unremarkable CMP.   CT chest abdomen pelvisShows resolution of right lower lobe groundglass opacities.  Fatty liver infiltration.  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  Cholelithiasis    -3/18/2022 [facility] medical oncology follow-up visit: I reviewed multiple data sets from  and current CTs and labs and CEA from [facility].  Both images of the scans as well as reports reviewed and went over the above with the patient.  He started B12 already and has iron deficiency anemia as well documented at  and I have started him on ferrous sulfate 325 twice a day every other day with vitamin C and we will in 6 months repeat his CTs and CBC and CEA per NCCN guidelines from the cancer standpoint.  He will continue to follow with  liver specialists from the portal hypertension standpoint and is currently on Coumadin.  He had EGD and colonoscopy according to the patient yesterday at  and the varices were improved.  There was still some thrombus in the current CT but I suspect it is improving.  Coumadin is a Catch-22 and may actually complicate variceal bleeding but it also may diminish the likelihood of bleeding if it helps decompress the portal system by treating the thrombus and I leave that decision to the capable hands of his gastroenterologist/liver specialist at     - 8/26/2022 [facility] liver clinic note.  [place] cirrhosis.  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  Following with  anticoagulation clinic.  They plan to arrange for MRI liver protocol to assess for possible resolution of thrombus as surgeon planning ileostomy reversal once liver clinic clears him for surgery.  AST 49.  ALT 29.  Bilirubin 1.0.  Alkaline phosphatase 52.  White count 2980.  Hemoglobin 11.4.  Platelets 77,000.  Alpha-fetoprotein 2.6.  INR 2.0.    -9/12/2022 CT chest abdomen pelvis shows cirrhotic changes with portal hypertension stable no ascites.  No acute cardiopulmonary process.  No metastatic disease.  Stable presacral soft tissue thickening posttreatment related most likely.  Spleen is enlarged.    -9/16/2022 [facility], [place], operated by [facility] medical oncology follow-up visit: I reviewed the above liver clinic note and CT images and reports with patient.  No evidence of recurrence.  Today's white count is 2740 with hemoglobin 11.3 platelets 64,000 generally with platelets in the 70,000's over the last year.  MCV however has improved now 87.8 on iron and B12 in the hemoglobin has gone from 8.5 up to 11.3 in the last 6 months.  He did have gastritis and intestinal metaplasia without dysplasia with Dr. Lebron February 2022 with atrophic gastric body.  Iron, B12, folate, CEA pending at time of dictation.  We will have my nurse practitioner call him the results of the pending lab and if the CEA is rising he may need to see me sooner but otherwise the plan is to repeat his tumor markers and labs and CTs again in 6 months and will do so every 6 months through January 2024.  He is contemplating reversal of the ostomy and I left a message with Dr. Kelly that Avatrombopag might be helpful but his platelets are close to 60,000 which is the general goal for most preoperative clearances but I will defer to her on that and of course the portal hypertension itself increases the risk of surgery simply from vascular engorgement and an MRI liver is planned by the liver surgeons to see if he can safely come off of the Coumadin that they are managing through  the  Coumadin clinic.  Asked him to touch base with Dr. Lebron to follow-up on the intestinal metaplasia on EGD.    -3/20/2023 [facility] clinic follow-up: Mr. Arevalo continues to do well, no evidence of recurrent rectal cancer on clinical exam and no new worrisome symptoms.  Current CT chest, abdomen and pelvis from 3/14/2023 showed no evidence of recurrence, stable postsurgical changes and cirrhosis.  CEA normal at 1.05.  Platelet count is stable at 75,000.  He continues to follow with GI clinic at  in light of his cirrhosis, he is now off of Coumadin.  He has not had follow-up with Dr. Kelly, he was due to see her in September for repeat flexible sigmoidoscopy but he states when he called the office they did not show that he needed an appointment.  I have asked him to contact her office now to get an appointment.  He also had questions regarding his ileostomy reversal but I defer to Dr. Kelly for that discussion.  He is now off of Coumadin.  He is asking whether or not he should consider having his reversal at  due to his high risk with cirrhosis but again I have asked him to discuss this with Dr. Kelly.  We will continue surveillance per NCCN guidelines with repeat CT chest, abdomen and pelvis and serum testing again in 6 months and I have ordered that today.  We plan on surveillance through January 2024.    I spent 30 minutes caring for Hesham on this date of service. This time includes time spent by me in the following activities: preparing for the visit, reviewing tests, obtaining and/or reviewing a separately obtained history, performing a medically appropriate examination and/or evaluation, ordering medications, tests, or procedures and documenting information in the medical record.     Ana Self, APRN    03/20/2023

## 2023-05-28 DIAGNOSIS — C20 RECTAL CARCINOMA: Primary | ICD-10-CM

## 2023-05-30 RX ORDER — FERROUS SULFATE 325(65) MG
TABLET ORAL
Qty: 30 TABLET | Refills: 11 | Status: SHIPPED | OUTPATIENT
Start: 2023-05-30

## 2023-09-13 ENCOUNTER — HOSPITAL ENCOUNTER (OUTPATIENT)
Dept: CT IMAGING | Facility: HOSPITAL | Age: 58
Discharge: HOME | End: 2023-09-13
Payer: COMMERCIAL

## 2023-09-13 ENCOUNTER — LAB (OUTPATIENT)
Dept: LAB | Facility: HOSPITAL | Age: 58
End: 2023-09-13
Payer: COMMERCIAL

## 2023-09-13 DIAGNOSIS — Z85.048 HISTORY OF RECTAL CANCER: ICD-10-CM

## 2023-09-13 LAB
ALBUMIN SERPL-MCNC: 3.9 G/DL (ref 3.5–5.2)
ALBUMIN/GLOB SERPL: 1.1 G/DL
ALP SERPL-CCNC: 55 U/L (ref 39–117)
ALT SERPL W P-5'-P-CCNC: 31 U/L (ref 1–41)
ANION GAP SERPL CALCULATED.3IONS-SCNC: 9 MMOL/L (ref 5–15)
AST SERPL-CCNC: 51 U/L (ref 1–40)
BASOPHILS # BLD AUTO: 0.04 10*3/MM3 (ref 0–0.2)
BASOPHILS NFR BLD AUTO: 1.2 % (ref 0–1.5)
BILIRUB SERPL-MCNC: 1.8 MG/DL (ref 0–1.2)
BUN SERPL-MCNC: 16 MG/DL (ref 6–20)
BUN/CREAT SERPL: 13.1 (ref 7–25)
CALCIUM SPEC-SCNC: 9.3 MG/DL (ref 8.6–10.5)
CEA SERPL-MCNC: 1.49 NG/ML
CHLORIDE SERPL-SCNC: 104 MMOL/L (ref 98–107)
CO2 SERPL-SCNC: 26 MMOL/L (ref 22–29)
CREAT SERPL-MCNC: 1.22 MG/DL (ref 0.76–1.27)
DEPRECATED RDW RBC AUTO: 46.1 FL (ref 37–54)
EGFRCR SERPLBLD CKD-EPI 2021: 68.3 ML/MIN/1.73
EOSINOPHIL # BLD AUTO: 0.24 10*3/MM3 (ref 0–0.4)
EOSINOPHIL NFR BLD AUTO: 7 % (ref 0.3–6.2)
ERYTHROCYTE [DISTWIDTH] IN BLOOD BY AUTOMATED COUNT: 14.1 % (ref 12.3–15.4)
GLOBULIN UR ELPH-MCNC: 3.6 GM/DL
GLUCOSE SERPL-MCNC: 189 MG/DL (ref 65–99)
HCT VFR BLD AUTO: 37.7 % (ref 37.5–51)
HGB BLD-MCNC: 12.6 G/DL (ref 13–17.7)
IMM GRANULOCYTES # BLD AUTO: 0.01 10*3/MM3 (ref 0–0.05)
IMM GRANULOCYTES NFR BLD AUTO: 0.3 % (ref 0–0.5)
LYMPHOCYTES # BLD AUTO: 0.54 10*3/MM3 (ref 0.7–3.1)
LYMPHOCYTES NFR BLD AUTO: 15.7 % (ref 19.6–45.3)
MCH RBC QN AUTO: 30.1 PG (ref 26.6–33)
MCHC RBC AUTO-ENTMCNC: 33.4 G/DL (ref 31.5–35.7)
MCV RBC AUTO: 90 FL (ref 79–97)
MONOCYTES # BLD AUTO: 0.35 10*3/MM3 (ref 0.1–0.9)
MONOCYTES NFR BLD AUTO: 10.2 % (ref 5–12)
NEUTROPHILS NFR BLD AUTO: 2.26 10*3/MM3 (ref 1.7–7)
NEUTROPHILS NFR BLD AUTO: 65.6 % (ref 42.7–76)
NRBC BLD AUTO-RTO: 0 /100 WBC (ref 0–0.2)
PLATELET # BLD AUTO: 69 10*3/MM3 (ref 140–450)
PMV BLD AUTO: 10.7 FL (ref 6–12)
POTASSIUM SERPL-SCNC: 4.6 MMOL/L (ref 3.5–5.2)
PROT SERPL-MCNC: 7.5 G/DL (ref 6–8.5)
RBC # BLD AUTO: 4.19 10*6/MM3 (ref 4.14–5.8)
SODIUM SERPL-SCNC: 139 MMOL/L (ref 136–145)
WBC NRBC COR # BLD: 3.44 10*3/MM3 (ref 3.4–10.8)

## 2023-09-13 PROCEDURE — 85025 COMPLETE CBC W/AUTO DIFF WBC: CPT

## 2023-09-13 PROCEDURE — 80053 COMPREHEN METABOLIC PANEL: CPT

## 2023-09-13 PROCEDURE — 71260 CT THORAX DX C+: CPT

## 2023-09-13 PROCEDURE — 82378 CARCINOEMBRYONIC ANTIGEN: CPT

## 2023-09-13 PROCEDURE — 74177 CT ABD & PELVIS W/CONTRAST: CPT

## 2023-09-13 PROCEDURE — 36415 COLL VENOUS BLD VENIPUNCTURE: CPT

## 2023-09-13 PROCEDURE — 25510000001 IOPAMIDOL 61 % SOLUTION: Performed by: NURSE PRACTITIONER

## 2023-09-13 RX ADMIN — IOPAMIDOL 85 ML: 612 INJECTION, SOLUTION INTRAVENOUS at 09:45

## 2023-09-18 LAB — CREAT BLDA-MCNC: 1.3 MG/DL (ref 0.6–1.3)

## 2023-09-21 ENCOUNTER — OFFICE VISIT (OUTPATIENT)
Dept: ONCOLOGY | Facility: CLINIC | Age: 58
End: 2023-09-21
Payer: COMMERCIAL

## 2023-09-21 VITALS
TEMPERATURE: 97.7 F | HEIGHT: 73 IN | OXYGEN SATURATION: 98 % | RESPIRATION RATE: 18 BRPM | WEIGHT: 214 LBS | BODY MASS INDEX: 28.36 KG/M2 | DIASTOLIC BLOOD PRESSURE: 65 MMHG | HEART RATE: 62 BPM | SYSTOLIC BLOOD PRESSURE: 138 MMHG

## 2023-09-21 DIAGNOSIS — C20 RECTAL CARCINOMA: Primary | ICD-10-CM

## 2023-09-21 PROCEDURE — 99214 OFFICE O/P EST MOD 30 MIN: CPT | Performed by: INTERNAL MEDICINE

## 2023-09-21 RX ORDER — LEVOTHYROXINE SODIUM 0.03 MG/1
25 TABLET ORAL DAILY
COMMUNITY
Start: 2023-08-26

## 2023-09-21 NOTE — PROGRESS NOTES
CHIEF COMPLAINT: No new somatic complaints    Problem List:  Oncology/Hematology History Overview Note   1.  Stage IIIB rectal carcinoma clinical T3b N1 aM0  2.  Protracted postoperative course due to abdominal pelvic abscess following neoadjuvant Xeloda radiation  3.  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  B12 deficiency anemia  5.  Iron deficiency anemia  6.  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  7.  Intestinal metaplasia on EGD    -11/30/18 CT abdomen pelvis and chest x-ray negative for metastasis.  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    -12/6/18 MRI of rectum showed T3b with suspicious right internal iliac lymph node and CT chest noncontrast negative except for gallstones  -Per Dr. Kelly, CEA was normal.  -12/18/18 saw me for the first time.  I reviewed her case in our multidisciplinary conference and went over that in detail with her.  She had presented with hematochezia.  Dr. Kelly repeated endoscopy for more exact detailing of the primary location and this appears to be rectal on MRI and endoscopy.  Plan is for randomization on clinical trial N 1048 to neoadjuvant FOLFOX versus standard chemoradiation.  We'll get him to radiation and our research staff for randomization and get his baseline CBC and CMP.    -1/4/19 followed up: Randomized to the Xeloda radiation arm.  He will get that and then 4-6 weeks later had his surgery, and then follow that with 6 months of adjuvant FOLFOX per protocol.  My research assistant met with him today.  He has a Xeloda prescription.  -3/4/2019 MRI: Good response with T2, less likely T3 with spiculation N0 disease.  -4/23/2019 pathology:  Laparoscopic assisted converted to lower anterior resection open with stapled coloanal anastomosis and diverting loop ileostomy with liver biopsy showed residual adenoma with focal high-grade dysplasia but no residual invasive carcinoma.  0 out of 17 lymph nodes.  Mild steatosis on liver biopsy with bridging fibrosis.  -5/9/2019 to 5/15/2019 admitted with abdominal pelvic abscess status post CT-guided percutaneous drain placement presenting with sepsis improved with antibiotics and drainage.  Temp of 104 on presentation.  2 weeks of protracted IV antibiotics with Radames Sosa planned at discharge.  -7/11/2019 follow-up visit:Start of adjuvant therapy delayed due to the above postoperative pelvic abscess.  This was treated surgically initially but then by CT-guided percutaneous drainage as above.  Serial follow-up with Radames Sosa including CTs and antibiotics eventually cleared him adequately to consider resumption of plans for adjuvant therapy.  CTs of the pelvis done on 6/5/2019, 6/13/2019, 6/26/2019 monitoring for this abscess and possible drainage but not able to drain due to decreasing size and minimal residual edema with CT 7/10/2019 showingStable to slight decrease in size of presacral perirectal abscess with presacral edema measuring 3.9 x 1.9 previously 4.1 x 2.5 cm. No new sites of focal fluid collection or loculated fluid.  Hence, modified FOLFOX 6 started at this junction.  -9/30/2019 office visit: Oxaliplatin discontinued due to ongoing thrombocytopenia.  Platelet count today 59,000.  We will continue course #5 and 6 with 5-FU and leucovorin alone to complete adjuvant therapy.  -10/23/2019 completed adjuvant chemotherapy.  -10/28/2019 CT chest abdomen pelvis with contrast showed decreased and fluid in the presacral fat.  Decrease in surrounding soft tissue stranding.  Gallstone seen with enlargement of spleen.  Stranding of the mesenteric roots with varices in the mesentery slightly increased  compared to July 2019.  Chest is unremarkable.  CEA 1.53 with bilirubin 1.4, AST 46, alkaline phosphatase 135, white count 2500, hemoglobin 9.8, and platelets 62,000 on 10/21/2019.  -11/2019 colonoscopy with Dr. Lebron negative according to patient.  -2/23/2020: Saw Dr. Lebron regarding [place] cirrhosis-induced portal hypertension.  Did not recommend TIPS.  Started nadolol  -2/25/20 20 CT chest abdomen pelvis negative for metastasis but with portal hypertension and varices.  CEA 1.1.  ALT 51.  White count 3970 with hemoglobin 10.9.  -6/2/2020 CT abdomen and pelvis with stable postsurgical changes, no evidence of disease progression, CEA 0.86.  -8/28/2020 ileocolonoscopy  -9/8/2020 CT abdomen and pelvis without evidence of disease recurrence, CEA 0.87.  -3/9/2021 CT abdomen and pelvis stable changes, no evidence of progression of disease or recurrence.  CEA 1.21  -8/30/2021 CT chest, abdomen and pelvis with nonspecific small area of groundglass nodularity near the periphery of the right lower lobe favored infectious/inflammatory, otherwise CT scan stable without specific evidence of disease progression.  Of note, patient did have Covid infection 2 months ago.  Currently no respiratory concerns.  Has seen Dr. Gerardo at  for evaluation regarding his desire for ileostomy reversal, he reports that he had liver ultrasound that showed questionable liver lesion, he is scheduled for additional CT on 9/24 and then follow-up with Dr. Gerardo.  I discussed with the patient that current CT scan shows no abnormalities in the liver, he does have known cirrhosis with [place].  We will continue surveillance as per NCCN guidelines with repeat CT scans and serum testing in 6 months.  CEA currently normal at 1.13, platelet count stable at 73,000.    -2/24/2022 follow-up Dr. Racheal Gerardo Hepatology.  Given extension of thrombus into the superior mesenteric vein, they decided to start Coumadin November 2021 followed in the coagulation  clinic there with follow-up on repeat CT planned.    -2/28/2022 data from : Ferritin low at 12 with iron low at 38 and decreased transferrin saturation 8% with increased total iron-binding capacity 498 and increased transferrin 398 consistent with iron deficiency.  B12 level immeasurably low less than 150.  Folic acid normal 15.5.  Alpha-fetoprotein 2.5.  INR 2.3.  Hemoglobin 8.9 MCV 77 with white count 3250 and platelets 77k.    -3/10/2022 data:  White count stable 3230.  Hemoglobin down to 8.5 with MCV down to 76.3 with platelets stable 72,000.  CEA 1.22.  AST 48 with bilirubin 1.4Glucose 156.  Otherwise unremarkable CMP.   CT chest abdomen pelvisShows resolution of right lower lobe groundglass opacities.  Fatty liver infiltration.  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  Cholelithiasis    -3/18/2022 [facility] medical oncology follow-up visit: I reviewed multiple data sets from  and current CTs and labs and CEA from [facility].  Both images of the scans as well as reports reviewed and went over the above with the patient.  He started B12 already and has iron deficiency anemia as well documented at  and I have started him on ferrous sulfate 325 twice a day every other day with vitamin C and we will in 6 months repeat his CTs and CBC and CEA per NCCN guidelines from the cancer standpoint.  He will continue to follow with  liver specialists from the portal hypertension standpoint and is currently on Coumadin.  He had EGD and colonoscopy according to the patient yesterday at  and the varices were improved.  There was still some thrombus in the current CT but I suspect it is improving.  Coumadin is a Catch-22 and may actually complicate variceal bleeding but it also may diminish the likelihood of bleeding if it helps decompress the portal system by treating the thrombus and I leave that decision to  the capable hands of his gastroenterologist/liver specialist at     - 8/26/2022 [facility] liver clinic note.  [place] cirrhosis.  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  Following with anticoagulation clinic.  They plan to arrange for MRI liver protocol to assess for possible resolution of thrombus as surgeon planning ileostomy reversal once liver clinic clears him for surgery.  AST 49.  ALT 29.  Bilirubin 1.0.  Alkaline phosphatase 52.  White count 2980.  Hemoglobin 11.4.  Platelets 77,000.  Alpha-fetoprotein 2.6.  INR 2.0.    -9/12/2022 CT chest abdomen pelvis shows cirrhotic changes with portal hypertension stable no ascites.  No acute cardiopulmonary process.  No metastatic disease.  Stable presacral soft tissue thickening posttreatment related most likely.  Spleen is enlarged.      -9/16/2022 [facility] medical oncology follow-up visit: I reviewed the above liver clinic note and CT images and reports with patient.  No evidence of recurrence.  Today's white count is 2740 with hemoglobin 11.3 platelets 64,000 generally with platelets in the 70,000's over the last year.  MCV however has improved now 87.8 on iron and B12 in the hemoglobin has gone from 8.5 up to 11.3 in the last 6 months.  He did have gastritis and intestinal metaplasia without dysplasia with Dr. Lebron February 2022 with atrophic gastric body.  Iron, B12, folate, CEA pending at time of dictation.  We will have my nurse practitioner call him the results of the pending lab and if the CEA is rising he may need to see me sooner but otherwise the plan is to repeat his tumor markers and labs and CTs again in 6 months and will do so every 6 months through January 2024.  He is contemplating reversal of the ostomy and I left a message with Dr. Kelly that Avatrombopag might be helpful but his platelets are close to 60,000 which is the general goal for most preoperative clearances  but I will defer to her on that and of course the portal hypertension itself increases the risk of surgery simply from vascular engorgement and an MRI liver is planned by the liver surgeons to see if he can safely come off of the Coumadin that they are managing through the  Coumadin clinic.  Asked him to touch base with Dr. Lebron as to when to get the next EGD relative to the intestinal metaplasia on EGD.    -10/13/2022 MRI of the abdomen at  was stable, no new or suspicious liver lesions.    -3/14/2023 CT chest, abdomen and pelvis with no evidence of recurrent or metastatic disease.  Stable postsurgical changes from bowel resection and right lower quadrant ostomy.  Soft tissue in the presacral space similar to prior study.  Cirrhosis of the liver with splenomegaly and multiple right renal varices.  Cholelithiasis. CEA 1.05.  CBC is unremarkable, platelet count stable at 75,000.  CMP creatinine stable at 1.28, normal AST and ALT of 34/33, total bilirubin 1.3.    -3/20/2023 [facility] Oncology clinic follow-up: Mr. Arevalo continues to do well, no evidence of recurrent rectal cancer on clinical exam and no new worrisome symptoms.  Current CT chest, abdomen and pelvis from 3/14/2023 showed no evidence of recurrence, stable postsurgical changes and cirrhosis.  CEA normal at 1.05.  Platelet count is stable at 75,000.  He continues to follow with GI clinic at  in light of his cirrhosis, he is now off of Coumadin.  He has not had follow-up with Dr. Kelly, he was due to see her in September for repeat flexible sigmoidoscopy but he states when he called the office they did not show that he needed an appointment.  I have asked him to contact her office now to get an appointment.  He also had questions regarding his ileostomy reversal but I defer to Dr. Kelly for that discussion.  He is now off of Coumadin.  He is asking whether or not he should consider having his reversal at  due to his high risk with cirrhosis but again I  have asked him to discuss this with Dr. Kelly.  We will continue surveillance per NCCN guidelines with repeat CT chest, abdomen and pelvis and serum testing again in 6 months and I have ordered that today.  We plan on surveillance through January 2024.    -[facility]20[facility]  cirrhosis off Coumadin since November 2022 had ultrasound abdomen this day.  MELD score 11.  There ordering alpha-fetoprotein and MRI abdomen along with iron indices.  Presumably  due to weight, diabetes, hyperlipidemia, hypertension.  Tylenol as needed.  They plan MRI abdomen in 3-4 months to assess liver RADS 3 lesion.  If stable x2 years then they will continue ultrasound abdomen for HCC screening.  They recommended follow-up EGD March 2024 with Dr. Lebron.  Alpha-fetoprotein this day normal 2.7 With normal ferritin 33 and iron normal 80 with normal total iron-binding capacity and normal saturation 19% with normal total iron-binding capacity and transferrin.    -9/13/2023 CT chest abdomen pelvis compared to March 2023 showed postsurgical changes without recurrence in the chest abdomen or pelvis.  Cirrhotic portal hypertensive findings including splenomegaly with large mesenteric veins draining into the right renal vein.  Hemoglobin 12.6 up from 11.6 in June and stable from March 12 0.8 with normochromic normocytic indices.  Platelet count 69,000 where it fluctuates around 70,000 give her take 10,000.  AST 51 bilirubin 1.8 glucose 189 otherwise unremarkable CMP.  CEA normal 1.49.  This is up from 0.87 a year ago but was as high as 1.55 November 2019.    - 9/21/2023 [facility] hematology oncology follow-up: Review of flexible sigmoidoscopy note from Dr. Kelly 3/17/2022 from last year states follow-up flexible sigmoidoscopy in 6 months unless otherwise indicated.  Ileostomy reversal not recommended while having ongoing work-up for blood clots.  Okay to resume Coumadin from her standpoint at that point.  EGD with   Vi at that point showed chronic gastritis but no varices and just diffuse nonerosive gastropathy.  I left message with Dr. Kelly today and asked patient to double back on the repeat sigmoidoscopy that was delineated to be done 6 months from the March 2022 endoscopy report from Dr. Kelly.  From the CAT scan and blood and tumor marker standpoint, nothing to suggest recurrence.  CEA waxes and wanes but no significant rise.  He has persistent mild anemia and thrombocytopenia with his portal hypertension from [place] cirrhosis being monitored by hepatology at .  They plan MRI as outlined above to follow-up on prior liver mass but nothing on current CTs other than chronic cirrhotic portal hypertensive variceal changes.  From the rectal cancer standpoint we will repeat his CBC CMP CEA and CTs again in 6 months and at that point he will be more than 5 years out from diagnosis and we would stop routine oncologic laboratory and imaging follow-up from the rectal cancer standpoint.     Rectal carcinoma   12/18/2018 Initial Diagnosis    Rectal carcinoma (CMS/HCC)     1/7/2019 - 2/4/2019 Chemotherapy    Xeloda radiation on protocol     1/8/2019 - 2/14/2019 Radiation    Radiation OncologyTreatment Course:  Hesham Arevalo received 5040 cGy in 28 fractions to pelvis via External Beam Radiation - EBRT.     3/4/2019 Imaging    MRI of the pelvis:  IMPRESSIONS:  MRI rectal cancer T category is: T2, LESS LIKELY EARLY T3 SPICULATIONS  Maximum EMD of invasion is: 0  Minimum tumor to MRF distance is: 12 MM  Low rectal tumor component: NO  Mesorectal nodes/tumor deposits: NO  EMVI: NO  Extramesorectal nodes: NO     4/23/2019 Surgery    Surgery rectosigmoidectomy pathology report:  Final Diagnosis    1. RECTOSIGMOID COLON, RECTOSIGMOID RESECTION:  Residual adenoma with focal high grade dysplasia with no residual invasive carcinoma identified post-treatment (see comment).  Seventeen lymph nodes negative for carcinoma (0/17)   2. LIVER, CORE NEEDLE  BIOPSY:  Mild steatosis with mild chronic inflammation and increased fibrosis including bridging fibrosis (Stage 3-4) (see comment)        COLON AND RECTUM TEMPLATE:  TYPE OF SPECIMEN/PROCEDURE: Rectosigmoid resection.   SPECIMEN INTEGRITY:  Intact.  TUMOR SITE:  Rectum.  TUMOR SIZE (greatest dimension):  Indeterminate  TUMOR LOCATION (applicable only to rectal primaries): Entirely below anterior peritoneal reflection.  MACROSCOPIC INTACTNESS OF MESORECTUM (If applicable): Intact.  MACROSCOPIC TUMOR PERFORATION: Not identified.  TUMOR CONFIGURATION:  Ulcerated.  HISTOLOGIC TYPE:  Adenocarcinoma.  HISTOLOGIC GRADE:  G3-4 (per previous biopsy).  MICROSCOPIC DEPTH OF TUMOR INVASION/EXTENSION:  No residual tumor identified.  PERITONEAL INVOLVEMENT:  Not identified.  LYMPHVASCULAR INVASION:  Not identified.  PERINEURAL INVASION: Not identified.  SURGICAL MARGINS: Uninvolved.  STATUS AND DISTANCE FROM PROXIMAL MUCOSAL MARGIN:  Uninvolved, 13.0 cm.  STATUS AND DISTANCE FROM DISTAL MUCOSAL MARGIN: Uninvolved, 1.2 cm.  STATUS AND DISTANCE FROM MESENTERIC MARGIN: Not applicable.  STATUS AND DISTANCE FROM RADIAL MARGIN: Uninvolved, 1.7 cm.  DISTANCE FROM DENTATE LINE (RECTAL TUMOR ONLY):  Indeterminate.  TUMOR DEPOSITS (DISCONTINUOUS EXTRAMURAL EXTENSION):  Not identified.  NUMBER OF LYMPH NODES INVOLVED BY METASTATIC NEOPLASM: 0.  NUMBER OF REGIONAL LYMPH NODES EXAMINED: 17.  EXTRANODAL EXTENSION:  Not applicable.  TREATMENT EFFECT:  Present, no viable cancer cells identified (complete response, score 0).  ADDITIONAL PATHOLOGIC FINDINGS:  None.  MSI TESTING:  No evidence of MSI based on reported IHC on outside biopsy  OTHER ANCILLARY STUDIES (BRAF, KRAS, ETC.):  Should be performed on initial diagnostic biopsy if needed.  AJCC PATHOLOGIC STAGE:  (COMPLETED BY PATHOLOGIST, BASED ONLY ON TISSUE FINDINGS, MORE EXTENSIVE DISEASE MAY NOT BE KNOWN TO THE PATHOLOGIST)  ypT=  0  ypN=  0  AJCC PATHOLOGIC STAGE:  y0.                 5/15/2019 Adverse Reaction    -5/9/2019 to 5/15/2019 admitted with abdominal pelvic abscess status post CT-guided percutaneous drain placement presenting with sepsis improved with antibiotics and drainage.  Temp of 104 on presentation.  2 weeks of protracted IV antibiotics with Radames Sosa planned at discharge     7/22/2019 - 10/23/2019 Chemotherapy    OP COLON [id number] OXALIplatin / Leucovorin / Fluorouracil  Start of adjuvant therapy delayed due to the above postoperative pelvic abscess.  This was treated surgically initially but then by CT-guided percutaneous drainage as above.  Serial follow-up with Radames Sosa including CTs and antibiotics eventually cleared him adequately to consider resumption of plans for adjuvant therapy.  CTs of the pelvis done on 6/5/2019, 6/13/2019, 6/26/2019 monitoring for this abscess and possible drainage but not able to drain due to decreasing size and minimal residual edema with CT 7/10/2019 showingStable to slight decrease in size of presacral perirectal abscess with presacral edema measuring 3.9 x 1.9 previously 4.1 x 2.5 cm. No new sites of focal fluid collection or loculated fluid.     9/30/2019 Adverse Reaction    Oxaliplatin discontinued due to ongoing thrombocytopenia.     10/28/2019 Imaging    10/28/2019 CT chest abdomen pelvis with contrast showed decreased and fluid in the presacral fat.  Decrease in surrounding soft tissue stranding.  Gallstone seen with enlargement of spleen.  Stranding of the mesenteric roots with varices in the mesentery slightly increased compared to July 2019.  Chest is unremarkable.  CEA 1.53 with bilirubin 1.4, AST 46, alkaline phosphatase 135, white count 2500, hemoglobin 9.8, and platelets 62,000 on 10/21/2019.     8/28/2020 Procedure    Ileocolonoscopy     3/10/2022 Imaging    -3/10/2022 data:  White count stable 3230.  Hemoglobin down to 8.5 with MCV down to 76.3 with platelets stable 72,000.  CEA 1.22.  AST 48 with bilirubin  1.4Glucose 156.  Otherwise unremarkable CMP.   CT chest abdomen pelvisShows resolution of right lower lobe groundglass opacities.  Fatty liver infiltration.  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  Cholelithiasis     9/16/2022 Imaging    CT chest abdomen pelvis shows cirrhotic changes with portal hypertension stable no ascites.  No acute cardiopulmonary process.  No metastatic disease.  Stable presacral soft tissue thickening posttreatment related most likely.  Spleen is enlarged.         HISTORY OF PRESENT ILLNESS:  The patient is a 57 y.o. male, here for follow up on management of rectal cancer.  No new somatic complaints    Past Medical History:   Diagnosis Date    Arthritis     knees     Cancer 11/2018    colon    Disease of thyroid gland     Fatty liver     Fatty liver     Hashimoto's disease     History of radiation therapy 02/14/2019    rectal cancer    Hypertension     Ileostomy in place     Psoriasis     Wears glasses      Past Surgical History:   Procedure Laterality Date    COLON RESECTION N/A 4/23/2019    Procedure: LAPAROSCOPIC LOWER ANTERIOR RESECTION WITH DIVERTING LOOP ILEOOSTOMY, SPLENIC FLEIXURE MOBILIZATION AND LIVER BIOPSY, AND PROCTOSCOPY;  Surgeon: Pau Kelly MD;  Location:  [facility] OR;  Service: General    COLONOSCOPY      2018    ILEOSTOMY  04/2019    LIVER BIOPSY  2003    VASECTOMY  1998    VENOUS ACCESS DEVICE (PORT) INSERTION N/A 7/18/2019    Procedure: PORT PLACEMENT;  Surgeon: Franky Cazares MD;  Location:  [facility] OR;  Service: General       No Known Allergies    Family History and Social History reviewed and changed as necessary    REVIEW OF SYSTEM:   No new somatic complaints    PHYSICAL EXAM:  No jaundice or icterus or pallor.    Vitals:    09/21/23 0756   BP: 138/65   Pulse: 62   Resp: 18   Temp: 97.7 °F (36.5 °C)   SpO2: 98%   Weight: 97.1 kg (214 lb)  "  Height: 185.4 cm (73\")     Vitals:    09/21/23 0756   PainSc: 0-No pain          ECOG score: 0           Vitals reviewed.    ECOG: (0) Fully Active - Able to Carry On All Pre-disease Performance Without Restriction    Lab Results   Component Value Date    HGB 12.6 (L) 09/13/2023    HCT 37.7 09/13/2023    MCV 90.0 09/13/2023    PLT 69 (L) 09/13/2023    WBC 3.44 09/13/2023    NEUTROABS 2.26 09/13/2023    LYMPHSABS 0.54 (L) 09/13/2023    MONOSABS 0.35 09/13/2023    EOSABS 0.24 09/13/2023    BASOSABS 0.04 09/13/2023       Lab Results   Component Value Date    GLUCOSE 189 (H) 09/13/2023    BUN 16 09/13/2023    CREATININE 1.22 09/13/2023     09/13/2023    K 4.6 09/13/2023     09/13/2023    CO2 26.0 09/13/2023    CALCIUM 9.3 09/13/2023    PROTEINTOT 7.5 09/13/2023    ALBUMIN 3.9 09/13/2023    BILITOT 1.8 (H) 09/13/2023    ALKPHOS 55 09/13/2023    AST 51 (H) 09/13/2023    ALT 31 09/13/2023             ASSESSMENT & PLAN:  1.  Stage IIIB rectal carcinoma clinical T3b N1 aM0  2.  Protracted postoperative course due to abdominal pelvic abscess following neoadjuvant Xeloda radiation  3.  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  B12 deficiency anemia  5.  Iron deficiency anemia  6.  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  7.  Intestinal metaplasia on EGD    -11/30/18 CT abdomen pelvis and chest x-ray negative for metastasis.  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    -12/6/18 MRI of rectum showed T3b with suspicious right internal iliac lymph node and CT chest noncontrast negative except for gallstones  -Per Dr. Ferguson, CEA was normal.  -12/18/18 saw me for the first time.  I reviewed her " case in our multidisciplinary conference and went over that in detail with her.  She had presented with hematochezia.  Dr. Kelly repeated endoscopy for more exact detailing of the primary location and this appears to be rectal on MRI and endoscopy.  Plan is for randomization on clinical trial N 1048 to neoadjuvant FOLFOX versus standard chemoradiation.  We'll get him to radiation and our research staff for randomization and get his baseline CBC and CMP.    -1/4/19 followed up: Randomized to the Xeloda radiation arm.  He will get that and then 4-6 weeks later had his surgery, and then follow that with 6 months of adjuvant FOLFOX per protocol.  My research assistant met with him today.  He has a Xeloda prescription.  -3/4/2019 MRI: Good response with T2, less likely T3 with spiculation N0 disease.  -4/23/2019 pathology: Laparoscopic assisted converted to lower anterior resection open with stapled coloanal anastomosis and diverting loop ileostomy with liver biopsy showed residual adenoma with focal high-grade dysplasia but no residual invasive carcinoma.  0 out of 17 lymph nodes.  Mild steatosis on liver biopsy with bridging fibrosis.  -5/9/2019 to 5/15/2019 admitted with abdominal pelvic abscess status post CT-guided percutaneous drain placement presenting with sepsis improved with antibiotics and drainage.  Temp of 104 on presentation.  2 weeks of protracted IV antibiotics with Radames Sosa planned at discharge.  -7/11/2019 follow-up visit:Start of adjuvant therapy delayed due to the above postoperative pelvic abscess.  This was treated surgically initially but then by CT-guided percutaneous drainage as above.  Serial follow-up with Radames Sosa including CTs and antibiotics eventually cleared him adequately to consider resumption of plans for adjuvant therapy.  CTs of the pelvis done on 6/5/2019, 6/13/2019, 6/26/2019 monitoring for this abscess and possible drainage but not able to drain due to decreasing size  and minimal residual edema with CT 7/10/2019 showingStable to slight decrease in size of presacral perirectal abscess with presacral edema measuring 3.9 x 1.9 previously 4.1 x 2.5 cm. No new sites of focal fluid collection or loculated fluid.  Hence, modified FOLFOX 6 started at this junction.  -9/30/2019 office visit: Oxaliplatin discontinued due to ongoing thrombocytopenia.  Platelet count today 59,000.  We will continue course #5 and 6 with 5-FU and leucovorin alone to complete adjuvant therapy.  -10/23/2019 completed adjuvant chemotherapy.  -10/28/2019 CT chest abdomen pelvis with contrast showed decreased and fluid in the presacral fat.  Decrease in surrounding soft tissue stranding.  Gallstone seen with enlargement of spleen.  Stranding of the mesenteric roots with varices in the mesentery slightly increased compared to July 2019.  Chest is unremarkable.  CEA 1.53 with bilirubin 1.4, AST 46, alkaline phosphatase 135, white count 2500, hemoglobin 9.8, and platelets 62,000 on 10/21/2019.  -11/2019 colonoscopy with Dr. Lebron negative according to patient.  -2/23/2020: Saw Dr. Lebron regarding [place] cirrhosis-induced portal hypertension.  Did not recommend TIPS.  Started nadolol  -2/25/20 20 CT chest abdomen pelvis negative for metastasis but with portal hypertension and varices.  CEA 1.1.  ALT 51.  White count 3970 with hemoglobin 10.9.  -6/2/2020 CT abdomen and pelvis with stable postsurgical changes, no evidence of disease progression, CEA 0.86.  -8/28/2020 ileocolonoscopy  -9/8/2020 CT abdomen and pelvis without evidence of disease recurrence, CEA 0.87.  -3/9/2021 CT abdomen and pelvis stable changes, no evidence of progression of disease or recurrence.  CEA 1.21  -8/30/2021 CT chest, abdomen and pelvis with nonspecific small area of groundglass nodularity near the periphery of the right lower lobe favored infectious/inflammatory, otherwise CT scan stable without specific evidence of disease progression.  Of  note, patient did have Covid infection 2 months ago.  Currently no respiratory concerns.  Has seen Dr. Gerardo at  for evaluation regarding his desire for ileostomy reversal, he reports that he had liver ultrasound that showed questionable liver lesion, he is scheduled for additional CT on 9/24 and then follow-up with Dr. Gerardo.  I discussed with the patient that current CT scan shows no abnormalities in the liver, he does have known cirrhosis with [place].  We will continue surveillance as per NCCN guidelines with repeat CT scans and serum testing in 6 months.  CEA currently normal at 1.13, platelet count stable at 73,000.    -2/24/2022 follow-up Dr. Racheal Gerardo Hepatology.  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    -2/28/2022 data from : Ferritin low at 12 with iron low at 38 and decreased transferrin saturation 8% with increased total iron-binding capacity 498 and increased transferrin 398 consistent with iron deficiency.  B12 level immeasurably low less than 150.  Folic acid normal 15.5.  Alpha-fetoprotein 2.5.  INR 2.3.  Hemoglobin 8.9 MCV 77 with white count 3250 and platelets 77k.    -3/10/2022 data:  White count stable 3230.  Hemoglobin down to 8.5 with MCV down to 76.3 with platelets stable 72,000.  CEA 1.22.  AST 48 with bilirubin 1.4Glucose 156.  Otherwise unremarkable CMP.   CT chest abdomen pelvisShows resolution of right lower lobe groundglass opacities.  Fatty liver infiltration.  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  Cholelithiasis    -3/18/2022 [facility] medical oncology follow-up visit: I reviewed multiple data sets from  and current CTs and labs and CEA from [facility].  Both images of the scans as well as reports reviewed and went  over the above with the patient.  He started B12 already and has iron deficiency anemia as well documented at  and I have started him on ferrous sulfate 325 twice a day every other day with vitamin C and we will in 6 months repeat his CTs and CBC and CEA per NCCN guidelines from the cancer standpoint.  He will continue to follow with  liver specialists from the portal hypertension standpoint and is currently on Coumadin.  He had EGD and colonoscopy according to the patient yesterday at  and the varices were improved.  There was still some thrombus in the current CT but I suspect it is improving.  Coumadin is a Catch-22 and may actually complicate variceal bleeding but it also may diminish the likelihood of bleeding if it helps decompress the portal system by treating the thrombus and I leave that decision to the capable hands of his gastroenterologist/liver specialist at     - 8/26/2022 [facility] liver clinic note.  [place] cirrhosis.  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  Following with anticoagulation clinic.  They plan to arrange for MRI liver protocol to assess for possible resolution of thrombus as surgeon planning ileostomy reversal once liver clinic clears him for surgery.  AST 49.  ALT 29.  Bilirubin 1.0.  Alkaline phosphatase 52.  White count 2980.  Hemoglobin 11.4.  Platelets 77,000.  Alpha-fetoprotein 2.6.  INR 2.0.    -9/12/2022 CT chest abdomen pelvis shows cirrhotic changes with portal hypertension stable no ascites.  No acute cardiopulmonary process.  No metastatic disease.  Stable presacral soft tissue thickening posttreatment related most likely.  Spleen is enlarged.      -9/16/2022 [facility] medical oncology follow-up visit: I reviewed the above liver clinic note and CT images and reports with patient.  No evidence of recurrence.  Today's white count is 2740 with hemoglobin 11.3 platelets 64,000 generally  with platelets in the 70,000's over the last year.  MCV however has improved now 87.8 on iron and B12 in the hemoglobin has gone from 8.5 up to 11.3 in the last 6 months.  He did have gastritis and intestinal metaplasia without dysplasia with Dr. Lebron February 2022 with atrophic gastric body.  Iron, B12, folate, CEA pending at time of dictation.  We will have my nurse practitioner call him the results of the pending lab and if the CEA is rising he may need to see me sooner but otherwise the plan is to repeat his tumor markers and labs and CTs again in 6 months and will do so every 6 months through January 2024.  He is contemplating reversal of the ostomy and I left a message with Dr. Kelly that Avatrombopag might be helpful but his platelets are close to 60,000 which is the general goal for most preoperative clearances but I will defer to her on that and of course the portal hypertension itself increases the risk of surgery simply from vascular engorgement and an MRI liver is planned by the liver surgeons to see if he can safely come off of the Coumadin that they are managing through the  Coumadin clinic.  Asked him to touch base with Dr. Lebron as to when to get the next EGD relative to the intestinal metaplasia on EGD.    -10/13/2022 MRI of the abdomen at  was stable, no new or suspicious liver lesions.    -3/14/2023 CT chest, abdomen and pelvis with no evidence of recurrent or metastatic disease.  Stable postsurgical changes from bowel resection and right lower quadrant ostomy.  Soft tissue in the presacral space similar to prior study.  Cirrhosis of the liver with splenomegaly and multiple right renal varices.  Cholelithiasis. CEA 1.05.  CBC is unremarkable, platelet count stable at 75,000.  CMP creatinine stable at 1.28, normal AST and ALT of 34/33, total bilirubin 1.3.    -3/20/2023 [facility] follow-up: Mr. Arevalo continues to do well, no evidence of recurrent rectal cancer on clinical exam and  no new worrisome symptoms.  Current CT chest, abdomen and pelvis from 3/14/2023 showed no evidence of recurrence, stable postsurgical changes and cirrhosis.  CEA normal at 1.05.  Platelet count is stable at 75,000.  He continues to follow with GI clinic at  in light of his cirrhosis, he is now off of Coumadin.  He has not had follow-up with Dr. Kelly, he was due to see her in September for repeat flexible sigmoidoscopy but he states when he called the office they did not show that he needed an appointment.  I have asked him to contact her office now to get an appointment.  He also had questions regarding his ileostomy reversal but I defer to Dr. Kelly for that discussion.  He is now off of Coumadin.  He is asking whether or not he should consider having his reversal at  due to his high risk with cirrhosis but again I have asked him to discuss this with Dr. Kelly.  We will continue surveillance per NCCN guidelines with repeat CT chest, abdomen and pelvis and serum testing again in 6 months and I have ordered that today.  We plan on surveillance through January 2024.    -[facility]20[facility]  cirrhosis off Coumadin since November 2022 had ultrasound abdomen this day.  MELD score 11.  There ordering alpha-fetoprotein and MRI abdomen along with iron indices.  Presumably  due to weight, diabetes, hyperlipidemia, hypertension.  Tylenol as needed.  They plan MRI abdomen in 3-4 months to assess liver RADS 3 lesion.  If stable x2 years then they will continue ultrasound abdomen for HCC screening.  They recommended follow-up EGD March 2024 with Dr. Lebron.  Alpha-fetoprotein this day normal 2.7 With normal ferritin 33 and iron normal 80 with normal total iron-binding capacity and normal saturation 19% with normal total iron-binding capacity and transferrin.    -9/13/2023 CT chest abdomen pelvis compared to March 2023 showed postsurgical changes without recurrence in the chest abdomen or pelvis.   Cirrhotic portal hypertensive findings including splenomegaly with large mesenteric veins draining into the right renal vein.  Hemoglobin 12.6 up from 11.6 in June and stable from March 12 0.8 with normochromic normocytic indices.  Platelet count 69,000 where it fluctuates around 70,000 give her take 10,000.  AST 51 bilirubin 1.8 glucose 189 otherwise unremarkable CMP.  CEA normal 1.49.  This is up from 0.87 a year ago but was as high as 1.55 November 2019.    - 9/21/2023 [facility] hematology oncology follow-up: Per patient, patient did flexible sigmoidoscopy in May.  From the CAT scan and blood and tumor marker standpoint, nothing to suggest recurrence.  CEA waxes and wanes but no significant rise.  He has persistent mild anemia and thrombocytopenia with his portal hypertension from [place] cirrhosis being monitored by hepatology at .  They plan MRI as outlined above to follow-up on prior liver mass but nothing on current CTs other than chronic cirrhotic portal hypertensive variceal changes.  From the rectal cancer standpoint we will repeat his CBC CMP CEA and CTs again in 6 months and at that point he will be more than 5 years out from diagnosis and we would stop routine oncologic laboratory and imaging follow-up from the rectal cancer standpoint.  Dr. Kelly is not inclined towards reversal of his ostomy.  I asked him to [facility] in [place] back with Dr. Kelly as to need for surveillance colonoscopy proximal to the ostomy and not just sigmoidoscopy.    Total time of care today inclusive of time spent today prior to his arrival reviewing interval notes from  and interval images and reports thereof as well as laboratories as outlined in during visit interviewing him as to any signs or symptoms of his cancer and cirrhosis and management thereof and after visit instituting the plan as outlined took 30 minutes of patient care time throughout the day today.      Ousmane Moeller MD    09/21/2023

## 2023-09-21 NOTE — LETTER
September 21, 2023       No Recipients    Patient: Hesham Arevalo Jr.   YOB: 1965   Date of Visit: 9/21/2023     Dear Myke Mendoza MD:       Thank you for referring Hesham Arevalo to me for evaluation. Below are the relevant portions of my assessment and plan of care.    If you have questions, please do not hesitate to call me. I look forward to following Hesham along with you.         Sincerely,        Ousmane Moeller MD        CC:   No Recipients    Ousmane Moeller MD  09/21/23 0837  Sign when Signing Visit  CHIEF COMPLAINT: No new somatic complaints    Problem List:  Oncology/Hematology History Overview Note   1.  Stage IIIB rectal carcinoma clinical T3b N1 aM0  2.  Protracted postoperative course due to abdominal pelvic abscess following neoadjuvant Xeloda radiation  3.  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  B12 deficiency anemia  5.  Iron deficiency anemia  6.  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  7.  Intestinal metaplasia on EGD    -11/30/18 CT abdomen pelvis and chest x-ray negative for metastasis.  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    -12/6/18 MRI of rectum showed T3b with suspicious right internal iliac lymph node and CT chest noncontrast negative except for gallstones  -Per Dr. Kelly, CEA was normal.  -12/18/18 saw me for the first time.  I reviewed her case in our multidisciplinary conference and went over that in detail with her.  She had presented with hematochezia.  Dr. Kelly repeated endoscopy for more exact detailing of the primary location and this appears to be rectal on MRI and endoscopy.  Plan is for randomization on clinical trial N 1048 to  neoadjuvant FOLFOX versus standard chemoradiation.  We'll get him to radiation and our research staff for randomization and get his baseline CBC and CMP.    -1/4/19 followed up: Randomized to the Xeloda radiation arm.  He will get that and then 4-6 weeks later had his surgery, and then follow that with 6 months of adjuvant FOLFOX per protocol.  My research assistant met with him today.  He has a Xeloda prescription.  -3/4/2019 MRI: Good response with T2, less likely T3 with spiculation N0 disease.  -4/23/2019 pathology: Laparoscopic assisted converted to lower anterior resection open with stapled coloanal anastomosis and diverting loop ileostomy with liver biopsy showed residual adenoma with focal high-grade dysplasia but no residual invasive carcinoma.  0 out of 17 lymph nodes.  Mild steatosis on liver biopsy with bridging fibrosis.  -5/9/2019 to 5/15/2019 admitted with abdominal pelvic abscess status post CT-guided percutaneous drain placement presenting with sepsis improved with antibiotics and drainage.  Temp of 104 on presentation.  2 weeks of protracted IV antibiotics with Radames Sosa planned at discharge.  -7/11/2019 follow-up visit:Start of adjuvant therapy delayed due to the above postoperative pelvic abscess.  This was treated surgically initially but then by CT-guided percutaneous drainage as above.  Serial follow-up with Radames Sosa including CTs and antibiotics eventually cleared him adequately to consider resumption of plans for adjuvant therapy.  CTs of the pelvis done on 6/5/2019, 6/13/2019, 6/26/2019 monitoring for this abscess and possible drainage but not able to drain due to decreasing size and minimal residual edema with CT 7/10/2019 showingStable to slight decrease in size of presacral perirectal abscess with presacral edema measuring 3.9 x 1.9 previously 4.1 x 2.5 cm. No new sites of focal fluid collection or loculated fluid.  Hence, modified FOLFOX 6 started at this  junction.  -9/30/2019 office visit: Oxaliplatin discontinued due to ongoing thrombocytopenia.  Platelet count today 59,000.  We will continue course #5 and 6 with 5-FU and leucovorin alone to complete adjuvant therapy.  -10/23/2019 completed adjuvant chemotherapy.  -10/28/2019 CT chest abdomen pelvis with contrast showed decreased and fluid in the presacral fat.  Decrease in surrounding soft tissue stranding.  Gallstone seen with enlargement of spleen.  Stranding of the mesenteric roots with varices in the mesentery slightly increased compared to July 2019.  Chest is unremarkable.  CEA 1.53 with bilirubin 1.4, AST 46, alkaline phosphatase 135, white count 2500, hemoglobin 9.8, and platelets 62,000 on 10/21/2019.  -11/2019 colonoscopy with Dr. Lebron negative according to patient.  -2/23/2020: Saw Dr. Lebron regarding Hatfield cirrhosis-induced portal hypertension.  Did not recommend TIPS.  Started nadolol  -2/25/20 20 CT chest abdomen pelvis negative for metastasis but with portal hypertension and varices.  CEA 1.1.  ALT 51.  White count 3970 with hemoglobin 10.9.  -6/2/2020 CT abdomen and pelvis with stable postsurgical changes, no evidence of disease progression, CEA 0.86.  -8/28/2020 ileocolonoscopy  -9/8/2020 CT abdomen and pelvis without evidence of disease recurrence, CEA 0.87.  -3/9/2021 CT abdomen and pelvis stable changes, no evidence of progression of disease or recurrence.  CEA 1.21  -8/30/2021 CT chest, abdomen and pelvis with nonspecific small area of groundglass nodularity near the periphery of the right lower lobe favored infectious/inflammatory, otherwise CT scan stable without specific evidence of disease progression.  Of note, patient did have Covid infection 2 months ago.  Currently no respiratory concerns.  Has seen Dr. Gerardo at  for evaluation regarding his desire for ileostomy reversal, he reports that he had liver ultrasound that showed questionable liver lesion, he is scheduled for additional  CT on 9/24 and then follow-up with Dr. Gerardo.  I discussed with the patient that current CT scan shows no abnormalities in the liver, he does have known cirrhosis with [place].  We will continue surveillance as per NCCN guidelines with repeat CT scans and serum testing in 6 months.  CEA currently normal at 1.13, platelet count stable at 73,000.    -2/24/2022 follow-up Dr. Racheal Gerardo Hepatology.  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    -2/28/2022 data from : Ferritin low at 12 with iron low at 38 and decreased transferrin saturation 8% with increased total iron-binding capacity 498 and increased transferrin 398 consistent with iron deficiency.  B12 level immeasurably low less than 150.  Folic acid normal 15.5.  Alpha-fetoprotein 2.5.  INR 2.3.  Hemoglobin 8.9 MCV 77 with white count 3250 and platelets 77k.    -3/10/2022 data:  White count stable 3230.  Hemoglobin down to 8.5 with MCV down to 76.3 with platelets stable 72,000.  CEA 1.22.  AST 48 with bilirubin 1.4Glucose 156.  Otherwise unremarkable CMP.   CT chest abdomen pelvisShows resolution of right lower lobe groundglass opacities.  Fatty liver infiltration.  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  Cholelithiasis    -3/18/2022 [facility] medical oncology follow-up visit: I reviewed multiple data sets from  and current CTs and labs and CEA from [facility].  Both images of the scans as well as reports reviewed and went over the above with the patient.  He started B12 already and has iron deficiency anemia as well documented at  and I have started him on ferrous sulfate 325 twice a day every other day with vitamin C and we will in 6 months repeat his CTs and CBC and CEA per NCCN guidelines from the  cancer standpoint.  He will continue to follow with  liver specialists from the portal hypertension standpoint and is currently on Coumadin.  He had EGD and colonoscopy according to the patient yesterday at  and the varices were improved.  There was still some thrombus in the current CT but I suspect it is improving.  Coumadin is a Catch-22 and may actually complicate variceal bleeding but it also may diminish the likelihood of bleeding if it helps decompress the portal system by treating the thrombus and I leave that decision to the capable hands of his gastroenterologist/liver specialist at     - 8/26/2022 [facility] liver clinic note.  [place]field cirrhosis.  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  Following with anticoagulation clinic.  They plan to arrange for MRI liver protocol to assess for possible resolution of thrombus as surgeon planning ileostomy reversal once liver clinic clears him for surgery.  AST 49.  ALT 29.  Bilirubin 1.0.  Alkaline phosphatase 52.  White count 2980.  Hemoglobin 11.4.  Platelets 77,000.  Alpha-fetoprotein 2.6.  INR 2.0.    -9/12/2022 CT chest abdomen pelvis shows cirrhotic changes with portal hypertension stable no ascites.  No acute cardiopulmonary process.  No metastatic disease.  Stable presacral soft tissue thickening posttreatment related most likely.  Spleen is enlarged.      -9/16/2022 [facility] medical oncology follow-up visit: I reviewed the above liver clinic note and CT images and reports with patient.  No evidence of recurrence.  Today's white count is 2740 with hemoglobin 11.3 platelets 64,000 generally with platelets in the 70,000's over the last year.  MCV however has improved now 87.8 on iron and B12 in the hemoglobin has gone from 8.5 up to 11.3 in the last 6 months.  He did have gastritis and intestinal metaplasia without dysplasia with Dr. Lebron February 2022 with atrophic gastric  body.  Iron, B12, folate, CEA pending at time of dictation.  We will have my nurse practitioner call him the results of the pending lab and if the CEA is rising he may need to see me sooner but otherwise the plan is to repeat his tumor markers and labs and CTs again in 6 months and will do so every 6 months through January 2024.  He is contemplating reversal of the ostomy and I left a message with Dr. Kelly that Avatrombopag might be helpful but his platelets are close to 60,000 which is the general goal for most preoperative clearances but I will defer to her on that and of course the portal hypertension itself increases the risk of surgery simply from vascular engorgement and an MRI liver is planned by the liver surgeons to see if he can safely come off of the Coumadin that they are managing through the  Coumadin clinic.  Asked him to touch base with Dr. Lebron as to when to get the next EGD relative to the intestinal metaplasia on EGD.    -10/13/2022 MRI of the abdomen at  was stable, no new or suspicious liver lesions.    -3/14/2023 CT chest, abdomen and pelvis with no evidence of recurrent or metastatic disease.  Stable postsurgical changes from bowel resection and right lower quadrant ostomy.  Soft tissue in the presacral space similar to prior study.  Cirrhosis of the liver with splenomegaly and multiple right renal varices.  Cholelithiasis. CEA 1.05.  CBC is unremarkable, platelet count stable at 75,000.  CMP creatinine stable at 1.28, normal AST and ALT of 34/33, total bilirubin 1.3.    -3/20/2023 [facility] follow-up: Mr. Arevalo continues to do well, no evidence of recurrent rectal cancer on clinical exam and no new worrisome symptoms.  Current CT chest, abdomen and pelvis from 3/14/2023 showed no evidence of recurrence, stable postsurgical changes and cirrhosis.  CEA normal at 1.05.  Platelet count is stable at 75,000.  He continues to follow with GI clinic at  in light of his cirrhosis, he  is now off of Coumadin.  He has not had follow-up with Dr. Kelly, he was due to see her in September for repeat flexible sigmoidoscopy but he states when he called the office they did not show that he needed an appointment.  I have asked him to contact her office now to get an appointment.  He also had questions regarding his ileostomy reversal but I defer to Dr. Kelly for that discussion.  He is now off of Coumadin.  He is asking whether or not he should consider having his reversal at  due to his high risk with cirrhosis but again I have asked him to discuss this with Dr. Kelly.  We will continue surveillance per NCCN guidelines with repeat CT chest, abdomen and pelvis and serum testing again in 6 months and I have ordered that today.  We plan on surveillance through January 2024.    -6/9/2023  hepatology  Cony MOOREN follow-up HATFIELD cirrhosis off Coumadin since November 2022 had ultrasound abdomen this day.  MELD score 11.  There ordering alpha-fetoprotein and MRI abdomen along with iron indices.  Presumably [place] due to weight, diabetes, hyperlipidemia, hypertension.  Tylenol as needed.  They plan MRI abdomen in 3-4 months to assess liver RADS 3 lesion.  If stable x2 years then they will continue ultrasound abdomen for HCC screening.  They recommended follow-up EGD March 2024 with Dr. Lebron.  Alpha-fetoprotein this day normal 2.7 With normal ferritin 33 and iron normal 80 with normal total iron-binding capacity and normal saturation 19% with normal total iron-binding capacity and transferrin.    -9/13/2023 CT chest abdomen pelvis compared to March 2023 showed postsurgical changes without recurrence in the chest abdomen or pelvis.  Cirrhotic portal hypertensive findings including splenomegaly with large mesenteric veins draining into the right renal vein.  Hemoglobin 12.6 up from 11.6 in June and stable from March 12 0.8 with normochromic normocytic indices.  Platelet count 69,000 where it fluctuates around 70,000  give her take 10,000.  AST 51 bilirubin 1.8 glucose 189 otherwise unremarkable CMP.  CEA normal 1.49.  This is up from 0.87 a year ago but was as high as 1.55 November 2019.    - 9/21/2023 [facility] hematology oncology follow-up: Review of flexible sigmoidoscopy note from Dr. Kelly 3/17/2022 from last year states follow-up flexible sigmoidoscopy in 6 months unless otherwise indicated.  Ileostomy reversal not recommended while having ongoing work-up for blood clots.  Okay to resume Coumadin from her standpoint at that point.  EGD with Dr. Lebron at that point showed chronic gastritis but no varices and just diffuse nonerosive gastropathy.  I left message with Dr. Kelly today and asked patient to double back on the repeat sigmoidoscopy that was delineated to be done 6 months from the March 2022 endoscopy report from Dr. Kelly.  From the CAT scan and blood and tumor marker standpoint, nothing to suggest recurrence.  CEA waxes and wanes but no significant rise.  He has persistent mild anemia and thrombocytopenia with his portal hypertension from [place] cirrhosis being monitored by hepatology at .  They plan MRI as outlined above to follow-up on prior liver mass but nothing on current CTs other than chronic cirrhotic portal hypertensive variceal changes.  From the rectal cancer standpoint we will repeat his CBC CMP CEA and CTs again in 6 months and at that point he will be more than 5 years out from diagnosis and we would stop routine oncologic laboratory and imaging follow-up from the rectal cancer standpoint.     Rectal carcinoma   12/18/2018 Initial Diagnosis    Rectal carcinoma (CMS/HCC)     1/7/2019 - 2/4/2019 Chemotherapy    Xeloda radiation on protocol     1/8/2019 - 2/14/2019 Radiation    Radiation OncologyTreatment Course:  Hesham Arevalo received 5040 cGy in 28 fractions to pelvis via External Beam Radiation - EBRT.     3/4/2019 Imaging    MRI of the pelvis:  IMPRESSIONS:  MRI rectal cancer T category is: T2, LESS LIKELY  EARLY T3 SPICULATIONS  Maximum EMD of invasion is: 0  Minimum tumor to MRF distance is: 12 MM  Low rectal tumor component: NO  Mesorectal nodes/tumor deposits: NO  EMVI: NO  Extramesorectal nodes: NO     4/23/2019 Surgery    Surgery rectosigmoidectomy pathology report:  Final Diagnosis    1. RECTOSIGMOID COLON, RECTOSIGMOID RESECTION:  Residual adenoma with focal high grade dysplasia with no residual invasive carcinoma identified post-treatment (see comment).  Seventeen lymph nodes negative for carcinoma (0/17)   2. LIVER, CORE NEEDLE BIOPSY:  Mild steatosis with mild chronic inflammation and increased fibrosis including bridging fibrosis (Stage 3-4) (see comment)        COLON AND RECTUM TEMPLATE:  TYPE OF SPECIMEN/PROCEDURE: Rectosigmoid resection.   SPECIMEN INTEGRITY:  Intact.  TUMOR SITE:  Rectum.  TUMOR SIZE (greatest dimension):  Indeterminate  TUMOR LOCATION (applicable only to rectal primaries): Entirely below anterior peritoneal reflection.  MACROSCOPIC INTACTNESS OF MESORECTUM (If applicable): Intact.  MACROSCOPIC TUMOR PERFORATION: Not identified.  TUMOR CONFIGURATION:  Ulcerated.  HISTOLOGIC TYPE:  Adenocarcinoma.  HISTOLOGIC GRADE:  G3-4 (per previous biopsy).  MICROSCOPIC DEPTH OF TUMOR INVASION/EXTENSION:  No residual tumor identified.  PERITONEAL INVOLVEMENT:  Not identified.  LYMPHVASCULAR INVASION:  Not identified.  PERINEURAL INVASION: Not identified.  SURGICAL MARGINS: Uninvolved.  STATUS AND DISTANCE FROM PROXIMAL MUCOSAL MARGIN:  Uninvolved, 13.0 cm.  STATUS AND DISTANCE FROM DISTAL MUCOSAL MARGIN: Uninvolved, 1.2 cm.  STATUS AND DISTANCE FROM MESENTERIC MARGIN: Not applicable.  STATUS AND DISTANCE FROM RADIAL MARGIN: Uninvolved, 1.7 cm.  DISTANCE FROM DENTATE LINE (RECTAL TUMOR ONLY):  Indeterminate.  TUMOR DEPOSITS (DISCONTINUOUS EXTRAMURAL EXTENSION):  Not identified.  NUMBER OF LYMPH NODES INVOLVED BY METASTATIC NEOPLASM: 0.  NUMBER OF REGIONAL LYMPH NODES EXAMINED: 17.  EXTRANODAL  EXTENSION:  Not applicable.  TREATMENT EFFECT:  Present, no viable cancer cells identified (complete response, score 0).  ADDITIONAL PATHOLOGIC FINDINGS:  None.  MSI TESTING:  No evidence of MSI based on reported IHC on outside biopsy  OTHER ANCILLARY STUDIES (BRAF, KRAS, ETC.):  Should be performed on initial diagnostic biopsy if needed.  AJCC PATHOLOGIC STAGE:  (COMPLETED BY PATHOLOGIST, BASED ONLY ON TISSUE FINDINGS, MORE EXTENSIVE DISEASE MAY NOT BE KNOWN TO THE PATHOLOGIST)  ypT=  0  ypN=  0  AJCC PATHOLOGIC STAGE:  y0.                5/15/2019 Adverse Reaction    -5/9/2019 to 5/15/2019 admitted with abdominal pelvic abscess status post CT-guided percutaneous drain placement presenting with sepsis improved with antibiotics and drainage.  Temp of 104 on presentation.  2 weeks of protracted IV antibiotics with Radames Sosa planned at discharge     7/22/2019 - 10/23/2019 Chemotherapy    OP COLON [id number] OXALIplatin / Leucovorin / Fluorouracil  Start of adjuvant therapy delayed due to the above postoperative pelvic abscess.  This was treated surgically initially but then by CT-guided percutaneous drainage as above.  Serial follow-up with Radames Sosa including CTs and antibiotics eventually cleared him adequately to consider resumption of plans for adjuvant therapy.  CTs of the pelvis done on 6/5/2019, 6/13/2019, 6/26/2019 monitoring for this abscess and possible drainage but not able to drain due to decreasing size and minimal residual edema with CT 7/10/2019 showingStable to slight decrease in size of presacral perirectal abscess with presacral edema measuring 3.9 x 1.9 previously 4.1 x 2.5 cm. No new sites of focal fluid collection or loculated fluid.     9/30/2019 Adverse Reaction    Oxaliplatin discontinued due to ongoing thrombocytopenia.     10/28/2019 Imaging    10/28/2019 CT chest abdomen pelvis with contrast showed decreased and fluid in the presacral fat.  Decrease in surrounding soft tissue  stranding.  Gallstone seen with enlargement of spleen.  Stranding of the mesenteric roots with varices in the mesentery slightly increased compared to July 2019.  Chest is unremarkable.  CEA 1.53 with bilirubin 1.4, AST 46, alkaline phosphatase 135, white count 2500, hemoglobin 9.8, and platelets 62,000 on 10/21/2019.     8/28/2020 Procedure    Ileocolonoscopy     3/10/2022 Imaging    -3/10/2022 data:  White count stable 3230.  Hemoglobin down to 8.5 with MCV down to 76.3 with platelets stable 72,000.  CEA 1.22.  AST 48 with bilirubin 1.4Glucose 156.  Otherwise unremarkable CMP.   CT chest abdomen pelvisShows resolution of right lower lobe groundglass opacities.  Fatty liver infiltration.  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  Cholelithiasis     9/16/2022 Imaging    CT chest abdomen pelvis shows cirrhotic changes with portal hypertension stable no ascites.  No acute cardiopulmonary process.  No metastatic disease.  Stable presacral soft tissue thickening posttreatment related most likely.  Spleen is enlarged.         HISTORY OF PRESENT ILLNESS:  The patient is a 57 y.o. male, here for follow up on management of rectal cancer.  No new somatic complaints    Past Medical History:   Diagnosis Date   • Arthritis     knees    • Cancer 11/2018    colon   • Disease of thyroid gland    • Fatty liver    • Fatty liver    • Hashimoto's disease    • History of radiation therapy 02/14/2019    rectal cancer   • Hypertension    • Ileostomy in place    • Psoriasis    • Wears glasses      Past Surgical History:   Procedure Laterality Date   • COLON RESECTION N/A 4/23/2019    Procedure: LAPAROSCOPIC LOWER ANTERIOR RESECTION WITH DIVERTING LOOP ILEOOSTOMY, SPLENIC FLEIXURE MOBILIZATION AND LIVER BIOPSY, AND PROCTOSCOPY;  Surgeon: Pau Kelly MD;  Location: [facility];  Service: General   • COLONOSCOPY      2018   •  "ILEOSTOMY  04/2019   • LIVER BIOPSY  2003   • VASECTOMY  1998   • VENOUS ACCESS DEVICE (PORT) INSERTION N/A 7/18/2019    Procedure: PORT PLACEMENT;  Surgeon: Franky Cazares MD;  Location: [facility] OR;  Service: General       No Known Allergies    Family History and Social History reviewed and changed as necessary    REVIEW OF SYSTEM:   No new somatic complaints    PHYSICAL EXAM:  No jaundice or icterus or pallor.    Vitals:    09/21/23 0756   BP: 138/65   Pulse: 62   Resp: 18   Temp: 97.7 °F (36.5 °C)   SpO2: 98%   Weight: 97.1 kg (214 lb)   Height: 185.4 cm (73\")     Vitals:    09/21/23 0756   PainSc: 0-No pain          ECOG score: 0           Vitals reviewed.    ECOG: (0) Fully Active - Able to Carry On All Pre-disease Performance Without Restriction    Lab Results   Component Value Date    HGB 12.6 (L) 09/13/2023    HCT 37.7 09/13/2023    MCV 90.0 09/13/2023    PLT 69 (L) 09/13/2023    WBC 3.44 09/13/2023    NEUTROABS 2.26 09/13/2023    LYMPHSABS 0.54 (L) 09/13/2023    MONOSABS 0.35 09/13/2023    EOSABS 0.24 09/13/2023    BASOSABS 0.04 09/13/2023       Lab Results   Component Value Date    GLUCOSE 189 (H) 09/13/2023    BUN 16 09/13/2023    CREATININE 1.22 09/13/2023     09/13/2023    K 4.6 09/13/2023     09/13/2023    CO2 26.0 09/13/2023    CALCIUM 9.3 09/13/2023    PROTEINTOT 7.5 09/13/2023    ALBUMIN 3.9 09/13/2023    BILITOT 1.8 (H) 09/13/2023    ALKPHOS 55 09/13/2023    AST 51 (H) 09/13/2023    ALT 31 09/13/2023             ASSESSMENT & PLAN:  1.  Stage IIIB rectal carcinoma clinical T3b N1 aM0  2.  Protracted postoperative course due to abdominal pelvic abscess following neoadjuvant Xeloda radiation  3.  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  B12 deficiency anemia  5.  Iron " deficiency anemia  6.  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  7.  Intestinal metaplasia on EGD    -11/30/18 CT abdomen pelvis and chest x-ray negative for metastasis.  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    -12/6/18 MRI of rectum showed T3b with suspicious right internal iliac lymph node and CT chest noncontrast negative except for gallstones  -Per Dr. Kelly, CEA was normal.  -12/18/18 saw me for the first time.  I reviewed her case in our multidisciplinary conference and went over that in detail with her.  She had presented with hematochezia.  Dr. Kelly repeated endoscopy for more exact detailing of the primary location and this appears to be rectal on MRI and endoscopy.  Plan is for randomization on clinical trial N 1048 to neoadjuvant FOLFOX versus standard chemoradiation.  We'll get him to radiation and our research staff for randomization and get his baseline CBC and CMP.    -1/4/19 followed up: Randomized to the Xeloda radiation arm.  He will get that and then 4-6 weeks later had his surgery, and then follow that with 6 months of adjuvant FOLFOX per protocol.  My research assistant met with him today.  He has a Xeloda prescription.  -3/4/2019 MRI: Good response with T2, less likely T3 with spiculation N0 disease.  -4/23/2019 pathology: Laparoscopic assisted converted to lower anterior resection open with stapled coloanal anastomosis and diverting loop ileostomy with liver biopsy showed residual adenoma with focal high-grade dysplasia but no residual invasive carcinoma.  0 out of 17 lymph nodes.  Mild steatosis on liver biopsy with bridging fibrosis.  -5/9/2019 to 5/15/2019 admitted with abdominal pelvic abscess status post CT-guided percutaneous drain placement presenting with sepsis improved with antibiotics and drainage.  Temp of 104 on presentation.  2 weeks of protracted IV  antibiotics with Radames Sosa planned at discharge.  -7/11/2019 follow-up visit:Start of adjuvant therapy delayed due to the above postoperative pelvic abscess.  This was treated surgically initially but then by CT-guided percutaneous drainage as above.  Serial follow-up with Radames Sosa including CTs and antibiotics eventually cleared him adequately to consider resumption of plans for adjuvant therapy.  CTs of the pelvis done on 6/5/2019, 6/13/2019, 6/26/2019 monitoring for this abscess and possible drainage but not able to drain due to decreasing size and minimal residual edema with CT 7/10/2019 showingStable to slight decrease in size of presacral perirectal abscess with presacral edema measuring 3.9 x 1.9 previously 4.1 x 2.5 cm. No new sites of focal fluid collection or loculated fluid.  Hence, modified FOLFOX 6 started at this junction.  -9/30/2019 office visit: Oxaliplatin discontinued due to ongoing thrombocytopenia.  Platelet count today 59,000.  We will continue course #5 and 6 with 5-FU and leucovorin alone to complete adjuvant therapy.  -10/23/2019 completed adjuvant chemotherapy.  -10/28/2019 CT chest abdomen pelvis with contrast showed decreased and fluid in the presacral fat.  Decrease in surrounding soft tissue stranding.  Gallstone seen with enlargement of spleen.  Stranding of the mesenteric roots with varices in the mesentery slightly increased compared to July 2019.  Chest is unremarkable.  CEA 1.53 with bilirubin 1.4, AST 46, alkaline phosphatase 135, white count 2500, hemoglobin 9.8, and platelets 62,000 on 10/21/2019.  -11/2019 colonoscopy with Dr. Lebron negative according to patient.  -2/23/2020: Saw Dr. Lebron regarding Hatfield cirrhosis-induced portal hypertension.  Did not recommend TIPS.  Started nadolol  -2/25/20 20 CT chest abdomen pelvis negative for metastasis but with portal hypertension and varices.  CEA 1.1.  ALT 51.  White count 3970 with hemoglobin 10.9.  -6/2/2020 CT abdomen  and pelvis with stable postsurgical changes, no evidence of disease progression, CEA 0.86.  -8/28/2020 ileocolonoscopy  -9/8/2020 CT abdomen and pelvis without evidence of disease recurrence, CEA 0.87.  -3/9/2021 CT abdomen and pelvis stable changes, no evidence of progression of disease or recurrence.  CEA 1.21  -8/30/2021 CT chest, abdomen and pelvis with nonspecific small area of groundglass nodularity near the periphery of the right lower lobe favored infectious/inflammatory, otherwise CT scan stable without specific evidence of disease progression.  Of note, patient did have Covid infection 2 months ago.  Currently no respiratory concerns.  Has seen Dr. Gerardo at  for evaluation regarding his desire for ileostomy reversal, he reports that he had liver ultrasound that showed questionable liver lesion, he is scheduled for additional CT on 9/24 and then follow-up with Dr. Gerardo.  I discussed with the patient that current CT scan shows no abnormalities in the liver, he does have known cirrhosis with Hatfield.  We will continue surveillance as per NCCN guidelines with repeat CT scans and serum testing in 6 months.  CEA currently normal at 1.13, platelet count stable at 73,000.    -2/24/2022 follow-up Dr. Racheal Gerardo Hepatology.  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    -2/28/2022 data from : Ferritin low at 12 with iron low at 38 and decreased transferrin saturation 8% with increased total iron-binding capacity 498 and increased transferrin 398 consistent with iron deficiency.  B12 level immeasurably low less than 150.  Folic acid normal 15.5.  Alpha-fetoprotein 2.5.  INR 2.3.  Hemoglobin 8.9 MCV 77 with white count 3250 and platelets 77k.    -3/10/2022 data:  White count stable 3230.  Hemoglobin down to 8.5 with MCV down to 76.3 with platelets stable  72,000.  CEA 1.22.  AST 48 with bilirubin 1.4Glucose 156.  Otherwise unremarkable CMP.   CT chest abdomen pelvisShows resolution of right lower lobe groundglass opacities.  Fatty liver infiltration.  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  Cholelithiasis    -3/18/2022 [facility] medical oncology follow-up visit: I reviewed multiple data sets from  and current CTs and labs and CEA from [facility].  Both images of the scans as well as reports reviewed and went over the above with the patient.  He started B12 already and has iron deficiency anemia as well documented at  and I have started him on ferrous sulfate 325 twice a day every other day with vitamin C and we will in 6 months repeat his CTs and CBC and CEA per NCCN guidelines from the cancer standpoint.  He will continue to follow with  liver specialists from the portal hypertension standpoint and is currently on Coumadin.  He had EGD and colonoscopy according to the patient yesterday at  and the varices were improved.  There was still some thrombus in the current CT but I suspect it is improving.  Coumadin is a Catch-22 and may actually complicate variceal bleeding but it also may diminish the likelihood of bleeding if it helps decompress the portal system by treating the thrombus and I leave that decision to the capable hands of his gastroenterologist/liver specialist at     - 8/26/2022 [facility] liver clinic note.  H[place]field cirrhosis.  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  Following with anticoagulation clinic.  They plan to arrange for MRI liver protocol to assess for possible resolution of thrombus as surgeon planning ileostomy reversal once liver clinic clears him for surgery.  AST 49.  ALT 29.  Bilirubin 1.0.  Alkaline phosphatase 52.  White count 2980.   Hemoglobin 11.4.  Platelets 77,000.  Alpha-fetoprotein 2.6.  INR 2.0.    -9/12/2022 CT chest abdomen pelvis shows cirrhotic changes with portal hypertension stable no ascites.  No acute cardiopulmonary process.  No metastatic disease.  Stable presacral soft tissue thickening posttreatment related most likely.  Spleen is enlarged.      -9/16/2022 [facility] [place], operated by [facility] medical oncology follow-up visit: I reviewed the above liver clinic note and CT images and reports with patient.  No evidence of recurrence.  Today's white count is 2740 with hemoglobin 11.3 platelets 64,000 generally with platelets in the 70,000's over the last year.  MCV however has improved now 87.8 on iron and B12 in the hemoglobin has gone from 8.5 up to 11.3 in the last 6 months.  He did have gastritis and intestinal metaplasia without dysplasia with Dr. Lebron February 2022 with atrophic gastric body.  Iron, B12, folate, CEA pending at time of dictation.  We will have my nurse practitioner call him the results of the pending lab and if the CEA is rising he may need to see me sooner but otherwise the plan is to repeat his tumor markers and labs and CTs again in 6 months and will do so every 6 months through January 2024.  He is contemplating reversal of the ostomy and I left a message with Dr. Kelly that Avatrombopag might be helpful but his platelets are close to 60,000 which is the general goal for most preoperative clearances but I will defer to her on that and of course the portal hypertension itself increases the risk of surgery simply from vascular engorgement and an MRI liver is planned by the liver surgeons to see if he can safely come off of the Coumadin that they are managing through the  Coumadin clinic.  Asked him to touch base with Dr. Lebron as to when to get the next EGD relative to the intestinal metaplasia on EGD.    -10/13/2022 MRI of the abdomen at  was stable, no new or suspicious liver lesions.    -3/14/2023 CT chest, abdomen and pelvis  with no evidence of recurrent or metastatic disease.  Stable postsurgical changes from bowel resection and right lower quadrant ostomy.  Soft tissue in the presacral space similar to prior study.  Cirrhosis of the liver with splenomegaly and multiple right renal varices.  Cholelithiasis. CEA 1.05.  CBC is unremarkable, platelet count stable at 75,000.  CMP creatinine stable at 1.28, normal AST and ALT of 34/33, total bilirubin 1.3.    -3/20/2023 [facility] Oncology clinic follow-up: Mr. Arevalo continues to do well, no evidence of recurrent rectal cancer on clinical exam and no new worrisome symptoms.  Current CT chest, abdomen and pelvis from 3/14/2023 showed no evidence of recurrence, stable postsurgical changes and cirrhosis.  CEA normal at 1.05.  Platelet count is stable at 75,000.  He continues to follow with GI clinic at  in light of his cirrhosis, he is now off of Coumadin.  He has not had follow-up with Dr. Kelly, he was due to see her in September for repeat flexible sigmoidoscopy but he states when he called the office they did not show that he needed an appointment.  I have asked him to contact her office now to get an appointment.  He also had questions regarding his ileostomy reversal but I defer to Dr. Kelly for that discussion.  He is now off of Coumadin.  He is asking whether or not he should consider having his reversal at  due to his high risk with cirrhosis but again I have asked him to discuss this with Dr. Kelly.  We will continue surveillance per NCCN guidelines with repeat CT chest, abdomen and pelvis and serum testing again in 6 months and I have ordered that today.  We plan on surveillance through January 2024.    -6/9/2023  hepatology  Cony APRN follow-up HATFIELD cirrhosis off Coumadin since November 2022 had ultrasound abdomen this day.  MELD score 11.  There ordering alpha-fetoprotein and MRI abdomen along with iron indices.  Presumably [place] due to weight, diabetes, hyperlipidemia,  hypertension.  Tylenol as needed.  They plan MRI abdomen in 3-4 months to assess liver RADS 3 lesion.  If stable x2 years then they will continue ultrasound abdomen for HCC screening.  They recommended follow-up EGD March 2024 with Dr. Lebron.  Alpha-fetoprotein this day normal 2.7 With normal ferritin 33 and iron normal 80 with normal total iron-binding capacity and normal saturation 19% with normal total iron-binding capacity and transferrin.    -9/13/2023 CT chest abdomen pelvis compared to March 2023 showed postsurgical changes without recurrence in the chest abdomen or pelvis.  Cirrhotic portal hypertensive findings including splenomegaly with large mesenteric veins draining into the right renal vein.  Hemoglobin 12.6 up from 11.6 in June and stable from March 12 0.8 with normochromic normocytic indices.  Platelet count 69,000 where it fluctuates around 70,000 give her take 10,000.  AST 51 bilirubin 1.8 glucose 189 otherwise unremarkable CMP.  CEA normal 1.49.  This is up from 0.87 a year ago but was as high as 1.55 November 2019.    - 9/21/2023 [facility] hematology oncology follow-up: Per patient, patient did flexible sigmoidoscopy in May.  From the CAT scan and blood and tumor marker standpoint, nothing to suggest recurrence.  CEA waxes and wanes but no significant rise.  He has persistent mild anemia and thrombocytopenia with his portal hypertension from [place] cirrhosis being monitored by hepatology at .  They plan MRI as outlined above to follow-up on prior liver mass but nothing on current CTs other than chronic cirrhotic portal hypertensive variceal changes.  From the rectal cancer standpoint we will repeat his CBC CMP CEA and CTs again in 6 months and at that point he will be more than 5 years out from diagnosis and we would stop routine oncologic laboratory and imaging follow-up from the rectal cancer standpoint.  Dr. Kelly is not inclined towards reversal of his ostomy.  I asked him to [place] back with   [place] as to need for surveillance colonoscopy proximal to the ostomy and not just sigmoidoscopy.    Total time of care today inclusive of time spent today prior to his arrival reviewing interval notes from [facility] and interval images and reports thereof as well as laboratories as outlined in during visit interviewing him as to any signs or symptoms of his cancer and cirrhosis and management thereof and after visit instituting the plan as outlined took 30 minutes of patient care time throughout the day today.      Ousmane Moeller MD    09/21/2023

## 2024-03-12 ENCOUNTER — TELEPHONE (OUTPATIENT)
Dept: ONCOLOGY | Facility: CLINIC | Age: 59
End: 2024-03-12
Payer: COMMERCIAL

## 2024-03-12 NOTE — TELEPHONE ENCOUNTER
Caller: MEENA ROSARIO [facility]    Relationship: Network    Best call back number: [phone]    What test/procedure requested: CT WAS ORDERED BUT DENIED    When is it needed: MEENA STATES CT WAS DENIED & SHE HAS PUT PEER TO PEER INFO. IN THE REF. UNDER COMMUNICATIONS & ALSO STATED THAT THE DENIAL IS UNDER MEDIA.    Where is the test/procedure going to be performed: [facility]

## 2024-03-12 NOTE — TELEPHONE ENCOUNTER
Discussed with Ana, AMIRAH insurance will not cover sans again until September so we will cancel scans and check patients labs at his follow up. Called and notified Silvia, she will cancel scans. Called patient and he verbalized understanding.

## 2024-03-28 ENCOUNTER — LAB (OUTPATIENT)
Dept: LAB | Facility: HOSPITAL | Age: 59
End: 2024-03-28
Payer: COMMERCIAL

## 2024-03-28 ENCOUNTER — TELEPHONE (OUTPATIENT)
Dept: ONCOLOGY | Facility: CLINIC | Age: 59
End: 2024-03-28

## 2024-03-28 ENCOUNTER — OFFICE VISIT (OUTPATIENT)
Dept: ONCOLOGY | Facility: CLINIC | Age: 59
End: 2024-03-28
Payer: COMMERCIAL

## 2024-03-28 VITALS
DIASTOLIC BLOOD PRESSURE: 66 MMHG | HEIGHT: 73 IN | RESPIRATION RATE: 18 BRPM | BODY MASS INDEX: 26.37 KG/M2 | TEMPERATURE: 98.2 F | HEART RATE: 62 BPM | OXYGEN SATURATION: 98 % | SYSTOLIC BLOOD PRESSURE: 132 MMHG | WEIGHT: 199 LBS

## 2024-03-28 DIAGNOSIS — C20 RECTAL CARCINOMA: Primary | ICD-10-CM

## 2024-03-28 DIAGNOSIS — C20 RECTAL CARCINOMA: ICD-10-CM

## 2024-03-28 LAB
ALBUMIN SERPL-MCNC: 3.9 G/DL (ref 3.5–5.2)
ALBUMIN/GLOB SERPL: 1.2 G/DL
ALP SERPL-CCNC: 78 U/L (ref 39–117)
ALT SERPL W P-5'-P-CCNC: <5 U/L (ref 1–41)
ANION GAP SERPL CALCULATED.3IONS-SCNC: 9 MMOL/L (ref 5–15)
AST SERPL-CCNC: 32 U/L (ref 1–40)
BASOPHILS # BLD AUTO: 0.03 10*3/MM3 (ref 0–0.2)
BASOPHILS NFR BLD AUTO: 1 % (ref 0–1.5)
BILIRUB SERPL-MCNC: 1.1 MG/DL (ref 0–1.2)
BUN SERPL-MCNC: 12 MG/DL (ref 8–23)
BUN/CREAT SERPL: 16.7 (ref 7–25)
CALCIUM SPEC-SCNC: 8.9 MG/DL (ref 8.6–10.5)
CEA SERPL-MCNC: 1.69 NG/ML
CHLORIDE SERPL-SCNC: 101 MMOL/L (ref 98–107)
CO2 SERPL-SCNC: 26 MMOL/L (ref 22–29)
CREAT SERPL-MCNC: 0.72 MG/DL (ref 0.76–1.27)
DEPRECATED RDW RBC AUTO: 44 FL (ref 37–54)
EGFRCR SERPLBLD CKD-EPI 2021: 104.6 ML/MIN/1.73
EOSINOPHIL # BLD AUTO: 0.21 10*3/MM3 (ref 0–0.4)
EOSINOPHIL NFR BLD AUTO: 6.9 % (ref 0.3–6.2)
ERYTHROCYTE [DISTWIDTH] IN BLOOD BY AUTOMATED COUNT: 13.8 % (ref 12.3–15.4)
GLOBULIN UR ELPH-MCNC: 3.2 GM/DL
GLUCOSE SERPL-MCNC: 299 MG/DL (ref 65–99)
HCT VFR BLD AUTO: 35.7 % (ref 37.5–51)
HGB BLD-MCNC: 13.3 G/DL (ref 13–17.7)
IMM GRANULOCYTES # BLD AUTO: 0.01 10*3/MM3 (ref 0–0.05)
IMM GRANULOCYTES NFR BLD AUTO: 0.3 % (ref 0–0.5)
LYMPHOCYTES # BLD AUTO: 0.62 10*3/MM3 (ref 0.7–3.1)
LYMPHOCYTES NFR BLD AUTO: 20.3 % (ref 19.6–45.3)
MCH RBC QN AUTO: 32.4 PG (ref 26.6–33)
MCHC RBC AUTO-ENTMCNC: 37.3 G/DL (ref 31.5–35.7)
MCV RBC AUTO: 86.9 FL (ref 79–97)
MONOCYTES # BLD AUTO: 0.35 10*3/MM3 (ref 0.1–0.9)
MONOCYTES NFR BLD AUTO: 11.5 % (ref 5–12)
NEUTROPHILS NFR BLD AUTO: 1.83 10*3/MM3 (ref 1.7–7)
NEUTROPHILS NFR BLD AUTO: 60 % (ref 42.7–76)
PLATELET # BLD AUTO: 62 10*3/MM3 (ref 140–450)
PMV BLD AUTO: 9.6 FL (ref 6–12)
POTASSIUM SERPL-SCNC: 4.4 MMOL/L (ref 3.5–5.2)
PROT SERPL-MCNC: 7.1 G/DL (ref 6–8.5)
RBC # BLD AUTO: 4.11 10*6/MM3 (ref 4.14–5.8)
SODIUM SERPL-SCNC: 136 MMOL/L (ref 136–145)
WBC NRBC COR # BLD AUTO: 3.05 10*3/MM3 (ref 3.4–10.8)

## 2024-03-28 PROCEDURE — 36415 COLL VENOUS BLD VENIPUNCTURE: CPT

## 2024-03-28 PROCEDURE — 99214 OFFICE O/P EST MOD 30 MIN: CPT | Performed by: INTERNAL MEDICINE

## 2024-03-28 PROCEDURE — 82378 CARCINOEMBRYONIC ANTIGEN: CPT

## 2024-03-28 PROCEDURE — 80053 COMPREHEN METABOLIC PANEL: CPT

## 2024-03-28 PROCEDURE — 85025 COMPLETE CBC W/AUTO DIFF WBC: CPT

## 2024-03-28 NOTE — PROGRESS NOTES
CHIEF COMPLAINT: No new somatic complaints    Problem List:  Oncology/Hematology History Overview Note   1.  Stage IIIB rectal carcinoma clinical T3b N1 aM0  2.  Protracted postoperative course due to abdominal pelvic abscess following neoadjuvant Xeloda radiation  3.  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  B12 deficiency anemia  5.  Iron deficiency anemia  6.  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  7.  Intestinal metaplasia on EGD    -11/30/18 CT abdomen pelvis and chest x-ray negative for metastasis.  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    -12/6/18 MRI of rectum showed T3b with suspicious right internal iliac lymph node and CT chest noncontrast negative except for gallstones  -Per Dr. Kelly, CEA was normal.  -12/18/18 saw me for the first time.  I reviewed her case in our multidisciplinary conference and went over that in detail with her.  She had presented with hematochezia.  Dr. Kelly repeated endoscopy for more exact detailing of the primary location and this appears to be rectal on MRI and endoscopy.  Plan is for randomization on clinical trial N 1048 to neoadjuvant FOLFOX versus standard chemoradiation.  We'll get him to radiation and our research staff for randomization and get his baseline CBC and CMP.    -1/4/19 followed up: Randomized to the Xeloda radiation arm.  He will get that and then 4-6 weeks later had his surgery, and then follow that with 6 months of adjuvant FOLFOX per protocol.  My research assistant met with him today.  He has a Xeloda prescription.  -3/4/2019 MRI: Good response with T2, less likely T3 with spiculation N0 disease.  -4/23/2019 pathology:  Laparoscopic assisted converted to lower anterior resection open with stapled coloanal anastomosis and diverting loop ileostomy with liver biopsy showed residual adenoma with focal high-grade dysplasia but no residual invasive carcinoma.  0 out of 17 lymph nodes.  Mild steatosis on liver biopsy with bridging fibrosis.  -5/9/2019 to 5/15/2019 admitted with abdominal pelvic abscess status post CT-guided percutaneous drain placement presenting with sepsis improved with antibiotics and drainage.  Temp of 104 on presentation.  2 weeks of protracted IV antibiotics with Radames Sosa planned at discharge.  -7/11/2019 follow-up visit:Start of adjuvant therapy delayed due to the above postoperative pelvic abscess.  This was treated surgically initially but then by CT-guided percutaneous drainage as above.  Serial follow-up with Radames Sosa including CTs and antibiotics eventually cleared him adequately to consider resumption of plans for adjuvant therapy.  CTs of the pelvis done on 6/5/2019, 6/13/2019, 6/26/2019 monitoring for this abscess and possible drainage but not able to drain due to decreasing size and minimal residual edema with CT 7/10/2019 showingStable to slight decrease in size of presacral perirectal abscess with presacral edema measuring 3.9 x 1.9 previously 4.1 x 2.5 cm. No new sites of focal fluid collection or loculated fluid.  Hence, modified FOLFOX 6 started at this junction.  -9/30/2019 office visit: Oxaliplatin discontinued due to ongoing thrombocytopenia.  Platelet count today 59,000.  We will continue course #5 and 6 with 5-FU and leucovorin alone to complete adjuvant therapy.  -10/23/2019 completed adjuvant chemotherapy.  -10/28/2019 CT chest abdomen pelvis with contrast showed decreased and fluid in the presacral fat.  Decrease in surrounding soft tissue stranding.  Gallstone seen with enlargement of spleen.  Stranding of the mesenteric roots with varices in the mesentery slightly increased  compared to July 2019.  Chest is unremarkable.  CEA 1.53 with bilirubin 1.4, AST 46, alkaline phosphatase 135, white count 2500, hemoglobin 9.8, and platelets 62,000 on 10/21/2019.  -11/2019 colonoscopy with Dr. Lebron negative according to patient.  -2/23/2020: Saw Dr. Lebron regarding [place] cirrhosis-induced portal hypertension.  Did not recommend TIPS.  Started nadolol  -2/25/20 20 CT chest abdomen pelvis negative for metastasis but with portal hypertension and varices.  CEA 1.1.  ALT 51.  White count 3970 with hemoglobin 10.9.  -6/2/2020 CT abdomen and pelvis with stable postsurgical changes, no evidence of disease progression, CEA 0.86.  -8/28/2020 ileocolonoscopy  -9/8/2020 CT abdomen and pelvis without evidence of disease recurrence, CEA 0.87.  -3/9/2021 CT abdomen and pelvis stable changes, no evidence of progression of disease or recurrence.  CEA 1.21  -8/30/2021 CT chest, abdomen and pelvis with nonspecific small area of groundglass nodularity near the periphery of the right lower lobe favored infectious/inflammatory, otherwise CT scan stable without specific evidence of disease progression.  Of note, patient did have Covid infection 2 months ago.  Currently no respiratory concerns.  Has seen Dr. Gerardo at  for evaluation regarding his desire for ileostomy reversal, he reports that he had liver ultrasound that showed questionable liver lesion, he is scheduled for additional CT on 9/24 and then follow-up with Dr. Gerardo.  I discussed with the patient that current CT scan shows no abnormalities in the liver, he does have known cirrhosis with [place].  We will continue surveillance as per NCCN guidelines with repeat CT scans and serum testing in 6 months.  CEA currently normal at 1.13, platelet count stable at 73,000.    -2/24/2022 follow-up Dr. Racheal Gerardo Hepatology.  Given extension of thrombus into the superior mesenteric vein, they decided to start Coumadin November 2021 followed in the coagulation  clinic there with follow-up on repeat CT planned.    -2/28/2022 data from : Ferritin low at 12 with iron low at 38 and decreased transferrin saturation 8% with increased total iron-binding capacity 498 and increased transferrin 398 consistent with iron deficiency.  B12 level immeasurably low less than 150.  Folic acid normal 15.5.  Alpha-fetoprotein 2.5.  INR 2.3.  Hemoglobin 8.9 MCV 77 with white count 3250 and platelets 77k.    -3/10/2022 data:  White count stable 3230.  Hemoglobin down to 8.5 with MCV down to 76.3 with platelets stable 72,000.  CEA 1.22.  AST 48 with bilirubin 1.4Glucose 156.  Otherwise unremarkable CMP.   CT chest abdomen pelvisShows resolution of right lower lobe groundglass opacities.  Fatty liver infiltration.  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  Cholelithiasis    -3/18/2022 [facility] medical oncology follow-up visit: I reviewed multiple data sets from  and current CTs and labs and CEA from [facility].  Both images of the scans as well as reports reviewed and went over the above with the patient.  He started B12 already and has iron deficiency anemia as well documented at  and I have started him on ferrous sulfate 325 twice a day every other day with vitamin C and we will in 6 months repeat his CTs and CBC and CEA per NCCN guidelines from the cancer standpoint.  He will continue to follow with  liver specialists from the portal hypertension standpoint and is currently on Coumadin.  He had EGD and colonoscopy according to the patient yesterday at  and the varices were improved.  There was still some thrombus in the current CT but I suspect it is improving.  Coumadin is a Catch-22 and may actually complicate variceal bleeding but it also may diminish the likelihood of bleeding if it helps decompress the portal system by treating the thrombus and I leave that decision to  the capable hands of his gastroenterologist/liver specialist at     - 8/26/2022 [facility] liver clinic note.  [place] cirrhosis.  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  Following with anticoagulation clinic.  They plan to arrange for MRI liver protocol to assess for possible resolution of thrombus as surgeon planning ileostomy reversal once liver clinic clears him for surgery.  AST 49.  ALT 29.  Bilirubin 1.0.  Alkaline phosphatase 52.  White count 2980.  Hemoglobin 11.4.  Platelets 77,000.  Alpha-fetoprotein 2.6.  INR 2.0.    -9/12/2022 CT chest abdomen pelvis shows cirrhotic changes with portal hypertension stable no ascites.  No acute cardiopulmonary process.  No metastatic disease.  Stable presacral soft tissue thickening posttreatment related most likely.  Spleen is enlarged.      -9/16/2022 [facility] medical oncology follow-up visit: I reviewed the above liver clinic note and CT images and reports with patient.  No evidence of recurrence.  Today's white count is 2740 with hemoglobin 11.3 platelets 64,000 generally with platelets in the 70,000's over the last year.  MCV however has improved now 87.8 on iron and B12 in the hemoglobin has gone from 8.5 up to 11.3 in the last 6 months.  He did have gastritis and intestinal metaplasia without dysplasia with Dr. Lebron February 2022 with atrophic gastric body.  Iron, B12, folate, CEA pending at time of dictation.  We will have my nurse practitioner call him the results of the pending lab and if the CEA is rising he may need to see me sooner but otherwise the plan is to repeat his tumor markers and labs and CTs again in 6 months and will do so every 6 months through January 2024.  He is contemplating reversal of the ostomy and I left a message with Dr. Kelly that Avatrombopag might be helpful but his platelets are close to 60,000 which is the general goal for most preoperative clearances  but I will defer to her on that and of course the portal hypertension itself increases the risk of surgery simply from vascular engorgement and an MRI liver is planned by the liver surgeons to see if he can safely come off of the Coumadin that they are managing through the  Coumadin clinic.  Asked him to touch base with Dr. Lebron as to when to get the next EGD relative to the intestinal metaplasia on EGD.    -10/13/2022 MRI of the abdomen at  was stable, no new or suspicious liver lesions.    -3/14/2023 CT chest, abdomen and pelvis with no evidence of recurrent or metastatic disease.  Stable postsurgical changes from bowel resection and right lower quadrant ostomy.  Soft tissue in the presacral space similar to prior study.  Cirrhosis of the liver with splenomegaly and multiple right renal varices.  Cholelithiasis. CEA 1.05.  CBC is unremarkable, platelet count stable at 75,000.  CMP creatinine stable at 1.28, normal AST and ALT of 34/33, total bilirubin 1.3.    -3/20/2023 [facility] Oncology clinic follow-up: Mr. Arevalo continues to do well, no evidence of recurrent rectal cancer on clinical exam and no new worrisome symptoms.  Current CT chest, abdomen and pelvis from 3/14/2023 showed no evidence of recurrence, stable postsurgical changes and cirrhosis.  CEA normal at 1.05.  Platelet count is stable at 75,000.  He continues to follow with GI clinic at  in light of his cirrhosis, he is now off of Coumadin.  He has not had follow-up with Dr. Kelly, he was due to see her in September for repeat flexible sigmoidoscopy but he states when he called the office they did not show that he needed an appointment.  I have asked him to contact her office now to get an appointment.  He also had questions regarding his ileostomy reversal but I defer to Dr. Kelly for that discussion.  He is now off of Coumadin.  He is asking whether or not he should consider having his reversal at  due to his high risk with cirrhosis but again I  have asked him to discuss this with Dr. Kelly.  We will continue surveillance per NCCN guidelines with repeat CT chest, abdomen and pelvis and serum testing again in 6 months and I have ordered that today.  We plan on surveillance through January 2024.    -6/9/2023  hepatology  [facility] APRN follow-up  cirrhosis off Coumadin since November 2022 had ultrasound abdomen this day.  MELD score 11.  There ordering alpha-fetoprotein and MRI abdomen along with iron indices.  Presumably  due to weight, diabetes, hyperlipidemia, hypertension.  Tylenol as needed.  They plan MRI abdomen in 3-4 months to assess liver RADS 3 lesion.  If stable x2 years then they will continue ultrasound abdomen for HCC screening.  They recommended follow-up EGD March 2024 with Dr. Lebron.  Alpha-fetoprotein this day normal 2.7 With normal ferritin 33 and iron normal 80 with normal total iron-binding capacity and normal saturation 19% with normal total iron-binding capacity and transferrin.    -9/13/2023 CT chest abdomen pelvis compared to March 2023 showed postsurgical changes without recurrence in the chest abdomen or pelvis.  Cirrhotic portal hypertensive findings including splenomegaly with large mesenteric veins draining into the right renal vein.  Hemoglobin 12.6 up from 11.6 in June and stable from March 12 0.8 with normochromic normocytic indices.  Platelet count 69,000 where it fluctuates around 70,000 give her take 10,000.  AST 51 bilirubin 1.8 glucose 189 otherwise unremarkable CMP.  CEA normal 1.49.  This is up from 0.87 a year ago but was as high as 1.55 November 2019.    - 9/21/2023 [facility] hematology oncology follow-up: Per patient, patient did flexible sigmoidoscopy in May.  From the CAT scan and blood and tumor marker standpoint, nothing to suggest recurrence.  CEA waxes and wanes but no significant rise.  He has persistent mild anemia and thrombocytopenia with his portal hypertension from  cirrhosis being monitored by  hepatology at .  They plan MRI as outlined above to follow-up on prior liver mass but nothing on current CTs other than chronic cirrhotic portal hypertensive variceal changes.  From the rectal cancer standpoint we will repeat his CBC CMP CEA and CTs again in 6 months and at that point he will be more than 5 years out from diagnosis and we would stop routine oncologic laboratory and imaging follow-up from the rectal cancer standpoint.  Dr. Kelly is not inclined towards reversal of his ostomy.  I asked him to [place] back with Dr. Kelly as to need for surveillance colonoscopy proximal to the ostomy and not just sigmoidoscopy.    -11/17/2023 MRI abdomen 10 mm liver RADS 3 intermediate probability for hepatocellular carcinoma with splenomegaly and no ascites.    -3/28/2024 white count 3050 hemoglobin 13.3 platelets 62,000.  Glucose 299 creatinine 0.72 otherwise unremarkable CMP.  CEA pending.    -3/28/2024 [facility] hematology oncology follow-up: With reference to his cirrhosis and screening for hepatocellular carcinoma he is following with hepatology at  and his labs are fairly stable.  We will call him his CEA results from today and if rising we will push for CTs which this time around were denied and we will check his tumor marker with labs and CT prior to return in 6 months.  I have asked him to get back with Dr. Kelly for repeat endoscopy.     Rectal carcinoma   12/18/2018 Initial Diagnosis    Rectal carcinoma (CMS/HCC)     1/7/2019 - 2/4/2019 Chemotherapy    Xeloda radiation on protocol     1/8/2019 - 2/14/2019 Radiation    Radiation OncologyTreatment Course:  Hesham Arevalo received 5040 cGy in 28 fractions to pelvis via External Beam Radiation - EBRT.     3/4/2019 Imaging    MRI of the pelvis:  IMPRESSIONS:  MRI rectal cancer T category is: T2, LESS LIKELY EARLY T3 SPICULATIONS  Maximum EMD of invasion is: 0  Minimum tumor to MRF distance is: 12 MM  Low rectal tumor component: NO  Mesorectal nodes/tumor deposits:  NO  EMVI: NO  Extramesorectal nodes: NO     4/23/2019 Surgery    Surgery rectosigmoidectomy pathology report:  Final Diagnosis    1. RECTOSIGMOID COLON, RECTOSIGMOID RESECTION:  Residual adenoma with focal high grade dysplasia with no residual invasive carcinoma identified post-treatment (see comment).  Seventeen lymph nodes negative for carcinoma (0/17)   2. LIVER, CORE NEEDLE BIOPSY:  Mild steatosis with mild chronic inflammation and increased fibrosis including bridging fibrosis (Stage 3-4) (see comment)        COLON AND RECTUM TEMPLATE:  TYPE OF SPECIMEN/PROCEDURE: Rectosigmoid resection.   SPECIMEN INTEGRITY:  Intact.  TUMOR SITE:  Rectum.  TUMOR SIZE (greatest dimension):  Indeterminate  TUMOR LOCATION (applicable only to rectal primaries): Entirely below anterior peritoneal reflection.  MACROSCOPIC INTACTNESS OF MESORECTUM (If applicable): Intact.  MACROSCOPIC TUMOR PERFORATION: Not identified.  TUMOR CONFIGURATION:  Ulcerated.  HISTOLOGIC TYPE:  Adenocarcinoma.  HISTOLOGIC GRADE:  G3-4 (per previous biopsy).  MICROSCOPIC DEPTH OF TUMOR INVASION/EXTENSION:  No residual tumor identified.  PERITONEAL INVOLVEMENT:  Not identified.  LYMPHVASCULAR INVASION:  Not identified.  PERINEURAL INVASION: Not identified.  SURGICAL MARGINS: Uninvolved.  STATUS AND DISTANCE FROM PROXIMAL MUCOSAL MARGIN:  Uninvolved, 13.0 cm.  STATUS AND DISTANCE FROM DISTAL MUCOSAL MARGIN: Uninvolved, 1.2 cm.  STATUS AND DISTANCE FROM MESENTERIC MARGIN: Not applicable.  STATUS AND DISTANCE FROM RADIAL MARGIN: Uninvolved, 1.7 cm.  DISTANCE FROM DENTATE LINE (RECTAL TUMOR ONLY):  Indeterminate.  TUMOR DEPOSITS (DISCONTINUOUS EXTRAMURAL EXTENSION):  Not identified.  NUMBER OF LYMPH NODES INVOLVED BY METASTATIC NEOPLASM: 0.  NUMBER OF REGIONAL LYMPH NODES EXAMINED: 17.  EXTRANODAL EXTENSION:  Not applicable.  TREATMENT EFFECT:  Present, no viable cancer cells identified (complete response, score 0).  ADDITIONAL PATHOLOGIC FINDINGS:  None.  MSI  TESTING:  No evidence of MSI based on reported IHC on outside biopsy  OTHER ANCILLARY STUDIES (BRAF, KRAS, ETC.):  Should be performed on initial diagnostic biopsy if needed.  AJCC PATHOLOGIC STAGE:  (COMPLETED BY PATHOLOGIST, BASED ONLY ON TISSUE FINDINGS, MORE EXTENSIVE DISEASE MAY NOT BE KNOWN TO THE PATHOLOGIST)  ypT=  0  ypN=  0  AJCC PATHOLOGIC STAGE:  y0.                5/15/2019 Adverse Reaction    -5/9/2019 to 5/15/2019 admitted with abdominal pelvic abscess status post CT-guided percutaneous drain placement presenting with sepsis improved with antibiotics and drainage.  Temp of 104 on presentation.  2 weeks of protracted IV antibiotics with Radames Sosa planned at discharge     7/22/2019 - 10/23/2019 Chemotherapy    OP COLON [id number] OXALIplatin / Leucovorin / Fluorouracil  Start of adjuvant therapy delayed due to the above postoperative pelvic abscess.  This was treated surgically initially but then by CT-guided percutaneous drainage as above.  Serial follow-up with Radames Sosa including CTs and antibiotics eventually cleared him adequately to consider resumption of plans for adjuvant therapy.  CTs of the pelvis done on 6/5/2019, 6/13/2019, 6/26/2019 monitoring for this abscess and possible drainage but not able to drain due to decreasing size and minimal residual edema with CT 7/10/2019 showingStable to slight decrease in size of presacral perirectal abscess with presacral edema measuring 3.9 x 1.9 previously 4.1 x 2.5 cm. No new sites of focal fluid collection or loculated fluid.     9/30/2019 Adverse Reaction    Oxaliplatin discontinued due to ongoing thrombocytopenia.     10/28/2019 Imaging    10/28/2019 CT chest abdomen pelvis with contrast showed decreased and fluid in the presacral fat.  Decrease in surrounding soft tissue stranding.  Gallstone seen with enlargement of spleen.  Stranding of the mesenteric roots with varices in the mesentery slightly increased compared to July 2019.  Chest is  unremarkable.  CEA 1.53 with bilirubin 1.4, AST 46, alkaline phosphatase 135, white count 2500, hemoglobin 9.8, and platelets 62,000 on 10/21/2019.     8/28/2020 Procedure    Ileocolonoscopy     3/10/2022 Imaging    -3/10/2022 data:  White count stable 3230.  Hemoglobin down to 8.5 with MCV down to 76.3 with platelets stable 72,000.  CEA 1.22.  AST 48 with bilirubin 1.4Glucose 156.  Otherwise unremarkable CMP.   CT chest abdomen pelvisShows resolution of right lower lobe groundglass opacities.  Fatty liver infiltration.  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  Cholelithiasis     9/16/2022 Imaging    CT chest abdomen pelvis shows cirrhotic changes with portal hypertension stable no ascites.  No acute cardiopulmonary process.  No metastatic disease.  Stable presacral soft tissue thickening posttreatment related most likely.  Spleen is enlarged.         HISTORY OF PRESENT ILLNESS:  The patient is a 58 y.o. male, here for follow up on management of history of rectal cancer no new somatic complaint    Past Medical History:   Diagnosis Date    Arthritis     knees     Cancer 11/2018    colon    Disease of thyroid gland     Fatty liver     Fatty liver     Hashimoto's disease     History of radiation therapy 02/14/2019    rectal cancer    Hypertension     Ileostomy in place     Psoriasis     Wears glasses      Past Surgical History:   Procedure Laterality Date    COLON RESECTION N/A 4/23/2019    Procedure: LAPAROSCOPIC LOWER ANTERIOR RESECTION WITH DIVERTING LOOP ILEOOSTOMY, SPLENIC FLEIXURE MOBILIZATION AND LIVER BIOPSY, AND PROCTOSCOPY;  Surgeon: Pau Kelly MD;  Location[facility];  Service: General    COLONOSCOPY      2018    ILEOSTOMY  04/2019    LIVER BIOPSY  2003    VASECTOMY  1998    VENOUS ACCESS DEVICE (PORT) INSERTION N/A 7/18/2019    Procedure: PORT PLACEMENT;  Surgeon: Franky Cazares,  "MD;  Location: [facility];  Service: General       No Known Allergies    Family History and Social History reviewed and changed as necessary    REVIEW OF SYSTEM:   No new somatic complaints    PHYSICAL EXAM:  No jaundice icterus pallor or respiratory distress.    Vitals:    03/28/24 0826   BP: 132/66   Pulse: 62   Resp: 18   Temp: 98.2 °F (36.8 °C)   SpO2: 98%   Weight: 90.3 kg (199 lb)   Height: 185.4 cm (73\")     Vitals:    03/28/24 0826   PainSc: 0-No pain          ECOG score: 0           Vitals reviewed.    ECOG: (0) Fully Active - Able to Carry On All Pre-disease Performance Without Restriction    Lab Results   Component Value Date    HGB 13.3 03/28/2024    HCT 35.7 (L) 03/28/2024    MCV 86.9 03/28/2024    PLT 62 (L) 03/28/2024    WBC 3.05 (L) 03/28/2024    NEUTROABS 1.83 03/28/2024    LYMPHSABS 0.62 (L) 03/28/2024    MONOSABS 0.35 03/28/2024    EOSABS 0.21 03/28/2024    BASOSABS 0.03 03/28/2024       Lab Results   Component Value Date    GLUCOSE 299 (H) 03/28/2024    BUN 12 03/28/2024    CREATININE 0.72 (L) 03/28/2024     03/28/2024    K 4.4 03/28/2024     03/28/2024    CO2 26.0 03/28/2024    CALCIUM 8.9 03/28/2024    PROTEINTOT 7.1 03/28/2024    ALBUMIN 3.9 03/28/2024    BILITOT 1.1 03/28/2024    ALKPHOS 78 03/28/2024    AST 32 03/28/2024    ALT <5 03/28/2024             ASSESSMENT & PLAN:  1.  Stage IIIB rectal carcinoma clinical T3b N1 aM0  2.  Protracted postoperative course due to abdominal pelvic abscess following neoadjuvant Xeloda radiation  3.  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  B12 deficiency anemia  5.  Iron deficiency anemia  6.  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  7.  Intestinal metaplasia on EGD    -11/30/18 CT abdomen " pelvis and chest x-ray negative for metastasis.  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    -12/6/18 MRI of rectum showed T3b with suspicious right internal iliac lymph node and CT chest noncontrast negative except for gallstones  -Per Dr. Kelly, CEA was normal.  -12/18/18 saw me for the first time.  I reviewed her case in our multidisciplinary conference and went over that in detail with her.  She had presented with hematochezia.  Dr. Kelly repeated endoscopy for more exact detailing of the primary location and this appears to be rectal on MRI and endoscopy.  Plan is for randomization on clinical trial N 1048 to neoadjuvant FOLFOX versus standard chemoradiation.  We'll get him to radiation and our research staff for randomization and get his baseline CBC and CMP.    -1/4/19 followed up: Randomized to the Xeloda radiation arm.  He will get that and then 4-6 weeks later had his surgery, and then follow that with 6 months of adjuvant FOLFOX per protocol.  My research assistant met with him today.  He has a Xeloda prescription.  -3/4/2019 MRI: Good response with T2, less likely T3 with spiculation N0 disease.  -4/23/2019 pathology: Laparoscopic assisted converted to lower anterior resection open with stapled coloanal anastomosis and diverting loop ileostomy with liver biopsy showed residual adenoma with focal high-grade dysplasia but no residual invasive carcinoma.  0 out of 17 lymph nodes.  Mild steatosis on liver biopsy with bridging fibrosis.  -5/9/2019 to 5/15/2019 admitted with abdominal pelvic abscess status post CT-guided percutaneous drain placement presenting with sepsis improved with antibiotics and drainage.  Temp of 104 on presentation.  2 weeks of protracted IV antibiotics with Radames Sosa planned at discharge.  -7/11/2019 follow-up visit:Start of adjuvant therapy delayed due to the above postoperative pelvic abscess.  This was  treated surgically initially but then by CT-guided percutaneous drainage as above.  Serial follow-up with Radames Sosa including CTs and antibiotics eventually cleared him adequately to consider resumption of plans for adjuvant therapy.  CTs of the pelvis done on 6/5/2019, 6/13/2019, 6/26/2019 monitoring for this abscess and possible drainage but not able to drain due to decreasing size and minimal residual edema with CT 7/10/2019 showingStable to slight decrease in size of presacral perirectal abscess with presacral edema measuring 3.9 x 1.9 previously 4.1 x 2.5 cm. No new sites of focal fluid collection or loculated fluid.  Hence, modified FOLFOX 6 started at this junction.  -9/30/2019 office visit: Oxaliplatin discontinued due to ongoing thrombocytopenia.  Platelet count today 59,000.  We will continue course #5 and 6 with 5-FU and leucovorin alone to complete adjuvant therapy.  -10/23/2019 completed adjuvant chemotherapy.  -10/28/2019 CT chest abdomen pelvis with contrast showed decreased and fluid in the presacral fat.  Decrease in surrounding soft tissue stranding.  Gallstone seen with enlargement of spleen.  Stranding of the mesenteric roots with varices in the mesentery slightly increased compared to July 2019.  Chest is unremarkable.  CEA 1.53 with bilirubin 1.4, AST 46, alkaline phosphatase 135, white count 2500, hemoglobin 9.8, and platelets 62,000 on 10/21/2019.  -11/2019 colonoscopy with Dr. Lebron negative according to patient.  -2/23/2020: Saw Dr. Lebron regarding Hatfield cirrhosis-induced portal hypertension.  Did not recommend TIPS.  Started nadolol  -2/25/20 20 CT chest abdomen pelvis negative for metastasis but with portal hypertension and varices.  CEA 1.1.  ALT 51.  White count 3970 with hemoglobin 10.9.  -6/2/2020 CT abdomen and pelvis with stable postsurgical changes, no evidence of disease progression, CEA 0.86.  -8/28/2020 ileocolonoscopy  -9/8/2020 CT abdomen and pelvis without evidence of  disease recurrence, CEA 0.87.  -3/9/2021 CT abdomen and pelvis stable changes, no evidence of progression of disease or recurrence.  CEA 1.21  -8/30/2021 CT chest, abdomen and pelvis with nonspecific small area of groundglass nodularity near the periphery of the right lower lobe favored infectious/inflammatory, otherwise CT scan stable without specific evidence of disease progression.  Of note, patient did have Covid infection 2 months ago.  Currently no respiratory concerns.  Has seen Dr. Gerardo at  for evaluation regarding his desire for ileostomy reversal, he reports that he had liver ultrasound that showed questionable liver lesion, he is scheduled for additional CT on 9/24 and then follow-up with Dr. Gerardo.  I discussed with the patient that current CT scan shows no abnormalities in the liver, he does have known cirrhosis with Hatfield.  We will continue surveillance as per NCCN guidelines with repeat CT scans and serum testing in 6 months.  CEA currently normal at 1.13, platelet count stable at 73,000.    -2/24/2022 follow-up Dr. Racheal Gerardo Hepatology.  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    -2/28/2022 data from : Ferritin low at 12 with iron low at 38 and decreased transferrin saturation 8% with increased total iron-binding capacity 498 and increased transferrin 398 consistent with iron deficiency.  B12 level immeasurably low less than 150.  Folic acid normal 15.5.  Alpha-fetoprotein 2.5.  INR 2.3.  Hemoglobin 8.9 MCV 77 with white count 3250 and platelets 77k.    -3/10/2022 data:  White count stable 3230.  Hemoglobin down to 8.5 with MCV down to 76.3 with platelets stable 72,000.  CEA 1.22.  AST 48 with bilirubin 1.4Glucose 156.  Otherwise unremarkable CMP.   CT chest abdomen pelvisShows resolution of right lower lobe groundglass opacities.  Fatty  liver infiltration.  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  Cholelithiasis    -3/18/2022 [facility] [place] [facility] medical oncology follow-up visit: I reviewed multiple data sets from  and current CTs and labs and CEA from [facility], [place] by [facility].  Both images of the scans as well as reports reviewed and went over the above with the patient.  He started B12 already and has iron deficiency anemia as well documented at  and I have started him on ferrous sulfate 325 twice a day every other day with vitamin C and we will in 6 months repeat his CTs and CBC and CEA per NCCN guidelines from the cancer standpoint.  He will continue to follow with  liver specialists from the portal hypertension standpoint and is currently on Coumadin.  He had EGD and colonoscopy according to the patient yesterday at  and the varices were improved.  There was still some thrombus in the current CT but I suspect it is improving.  Coumadin is a Catch-22 and may actually complicate variceal bleeding but it also may diminish the likelihood of bleeding if it helps decompress the portal system by treating the thrombus and I leave that decision to the capable hands of his gastroenterologist/liver specialist at     - 8/26/2022 [facility] liver clinic note.  Hatfield cirrhosis.  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  Following with anticoagulation clinic.  They plan to arrange for MRI liver protocol to assess for possible resolution of thrombus as surgeon planning ileostomy reversal once liver clinic clears him for surgery.  AST 49.  ALT 29.  Bilirubin 1.0.  Alkaline phosphatase 52.  White count 2980.  Hemoglobin 11.4.  Platelets 77,000.  Alpha-fetoprotein 2.6.  INR 2.0.    -9/12/2022 CT chest abdomen pelvis shows cirrhotic changes with portal hypertension stable no ascites.  No  acute cardiopulmonary process.  No metastatic disease.  Stable presacral soft tissue thickening posttreatment related most likely.  Spleen is enlarged.      -9/16/2022 [facility] medical oncology follow-up visit: I reviewed the above liver clinic note and CT images and reports with patient.  No evidence of recurrence.  Today's white count is 2740 with hemoglobin 11.3 platelets 64,000 generally with platelets in the 70,000's over the last year.  MCV however has improved now 87.8 on iron and B12 in the hemoglobin has gone from 8.5 up to 11.3 in the last 6 months.  He did have gastritis and intestinal metaplasia without dysplasia with Dr. Lebron February 2022 with atrophic gastric body.  Iron, B12, folate, CEA pending at time of dictation.  We will have my nurse practitioner call him the results of the pending lab and if the CEA is rising he may need to see me sooner but otherwise the plan is to repeat his tumor markers and labs and CTs again in 6 months and will do so every 6 months through January 2024.  He is contemplating reversal of the ostomy and I left a message with Dr. Kelly that Avatrombopag might be helpful but his platelets are close to 60,000 which is the general goal for most preoperative clearances but I will defer to her on that and of course the portal hypertension itself increases the risk of surgery simply from vascular engorgement and an MRI liver is planned by the liver surgeons to see if he can safely come off of the Coumadin that they are managing through the  Coumadin clinic.  Asked him to touch base with Dr. Lebron as to when to get the next EGD relative to the intestinal metaplasia on EGD.    -10/13/2022 MRI of the abdomen at  was stable, no new or suspicious liver lesions.    -3/14/2023 CT chest, abdomen and pelvis with no evidence of recurrent or metastatic disease.  Stable postsurgical changes from bowel resection and right lower quadrant ostomy.  Soft tissue in the presacral space similar to  prior study.  Cirrhosis of the liver with splenomegaly and multiple right renal varices.  Cholelithiasis. CEA 1.05.  CBC is unremarkable, platelet count stable at 75,000.  CMP creatinine stable at 1.28, normal AST and ALT of 34/33, total bilirubin 1.3.    -3/20/2023 [facility] clinic follow-up: Mr. Arevalo continues to do well, no evidence of recurrent rectal cancer on clinical exam and no new worrisome symptoms.  Current CT chest, abdomen and pelvis from 3/14/2023 showed no evidence of recurrence, stable postsurgical changes and cirrhosis.  CEA normal at 1.05.  Platelet count is stable at 75,000.  He continues to follow with GI clinic at  in light of his cirrhosis, he is now off of Coumadin.  He has not had follow-up with Dr. Kelly, he was due to see her in September for repeat flexible sigmoidoscopy but he states when he called the office they did not show that he needed an appointment.  I have asked him to contact her office now to get an appointment.  He also had questions regarding his ileostomy reversal but I defer to Dr. Kelly for that discussion.  He is now off of Coumadin.  He is asking whether or not he should consider having his reversal at  due to his high risk with cirrhosis but again I have asked him to discuss this with Dr. Kelly.  We will continue surveillance per NCCN guidelines with repeat CT chest, abdomen and pelvis and serum testing again in 6 months and I have ordered that today.  We plan on surveillance through January 2024.    -6/9/2023  hepatology  Cony MACARIO follow-up HATFIELD cirrhosis off Coumadin since November 2022 had ultrasound abdomen this day.  MELD score 11.  There ordering alpha-fetoprotein and MRI abdomen along with iron indices.  Presumably [place]field due to weight, diabetes, hyperlipidemia, hypertension.  Tylenol as needed.  They plan MRI abdomen in 3-4 months to assess liver RADS 3 lesion.  If stable x2 years then they will continue ultrasound abdomen for HCC screening.  They  recommended follow-up EGD March 2024 with Dr. Lebron.  Alpha-fetoprotein this day normal 2.7 With normal ferritin 33 and iron normal 80 with normal total iron-binding capacity and normal saturation 19% with normal total iron-binding capacity and transferrin.    -9/13/2023 CT chest abdomen pelvis compared to March 2023 showed postsurgical changes without recurrence in the chest abdomen or pelvis.  Cirrhotic portal hypertensive findings including splenomegaly with large mesenteric veins draining into the right renal vein.  Hemoglobin 12.6 up from 11.6 in June and stable from March 12 0.8 with normochromic normocytic indices.  Platelet count 69,000 where it fluctuates around 70,000 give her take 10,000.  AST 51 bilirubin 1.8 glucose 189 otherwise unremarkable CMP.  CEA normal 1.49.  This is up from 0.87 a year ago but was as high as 1.55 November 2019.    - 9/21/2023 [facility] hematology oncology follow-up: Per patient, patient did flexible sigmoidoscopy in May.  From the CAT scan and blood and tumor marker standpoint, nothing to suggest recurrence.  CEA waxes and wanes but no significant rise.  He has persistent mild anemia and thrombocytopenia with his portal hypertension from [place] cirrhosis being monitored by hepatology at .  They plan MRI as outlined above to follow-up on prior liver mass but nothing on current CTs other than chronic cirrhotic portal hypertensive variceal changes.  From the rectal cancer standpoint we will repeat his CBC CMP CEA and CTs again in 6 months and at that point he will be more than 5 years out from diagnosis and we would stop routine oncologic laboratory and imaging follow-up from the rectal cancer standpoint.  Dr. Kelly is not inclined towards reversal of his ostomy.  I asked him to [place] back with Dr. Kelly as to need for surveillance colonoscopy proximal to the ostomy and not just sigmoidoscopy.    -11/17/2023 MRI abdomen 10 mm liver RADS 3 intermediate probability for hepatocellular  carcinoma with splenomegaly and no ascites.    -3/28/2024 white count 3050 hemoglobin 13.3 platelets 62,000.  Glucose 299 creatinine 0.72 otherwise unremarkable CMP.  CEA pending.    -3/28/2024 [facility] hematology oncology follow-up: With reference to his cirrhosis and screening for hepatocellular carcinoma he is following with hepatology at  and his labs are fairly stable.  We will call him his CEA results from today and if rising we will push for CTs which this time around were denied and we will check his tumor marker with labs and CT prior to return in 6 months.  I have asked him to get back with Dr. Kelly for repeat endoscopy.    Total time of care today inclusive of time spent today prior to patient's arrival reviewing interval MRI and interval labs and during visit interviewing him as to signs or symptoms of his disease and management thereof and after ordering the plan as outlined took 35 minutes patient care time throughout the day today.  Ousmane Moeller MD    03/28/2024

## 2024-03-28 NOTE — LETTER
March 28, 2024       No Recipients    Patient: Hesham Arevalo Jr.   YOB: 1965   Date of Visit: 3/28/2024     Dear Myke Mendoza MD:       Thank you for referring Hesham Arevalo to me for evaluation. Below are the relevant portions of my assessment and plan of care.    If you have questions, please do not hesitate to call me. I look forward to following Hesham along with you.         Sincerely,        Ousmane Moeller MD        CC:   No Recipients    Ousmane Moeller MD  03/28/24 1031  Sign when Signing Visit  CHIEF COMPLAINT: No new somatic complaints    Problem List:  Oncology/Hematology History Overview Note   1.  Stage IIIB rectal carcinoma clinical T3b N1 aM0  2.  Protracted postoperative course due to abdominal pelvic abscess following neoadjuvant Xeloda radiation  3.  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  B12 deficiency anemia  5.  Iron deficiency anemia  6.  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  7.  Intestinal metaplasia on EGD    -11/30/18 CT abdomen pelvis and chest x-ray negative for metastasis.  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    -12/6/18 MRI of rectum showed T3b with suspicious right internal iliac lymph node and CT chest noncontrast negative except for gallstones  -Per Dr. Kelly, CEA was normal.  -12/18/18 saw me for the first time.  I reviewed her case in our multidisciplinary conference and went over that in detail with her.  She had presented with hematochezia.  Dr. Kelly repeated endoscopy for more exact detailing of the primary location and this appears to be rectal on MRI and endoscopy.  Plan is for randomization on clinical trial N 1048 to  neoadjuvant FOLFOX versus standard chemoradiation.  We'll get him to radiation and our research staff for randomization and get his baseline CBC and CMP.    -1/4/19 followed up: Randomized to the Xeloda radiation arm.  He will get that and then 4-6 weeks later had his surgery, and then follow that with 6 months of adjuvant FOLFOX per protocol.  My research assistant met with him today.  He has a Xeloda prescription.  -3/4/2019 MRI: Good response with T2, less likely T3 with spiculation N0 disease.  -4/23/2019 pathology: Laparoscopic assisted converted to lower anterior resection open with stapled coloanal anastomosis and diverting loop ileostomy with liver biopsy showed residual adenoma with focal high-grade dysplasia but no residual invasive carcinoma.  0 out of 17 lymph nodes.  Mild steatosis on liver biopsy with bridging fibrosis.  -5/9/2019 to 5/15/2019 admitted with abdominal pelvic abscess status post CT-guided percutaneous drain placement presenting with sepsis improved with antibiotics and drainage.  Temp of 104 on presentation.  2 weeks of protracted IV antibiotics with Radames Sosa planned at discharge.  -7/11/2019 follow-up visit:Start of adjuvant therapy delayed due to the above postoperative pelvic abscess.  This was treated surgically initially but then by CT-guided percutaneous drainage as above.  Serial follow-up with Radames Sosa including CTs and antibiotics eventually cleared him adequately to consider resumption of plans for adjuvant therapy.  CTs of the pelvis done on 6/5/2019, 6/13/2019, 6/26/2019 monitoring for this abscess and possible drainage but not able to drain due to decreasing size and minimal residual edema with CT 7/10/2019 showingStable to slight decrease in size of presacral perirectal abscess with presacral edema measuring 3.9 x 1.9 previously 4.1 x 2.5 cm. No new sites of focal fluid collection or loculated fluid.  Hence, modified FOLFOX 6 started at this  junction.  -9/30/2019 office visit: Oxaliplatin discontinued due to ongoing thrombocytopenia.  Platelet count today 59,000.  We will continue course #5 and 6 with 5-FU and leucovorin alone to complete adjuvant therapy.  -10/23/2019 completed adjuvant chemotherapy.  -10/28/2019 CT chest abdomen pelvis with contrast showed decreased and fluid in the presacral fat.  Decrease in surrounding soft tissue stranding.  Gallstone seen with enlargement of spleen.  Stranding of the mesenteric roots with varices in the mesentery slightly increased compared to July 2019.  Chest is unremarkable.  CEA 1.53 with bilirubin 1.4, AST 46, alkaline phosphatase 135, white count 2500, hemoglobin 9.8, and platelets 62,000 on 10/21/2019.  -11/2019 colonoscopy with Dr. Lebron negative according to patient.  -2/23/2020: Saw Dr. Lebron regarding [place] cirrhosis-induced portal hypertension.  Did not recommend TIPS.  Started nadolol  -2/25/20 20 CT chest abdomen pelvis negative for metastasis but with portal hypertension and varices.  CEA 1.1.  ALT 51.  White count 3970 with hemoglobin 10.9.  -6/2/2020 CT abdomen and pelvis with stable postsurgical changes, no evidence of disease progression, CEA 0.86.  -8/28/2020 ileocolonoscopy  -9/8/2020 CT abdomen and pelvis without evidence of disease recurrence, CEA 0.87.  -3/9/2021 CT abdomen and pelvis stable changes, no evidence of progression of disease or recurrence.  CEA 1.21  -8/30/2021 CT chest, abdomen and pelvis with nonspecific small area of groundglass nodularity near the periphery of the right lower lobe favored infectious/inflammatory, otherwise CT scan stable without specific evidence of disease progression.  Of note, patient did have Covid infection 2 months ago.  Currently no respiratory concerns.  Has seen Dr. Gerardo at  for evaluation regarding his desire for ileostomy reversal, he reports that he had liver ultrasound that showed questionable liver lesion, he is scheduled for additional  CT on 9/24 and then follow-up with Dr. Gerardo.  I discussed with the patient that current CT scan shows no abnormalities in the liver, he does have known cirrhosis with [place].  We will continue surveillance as per NCCN guidelines with repeat CT scans and serum testing in 6 months.  CEA currently normal at 1.13, platelet count stable at 73,000.    -2/24/2022 follow-up Dr. Racheal Gerardo Hepatology.  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    -2/28/2022 data from : Ferritin low at 12 with iron low at 38 and decreased transferrin saturation 8% with increased total iron-binding capacity 498 and increased transferrin 398 consistent with iron deficiency.  B12 level immeasurably low less than 150.  Folic acid normal 15.5.  Alpha-fetoprotein 2.5.  INR 2.3.  Hemoglobin 8.9 MCV 77 with white count 3250 and platelets 77k.    -3/10/2022 data:  White count stable 3230.  Hemoglobin down to 8.5 with MCV down to 76.3 with platelets stable 72,000.  CEA 1.22.  AST 48 with bilirubin 1.4Glucose 156.  Otherwise unremarkable CMP.   CT chest abdomen pelvisShows resolution of right lower lobe groundglass opacities.  Fatty liver infiltration.  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  Cholelithiasis    -3/18/2022 [facility] medical oncology follow-up visit: I reviewed multiple data sets from  and current CTs and labs and CEA from [facility].  Both images of the scans as well as reports reviewed and went over the above with the patient.  He started B12 already and has iron deficiency anemia as well documented at  and I have started him on ferrous sulfate 325 twice a day every other day with vitamin C and we will in 6 months repeat his CTs and CBC and CEA per NCCN guidelines from the  cancer standpoint.  He will continue to follow with  liver specialists from the portal hypertension standpoint and is currently on Coumadin.  He had EGD and colonoscopy according to the patient yesterday at  and the varices were improved.  There was still some thrombus in the current CT but I suspect it is improving.  Coumadin is a Catch-22 and may actually complicate variceal bleeding but it also may diminish the likelihood of bleeding if it helps decompress the portal system by treating the thrombus and I leave that decision to the capable hands of his gastroenterologist/liver specialist at     - 8/26/2022 [facility] liver clinic note.  [place] cirrhosis.  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  Following with anticoagulation clinic.  They plan to arrange for MRI liver protocol to assess for possible resolution of thrombus as surgeon planning ileostomy reversal once liver clinic clears him for surgery.  AST 49.  ALT 29.  Bilirubin 1.0.  Alkaline phosphatase 52.  White count 2980.  Hemoglobin 11.4.  Platelets 77,000.  Alpha-fetoprotein 2.6.  INR 2.0.    -9/12/2022 CT chest abdomen pelvis shows cirrhotic changes with portal hypertension stable no ascites.  No acute cardiopulmonary process.  No metastatic disease.  Stable presacral soft tissue thickening posttreatment related most likely.  Spleen is enlarged.      -9/16/2022 [facility] medical oncology follow-up visit: I reviewed the above liver clinic note and CT images and reports with patient.  No evidence of recurrence.  Today's white count is 2740 with hemoglobin 11.3 platelets 64,000 generally with platelets in the 70,000's over the last year.  MCV however has improved now 87.8 on iron and B12 in the hemoglobin has gone from 8.5 up to 11.3 in the last 6 months.  He did have gastritis and intestinal metaplasia without dysplasia with Dr. Lebron February 2022 with atrophic gastric  body.  Iron, B12, folate, CEA pending at time of dictation.  We will have my nurse practitioner call him the results of the pending lab and if the CEA is rising he may need to see me sooner but otherwise the plan is to repeat his tumor markers and labs and CTs again in 6 months and will do so every 6 months through January 2024.  He is contemplating reversal of the ostomy and I left a message with Dr. Kelly that Avatrombopag might be helpful but his platelets are close to 60,000 which is the general goal for most preoperative clearances but I will defer to her on that and of course the portal hypertension itself increases the risk of surgery simply from vascular engorgement and an MRI liver is planned by the liver surgeons to see if he can safely come off of the Coumadin that they are managing through the  Coumadin clinic.  Asked him to touch base with Dr. Lebron as to when to get the next EGD relative to the intestinal metaplasia on EGD.    -10/13/2022 MRI of the abdomen at  was stable, no new or suspicious liver lesions.    -3/14/2023 CT chest, abdomen and pelvis with no evidence of recurrent or metastatic disease.  Stable postsurgical changes from bowel resection and right lower quadrant ostomy.  Soft tissue in the presacral space similar to prior study.  Cirrhosis of the liver with splenomegaly and multiple right renal varices.  Cholelithiasis. CEA 1.05.  CBC is unremarkable, platelet count stable at 75,000.  CMP creatinine stable at 1.28, normal AST and ALT of 34/33, total bilirubin 1.3.    -3/20/2023 [facility] follow-up: Mr. Arevalo continues to do well, no evidence of recurrent rectal cancer on clinical exam and no new worrisome symptoms.  Current CT chest, abdomen and pelvis from 3/14/2023 showed no evidence of recurrence, stable postsurgical changes and cirrhosis.  CEA normal at 1.05.  Platelet count is stable at 75,000.  He continues to follow with GI clinic at  in light of his cirrhosis, he  is now off of Coumadin.  He has not had follow-up with Dr. Kelly, he was due to see her in September for repeat flexible sigmoidoscopy but he states when he called the office they did not show that he needed an appointment.  I have asked him to contact her office now to get an appointment.  He also had questions regarding his ileostomy reversal but I defer to Dr. Kelly for that discussion.  He is now off of Coumadin.  He is asking whether or not he should consider having his reversal at  due to his high risk with cirrhosis but again I have asked him to discuss this with Dr. Kelly.  We will continue surveillance per NCCN guidelines with repeat CT chest, abdomen and pelvis and serum testing again in 6 months and I have ordered that today.  We plan on surveillance through January 2024.    -6/9/2023  hepatology  Cony MOOREN follow-up H[place] cirrhosis off Coumadin since November 2022 had ultrasound abdomen this day.  MELD score 11.  There ordering alpha-fetoprotein and MRI abdomen along with iron indices.  Presumably [place] due to weight, diabetes, hyperlipidemia, hypertension.  Tylenol as needed.  They plan MRI abdomen in 3-4 months to assess liver RADS 3 lesion.  If stable x2 years then they will continue ultrasound abdomen for HCC screening.  They recommended follow-up EGD March 2024 with Dr. Lebron.  Alpha-fetoprotein this day normal 2.7 With normal ferritin 33 and iron normal 80 with normal total iron-binding capacity and normal saturation 19% with normal total iron-binding capacity and transferrin.    -9/13/2023 CT chest abdomen pelvis compared to March 2023 showed postsurgical changes without recurrence in the chest abdomen or pelvis.  Cirrhotic portal hypertensive findings including splenomegaly with large mesenteric veins draining into the right renal vein.  Hemoglobin 12.6 up from 11.6 in June and stable from March 12 0.8 with normochromic normocytic indices.  Platelet count 69,000 where it fluctuates around 70,000  give her take 10,000.  AST 51 bilirubin 1.8 glucose 189 otherwise unremarkable CMP.  CEA normal 1.49.  This is up from 0.87 a year ago but was as high as 1.55 November 2019.    - 9/21/2023 [facility] hematology oncology follow-up: Per patient, patient did flexible sigmoidoscopy in May.  From the CAT scan and blood and tumor marker standpoint, nothing to suggest recurrence.  CEA waxes and wanes but no significant rise.  He has persistent mild anemia and thrombocytopenia with his portal hypertension from [place] cirrhosis being monitored by hepatology at .  They plan MRI as outlined above to follow-up on prior liver mass but nothing on current CTs other than chronic cirrhotic portal hypertensive variceal changes.  From the rectal cancer standpoint we will repeat his CBC CMP CEA and CTs again in 6 months and at that point he will be more than 5 years out from diagnosis and we would stop routine oncologic laboratory and imaging follow-up from the rectal cancer standpoint.  Dr. Kelly is not inclined towards reversal of his ostomy.  I asked him to [place] back with Dr. Kelly as to need for surveillance colonoscopy proximal to the ostomy and not just sigmoidoscopy.    -11/17/2023 MRI abdomen 10 mm liver RADS 3 intermediate probability for hepatocellular carcinoma with splenomegaly and no ascites.    -3/28/2024 white count 3050 hemoglobin 13.3 platelets 62,000.  Glucose 299 creatinine 0.72 otherwise unremarkable CMP.  CEA pending.    -3/28/2024 [facility] hematology oncology follow-up: With reference to his cirrhosis and screening for hepatocellular carcinoma he is following with hepatology at  and his labs are fairly stable.  We will call him his CEA results from today and if rising we will push for CTs which this time around were denied and we will check his tumor marker with labs and CT prior to return in 6 months.  I have asked him to get back with Dr. Kelly for repeat endoscopy.     Rectal carcinoma   12/18/2018 Initial Diagnosis     Rectal carcinoma (CMS/HCC)     1/7/2019 - 2/4/2019 Chemotherapy    Xeloda radiation on protocol     1/8/2019 - 2/14/2019 Radiation    Radiation OncologyTreatment Course:  Hesham Arevalo received 5040 cGy in 28 fractions to pelvis via External Beam Radiation - EBRT.     3/4/2019 Imaging    MRI of the pelvis:  IMPRESSIONS:  MRI rectal cancer T category is: T2, LESS LIKELY EARLY T3 SPICULATIONS  Maximum EMD of invasion is: 0  Minimum tumor to MRF distance is: 12 MM  Low rectal tumor component: NO  Mesorectal nodes/tumor deposits: NO  EMVI: NO  Extramesorectal nodes: NO     4/23/2019 Surgery    Surgery rectosigmoidectomy pathology report:  Final Diagnosis    1. RECTOSIGMOID COLON, RECTOSIGMOID RESECTION:  Residual adenoma with focal high grade dysplasia with no residual invasive carcinoma identified post-treatment (see comment).  Seventeen lymph nodes negative for carcinoma (0/17)   2. LIVER, CORE NEEDLE BIOPSY:  Mild steatosis with mild chronic inflammation and increased fibrosis including bridging fibrosis (Stage 3-4) (see comment)        COLON AND RECTUM TEMPLATE:  TYPE OF SPECIMEN/PROCEDURE: Rectosigmoid resection.   SPECIMEN INTEGRITY:  Intact.  TUMOR SITE:  Rectum.  TUMOR SIZE (greatest dimension):  Indeterminate  TUMOR LOCATION (applicable only to rectal primaries): Entirely below anterior peritoneal reflection.  MACROSCOPIC INTACTNESS OF MESORECTUM (If applicable): Intact.  MACROSCOPIC TUMOR PERFORATION: Not identified.  TUMOR CONFIGURATION:  Ulcerated.  HISTOLOGIC TYPE:  Adenocarcinoma.  HISTOLOGIC GRADE:  G3-4 (per previous biopsy).  MICROSCOPIC DEPTH OF TUMOR INVASION/EXTENSION:  No residual tumor identified.  PERITONEAL INVOLVEMENT:  Not identified.  LYMPHVASCULAR INVASION:  Not identified.  PERINEURAL INVASION: Not identified.  SURGICAL MARGINS: Uninvolved.  STATUS AND DISTANCE FROM PROXIMAL MUCOSAL MARGIN:  Uninvolved, 13.0 cm.  STATUS AND DISTANCE FROM DISTAL MUCOSAL MARGIN: Uninvolved, 1.2 cm.  STATUS  AND DISTANCE FROM MESENTERIC MARGIN: Not applicable.  STATUS AND DISTANCE FROM RADIAL MARGIN: Uninvolved, 1.7 cm.  DISTANCE FROM DENTATE LINE (RECTAL TUMOR ONLY):  Indeterminate.  TUMOR DEPOSITS (DISCONTINUOUS EXTRAMURAL EXTENSION):  Not identified.  NUMBER OF LYMPH NODES INVOLVED BY METASTATIC NEOPLASM: 0.  NUMBER OF REGIONAL LYMPH NODES EXAMINED: 17.  EXTRANODAL EXTENSION:  Not applicable.  TREATMENT EFFECT:  Present, no viable cancer cells identified (complete response, score 0).  ADDITIONAL PATHOLOGIC FINDINGS:  None.  MSI TESTING:  No evidence of MSI based on reported IHC on outside biopsy  OTHER ANCILLARY STUDIES (BRAF, KRAS, ETC.):  Should be performed on initial diagnostic biopsy if needed.  AJCC PATHOLOGIC STAGE:  (COMPLETED BY PATHOLOGIST, BASED ONLY ON TISSUE FINDINGS, MORE EXTENSIVE DISEASE MAY NOT BE KNOWN TO THE PATHOLOGIST)  ypT=  0  ypN=  0  AJCC PATHOLOGIC STAGE:  y0.                5/15/2019 Adverse Reaction    -5/9/2019 to 5/15/2019 admitted with abdominal pelvic abscess status post CT-guided percutaneous drain placement presenting with sepsis improved with antibiotics and drainage.  Temp of 104 on presentation.  2 weeks of protracted IV antibiotics with Radames Sosa planned at discharge     7/22/2019 - 10/23/2019 Chemotherapy    OP COLON [id number] OXALIplatin / Leucovorin / Fluorouracil  Start of adjuvant therapy delayed due to the above postoperative pelvic abscess.  This was treated surgically initially but then by CT-guided percutaneous drainage as above.  Serial follow-up with Radames Sosa including CTs and antibiotics eventually cleared him adequately to consider resumption of plans for adjuvant therapy.  CTs of the pelvis done on 6/5/2019, 6/13/2019, 6/26/2019 monitoring for this abscess and possible drainage but not able to drain due to decreasing size and minimal residual edema with CT 7/10/2019 showingStable to slight decrease in size of presacral perirectal abscess with presacral  edema measuring 3.9 x 1.9 previously 4.1 x 2.5 cm. No new sites of focal fluid collection or loculated fluid.     9/30/2019 Adverse Reaction    Oxaliplatin discontinued due to ongoing thrombocytopenia.     10/28/2019 Imaging    10/28/2019 CT chest abdomen pelvis with contrast showed decreased and fluid in the presacral fat.  Decrease in surrounding soft tissue stranding.  Gallstone seen with enlargement of spleen.  Stranding of the mesenteric roots with varices in the mesentery slightly increased compared to July 2019.  Chest is unremarkable.  CEA 1.53 with bilirubin 1.4, AST 46, alkaline phosphatase 135, white count 2500, hemoglobin 9.8, and platelets 62,000 on 10/21/2019.     8/28/2020 Procedure    Ileocolonoscopy     3/10/2022 Imaging    -3/10/2022 data:  White count stable 3230.  Hemoglobin down to 8.5 with MCV down to 76.3 with platelets stable 72,000.  CEA 1.22.  AST 48 with bilirubin 1.4Glucose 156.  Otherwise unremarkable CMP.   CT chest abdomen pelvisShows resolution of right lower lobe groundglass opacities.  Fatty liver infiltration.  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  Cholelithiasis     9/16/2022 Imaging    CT chest abdomen pelvis shows cirrhotic changes with portal hypertension stable no ascites.  No acute cardiopulmonary process.  No metastatic disease.  Stable presacral soft tissue thickening posttreatment related most likely.  Spleen is enlarged.         HISTORY OF PRESENT ILLNESS:  The patient is a 58 y.o. male, here for follow up on management of history of rectal cancer no new somatic complaint    Past Medical History:   Diagnosis Date   • Arthritis     knees    • Cancer 11/2018    colon   • Disease of thyroid gland    • Fatty liver    • Fatty liver    • Hashimoto's disease    • History of radiation therapy 02/14/2019    rectal cancer   • Hypertension    • Ileostomy in place    •  "Psoriasis    • Wears glasses      Past Surgical History:   Procedure Laterality Date   • COLON RESECTION N/A 4/23/2019    Procedure: LAPAROSCOPIC LOWER ANTERIOR RESECTION WITH DIVERTING LOOP ILEOOSTOMY, SPLENIC FLEIXURE MOBILIZATION AND LIVER BIOPSY, AND PROCTOSCOPY;  Surgeon: Pau Kelly MD;  Location: [facility] OR;  Service: General   • COLONOSCOPY      2018   • ILEOSTOMY  04/2019   • LIVER BIOPSY  2003   • VASECTOMY  1998   • VENOUS ACCESS DEVICE (PORT) INSERTION N/A 7/18/2019    Procedure: PORT PLACEMENT;  Surgeon: Franky Cazares MD;  Location: [facility] OR;  Service: General       No Known Allergies    Family History and Social History reviewed and changed as necessary    REVIEW OF SYSTEM:   No new somatic complaints    PHYSICAL EXAM:  No jaundice icterus pallor or respiratory distress.    Vitals:    03/28/24 0826   BP: 132/66   Pulse: 62   Resp: 18   Temp: 98.2 °F (36.8 °C)   SpO2: 98%   Weight: 90.3 kg (199 lb)   Height: 185.4 cm (73\")     Vitals:    03/28/24 0826   PainSc: 0-No pain          ECOG score: 0           Vitals reviewed.    ECOG: (0) Fully Active - Able to Carry On All Pre-disease Performance Without Restriction    Lab Results   Component Value Date    HGB 13.3 03/28/2024    HCT 35.7 (L) 03/28/2024    MCV 86.9 03/28/2024    PLT 62 (L) 03/28/2024    WBC 3.05 (L) 03/28/2024    NEUTROABS 1.83 03/28/2024    LYMPHSABS 0.62 (L) 03/28/2024    MONOSABS 0.35 03/28/2024    EOSABS 0.21 03/28/2024    BASOSABS 0.03 03/28/2024       Lab Results   Component Value Date    GLUCOSE 299 (H) 03/28/2024    BUN 12 03/28/2024    CREATININE 0.72 (L) 03/28/2024     03/28/2024    K 4.4 03/28/2024     03/28/2024    CO2 26.0 03/28/2024    CALCIUM 8.9 03/28/2024    PROTEINTOT 7.1 03/28/2024    ALBUMIN 3.9 03/28/2024    BILITOT 1.1 03/28/2024    ALKPHOS 78 03/28/2024    AST 32 03/28/2024    ALT <5 03/28/2024             ASSESSMENT & PLAN:  1.  Stage IIIB rectal carcinoma clinical T3b N1 aM0  2.  Protracted " postoperative course due to abdominal pelvic abscess following neoadjuvant Xeloda radiation  3.  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  B12 deficiency anemia  5.  Iron deficiency anemia  6.  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  7.  Intestinal metaplasia on EGD    -11/30/18 CT abdomen pelvis and chest x-ray negative for metastasis.  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    -12/6/18 MRI of rectum showed T3b with suspicious right internal iliac lymph node and CT chest noncontrast negative except for gallstones  -Per Dr. Kelly, CEA was normal.  -12/18/18 saw me for the first time.  I reviewed her case in our multidisciplinary conference and went over that in detail with her.  She had presented with hematochezia.  Dr. Kelly repeated endoscopy for more exact detailing of the primary location and this appears to be rectal on MRI and endoscopy.  Plan is for randomization on clinical trial N 1048 to neoadjuvant FOLFOX versus standard chemoradiation.  We'll get him to radiation and our research staff for randomization and get his baseline CBC and CMP.    -1/4/19 followed up: Randomized to the Xeloda radiation arm.  He will get that and then 4-6 weeks later had his surgery, and then follow that with 6 months of adjuvant FOLFOX per protocol.  My research assistant met with him today.  He has a Xeloda prescription.  -3/4/2019 MRI: Good response with T2, less likely T3 with spiculation N0 disease.  -4/23/2019 pathology: Laparoscopic assisted converted to lower anterior resection open with stapled coloanal anastomosis and diverting loop ileostomy with liver biopsy showed residual adenoma with focal  high-grade dysplasia but no residual invasive carcinoma.  0 out of 17 lymph nodes.  Mild steatosis on liver biopsy with bridging fibrosis.  -5/9/2019 to 5/15/2019 admitted with abdominal pelvic abscess status post CT-guided percutaneous drain placement presenting with sepsis improved with antibiotics and drainage.  Temp of 104 on presentation.  2 weeks of protracted IV antibiotics with Radames Sosa planned at discharge.  -7/11/2019 follow-up visit:Start of adjuvant therapy delayed due to the above postoperative pelvic abscess.  This was treated surgically initially but then by CT-guided percutaneous drainage as above.  Serial follow-up with Radames Sosa including CTs and antibiotics eventually cleared him adequately to consider resumption of plans for adjuvant therapy.  CTs of the pelvis done on 6/5/2019, 6/13/2019, 6/26/2019 monitoring for this abscess and possible drainage but not able to drain due to decreasing size and minimal residual edema with CT 7/10/2019 showingStable to slight decrease in size of presacral perirectal abscess with presacral edema measuring 3.9 x 1.9 previously 4.1 x 2.5 cm. No new sites of focal fluid collection or loculated fluid.  Hence, modified FOLFOX 6 started at this junction.  -9/30/2019 office visit: Oxaliplatin discontinued due to ongoing thrombocytopenia.  Platelet count today 59,000.  We will continue course #5 and 6 with 5-FU and leucovorin alone to complete adjuvant therapy.  -10/23/2019 completed adjuvant chemotherapy.  -10/28/2019 CT chest abdomen pelvis with contrast showed decreased and fluid in the presacral fat.  Decrease in surrounding soft tissue stranding.  Gallstone seen with enlargement of spleen.  Stranding of the mesenteric roots with varices in the mesentery slightly increased compared to July 2019.  Chest is unremarkable.  CEA 1.53 with bilirubin 1.4, AST 46, alkaline phosphatase 135, white count 2500, hemoglobin 9.8, and platelets 62,000 on  10/21/2019.  -11/2019 colonoscopy with Dr. Lebron negative according to patient.  -2/23/2020: Saw Dr. Lebron regarding [place] cirrhosis-induced portal hypertension.  Did not recommend TIPS.  Started nadolol  -2/25/20 20 CT chest abdomen pelvis negative for metastasis but with portal hypertension and varices.  CEA 1.1.  ALT 51.  White count 3970 with hemoglobin 10.9.  -6/2/2020 CT abdomen and pelvis with stable postsurgical changes, no evidence of disease progression, CEA 0.86.  -8/28/2020 ileocolonoscopy  -9/8/2020 CT abdomen and pelvis without evidence of disease recurrence, CEA 0.87.  -3/9/2021 CT abdomen and pelvis stable changes, no evidence of progression of disease or recurrence.  CEA 1.21  -8/30/2021 CT chest, abdomen and pelvis with nonspecific small area of groundglass nodularity near the periphery of the right lower lobe favored infectious/inflammatory, otherwise CT scan stable without specific evidence of disease progression.  Of note, patient did have Covid infection 2 months ago.  Currently no respiratory concerns.  Has seen Dr. Gerardo at  for evaluation regarding his desire for ileostomy reversal, he reports that he had liver ultrasound that showed questionable liver lesion, he is scheduled for additional CT on 9/24 and then follow-up with Dr. Gerardo.  I discussed with the patient that current CT scan shows no abnormalities in the liver, he does have known cirrhosis with [place].  We will continue surveillance as per NCCN guidelines with repeat CT scans and serum testing in 6 months.  CEA currently normal at 1.13, platelet count stable at 73,000.    -2/24/2022 follow-up Dr. Racheal Gerardo Hepatology.  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    -2/28/2022 data from : Ferritin low at 12 with iron low at 38 and decreased transferrin saturation 8% with increased  total iron-binding capacity 498 and increased transferrin 398 consistent with iron deficiency.  B12 level immeasurably low less than 150.  Folic acid normal 15.5.  Alpha-fetoprotein 2.5.  INR 2.3.  Hemoglobin 8.9 MCV 77 with white count 3250 and platelets 77k.    -3/10/2022 data:  White count stable 3230.  Hemoglobin down to 8.5 with MCV down to 76.3 with platelets stable 72,000.  CEA 1.22.  AST 48 with bilirubin 1.4Glucose 156.  Otherwise unremarkable CMP.   CT chest abdomen pelvisShows resolution of right lower lobe groundglass opacities.  Fatty liver infiltration.  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  Cholelithiasis    -3/18/2022 [facility] medical oncology follow-up visit: I reviewed multiple data sets from  and current CTs and labs and CEA from [facility].  Both images of the scans as well as reports reviewed and went over the above with the patient.  He started B12 already and has iron deficiency anemia as well documented at  and I have started him on ferrous sulfate 325 twice a day every other day with vitamin C and we will in 6 months repeat his CTs and CBC and CEA per NCCN guidelines from the cancer standpoint.  He will continue to follow with  liver specialists from the portal hypertension standpoint and is currently on Coumadin.  He had EGD and colonoscopy according to the patient yesterday at  and the varices were improved.  There was still some thrombus in the current CT but I suspect it is improving.  Coumadin is a Catch-22 and may actually complicate variceal bleeding but it also may diminish the likelihood of bleeding if it helps decompress the portal system by treating the thrombus and I leave that decision to the capable hands of his gastroenterologist/liver specialist at     - 8/26/2022 [facility] liver clinic note.  [place] cirrhosis.  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  Following with anticoagulation clinic.  They plan to arrange for MRI liver protocol to assess for possible resolution of thrombus as surgeon planning ileostomy reversal once liver clinic clears him for surgery.  AST 49.  ALT 29.  Bilirubin 1.0.  Alkaline phosphatase 52.  White count 2980.  Hemoglobin 11.4.  Platelets 77,000.  Alpha-fetoprotein 2.6.  INR 2.0.    -9/12/2022 CT chest abdomen pelvis shows cirrhotic changes with portal hypertension stable no ascites.  No acute cardiopulmonary process.  No metastatic disease.  Stable presacral soft tissue thickening posttreatment related most likely.  Spleen is enlarged.      -9/16/2022 [facility] medical oncology follow-up visit: I reviewed the above liver clinic note and CT images and reports with patient.  No evidence of recurrence.  Today's white count is 2740 with hemoglobin 11.3 platelets 64,000 generally with platelets in the 70,000's over the last year.  MCV however has improved now 87.8 on iron and B12 in the hemoglobin has gone from 8.5 up to 11.3 in the last 6 months.  He did have gastritis and intestinal metaplasia without dysplasia with Dr. Lebron February 2022 with atrophic gastric body.  Iron, B12, folate, CEA pending at time of dictation.  We will have my nurse practitioner call him the results of the pending lab and if the CEA is rising he may need to see me sooner but otherwise the plan is to repeat his tumor markers and labs and CTs again in 6 months and will do so every 6 months through January 2024.  He is contemplating reversal of the ostomy and I left a message with Dr. Kelly that Avatrombopag might be helpful but his platelets are close to 60,000 which is the general goal for most preoperative clearances but I will defer to her on that and of course the portal hypertension itself increases the risk of surgery simply from vascular engorgement and an MRI liver is planned  by the liver surgeons to see if he can safely come off of the Coumadin that they are managing through the  Coumadin clinic.  Asked him to touch base with Dr. Lebron as to when to get the next EGD relative to the intestinal metaplasia on EGD.    -10/13/2022 MRI of the abdomen at  was stable, no new or suspicious liver lesions.    -3/14/2023 CT chest, abdomen and pelvis with no evidence of recurrent or metastatic disease.  Stable postsurgical changes from bowel resection and right lower quadrant ostomy.  Soft tissue in the presacral space similar to prior study.  Cirrhosis of the liver with splenomegaly and multiple right renal varices.  Cholelithiasis. CEA 1.05.  CBC is unremarkable, platelet count stable at 75,000.  CMP creatinine stable at 1.28, normal AST and ALT of 34/33, total bilirubin 1.3.    -3/20/2023 [facility] Oncology clinic follow-up: Mr. Arevalo continues to do well, no evidence of recurrent rectal cancer on clinical exam and no new worrisome symptoms.  Current CT chest, abdomen and pelvis from 3/14/2023 showed no evidence of recurrence, stable postsurgical changes and cirrhosis.  CEA normal at 1.05.  Platelet count is stable at 75,000.  He continues to follow with GI clinic at  in light of his cirrhosis, he is now off of Coumadin.  He has not had follow-up with Dr. Kelly, he was due to see her in September for repeat flexible sigmoidoscopy but he states when he called the office they did not show that he needed an appointment.  I have asked him to contact her office now to get an appointment.  He also had questions regarding his ileostomy reversal but I defer to Dr. Kelly for that discussion.  He is now off of Coumadin.  He is asking whether or not he should consider having his reversal at  due to his high risk with cirrhosis but again I have asked him to discuss this with Dr. Kelly.  We will continue surveillance per NCCN guidelines with repeat CT chest, abdomen and pelvis and serum testing again in 6  months and I have ordered that today.  We plan on surveillance through January 2024.    -6/9/2023  hepatology  Cony MACARIO follow-up  cirrhosis off Coumadin since November 2022 had ultrasound abdomen this day.  MELD score 11.  There ordering alpha-fetoprotein and MRI abdomen along with iron indices.  Presumably  due to weight, diabetes, hyperlipidemia, hypertension.  Tylenol as needed.  They plan MRI abdomen in 3-4 months to assess liver RADS 3 lesion.  If stable x2 years then they will continue ultrasound abdomen for HCC screening.  They recommended follow-up EGD March 2024 with Dr. Lebron.  Alpha-fetoprotein this day normal 2.7 With normal ferritin 33 and iron normal 80 with normal total iron-binding capacity and normal saturation 19% with normal total iron-binding capacity and transferrin.    -9/13/2023 CT chest abdomen pelvis compared to March 2023 showed postsurgical changes without recurrence in the chest abdomen or pelvis.  Cirrhotic portal hypertensive findings including splenomegaly with large mesenteric veins draining into the right renal vein.  Hemoglobin 12.6 up from 11.6 in June and stable from March 12 0.8 with normochromic normocytic indices.  Platelet count 69,000 where it fluctuates around 70,000 give her take 10,000.  AST 51 bilirubin 1.8 glucose 189 otherwise unremarkable CMP.  CEA normal 1.49.  This is up from 0.87 a year ago but was as high as 1.55 November 2019.    - 9/21/2023 [facility] hematology oncology follow-up: Per patient, patient did flexible sigmoidoscopy in May.  From the CAT scan and blood and tumor marker standpoint, nothing to suggest recurrence.  CEA waxes and wanes but no significant rise.  He has persistent mild anemia and thrombocytopenia with his portal hypertension from  cirrhosis being monitored by hepatology at .  They plan MRI as outlined above to follow-up on prior liver mass but nothing on current CTs other than chronic cirrhotic portal hypertensive  variceal changes.  From the rectal cancer standpoint we will repeat his CBC CMP CEA and CTs again in 6 months and at that point he will be more than 5 years out from diagnosis and we would stop routine oncologic laboratory and imaging follow-up from the rectal cancer standpoint.  Dr. Kelly is not inclined towards reversal of his ostomy.  I asked him to [place] back with Dr. Kelly as to need for surveillance colonoscopy proximal to the ostomy and not just sigmoidoscopy.    -11/17/2023 MRI abdomen 10 mm liver RADS 3 intermediate probability for hepatocellular carcinoma with splenomegaly and no ascites.    -3/28/2024 white count 3050 hemoglobin 13.3 platelets 62,000.  Glucose 299 creatinine 0.72 otherwise unremarkable CMP.  CEA pending.    -3/28/2024 [facility] hematology oncology follow-up: With reference to his cirrhosis and screening for hepatocellular carcinoma he is following with hepatology at  and his labs are fairly stable.  We will call him his CEA results from today and if rising we will push for CTs which this time around were denied and we will check his tumor marker with labs and CT prior to return in 6 months.  I have asked him to get back with Dr. Kelly for repeat endoscopy.    Total time of care today inclusive of time spent today prior to patient's arrival reviewing interval MRI and interval labs and during visit interviewing him as to signs or symptoms of his disease and management thereof and after ordering the plan as outlined took 35 minutes patient care time throughout the day today.  Ousmane Moeller MD    03/28/2024

## 2024-03-28 NOTE — TELEPHONE ENCOUNTER
Called and notified patient per Dr. Moeller that his CEA is normal and we will do scans in 6 months as planned. Patient verbalized understanding.

## 2024-05-02 ENCOUNTER — APPOINTMENT (OUTPATIENT)
Dept: ULTRASOUND IMAGING | Facility: HOSPITAL | Age: 59
End: 2024-05-02
Payer: COMMERCIAL

## 2024-05-02 ENCOUNTER — APPOINTMENT (OUTPATIENT)
Dept: CT IMAGING | Facility: HOSPITAL | Age: 59
End: 2024-05-02
Payer: COMMERCIAL

## 2024-05-02 ENCOUNTER — HOSPITAL ENCOUNTER (OUTPATIENT)
Facility: HOSPITAL | Age: 59
Setting detail: OBSERVATION
Discharge: HOME | End: 2024-05-03
Attending: EMERGENCY MEDICINE | Admitting: INTERNAL MEDICINE
Payer: COMMERCIAL

## 2024-05-02 DIAGNOSIS — R10.9 INTRACTABLE ABDOMINAL PAIN: Primary | ICD-10-CM

## 2024-05-02 DIAGNOSIS — Z98.890 HISTORY OF ILEOSTOMY: ICD-10-CM

## 2024-05-02 DIAGNOSIS — R11.2 NAUSEA AND VOMITING, UNSPECIFIED VOMITING TYPE: ICD-10-CM

## 2024-05-02 PROBLEM — E80.6 HYPERBILIRUBINEMIA: Status: ACTIVE | Noted: 2024-05-02

## 2024-05-02 PROBLEM — R11.10 ABDOMINAL PAIN WITH NONBILIOUS VOMITING: Status: ACTIVE | Noted: 2024-05-02

## 2024-05-02 PROBLEM — K80.20 CHOLELITHIASIS: Status: ACTIVE | Noted: 2024-05-02

## 2024-05-02 LAB
ADV 40+41 DNA STL QL NAA+NON-PROBE: NOT DETECTED
ALBUMIN SERPL-MCNC: 3.8 G/DL (ref 3.5–5.2)
ALBUMIN/GLOB SERPL: 1.2 G/DL
ALP SERPL-CCNC: 59 U/L (ref 39–117)
ALT SERPL W P-5'-P-CCNC: 26 U/L (ref 1–41)
ANION GAP SERPL CALCULATED.3IONS-SCNC: 9 MMOL/L (ref 5–15)
AST SERPL-CCNC: 45 U/L (ref 1–40)
ASTRO TYP 1-8 RNA STL QL NAA+NON-PROBE: NOT DETECTED
BACTERIA UR QL AUTO: ABNORMAL /HPF
BASOPHILS # BLD AUTO: 0.04 10*3/MM3 (ref 0–0.2)
BASOPHILS NFR BLD AUTO: 0.6 % (ref 0–1.5)
BILIRUB SERPL-MCNC: 3.3 MG/DL (ref 0–1.2)
BILIRUB UR QL STRIP: ABNORMAL
BUN SERPL-MCNC: 16 MG/DL (ref 8–23)
BUN/CREAT SERPL: 16.8 (ref 7–25)
C CAYETANENSIS DNA STL QL NAA+NON-PROBE: NOT DETECTED
C COLI+JEJ+UPSA DNA STL QL NAA+NON-PROBE: NOT DETECTED
CALCIUM SPEC-SCNC: 9.1 MG/DL (ref 8.6–10.5)
CHLORIDE SERPL-SCNC: 101 MMOL/L (ref 98–107)
CLARITY UR: CLEAR
CO2 SERPL-SCNC: 25 MMOL/L (ref 22–29)
COLOR UR: ABNORMAL
CREAT BLDA-MCNC: 1 MG/DL (ref 0.6–1.3)
CREAT BLDA-MCNC: 1 MG/DL (ref 0.6–1.3)
CREAT SERPL-MCNC: 0.95 MG/DL (ref 0.76–1.27)
CRYPTOSP DNA STL QL NAA+NON-PROBE: NOT DETECTED
D-LACTATE SERPL-SCNC: 1.8 MMOL/L (ref 0.5–2)
DEPRECATED RDW RBC AUTO: 42.8 FL (ref 37–54)
E HISTOLYT DNA STL QL NAA+NON-PROBE: NOT DETECTED
EAEC PAA PLAS AGGR+AATA ST NAA+NON-PRB: NOT DETECTED
EC STX1+STX2 GENES STL QL NAA+NON-PROBE: NOT DETECTED
EGFRCR SERPLBLD CKD-EPI 2021: 91.6 ML/MIN/1.73
EOSINOPHIL # BLD AUTO: 0.12 10*3/MM3 (ref 0–0.4)
EOSINOPHIL NFR BLD AUTO: 1.7 % (ref 0.3–6.2)
EPEC EAE GENE STL QL NAA+NON-PROBE: NOT DETECTED
ERYTHROCYTE [DISTWIDTH] IN BLOOD BY AUTOMATED COUNT: 13.5 % (ref 12.3–15.4)
ETEC LTA+ST1A+ST1B TOX ST NAA+NON-PROBE: NOT DETECTED
G LAMBLIA DNA STL QL NAA+NON-PROBE: NOT DETECTED
GLOBULIN UR ELPH-MCNC: 3.3 GM/DL
GLUCOSE BLDC GLUCOMTR-MCNC: 167 MG/DL (ref 70–130)
GLUCOSE BLDC GLUCOMTR-MCNC: 168 MG/DL (ref 70–130)
GLUCOSE SERPL-MCNC: 215 MG/DL (ref 65–99)
GLUCOSE UR STRIP-MCNC: ABNORMAL MG/DL
HCT VFR BLD AUTO: 36.2 % (ref 37.5–51)
HGB BLD-MCNC: 12.8 G/DL (ref 13–17.7)
HGB UR QL STRIP.AUTO: ABNORMAL
HYALINE CASTS UR QL AUTO: ABNORMAL /LPF
IMM GRANULOCYTES # BLD AUTO: 0.02 10*3/MM3 (ref 0–0.05)
IMM GRANULOCYTES NFR BLD AUTO: 0.3 % (ref 0–0.5)
KETONES UR QL STRIP: ABNORMAL
LEUKOCYTE ESTERASE UR QL STRIP.AUTO: NEGATIVE
LIPASE SERPL-CCNC: 60 U/L (ref 13–60)
LYMPHOCYTES # BLD AUTO: 0.71 10*3/MM3 (ref 0.7–3.1)
LYMPHOCYTES NFR BLD AUTO: 10 % (ref 19.6–45.3)
MCH RBC QN AUTO: 30.8 PG (ref 26.6–33)
MCHC RBC AUTO-ENTMCNC: 35.4 G/DL (ref 31.5–35.7)
MCV RBC AUTO: 87 FL (ref 79–97)
MONOCYTES # BLD AUTO: 0.46 10*3/MM3 (ref 0.1–0.9)
MONOCYTES NFR BLD AUTO: 6.5 % (ref 5–12)
NEUTROPHILS NFR BLD AUTO: 5.77 10*3/MM3 (ref 1.7–7)
NEUTROPHILS NFR BLD AUTO: 80.9 % (ref 42.7–76)
NITRITE UR QL STRIP: NEGATIVE
NOROVIRUS GI+II RNA STL QL NAA+NON-PROBE: NOT DETECTED
NRBC BLD AUTO-RTO: 0 /100 WBC (ref 0–0.2)
P SHIGELLOIDES DNA STL QL NAA+NON-PROBE: NOT DETECTED
PH UR STRIP.AUTO: <=5 [PH] (ref 5–8)
PLATELET # BLD AUTO: 77 10*3/MM3 (ref 140–450)
PMV BLD AUTO: 10.1 FL (ref 6–12)
POTASSIUM SERPL-SCNC: 4.2 MMOL/L (ref 3.5–5.2)
PROT SERPL-MCNC: 7.1 G/DL (ref 6–8.5)
PROT UR QL STRIP: ABNORMAL
RBC # BLD AUTO: 4.16 10*6/MM3 (ref 4.14–5.8)
RBC # UR STRIP: ABNORMAL /HPF
REF LAB TEST METHOD: ABNORMAL
RVA RNA STL QL NAA+NON-PROBE: NOT DETECTED
S ENT+BONG DNA STL QL NAA+NON-PROBE: NOT DETECTED
SAPO I+II+IV+V RNA STL QL NAA+NON-PROBE: NOT DETECTED
SHIGELLA SP+EIEC IPAH ST NAA+NON-PROBE: NOT DETECTED
SODIUM SERPL-SCNC: 135 MMOL/L (ref 136–145)
SP GR UR STRIP: 1.04 (ref 1–1.03)
SQUAMOUS #/AREA URNS HPF: ABNORMAL /HPF
TROPONIN T SERPL HS-MCNC: 12 NG/L
UROBILINOGEN UR QL STRIP: ABNORMAL
V CHOL+PARA+VUL DNA STL QL NAA+NON-PROBE: NOT DETECTED
V CHOLERAE DNA STL QL NAA+NON-PROBE: NOT DETECTED
WBC # UR STRIP: ABNORMAL /HPF
WBC NRBC COR # BLD AUTO: 7.12 10*3/MM3 (ref 3.4–10.8)
Y ENTEROCOL DNA STL QL NAA+NON-PROBE: NOT DETECTED

## 2024-05-02 PROCEDURE — 84484 ASSAY OF TROPONIN QUANT: CPT | Performed by: PHYSICIAN ASSISTANT

## 2024-05-02 PROCEDURE — 25010000002 ONDANSETRON PER 1 MG: Performed by: PHYSICIAN ASSISTANT

## 2024-05-02 PROCEDURE — 85025 COMPLETE CBC W/AUTO DIFF WBC: CPT | Performed by: PHYSICIAN ASSISTANT

## 2024-05-02 PROCEDURE — 99285 EMERGENCY DEPT VISIT HI MDM: CPT

## 2024-05-02 PROCEDURE — 25010000002 MORPHINE PER 10 MG: Performed by: EMERGENCY MEDICINE

## 2024-05-02 PROCEDURE — 25810000003 SODIUM CHLORIDE 0.9 % SOLUTION: Performed by: PHYSICIAN ASSISTANT

## 2024-05-02 PROCEDURE — G0378 HOSPITAL OBSERVATION PER HR: HCPCS

## 2024-05-02 PROCEDURE — 83605 ASSAY OF LACTIC ACID: CPT | Performed by: PHYSICIAN ASSISTANT

## 2024-05-02 PROCEDURE — 81001 URINALYSIS AUTO W/SCOPE: CPT | Performed by: PHYSICIAN ASSISTANT

## 2024-05-02 PROCEDURE — 93005 ELECTROCARDIOGRAM TRACING: CPT | Performed by: PHYSICIAN ASSISTANT

## 2024-05-02 PROCEDURE — 25810000003 SODIUM CHLORIDE 0.9 % SOLUTION: Performed by: NURSE PRACTITIONER

## 2024-05-02 PROCEDURE — 25510000001 IOPAMIDOL 61 % SOLUTION: Performed by: EMERGENCY MEDICINE

## 2024-05-02 PROCEDURE — 82565 ASSAY OF CREATININE: CPT

## 2024-05-02 PROCEDURE — 83690 ASSAY OF LIPASE: CPT | Performed by: PHYSICIAN ASSISTANT

## 2024-05-02 PROCEDURE — 96376 TX/PRO/DX INJ SAME DRUG ADON: CPT

## 2024-05-02 PROCEDURE — 80053 COMPREHEN METABOLIC PANEL: CPT | Performed by: PHYSICIAN ASSISTANT

## 2024-05-02 PROCEDURE — 82948 REAGENT STRIP/BLOOD GLUCOSE: CPT

## 2024-05-02 PROCEDURE — 96374 THER/PROPH/DIAG INJ IV PUSH: CPT

## 2024-05-02 PROCEDURE — 25010000002 MORPHINE PER 10 MG: Performed by: NURSE PRACTITIONER

## 2024-05-02 PROCEDURE — 76705 ECHO EXAM OF ABDOMEN: CPT

## 2024-05-02 PROCEDURE — 25010000002 ENOXAPARIN PER 10 MG: Performed by: NURSE PRACTITIONER

## 2024-05-02 PROCEDURE — 96372 THER/PROPH/DIAG INJ SC/IM: CPT

## 2024-05-02 PROCEDURE — 96375 TX/PRO/DX INJ NEW DRUG ADDON: CPT

## 2024-05-02 PROCEDURE — 93010 ELECTROCARDIOGRAM REPORT: CPT | Performed by: INTERNAL MEDICINE

## 2024-05-02 PROCEDURE — 25010000002 HYDROMORPHONE PER 4 MG: Performed by: EMERGENCY MEDICINE

## 2024-05-02 PROCEDURE — 74177 CT ABD & PELVIS W/CONTRAST: CPT

## 2024-05-02 PROCEDURE — 99222 1ST HOSP IP/OBS MODERATE 55: CPT | Performed by: NURSE PRACTITIONER

## 2024-05-02 PROCEDURE — 87507 IADNA-DNA/RNA PROBE TQ 12-25: CPT | Performed by: NURSE PRACTITIONER

## 2024-05-02 RX ORDER — SODIUM CHLORIDE 9 MG/ML
100 INJECTION, SOLUTION INTRAVENOUS CONTINUOUS
Status: DISCONTINUED | OUTPATIENT
Start: 2024-05-02 | End: 2024-05-03 | Stop reason: HOSPADM

## 2024-05-02 RX ORDER — MULTIPLE VITAMINS W/ MINERALS TAB 9MG-400MCG
1 TAB ORAL EVERY MORNING
Status: DISCONTINUED | OUTPATIENT
Start: 2024-05-03 | End: 2024-05-03 | Stop reason: HOSPADM

## 2024-05-02 RX ORDER — MORPHINE SULFATE 2 MG/ML
1 INJECTION, SOLUTION INTRAMUSCULAR; INTRAVENOUS EVERY 4 HOURS PRN
Status: DISCONTINUED | OUTPATIENT
Start: 2024-05-02 | End: 2024-05-03 | Stop reason: HOSPADM

## 2024-05-02 RX ORDER — SODIUM CHLORIDE 0.9 % (FLUSH) 0.9 %
10 SYRINGE (ML) INJECTION AS NEEDED
Status: DISCONTINUED | OUTPATIENT
Start: 2024-05-02 | End: 2024-05-03 | Stop reason: HOSPADM

## 2024-05-02 RX ORDER — FERROUS SULFATE 325(65) MG
325 TABLET ORAL EVERY OTHER DAY
Status: DISCONTINUED | OUTPATIENT
Start: 2024-05-02 | End: 2024-05-03 | Stop reason: HOSPADM

## 2024-05-02 RX ORDER — INSULIN LISPRO 100 [IU]/ML
2-7 INJECTION, SOLUTION INTRAVENOUS; SUBCUTANEOUS
Status: DISCONTINUED | OUTPATIENT
Start: 2024-05-02 | End: 2024-05-03 | Stop reason: HOSPADM

## 2024-05-02 RX ORDER — ONDANSETRON 2 MG/ML
4 INJECTION INTRAMUSCULAR; INTRAVENOUS EVERY 6 HOURS PRN
Status: DISCONTINUED | OUTPATIENT
Start: 2024-05-02 | End: 2024-05-03 | Stop reason: HOSPADM

## 2024-05-02 RX ORDER — ONDANSETRON 4 MG/1
4 TABLET, ORALLY DISINTEGRATING ORAL EVERY 6 HOURS PRN
Status: DISCONTINUED | OUTPATIENT
Start: 2024-05-02 | End: 2024-05-03 | Stop reason: HOSPADM

## 2024-05-02 RX ORDER — MORPHINE SULFATE 2 MG/ML
2 INJECTION, SOLUTION INTRAMUSCULAR; INTRAVENOUS ONCE
Status: COMPLETED | OUTPATIENT
Start: 2024-05-02 | End: 2024-05-02

## 2024-05-02 RX ORDER — IBUPROFEN 600 MG/1
1 TABLET ORAL
Status: DISCONTINUED | OUTPATIENT
Start: 2024-05-02 | End: 2024-05-03 | Stop reason: HOSPADM

## 2024-05-02 RX ORDER — ENOXAPARIN SODIUM 100 MG/ML
40 INJECTION SUBCUTANEOUS EVERY 24 HOURS
Status: DISCONTINUED | OUTPATIENT
Start: 2024-05-02 | End: 2024-05-03 | Stop reason: HOSPADM

## 2024-05-02 RX ORDER — FAMOTIDINE 20 MG/1
40 TABLET, FILM COATED ORAL DAILY
Status: DISCONTINUED | OUTPATIENT
Start: 2024-05-02 | End: 2024-05-03 | Stop reason: HOSPADM

## 2024-05-02 RX ORDER — HYDROMORPHONE HYDROCHLORIDE 1 MG/ML
0.5 INJECTION, SOLUTION INTRAMUSCULAR; INTRAVENOUS; SUBCUTANEOUS ONCE
Status: COMPLETED | OUTPATIENT
Start: 2024-05-02 | End: 2024-05-02

## 2024-05-02 RX ORDER — LEVOTHYROXINE SODIUM 0.03 MG/1
25 TABLET ORAL
Status: DISCONTINUED | OUTPATIENT
Start: 2024-05-03 | End: 2024-05-03 | Stop reason: HOSPADM

## 2024-05-02 RX ORDER — NICOTINE POLACRILEX 4 MG
15 LOZENGE BUCCAL
Status: DISCONTINUED | OUTPATIENT
Start: 2024-05-02 | End: 2024-05-03 | Stop reason: HOSPADM

## 2024-05-02 RX ORDER — SODIUM CHLORIDE 0.9 % (FLUSH) 0.9 %
10 SYRINGE (ML) INJECTION EVERY 12 HOURS SCHEDULED
Status: DISCONTINUED | OUTPATIENT
Start: 2024-05-02 | End: 2024-05-03 | Stop reason: HOSPADM

## 2024-05-02 RX ORDER — ONDANSETRON 2 MG/ML
4 INJECTION INTRAMUSCULAR; INTRAVENOUS ONCE
Status: COMPLETED | OUTPATIENT
Start: 2024-05-02 | End: 2024-05-02

## 2024-05-02 RX ORDER — DEXTROSE MONOHYDRATE 25 G/50ML
25 INJECTION, SOLUTION INTRAVENOUS
Status: DISCONTINUED | OUTPATIENT
Start: 2024-05-02 | End: 2024-05-03 | Stop reason: HOSPADM

## 2024-05-02 RX ORDER — LISINOPRIL 10 MG/1
10 TABLET ORAL DAILY
Status: DISCONTINUED | OUTPATIENT
Start: 2024-05-03 | End: 2024-05-03 | Stop reason: HOSPADM

## 2024-05-02 RX ORDER — NALOXONE HCL 0.4 MG/ML
0.4 VIAL (ML) INJECTION
Status: DISCONTINUED | OUTPATIENT
Start: 2024-05-02 | End: 2024-05-03 | Stop reason: HOSPADM

## 2024-05-02 RX ORDER — SODIUM CHLORIDE 9 MG/ML
40 INJECTION, SOLUTION INTRAVENOUS AS NEEDED
Status: DISCONTINUED | OUTPATIENT
Start: 2024-05-02 | End: 2024-05-03 | Stop reason: HOSPADM

## 2024-05-02 RX ADMIN — HYDROMORPHONE HYDROCHLORIDE 0.5 MG: 1 INJECTION, SOLUTION INTRAMUSCULAR; INTRAVENOUS; SUBCUTANEOUS at 14:37

## 2024-05-02 RX ADMIN — MORPHINE SULFATE 1 MG: 2 INJECTION, SOLUTION INTRAMUSCULAR; INTRAVENOUS at 17:28

## 2024-05-02 RX ADMIN — IOPAMIDOL 85 ML: 612 INJECTION, SOLUTION INTRAVENOUS at 12:41

## 2024-05-02 RX ADMIN — Medication 10 ML: at 20:57

## 2024-05-02 RX ADMIN — FAMOTIDINE 40 MG: 20 TABLET, FILM COATED ORAL at 17:25

## 2024-05-02 RX ADMIN — ENOXAPARIN SODIUM 40 MG: 100 INJECTION SUBCUTANEOUS at 17:26

## 2024-05-02 RX ADMIN — ONDANSETRON 4 MG: 2 INJECTION INTRAMUSCULAR; INTRAVENOUS at 12:09

## 2024-05-02 RX ADMIN — FERROUS SULFATE TAB 325 MG (65 MG ELEMENTAL FE) 325 MG: 325 (65 FE) TAB at 17:26

## 2024-05-02 RX ADMIN — MORPHINE SULFATE 2 MG: 2 INJECTION, SOLUTION INTRAMUSCULAR; INTRAVENOUS at 12:10

## 2024-05-02 RX ADMIN — SODIUM CHLORIDE 1000 ML: 9 INJECTION, SOLUTION INTRAVENOUS at 12:09

## 2024-05-02 RX ADMIN — SODIUM CHLORIDE 100 ML/HR: 9 INJECTION, SOLUTION INTRAVENOUS at 17:27

## 2024-05-02 NOTE — ED PROVIDER NOTES
Subjective   History of Present Illness  Mr. Arevalo is a 58 year old male with history of prior colorectal cancer, s/p resection and ileostomy 5 years ago, HTN, hypothyroidism, pre-DM, who presents with diffuse abdominal pain, nausea and vomiting since yesterday.  He states he has had very little stool output in his ileostomy bag.  Normal urination.  He states he is scheduled to have colonoscopy tomorrow with Dr. Lebron and had only clear liquids today.  No fever.  His colorectal doctor is Pau Kelly.          Review of Systems   Constitutional:  Negative for chills and fever.   HENT:  Negative for sore throat.    Respiratory:  Negative for cough and shortness of breath.    Cardiovascular:  Negative for chest pain.   Gastrointestinal:  Positive for abdominal pain, nausea and vomiting.   Genitourinary:  Negative for decreased urine volume and dysuria.   Musculoskeletal:  Negative for back pain.   Skin:  Negative for color change.   Neurological:  Positive for weakness (generalized) and light-headedness. Negative for headaches.   Psychiatric/Behavioral:  Negative for confusion.        Past Medical History:   Diagnosis Date    Arthritis     knees     Cancer 11/2018    colon    Disease of thyroid gland     Fatty liver     Fatty liver     Hashimoto's disease     History of radiation therapy 02/14/2019    rectal cancer    Hypertension     Ileostomy in place     Psoriasis     Wears glasses        No Known Allergies    Past Surgical History:   Procedure Laterality Date    COLON RESECTION N/A 4/23/2019    Procedure: LAPAROSCOPIC LOWER ANTERIOR RESECTION WITH DIVERTING LOOP ILEOOSTOMY, SPLENIC FLEIXURE MOBILIZATION AND LIVER BIOPSY, AND PROCTOSCOPY;  Surgeon: Pau Kelly MD;  Location: [facility];  Service: General    COLONOSCOPY      2018    ILEOSTOMY  04/2019    LIVER BIOPSY  2003    VASECTOMY  1998    VENOUS ACCESS DEVICE (PORT) INSERTION N/A 7/18/2019    Procedure: PORT PLACEMENT;  Surgeon: Franky Cazares,  MD;  Location: [facility];  Service: General       Family History   Problem Relation Age of Onset    Breast cancer Mother     Cancer Father        Social History     Socioeconomic History    Marital status:    Tobacco Use    Smoking status: Former     Current packs/day: 0.00     Average packs/day: 1 pack/day for 14.0 years (14.0 ttl pk-yrs)     Types: Cigarettes     Start date:      Quit date:      Years since quittin.3    Smokeless tobacco: Never   Substance and Sexual Activity    Alcohol use: No    Drug use: No    Sexual activity: Defer           Objective   Physical Exam  Constitutional:       Appearance: He is well-developed.   HENT:      Head: Normocephalic.      Mouth/Throat:      Mouth: Mucous membranes are moist.   Eyes:      Extraocular Movements: Extraocular movements intact.      Pupils: Pupils are equal, round, and reactive to light.   Cardiovascular:      Rate and Rhythm: Normal rate and regular rhythm.      Heart sounds: Normal heart sounds.   Pulmonary:      Effort: Pulmonary effort is normal.      Breath sounds: Normal breath sounds.   Abdominal:      General: Bowel sounds are decreased.      Palpations: Abdomen is soft.      Tenderness: There is abdominal tenderness (mild, generalized).      Comments: Small amount of stool in ileostomy bag.     Musculoskeletal:      Right lower leg: No edema.      Left lower leg: No edema.   Skin:     General: Skin is warm and dry.      Coloration: Skin is not pale.      Findings: No rash.   Neurological:      Mental Status: He is alert and oriented to person, place, and time.   Psychiatric:         Mood and Affect: Mood normal.         Procedures           ED Course      In summary, 58 year old male with hx of colorectal cancer, s/p resection with ileostomy, presents with diffuse abdominal pain, nausea, vomiting and has been having very little stool out in his ileostomy bag past 24 hrs.  Pt scheduled for colonoscopy tomorrow with Dr. Lebron, has  been on clear liquids today.  Normal urination.  No fever.      MDM:  differential includes  bowel obstruction, ileus, malignancy, pancreatitis, cholecystitis, etc.    Labs, UA, and CT abd/pelvis ordered.  Pt started on fluids and given Zofran for nausea and Morphine for pain.      Labs are bland.  White count is normal at 7.12.  H&H is 12.8/36.2.  Creatinine is normal at 0.95.  Normal chemistries.  Bilirubin is up a bit at 3.3 with essentially normal LFTs.  Lipase is 60.  Urinalysis shows no evidence of infection.  Lactic acid is 1.8.                             CT abdomen pelvis with contrast:  Liver: The liver is cirrhotic in morphology. No focal liver lesion is seen. No biliary dilation is seen.     Gallbladder: Cholelithiasis with gallstone noted in the gallbladder neck. Mild gallbladder wall indistinctness is nonspecific in the setting of cirrhosis. The gallbladder is otherwise grossly unremarkable.     Pancreas: Unremarkable.     Spleen: The spleen is enlarged, measuring 17 cm in length. Several splenic granulomas are seen.     Adrenal glands: Unremarkable.     Genitourinary tract: There are 2 nonobstructing calculi at the lower pole of the left kidney measuring 4 to 5 mm. Kidneys are otherwise unremarkable. No hydronephrosis is seen. The visualized portions of the ureters are unremarkable. Urinary bladder is   decompressed which limits its evaluation.     Gastrointestinal tract: There is a surgical anastomosis of the low rectum, and there is a right-sided ileostomy. Hollow viscera appear otherwise unremarkable. No findings to suggest bowel obstruction.     Appendix: No findings to suggest acute appendicitis.     Other findings: There is unchanged ill-defined soft tissue density and questionable trace extraluminal air within the presacral space (see series 2, image 128). These findings are similar since 9/13/2023. No pathologically enlarged lymph nodes are seen.   Mild vascular calcifications are present. The  IVC is grossly unremarkable.     Bones and soft tissues: No acute or suspicious osseous or soft tissue lesion is identified.     Lung bases: The visualized lung bases are clear.     IMPRESSION:  Impression:  1.Cholelithiasis with gallstone noted in the gallbladder neck. Mild gallbladder wall indistinctness is nonspecific in the setting of cirrhosis. If patient has right upper quadrant pain, consider further evaluation with gallbladder ultrasound.  2.Cirrhosis with splenomegaly.  3.Surgical changes of the low rectum with right-sided ileostomy. No evidence of bowel obstruction.  4.There is unchanged ill-defined soft tissue density and questionable trace extraluminal air within the presacral space (see series 2, image 128). These chronic findings are similar in comparison with multiple prior studies.  5.Left nonobstructive nephrolithiasis.  6.Additional findings as detailed above.      Pt sent for GB ultrasound.    Gallbladder ultrasound:  1.No ultrasound evidence of acute cholecystitis.  2.Gallstones on CT are obscured currently.  3.Cirrhosis     EKG read by me shows sinus bradycardia with rate of 52.  No ischemia noted.    HS troponin is normal at 12.    Pt still having a lot of abdominal pain and no stool in his ileostomy bag.  We discussed his pending colonoscopy but he states Dr. Lebron has now cancelled it due to him being in the ED with abdominal pain.  Given that he is still in pain (7/10) in spite of pain meds, and is not putting out much in the ileostomy bag, I think he warrants admission for pain control and IV fluids.          Medical Decision Making  Problems Addressed:  History of ileostomy: complicated acute illness or injury  Intractable abdominal pain: complicated acute illness or injury  Nausea and vomiting, unspecified vomiting type: complicated acute illness or injury    Amount and/or Complexity of Data Reviewed  Labs: ordered.  Radiology: ordered.  ECG/medicine tests: ordered.    Risk  Prescription  drug management.  Decision regarding hospitalization.        Final diagnoses:   Intractable abdominal pain   Nausea and vomiting, unspecified vomiting type   History of ileostomy       ED Disposition  ED Disposition       ED Disposition   Decision to Admit    Condition   --    Comment   --               No follow-up provider specified.       Medication List      No changes were made to your prescriptions during this visit.            Foreign Chapa, PA  05/02/24 1507       Foreign Chapa, PA  05/02/24 2622

## 2024-05-02 NOTE — ED NOTES
Hesham Arevalo Jr.    Nursing Report ED to Floor:  Mental status: a&ox4  Ambulatory status: stand by  Oxygen Therapy:  ra  Cardiac Rhythm: sinus liane  Admitted from: home  Safety Concerns:  none  Social Issues: none  ED Room #:  10    ED Nurse Phone Extension - [phone] or may call [phone].      HPI:   Chief Complaint   Patient presents with    Abdominal Pain       Past Medical History:  Past Medical History:   Diagnosis Date    Arthritis     knees     Cancer 11/2018    colon    Disease of thyroid gland     Fatty liver     Fatty liver     Hashimoto's disease     History of radiation therapy 02/14/2019    rectal cancer    Hypertension     Ileostomy in place     Psoriasis     Wears glasses         Past Surgical History:  Past Surgical History:   Procedure Laterality Date    COLON RESECTION N/A 4/23/2019    Procedure: LAPAROSCOPIC LOWER ANTERIOR RESECTION WITH DIVERTING LOOP ILEOOSTOMY, SPLENIC FLEIXURE MOBILIZATION AND LIVER BIOPSY, AND PROCTOSCOPY;  Surgeon: Pau Kelly MD;  Location: [facility] OR;  Service: General    COLONOSCOPY      2018    ILEOSTOMY  04/2019    LIVER BIOPSY  2003    VASECTOMY  1998    VENOUS ACCESS DEVICE (PORT) INSERTION N/A 7/18/2019    Procedure: PORT PLACEMENT;  Surgeon: Franky Cazares MD;  Location: [facility] OR;  Service: General        Admitting Doctor:   Brody Polo MD    Consulting Provider(s):  Consults       No orders found from 4/3/2024 to 5/3/2024.             Admitting Diagnosis:   The primary encounter diagnosis was Intractable abdominal pain. Diagnoses of Nausea and vomiting, unspecified vomiting type and History of ileostomy were also pertinent to this visit.    Most Recent Vitals:   Vitals:    05/02/24 1300 05/02/24 1330 05/02/24 1445 05/02/24 1500   BP: 134/69 141/73  142/72   BP Location:       Patient Position:       Pulse: 56 57 57 55   Resp:       Temp:       TempSrc:       SpO2: 98% 98% 91% 95%   Weight:       Height:           Active LDAs/IV Access:   Lines,  Drains & Airways       Active LDAs       Name Placement date Placement time Site Days    Peripheral IV 05/02/24 1209 Right Antecubital 05/02/24  1209  Antecubital  less than 1    Ileostomy Loop RLQ 04/23/19  1124  RLQ  1836                    Labs (abnormal labs have a star):   Labs Reviewed   URINALYSIS W/ MICROSCOPIC IF INDICATED (NO CULTURE) - Abnormal; Notable for the following components:       Result Value    Color, UA Orange (*)     Specific Gravity, UA 1.036 (*)     Glucose, UA >=1000 mg/dL (3+) (*)     Ketones, UA Trace (*)     Bilirubin, UA Small (1+) (*)     Blood, UA Large (3+) (*)     Protein, UA Trace (*)     All other components within normal limits   COMPREHENSIVE METABOLIC PANEL - Abnormal; Notable for the following components:    Glucose 215 (*)     Sodium 135 (*)     AST (SGOT) 45 (*)     Total Bilirubin 3.3 (*)     All other components within normal limits    Narrative:     GFR Normal >60  Chronic Kidney Disease <60  Kidney Failure <15     CBC WITH AUTO DIFFERENTIAL - Abnormal; Notable for the following components:    Hemoglobin 12.8 (*)     Hematocrit 36.2 (*)     Platelets 77 (*)     Neutrophil % 80.9 (*)     Lymphocyte % 10.0 (*)     All other components within normal limits   URINALYSIS, MICROSCOPIC ONLY - Abnormal; Notable for the following components:    RBC, UA 3-5 (*)     All other components within normal limits   LIPASE - Normal   LACTIC ACID, PLASMA - Normal   SINGLE HS TROPONIN T - Normal    Narrative:     High Sensitive Troponin T Reference Range:  <14.0 ng/L- Negative Female for AMI  <22.0 ng/L- Negative Male for AMI  >=14 - Abnormal Female indicating possible myocardial injury.  >=22 - Abnormal Male indicating possible myocardial injury.   Clinicians would have to utilize clinical acumen, EKG, Troponin, and serial changes to determine if it is an Acute Myocardial Infarction or myocardial injury due to an underlying chronic condition.        POCT CREATININE - Normal   POCT  CREATININE - Normal   CBC AND DIFFERENTIAL    Narrative:     The following orders were created for panel order CBC & Differential.  Procedure                               Abnormality         Status                     ---------                               -----------         ------                     CBC Auto Differential[[id number]]        Abnormal            Final result               Scan Slide[[id number]]                                                                    Please view results for these tests on the individual orders.       Meds Given in ED:   Medications   sodium chloride 0.9 % flush 10 mL (has no administration in time range)   ondansetron (ZOFRAN) injection 4 mg (4 mg Intravenous Given 5/2/24 1209)   sodium chloride 0.9 % bolus 1,000 mL (0 mL Intravenous Stopped 5/2/24 1436)   morphine injection 2 mg (2 mg Intravenous Given 5/2/24 1210)   iopamidol (ISOVUE-300) 61 % injection 85 mL (85 mL Intravenous Given 5/2/24 1241)   HYDROmorphone (DILAUDID) injection 0.5 mg (0.5 mg Intravenous Given 5/2/24 1437)           Last NIH score:                                                          Dysphagia screening results:        Gadiel Coma Scale:  No data recorded     CIWA:        Restraint Type:            Isolation Status:  No active isolations

## 2024-05-02 NOTE — H&P
[facility] Medicine Services  HISTORY AND PHYSICAL    Patient Name: Hesham Arevalo Jr.  : 1965  MRN: [id number]  Primary Care Physician: Myke Mendoza MD  Date of admission: 2024    Subjective   Subjective     Chief Complaint:  N/V, decreased ostomy output    HPI:  Hesham Arevalo Jr. is a 58 y.o. male with PMH of colorectal CA with ileostomy, cirrhosis, hypothyroidism, and HTN who presented to the ED with complaints of N/V and decreased ostomy ouput since approximately 6pm 24.  He denies fever.  Denies sick contacts.  States he stopped on the way home from work yesterday and got a ham sandwich and started vomiting 2h later.  He also reports abdominal pain and back pain.  He is being admitted to the hospitalist service for further treatment and hydration.         Review of Systems   Constitutional:  Positive for appetite change, chills and diaphoresis. Negative for fever.   HENT:  Negative for hearing loss.    Eyes:  Negative for visual disturbance.   Respiratory:  Negative for cough and shortness of breath.    Cardiovascular:  Negative for chest pain and palpitations.   Gastrointestinal:  Positive for abdominal pain, nausea and vomiting. Negative for blood in stool and diarrhea.   Genitourinary:  Negative for dysuria.   Musculoskeletal:  Negative for back pain.   Neurological:  Negative for dizziness and weakness.   Psychiatric/Behavioral:  Negative for behavioral problems and confusion.       Personal History     Past Medical History:   Diagnosis Date    Arthritis     knees     Cancer 2018    colon    Disease of thyroid gland     Fatty liver     Hashimoto's disease     History of radiation therapy 2019    rectal cancer    Hypertension     Ileostomy in place     Psoriasis     Wears glasses        Oncology Problem List:  Rectal cancer (2019; Status: Active)  Rectal carcinoma (2018; Status: Active)  Oncology/Hematology History   Rectal  carcinoma   12/18/2018 Initial Diagnosis    Rectal carcinoma (CMS/HCC)     1/7/2019 - 2/4/2019 Chemotherapy    Xeloda radiation on protocol     1/8/2019 - 2/14/2019 Radiation    Radiation OncologyTreatment Course:  Hesham Arevalo received 5040 cGy in 28 fractions to pelvis via External Beam Radiation - EBRT.     4/23/2019 Surgery    Surgery rectosigmoidectomy pathology report:  Final Diagnosis    1. RECTOSIGMOID COLON, RECTOSIGMOID RESECTION:  Residual adenoma with focal high grade dysplasia with no residual invasive carcinoma identified post-treatment (see comment).  Seventeen lymph nodes negative for carcinoma (0/17)   2. LIVER, CORE NEEDLE BIOPSY:  Mild steatosis with mild chronic inflammation and increased fibrosis including bridging fibrosis (Stage 3-4) (see comment)        COLON AND RECTUM TEMPLATE:  TYPE OF SPECIMEN/PROCEDURE: Rectosigmoid resection.   SPECIMEN INTEGRITY:  Intact.  TUMOR SITE:  Rectum.  TUMOR SIZE (greatest dimension):  Indeterminate  TUMOR LOCATION (applicable only to rectal primaries): Entirely below anterior peritoneal reflection.  MACROSCOPIC INTACTNESS OF MESORECTUM (If applicable): Intact.  MACROSCOPIC TUMOR PERFORATION: Not identified.  TUMOR CONFIGURATION:  Ulcerated.  HISTOLOGIC TYPE:  Adenocarcinoma.  HISTOLOGIC GRADE:  G3-4 (per previous biopsy).  MICROSCOPIC DEPTH OF TUMOR INVASION/EXTENSION:  No residual tumor identified.  PERITONEAL INVOLVEMENT:  Not identified.  LYMPHVASCULAR INVASION:  Not identified.  PERINEURAL INVASION: Not identified.  SURGICAL MARGINS: Uninvolved.  STATUS AND DISTANCE FROM PROXIMAL MUCOSAL MARGIN:  Uninvolved, 13.0 cm.  STATUS AND DISTANCE FROM DISTAL MUCOSAL MARGIN: Uninvolved, 1.2 cm.  STATUS AND DISTANCE FROM MESENTERIC MARGIN: Not applicable.  STATUS AND DISTANCE FROM RADIAL MARGIN: Uninvolved, 1.7 cm.  DISTANCE FROM DENTATE LINE (RECTAL TUMOR ONLY):  Indeterminate.  TUMOR DEPOSITS (DISCONTINUOUS EXTRAMURAL EXTENSION):  Not identified.  NUMBER OF LYMPH  NODES INVOLVED BY METASTATIC NEOPLASM: 0.  NUMBER OF REGIONAL LYMPH NODES EXAMINED: 17.  EXTRANODAL EXTENSION:  Not applicable.  TREATMENT EFFECT:  Present, no viable cancer cells identified (complete response, score 0).  ADDITIONAL PATHOLOGIC FINDINGS:  None.  MSI TESTING:  No evidence of MSI based on reported IHC on outside biopsy  OTHER ANCILLARY STUDIES (BRAF, KRAS, ETC.):  Should be performed on initial diagnostic biopsy if needed.  AJCC PATHOLOGIC STAGE:  (COMPLETED BY PATHOLOGIST, BASED ONLY ON TISSUE FINDINGS, MORE EXTENSIVE DISEASE MAY NOT BE KNOWN TO THE PATHOLOGIST)  ypT=  0  ypN=  0  AJCC PATHOLOGIC STAGE:  y0.                7/22/2019 - 10/23/2019 Chemotherapy    OP COLON [id number] OXALIplatin / Leucovorin / Fluorouracil  Start of adjuvant therapy delayed due to the above postoperative pelvic abscess.  This was treated surgically initially but then by CT-guided percutaneous drainage as above.  Serial follow-up with Radames Sosa including CTs and antibiotics eventually cleared him adequately to consider resumption of plans for adjuvant therapy.  CTs of the pelvis done on 6/5/2019, 6/13/2019, 6/26/2019 monitoring for this abscess and possible drainage but not able to drain due to decreasing size and minimal residual edema with CT 7/10/2019 showingStable to slight decrease in size of presacral perirectal abscess with presacral edema measuring 3.9 x 1.9 previously 4.1 x 2.5 cm. No new sites of focal fluid collection or loculated fluid.         Past Surgical History:   Procedure Laterality Date    COLON RESECTION N/A 4/23/2019    Procedure: LAPAROSCOPIC LOWER ANTERIOR RESECTION WITH DIVERTING LOOP ILEOOSTOMY, SPLENIC FLEIXURE MOBILIZATION AND LIVER BIOPSY, AND PROCTOSCOPY;  Surgeon: Pau Kelly MD;  Location: [facility];  Service: General    COLONOSCOPY      2018    ILEOSTOMY  04/2019    LIVER BIOPSY  2003    VASECTOMY  1998    VENOUS ACCESS DEVICE (PORT) INSERTION N/A 7/18/2019    Procedure: PORT  PLACEMENT;  Surgeon: Franky Cazares MD;  Location: [facility];  Service: General       Family History: family history includes Breast cancer in his mother; Cancer in his father.     Social History:  reports that he quit smoking about 31 years ago. His smoking use included cigarettes. He started smoking about 45 years ago. He has a 14 pack-year smoking history. He has never used smokeless tobacco. He reports that he does not drink alcohol and does not use drugs.  Social History     Social History Narrative    Lives in [place] with wife and son    Still works, drives a van for [employer]       Medications:  carvedilol, cyanocobalamin, ezetimibe, fenofibrate, ferrous sulfate, levothyroxine, lisinopril, loperamide, metFORMIN, and multivitamin with minerals    No Known Allergies    Objective   Objective     Vital Signs:   Temp:  [97.9 °F (36.6 °C)] 97.9 °F (36.6 °C)  Heart Rate:  [54-58] 58  Resp:  [18] 18  BP: (134-157)/(69-90) 142/75    Physical Exam   Constitutional: Awake, alert, wife at bedside, drowsy from previous medications  Eyes: PERRLA, sclerae anicteric, no conjunctival injection  HENT: NCAT, mucous membranes moist  Neck: Supple, no thyromegaly, no lymphadenopathy, trachea midline  Respiratory: Clear to auscultation bilaterally, nonlabored respirations   Cardiovascular: RRR, no murmurs, rubs, or gallops, palpable pedal pulses bilaterally  Gastrointestinal: Positive bowel sounds, soft, nontender, nondistended, small amount of liquid brown stool in ostomy bag  Musculoskeletal: No bilateral ankle edema, no clubbing or cyanosis to extremities  Psychiatric: Appropriate affect, cooperative  Neurologic: Oriented x 3, BUTTS, speech clear  Skin: No rashes noted      Result Review:  I have personally reviewed the results from the time of this admission to 5/2/2024 16:15 EDT and agree with these findings:  [x]  Laboratory list / accordion  []  Microbiology  []  Radiology  [x]  EKG/Telemetry EKG without  ischemic changes  []  Cardiology/Vascular   []  Pathology  [x]  Old records  []  Other:  Most notable findings include: Total bili 3.3, GI PCR pending  LAB RESULTS:      Lab 05/02/24  1158   WBC 7.12   HEMOGLOBIN 12.8*   HEMATOCRIT 36.2*   PLATELETS 77*   NEUTROS ABS 5.77   IMMATURE GRANS (ABS) 0.02   LYMPHS ABS 0.71   MONOS ABS 0.46   EOS ABS 0.12   MCV 87.0   LACTATE 1.8         Lab 05/02/24  1226 05/02/24  1211 05/02/24  1158   SODIUM  --   --  135*   POTASSIUM  --   --  4.2   CHLORIDE  --   --  101   CO2  --   --  25.0   ANION GAP  --   --  9.0   BUN  --   --  16   CREATININE 1.00 1.00 0.95   EGFR  --   --  91.6   GLUCOSE  --   --  215*   CALCIUM  --   --  9.1         Lab 05/02/24  1158   TOTAL PROTEIN 7.1   ALBUMIN 3.8   GLOBULIN 3.3   ALT (SGPT) 26   AST (SGOT) 45*   BILIRUBIN 3.3*   ALK PHOS 59   LIPASE 60         Lab 05/02/24  1158   HSTROP T 12       Brief Urine Lab Results  (Last result in the past 365 days)        Color   Clarity   Blood   Leuk Est   Nitrite   Protein   CREAT   Urine HCG        05/02/24 1200 Orange   Clear   Large (3+)   Negative   Negative   Trace                 Microbiology Results (last 10 days)       ** No results found for the last 240 hours. **            US Gallbladder    Result Date: 5/2/2024  US GALLBLADDER Date of Exam: 5/2/2024 1:40 PM EDT Indication: Diffuse abd pain. Comparison: CT 5/2/2024 Technique: Grayscale and color Doppler ultrasound evaluation of the right upper quadrant was performed. Findings: LIVER: Cirrhotic morphology. No focal lesions identified. Normal flow is present within the hepatic vasculature. GALLBLADDER: Gallstones on CT are not identified, potentially obscured by the duodenum given their location on CT. No abnormal gallbladder wall thickening, pericholecystic fluid, nor reported sonographic Rea sign. PANCREAS: Primarily obscured RIGHT KIDNEY:  Normal in size with no focal lesions. No evidence of nephrolithiasis or hydronephrosis.     Impression:  Impression: 1.No ultrasound evidence of acute cholecystitis. 2.Gallstones on CT are obscured currently. 3.Cirrhosis Electronically Signed: Isaiah Merlos MD  5/2/2024 2:02 PM EDT  Workstation ID: [id number]    CT Abdomen Pelvis With Contrast    Result Date: 5/2/2024  CT ABDOMEN PELVIS W CONTRAST Date of Exam: 5/2/2024 12:40 PM EDT Indication: diffuse abdominal pain, N/V, little to no output in ileostomy bag.. Comparison: CT of the abdomen and pelvis dated 9/13/2023 Technique: Axial CT images were obtained of the abdomen and pelvis following the uneventful intravenous administration of 85 mL Isovue-300. Reconstructed coronal and sagittal images were also obtained. Automated exposure control and iterative construction methods were used. Findings: Liver: The liver is cirrhotic in morphology. No focal liver lesion is seen. No biliary dilation is seen. Gallbladder: Cholelithiasis with gallstone noted in the gallbladder neck. Mild gallbladder wall indistinctness is nonspecific in the setting of cirrhosis. The gallbladder is otherwise grossly unremarkable. Pancreas: Unremarkable. Spleen: The spleen is enlarged, measuring 17 cm in length. Several splenic granulomas are seen. Adrenal glands: Unremarkable. Genitourinary tract: There are 2 nonobstructing calculi at the lower pole of the left kidney measuring 4 to 5 mm. Kidneys are otherwise unremarkable. No hydronephrosis is seen. The visualized portions of the ureters are unremarkable. Urinary bladder is decompressed which limits its evaluation. Gastrointestinal tract: There is a surgical anastomosis of the low rectum, and there is a right-sided ileostomy. Hollow viscera appear otherwise unremarkable. No findings to suggest bowel obstruction. Appendix: No findings to suggest acute appendicitis. Other findings: There is unchanged ill-defined soft tissue density and questionable trace extraluminal air within the presacral space (see series 2, image 128). These findings are similar  since 9/13/2023. No pathologically enlarged lymph nodes are seen. Mild vascular calcifications are present. The IVC is grossly unremarkable. Bones and soft tissues: No acute or suspicious osseous or soft tissue lesion is identified. Lung bases: The visualized lung bases are clear.     Impression: Impression: 1.Cholelithiasis with gallstone noted in the gallbladder neck. Mild gallbladder wall indistinctness is nonspecific in the setting of cirrhosis. If patient has right upper quadrant pain, consider further evaluation with gallbladder ultrasound. 2.Cirrhosis with splenomegaly. 3.Surgical changes of the low rectum with right-sided ileostomy. No evidence of bowel obstruction. 4.There is unchanged ill-defined soft tissue density and questionable trace extraluminal air within the presacral space (see series 2, image 128). These chronic findings are similar in comparison with multiple prior studies. 5.Left nonobstructive nephrolithiasis. 6.Additional findings as detailed above. 7. Electronically Signed: Walt Melgar MD  5/2/2024 12:57 PM EDT  Workstation ID: [id number]         Assessment & Plan   Assessment & Plan       Abdominal pain with nonbilious vomiting    Rectal carcinoma    Hyperbilirubinemia    Cholelithiasis    N/V  Abdominal pain  Cholelithiasis  --pain control/antiemetics as needed  --hydrate overnight  --GI PCR pending  --CT and GB US reviewed    Hyperbilirubinemia  --IVF overnight  --recheck in am    History of rectal cancer with ileostomy    Total time spent: 45 minutes  Time spent includes time reviewing chart, face-to-face time, counseling patient/family/caregiver, ordering medications/tests/procedures, communicating with other health care professionals, documenting clinical information in the electronic health record, and coordination of care.      DVT prophylaxis:  Lovenox    CODE STATUS:    Code Status (Patient has no pulse and is not breathing): CPR (Attempt to Resuscitate)  Medical Interventions  (Patient has pulse or is breathing): Full Support      Expected Discharge  Expected discharge date/ time has not been documented.        Signature: Electronically signed by AMIRAH Herrera, 05/02/24, 4:48 PM EDT

## 2024-05-03 VITALS
SYSTOLIC BLOOD PRESSURE: 131 MMHG | BODY MASS INDEX: 26.63 KG/M2 | HEIGHT: 73 IN | RESPIRATION RATE: 18 BRPM | TEMPERATURE: 99.9 F | HEART RATE: 60 BPM | DIASTOLIC BLOOD PRESSURE: 62 MMHG | OXYGEN SATURATION: 96 % | WEIGHT: 200.9 LBS

## 2024-05-03 LAB
ALBUMIN SERPL-MCNC: 3.2 G/DL (ref 3.5–5.2)
ALBUMIN/GLOB SERPL: 1 G/DL
ALP SERPL-CCNC: 50 U/L (ref 39–117)
ALT SERPL W P-5'-P-CCNC: 20 U/L (ref 1–41)
ANION GAP SERPL CALCULATED.3IONS-SCNC: 9 MMOL/L (ref 5–15)
AST SERPL-CCNC: 30 U/L (ref 1–40)
BILIRUB SERPL-MCNC: 3 MG/DL (ref 0–1.2)
BUN SERPL-MCNC: 16 MG/DL (ref 8–23)
BUN/CREAT SERPL: 15.4 (ref 7–25)
CALCIUM SPEC-SCNC: 8.3 MG/DL (ref 8.6–10.5)
CHLORIDE SERPL-SCNC: 102 MMOL/L (ref 98–107)
CO2 SERPL-SCNC: 25 MMOL/L (ref 22–29)
CREAT SERPL-MCNC: 1.04 MG/DL (ref 0.76–1.27)
EGFRCR SERPLBLD CKD-EPI 2021: 82.2 ML/MIN/1.73
GLOBULIN UR ELPH-MCNC: 3.1 GM/DL
GLUCOSE BLDC GLUCOMTR-MCNC: 173 MG/DL (ref 70–130)
GLUCOSE BLDC GLUCOMTR-MCNC: 182 MG/DL (ref 70–130)
GLUCOSE SERPL-MCNC: 158 MG/DL (ref 65–99)
POTASSIUM SERPL-SCNC: 3.7 MMOL/L (ref 3.5–5.2)
PROT SERPL-MCNC: 6.3 G/DL (ref 6–8.5)
SODIUM SERPL-SCNC: 136 MMOL/L (ref 136–145)

## 2024-05-03 PROCEDURE — 25010000002 MORPHINE PER 10 MG: Performed by: NURSE PRACTITIONER

## 2024-05-03 PROCEDURE — 96376 TX/PRO/DX INJ SAME DRUG ADON: CPT

## 2024-05-03 PROCEDURE — G0378 HOSPITAL OBSERVATION PER HR: HCPCS

## 2024-05-03 PROCEDURE — 82948 REAGENT STRIP/BLOOD GLUCOSE: CPT

## 2024-05-03 PROCEDURE — 80053 COMPREHEN METABOLIC PANEL: CPT | Performed by: NURSE PRACTITIONER

## 2024-05-03 PROCEDURE — 63710000001 ONDANSETRON ODT 4 MG TABLET DISPERSIBLE: Performed by: NURSE PRACTITIONER

## 2024-05-03 PROCEDURE — 25810000003 SODIUM CHLORIDE 0.9 % SOLUTION: Performed by: NURSE PRACTITIONER

## 2024-05-03 PROCEDURE — 99239 HOSP IP/OBS DSCHRG MGMT >30: CPT | Performed by: INTERNAL MEDICINE

## 2024-05-03 RX ADMIN — FAMOTIDINE 40 MG: 20 TABLET, FILM COATED ORAL at 08:40

## 2024-05-03 RX ADMIN — LISINOPRIL 10 MG: 10 TABLET ORAL at 08:40

## 2024-05-03 RX ADMIN — ONDANSETRON 4 MG: 4 TABLET, ORALLY DISINTEGRATING ORAL at 08:45

## 2024-05-03 RX ADMIN — MORPHINE SULFATE 1 MG: 2 INJECTION, SOLUTION INTRAMUSCULAR; INTRAVENOUS at 00:37

## 2024-05-03 RX ADMIN — LEVOTHYROXINE SODIUM 25 MCG: 25 TABLET ORAL at 06:52

## 2024-05-03 RX ADMIN — Medication 1 TABLET: at 06:52

## 2024-05-03 RX ADMIN — SODIUM CHLORIDE 100 ML/HR: 9 INJECTION, SOLUTION INTRAVENOUS at 00:40

## 2024-05-03 NOTE — DISCHARGE SUMMARY
[facility] Medicine Services  DISCHARGE SUMMARY    Patient Name: Hesham Arevalo Jr.  : 1965  MRN: [id number]    Date of Admission: 2024 11:20 AM  Date of Discharge:  5/3/2024  Primary Care Physician: Myke Mendoza MD    Consults       No orders found for last 30 day(s).            Hospital Course     Presenting Problem: abdominal pain w vomiting    Active Hospital Problems    Diagnosis  POA    **Abdominal pain with nonbilious vomiting [R10.9, R11.10]  Yes    Hyperbilirubinemia [E80.6]  Yes    Cholelithiasis [K80.20]  Yes    Rectal carcinoma [C20]  Yes      Resolved Hospital Problems   No resolved problems to display.          Hospital Course:  Hesham Arevalo Jr. is a 58 y.o. male w colorectal cancer s/p ileostomy who presented with acute nausea/vomiting, epigastric abdominal pain radiating to the back after eating a ham sandwich.  CT a/p reassuring without obstruction/pancreatitis/cholecystitis. RUQ u/s with gallstones without s/s of cholecystitis. Mildly elevated bilirubin (3.3) on admission which downtrended without CBD dilation appreciated and without elev alk phos or AST/ALT elevation. Troponin also negative on arrival with normal ECG.    Patient symptoms resolved entirely 5/3, tolerating diet well and hopeful for home. Having ostomy output and gas output from below without s/s of bowel obstruction. Unrevealing exam.    Discharge Follow Up Recommendations for outpatient labs/diagnostics:   F/u PCP 1 week    Day of Discharge     HPI:   Feeling much better  No abdominal pain now  No vomiting  Soft ostomy output, gas output  Vital Signs:   Temp:  [98 °F (36.7 °C)-99.9 °F (37.7 °C)] 99.9 °F (37.7 °C)  Heart Rate:  [55-60] 60  Resp:  [16-18] 18  BP: (130-162)/(62-77) 131/62      Physical Exam:  Constitutional: No acute distress, awake, alert  HENT: NCAT, mucous membranes moist  Respiratory: Clear to auscultation bilaterally, respiratory effort normal    Cardiovascular: RRR, no murmurs, rubs, or gallops  Gastrointestinal: Soft, nontender, nondistended  Musculoskeletal: Muscle tone within normal limits, no joint effusions appreciated  Psychiatric: Appropriate affect, cooperative  Neurologic: Alert and oriented, facial movements symmetric and spontaneous movement of all 4 extremities grossly equal bilaterally, speech clear  Skin: No rashes    Pertinent  and/or Most Recent Results     LAB RESULTS:      Lab 05/02/24  1158   WBC 7.12   HEMOGLOBIN 12.8*   HEMATOCRIT 36.2*   PLATELETS 77*   NEUTROS ABS 5.77   IMMATURE GRANS (ABS) 0.02   LYMPHS ABS 0.71   MONOS ABS 0.46   EOS ABS 0.12   MCV 87.0   LACTATE 1.8         Lab 05/03/24  0429 05/02/24  1226 05/02/24  1211 05/02/24  1158   SODIUM 136  --   --  135*   POTASSIUM 3.7  --   --  4.2   CHLORIDE 102  --   --  101   CO2 25.0  --   --  25.0   ANION GAP 9.0  --   --  9.0   BUN 16  --   --  16   CREATININE 1.04 1.00 1.00 0.95   EGFR 82.2  --   --  91.6   GLUCOSE 158*  --   --  215*   CALCIUM 8.3*  --   --  9.1         Lab 05/03/24  0429 05/02/24  1158   TOTAL PROTEIN 6.3 7.1   ALBUMIN 3.2* 3.8   GLOBULIN 3.1 3.3   ALT (SGPT) 20 26   AST (SGOT) 30 45*   BILIRUBIN 3.0* 3.3*   ALK PHOS 50 59   LIPASE  --  60         Lab 05/02/24  1158   HSTROP T 12                 Brief Urine Lab Results  (Last result in the past 365 days)        Color   Clarity   Blood   Leuk Est   Nitrite   Protein   CREAT   Urine HCG        05/02/24 1200 Orange   Clear   Large (3+)   Negative   Negative   Trace                 Microbiology Results (last 10 days)       Procedure Component Value - Date/Time    Gastrointestinal Panel, PCR - Stool, Per Rectum [[id number]]  (Normal) Collected: 05/02/24 1628    Lab Status: Final result Specimen: Stool from Per Rectum Updated: 05/02/24 1802     Campylobacter Not Detected     Plesiomonas shigelloides Not Detected     Salmonella Not Detected     Vibrio Not Detected     Vibrio cholerae Not Detected     Yersinia  enterocolitica Not Detected     Enteroaggregative E. coli (EAEC) Not Detected     Enteropathogenic E. coli (EPEC) Not Detected     Enterotoxigenic E. coli (ETEC) lt/st Not Detected     Shiga-like toxin-producing E. coli (STEC) stx1/stx2 Not Detected     Shigella/Enteroinvasive E. coli (EIEC) Not Detected     Cryptosporidium Not Detected     Cyclospora cayetanensis Not Detected     Entamoeba histolytica Not Detected     Giardia lamblia Not Detected     Adenovirus F40/41 Not Detected     Astrovirus Not Detected     Norovirus GI/GII Not Detected     Rotavirus A Not Detected     Sapovirus (I, II, IV or V) Not Detected            US Gallbladder    Result Date: 5/2/2024  US GALLBLADDER Date of Exam: 5/2/2024 1:40 PM EDT Indication: Diffuse abd pain. Comparison: CT 5/2/2024 Technique: Grayscale and color Doppler ultrasound evaluation of the right upper quadrant was performed. Findings: LIVER: Cirrhotic morphology. No focal lesions identified. Normal flow is present within the hepatic vasculature. GALLBLADDER: Gallstones on CT are not identified, potentially obscured by the duodenum given their location on CT. No abnormal gallbladder wall thickening, pericholecystic fluid, nor reported sonographic Rea sign. PANCREAS: Primarily obscured RIGHT KIDNEY:  Normal in size with no focal lesions. No evidence of nephrolithiasis or hydronephrosis.     Impression: 1.No ultrasound evidence of acute cholecystitis. 2.Gallstones on CT are obscured currently. 3.Cirrhosis Electronically Signed: Isaiah Merlos MD  5/2/2024 2:02 PM EDT  Workstation ID: [id number]    CT Abdomen Pelvis With Contrast    Result Date: 5/2/2024  CT ABDOMEN PELVIS W CONTRAST Date of Exam: 5/2/2024 12:40 PM EDT Indication: diffuse abdominal pain, N/V, little to no output in ileostomy bag.. Comparison: CT of the abdomen and pelvis dated 9/13/2023 Technique: Axial CT images were obtained of the abdomen and pelvis following the uneventful intravenous administration of 85  mL Isovue-300. Reconstructed coronal and sagittal images were also obtained. Automated exposure control and iterative construction methods were used. Findings: Liver: The liver is cirrhotic in morphology. No focal liver lesion is seen. No biliary dilation is seen. Gallbladder: Cholelithiasis with gallstone noted in the gallbladder neck. Mild gallbladder wall indistinctness is nonspecific in the setting of cirrhosis. The gallbladder is otherwise grossly unremarkable. Pancreas: Unremarkable. Spleen: The spleen is enlarged, measuring 17 cm in length. Several splenic granulomas are seen. Adrenal glands: Unremarkable. Genitourinary tract: There are 2 nonobstructing calculi at the lower pole of the left kidney measuring 4 to 5 mm. Kidneys are otherwise unremarkable. No hydronephrosis is seen. The visualized portions of the ureters are unremarkable. Urinary bladder is decompressed which limits its evaluation. Gastrointestinal tract: There is a surgical anastomosis of the low rectum, and there is a right-sided ileostomy. Hollow viscera appear otherwise unremarkable. No findings to suggest bowel obstruction. Appendix: No findings to suggest acute appendicitis. Other findings: There is unchanged ill-defined soft tissue density and questionable trace extraluminal air within the presacral space (see series 2, image 128). These findings are similar since 9/13/2023. No pathologically enlarged lymph nodes are seen. Mild vascular calcifications are present. The IVC is grossly unremarkable. Bones and soft tissues: No acute or suspicious osseous or soft tissue lesion is identified. Lung bases: The visualized lung bases are clear.     Impression: 1.Cholelithiasis with gallstone noted in the gallbladder neck. Mild gallbladder wall indistinctness is nonspecific in the setting of cirrhosis. If patient has right upper quadrant pain, consider further evaluation with gallbladder ultrasound. 2.Cirrhosis with splenomegaly. 3.Surgical changes  of the low rectum with right-sided ileostomy. No evidence of bowel obstruction. 4.There is unchanged ill-defined soft tissue density and questionable trace extraluminal air within the presacral space (see series 2, image 128). These chronic findings are similar in comparison with multiple prior studies. 5.Left nonobstructive nephrolithiasis. 6.Additional findings as detailed above. 7. Electronically Signed: Walt Melgar MD  5/2/2024 12:57 PM EDT  Workstation ID: [id number]                 Plan for Follow-up of Pending Labs/Results:     Discharge Details        Discharge Medications        Continue These Medications        Instructions Start Date   carvedilol 6.25 MG tablet  Commonly known as: COREG   6.25 mg, Oral      cyanocobalamin 1000 MCG/ML injection   No dose, route, or frequency recorded.      ezetimibe 10 MG tablet  Commonly known as: ZETIA   10 mg, Oral, Daily      fenofibrate 145 MG tablet  Commonly known as: TRICOR   145 mg, Oral, Daily      FeroSul 325 (65 Fe) MG tablet  Generic drug: ferrous sulfate   TAKE 1 TABLET BY MOUTH TWICE DAILY EVERY OTHER DAY WITH VITAMINC      levothyroxine 25 MCG tablet  Commonly known as: SYNTHROID, LEVOTHROID   25 mcg, Oral, Daily      lisinopril 10 MG tablet  Commonly known as: PRINIVIL,ZESTRIL   Oral, Daily      metFORMIN 1000 MG tablet  Commonly known as: GLUCOPHAGE   1,000 mg, Oral, 2 Times Daily      multivitamin with minerals tablet tablet   1 dose, Oral, Every Morning      RA Anti-Diarrheal 2 MG tablet  Generic drug: loperamide   take 1 tablet by mouth before meals and at bedtime               No Known Allergies      Discharge Disposition:      Diet:  Hospital:  Diet Order   Procedures    Diet: Gastrointestinal; Fiber-Restricted; Texture: Soft to Chew (NDD 3); Soft to Chew: Whole Meat; Fluid Consistency: Thin (IDDSI 0)            Activity:      Restrictions or Other Recommendations:         CODE STATUS:    Code Status and Medical Interventions:   Ordered at:  05/02/24 1554     Code Status (Patient has no pulse and is not breathing):    CPR (Attempt to Resuscitate)     Medical Interventions (Patient has pulse or is breathing):    Full Support       Future Appointments   Date Time Provider Department Center   9/23/2024  9:30 AM [facility] [address] [facility]   9/27/2024  9:00 AM Ana Self, [facility]       Additional Instructions for the Follow-ups that You Need to Schedule       Discharge Follow-up with PCP   As directed       Currently Documented PCP:    Myke Mendoza MD    PCP Phone Number:    [phone]     Follow Up Details: 1 week                      Bárbara Alfaro MD  05/03/24      Time Spent on Discharge:  I spent  35  minutes on this discharge activity which included: face-to-face encounter with the patient, reviewing the data in the system, coordination of the care with the nursing staff as well as consultants, documentation, and entering orders.

## 2024-05-03 NOTE — CASE MANAGEMENT/SOCIAL WORK
Discharge Planning Assessment  [facility]     Patient Name: Hesham Arevalo Jr.  MRN: [id number]  Today's Date: 5/3/2024    Admit Date: 5/2/2024    Plan: Home at DC   Discharge Needs Assessment       Row Name 05/03/24 1047       Living Environment    People in Home spouse    Current Living Arrangements home       Discharge Needs Assessment    Equipment Currently Used at Home none    Equipment Needed After Discharge none    Discharge Coordination/Progress Denies being current with DME, HH or outpt services.                   Discharge Plan       Row Name 05/03/24 1048       Plan    Plan Home at DC    Patient/Family in Agreement with Plan yes    Plan Comments I spoke with the pt and his spouse. They deny any DC needs at this time.    Final Discharge Disposition Code 01 - home or self-care      Row Name 05/03/24 0818       Plan    Final Discharge Disposition Code 01 - home or self-care                  Continued Care and Services - Admitted Since 5/2/2024    No active coordination exists for this encounter.       Expected Discharge Date and Time       Expected Discharge Date Expected Discharge Time    May 3, 2024            Demographic Summary    No documentation.                  Functional Status       Row Name 05/03/24 1047       Functional Status    Usual Activity Tolerance good       Functional Status, IADL    Medications independent    Meal Preparation independent    Housekeeping independent    Laundry independent    Shopping independent                   Psychosocial    No documentation.                  Abuse/Neglect    No documentation.                  Legal    No documentation.                  Substance Abuse    No documentation.                  Patient Forms    No documentation.                     Nakia Sarmiento RN

## 2024-05-03 NOTE — PLAN OF CARE
Problem: Adult Inpatient Plan of Care  Goal: Absence of Hospital-Acquired Illness or Injury  Intervention: Identify and Manage Fall Risk  Recent Flowsheet Documentation  Taken 5/3/2024 0450 by Janessa Mccain, RN  Safety Promotion/Fall Prevention:   activity supervised   assistive device/personal items within reach   clutter free environment maintained   fall prevention program maintained   lighting adjusted   nonskid shoes/slippers when out of bed   room organization consistent   safety round/check completed  Taken 5/3/2024 0250 by Janessa Mccain, RN  Safety Promotion/Fall Prevention:   activity supervised   assistive device/personal items within reach   clutter free environment maintained   fall prevention program maintained   lighting adjusted   nonskid shoes/slippers when out of bed   room organization consistent   safety round/check completed  Taken 5/3/2024 0050 by Janessa Mccain, RN  Safety Promotion/Fall Prevention:   activity supervised   assistive device/personal items within reach   clutter free environment maintained   fall prevention program maintained   lighting adjusted   nonskid shoes/slippers when out of bed   room organization consistent   safety round/check completed  Taken 5/2/2024 2250 by Janessa Mccain, RN  Safety Promotion/Fall Prevention:   activity supervised   clutter free environment maintained   assistive device/personal items within reach   fall prevention program maintained   lighting adjusted   nonskid shoes/slippers when out of bed   room organization consistent   safety round/check completed  Taken 5/2/2024 2057 by Janessa Mccain, RN  Safety Promotion/Fall Prevention:   activity supervised   assistive device/personal items within reach   clutter free environment maintained   fall prevention program maintained   lighting adjusted   nonskid shoes/slippers when out of bed   room organization consistent   safety round/check completed  Intervention: Prevent Skin Injury  Recent  Flowsheet Documentation  Taken 5/3/2024 0450 by Janessa Mccain RN  Body Position: position changed independently  Taken 5/3/2024 0250 by Janessa Mccain RN  Body Position: position changed independently  Taken 5/3/2024 0050 by Janessa Mccain RN  Body Position: position changed independently  Taken 5/2/2024 2250 by Janessa Mccain RN  Body Position: position changed independently  Taken 5/2/2024 2057 by Janessa Mccain RN  Body Position: position changed independently  Intervention: Prevent and Manage VTE (Venous Thromboembolism) Risk  Recent Flowsheet Documentation  Taken 5/2/2024 2057 by Janessa Mccain RN  Activity Management: activity minimized  Intervention: Prevent Infection  Recent Flowsheet Documentation  Taken 5/3/2024 0450 by Janessa Mccain RN  Infection Prevention:   environmental surveillance performed   hand hygiene promoted   rest/sleep promoted   single patient room provided  Taken 5/3/2024 0250 by Janessa Mccain RN  Infection Prevention:   environmental surveillance performed   hand hygiene promoted   rest/sleep promoted   single patient room provided  Taken 5/3/2024 0050 by Janessa Mccain RN  Infection Prevention:   environmental surveillance performed   hand hygiene promoted   rest/sleep promoted   single patient room provided  Taken 5/2/2024 2250 by Janessa Mccain RN  Infection Prevention:   environmental surveillance performed   hand hygiene promoted   rest/sleep promoted   single patient room provided  Taken 5/2/2024 2057 by Janessa Mccain RN  Infection Prevention:   environmental surveillance performed   hand hygiene promoted   rest/sleep promoted   single patient room provided  Goal: Optimal Comfort and Wellbeing  Intervention: Provide Person-Centered Care  Recent Flowsheet Documentation  Taken 5/2/2024 2057 by Janessa Mccain RN  Trust Relationship/Rapport:   care explained   choices provided   questions answered   questions encouraged   thoughts/feelings  acknowledged   Goal Outcome Evaluation:   Pt A&Ox4, RA, VSS. Pt had a complaint of pain during shift, see MAR. Denies any needs at this time.

## 2024-05-03 NOTE — PLAN OF CARE
Problem: Adult Inpatient Plan of Care  Goal: Plan of Care Review  Outcome: Met  Goal: Patient-Specific Goal (Individualized)  Outcome: Met  Goal: Absence of Hospital-Acquired Illness or Injury  Outcome: Met  Intervention: Identify and Manage Fall Risk  Recent Flowsheet Documentation  Taken 5/3/2024 1600 by Dennis Figueroa RN  Safety Promotion/Fall Prevention: safety round/check completed  Taken 5/3/2024 1400 by Dennis Figueroa RN  Safety Promotion/Fall Prevention: safety round/check completed  Taken 5/3/2024 1200 by Dennis Figueroa RN  Safety Promotion/Fall Prevention: safety round/check completed  Taken 5/3/2024 1000 by Dennis Figueroa RN  Safety Promotion/Fall Prevention: safety round/check completed  Taken 5/3/2024 0802 by Dennis Figueroa RN  Safety Promotion/Fall Prevention: safety round/check completed  Taken 5/3/2024 0800 by Dennis Figueroa RN  Safety Promotion/Fall Prevention: safety round/check completed  Intervention: Prevent Skin Injury  Recent Flowsheet Documentation  Taken 5/3/2024 0802 by Dennis Figueroa RN  Body Position: position changed independently  Intervention: Prevent and Manage VTE (Venous Thromboembolism) Risk  Recent Flowsheet Documentation  Taken 5/3/2024 0802 by Dennis Figueroa RN  Activity Management: up ad roscoe  Intervention: Prevent Infection  Recent Flowsheet Documentation  Taken 5/3/2024 1600 by Dennis Figueroa RN  Infection Prevention: single patient room provided  Taken 5/3/2024 1400 by Dennis Figueroa RN  Infection Prevention: single patient room provided  Taken 5/3/2024 1200 by Dennis Figueroa RN  Infection Prevention: single patient room provided  Taken 5/3/2024 1000 by Dennis Figueroa RN  Infection Prevention: single patient room provided  Taken 5/3/2024 0802 by Dennis Figueroa RN  Infection Prevention: single patient room provided  Taken 5/3/2024 0800 by Dennis Figueroa RN  Infection Prevention: single patient room provided  Goal: Optimal Comfort and  Wellbeing  Outcome: Met  Goal: Readiness for Transition of Care  Outcome: Met     Problem: Pain Acute  Goal: Acceptable Pain Control and Functional Ability  Outcome: Met  Intervention: Prevent or Manage Pain  Recent Flowsheet Documentation  Taken 5/3/2024 1600 by Dennis Figueroa RN  Medication Review/Management: medications reviewed  Taken 5/3/2024 1400 by Dennis Figueroa RN  Medication Review/Management: medications reviewed  Taken 5/3/2024 1200 by Dennis Figueroa RN  Medication Review/Management: medications reviewed  Taken 5/3/2024 1000 by Dennis Figueroa RN  Medication Review/Management: medications reviewed  Taken 5/3/2024 0802 by Dennis Figueroa RN  Medication Review/Management: medications reviewed  Taken 5/3/2024 0800 by Dennis Figueroa RN  Medication Review/Management: medications reviewed   Goal Outcome Evaluation:

## 2024-05-04 NOTE — PROGRESS NOTES
"          Clinical Nutrition Assessment     Patient Name: Hesham Arevalo Jr.  YOB: 1965  MRN: [id number]  Date of Encounter: 05/03/24 21:11 EDT  Admission date: 5/2/2024  Reason for Visit: Identified at risk by screening criteria, MST score 2+    Assessment   Nutrition Assessment   Admission Diagnosis:  Abdominal pain with nonbilious vomiting [R10.9, R11.10]    Problem List:    Abdominal pain with nonbilious vomiting    Rectal carcinoma    Hyperbilirubinemia    Cholelithiasis      PMH:   He  has a past medical history of Arthritis, Cancer (11/2018), Disease of thyroid gland, Fatty liver, Hashimoto's disease, History of radiation therapy (02/14/2019), Hypertension, Ileostomy in place, Psoriasis, and Wears glasses.    PSH:  He  has a past surgical history that includes Vasectomy (1998); Liver biopsy (2003); Colonoscopy; Colectomy (N/A, 4/23/2019); Ileostomy (04/2019); and Venous Access Device (Port) (N/A, 7/18/2019).    Applicable Nutrition History:   5/3 symptoms resolved w no further N/V or pain; having ostomy output - no obstruction     Anthropometrics     Height: Height: 185.4 cm (73\")  Last Filed Weight: Weight: 91.1 kg (200 lb 14.4 oz) (05/02/24 1652)  Method: Weight Method: Bed scale  BMI: BMI (Calculated): 26.5    UBW:  Per EMR wt of 199 lbs on 3/28/24   Weight change: none recent documented.    Nutrition Focused Physical Exam    Date: 5/3    Pt does not meet criteria for malnutrition diagnosis, at this time.    Only mild wasting detected at clavicle and calf areas.    Subjective   Reported/Observed/Food/Nutrition Related History:     5/3  Pt allows  -200 lbs - has been stable. Was eating ok at home.      Current Nutrition Prescription   PO: Gastrointestinal Soft to Chew whole meat  Oral Nutrition Supplement:   Intake: 1 Day: 100% x 1 meal recorded    Assessment & Plan   Nutrition Diagnosis   Date:  5/3            Updated:    Problem No nutrition diagnosis at this time    Etiology  "   Signs/Symptoms    Status:       Goal / Objectives:   Nutrition to support treatment and Maintain intake per meal documented      Nutrition Intervention      Follow treatment progress, Care plan reviewed, Advise alternate selection, Menu provided        Monitoring/Evaluation:   Per protocol, I&O, PO intake, Pertinent labs, Weight, Symptoms    Janet Brunner RD  Time Spent: 30 min

## 2024-05-05 LAB
QT INTERVAL: 460 MS
QTC INTERVAL: 427 MS

## 2024-05-12 ENCOUNTER — HOSPITAL ENCOUNTER (INPATIENT)
Facility: HOSPITAL | Age: 59
LOS: 4 days | Discharge: HOME | End: 2024-05-16
Attending: EMERGENCY MEDICINE | Admitting: STUDENT IN AN ORGANIZED HEALTH CARE EDUCATION/TRAINING PROGRAM
Payer: COMMERCIAL

## 2024-05-12 ENCOUNTER — APPOINTMENT (OUTPATIENT)
Dept: CT IMAGING | Facility: HOSPITAL | Age: 59
End: 2024-05-12
Payer: COMMERCIAL

## 2024-05-12 ENCOUNTER — APPOINTMENT (OUTPATIENT)
Dept: MRI IMAGING | Facility: HOSPITAL | Age: 59
End: 2024-05-12
Payer: COMMERCIAL

## 2024-05-12 ENCOUNTER — APPOINTMENT (OUTPATIENT)
Dept: ULTRASOUND IMAGING | Facility: HOSPITAL | Age: 59
End: 2024-05-12
Payer: COMMERCIAL

## 2024-05-12 DIAGNOSIS — Z85.038 HISTORY OF COLON CANCER: ICD-10-CM

## 2024-05-12 DIAGNOSIS — K75.81 LIVER CIRRHOSIS SECONDARY TO NASH: ICD-10-CM

## 2024-05-12 DIAGNOSIS — Z86.79 HISTORY OF HYPERTENSION: ICD-10-CM

## 2024-05-12 DIAGNOSIS — R17 JAUNDICE: ICD-10-CM

## 2024-05-12 DIAGNOSIS — R10.9 RIGHT SIDED ABDOMINAL PAIN: ICD-10-CM

## 2024-05-12 DIAGNOSIS — K81.0 ACUTE CHOLECYSTITIS: Primary | ICD-10-CM

## 2024-05-12 DIAGNOSIS — R74.8 ELEVATED LIPASE: ICD-10-CM

## 2024-05-12 DIAGNOSIS — C20 RECTAL CARCINOMA: ICD-10-CM

## 2024-05-12 DIAGNOSIS — E80.6 HYPERBILIRUBINEMIA: ICD-10-CM

## 2024-05-12 DIAGNOSIS — Z93.2 ILEOSTOMY IN PLACE: ICD-10-CM

## 2024-05-12 DIAGNOSIS — K74.60 LIVER CIRRHOSIS SECONDARY TO NASH: ICD-10-CM

## 2024-05-12 DIAGNOSIS — R11.0 NAUSEA: ICD-10-CM

## 2024-05-12 PROBLEM — R73.9 HYPERGLYCEMIA: Status: RESOLVED | Noted: 2024-05-12 | Resolved: 2024-05-12

## 2024-05-12 PROBLEM — E87.1 HYPONATREMIA: Status: ACTIVE | Noted: 2024-05-12

## 2024-05-12 PROBLEM — E03.8 OTHER SPECIFIED HYPOTHYROIDISM: Status: ACTIVE | Noted: 2019-04-23

## 2024-05-12 PROBLEM — E11.9 TYPE 2 DIABETES MELLITUS WITHOUT COMPLICATION, WITHOUT LONG-TERM CURRENT USE OF INSULIN: Status: ACTIVE | Noted: 2024-05-12

## 2024-05-12 PROBLEM — R73.9 HYPERGLYCEMIA: Status: ACTIVE | Noted: 2024-05-12

## 2024-05-12 LAB
ALBUMIN SERPL-MCNC: 3.7 G/DL (ref 3.5–5.2)
ALBUMIN/GLOB SERPL: 1 G/DL
ALP SERPL-CCNC: 122 U/L (ref 39–117)
ALT SERPL W P-5'-P-CCNC: 102 U/L (ref 1–41)
ANION GAP SERPL CALCULATED.3IONS-SCNC: 12 MMOL/L (ref 5–15)
AST SERPL-CCNC: 207 U/L (ref 1–40)
BACTERIA UR QL AUTO: NORMAL /HPF
BASOPHILS # BLD AUTO: 0.04 10*3/MM3 (ref 0–0.2)
BASOPHILS NFR BLD AUTO: 0.5 % (ref 0–1.5)
BILIRUB SERPL-MCNC: 11.2 MG/DL (ref 0–1.2)
BILIRUB UR QL STRIP: ABNORMAL
BUN SERPL-MCNC: 13 MG/DL (ref 8–23)
BUN/CREAT SERPL: 13.4 (ref 7–25)
CALCIUM SPEC-SCNC: 9.1 MG/DL (ref 8.6–10.5)
CHLORIDE SERPL-SCNC: 90 MMOL/L (ref 98–107)
CLARITY UR: CLEAR
CO2 SERPL-SCNC: 26 MMOL/L (ref 22–29)
COLOR UR: ABNORMAL
CREAT SERPL-MCNC: 0.97 MG/DL (ref 0.76–1.27)
D-LACTATE SERPL-SCNC: 2 MMOL/L (ref 0.5–2)
DEPRECATED RDW RBC AUTO: 44.5 FL (ref 37–54)
EGFRCR SERPLBLD CKD-EPI 2021: 89.4 ML/MIN/1.73
EOSINOPHIL # BLD AUTO: 0.08 10*3/MM3 (ref 0–0.4)
EOSINOPHIL NFR BLD AUTO: 1 % (ref 0.3–6.2)
ERYTHROCYTE [DISTWIDTH] IN BLOOD BY AUTOMATED COUNT: 13.6 % (ref 12.3–15.4)
GLOBULIN UR ELPH-MCNC: 3.8 GM/DL
GLUCOSE SERPL-MCNC: 164 MG/DL (ref 65–99)
GLUCOSE UR STRIP-MCNC: ABNORMAL MG/DL
HAV IGM SERPL QL IA: NORMAL
HBV CORE IGM SERPL QL IA: NORMAL
HBV SURFACE AG SERPL QL IA: NORMAL
HCT VFR BLD AUTO: 36.6 % (ref 37.5–51)
HCV AB SER QL: NORMAL
HGB BLD-MCNC: 12.5 G/DL (ref 13–17.7)
HGB UR QL STRIP.AUTO: NEGATIVE
HYALINE CASTS UR QL AUTO: NORMAL /LPF
IMM GRANULOCYTES # BLD AUTO: 0.04 10*3/MM3 (ref 0–0.05)
IMM GRANULOCYTES NFR BLD AUTO: 0.5 % (ref 0–0.5)
KETONES UR QL STRIP: ABNORMAL
LEUKOCYTE ESTERASE UR QL STRIP.AUTO: ABNORMAL
LIPASE SERPL-CCNC: >3000 U/L (ref 13–60)
LYMPHOCYTES # BLD AUTO: 0.49 10*3/MM3 (ref 0.7–3.1)
LYMPHOCYTES NFR BLD AUTO: 5.9 % (ref 19.6–45.3)
MAGNESIUM SERPL-MCNC: 1.6 MG/DL (ref 1.6–2.4)
MCH RBC QN AUTO: 30.3 PG (ref 26.6–33)
MCHC RBC AUTO-ENTMCNC: 34.2 G/DL (ref 31.5–35.7)
MCV RBC AUTO: 88.6 FL (ref 79–97)
MONOCYTES # BLD AUTO: 0.54 10*3/MM3 (ref 0.1–0.9)
MONOCYTES NFR BLD AUTO: 6.5 % (ref 5–12)
NEUTROPHILS NFR BLD AUTO: 7.09 10*3/MM3 (ref 1.7–7)
NEUTROPHILS NFR BLD AUTO: 85.6 % (ref 42.7–76)
NITRITE UR QL STRIP: POSITIVE
NRBC BLD AUTO-RTO: 0 /100 WBC (ref 0–0.2)
PH UR STRIP.AUTO: 5.5 [PH] (ref 5–8)
PLATELET # BLD AUTO: 111 10*3/MM3 (ref 140–450)
PMV BLD AUTO: 9.3 FL (ref 6–12)
POTASSIUM SERPL-SCNC: 3.6 MMOL/L (ref 3.5–5.2)
PROT SERPL-MCNC: 7.5 G/DL (ref 6–8.5)
PROT UR QL STRIP: ABNORMAL
QT INTERVAL: 370 MS
QTC INTERVAL: 440 MS
RBC # BLD AUTO: 4.13 10*6/MM3 (ref 4.14–5.8)
RBC # UR STRIP: NORMAL /HPF
REF LAB TEST METHOD: NORMAL
SODIUM SERPL-SCNC: 128 MMOL/L (ref 136–145)
SP GR UR STRIP: >=1.03 (ref 1–1.03)
SQUAMOUS #/AREA URNS HPF: NORMAL /HPF
TRIGL SERPL-MCNC: 97 MG/DL (ref 0–150)
TROPONIN T SERPL HS-MCNC: 11 NG/L
UROBILINOGEN UR QL STRIP: ABNORMAL
WBC # UR STRIP: NORMAL /HPF
WBC NRBC COR # BLD AUTO: 8.28 10*3/MM3 (ref 3.4–10.8)

## 2024-05-12 PROCEDURE — 83690 ASSAY OF LIPASE: CPT | Performed by: EMERGENCY MEDICINE

## 2024-05-12 PROCEDURE — G0378 HOSPITAL OBSERVATION PER HR: HCPCS

## 2024-05-12 PROCEDURE — 93005 ELECTROCARDIOGRAM TRACING: CPT | Performed by: EMERGENCY MEDICINE

## 2024-05-12 PROCEDURE — 74177 CT ABD & PELVIS W/CONTRAST: CPT

## 2024-05-12 PROCEDURE — 83605 ASSAY OF LACTIC ACID: CPT | Performed by: EMERGENCY MEDICINE

## 2024-05-12 PROCEDURE — 25010000002 HYDROMORPHONE PER 4 MG: Performed by: EMERGENCY MEDICINE

## 2024-05-12 PROCEDURE — 85025 COMPLETE CBC W/AUTO DIFF WBC: CPT | Performed by: EMERGENCY MEDICINE

## 2024-05-12 PROCEDURE — 36415 COLL VENOUS BLD VENIPUNCTURE: CPT

## 2024-05-12 PROCEDURE — 99285 EMERGENCY DEPT VISIT HI MDM: CPT

## 2024-05-12 PROCEDURE — 84478 ASSAY OF TRIGLYCERIDES: CPT | Performed by: EMERGENCY MEDICINE

## 2024-05-12 PROCEDURE — 80053 COMPREHEN METABOLIC PANEL: CPT | Performed by: EMERGENCY MEDICINE

## 2024-05-12 PROCEDURE — 99223 1ST HOSP IP/OBS HIGH 75: CPT | Performed by: INTERNAL MEDICINE

## 2024-05-12 PROCEDURE — 87040 BLOOD CULTURE FOR BACTERIA: CPT | Performed by: EMERGENCY MEDICINE

## 2024-05-12 PROCEDURE — 85610 PROTHROMBIN TIME: CPT | Performed by: INTERNAL MEDICINE

## 2024-05-12 PROCEDURE — 81001 URINALYSIS AUTO W/SCOPE: CPT | Performed by: EMERGENCY MEDICINE

## 2024-05-12 PROCEDURE — 84484 ASSAY OF TROPONIN QUANT: CPT | Performed by: EMERGENCY MEDICINE

## 2024-05-12 PROCEDURE — 74181 MRI ABDOMEN W/O CONTRAST: CPT

## 2024-05-12 PROCEDURE — 25010000002 ONDANSETRON PER 1 MG: Performed by: EMERGENCY MEDICINE

## 2024-05-12 PROCEDURE — 76705 ECHO EXAM OF ABDOMEN: CPT

## 2024-05-12 PROCEDURE — 25010000002 PIPERACILLIN SOD-TAZOBACTAM PER 1 G: Performed by: PHYSICIAN ASSISTANT

## 2024-05-12 PROCEDURE — 25510000001 IOPAMIDOL 61 % SOLUTION: Performed by: EMERGENCY MEDICINE

## 2024-05-12 PROCEDURE — 83735 ASSAY OF MAGNESIUM: CPT | Performed by: EMERGENCY MEDICINE

## 2024-05-12 PROCEDURE — 25810000003 SODIUM CHLORIDE 0.9 % SOLUTION: Performed by: EMERGENCY MEDICINE

## 2024-05-12 PROCEDURE — 80074 ACUTE HEPATITIS PANEL: CPT | Performed by: EMERGENCY MEDICINE

## 2024-05-12 RX ORDER — HYDROMORPHONE HYDROCHLORIDE 1 MG/ML
0.5 INJECTION, SOLUTION INTRAMUSCULAR; INTRAVENOUS; SUBCUTANEOUS ONCE
Status: COMPLETED | OUTPATIENT
Start: 2024-05-12 | End: 2024-05-12

## 2024-05-12 RX ORDER — ONDANSETRON 2 MG/ML
4 INJECTION INTRAMUSCULAR; INTRAVENOUS ONCE
Status: COMPLETED | OUTPATIENT
Start: 2024-05-12 | End: 2024-05-12

## 2024-05-12 RX ADMIN — IOPAMIDOL 100 ML: 612 INJECTION, SOLUTION INTRAVENOUS at 22:22

## 2024-05-12 RX ADMIN — SODIUM CHLORIDE 1000 ML: 9 INJECTION, SOLUTION INTRAVENOUS at 20:32

## 2024-05-12 RX ADMIN — ONDANSETRON 4 MG: 2 INJECTION INTRAMUSCULAR; INTRAVENOUS at 20:33

## 2024-05-12 RX ADMIN — PIPERACILLIN AND TAZOBACTAM 3.38 G: 3; .375 INJECTION, POWDER, LYOPHILIZED, FOR SOLUTION INTRAVENOUS at 23:09

## 2024-05-12 RX ADMIN — HYDROMORPHONE HYDROCHLORIDE 0.5 MG: 1 INJECTION, SOLUTION INTRAMUSCULAR; INTRAVENOUS; SUBCUTANEOUS at 20:33

## 2024-05-12 NOTE — ED PROVIDER NOTES
"Subjective   History of Present Illness  This is a 58-year-old male that presents the ER with 24-hour history of right-sided back pain that radiates around to the right abdomen and he describes it as constant pressure and discomfort and at times pain radiates to the right shoulder.  He was recently admitted to our facility from 5/2/2024 through 5/3/2024 for intractable abdominal pain, hyperbilirubinemia with a level of 3.3, diagnosis of gallstones, and history of colon cancer with ileostomy in place.  Patient says that he had been doing very well after he was discharged to home and starting to feel better, however, almost every time he eats, he starts feeling poorly.  Patient ate a fish sandwich last night and pain significantly worsened.  He reports nausea without vomiting.  He is having good output from his ileostomy.  He does report chills and just generally feels poorly.  He denies any chest pain or shortness of breath.  Wife reports that he appears jaundiced and urine has been dark in coloration.  Patient denies any dysuria, urgency, or frequency.  Patient was wondering if he had a kidney stone.  Past medical history is also significant for hypertension, hypothyroidism, psoriasis, , Hashimoto's thyroiditis, history of colon cancer with colectomy on 4/23/2019 and placement of ileostomy.  Previous abdominal surgeries include vasectomy, colectomy with subsequent ileostomy.    History provided by:  Patient  Abdominal Pain  Pain location:  RUQ (Right mid abdomen)  Pain quality: fullness and pressure    Pain quality comment:  \"Discomfort\"  Pain radiates to:  R shoulder and back (Right upper back and shoulder)  Onset quality:  Sudden  Duration:  24 hours  Timing:  Constant  Progression:  Unchanged  Chronicity:  Recurrent (History of recent admission on 4/2/2024 for intractable abdominal pain)  Context: eating and previous surgery (Mastectomy, colectomy with subsequent ileostomy for colon cancer in 2019.)  "   Context: not alcohol use    Context comment:  Patient reports recent admission to our facility on 4/2/2024 for intractable abdominal pain.  Workup revealed elevated total bilirubin around 3.3 with evidence of gallstones but no acute cholecystitis.  Relieved by:  Nothing  Worsened by:  Eating  Ineffective treatments:  None tried  Associated symptoms: anorexia, chills (Chills alternating with hot flashes), fever (Subjective fever) and nausea    Associated symptoms: no constipation, no cough, no diarrhea, no dysuria, no flatus, no hematemesis, no hematochezia, no hematuria, no shortness of breath, no sore throat and no vomiting    Associated symptoms comment:  Dark coloration of urine  Risk factors: no alcohol abuse, has not had multiple surgeries and no NSAID use        Review of Systems   Constitutional:  Positive for activity change, appetite change, chills (Chills alternating with hot flashes) and fever (Subjective fever).   HENT: Negative.  Negative for congestion, ear pain, postnasal drip, rhinorrhea, sinus pressure, sinus pain, sneezing and sore throat.    Respiratory: Negative.  Negative for cough and shortness of breath.    Cardiovascular: Negative.    Gastrointestinal:  Positive for abdominal pain (Right-sided abdominal pain with radiation to right upper back and right shoulder), anorexia and nausea. Negative for constipation, diarrhea, flatus, hematemesis, hematochezia and vomiting.        History of colon cancer with subsequent ileostomy in 2019.   Genitourinary:  Negative for dysuria, flank pain, frequency, hematuria and urgency.        Dark coloration of urine.  No dysuria, urgency, or frequency.   Musculoskeletal:  Positive for back pain (Right upper back pain.  No CVA pain).   Skin:  Positive for color change (Patient appears jaundice).   Neurological: Negative.  Negative for dizziness, syncope, weakness and headaches.   All other systems reviewed and are negative.      Past Medical History:    Diagnosis Date    Arthritis     knees     Cancer 2018    colon    Disease of thyroid gland     Fatty liver     Hashimoto's disease     History of radiation therapy 2019    rectal cancer    Hypertension     Ileostomy in place     Psoriasis     Wears glasses        No Known Allergies    Past Surgical History:   Procedure Laterality Date    COLON RESECTION N/A 2019    Procedure: LAPAROSCOPIC LOWER ANTERIOR RESECTION WITH DIVERTING LOOP ILEOOSTOMY, SPLENIC FLEIXURE MOBILIZATION AND LIVER BIOPSY, AND PROCTOSCOPY;  Surgeon: Pau Kelly MD;  Location:  [facility];  Service: General    COLONOSCOPY      2018    ILEOSTOMY  2019    LIVER BIOPSY  2003    VASECTOMY      VENOUS ACCESS DEVICE (PORT) INSERTION N/A 2019    Procedure: PORT PLACEMENT;  Surgeon: Franky Cazares MD;  Location:  [facility];  Service: General       Family History   Problem Relation Age of Onset    Breast cancer Mother     Cancer Father         bladder/prostate       Social History     Socioeconomic History    Marital status:    Tobacco Use    Smoking status: Former     Current packs/day: 0.00     Average packs/day: 1 pack/day for 14.0 years (14.0 ttl pk-yrs)     Types: Cigarettes     Start date:      Quit date:      Years since quittin.3    Smokeless tobacco: Never   Vaping Use    Vaping status: Never Used   Substance and Sexual Activity    Alcohol use: No    Drug use: No    Sexual activity: Defer           Objective   Physical Exam  Vitals and nursing note reviewed.   Constitutional:       General: He is not in acute distress.     Appearance: Normal appearance. He is ill-appearing. He is not toxic-appearing.      Comments: Patient appears ill and uncomfortable secondary to right sided abdominal pain.  Nontoxic.  No acute distress.   HENT:      Head: Normocephalic and atraumatic.      Nose: Nose normal.      Mouth/Throat:      Mouth: Mucous membranes are dry.      Pharynx: Oropharynx is clear. No  pharyngeal swelling, oropharyngeal exudate, posterior oropharyngeal erythema or uvula swelling.      Comments: Oral mucous membranes are dry.  Posterior pharynx is nonerythematous.  Eyes:      General: Scleral icterus present.      Extraocular Movements: Extraocular movements intact.      Conjunctiva/sclera: Conjunctivae normal.      Pupils: Pupils are equal, round, and reactive to light.      Comments: Patient has scleral icterus   Cardiovascular:      Rate and Rhythm: Normal rate and regular rhythm. No extrasystoles are present.     Pulses: Normal pulses.      Heart sounds: Normal heart sounds.      Comments: Regular rate and rhythm.  No ectopy.  No pedal edema to lower extremities  Pulmonary:      Effort: Pulmonary effort is normal.      Breath sounds: Normal breath sounds.      Comments: Lungs are clear to auscultation bilaterally  Abdominal:      General: Bowel sounds are normal. There is no distension.      Palpations: Abdomen is soft.      Tenderness: There is abdominal tenderness in the right upper quadrant. There is no right CVA tenderness, left CVA tenderness, guarding or rebound. Negative signs include Rea's sign and McBurney's sign.      Comments: Abdomen soft without distention.  Active bowel sounds in all 4 quadrants.  Ileostomy in place to the right abdomen.  Moderate tenderness to right upper quadrant without guarding or rebound.  Negative Rea's sign.  No right lower quadrant tenderness and no flank or CVA tenderness.  Abdominal exam is benign and nonsurgical.   Musculoskeletal:         General: Normal range of motion.      Cervical back: Normal range of motion and neck supple.      Right lower leg: No edema.      Left lower leg: No edema.   Skin:     General: Skin is warm and dry.      Coloration: Skin is jaundiced.      Comments: Patient appears jaundiced.   Neurological:      General: No focal deficit present.      Mental Status: He is alert and oriented to person, place, and time.       Cranial Nerves: Cranial nerves 2-12 are intact.      Sensory: Sensation is intact.      Motor: Motor function is intact.      Coordination: Coordination is intact.      Comments: Alert and oriented x 3.  Neuro intact and nonfocal   Psychiatric:         Mood and Affect: Mood and affect normal.         Speech: Speech normal.         Behavior: Behavior normal. Behavior is cooperative.         Thought Content: Thought content normal.         Cognition and Memory: Cognition normal.         Judgment: Judgment normal.      Comments: Normal mood and affect.         Procedures           ED Course  ED Course as of 05/12/24 2340   Sun May 12, 2024   3153 I reviewed recent admission from 4/2/2024 through 4/4/2024.  Patient was admitted for intractable abdominal pain, hyperbilirubinemia, gallstones, and history of rectal carcinoma with previous colectomy and subsequent ileostomy.  Patient presented to the ER on 4/2/2024 with nausea/vomiting and epigastric abdominal pain.  CT of the abdomen/pelvis was reassuring without evidence of obstruction, pancreatitis, or cholecystitis.  Right upper quadrant ultrasound showed no evidence of cholecystitis.  Gallstones that were noted on CT imaging were obscured on the ultrasound on 5/2/2024.  Patient did have evidence of cirrhosis and has personal history of [place].  CT of the abdomen/pelvis with contrast today reveals acute cholecystitis.  Pancreas was normal and there was no evidence of pancreatic mass or peripancreatic fluid.  Gallbladder was mildly distended with diffuse gallbladder wall thickening and stranding.  Gallstones are present but the biliary tree is not dilated.  Repeat gallbladder ultrasound today showed cholelithiasis without secondary findings of acute cholecystitis.  CBC shows white blood cell count of 8.28 with 85% neutrophils on the differential.  Chemistries reveal , , alk phos 122 and total bilirubin was 11.2.  Lipase is greater than 3000.  Lactic acid was  2.0.  Hepatitis panel was negative.  We added on triglycerides.  Urinalysis revealed positive nitrite, trace ketones, large 3+ bilirubin, no white blood cells and no bacteria.  Patient will be kept n.p.o. and we initiated IV fluid bolus and Zosyn.  Differential diagnoses includes cholangitis, choledocholithiasis, and acute cholecystitis.  I will page hospitalist, Dr. Milner, to discuss admission.  Abdominal exam is nonsurgical. [FC]   2338 Triglyceride level is normal at 97.  Discussed admission with Dr. Milner, hospitalist.  She is agreeable to admission on telemetry and will consult GI and general surgery.  I updated patient and wife on all results and need for admission and they are agreeable.  Hospitalist team will also order MRCP to rule out choledocholithiasis. [FC]      ED Course User Index  [FC] Micki Ryan, BRANDIN                                        Recent Results (from the past 24 hour(s))   ECG 12 Lead Other; weakness    Collection Time: 05/12/24  8:21 PM   Result Value Ref Range    QT Interval 370 ms    QTC Interval 440 ms   Comprehensive Metabolic Panel    Collection Time: 05/12/24  8:29 PM    Specimen: Blood   Result Value Ref Range    Glucose 164 (H) 65 - 99 mg/dL    BUN 13 8 - 23 mg/dL    Creatinine 0.97 0.76 - 1.27 mg/dL    Sodium 128 (L) 136 - 145 mmol/L    Potassium 3.6 3.5 - 5.2 mmol/L    Chloride 90 (L) 98 - 107 mmol/L    CO2 26.0 22.0 - 29.0 mmol/L    Calcium 9.1 8.6 - 10.5 mg/dL    Total Protein 7.5 6.0 - 8.5 g/dL    Albumin 3.7 3.5 - 5.2 g/dL    ALT (SGPT) 102 (H) 1 - 41 U/L    AST (SGOT) 207 (H) 1 - 40 U/L    Alkaline Phosphatase 122 (H) 39 - 117 U/L    Total Bilirubin 11.2 (H) 0.0 - 1.2 mg/dL    Globulin 3.8 gm/dL    A/G Ratio 1.0 g/dL    BUN/Creatinine Ratio 13.4 7.0 - 25.0    Anion Gap 12.0 5.0 - 15.0 mmol/L    eGFR 89.4 >60.0 mL/min/1.73   Lipase    Collection Time: 05/12/24  8:29 PM    Specimen: Blood   Result Value Ref Range    Lipase >3,000 (H) 13 - 60 U/L   Single High  Sensitivity Troponin T    Collection Time: 05/12/24  8:29 PM    Specimen: Blood   Result Value Ref Range    HS Troponin T 11 <22 ng/L   Lactic Acid, Plasma    Collection Time: 05/12/24  8:29 PM    Specimen: Blood   Result Value Ref Range    Lactate 2.0 0.5 - 2.0 mmol/L   Magnesium    Collection Time: 05/12/24  8:29 PM    Specimen: Blood   Result Value Ref Range    Magnesium 1.6 1.6 - 2.4 mg/dL   Hepatitis Panel, Acute    Collection Time: 05/12/24  8:29 PM    Specimen: Blood   Result Value Ref Range    Hepatitis B Surface Ag Non-Reactive Non-Reactive    Hep A IgM Non-Reactive Non-Reactive    Hep B C IgM Non-Reactive Non-Reactive    Hepatitis C Ab Non-Reactive Non-Reactive   CBC Auto Differential    Collection Time: 05/12/24  8:29 PM    Specimen: Blood   Result Value Ref Range    WBC 8.28 3.40 - 10.80 10*3/mm3    RBC 4.13 (L) 4.14 - 5.80 10*6/mm3    Hemoglobin 12.5 (L) 13.0 - 17.7 g/dL    Hematocrit 36.6 (L) 37.5 - 51.0 %    MCV 88.6 79.0 - 97.0 fL    MCH 30.3 26.6 - 33.0 pg    MCHC 34.2 31.5 - 35.7 g/dL    RDW 13.6 12.3 - 15.4 %    RDW-SD 44.5 37.0 - 54.0 fl    MPV 9.3 6.0 - 12.0 fL    Platelets 111 (L) 140 - 450 10*3/mm3    Neutrophil % 85.6 (H) 42.7 - 76.0 %    Lymphocyte % 5.9 (L) 19.6 - 45.3 %    Monocyte % 6.5 5.0 - 12.0 %    Eosinophil % 1.0 0.3 - 6.2 %    Basophil % 0.5 0.0 - 1.5 %    Immature Grans % 0.5 0.0 - 0.5 %    Neutrophils, Absolute 7.09 (H) 1.70 - 7.00 10*3/mm3    Lymphocytes, Absolute 0.49 (L) 0.70 - 3.10 10*3/mm3    Monocytes, Absolute 0.54 0.10 - 0.90 10*3/mm3    Eosinophils, Absolute 0.08 0.00 - 0.40 10*3/mm3    Basophils, Absolute 0.04 0.00 - 0.20 10*3/mm3    Immature Grans, Absolute 0.04 0.00 - 0.05 10*3/mm3    nRBC 0.0 0.0 - 0.2 /100 WBC   Triglycerides    Collection Time: 05/12/24  8:29 PM    Specimen: Blood   Result Value Ref Range    Triglycerides 97 0 - 150 mg/dL   Urinalysis With Microscopic If Indicated (No Culture) - Urine, Clean Catch    Collection Time: 05/12/24  8:32 PM    Specimen:  Urine, Clean Catch   Result Value Ref Range    Color, UA Dark Yellow (A) Yellow, Straw    Appearance, UA Clear Clear    pH, UA 5.5 5.0 - 8.0    Specific Gravity, UA >=1.030 1.001 - 1.030    Glucose,  mg/dL (1+) (A) Negative    Ketones, UA Trace (A) Negative    Bilirubin, UA Large (3+) (A) Negative    Blood, UA Negative Negative    Protein, UA Trace (A) Negative    Leuk Esterase, UA Trace (A) Negative    Nitrite, UA Positive (A) Negative    Urobilinogen, UA 0.2 E.U./dL 0.2 - 1.0 E.U./dL   Urinalysis, Microscopic Only - Urine, Clean Catch    Collection Time: 05/12/24  8:32 PM    Specimen: Urine, Clean Catch   Result Value Ref Range    RBC, UA 0-2 None Seen, 0-2 /HPF    WBC, UA None Seen None Seen, 0-2 /HPF    Bacteria, UA None Seen None Seen, Trace /HPF    Squamous Epithelial Cells, UA None Seen None Seen, 0-2 /HPF    Hyaline Casts, UA None Seen 0 - 6 /LPF    Methodology Automated Microscopy      Note: In addition to lab results from this visit, the labs listed above may include labs taken at another facility or during a different encounter within the last 24 hours. Please correlate lab times with ED admission and discharge times for further clarification of the services performed during this visit.    CT Abdomen Pelvis With Contrast   Final Result   Impression:      1. Findings consistent with acute cholecystitis.   2. Ancillary findings as described above.            Electronically Signed: Salome Peace MD     5/12/2024 10:33 PM EDT     Workstation ID: [id number]      US Gallbladder   Final Result   Impression:   Cholelithiasis without secondary findings of acute cholecystitis.      Increased echogenicity of the liver parenchyma likely related to steatosis or nonspecific hepatitis.            Electronically Signed: Salome Peace MD     5/12/2024 9:49 PM EDT     Workstation ID: [id number]      MRI abdomen wo contrast mrcp    (Results Pending)     Vitals:    05/12/24 1809 05/12/24 2040   BP: 153/98 135/68   Patient  "Position: Sitting    Pulse: 86 92   Resp: 18    Temp: 98.5 °F (36.9 °C)    TempSrc: Oral    SpO2: 100% 100%   Weight: 88 kg (194 lb)    Height: 185.4 cm (73\")      Medications   sodium chloride 0.9 % bolus 1,000 mL (0 mL Intravenous Stopped 5/12/24 2309)   ondansetron (ZOFRAN) injection 4 mg (4 mg Intravenous Given 5/12/24 2033)   HYDROmorphone (DILAUDID) injection 0.5 mg (0.5 mg Intravenous Given 5/12/24 2033)   iopamidol (ISOVUE-300) 61 % injection 100 mL (100 mL Intravenous Given 5/12/24 2222)   piperacillin-tazobactam (ZOSYN) 3.375 g IVPB in 100 mL NS MBP (CD) (3.375 g Intravenous New Bag 5/12/24 2309)     ECG/EMG Results (last 24 hours)       Procedure Component Value Units Date/Time    ECG 12 Lead Other; weakness [[id number]] Collected: 05/12/24 2021     Updated: 05/12/24 2021     QT Interval 370 ms      QTC Interval 440 ms     Narrative:      Test Reason : Other~  Blood Pressure :   */*   mmHG  Vent. Rate :  85 BPM     Atrial Rate :  85 BPM     P-R Int : 110 ms          QRS Dur :  80 ms      QT Int : 370 ms       P-R-T Axes :  60   7  29 degrees     QTc Int : 440 ms    Sinus rhythm with short MD  Otherwise normal ECG  When compared with ECG of 02-MAY-2024 14:50,  Vent. rate has increased BY  33 BPM    Referred By: EDMD           Confirmed By:           ECG 12 Lead Other; weakness   Preliminary Result   Test Reason : Other~   Blood Pressure :   */*   mmHG   Vent. Rate :  85 BPM     Atrial Rate :  85 BPM      P-R Int : 110 ms          QRS Dur :  80 ms       QT Int : 370 ms       P-R-T Axes :  60   7  29 degrees      QTc Int : 440 ms      Sinus rhythm with short MD   Otherwise normal ECG   When compared with ECG of 02-MAY-2024 14:50,   Vent. rate has increased BY  33 BPM      Referred By: EDMD           Confirmed By:                  Medical Decision Making  Amount and/or Complexity of Data Reviewed  Labs: ordered.  Radiology: ordered.  ECG/medicine tests: ordered.    Risk  Prescription drug " management.        Final diagnoses:   Acute cholecystitis   Right sided abdominal pain   Nausea   Elevated lipase   Hyperbilirubinemia   Jaundice   History of colon cancer   Ileostomy in place   Liver cirrhosis secondary to    History of hypertension       ED Disposition  ED Disposition       ED Disposition   Decision to Admit    Condition   --    Comment   Level of Care: Telemetry [5]   Diagnosis: Abdominal pain [949045]   Admitting Physician: LAZARA QUINTERO [[id number]]   Attending Physician: LAZARA QUINTERO [[id number]]   Bed Request Comments: tele                 No follow-up provider specified.       Medication List      No changes were made to your prescriptions during this visit.            Micki Ryan PA-C  05/12/24 5704

## 2024-05-12 NOTE — Clinical Note
Level of Care: Telemetry [5]   Diagnosis: Abdominal pain [291695]   Admitting Physician: LAZARA QUINTERO [[id number]]   Attending Physician: LAZARA QUINTERO [[id number]]   Bed Request Comments: tele

## 2024-05-13 ENCOUNTER — APPOINTMENT (OUTPATIENT)
Dept: CT IMAGING | Facility: HOSPITAL | Age: 59
End: 2024-05-13
Payer: COMMERCIAL

## 2024-05-13 PROBLEM — K85.90 ACUTE PANCREATITIS: Status: ACTIVE | Noted: 2024-05-13

## 2024-05-13 PROBLEM — K74.60 CIRRHOSIS OF LIVER: Status: ACTIVE | Noted: 2024-05-13

## 2024-05-13 PROBLEM — B17.9 ACUTE HEPATITIS: Status: ACTIVE | Noted: 2024-05-13

## 2024-05-13 PROBLEM — K81.0 ACUTE CHOLECYSTITIS: Status: ACTIVE | Noted: 2024-05-13

## 2024-05-13 LAB
25(OH)D3 SERPL-MCNC: 15.6 NG/ML (ref 30–100)
ALBUMIN SERPL-MCNC: 3 G/DL (ref 3.5–5.2)
ALBUMIN/GLOB SERPL: 0.9 G/DL
ALP SERPL-CCNC: 108 U/L (ref 39–117)
ALPHA-FETOPROTEIN: <2 NG/ML (ref 0–8.3)
ALT SERPL W P-5'-P-CCNC: 84 U/L (ref 1–41)
ANION GAP SERPL CALCULATED.3IONS-SCNC: 8 MMOL/L (ref 5–15)
AST SERPL-CCNC: 148 U/L (ref 1–40)
BASOPHILS # BLD AUTO: 0.02 10*3/MM3 (ref 0–0.2)
BASOPHILS NFR BLD AUTO: 0.4 % (ref 0–1.5)
BILIRUB SERPL-MCNC: 10.6 MG/DL (ref 0–1.2)
BUN SERPL-MCNC: 14 MG/DL (ref 8–23)
BUN/CREAT SERPL: 14.4 (ref 7–25)
CALCIUM SPEC-SCNC: 8.3 MG/DL (ref 8.6–10.5)
CHLORIDE SERPL-SCNC: 101 MMOL/L (ref 98–107)
CHOLEST SERPL-MCNC: 71 MG/DL (ref 0–200)
CO2 SERPL-SCNC: 26 MMOL/L (ref 22–29)
CREAT SERPL-MCNC: 0.97 MG/DL (ref 0.76–1.27)
DEPRECATED RDW RBC AUTO: 45.8 FL (ref 37–54)
EGFRCR SERPLBLD CKD-EPI 2021: 89.4 ML/MIN/1.73
EOSINOPHIL # BLD AUTO: 0.13 10*3/MM3 (ref 0–0.4)
EOSINOPHIL NFR BLD AUTO: 2.9 % (ref 0.3–6.2)
ERYTHROCYTE [DISTWIDTH] IN BLOOD BY AUTOMATED COUNT: 14 % (ref 12.3–15.4)
GLOBULIN UR ELPH-MCNC: 3.2 GM/DL
GLUCOSE BLDC GLUCOMTR-MCNC: 85 MG/DL (ref 70–130)
GLUCOSE BLDC GLUCOMTR-MCNC: 95 MG/DL (ref 70–130)
GLUCOSE SERPL-MCNC: 95 MG/DL (ref 65–99)
HBA1C MFR BLD: 8.3 % (ref 4.8–5.6)
HBV SURFACE AB SER RIA-ACNC: NORMAL
HCT VFR BLD AUTO: 29.7 % (ref 37.5–51)
HDLC SERPL-MCNC: 27 MG/DL (ref 40–60)
HGB BLD-MCNC: 10.1 G/DL (ref 13–17.7)
IMM GRANULOCYTES # BLD AUTO: 0.02 10*3/MM3 (ref 0–0.05)
IMM GRANULOCYTES NFR BLD AUTO: 0.4 % (ref 0–0.5)
INR PPP: 1.16 (ref 0.89–1.12)
LDLC SERPL CALC-MCNC: 25 MG/DL (ref 0–100)
LDLC/HDLC SERPL: 0.94 {RATIO}
LYMPHOCYTES # BLD AUTO: 0.58 10*3/MM3 (ref 0.7–3.1)
LYMPHOCYTES NFR BLD AUTO: 12.7 % (ref 19.6–45.3)
MCH RBC QN AUTO: 30.5 PG (ref 26.6–33)
MCHC RBC AUTO-ENTMCNC: 34 G/DL (ref 31.5–35.7)
MCV RBC AUTO: 89.7 FL (ref 79–97)
MONOCYTES # BLD AUTO: 0.51 10*3/MM3 (ref 0.1–0.9)
MONOCYTES NFR BLD AUTO: 11.2 % (ref 5–12)
NEUTROPHILS NFR BLD AUTO: 3.29 10*3/MM3 (ref 1.7–7)
NEUTROPHILS NFR BLD AUTO: 72.4 % (ref 42.7–76)
NRBC BLD AUTO-RTO: 0 /100 WBC (ref 0–0.2)
PLATELET # BLD AUTO: 66 10*3/MM3 (ref 140–450)
PMV BLD AUTO: 9.2 FL (ref 6–12)
POTASSIUM SERPL-SCNC: 3.9 MMOL/L (ref 3.5–5.2)
PROT SERPL-MCNC: 6.2 G/DL (ref 6–8.5)
PROTHROMBIN TIME: 15 SECONDS (ref 12.2–14.5)
RBC # BLD AUTO: 3.31 10*6/MM3 (ref 4.14–5.8)
SODIUM SERPL-SCNC: 135 MMOL/L (ref 136–145)
TRIGL SERPL-MCNC: 93 MG/DL (ref 0–150)
VLDLC SERPL-MCNC: 19 MG/DL (ref 5–40)
WBC NRBC COR # BLD AUTO: 4.55 10*3/MM3 (ref 3.4–10.8)

## 2024-05-13 PROCEDURE — 80053 COMPREHEN METABOLIC PANEL: CPT | Performed by: INTERNAL MEDICINE

## 2024-05-13 PROCEDURE — G0378 HOSPITAL OBSERVATION PER HR: HCPCS

## 2024-05-13 PROCEDURE — 87186 SC STD MICRODIL/AGAR DIL: CPT | Performed by: SURGERY

## 2024-05-13 PROCEDURE — 25010000002 PIPERACILLIN SOD-TAZOBACTAM PER 1 G: Performed by: SURGERY

## 2024-05-13 PROCEDURE — 25010000002 MIDAZOLAM PER 1 MG: Performed by: RADIOLOGY

## 2024-05-13 PROCEDURE — 0F9430Z DRAINAGE OF GALLBLADDER WITH DRAINAGE DEVICE, PERCUTANEOUS APPROACH: ICD-10-PCS | Performed by: RADIOLOGY

## 2024-05-13 PROCEDURE — C1729 CATH, DRAINAGE: HCPCS

## 2024-05-13 PROCEDURE — 25810000003 SODIUM CHLORIDE 0.9 % SOLUTION: Performed by: INTERNAL MEDICINE

## 2024-05-13 PROCEDURE — 77012 CT SCAN FOR NEEDLE BIOPSY: CPT

## 2024-05-13 PROCEDURE — 86706 HEP B SURFACE ANTIBODY: CPT | Performed by: NURSE PRACTITIONER

## 2024-05-13 PROCEDURE — 25010000002 FENTANYL CITRATE (PF) 50 MCG/ML SOLUTION: Performed by: RADIOLOGY

## 2024-05-13 PROCEDURE — 87070 CULTURE OTHR SPECIMN AEROBIC: CPT | Performed by: SURGERY

## 2024-05-13 PROCEDURE — 25010000002 ONDANSETRON PER 1 MG: Performed by: INTERNAL MEDICINE

## 2024-05-13 PROCEDURE — 82105 ALPHA-FETOPROTEIN SERUM: CPT | Performed by: NURSE PRACTITIONER

## 2024-05-13 PROCEDURE — 85025 COMPLETE CBC W/AUTO DIFF WBC: CPT | Performed by: INTERNAL MEDICINE

## 2024-05-13 PROCEDURE — 83036 HEMOGLOBIN GLYCOSYLATED A1C: CPT | Performed by: PHYSICIAN ASSISTANT

## 2024-05-13 PROCEDURE — 82948 REAGENT STRIP/BLOOD GLUCOSE: CPT

## 2024-05-13 PROCEDURE — 25010000002 LIDOCAINE 1 % SOLUTION: Performed by: RADIOLOGY

## 2024-05-13 PROCEDURE — 25810000003 LACTATED RINGERS SOLUTION: Performed by: NURSE PRACTITIONER

## 2024-05-13 PROCEDURE — 25810000003 LACTATED RINGERS PER 1000 ML: Performed by: NURSE PRACTITIONER

## 2024-05-13 PROCEDURE — 82306 VITAMIN D 25 HYDROXY: CPT | Performed by: NURSE PRACTITIONER

## 2024-05-13 PROCEDURE — 25010000002 HYDROMORPHONE PER 4 MG: Performed by: INTERNAL MEDICINE

## 2024-05-13 PROCEDURE — 99221 1ST HOSP IP/OBS SF/LOW 40: CPT | Performed by: NURSE PRACTITIONER

## 2024-05-13 PROCEDURE — 25010000002 PIPERACILLIN SOD-TAZOBACTAM PER 1 G: Performed by: PHYSICIAN ASSISTANT

## 2024-05-13 PROCEDURE — 80061 LIPID PANEL: CPT | Performed by: PHYSICIAN ASSISTANT

## 2024-05-13 PROCEDURE — 87205 SMEAR GRAM STAIN: CPT | Performed by: SURGERY

## 2024-05-13 PROCEDURE — 87077 CULTURE AEROBIC IDENTIFY: CPT | Performed by: SURGERY

## 2024-05-13 PROCEDURE — 99232 SBSQ HOSP IP/OBS MODERATE 35: CPT | Performed by: HOSPITALIST

## 2024-05-13 PROCEDURE — 86708 HEPATITIS A ANTIBODY: CPT | Performed by: NURSE PRACTITIONER

## 2024-05-13 PROCEDURE — 87075 CULTR BACTERIA EXCEPT BLOOD: CPT | Performed by: SURGERY

## 2024-05-13 RX ORDER — CARVEDILOL 6.25 MG/1
6.25 TABLET ORAL 2 TIMES DAILY WITH MEALS
Status: DISCONTINUED | OUTPATIENT
Start: 2024-05-13 | End: 2024-05-16 | Stop reason: HOSPADM

## 2024-05-13 RX ORDER — HYDROMORPHONE HYDROCHLORIDE 1 MG/ML
0.5 INJECTION, SOLUTION INTRAMUSCULAR; INTRAVENOUS; SUBCUTANEOUS EVERY 4 HOURS PRN
Status: DISCONTINUED | OUTPATIENT
Start: 2024-05-13 | End: 2024-05-16 | Stop reason: HOSPADM

## 2024-05-13 RX ORDER — NICOTINE POLACRILEX 4 MG
15 LOZENGE BUCCAL
Status: DISCONTINUED | OUTPATIENT
Start: 2024-05-13 | End: 2024-05-16 | Stop reason: HOSPADM

## 2024-05-13 RX ORDER — FENTANYL CITRATE 50 UG/ML
INJECTION, SOLUTION INTRAMUSCULAR; INTRAVENOUS AS NEEDED
Status: COMPLETED | OUTPATIENT
Start: 2024-05-13 | End: 2024-05-13

## 2024-05-13 RX ORDER — SODIUM CHLORIDE, SODIUM LACTATE, POTASSIUM CHLORIDE, CALCIUM CHLORIDE 600; 310; 30; 20 MG/100ML; MG/100ML; MG/100ML; MG/100ML
125 INJECTION, SOLUTION INTRAVENOUS CONTINUOUS
Status: DISCONTINUED | OUTPATIENT
Start: 2024-05-13 | End: 2024-05-16 | Stop reason: HOSPADM

## 2024-05-13 RX ORDER — MIDAZOLAM HYDROCHLORIDE 1 MG/ML
INJECTION INTRAMUSCULAR; INTRAVENOUS AS NEEDED
Status: COMPLETED | OUTPATIENT
Start: 2024-05-13 | End: 2024-05-13

## 2024-05-13 RX ORDER — IBUPROFEN 600 MG/1
1 TABLET ORAL
Status: DISCONTINUED | OUTPATIENT
Start: 2024-05-13 | End: 2024-05-16 | Stop reason: HOSPADM

## 2024-05-13 RX ORDER — DEXTROSE MONOHYDRATE 25 G/50ML
25 INJECTION, SOLUTION INTRAVENOUS
Status: DISCONTINUED | OUTPATIENT
Start: 2024-05-13 | End: 2024-05-16 | Stop reason: HOSPADM

## 2024-05-13 RX ORDER — CHOLECALCIFEROL (VITAMIN D3) 125 MCG
5 CAPSULE ORAL NIGHTLY PRN
Status: DISCONTINUED | OUTPATIENT
Start: 2024-05-13 | End: 2024-05-16 | Stop reason: HOSPADM

## 2024-05-13 RX ORDER — SODIUM CHLORIDE 9 MG/ML
125 INJECTION, SOLUTION INTRAVENOUS CONTINUOUS
Status: DISCONTINUED | OUTPATIENT
Start: 2024-05-13 | End: 2024-05-13

## 2024-05-13 RX ORDER — INSULIN LISPRO 100 [IU]/ML
2-9 INJECTION, SOLUTION INTRAVENOUS; SUBCUTANEOUS
Status: DISCONTINUED | OUTPATIENT
Start: 2024-05-13 | End: 2024-05-16 | Stop reason: HOSPADM

## 2024-05-13 RX ORDER — LIDOCAINE HYDROCHLORIDE 10 MG/ML
10 INJECTION, SOLUTION INFILTRATION; PERINEURAL ONCE
Status: COMPLETED | OUTPATIENT
Start: 2024-05-13 | End: 2024-05-13

## 2024-05-13 RX ORDER — ONDANSETRON 2 MG/ML
4 INJECTION INTRAMUSCULAR; INTRAVENOUS EVERY 6 HOURS PRN
Status: DISCONTINUED | OUTPATIENT
Start: 2024-05-13 | End: 2024-05-16 | Stop reason: HOSPADM

## 2024-05-13 RX ORDER — SODIUM CHLORIDE 0.9 % (FLUSH) 0.9 %
10 SYRINGE (ML) INJECTION AS NEEDED
Status: DISCONTINUED | OUTPATIENT
Start: 2024-05-13 | End: 2024-05-16 | Stop reason: HOSPADM

## 2024-05-13 RX ORDER — LISINOPRIL 10 MG/1
10 TABLET ORAL
Status: DISCONTINUED | OUTPATIENT
Start: 2024-05-13 | End: 2024-05-16 | Stop reason: HOSPADM

## 2024-05-13 RX ORDER — LEVOTHYROXINE SODIUM 0.03 MG/1
25 TABLET ORAL
Status: DISCONTINUED | OUTPATIENT
Start: 2024-05-13 | End: 2024-05-16 | Stop reason: HOSPADM

## 2024-05-13 RX ORDER — FENTANYL CITRATE 50 UG/ML
INJECTION, SOLUTION INTRAMUSCULAR; INTRAVENOUS
Status: DISPENSED
Start: 2024-05-13 | End: 2024-05-13

## 2024-05-13 RX ORDER — SODIUM CHLORIDE 0.9 % (FLUSH) 0.9 %
10 SYRINGE (ML) INJECTION EVERY 12 HOURS SCHEDULED
Status: DISCONTINUED | OUTPATIENT
Start: 2024-05-13 | End: 2024-05-16 | Stop reason: HOSPADM

## 2024-05-13 RX ORDER — MIDAZOLAM HYDROCHLORIDE 1 MG/ML
INJECTION INTRAMUSCULAR; INTRAVENOUS
Status: DISPENSED
Start: 2024-05-13 | End: 2024-05-13

## 2024-05-13 RX ORDER — SODIUM CHLORIDE 9 MG/ML
40 INJECTION, SOLUTION INTRAVENOUS AS NEEDED
Status: DISCONTINUED | OUTPATIENT
Start: 2024-05-13 | End: 2024-05-16 | Stop reason: HOSPADM

## 2024-05-13 RX ADMIN — HYDROMORPHONE HYDROCHLORIDE 0.5 MG: 1 INJECTION, SOLUTION INTRAMUSCULAR; INTRAVENOUS; SUBCUTANEOUS at 03:08

## 2024-05-13 RX ADMIN — LISINOPRIL 10 MG: 10 TABLET ORAL at 09:13

## 2024-05-13 RX ADMIN — SODIUM CHLORIDE, POTASSIUM CHLORIDE, SODIUM LACTATE AND CALCIUM CHLORIDE 125 ML/HR: 600; 310; 30; 20 INJECTION, SOLUTION INTRAVENOUS at 09:14

## 2024-05-13 RX ADMIN — SODIUM CHLORIDE, POTASSIUM CHLORIDE, SODIUM LACTATE AND CALCIUM CHLORIDE 250 ML: 600; 310; 30; 20 INJECTION, SOLUTION INTRAVENOUS at 20:25

## 2024-05-13 RX ADMIN — LIDOCAINE HYDROCHLORIDE 10 ML: 10 INJECTION, SOLUTION INFILTRATION; PERINEURAL at 11:49

## 2024-05-13 RX ADMIN — PIPERACILLIN AND TAZOBACTAM 3.38 G: 3; .375 INJECTION, POWDER, LYOPHILIZED, FOR SOLUTION INTRAVENOUS at 22:56

## 2024-05-13 RX ADMIN — CARVEDILOL 6.25 MG: 6.25 TABLET, FILM COATED ORAL at 09:13

## 2024-05-13 RX ADMIN — Medication 10 ML: at 06:52

## 2024-05-13 RX ADMIN — CARVEDILOL 6.25 MG: 6.25 TABLET, FILM COATED ORAL at 16:43

## 2024-05-13 RX ADMIN — FENTANYL CITRATE 50 MCG: 50 INJECTION, SOLUTION INTRAMUSCULAR; INTRAVENOUS at 11:26

## 2024-05-13 RX ADMIN — MIDAZOLAM HYDROCHLORIDE 1 MG: 1 INJECTION, SOLUTION INTRAMUSCULAR; INTRAVENOUS at 11:27

## 2024-05-13 RX ADMIN — PIPERACILLIN AND TAZOBACTAM 3.38 G: 3; .375 INJECTION, POWDER, LYOPHILIZED, FOR SOLUTION INTRAVENOUS at 06:48

## 2024-05-13 RX ADMIN — SODIUM CHLORIDE 125 ML/HR: 9 INJECTION, SOLUTION INTRAVENOUS at 06:48

## 2024-05-13 RX ADMIN — LEVOTHYROXINE SODIUM 25 MCG: 25 TABLET ORAL at 09:13

## 2024-05-13 RX ADMIN — PIPERACILLIN AND TAZOBACTAM 3.38 G: 3; .375 INJECTION, POWDER, LYOPHILIZED, FOR SOLUTION INTRAVENOUS at 14:32

## 2024-05-13 RX ADMIN — HYDROMORPHONE HYDROCHLORIDE 0.5 MG: 1 INJECTION, SOLUTION INTRAMUSCULAR; INTRAVENOUS; SUBCUTANEOUS at 12:47

## 2024-05-13 RX ADMIN — ONDANSETRON 4 MG: 2 INJECTION INTRAMUSCULAR; INTRAVENOUS at 03:08

## 2024-05-13 NOTE — CASE MANAGEMENT/SOCIAL WORK
Discharge Planning Assessment  [facility]     Patient Name: Hesham Arevalo Jr.  MRN: [id number]  Today's Date: 5/13/2024    Admit Date: 5/12/2024    Plan: home   Discharge Needs Assessment       Row Name 05/13/24 1301       Living Environment    People in Home spouse    Name(s) of People in Home wifeLivier    Current Living Arrangements home    Primary Care Provided by self       Transition Planning    Patient/Family Anticipates Transition to home with family       Discharge Needs Assessment    Readmission Within the Last 30 Days no previous admission in last 30 days    Equipment Currently Used at Home none    Concerns to be Addressed basic needs;discharge planning                   Discharge Plan       Row Name 05/13/24 1301       Plan    Plan home    Patient/Family in Agreement with Plan yes    Plan Comments I met with this patient at the bedside. He lives with his wife in [facility]. He is independent with activities of daily living and mobility. He anticipates returning home after this hospitalization, and his wife can transport. Case management will follow.    Final Discharge Disposition Code 01 - home or self-care                  Continued Care and Services - Admitted Since 5/12/2024    No active coordination exists for this encounter.       Expected Discharge Date and Time       Expected Discharge Date Expected Discharge Time    May 17, 2024            Demographic Summary       Row Name 05/13/24 1300       General Information    General Information Comments I confirmed that Myke Mendoza is Mr Aervalo's PCP and he has [place] BC                   Functional Status       Row Name 05/13/24 1300       Functional Status, IADL    Medications independent    Meal Preparation independent    Housekeeping independent    Laundry independent    Shopping independent                   Psychosocial    No documentation.                  Abuse/Neglect    No documentation.                  Legal    No  documentation.                  Substance Abuse    No documentation.                  Patient Forms    No documentation.                     Marline Huynh RN

## 2024-05-13 NOTE — PROGRESS NOTES
[facility] Medicine Services  PROGRESS NOTE    Patient Name: Hesham Arevalo Jr.  : 1965  MRN: [id number]    Date of Admission: 2024  Primary Care Physician: Myke Mendoza MD    Subjective   Subjective     CC:  F/U abdominal pain    HPI:  Seen this morning while still in ER. Pain improved currently, received pain medication about 4 hours prior. He has talked to Dr. Garcia already.       Objective   Objective     Vital Signs:   Temp:  [98.5 °F (36.9 °C)] 98.5 °F (36.9 °C)  Heart Rate:  [78-94] 78  Resp:  [16-18] 16  BP: (117-153)/(61-98) 135/66     Physical Exam:  Gen-no acute distress  HENT-NCAT, mucous membranes moist  CV-RRR, S1 S2 normal, no m/r/g  Resp-CTAB, no wheezes or rales  Abd-soft, mild RUQ TTP, ND, +BS, ostomy in place  Ext-no edema  Neuro-A&Ox3, no focal deficits  Skin-no rashes  Psych-appropriate mood      Results Reviewed:  LAB RESULTS:      Lab 24  0700 24   WBC 4.55 8.28   HEMOGLOBIN 10.1* 12.5*   HEMATOCRIT 29.7* 36.6*   PLATELETS 66* 111*   NEUTROS ABS 3.29 7.09*   IMMATURE GRANS (ABS) 0.02 0.04   LYMPHS ABS 0.58* 0.49*   MONOS ABS 0.51 0.54   EOS ABS 0.13 0.08   MCV 89.7 88.6   LACTATE  --  2.0   PROTIME  --  15.0*         Lab 24  0700 24   SODIUM 135* 128*   POTASSIUM 3.9 3.6   CHLORIDE 101 90*   CO2 26.0 26.0   ANION GAP 8.0 12.0   BUN 14 13   CREATININE 0.97 0.97   EGFR 89.4 89.4   GLUCOSE 95 164*   CALCIUM 8.3* 9.1   MAGNESIUM  --  1.6   HEMOGLOBIN A1C 8.30*  --          Lab 24  0724   TOTAL PROTEIN 6.2 7.5   ALBUMIN 3.0* 3.7   GLOBULIN 3.2 3.8   ALT (SGPT) 84* 102*   AST (SGOT) 148* 207*   BILIRUBIN 10.6* 11.2*   ALK PHOS 108 122*   LIPASE  --  >3,000*         Lab 24   HSTROP T 11   PROTIME 15.0*   INR 1.16*         Lab 24  0700 24   CHOLESTEROL 71  --    LDL CHOL 25  --    HDL CHOL 27*  --    TRIGLYCERIDES 93 97             Brief Urine Lab Results  (Last  result in the past 365 days)        Color   Clarity   Blood   Leuk Est   Nitrite   Protein   CREAT   Urine HCG        05/12/24 2032 Dark Yellow   Clear   Negative   Trace   Positive   Trace                   Microbiology Results Abnormal       None            MRI abdomen wo contrast mrcp    Result Date: 5/13/2024  MRI ABDOMEN WO CONTRAST MRCP Date of Exam: 5/12/2024 11:39 PM EDT Indication: acute hepatitis, pancreatitis.  Comparison: 5/12/2024. Technique:  Routine multiplanar/multisequence images of the abdomen were obtained with MRCP sequences without contrast administration. Findings: Redemonstration of cirrhotic morphology of the liver with surface nodularity present and diffuse low T1 signal changes. No evidence of ascites. The gallbladder appears distended. Gallstones are present. Wall thickening is present with a small amount of surrounding inflammatory changes and free fluid. No evidence of biliary ductal dilatation. No filling defects identified within the biliary tree. There does hypertrophy a stones seen either within the gallbladder neck or within the cystic duct (series 21  image 56). Spleen appears enlarged likely related to portal venous hypertension, stable. No evidence of pancreatic ductal dilatation. No evidence of focal collection. Mild edema is seen diffusely surrounding the pancreas kidneys appear unremarkable. No significant inflammatory changes.     Impression: Impression: 1. Findings consistent with acute cholecystitis. No evidence of biliary ductal dilatation or filling defects within the biliary tree. There is a stone seen within the gallbladder neck or within the proximal portion of the cystic duct. 2. Mild diffuse edema seen surrounding the pancreas consistent with acute uncomplicated pancreatitis. 3. Cirrhotic morphology of the liver with evidence of portal venous hypertension. No focal lesions or evidence of ascites. Electronically Signed: Salome Peace MD  5/13/2024 12:52 AM EDT   Workstation ID: [id number]    CT Abdomen Pelvis With Contrast    Result Date: 5/12/2024  CT ABDOMEN PELVIS W CONTRAST Date of Exam: 5/12/2024 10:14 PM EDT Indication: right sided abd pain, nausea, chills, h/o gallstones, r/o cholecystitis. Comparison: 5/2/2024. Technique: Axial CT images were obtained of the abdomen and pelvis following the uneventful intravenous administration of intravenous contrast. Reconstructed coronal and sagittal images were also obtained. Automated exposure control and iterative construction methods were used. Findings: Lung Bases:   The visualized lung bases and lower mediastinal structures are unremarkable. Liver: The liver appears diffusely hypodense consistent with steatosis.. No evidence of ascites. Contour lobular appearance present consistent with cirrhosis.. No focal lesions. Biliary/Gallbladder:  The gallbladder is mildly distended. Diffuse gallbladder wall thickening is present. Stranding is seen within the adjacent fat. A trace amount of free fluid is seen along the inferior portion of the second portion of the duodenum. Gallstones are present.. The biliary tree is nondilated. Spleen: Spleen appears mildly enlarged, stable as compared to the previous study.. Pancreas:  Pancreas is normal. There is no evidence of pancreatic mass or peripancreatic fluid. Kidneys:  Kidneys are normal in size. There are no stones or hydronephrosis. Adrenals:  Adrenal glands are unremarkable. Retroperitoneal/Lymph Nodes/Vasculature:  No retroperitoneal adenopathy is identified. Gastrointestinal/Mesentery:  The bowel loops are non-dilated without wall thickening. Presacral fluid and soft tissue thickening present, similar as compared to the previous study. Postsurgical changes are present within the rectum. Right lower quadrant colostomy present. No significant stool burden identified. Varicosities are present. No evidence of hernia.. The appendix appears within normal limits. No evidence of obstruction.  No free air. No mesenteric fluid collections identified. Bladder:  The bladder is normal. Genital:   Unremarkable       Bony Structures:   Visualized bony structures are consistent with the patient's age.     Impression: Impression: 1. Findings consistent with acute cholecystitis. 2. Ancillary findings as described above. Electronically Signed: Salome Peace MD  5/12/2024 10:33 PM EDT  Workstation ID: [id number]    US Gallbladder    Result Date: 5/12/2024  US GALLBLADDER Date of Exam: 5/12/2024 9:24 PM EDT Indication: RUQ abd pain, pt appears jaundiced, nausea, chills, known gallstones. Comparison: 5/2/2024. Technique: Grayscale and color Doppler ultrasound evaluation of the right upper quadrant was performed. Findings: Limited evaluation due to patient condition. The liver appears echogenic.. No focal lesions identified. Normal flow is present within the hepatic vasculature. Limited visualization of the pancreas appears grossly unremarkable. No evidence of ascites. The gallbladder appears normal in caliber. No wall thickening identified. Stones present within the lumen.. The common bile duct appears normal in caliber. The sonographic Rea sign was negative. The right kidney appears normal in size with no focal lesions. No evidence of nephrolithiasis or hydronephrosis.     Impression: Impression: Cholelithiasis without secondary findings of acute cholecystitis. Increased echogenicity of the liver parenchyma likely related to steatosis or nonspecific hepatitis. Electronically Signed: Salome Peace MD  5/12/2024 9:49 PM EDT  Workstation ID: [id number]         Current medications:  Scheduled Meds:carvedilol, 6.25 mg, Oral, BID With Meals  fentaNYL citrate (PF), , ,   insulin lispro, 2-9 Units, Subcutaneous, 4x Daily AC & at Bedtime  levothyroxine, 25 mcg, Oral, Q AM  lisinopril, 10 mg, Oral, Q24H  midazolam, , ,   piperacillin-tazobactam, 3.375 g, Intravenous, Q8H  sodium chloride, 10 mL, Intravenous, Q12H      Continuous  Infusions:lactated ringers, 125 mL/hr, Last Rate: 125 mL/hr (05/13/24 0914)      PRN Meds:.  dextrose    dextrose    fentaNYL citrate (PF)    glucagon (human recombinant)    HYDROmorphone    melatonin    midazolam    ondansetron    sodium chloride    sodium chloride    Assessment & Plan   Assessment & Plan     Active Hospital Problems    Diagnosis  POA    **Acute cholecystitis [K81.0]  Unknown    Cirrhosis of liver [K74.60]  Unknown    Acute hepatitis [B17.9]  Unknown    Acute pancreatitis [K85.90]  Unknown    Abdominal pain [R10.9]  Yes    Hyponatremia [E87.1]  Yes    Type 2 diabetes mellitus without complication, without long-term current use of insulin [E11.9]  Yes    Thrombocytopenia [D69.6]  Yes    Essential hypertension [I10]  Yes    Other specified hypothyroidism [E03.8]  Yes      Resolved Hospital Problems    Diagnosis Date Resolved POA    Hyperglycemia [R73.9] 05/12/2024 Yes        Brief Hospital Course to date:  Hesham Arevalo Jr. is a 58 y.o. male with hx of HTN, HLD, T2DM,  cirrhosis, and hx of rectal adenocarcinoma s/p resection and ileostomy who presents to [facility] ED for complaint of RUQ abdominal pain. He had been admitted here 5/2-5/3/24 due to similar complaints, had imaging that showed gallstones without signs of cholecystitis or pancreatitis - improved with supportive care and patient was discharged home.    This patient's problems and plans were partially entered by my partner and updated as appropriate by me 05/13/24. Copied text in this note has been reviewed and is accurate as of today's date.     Acute cholecystitis  Elevated LFT's/hyperbilirubinemia  Acute gallstone pancreatitis  --CT A/P with acute cholecystitis   --US gallbladder with cholelithiasis but no acute signs of cholecystitis  --MRCP with acute cholecystitis but no filling defects/biliary ductal dilatation, mild diffuse edema surrounding pancreas  --, , Total Bilirubin 11.2 - markedly elevated compared  to 5/3/24 labs  --Lipase elevated > 3000  --Continue IV fluids, Zosyn  --Dr. 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 Lebron March 2022 - no varices noted  --Continue Coreg  --GI consulted as above  --Monitor platelets closely, significant drop from 111 to 66K today     HTN  HLD  --Takes Coreg and Lisinopril, continue  --Not currently on a statin, lipid profile acceptable (LDL 25)     T2DM  --HbA1c 8.3%  --SSI     Hypothyroidism  --Continue home Synthroid    Hx of rectal adenocarcinoma s/p resection and ileostomy April 2019  --Follows with Dr. Kelly and Dr. Moeller, no evidence of recurrence per last note from Oncology 3/28/24  --He reports he has been told he is too high risk for ileostomy reversal    Expected Discharge Location and Transportation: home  Expected Discharge   Expected Discharge Date: 5/17/2024; Expected Discharge Time:      DVT prophylaxis:  Mechanical DVT prophylaxis orders are present.              CODE STATUS:   Code Status and Medical Interventions:   Ordered at: 05/13/24 0037     Code Status (Patient has no pulse and is not breathing):    CPR (Attempt to Resuscitate)     Medical Interventions (Patient has pulse or is breathing):    Full Support       Jaylin Sampson MD  05/13/24

## 2024-05-13 NOTE — PLAN OF CARE
Goal Outcome Evaluation:  Plan of Care Reviewed With: patient        Progress: improving  Outcome Evaluation: VSS on RA. A&Ox4. IR place subq cholecystostomy ot FRANTZ drain. Output adequate and brown in color. Complaints of abd pain relieved with PRNs. Discussed plan of care with patient who verbalizes understanding.

## 2024-05-13 NOTE — H&P
[facility] Medicine Services  HISTORY AND PHYSICAL    Patient Name: Hesham Arevalo Jr.  : 1965  MRN: [id number]  Primary Care Physician: Myke Mendoza MD  Date of admission: 2024    Subjective   Subjective     Chief Complaint:  Abdominal pain    HPI:  Hesham Arevalo Jr. is a 58 y.o. male with a history of HTN, HLD, T2DM, Liver Cirrhosis,  and hx colon cancer s/p colon resection and ileostomy who presents to [facility] ED for complaint of abdominal pain. He states that this began last night shortly after eating a fish sandwich. Pain seems to be localized to the RUQ, and radiates into his back. He denies fever, chills, SOB, nausea, vomiting, or diarrhea. He initially presented here a little over a week ago for similar complaints. Scans at that time were reassuring, and so was discharged home the following day. He states that pain had subsided since then, but then resumed yesterday.          Review of Systems   Constitutional:  Positive for appetite change. Negative for chills, diaphoresis, fatigue, fever and unexpected weight change.   HENT:  Negative for nosebleeds, sore throat and trouble swallowing.    Eyes:  Negative for photophobia and visual disturbance.   Respiratory:  Negative for cough, shortness of breath and wheezing.    Cardiovascular:  Negative for chest pain and palpitations.   Gastrointestinal:  Positive for abdominal pain. Negative for diarrhea, nausea and vomiting.   Genitourinary:  Negative for dysuria and hematuria.   Musculoskeletal:  Negative for arthralgias and myalgias.   Skin: Negative.    Neurological:  Negative for tremors, syncope, speech difficulty and weakness.   Psychiatric/Behavioral:  Negative for confusion. The patient is not nervous/anxious.               Personal History     Past Medical History:   Diagnosis Date   • Arthritis     knees    • Cancer 2018    colon   • Disease of thyroid gland    • Fatty liver    • Hashimoto's  disease    • History of radiation therapy 02/14/2019    rectal cancer   • Hypertension    • Ileostomy in place    • Psoriasis    • Wears glasses          Oncology Problem List:  Rectal cancer (04/23/2019; Status: Active)  Rectal carcinoma (12/18/2018; Status: Active)    Oncology/Hematology History   Rectal carcinoma   12/18/2018 Initial Diagnosis    Rectal carcinoma (CMS/HCC)     1/7/2019 - 2/4/2019 Chemotherapy    Xeloda radiation on protocol     1/8/2019 - 2/14/2019 Radiation    Radiation OncologyTreatment Course:  Hesham Arevalo received 5040 cGy in 28 fractions to pelvis via External Beam Radiation - EBRT.     4/23/2019 Surgery    Surgery rectosigmoidectomy pathology report:  Final Diagnosis    1. RECTOSIGMOID COLON, RECTOSIGMOID RESECTION:  Residual adenoma with focal high grade dysplasia with no residual invasive carcinoma identified post-treatment (see comment).  Seventeen lymph nodes negative for carcinoma (0/17)   2. LIVER, CORE NEEDLE BIOPSY:  Mild steatosis with mild chronic inflammation and increased fibrosis including bridging fibrosis (Stage 3-4) (see comment)        COLON AND RECTUM TEMPLATE:  TYPE OF SPECIMEN/PROCEDURE: Rectosigmoid resection.   SPECIMEN INTEGRITY:  Intact.  TUMOR SITE:  Rectum.  TUMOR SIZE (greatest dimension):  Indeterminate  TUMOR LOCATION (applicable only to rectal primaries): Entirely below anterior peritoneal reflection.  MACROSCOPIC INTACTNESS OF MESORECTUM (If applicable): Intact.  MACROSCOPIC TUMOR PERFORATION: Not identified.  TUMOR CONFIGURATION:  Ulcerated.  HISTOLOGIC TYPE:  Adenocarcinoma.  HISTOLOGIC GRADE:  G3-4 (per previous biopsy).  MICROSCOPIC DEPTH OF TUMOR INVASION/EXTENSION:  No residual tumor identified.  PERITONEAL INVOLVEMENT:  Not identified.  LYMPHVASCULAR INVASION:  Not identified.  PERINEURAL INVASION: Not identified.  SURGICAL MARGINS: Uninvolved.  STATUS AND DISTANCE FROM PROXIMAL MUCOSAL MARGIN:  Uninvolved, 13.0 cm.  STATUS AND DISTANCE FROM DISTAL  MUCOSAL MARGIN: Uninvolved, 1.2 cm.  STATUS AND DISTANCE FROM MESENTERIC MARGIN: Not applicable.  STATUS AND DISTANCE FROM RADIAL MARGIN: Uninvolved, 1.7 cm.  DISTANCE FROM DENTATE LINE (RECTAL TUMOR ONLY):  Indeterminate.  TUMOR DEPOSITS (DISCONTINUOUS EXTRAMURAL EXTENSION):  Not identified.  NUMBER OF LYMPH NODES INVOLVED BY METASTATIC NEOPLASM: 0.  NUMBER OF REGIONAL LYMPH NODES EXAMINED: 17.  EXTRANODAL EXTENSION:  Not applicable.  TREATMENT EFFECT:  Present, no viable cancer cells identified (complete response, score 0).  ADDITIONAL PATHOLOGIC FINDINGS:  None.  MSI TESTING:  No evidence of MSI based on reported IHC on outside biopsy  OTHER ANCILLARY STUDIES (BRAF, KRAS, ETC.):  Should be performed on initial diagnostic biopsy if needed.  AJCC PATHOLOGIC STAGE:  (COMPLETED BY PATHOLOGIST, BASED ONLY ON TISSUE FINDINGS, MORE EXTENSIVE DISEASE MAY NOT BE KNOWN TO THE PATHOLOGIST)  ypT=  0  ypN=  0  AJCC PATHOLOGIC STAGE:  y0.                7/22/2019 - 10/23/2019 Chemotherapy    OP COLON [id number] OXALIplatin / Leucovorin / Fluorouracil  Start of adjuvant therapy delayed due to the above postoperative pelvic abscess.  This was treated surgically initially but then by CT-guided percutaneous drainage as above.  Serial follow-up with Radames Sosa including CTs and antibiotics eventually cleared him adequately to consider resumption of plans for adjuvant therapy.  CTs of the pelvis done on 6/5/2019, 6/13/2019, 6/26/2019 monitoring for this abscess and possible drainage but not able to drain due to decreasing size and minimal residual edema with CT 7/10/2019 showingStable to slight decrease in size of presacral perirectal abscess with presacral edema measuring 3.9 x 1.9 previously 4.1 x 2.5 cm. No new sites of focal fluid collection or loculated fluid.         Past Surgical History:   Procedure Laterality Date   • COLON RESECTION N/A 4/23/2019    Procedure: LAPAROSCOPIC LOWER ANTERIOR RESECTION WITH DIVERTING LOOP  ILEOOSTOMY, SPLENIC FLEIXURE MOBILIZATION AND LIVER BIOPSY, AND PROCTOSCOPY;  Surgeon: Pau Kelly MD;  Location: [facility] OR;  Service: General   • COLONOSCOPY      2018   • ILEOSTOMY  04/2019   • LIVER BIOPSY  2003   • VASECTOMY  1998   • VENOUS ACCESS DEVICE (PORT) INSERTION N/A 7/18/2019    Procedure: PORT PLACEMENT;  Surgeon: Franky Cazares MD;  Location: [facility] OR;  Service: General       Family History:  family history includes Breast cancer in his mother; Cancer in his father.     Social History:  reports that he quit smoking about 31 years ago. His smoking use included cigarettes. He started smoking about 45 years ago. He has a 14 pack-year smoking history. He has never used smokeless tobacco. He reports that he does not drink alcohol and does not use drugs.  Social History     Social History Narrative    Lives in [place] with wife and son    Still works, drives a van for [employer]       Medications:  carvedilol, cyanocobalamin, ezetimibe, fenofibrate, ferrous sulfate, levothyroxine, lisinopril, loperamide, metFORMIN, and multivitamin with minerals    No Known Allergies    Objective   Objective     Vital Signs:   Temp:  [98.5 °F (36.9 °C)] 98.5 °F (36.9 °C)  Heart Rate:  [82-94] 82  Resp:  [18] 18  BP: (135-153)/(68-98) 135/71    Physical Exam  Constitutional:       General: He is not in acute distress.     Appearance: Normal appearance.   HENT:      Head: Atraumatic.      Right Ear: External ear normal.      Left Ear: External ear normal.      Nose: Nose normal.   Eyes:      Extraocular Movements: Extraocular movements intact.      Conjunctiva/sclera: Conjunctivae normal.      Pupils: Pupils are equal, round, and reactive to light.   Cardiovascular:      Rate and Rhythm: Normal rate and regular rhythm.      Pulses: Normal pulses.      Heart sounds: Normal heart sounds. No murmur heard.  Pulmonary:      Effort: Pulmonary effort is normal. No respiratory distress.      Breath sounds:  Normal breath sounds. No wheezing, rhonchi or rales.   Abdominal:      General: Bowel sounds are normal. There is no distension.      Tenderness: There is no abdominal tenderness. There is no guarding or rebound.      Comments: Ostomy present   Musculoskeletal:         General: Normal range of motion.      Cervical back: No rigidity.   Skin:     General: Skin is warm and dry.      Coloration: Skin is not jaundiced.      Findings: No lesion or rash.   Neurological:      General: No focal deficit present.      Mental Status: He is alert and oriented to person, place, and time.   Psychiatric:         Attention and Perception: Attention normal.         Mood and Affect: Mood normal.         Behavior: Behavior normal.         Thought Content: Thought content normal.          Result Review:  I have personally reviewed the results from the time of this admission to 5/13/2024 02:21 EDT and agree with these findings:  [x]  Laboratory list / accordion  []  Microbiology  [x]  Radiology  []  EKG/Telemetry   []  Cardiology/Vascular   []  Pathology  [x]  Old records  []  Other:      LAB RESULTS:      Lab 05/12/24 2029   WBC 8.28   HEMOGLOBIN 12.5*   HEMATOCRIT 36.6*   PLATELETS 111*   NEUTROS ABS 7.09*   IMMATURE GRANS (ABS) 0.04   LYMPHS ABS 0.49*   MONOS ABS 0.54   EOS ABS 0.08   MCV 88.6   LACTATE 2.0   PROTIME 15.0*         Lab 05/12/24 2029   SODIUM 128*   POTASSIUM 3.6   CHLORIDE 90*   CO2 26.0   ANION GAP 12.0   BUN 13   CREATININE 0.97   EGFR 89.4   GLUCOSE 164*   CALCIUM 9.1   MAGNESIUM 1.6         Lab 05/12/24 2029   TOTAL PROTEIN 7.5   ALBUMIN 3.7   GLOBULIN 3.8   ALT (SGPT) 102*   AST (SGOT) 207*   BILIRUBIN 11.2*   ALK PHOS 122*   LIPASE >3,000*         Lab 05/12/24 2029   HSTROP T 11   PROTIME 15.0*   INR 1.16*         Lab 05/12/24 2029   TRIGLYCERIDES 97             Brief Urine Lab Results  (Last result in the past 365 days)        Color   Clarity   Blood   Leuk Est   Nitrite   Protein   CREAT   Urine HCG         05/12/24 2032 Dark Yellow   Clear   Negative   Trace   Positive   Trace                 Microbiology Results (last 10 days)       ** No results found for the last 240 hours. **            MRI abdomen wo contrast mrcp    Result Date: 5/13/2024  MRI ABDOMEN WO CONTRAST MRCP Date of Exam: 5/12/2024 11:39 PM EDT Indication: acute hepatitis, pancreatitis.  Comparison: 5/12/2024. Technique:  Routine multiplanar/multisequence images of the abdomen were obtained with MRCP sequences without contrast administration. Findings: Redemonstration of cirrhotic morphology of the liver with surface nodularity present and diffuse low T1 signal changes. No evidence of ascites. The gallbladder appears distended. Gallstones are present. Wall thickening is present with a small amount of surrounding inflammatory changes and free fluid. No evidence of biliary ductal dilatation. No filling defects identified within the biliary tree. There does hypertrophy a stones seen either within the gallbladder neck or within the cystic duct (series 21  image 56). Spleen appears enlarged likely related to portal venous hypertension, stable. No evidence of pancreatic ductal dilatation. No evidence of focal collection. Mild edema is seen diffusely surrounding the pancreas kidneys appear unremarkable. No significant inflammatory changes.     Impression: Impression: 1. Findings consistent with acute cholecystitis. No evidence of biliary ductal dilatation or filling defects within the biliary tree. There is a stone seen within the gallbladder neck or within the proximal portion of the cystic duct. 2. Mild diffuse edema seen surrounding the pancreas consistent with acute uncomplicated pancreatitis. 3. Cirrhotic morphology of the liver with evidence of portal venous hypertension. No focal lesions or evidence of ascites. Electronically Signed: Salome Peace MD  5/13/2024 12:52 AM EDT  Workstation ID: [id number]    CT Abdomen Pelvis With Contrast    Result Date:  5/12/2024  CT ABDOMEN PELVIS W CONTRAST Date of Exam: 5/12/2024 10:14 PM EDT Indication: right sided abd pain, nausea, chills, h/o gallstones, r/o cholecystitis. Comparison: 5/2/2024. Technique: Axial CT images were obtained of the abdomen and pelvis following the uneventful intravenous administration of intravenous contrast. Reconstructed coronal and sagittal images were also obtained. Automated exposure control and iterative construction methods were used. Findings: Lung Bases:   The visualized lung bases and lower mediastinal structures are unremarkable. Liver: The liver appears diffusely hypodense consistent with steatosis.. No evidence of ascites. Contour lobular appearance present consistent with cirrhosis.. No focal lesions. Biliary/Gallbladder:  The gallbladder is mildly distended. Diffuse gallbladder wall thickening is present. Stranding is seen within the adjacent fat. A trace amount of free fluid is seen along the inferior portion of the second portion of the duodenum. Gallstones are present.. The biliary tree is nondilated. Spleen: Spleen appears mildly enlarged, stable as compared to the previous study.. Pancreas:  Pancreas is normal. There is no evidence of pancreatic mass or peripancreatic fluid. Kidneys:  Kidneys are normal in size. There are no stones or hydronephrosis. Adrenals:  Adrenal glands are unremarkable. Retroperitoneal/Lymph Nodes/Vasculature:  No retroperitoneal adenopathy is identified. Gastrointestinal/Mesentery:  The bowel loops are non-dilated without wall thickening. Presacral fluid and soft tissue thickening present, similar as compared to the previous study. Postsurgical changes are present within the rectum. Right lower quadrant colostomy present. No significant stool burden identified. Varicosities are present. No evidence of hernia.. The appendix appears within normal limits. No evidence of obstruction. No free air. No mesenteric fluid collections identified. Bladder:  The  bladder is normal. Genital:   Unremarkable       Bony Structures:   Visualized bony structures are consistent with the patient's age.     Impression: Impression: 1. Findings consistent with acute cholecystitis. 2. Ancillary findings as described above. Electronically Signed: Salome Peace MD  5/12/2024 10:33 PM EDT  Workstation ID: [id number]    US Gallbladder    Result Date: 5/12/2024  US GALLBLADDER Date of Exam: 5/12/2024 9:24 PM EDT Indication: RUQ abd pain, pt appears jaundiced, nausea, chills, known gallstones. Comparison: 5/2/2024. Technique: Grayscale and color Doppler ultrasound evaluation of the right upper quadrant was performed. Findings: Limited evaluation due to patient condition. The liver appears echogenic.. No focal lesions identified. Normal flow is present within the hepatic vasculature. Limited visualization of the pancreas appears grossly unremarkable. No evidence of ascites. The gallbladder appears normal in caliber. No wall thickening identified. Stones present within the lumen.. The common bile duct appears normal in caliber. The sonographic Rea sign was negative. The right kidney appears normal in size with no focal lesions. No evidence of nephrolithiasis or hydronephrosis.     Impression: Impression: Cholelithiasis without secondary findings of acute cholecystitis. Increased echogenicity of the liver parenchyma likely related to steatosis or nonspecific hepatitis. Electronically Signed: Salome Peace MD  5/12/2024 9:49 PM EDT  Workstation ID: [id number]         Assessment & Plan   Assessment & Plan       Acute cholecystitis    Other specified hypothyroidism    Essential hypertension    Thrombocytopenia    Abdominal pain    Hyponatremia    Type 2 diabetes mellitus without complication, without long-term current use of insulin    Cirrhosis of liver    Acute hepatitis    Acute pancreatitis      Hesham Arevalo Jr. is a 58 y.o. male with a history of HTN, HLD, T2DM, Liver Cirrhosis,  and hx  colon cancer s/p colon resection and ileostomy who presents to [facility] ED for complaint of abdominal pain.       Acute Cholecystis  Elevated LFTs  Elevated Lipase  Acute gallstone pancreatitis  History of cirrhosis  Thrombocytopenia   -, , Total Bili 11.2.  Markedly elevated compared to 5/3.   -Lipase elevated above 3000  -CT abd/pelvis and MRCP noted above  -IVFs  -Started on Zosyn in the ED. Continue  -Gen Surgery consult for the am. May need Lap Kelly  -consider GI consult  -NPO tonight.     HTN  HLD  -Takes Coreg and Lisinopril. Continue  -Not currently on a statin. FLP in the am    T2DM  -Blood glucose 164  -check A1C  -Fingerstick achs. SSI    Hypothyroidism  -Continue home synthroid    Hyponatremia  -Na+ 128. Likely 2ry to dehydration from poor PO intake. Was normal on 5/3.   -Gentle IV fluids tonight.   -Repeat CMP in the am      DVT prophylaxis:  SCDS    CODE STATUS:  Full Code  Code Status (Patient has no pulse and is not breathing): CPR (Attempt to Resuscitate)  Medical Interventions (Patient has pulse or is breathing): Full Support      Expected DischargeTBD       This note has been completed as part of a split-shared workflow.     Signature: Electronically signed by Parmjit Jerez PA-C, 05/13/24, 12:31 AM EDT      Attending   Admission Attestation       I have performed an independent face-to-face diagnostic evaluation including performing an independent physical examination.  I approve of the documented plan of care above that was reviewed and developed with the advanced practice clinician (APC) and take responsibility for that plan along with its associated risks.  I have updated the HPI as appropriate.    Brief HPI    This is a 58-year-old male patient with a PMH significant for [place] cirrhosis, rectal cancer s/p resection with loop ileostomy HTN, hypothyroidism who comes to the ED due to abdominal and back pain.  Patient was recently hospitalized at [facility] where he was  treated for nausea, vomiting, epigastric abdominal pain.  Gallbladder ultrasound showed gallstones, bilirubin was elevated at 3.3.  He received supportive care and was discharged home.  Saturday night after eating a fried fish dinner, patient began to experience severe abdominal pain radiating into his back with associated nausea.  Pain at its worst is 7/10 in intensity, currently 4/10.  Due to persistent pain he came to the ED for further evaluation.  Family noticed that patient's skin started looking yellow a couple of days ago.    Attending Physical Exam:  Temp:  [98.5 °F (36.9 °C)] 98.5 °F (36.9 °C)  Heart Rate:  [82-94] 82  Resp:  [18] 18  BP: (135-153)/(68-98) 135/71    Constitutional: Awake, alert  Eyes: PERRLA, scleral icterus, no conjunctival injection  HENT: NCAT, mucous membranes moist  Neck: Supple, no thyromegaly, no lymphadenopathy, trachea midline  Respiratory: Clear to auscultation bilaterally, nonlabored respirations   Cardiovascular: RRR, no murmurs, rubs, or gallops, palpable pedal pulses bilaterally  Gastrointestinal: Positive bowel sounds, soft, nontender, nondistended  Musculoskeletal: No bilateral ankle edema, no clubbing or cyanosis to extremities  Psychiatric: Appropriate affect, cooperative  Neurologic: Oriented x 3, strength symmetric in all extremities, Cranial Nerves grossly intact to confrontation, speech clear  Skin: Jaundiced skin      Result Review:  I have personally reviewed the results from the time of this admission to 5/13/2024 02:21 EDT and agree with these findings:  [x]  Laboratory list / accordion  []  Microbiology  [x]  Radiology  [x]  EKG/Telemetry   []  Cardiology/Vascular   []  Pathology  [x]  Old records    Assessment and Plan:    See assessment and plan documented by APC above and updated/edited by me as appropriate.    Lilia Milner,   05/13/24

## 2024-05-13 NOTE — ED NOTES
Hesham Arevalo Jr.    Nursing Report ED to Floor:  Mental status: aox4  Ambulatory status: assist1  Oxygen Therapy:  ra  Cardiac Rhythm: nsr  Admitted from: home  Safety Concerns:  none  Social Issues: none  ED Room #:  09    ED Nurse Phone Extension - [phone] or may call [phone].      HPI:   Chief Complaint   Patient presents with    Back Pain       Past Medical History:  Past Medical History:   Diagnosis Date    Arthritis     knees     Cancer 11/2018    colon    Disease of thyroid gland     Fatty liver     Hashimoto's disease     History of radiation therapy 02/14/2019    rectal cancer    Hypertension     Ileostomy in place     Psoriasis     Wears glasses         Past Surgical History:  Past Surgical History:   Procedure Laterality Date    COLON RESECTION N/A 4/23/2019    Procedure: LAPAROSCOPIC LOWER ANTERIOR RESECTION WITH DIVERTING LOOP ILEOOSTOMY, SPLENIC FLEIXURE MOBILIZATION AND LIVER BIOPSY, AND PROCTOSCOPY;  Surgeon: Pau Kelly MD;  Location: [facility] OR;  Service: General    COLONOSCOPY      2018    ILEOSTOMY  04/2019    LIVER BIOPSY  2003    VASECTOMY  1998    VENOUS ACCESS DEVICE (PORT) INSERTION N/A 7/18/2019    Procedure: PORT PLACEMENT;  Surgeon: Franky Cazares MD;  Location: [facility] OR;  Service: General        Admitting Doctor:   Jaylin ALVAREZ MD    Consulting Provider(s):  Consults       Date and Time Order Name Status Description    5/13/2024  6:33 AM Inpatient Gastroenterology Consult      5/13/2024  2:23 AM Inpatient General Surgery Consult               Admitting Diagnosis:   The primary encounter diagnosis was Acute cholecystitis. Diagnoses of Right sided abdominal pain, Nausea, Elevated lipase, Hyperbilirubinemia, Jaundice, History of colon cancer, Ileostomy in place, Liver cirrhosis secondary to , and History of hypertension were also pertinent to this visit.    Most Recent Vitals:   Vitals:    05/13/24 0230 05/13/24 0258 05/13/24 0330 05/13/24 0400   BP: 132/67 127/68  125/65 125/64   Pulse: 84 84 84 84   Resp:       Temp:       TempSrc:       SpO2: 96% 95% 95% 90%   Weight:       Height:           Active LDAs/IV Access:   Lines, Drains & Airways       Active LDAs       Name Placement date Placement time Site Days    Peripheral IV 05/12/24 2032 Right Antecubital 05/12/24 2032  Antecubital  less than 1    Ileostomy Loop RLQ 04/23/19  1124  RLQ  1846                    Labs (abnormal labs have a star):   Labs Reviewed   COMPREHENSIVE METABOLIC PANEL - Abnormal; Notable for the following components:       Result Value    Glucose 164 (*)     Sodium 128 (*)     Chloride 90 (*)     ALT (SGPT) 102 (*)     AST (SGOT) 207 (*)     Alkaline Phosphatase 122 (*)     Total Bilirubin 11.2 (*)     All other components within normal limits    Narrative:     GFR Normal >60  Chronic Kidney Disease <60  Kidney Failure <15     LIPASE - Abnormal; Notable for the following components:    Lipase >3,000 (*)     All other components within normal limits   URINALYSIS W/ MICROSCOPIC IF INDICATED (NO CULTURE) - Abnormal; Notable for the following components:    Color, UA Dark Yellow (*)     Glucose,  mg/dL (1+) (*)     Ketones, UA Trace (*)     Bilirubin, UA Large (3+) (*)     Protein, UA Trace (*)     Leuk Esterase, UA Trace (*)     Nitrite, UA Positive (*)     All other components within normal limits   CBC WITH AUTO DIFFERENTIAL - Abnormal; Notable for the following components:    RBC 4.13 (*)     Hemoglobin 12.5 (*)     Hematocrit 36.6 (*)     Platelets 111 (*)     Neutrophil % 85.6 (*)     Lymphocyte % 5.9 (*)     Neutrophils, Absolute 7.09 (*)     Lymphocytes, Absolute 0.49 (*)     All other components within normal limits   PROTIME-INR - Abnormal; Notable for the following components:    Protime 15.0 (*)     INR 1.16 (*)     All other components within normal limits   SINGLE HS TROPONIN T - Normal    Narrative:     High Sensitive Troponin T Reference Range:  <14.0 ng/L- Negative Female for  AMI  <22.0 ng/L- Negative Male for AMI  >=14 - Abnormal Female indicating possible myocardial injury.  >=22 - Abnormal Male indicating possible myocardial injury.   Clinicians would have to utilize clinical acumen, EKG, Troponin, and serial changes to determine if it is an Acute Myocardial Infarction or myocardial injury due to an underlying chronic condition.        LACTIC ACID, PLASMA - Normal   MAGNESIUM - Normal   HEPATITIS PANEL, ACUTE - Normal    Narrative:     Results may be falsely decreased if patient taking Biotin.    TRIGLYCERIDES - Normal    Narrative:     Triglyceride Reference Ranges:    Normal           less than 150 mg/dL  Borderline       150-199 mg/dL  High             200-499 mg/dL  Very High        greater than 499 mg/dL   BLOOD CULTURE   BLOOD CULTURE   URINALYSIS, MICROSCOPIC ONLY   HEMOGLOBIN A1C   LIPID PANEL   COMPREHENSIVE METABOLIC PANEL   CBC WITH AUTO DIFFERENTIAL   POCT GLUCOSE FINGERSTICK   POCT GLUCOSE FINGERSTICK   POCT GLUCOSE FINGERSTICK   POCT GLUCOSE FINGERSTICK   CBC AND DIFFERENTIAL    Narrative:     The following orders were created for panel order CBC & Differential.  Procedure                               Abnormality         Status                     ---------                               -----------         ------                     CBC Auto Differential[[id number]]        Abnormal            Final result               Scan Slide[[id number]]                                                                    Please view results for these tests on the individual orders.   CBC AND DIFFERENTIAL    Narrative:     The following orders were created for panel order CBC & Differential.  Procedure                               Abnormality         Status                     ---------                               -----------         ------                     CBC Auto Differential[[id number]]                                                         Please view results for these tests  on the individual orders.       Meds Given in ED:   Medications   carvedilol (COREG) tablet 6.25 mg (has no administration in time range)   levothyroxine (SYNTHROID, LEVOTHROID) tablet 25 mcg (has no administration in time range)   lisinopril (PRINIVIL,ZESTRIL) tablet 10 mg (has no administration in time range)   sodium chloride 0.9 % flush 10 mL (10 mL Intravenous Given 5/13/24 0652)   sodium chloride 0.9 % flush 10 mL (has no administration in time range)   sodium chloride 0.9 % infusion 40 mL (has no administration in time range)   sodium chloride 0.9 % infusion (125 mL/hr Intravenous New Bag 5/13/24 0648)   melatonin tablet 5 mg (has no administration in time range)   dextrose (GLUTOSE) oral gel 15 g (has no administration in time range)   dextrose (D50W) (25 g/50 mL) IV injection 25 g (has no administration in time range)   glucagon (GLUCAGEN) injection 1 mg (has no administration in time range)   Insulin Lispro (humaLOG) injection 2-9 Units (has no administration in time range)   Pharmacy Consult - Pharmacy to dose (has no administration in time range)   HYDROmorphone (DILAUDID) injection 0.5 mg (0.5 mg Intravenous Given 5/13/24 0308)   piperacillin-tazobactam (ZOSYN) 3.375 g IVPB in 100 mL NS MBP (CD) (3.375 g Intravenous New Bag 5/13/24 0648)   ondansetron (ZOFRAN) injection 4 mg (4 mg Intravenous Given 5/13/24 0308)   sodium chloride 0.9 % bolus 1,000 mL (0 mL Intravenous Stopped 5/12/24 2309)   ondansetron (ZOFRAN) injection 4 mg (4 mg Intravenous Given 5/12/24 2033)   HYDROmorphone (DILAUDID) injection 0.5 mg (0.5 mg Intravenous Given 5/12/24 2033)   iopamidol (ISOVUE-300) 61 % injection 100 mL (100 mL Intravenous Given 5/12/24 2222)   piperacillin-tazobactam (ZOSYN) 3.375 g IVPB in 100 mL NS MBP (CD) (0 g Intravenous Stopped 5/13/24 0005)     Pharmacy Consult - Pharmacy to dose,   sodium chloride, 125 mL/hr, Last Rate: 125 mL/hr (05/13/24 0648)         Last NIH score:                                                           Dysphagia screening results:  Patient Factors Component (Dysphagia:Stroke or Rule-out)  Best Eye Response: 4-->(E4) spontaneous (05/12/24 1855)  Best Motor Response: 6-->(M6) obeys commands (05/12/24 1855)  Best Verbal Response: 5-->(V5) oriented (05/12/24 1855)  Gadiel Coma Scale Score: 15 (05/12/24 1855)     Gadiel Coma Scale:  No data recorded     CIWA:        Restraint Type:            Isolation Status:  No active isolations

## 2024-05-13 NOTE — CONSULTS
General Surgery Consultation Note    Date of Service: 5/13/2024  Hesham Arevalo Jr.  [id number]  1965      Referring Provider: Jaylin Sampson MD    Location of Consult: Emergency department     Reason for Consultation: Cholecystitis, gallstone pancreatitis       History of Present Illness:  I am seeing, Hesham Arevalo JrDestiny, in consultation at request of Jaylin Sampson MD regarding cholecystitis and gallstone pancreatitis in the setting of cirrhosis.  He is a 58-year-old gentleman with history of hypertension, hyperlipidemia, diabetes, rectal cancer status post LAR with diverting ileostomy and HATFIELD cirrhosis who presents to [facility] with abdominal pain.  He reports that he had a similar episode 10 days ago that required hospitalization and he was discharged within 24 hours.  He reports that he initially felt well following discharge for a few days, but on Saturday night he had some fried food and began to experience abdominal pain following this.  Pain persisted throughout the day yesterday.  He describes this as a pain mostly in his back.  Pain has improved since admission.  No nausea or vomiting.  No fevers.  He has a known history of Hatfield cirrhosis and follows with Dr. Lebron.  He reports a history of rectal cancer status post resection with diverting ileostomy creation by Dr. Kelly in 2019.  They did not pursue ileostomy reversal as he was felt to be high risk related to his history of cirrhosis..      Problems Addressed this Visit          Gastrointestinal Abdominal     Hyperbilirubinemia    * (Principal) Acute cholecystitis - Primary     Other Visit Diagnoses       Right sided abdominal pain        Nausea        Elevated lipase        Jaundice        History of colon cancer        Ileostomy in place        Liver cirrhosis secondary to HATFIELD        History of hypertension              Diagnoses         Codes Comments    Acute cholecystitis    -  Primary ICD-10-CM: K81.0  ICD-9-CM:  575.0     Right sided abdominal pain     ICD-10-CM: R10.9  ICD-9-CM: 789.09     Nausea     ICD-10-CM: R11.0  ICD-9-CM: 787.02     Elevated lipase     ICD-10-CM: R74.8  ICD-9-CM: 790.5     Hyperbilirubinemia     ICD-10-CM: E80.6  ICD-9-CM: 782.4     Jaundice     ICD-10-CM: R17  ICD-9-CM: 782.4     History of colon cancer     ICD-10-CM: Z85.038  ICD-9-CM: V10.05     Ileostomy in place     ICD-10-CM: Z93.2  ICD-9-CM: V44.2     Liver cirrhosis secondary to      ICD-10-CM: K75.81, K74.60  ICD-9-CM: 571.8, 571.5     History of hypertension     ICD-10-CM: Z86.79  ICD-9-CM: V12.59             PMHx:  Past Medical History:   Diagnosis Date    Arthritis     knees     Cancer 11/2018    colon    Disease of thyroid gland     Fatty liver     Hashimoto's disease     History of radiation therapy 02/14/2019    rectal cancer    Hypertension     Ileostomy in place     Psoriasis     Wears glasses        Past Surgical History:  Past Surgical History:    COLON RESECTION    Procedure: LAPAROSCOPIC LOWER ANTERIOR RESECTION WITH DIVERTING LOOP ILEOOSTOMY, SPLENIC FLEIXURE MOBILIZATION AND LIVER BIOPSY, AND PROCTOSCOPY;  Surgeon: Pau Kelly MD;  Location: [facility] OR;  Service: General    COLONOSCOPY    2018    ILEOSTOMY    LIVER BIOPSY    VASECTOMY    VENOUS ACCESS DEVICE (PORT) INSERTION    Procedure: PORT PLACEMENT;  Surgeon: Franky Cazares MD;  Location: [facility] OR;  Service: General       Allergies:  No Known Allergies    Medications:  No current facility-administered medications on file prior to encounter.     Current Outpatient Medications on File Prior to Encounter   Medication Sig Dispense Refill    carvedilol (COREG) 6.25 MG tablet Take 1 tablet by mouth.      cyanocobalamin 1000 MCG/ML injection       ezetimibe (ZETIA) 10 MG tablet Take 1 tablet by mouth Daily.      fenofibrate (TRICOR) 145 MG tablet Take 1 tablet by mouth Daily.      FeroSul 325 (65 Fe) MG tablet TAKE 1 TABLET BY MOUTH TWICE DAILY EVERY OTHER DAY  WITH VITAMINC 30 tablet 11    levothyroxine (SYNTHROID, LEVOTHROID) 25 MCG tablet Take 1 tablet by mouth Daily.      lisinopril (PRINIVIL,ZESTRIL) 10 MG tablet Take  by mouth Daily.      metFORMIN (GLUCOPHAGE) 1000 MG tablet Take 1 tablet by mouth 2 (Two) Times a Day.      Multiple Vitamins-Minerals (MULTIVITAMIN ADULT PO) Take 1 dose by mouth Every Morning.      RA ANTI-DIARRHEAL 2 MG tablet take 1 tablet by mouth before meals and at bedtime  0         Current Facility-Administered Medications:     carvedilol (COREG) tablet 6.25 mg, 6.25 mg, Oral, BID With Meals, Parmjit Jerez PA-C    dextrose (D50W) (25 g/50 mL) IV injection 25 g, 25 g, Intravenous, Q15 Min PRN, Parmjit Jerez PA-C    dextrose (GLUTOSE) oral gel 15 g, 15 g, Oral, Q15 Min PRN, Parmjit Jerez PA-C    glucagon (GLUCAGEN) injection 1 mg, 1 mg, Intramuscular, Q15 Min PRN, Parmjit Jerez PA-C    HYDROmorphone (DILAUDID) injection 0.5 mg, 0.5 mg, Intravenous, Q4H PRN, Lilia Milner DO, 0.5 mg at 05/13/24 0308    Insulin Lispro (humaLOG) injection 2-9 Units, 2-9 Units, Subcutaneous, 4x Daily AC & at Bedtime, Parmjit Jerez PA-C    levothyroxine (SYNTHROID, LEVOTHROID) tablet 25 mcg, 25 mcg, Oral, Q AM, Parmjit Jerez PA-C    lisinopril (PRINIVIL,ZESTRIL) tablet 10 mg, 10 mg, Oral, Q24H, Parmjit Jerez PA-C    melatonin tablet 5 mg, 5 mg, Oral, Nightly PRN, Parmjit Jerez PA-C    ondansetron (ZOFRAN) injection 4 mg, 4 mg, Intravenous, Q6H PRN, Lilia Milner DO, 4 mg at 05/13/24 0308    Pharmacy Consult - Pharmacy to dose, , Does not apply, Continuous PRN, Parmjit Jerez PA-C    piperacillin-tazobactam (ZOSYN) 3.375 g IVPB in 100 mL NS MBP (CD), 3.375 g, Intravenous, Q8H, Parmjit Jerez PA-C, 3.375 g at 05/13/24 0648    sodium chloride 0.9 % flush 10 mL, 10 mL, Intravenous, Q12H, Parmjit Jerez PA-C, 10 mL at 05/13/24 0652    sodium chloride  0.9 % flush 10 mL, 10 mL, Intravenous, PRN, Parmjit Jerez PA-C    sodium chloride 0.9 % infusion 40 mL, 40 mL, Intravenous, PRN, Parmjit Jerez PA-C    sodium chloride 0.9 % infusion, 125 mL/hr, Intravenous, Continuous, Lilia Milner DO, Last Rate: 125 mL/hr at 05/13/24 0648, 125 mL/hr at 05/13/24 0648    Current Outpatient Medications:     carvedilol (COREG) 6.25 MG tablet, Take 1 tablet by mouth., Disp: , Rfl:     cyanocobalamin 1000 MCG/ML injection, , Disp: , Rfl:     ezetimibe (ZETIA) 10 MG tablet, Take 1 tablet by mouth Daily., Disp: , Rfl:     fenofibrate (TRICOR) 145 MG tablet, Take 1 tablet by mouth Daily., Disp: , Rfl:     FeroSul 325 (65 Fe) MG tablet, TAKE 1 TABLET BY MOUTH TWICE DAILY EVERY OTHER DAY WITH VITAMINC, Disp: 30 tablet, Rfl: 11    levothyroxine (SYNTHROID, LEVOTHROID) 25 MCG tablet, Take 1 tablet by mouth Daily., Disp: , Rfl:     lisinopril (PRINIVIL,ZESTRIL) 10 MG tablet, Take  by mouth Daily., Disp: , Rfl:     metFORMIN (GLUCOPHAGE) 1000 MG tablet, Take 1 tablet by mouth 2 (Two) Times a Day., Disp: , Rfl:     Multiple Vitamins-Minerals (MULTIVITAMIN ADULT PO), Take 1 dose by mouth Every Morning., Disp: , Rfl:     RA ANTI-DIARRHEAL 2 MG tablet, take 1 tablet by mouth before meals and at bedtime, Disp: , Rfl: 0      Family History:  Family History   Problem Relation Age of Onset    Breast cancer Mother     Cancer Father         bladder/prostate       Social History: Pt lives in [facility].    Tobacco use: Denies     EtOH use : Denies    Illicit drug use: Denies       Review of Systems:   Constitutional: No fevers, chills or malaise   Eyes: Denies visual changes    Cardiovascular: Denies chest pain, palpitations   Pulmonary: Denies cough or shortness of breath   Abdominal/ GI: See HPI    Genitourinary: Denies dysuria or hematuria   Musculoskeletal: Denies any but chronic joint aches, pains or deformities   Psychiatric: No recent mood changes   Neurologic: No  "paresthesias or loss of function    /64   Pulse 84   Temp 98.5 °F (36.9 °C) (Oral)   Resp 18   Ht 185.4 cm (73\")   Wt 88 kg (194 lb)   SpO2 90%   BMI 25.60 kg/m²   Body mass index is 25.6 kg/m².    Gen: Awake, alert, resting in bed, appears somewhat uncomfortable but in no obvious distress, skin is jaundiced  Head: Normocephalic, atraumatic.   Eyes: Pupils equal, round, react to light and accommodation.  Scleral icterus is present  Mouth: Oral mucosa without lesions,   Neck: No masses, lymphadenopathy or carotid bruits bilaterally   CV: Rhythm and rate regular, no murmurs, rubs or gallops  Lungs: Clear to auscultation bilaterally, not labored on room air   Abdomen: Soft, not obviously distended, healed midline scar, right upper quadrant ileostomy, there is only very mild tenderness to palpation in the right upper quadrant  Groin : No obvious hernias bilaterally   Extremities:  No cyanosis, clubbing or edema bilaterally  Lymphatics: No abnormal lymphadenopathy appreciated   Neurologic: No gross deficits         CBC  Results from last 7 days   Lab Units 05/12/24 2029   WBC 10*3/mm3 8.28   HEMOGLOBIN g/dL 12.5*   HEMATOCRIT % 36.6*   PLATELETS 10*3/mm3 111*       CMP  Results from last 7 days   Lab Units 05/12/24 2029   SODIUM mmol/L 128*   POTASSIUM mmol/L 3.6   CHLORIDE mmol/L 90*   CO2 mmol/L 26.0   BUN mg/dL 13   CREATININE mg/dL 0.97   CALCIUM mg/dL 9.1   BILIRUBIN mg/dL 11.2*   ALK PHOS U/L 122*   ALT (SGPT) U/L 102*   AST (SGOT) U/L 207*   GLUCOSE mg/dL 164*       Radiology  Imaging Results (Last 72 Hours)       Procedure Component Value Units Date/Time    MRI abdomen wo contrast mrcp [[id number]] Collected: 05/13/24 0050     Updated: 05/13/24 0056    Narrative:      MRI ABDOMEN WO CONTRAST MRCP    Date of Exam: 5/12/2024 11:39 PM EDT    Indication: acute hepatitis, pancreatitis.     Comparison: 5/12/2024.    Technique:  Routine multiplanar/multisequence images of the abdomen were obtained with " MRCP sequences without contrast administration.      Findings:  Redemonstration of cirrhotic morphology of the liver with surface nodularity present and diffuse low T1 signal changes. No evidence of ascites. The gallbladder appears distended. Gallstones are present. Wall thickening is present with a small amount of   surrounding inflammatory changes and free fluid. No evidence of biliary ductal dilatation. No filling defects identified within the biliary tree. There does hypertrophy a stones seen either within the gallbladder neck or within the cystic duct (series 21   image 56). Spleen appears enlarged likely related to portal venous hypertension, stable. No evidence of pancreatic ductal dilatation. No evidence of focal collection. Mild edema is seen diffusely surrounding the pancreas kidneys appear unremarkable. No   significant inflammatory changes.      Impression:      Impression:    1. Findings consistent with acute cholecystitis. No evidence of biliary ductal dilatation or filling defects within the biliary tree. There is a stone seen within the gallbladder neck or within the proximal portion of the cystic duct.  2. Mild diffuse edema seen surrounding the pancreas consistent with acute uncomplicated pancreatitis.  3. Cirrhotic morphology of the liver with evidence of portal venous hypertension. No focal lesions or evidence of ascites.        Electronically Signed: Salome Peace MD    5/13/2024 12:52 AM EDT    Workstation ID: [id number]    CT Abdomen Pelvis With Contrast [[id number]] Collected: 05/12/24 2231     Updated: 05/12/24 2236    Narrative:      CT ABDOMEN PELVIS W CONTRAST    Date of Exam: 5/12/2024 10:14 PM EDT    Indication: right sided abd pain, nausea, chills, h/o gallstones, r/o cholecystitis.    Comparison: 5/2/2024.    Technique: Axial CT images were obtained of the abdomen and pelvis following the uneventful intravenous administration of intravenous contrast. Reconstructed coronal and sagittal  images were also obtained. Automated exposure control and iterative   construction methods were used.      Findings:  Lung Bases:     The visualized lung bases and lower mediastinal structures are unremarkable.    Liver:  The liver appears diffusely hypodense consistent with steatosis.. No evidence of ascites. Contour lobular appearance present consistent with cirrhosis.. No focal lesions.    Biliary/Gallbladder:    The gallbladder is mildly distended. Diffuse gallbladder wall thickening is present. Stranding is seen within the adjacent fat. A trace amount of free fluid is seen along the inferior portion of the second portion of the duodenum. Gallstones are   present.. The biliary tree is nondilated.    Spleen:  Spleen appears mildly enlarged, stable as compared to the previous study..    Pancreas:    Pancreas is normal. There is no evidence of pancreatic mass or peripancreatic fluid.    Kidneys:    Kidneys are normal in size. There are no stones or hydronephrosis.    Adrenals:    Adrenal glands are unremarkable.    Retroperitoneal/Lymph Nodes/Vasculature:    No retroperitoneal adenopathy is identified.    Gastrointestinal/Mesentery:    The bowel loops are non-dilated without wall thickening. Presacral fluid and soft tissue thickening present, similar as compared to the previous study. Postsurgical changes are present within the rectum. Right lower quadrant colostomy present. No   significant stool burden identified. Varicosities are present. No evidence of hernia.. The appendix appears within normal limits. No evidence of obstruction. No free air. No mesenteric fluid collections identified.    Bladder:    The bladder is normal.    Genital:     Unremarkable          Bony Structures:     Visualized bony structures are consistent with the patient's age.        Impression:      Impression:    1. Findings consistent with acute cholecystitis.  2. Ancillary findings as described above.        Electronically Signed: Salome  MD Kobi    5/12/2024 10:33 PM EDT    Workstation ID: [id number]    US Gallbladder [[id number]] Collected: 05/12/24 2148     Updated: 05/12/24 2152    Narrative:      US GALLBLADDER    Date of Exam: 5/12/2024 9:24 PM EDT    Indication: RUQ abd pain, pt appears jaundiced, nausea, chills, known gallstones.    Comparison: 5/2/2024.    Technique: Grayscale and color Doppler ultrasound evaluation of the right upper quadrant was performed.      Findings:  Limited evaluation due to patient condition. The liver appears echogenic.. No focal lesions identified. Normal flow is present within the hepatic vasculature.  Limited visualization of the pancreas appears grossly unremarkable. No evidence of ascites.  The gallbladder appears normal in caliber. No wall thickening identified. Stones present within the lumen.. The common bile duct appears normal in caliber. The sonographic Rea sign was negative.  The right kidney appears normal in size with no focal lesions. No evidence of nephrolithiasis or hydronephrosis.       Impression:      Impression:  Cholelithiasis without secondary findings of acute cholecystitis.    Increased echogenicity of the liver parenchyma likely related to steatosis or nonspecific hepatitis.        Electronically Signed: Salome Peace MD    5/12/2024 9:49 PM EDT    Workstation ID: [id number]                Results Review: I have personally reviewed all of the recent lab and imaging results available at this time.     Vital signs are stable    Labs demonstrate normal white blood cell count of 8.2.  Sodium is slightly low at 128.  Liver function tests are markedly elevated with a total bilirubin of 11.2.  AST and ALT are 207 and 102.  Lipase elevated greater than 3000 platelets low at 111    I have personally reviewed a right upper quadrant ultrasound.  This demonstrates findings of stones with no obvious biliary dilation    I have reviewed his CT scan.  This demonstrates a distended gallbladder with stones  and some concern for some subtle pericholecystic stranding    I have reviewed an MRCP.  This demonstrates findings of cirrhosis and pancreatitis.  There is concern for a stone within the gallbladder neck versus cystic duct    Assessment:  Mr. Arevalo is a 58-year-old gentleman with history of hypertension, hyperlipidemia, diabetes, Hat[place] cirrhosis, and rectal cancer status post resection with diverting ileostomy who presents with gallstone pancreatitis and cholecystitis.  His clinical workup is concerning for both pancreatitis and acute cholecystitis.  His liver function tests are markedly elevated with a total bilirubin of 11.2.  Given his underlying liver disease with hyperbilirubinemia, thrombocytopenia, and hyponatremia I am concerned that operative intervention would result in worsening of his liver function.  In addition to this his right upper quadrant stoma virtually guarantees that he would require open cholecystectomy.  I long discussion with him regarding treatment options.  I think the best course of action is to pursue supportive management of his pancreatitis and percutaneous cholecystostomy placement.     Plan:  -Supportive management of pancreatitis.  Would recommend GI consult in the setting of his significant underlying liver disease  -I do not have plans for cholecystectomy currently as I believe he is quite high risk due to his liver disease and right upper quadrant stoma that would require likely open cholecystectomy.  Will ask interventional radiology to evaluate for percutaneous cholecystostomy placement.  -N.p.o. and supportive therapies for now      I discussed the patient's findings and my recommendations with the patient and/or family, as well as the primary team     Konstantin Garcia MD  05/13/24  06:53 EDT      Part of this note may be an electronic transcription/translation of spoken language to printed text using the Dragon Dictation System.

## 2024-05-13 NOTE — PRE-PROCEDURE NOTE
.  [facility]   Interventional Radiology H&P    Patient Name: Hesham Arevalo Jr.  : 1965  MRN: [id number]  Primary Care Physician:  Myke Mendoza MD  Referring Physician: No ref. provider found  Date of admission: 2024    Subjective   Subjective     HPI:  Hesham Arevalo Jr. is a 58 y.o. male with cirrhosis and acute cholecystitis    Review of Systems:   Constitutional no fever,  no weight loss       Otolaryngeal no difficulty swallowing   Cardiovascular no chest pain   Pulmonary no cough, no sputum production   Gastrointestinal no constipation, no diarrhea                         Personal History       Past Medical/Surgical History:   Past Medical History:   Diagnosis Date    Arthritis     knees     Cancer 2018    colon    Disease of thyroid gland     Fatty liver     Hashimoto's disease     History of radiation therapy 2019    rectal cancer    Hypertension     Ileostomy in place     Psoriasis     Wears glasses      Past Surgical History:   Procedure Laterality Date    COLON RESECTION N/A 2019    Procedure: LAPAROSCOPIC LOWER ANTERIOR RESECTION WITH DIVERTING LOOP ILEOOSTOMY, SPLENIC FLEIXURE MOBILIZATION AND LIVER BIOPSY, AND PROCTOSCOPY;  Surgeon: Pau Kelly MD;  Location:  [facility] OR;  Service: General    COLONOSCOPY      2018    ILEOSTOMY  2019    LIVER BIOPSY  2003    VASECTOMY      VENOUS ACCESS DEVICE (PORT) INSERTION N/A 2019    Procedure: PORT PLACEMENT;  Surgeon: Franky Cazares MD;  Location:  [facility] OR;  Service: General       Social History:  reports that he quit smoking about 31 years ago. His smoking use included cigarettes. He started smoking about 45 years ago. He has a 14 pack-year smoking history. He has never used smokeless tobacco. He reports that he does not drink alcohol and does not use drugs.    Medications:  Medications Prior to Admission   Medication Sig Dispense Refill Last Dose    carvedilol (COREG) 6.25 MG tablet Take  1 tablet by mouth.       cyanocobalamin 1000 MCG/ML injection        ezetimibe (ZETIA) 10 MG tablet Take 1 tablet by mouth Daily.       fenofibrate (TRICOR) 145 MG tablet Take 1 tablet by mouth Daily.       FeroSul 325 (65 Fe) MG tablet TAKE 1 TABLET BY MOUTH TWICE DAILY EVERY OTHER DAY WITH VITAMINC 30 tablet 11     levothyroxine (SYNTHROID, LEVOTHROID) 25 MCG tablet Take 1 tablet by mouth Daily.       lisinopril (PRINIVIL,ZESTRIL) 10 MG tablet Take  by mouth Daily.       metFORMIN (GLUCOPHAGE) 1000 MG tablet Take 1 tablet by mouth 2 (Two) Times a Day.       Multiple Vitamins-Minerals (MULTIVITAMIN ADULT PO) Take 1 dose by mouth Every Morning.       RA ANTI-DIARRHEAL 2 MG tablet take 1 tablet by mouth before meals and at bedtime  0      Current medications:  carvedilol, 6.25 mg, Oral, BID With Meals  fentaNYL citrate (PF), , ,   insulin lispro, 2-9 Units, Subcutaneous, 4x Daily AC & at Bedtime  levothyroxine, 25 mcg, Oral, Q AM  lisinopril, 10 mg, Oral, Q24H  midazolam, , ,   piperacillin-tazobactam, 3.375 g, Intravenous, Q8H  sodium chloride, 10 mL, Intravenous, Q12H      Current IV drips:  lactated ringers, 125 mL/hr, Last Rate: 125 mL/hr (05/13/24 0914)        Allergies:  No Known Allergies    Objective    Objective     Vitals:   Temp:  [98.5 °F (36.9 °C)] 98.5 °F (36.9 °C)  Heart Rate:  [78-94] 78  Resp:  [16-18] 16  BP: (117-153)/(61-98) 135/66      Physical Exam:   Constitutional: Awake, alert, No acute distress    Respiratory: Clear to auscultation bilaterally, nonlabored respirations    Cardiovascular: RRR, no murmurs, rubs, or gallops, palpable pedal pulses bilaterally   Gastrointestinal: Positive bowel sounds, soft, nontender, nondistended        ASA SCALE ASSESSMENT:  2-Mild to moderate systemic disease, medically well controlled, with no functional limitation    MALLAMPATI CLASSIFICATION:  2-Able to visualize the soft palate, fauces, uvula. The anterior & posterior tonsilar pillars are hidden by the  "tongue.       Result Review        Result Review:     Sodium   Date Value Ref Range Status   05/13/2024 135 (L) 136 - 145 mmol/L Final   05/12/2024 128 (L) 136 - 145 mmol/L Final       Potassium   Date Value Ref Range Status   05/13/2024 3.9 3.5 - 5.2 mmol/L Final   05/12/2024 3.6 3.5 - 5.2 mmol/L Final       Chloride   Date Value Ref Range Status   05/13/2024 101 98 - 107 mmol/L Final   05/12/2024 90 (L) 98 - 107 mmol/L Final       No results found for: \"PLASMABICARB\"    BUN   Date Value Ref Range Status   05/13/2024 14 8 - 23 mg/dL Final   05/12/2024 13 8 - 23 mg/dL Final       Creatinine   Date Value Ref Range Status   05/13/2024 0.97 0.76 - 1.27 mg/dL Final   05/12/2024 0.97 0.76 - 1.27 mg/dL Final       Calcium   Date Value Ref Range Status   05/13/2024 8.3 (L) 8.6 - 10.5 mg/dL Final   05/12/2024 9.1 8.6 - 10.5 mg/dL Final           No components found for: \"GLUCOSE.*\"  Results from last 7 days   Lab Units 05/13/24  0700   WBC 10*3/mm3 4.55   HEMOGLOBIN g/dL 10.1*   HEMATOCRIT % 29.7*   PLATELETS 10*3/mm3 66*      Results from last 7 days   Lab Units 05/12/24 2029   INR  1.16*           Assessment / Plan     Assesment:   Cholecystitis      Plan:   Percutaneous cholecystostomy    The risks and benefits of the procedure were discussed with the patient.    Electronically signed by Jose Eduardo Stein III, MD, 05/13/24, 10:54 AM EDT.   "

## 2024-05-13 NOTE — NURSING NOTE
Pt to CT for percutaneous cholecystostomy,  Pt positioned supine, Pt with decreased blood pressure from start of procedure, opening fluids after sedation for decreased BP.   Pt received 1 mg versed, 50 mcgs fentanyl, total sedation 19 minutes.

## 2024-05-13 NOTE — CONSULTS
[facility]  Inpatient Gastroenterology Consult    Inpatient Gastroenterology Consult  Consult performed by: Angel Gustafson APRN  Consult ordered by: Konstantin Garcia MD  Reason for consult: History of cirrhosis and pancreatitis, bili greater than 10      Referring Provider: No ref. provider found    PCP: Myke Mendoza MD    Chief Complaint: Abdominal pain    History of present illness:    Hesham Arevalo Jr. is a 58 y.o. male who is admitted with worsening abdominal pain was found to have acute cholecystitis.  GI consult given history of cirrhosis pancreatitis with total bili greater than 10.  Patient notes he has been feeling ill off and on for the past week or so with significant abdominal pain with associated nausea as well as with fever and chills.  Does not report any change in ileostomy output since onset.  Reports symptoms were exacerbated after p.o. intake.  Patient does have a history of [place] liver cirrhosis is followed at the [facility] thereof; denies ever needing a paracentesis or having issues with bleeding or confusion. Past medical, surgical, social, and family histories are reviewed for accuracy.  No documented alleviating or exacerbating factors.  Does not endorse pain at time of exam.    Allergies:  Patient has no known allergies.    Scheduled Meds:  carvedilol, 6.25 mg, Oral, BID With Meals  fentaNYL citrate (PF), , ,   insulin lispro, 2-9 Units, Subcutaneous, 4x Daily AC & at Bedtime  levothyroxine, 25 mcg, Oral, Q AM  lisinopril, 10 mg, Oral, Q24H  midazolam, , ,   piperacillin-tazobactam, 3.375 g, Intravenous, Q8H  sodium chloride, 10 mL, Intravenous, Q12H         Infusions:  lactated ringers, 125 mL/hr, Last Rate: 125 mL/hr (05/13/24 1641)        PRN Meds:    dextrose    dextrose    fentaNYL citrate (PF)    glucagon (human recombinant)    HYDROmorphone    melatonin    midazolam    ondansetron    sodium chloride    sodium chloride    Home  Meds:  Medications Prior to Admission   Medication Sig Dispense Refill Last Dose    carvedilol (COREG) 6.25 MG tablet Take 1 tablet by mouth.       cyanocobalamin 1000 MCG/ML injection        ezetimibe (ZETIA) 10 MG tablet Take 1 tablet by mouth Daily.       fenofibrate (TRICOR) 145 MG tablet Take 1 tablet by mouth Daily.       FeroSul 325 (65 Fe) MG tablet TAKE 1 TABLET BY MOUTH TWICE DAILY EVERY OTHER DAY WITH VITAMINC 30 tablet 11     levothyroxine (SYNTHROID, LEVOTHROID) 25 MCG tablet Take 1 tablet by mouth Daily.       lisinopril (PRINIVIL,ZESTRIL) 10 MG tablet Take  by mouth Daily.       metFORMIN (GLUCOPHAGE) 1000 MG tablet Take 1 tablet by mouth 2 (Two) Times a Day.       Multiple Vitamins-Minerals (MULTIVITAMIN ADULT PO) Take 1 dose by mouth Every Morning.       RA ANTI-DIARRHEAL 2 MG tablet take 1 tablet by mouth before meals and at bedtime  0        ROS: Review of Systems   Constitutional:  Positive for activity change, appetite change, chills, fatigue and fever. Negative for diaphoresis and unexpected weight change.   Eyes: Negative.    Respiratory:  Negative for apnea, cough, choking, chest tightness, shortness of breath, wheezing and stridor.    Cardiovascular:  Negative for chest pain, palpitations and leg swelling.   Gastrointestinal:  Positive for abdominal pain and nausea. Negative for abdominal distention, anal bleeding, blood in stool, constipation, diarrhea, rectal pain and vomiting.   Endocrine: Negative.    Genitourinary: Negative.    Musculoskeletal: Negative.    Skin: Negative.    Allergic/Immunologic: Negative.    Neurological: Negative.    Hematological: Negative.    Psychiatric/Behavioral: Negative.     All other systems reviewed and are negative.      PAST MED HX:  Past Medical History:   Diagnosis Date    Arthritis     knees     Cancer 11/2018    colon    Disease of thyroid gland     Fatty liver     Hashimoto's disease     History of radiation therapy 02/14/2019    rectal cancer     "Hypertension     Ileostomy in place     Psoriasis     Wears glasses        PAST SURG HX:  Past Surgical History:   Procedure Laterality Date    COLON RESECTION N/A 2019    Procedure: LAPAROSCOPIC LOWER ANTERIOR RESECTION WITH DIVERTING LOOP ILEOOSTOMY, SPLENIC FLEIXURE MOBILIZATION AND LIVER BIOPSY, AND PROCTOSCOPY;  Surgeon: Pau Kelly MD;  Location:  [facility];  Service: General    COLONOSCOPY      2018    ILEOSTOMY  2019    LIVER BIOPSY  2003    VASECTOMY      VENOUS ACCESS DEVICE (PORT) INSERTION N/A 2019    Procedure: PORT PLACEMENT;  Surgeon: Franky Cazares MD;  Location:  [facility];  Service: General       FAM HX:  Family History   Problem Relation Age of Onset    Breast cancer Mother     Cancer Father         bladder/prostate       SOC HX:  Social History     Socioeconomic History    Marital status:    Tobacco Use    Smoking status: Former     Current packs/day: 0.00     Average packs/day: 1 pack/day for 14.0 years (14.0 ttl pk-yrs)     Types: Cigarettes     Start date:      Quit date:      Years since quittin.3    Smokeless tobacco: Never   Vaping Use    Vaping status: Never Used   Substance and Sexual Activity    Alcohol use: No    Drug use: No    Sexual activity: Defer       PHYSICAL EXAM  BP 97/63 (BP Location: Left arm, Patient Position: Lying)   Pulse 72   Temp 99.3 °F (37.4 °C) (Oral)   Resp 14   Ht 185.4 cm (73\")   Wt 88 kg (194 lb)   SpO2 97%   BMI 25.60 kg/m²   Wt Readings from Last 3 Encounters:   24 88 kg (194 lb)   24 91.1 kg (200 lb 14.4 oz)   24 90.3 kg (199 lb)   ,body mass index is 25.6 kg/m².  Physical Exam  Vitals and nursing note reviewed.   Constitutional:       General: He is not in acute distress.     Appearance: Normal appearance. He is normal weight. He is ill-appearing. He is not toxic-appearing.   HENT:      Head: Normocephalic and atraumatic.   Eyes:      General: No scleral icterus.     Extraocular " Movements: Extraocular movements intact.      Pupils: Pupils are equal, round, and reactive to light.   Cardiovascular:      Rate and Rhythm: Normal rate and regular rhythm.      Pulses: Normal pulses.      Heart sounds: Normal heart sounds.   Pulmonary:      Effort: Pulmonary effort is normal. No respiratory distress.      Breath sounds: Normal breath sounds.   Abdominal:      General: Abdomen is flat. Bowel sounds are normal. There is no distension.      Palpations: Abdomen is soft. There is no mass.      Tenderness: There is abdominal tenderness. There is guarding. There is no rebound.      Hernia: No hernia is present.      Comments: Ileostomy in place with soft liquidy stool in appliance.   Skin:     General: Skin is warm and dry.      Capillary Refill: Capillary refill takes less than 2 seconds.      Coloration: Skin is not jaundiced or pale.   Neurological:      General: No focal deficit present.      Mental Status: He is alert and oriented to person, place, and time.   Psychiatric:         Mood and Affect: Mood normal.         Behavior: Behavior normal.         Thought Content: Thought content normal.         Judgment: Judgment normal.         Results Review:   I reviewed the patient's new clinical results.  I reviewed the patient's new imaging results and agree with the interpretation.  I reviewed the patient's other test results and agree with the interpretation  I personally viewed and interpreted the patient's EKG/Telemetry data    Lab Results   Component Value Date    WBC 4.55 05/13/2024    HGB 10.1 (L) 05/13/2024    HGB 12.5 (L) 05/12/2024    HGB 12.8 (L) 05/02/2024    HCT 29.7 (L) 05/13/2024    MCV 89.7 05/13/2024    PLT 66 (L) 05/13/2024       Lab Results   Component Value Date    INR 1.16 (H) 05/12/2024    INR 1.1 11/17/2023    INR 1.1 06/09/2023       Lab Results   Component Value Date    GLUCOSE 95 05/13/2024    BUN 14 05/13/2024    CREATININE 0.97 05/13/2024    EGFRIFNONA 58 (L) 02/24/2022     EGFRIFAFRI 69 11/17/2023    BCR 14.4 05/13/2024     (L) 05/13/2024    K 3.9 05/13/2024    CO2 26.0 05/13/2024    CALCIUM 8.3 (L) 05/13/2024    PROTENTOTREF 7.5 12/18/2018    ALBUMIN 3.0 (L) 05/13/2024    ALKPHOS 108 05/13/2024    BILITOT 10.6 (H) 05/13/2024    ALT 84 (H) 05/13/2024     (H) 05/13/2024       MRI abdomen wo contrast mrcp    Result Date: 5/13/2024  MRI ABDOMEN WO CONTRAST MRCP Date of Exam: 5/12/2024 11:39 PM EDT Indication: acute hepatitis, pancreatitis.  Comparison: 5/12/2024. Technique:  Routine multiplanar/multisequence images of the abdomen were obtained with MRCP sequences without contrast administration. Findings: Redemonstration of cirrhotic morphology of the liver with surface nodularity present and diffuse low T1 signal changes. No evidence of ascites. The gallbladder appears distended. Gallstones are present. Wall thickening is present with a small amount of surrounding inflammatory changes and free fluid. No evidence of biliary ductal dilatation. No filling defects identified within the biliary tree. There does hypertrophy a stones seen either within the gallbladder neck or within the cystic duct (series 21  image 56). Spleen appears enlarged likely related to portal venous hypertension, stable. No evidence of pancreatic ductal dilatation. No evidence of focal collection. Mild edema is seen diffusely surrounding the pancreas kidneys appear unremarkable. No significant inflammatory changes.     Impression: 1. Findings consistent with acute cholecystitis. No evidence of biliary ductal dilatation or filling defects within the biliary tree. There is a stone seen within the gallbladder neck or within the proximal portion of the cystic duct. 2. Mild diffuse edema seen surrounding the pancreas consistent with acute uncomplicated pancreatitis. 3. Cirrhotic morphology of the liver with evidence of portal venous hypertension. No focal lesions or evidence of ascites. Electronically Signed:  Salome Peace MD  5/13/2024 12:52 AM EDT  Workstation ID: [id number]    CT Abdomen Pelvis With Contrast    Result Date: 5/12/2024  CT ABDOMEN PELVIS W CONTRAST Date of Exam: 5/12/2024 10:14 PM EDT Indication: right sided abd pain, nausea, chills, h/o gallstones, r/o cholecystitis. Comparison: 5/2/2024. Technique: Axial CT images were obtained of the abdomen and pelvis following the uneventful intravenous administration of intravenous contrast. Reconstructed coronal and sagittal images were also obtained. Automated exposure control and iterative construction methods were used. Findings: Lung Bases:   The visualized lung bases and lower mediastinal structures are unremarkable. Liver: The liver appears diffusely hypodense consistent with steatosis.. No evidence of ascites. Contour lobular appearance present consistent with cirrhosis.. No focal lesions. Biliary/Gallbladder:  The gallbladder is mildly distended. Diffuse gallbladder wall thickening is present. Stranding is seen within the adjacent fat. A trace amount of free fluid is seen along the inferior portion of the second portion of the duodenum. Gallstones are present.. The biliary tree is nondilated. Spleen: Spleen appears mildly enlarged, stable as compared to the previous study.. Pancreas:  Pancreas is normal. There is no evidence of pancreatic mass or peripancreatic fluid. Kidneys:  Kidneys are normal in size. There are no stones or hydronephrosis. Adrenals:  Adrenal glands are unremarkable. Retroperitoneal/Lymph Nodes/Vasculature:  No retroperitoneal adenopathy is identified. Gastrointestinal/Mesentery:  The bowel loops are non-dilated without wall thickening. Presacral fluid and soft tissue thickening present, similar as compared to the previous study. Postsurgical changes are present within the rectum. Right lower quadrant colostomy present. No significant stool burden identified. Varicosities are present. No evidence of hernia.. The appendix appears within  normal limits. No evidence of obstruction. No free air. No mesenteric fluid collections identified. Bladder:  The bladder is normal. Genital:   Unremarkable       Bony Structures:   Visualized bony structures are consistent with the patient's age.     Impression: 1. Findings consistent with acute cholecystitis. 2. Ancillary findings as described above. Electronically Signed: Salome Peace MD  5/12/2024 10:33 PM EDT  Workstation ID: [id number]    US Gallbladder    Result Date: 5/12/2024  US GALLBLADDER Date of Exam: 5/12/2024 9:24 PM EDT Indication: RUQ abd pain, pt appears jaundiced, nausea, chills, known gallstones. Comparison: 5/2/2024. Technique: Grayscale and color Doppler ultrasound evaluation of the right upper quadrant was performed. Findings: Limited evaluation due to patient condition. The liver appears echogenic.. No focal lesions identified. Normal flow is present within the hepatic vasculature. Limited visualization of the pancreas appears grossly unremarkable. No evidence of ascites. The gallbladder appears normal in caliber. No wall thickening identified. Stones present within the lumen.. The common bile duct appears normal in caliber. The sonographic Rea sign was negative. The right kidney appears normal in size with no focal lesions. No evidence of nephrolithiasis or hydronephrosis.     Impression: Cholelithiasis without secondary findings of acute cholecystitis. Increased echogenicity of the liver parenchyma likely related to steatosis or nonspecific hepatitis. Electronically Signed: Salome Peace MD  5/12/2024 9:49 PM EDT  Workstation ID: [id number]    US Gallbladder    Result Date: 5/2/2024  US GALLBLADDER Date of Exam: 5/2/2024 1:40 PM EDT Indication: Diffuse abd pain. Comparison: CT 5/2/2024 Technique: Grayscale and color Doppler ultrasound evaluation of the right upper quadrant was performed. Findings: LIVER: Cirrhotic morphology. No focal lesions identified. Normal flow is present within the  hepatic vasculature. GALLBLADDER: Gallstones on CT are not identified, potentially obscured by the duodenum given their location on CT. No abnormal gallbladder wall thickening, pericholecystic fluid, nor reported sonographic Rea sign. PANCREAS: Primarily obscured RIGHT KIDNEY:  Normal in size with no focal lesions. No evidence of nephrolithiasis or hydronephrosis.     Impression: 1.No ultrasound evidence of acute cholecystitis. 2.Gallstones on CT are obscured currently. 3.Cirrhosis Electronically Signed: Isaiah Merlos MD  5/2/2024 2:02 PM EDT  Workstation ID: [id number]    CT Abdomen Pelvis With Contrast    Result Date: 5/2/2024  CT ABDOMEN PELVIS W CONTRAST Date of Exam: 5/2/2024 12:40 PM EDT Indication: diffuse abdominal pain, N/V, little to no output in ileostomy bag.. Comparison: CT of the abdomen and pelvis dated 9/13/2023 Technique: Axial CT images were obtained of the abdomen and pelvis following the uneventful intravenous administration of 85 mL Isovue-300. Reconstructed coronal and sagittal images were also obtained. Automated exposure control and iterative construction methods were used. Findings: Liver: The liver is cirrhotic in morphology. No focal liver lesion is seen. No biliary dilation is seen. Gallbladder: Cholelithiasis with gallstone noted in the gallbladder neck. Mild gallbladder wall indistinctness is nonspecific in the setting of cirrhosis. The gallbladder is otherwise grossly unremarkable. Pancreas: Unremarkable. Spleen: The spleen is enlarged, measuring 17 cm in length. Several splenic granulomas are seen. Adrenal glands: Unremarkable. Genitourinary tract: There are 2 nonobstructing calculi at the lower pole of the left kidney measuring 4 to 5 mm. Kidneys are otherwise unremarkable. No hydronephrosis is seen. The visualized portions of the ureters are unremarkable. Urinary bladder is decompressed which limits its evaluation. Gastrointestinal tract: There is a surgical anastomosis of the low  rectum, and there is a right-sided ileostomy. Hollow viscera appear otherwise unremarkable. No findings to suggest bowel obstruction. Appendix: No findings to suggest acute appendicitis. Other findings: There is unchanged ill-defined soft tissue density and questionable trace extraluminal air within the presacral space (see series 2, image 128). These findings are similar since 9/13/2023. No pathologically enlarged lymph nodes are seen. Mild vascular calcifications are present. The IVC is grossly unremarkable. Bones and soft tissues: No acute or suspicious osseous or soft tissue lesion is identified. Lung bases: The visualized lung bases are clear.     Impression: 1.Cholelithiasis with gallstone noted in the gallbladder neck. Mild gallbladder wall indistinctness is nonspecific in the setting of cirrhosis. If patient has right upper quadrant pain, consider further evaluation with gallbladder ultrasound. 2.Cirrhosis with splenomegaly. 3.Surgical changes of the low rectum with right-sided ileostomy. No evidence of bowel obstruction. 4.There is unchanged ill-defined soft tissue density and questionable trace extraluminal air within the presacral space (see series 2, image 128). These chronic findings are similar in comparison with multiple prior studies. 5.Left nonobstructive nephrolithiasis. 6.Additional findings as detailed above. 7. Electronically Signed: Walt Melgar MD  5/2/2024 12:57 PM EDT  Workstation ID: [id number]     ASSESSMENTS/PLANS  1.  Acute cholecystitis  2.  Acute gallstone/biliary pancreatitis  3.  Elevated LFTs, markedly cholestatic  4.  History of [place] liver cirrhosis which is managed per hepatology at the [facility], history of esophageal varices  5.  History of rectal adenocarcinoma requiring surgical resection end ileostomy in 2019.  6.  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 is a 58 y.o.  male who presents to hospital worsening abdominal pain was found to have acute cholecystitis and suspected gallstone pancreatitis.  MRI films reviewed and shows no evidence of retained choledocholithiasis, thus, no role for ERCP at this time.    Patient's liver has been well compensated in the past and he has not required paracentesis though he does have history of esophageal varices.  Suspect current clinical presentation is more compatible with cholecystitis and biliary pancreatitis than decompensation of liver cirrhosis.  Will continue to monitor and support throughout this hospitalization; if worsening LFTs following percutaneous cholecystotomy tube, may need to consider portal duplex.  Patient will need to follow-up with his primary hepatologist at discharge.    >>> For acute cholecystitis  Agree with cholecystotomy tube  Patient would be high risk for any surgical intervention in setting of known liver cirrhosis as well as presence of ileostomy  IV antibiotics  >>> For acute biliary pancreatitis  IV fluids  Antiemetics  Pain control measures  Trial of liquid diet  >>> AM CMP and PT/INR    I discussed the patient's findings and my recommendations with patient, nursing staff, and consulting provider    AMIRAH Carreon  05/13/24  17:45 EDT

## 2024-05-14 PROBLEM — K85.10 GALLSTONE PANCREATITIS: Status: ACTIVE | Noted: 2024-05-14

## 2024-05-14 LAB
ALBUMIN SERPL-MCNC: 2.5 G/DL (ref 3.5–5.2)
ALBUMIN/GLOB SERPL: 1 G/DL
ALP SERPL-CCNC: 121 U/L (ref 39–117)
ALT SERPL W P-5'-P-CCNC: 70 U/L (ref 1–41)
ANION GAP SERPL CALCULATED.3IONS-SCNC: 8 MMOL/L (ref 5–15)
AST SERPL-CCNC: 126 U/L (ref 1–40)
BILIRUB SERPL-MCNC: 9.3 MG/DL (ref 0–1.2)
BUN SERPL-MCNC: 24 MG/DL (ref 8–23)
BUN/CREAT SERPL: 16 (ref 7–25)
CALCIUM SPEC-SCNC: 7.9 MG/DL (ref 8.6–10.5)
CHLORIDE SERPL-SCNC: 100 MMOL/L (ref 98–107)
CO2 SERPL-SCNC: 26 MMOL/L (ref 22–29)
CREAT SERPL-MCNC: 1.5 MG/DL (ref 0.76–1.27)
DEPRECATED RDW RBC AUTO: 46.4 FL (ref 37–54)
EGFRCR SERPLBLD CKD-EPI 2021: 53 ML/MIN/1.73
ERYTHROCYTE [DISTWIDTH] IN BLOOD BY AUTOMATED COUNT: 14.2 % (ref 12.3–15.4)
GLOBULIN UR ELPH-MCNC: 2.5 GM/DL
GLUCOSE BLDC GLUCOMTR-MCNC: 112 MG/DL (ref 70–130)
GLUCOSE BLDC GLUCOMTR-MCNC: 134 MG/DL (ref 70–130)
GLUCOSE BLDC GLUCOMTR-MCNC: 90 MG/DL (ref 70–130)
GLUCOSE BLDC GLUCOMTR-MCNC: 98 MG/DL (ref 70–130)
GLUCOSE SERPL-MCNC: 90 MG/DL (ref 65–99)
HAV AB SER QL IA: NEGATIVE
HCT VFR BLD AUTO: 27 % (ref 37.5–51)
HGB BLD-MCNC: 9.1 G/DL (ref 13–17.7)
MCH RBC QN AUTO: 30.5 PG (ref 26.6–33)
MCHC RBC AUTO-ENTMCNC: 33.7 G/DL (ref 31.5–35.7)
MCV RBC AUTO: 90.6 FL (ref 79–97)
PLATELET # BLD AUTO: 68 10*3/MM3 (ref 140–450)
PMV BLD AUTO: 9.4 FL (ref 6–12)
POTASSIUM SERPL-SCNC: 4.1 MMOL/L (ref 3.5–5.2)
PROT SERPL-MCNC: 5 G/DL (ref 6–8.5)
RBC # BLD AUTO: 2.98 10*6/MM3 (ref 4.14–5.8)
SODIUM SERPL-SCNC: 134 MMOL/L (ref 136–145)
WBC NRBC COR # BLD AUTO: 3.02 10*3/MM3 (ref 3.4–10.8)

## 2024-05-14 PROCEDURE — 80053 COMPREHEN METABOLIC PANEL: CPT | Performed by: HOSPITALIST

## 2024-05-14 PROCEDURE — 99232 SBSQ HOSP IP/OBS MODERATE 35: CPT | Performed by: STUDENT IN AN ORGANIZED HEALTH CARE EDUCATION/TRAINING PROGRAM

## 2024-05-14 PROCEDURE — 25810000003 LACTATED RINGERS PER 1000 ML: Performed by: NURSE PRACTITIONER

## 2024-05-14 PROCEDURE — 25010000002 PIPERACILLIN SOD-TAZOBACTAM PER 1 G: Performed by: PHYSICIAN ASSISTANT

## 2024-05-14 PROCEDURE — 25810000003 LACTATED RINGERS SOLUTION: Performed by: NURSE PRACTITIONER

## 2024-05-14 PROCEDURE — 82948 REAGENT STRIP/BLOOD GLUCOSE: CPT

## 2024-05-14 PROCEDURE — 85027 COMPLETE CBC AUTOMATED: CPT | Performed by: HOSPITALIST

## 2024-05-14 PROCEDURE — 25010000002 PIPERACILLIN SOD-TAZOBACTAM PER 1 G: Performed by: SURGERY

## 2024-05-14 RX ADMIN — SODIUM CHLORIDE, POTASSIUM CHLORIDE, SODIUM LACTATE AND CALCIUM CHLORIDE 250 ML: 600; 310; 30; 20 INJECTION, SOLUTION INTRAVENOUS at 03:55

## 2024-05-14 RX ADMIN — SODIUM CHLORIDE, POTASSIUM CHLORIDE, SODIUM LACTATE AND CALCIUM CHLORIDE 125 ML/HR: 600; 310; 30; 20 INJECTION, SOLUTION INTRAVENOUS at 04:24

## 2024-05-14 RX ADMIN — PIPERACILLIN AND TAZOBACTAM 3.38 G: 3; .375 INJECTION, POWDER, LYOPHILIZED, FOR SOLUTION INTRAVENOUS at 15:25

## 2024-05-14 RX ADMIN — PIPERACILLIN AND TAZOBACTAM 3.38 G: 3; .375 INJECTION, POWDER, LYOPHILIZED, FOR SOLUTION INTRAVENOUS at 21:09

## 2024-05-14 RX ADMIN — PIPERACILLIN AND TAZOBACTAM 3.38 G: 3; .375 INJECTION, POWDER, LYOPHILIZED, FOR SOLUTION INTRAVENOUS at 06:08

## 2024-05-14 RX ADMIN — LEVOTHYROXINE SODIUM 25 MCG: 25 TABLET ORAL at 06:08

## 2024-05-14 NOTE — PROGRESS NOTES
"Patient Name:  Hesham Arevalo Jr.  YOB: 1965  [id number]    Surgery Progress Note    Date of visit: 5/14/2024    Subjective: Blood pressures have been mostly in the 90s systolic.  Had a Tmax of 100.3 overnight.  Overall tells me that he is feeling better with less abdominal pain than yesterday.  No nausea or vomiting.  Denies bloating.  Having ileostomy output          Objective:     BP 95/50   Pulse 77   Temp 99.3 °F (37.4 °C) (Oral)   Resp 16   Ht 185.4 cm (73\")   Wt 88 kg (194 lb)   SpO2 95%   BMI 25.60 kg/m²     Intake/Output Summary (Last 24 hours) at 5/14/2024 0557  Last data filed at 5/13/2024 2255  Gross per 24 hour   Intake 1347 ml   Output 775 ml   Net 572 ml       GEN:   Awake, alert, in no acute distress, resting comfortably in bed, appears jaundiced  CV:   Regular rate and rhythm  L:  Symmetric expansion, not labored on room air  Abd:  Soft, not obviously distended, some mild tenderness to palpation throughout the epigastrium and upper abdomen, ileostomy in the right upper quadrant with stool in the bag, percutaneous cholecystostomy is in place with a small amount of serous bilious tinged output  Ext:  No cyanosis, clubbing, or edema    Recent labs that are back at this time have been reviewed.           Assessment/ Plan:    Mr. Arevalo is a 58-year-old gentleman with history of hypertension, hyperlipidemia, diabetes, [place] cirrhosis, and rectal cancer status post resection with diverting ileostomy who presents with gallstone pancreatitis and cholecystitis.     # Gallstone pancreatitis  # Cholecystitis  # Cirrhosis  # Rectal cancer status post resection with diverting loop ileostomy  -Underwent percutaneous cholecystostomy placement on May 13, 2024  -Tmax 100.3.  Systolics been mostly in the 90s.  -Tells me that he feels somewhat better today with less abdominal pain and denies bloating  -Labs from today are still pending.  -Continue percutaneous cholecystostomy and monitor " output.  This would likely need to remain in place for the next 6 weeks.  -Supportive management of pancreatitis per primary team and gastroenterology.  -I will follow      Konstantin Garcia MD  5/14/2024  05:57 EDT

## 2024-05-14 NOTE — PLAN OF CARE
Goal Outcome Evaluation:  Plan of Care Reviewed With: patient           Outcome Evaluation: Room air. A&O x4. Jaundiced. Hypotensive overnight. 250cc LR bolus x2 so far this shift. APRN came to bedside to assess pt. Cholecystostomy tube to bulb suction- yellow/orange drainage with red streaks/strands. Ileostomy in place with green liquid stool. Fall precautions in place. IVFs infusing. Abx infused. Will continue plan of care.

## 2024-05-14 NOTE — PROGRESS NOTES
[facility] Medicine Services  PROGRESS NOTE    Patient Name: Hesham Arevalo Jr.  : 1965  MRN: [id number]    Date of Admission: 2024  Primary Care Physician: Myke Mendoza MD    Subjective   Subjective     CC:  F/U abdominal pain    HPI:  Pain is improving, he is tolerating diet. Had cholecystostomy placed yesterday      Objective   Objective     Vital Signs:   Temp:  [98.9 °F (37.2 °C)-100.3 °F (37.9 °C)] 98.9 °F (37.2 °C)  Heart Rate:  [67-89] 67  Resp:  [16] 16  BP: ()/(46-53) 103/53     Physical Exam:  Gen-no acute distress  HENT-NCAT, mucous membranes moist  CV-RRR, S1 S2 normal, no m/r/g  Resp-CTAB, no wheezes or rales  Abd-soft, mild RUQ TTP, ND, +BS, ostomy in place, cholecystostomy tube in place  Ext-no edema  Neuro-A&Ox3, no focal deficits  Skin-no rashes  Psych-appropriate mood      Results Reviewed:  LAB RESULTS:      Lab 24  0640 24  0700 24   WBC 3.02* 4.55 8.28   HEMOGLOBIN 9.1* 10.1* 12.5*   HEMATOCRIT 27.0* 29.7* 36.6*   PLATELETS 68* 66* 111*   NEUTROS ABS  --  3.29 7.09*   IMMATURE GRANS (ABS)  --  0.02 0.04   LYMPHS ABS  --  0.58* 0.49*   MONOS ABS  --  0.51 0.54   EOS ABS  --  0.13 0.08   MCV 90.6 89.7 88.6   LACTATE  --   --  2.0   PROTIME  --   --  15.0*         Lab 24  0640 24  0700 24   SODIUM 134* 135* 128*   POTASSIUM 4.1 3.9 3.6   CHLORIDE 100 101 90*   CO2 26.0 26.0 26.0   ANION GAP 8.0 8.0 12.0   BUN 24* 14 13   CREATININE 1.50* 0.97 0.97   EGFR 53.0* 89.4 89.4   GLUCOSE 90 95 164*   CALCIUM 7.9* 8.3* 9.1   MAGNESIUM  --   --  1.6   HEMOGLOBIN A1C  --  8.30*  --          Lab 24  0640 24  0700 24   TOTAL PROTEIN 5.0* 6.2 7.5   ALBUMIN 2.5* 3.0* 3.7   GLOBULIN 2.5 3.2 3.8   ALT (SGPT) 70* 84* 102*   AST (SGOT) 126* 148* 207*   BILIRUBIN 9.3* 10.6* 11.2*   ALK PHOS 121* 108 122*   LIPASE  --   --  >3,000*         Lab 24   HSTROP T 11   PROTIME 15.0*    INR 1.16*         Lab 05/13/24  0700 05/12/24 2029   CHOLESTEROL 71  --    LDL CHOL 25  --    HDL CHOL 27*  --    TRIGLYCERIDES 93 97             Brief Urine Lab Results  (Last result in the past 365 days)        Color   Clarity   Blood   Leuk Est   Nitrite   Protein   CREAT   Urine HCG        05/12/24 2032 Dark Yellow   Clear   Negative   Trace   Positive   Trace                   Microbiology Results Abnormal       Procedure Component Value - Date/Time    Body Fluid Culture - Body Fluid, Peritoneum [[id number]] Collected: 05/13/24 1153    Lab Status: Preliminary result Specimen: Body Fluid from Peritoneum Updated: 05/14/24 0937     Body Fluid Culture Growth present, too young to evaluate     Gram Stain Moderate (3+) WBCs seen      No organisms seen    Blood Culture - Blood, Arm, Left [[id number]]  (Normal) Collected: 05/12/24 2050    Lab Status: Preliminary result Specimen: Blood from Arm, Left Updated: 05/13/24 2117     Blood Culture No growth at 24 hours    Narrative:      Less than seven (7) mL's of blood was collected.  Insufficient quantity may yield false negative results.    Blood Culture - Blood, Arm, Right [[id number]]  (Normal) Collected: 05/12/24 2025    Lab Status: Preliminary result Specimen: Blood from Arm, Right Updated: 05/13/24 2100     Blood Culture No growth at 24 hours    Narrative:      Less than seven (7) mL's of blood was collected.  Insufficient quantity may yield false negative results.            MRI abdomen wo contrast mrcp    Result Date: 5/13/2024  MRI ABDOMEN WO CONTRAST MRCP Date of Exam: 5/12/2024 11:39 PM EDT Indication: acute hepatitis, pancreatitis.  Comparison: 5/12/2024. Technique:  Routine multiplanar/multisequence images of the abdomen were obtained with MRCP sequences without contrast administration. Findings: Redemonstration of cirrhotic morphology of the liver with surface nodularity present and diffuse low T1 signal changes. No evidence of ascites. The gallbladder appears  distended. Gallstones are present. Wall thickening is present with a small amount of surrounding inflammatory changes and free fluid. No evidence of biliary ductal dilatation. No filling defects identified within the biliary tree. There does hypertrophy a stones seen either within the gallbladder neck or within the cystic duct (series 21  image 56). Spleen appears enlarged likely related to portal venous hypertension, stable. No evidence of pancreatic ductal dilatation. No evidence of focal collection. Mild edema is seen diffusely surrounding the pancreas kidneys appear unremarkable. No significant inflammatory changes.     Impression: Impression: 1. Findings consistent with acute cholecystitis. No evidence of biliary ductal dilatation or filling defects within the biliary tree. There is a stone seen within the gallbladder neck or within the proximal portion of the cystic duct. 2. Mild diffuse edema seen surrounding the pancreas consistent with acute uncomplicated pancreatitis. 3. Cirrhotic morphology of the liver with evidence of portal venous hypertension. No focal lesions or evidence of ascites. Electronically Signed: Salome Peace MD  5/13/2024 12:52 AM EDT  Workstation ID: [id number]    CT Abdomen Pelvis With Contrast    Result Date: 5/12/2024  CT ABDOMEN PELVIS W CONTRAST Date of Exam: 5/12/2024 10:14 PM EDT Indication: right sided abd pain, nausea, chills, h/o gallstones, r/o cholecystitis. Comparison: 5/2/2024. Technique: Axial CT images were obtained of the abdomen and pelvis following the uneventful intravenous administration of intravenous contrast. Reconstructed coronal and sagittal images were also obtained. Automated exposure control and iterative construction methods were used. Findings: Lung Bases:   The visualized lung bases and lower mediastinal structures are unremarkable. Liver: The liver appears diffusely hypodense consistent with steatosis.. No evidence of ascites. Contour lobular appearance  present consistent with cirrhosis.. No focal lesions. Biliary/Gallbladder:  The gallbladder is mildly distended. Diffuse gallbladder wall thickening is present. Stranding is seen within the adjacent fat. A trace amount of free fluid is seen along the inferior portion of the second portion of the duodenum. Gallstones are present.. The biliary tree is nondilated. Spleen: Spleen appears mildly enlarged, stable as compared to the previous study.. Pancreas:  Pancreas is normal. There is no evidence of pancreatic mass or peripancreatic fluid. Kidneys:  Kidneys are normal in size. There are no stones or hydronephrosis. Adrenals:  Adrenal glands are unremarkable. Retroperitoneal/Lymph Nodes/Vasculature:  No retroperitoneal adenopathy is identified. Gastrointestinal/Mesentery:  The bowel loops are non-dilated without wall thickening. Presacral fluid and soft tissue thickening present, similar as compared to the previous study. Postsurgical changes are present within the rectum. Right lower quadrant colostomy present. No significant stool burden identified. Varicosities are present. No evidence of hernia.. The appendix appears within normal limits. No evidence of obstruction. No free air. No mesenteric fluid collections identified. Bladder:  The bladder is normal. Genital:   Unremarkable       Bony Structures:   Visualized bony structures are consistent with the patient's age.     Impression: Impression: 1. Findings consistent with acute cholecystitis. 2. Ancillary findings as described above. Electronically Signed: Salome Peace MD  5/12/2024 10:33 PM EDT  Workstation ID: [id number]    US Gallbladder    Result Date: 5/12/2024  US GALLBLADDER Date of Exam: 5/12/2024 9:24 PM EDT Indication: RUQ abd pain, pt appears jaundiced, nausea, chills, known gallstones. Comparison: 5/2/2024. Technique: Grayscale and color Doppler ultrasound evaluation of the right upper quadrant was performed. Findings: Limited evaluation due to patient  condition. The liver appears echogenic.. No focal lesions identified. Normal flow is present within the hepatic vasculature. Limited visualization of the pancreas appears grossly unremarkable. No evidence of ascites. The gallbladder appears normal in caliber. No wall thickening identified. Stones present within the lumen.. The common bile duct appears normal in caliber. The sonographic Rea sign was negative. The right kidney appears normal in size with no focal lesions. No evidence of nephrolithiasis or hydronephrosis.     Impression: Impression: Cholelithiasis without secondary findings of acute cholecystitis. Increased echogenicity of the liver parenchyma likely related to steatosis or nonspecific hepatitis. Electronically Signed: Salome Peace MD  5/12/2024 9:49 PM EDT  Workstation ID: [id number]         Current medications:  Scheduled Meds:carvedilol, 6.25 mg, Oral, BID With Meals  insulin lispro, 2-9 Units, Subcutaneous, 4x Daily AC & at Bedtime  levothyroxine, 25 mcg, Oral, Q AM  lisinopril, 10 mg, Oral, Q24H  piperacillin-tazobactam, 3.375 g, Intravenous, Q8H  sodium chloride, 10 mL, Intravenous, Q12H      Continuous Infusions:lactated ringers, 125 mL/hr, Last Rate: 125 mL/hr (05/14/24 8424)      PRN Meds:.  dextrose    dextrose    glucagon (human recombinant)    HYDROmorphone    melatonin    ondansetron    sodium chloride    sodium chloride    Assessment & Plan   Assessment & Plan     Active Hospital Problems    Diagnosis  POA    **Acute cholecystitis [K81.0]  Unknown    Gallstone pancreatitis [K85.10]  Yes    Cirrhosis of liver [K74.60]  Unknown    Acute hepatitis [B17.9]  Unknown    Acute pancreatitis [K85.90]  Unknown    Abdominal pain [R10.9]  Yes    Hyponatremia [E87.1]  Yes    Type 2 diabetes mellitus without complication, without long-term current use of insulin [E11.9]  Yes    Thrombocytopenia [D69.6]  Yes    Essential hypertension [I10]  Yes    Other specified hypothyroidism [E03.8]  Yes       Resolved Hospital Problems    Diagnosis Date Resolved POA    Hyperglycemia [R73.9] 05/12/2024 Yes        Brief Hospital Course to date:  Hesham Arevalo Jr. is a 58 y.o. male with hx of HTN, HLD, T2DM,  cirrhosis, and hx of rectal adenocarcinoma s/p resection and ileostomy who presents to [facility] ED for complaint of RUQ abdominal pain. He had been admitted here 5/2-5/3/24 due to similar complaints, had imaging that showed gallstones without signs of cholecystitis or pancreatitis - improved with supportive care and patient was discharged home. He is here w worsening LFT and biliary elevations. Gen surg and GI following    This patient's problems and plans were partially entered by my partner and updated as appropriate by me 05/14/24. Copied text in this note has been reviewed and is accurate as of today's date.     Acute cholecystitis  Elevated LFT's/hyperbilirubinemia  Acute gallstone pancreatitis  --CT A/P with acute cholecystitis   --US gallbladder with cholelithiasis but no acute signs of cholecystitis  --MRCP with acute cholecystitis but no filling defects/biliary ductal dilatation, mild diffuse edema surrounding pancreas  --, , Total Bilirubin 11.2 - markedly elevated compared to 5/3/24 labs  --Lipase elevated > 3000  --Continue IV fluids, Zosyn  --Dr. Garcia consulted, due to underlying cirrhosis and ileostomy, patient is very high risk for cholecystectomy at this time - s/p IR for cholecystostomy tube placement. 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 Lebron March 2022 - no varices  noted  --Continue Coreg  --GI following  --Monitor platelets closely, no new bleeding     HTN  HLD  --Takes Coreg and Lisinopril, continue  --Not currently on a statin, lipid profile acceptable (LDL 25)     T2DM  --HbA1c 8.3%  --SSI     Hypothyroidism  --Continue home Synthroid    Hx of rectal adenocarcinoma s/p resection and ileostomy April 2019  --Follows with Dr. Kelly and Dr. Moeller, no evidence of recurrence per last note from Oncology 3/28/24  --He reports he has been told he is too high risk for ileostomy reversal    Expected Discharge Location and Transportation: home  Expected Discharge   Expected Discharge Date: 5/17/2024; Expected Discharge Time:      DVT prophylaxis:  Mechanical DVT prophylaxis orders are present.         AM-PAC 6 Clicks Score (PT): 24 (05/14/24 0809)    CODE STATUS:   Code Status and Medical Interventions:   Ordered at: 05/13/24 0037     Code Status (Patient has no pulse and is not breathing):    CPR (Attempt to Resuscitate)     Medical Interventions (Patient has pulse or is breathing):    Full Support       Shreya Simon MD  05/14/24

## 2024-05-14 NOTE — PLAN OF CARE
Goal Outcome Evaluation:  Plan of Care Reviewed With: patient        Progress: improving  Outcome Evaluation: VSS on RA. BP meds held due to hypotention. BP improved throughout shift. No complaints of pain or nausea at this time during shift. Tolerating clears. Ambulating and voiding. Patient reports diarrhea output of ostomy. Percutaneous drain output appropriate. Discussed plan of care with patient who verbalizes understanding.

## 2024-05-15 LAB
ALBUMIN SERPL-MCNC: 2.4 G/DL (ref 3.5–5.2)
ALBUMIN/GLOB SERPL: 0.8 G/DL
ALP SERPL-CCNC: 120 U/L (ref 39–117)
ALT SERPL W P-5'-P-CCNC: 60 U/L (ref 1–41)
ANION GAP SERPL CALCULATED.3IONS-SCNC: 6 MMOL/L (ref 5–15)
AST SERPL-CCNC: 93 U/L (ref 1–40)
BASOPHILS # BLD AUTO: 0.01 10*3/MM3 (ref 0–0.2)
BASOPHILS NFR BLD AUTO: 0.4 % (ref 0–1.5)
BILIRUB SERPL-MCNC: 5.7 MG/DL (ref 0–1.2)
BUN SERPL-MCNC: 15 MG/DL (ref 8–23)
BUN/CREAT SERPL: 13.5 (ref 7–25)
CALCIUM SPEC-SCNC: 8.1 MG/DL (ref 8.6–10.5)
CHLORIDE SERPL-SCNC: 104 MMOL/L (ref 98–107)
CO2 SERPL-SCNC: 28 MMOL/L (ref 22–29)
CREAT SERPL-MCNC: 1.11 MG/DL (ref 0.76–1.27)
DEPRECATED RDW RBC AUTO: 46.1 FL (ref 37–54)
EGFRCR SERPLBLD CKD-EPI 2021: 76 ML/MIN/1.73
EOSINOPHIL # BLD AUTO: 0.17 10*3/MM3 (ref 0–0.4)
EOSINOPHIL NFR BLD AUTO: 6.9 % (ref 0.3–6.2)
ERYTHROCYTE [DISTWIDTH] IN BLOOD BY AUTOMATED COUNT: 14.2 % (ref 12.3–15.4)
GLOBULIN UR ELPH-MCNC: 3.2 GM/DL
GLUCOSE BLDC GLUCOMTR-MCNC: 144 MG/DL (ref 70–130)
GLUCOSE BLDC GLUCOMTR-MCNC: 151 MG/DL (ref 70–130)
GLUCOSE BLDC GLUCOMTR-MCNC: 166 MG/DL (ref 70–130)
GLUCOSE BLDC GLUCOMTR-MCNC: 92 MG/DL (ref 70–130)
GLUCOSE SERPL-MCNC: 95 MG/DL (ref 65–99)
HCT VFR BLD AUTO: 27.5 % (ref 37.5–51)
HGB BLD-MCNC: 9.2 G/DL (ref 13–17.7)
IMM GRANULOCYTES # BLD AUTO: 0.01 10*3/MM3 (ref 0–0.05)
IMM GRANULOCYTES NFR BLD AUTO: 0.4 % (ref 0–0.5)
LYMPHOCYTES # BLD AUTO: 0.34 10*3/MM3 (ref 0.7–3.1)
LYMPHOCYTES NFR BLD AUTO: 13.7 % (ref 19.6–45.3)
MCH RBC QN AUTO: 30.1 PG (ref 26.6–33)
MCHC RBC AUTO-ENTMCNC: 33.5 G/DL (ref 31.5–35.7)
MCV RBC AUTO: 89.9 FL (ref 79–97)
MONOCYTES # BLD AUTO: 0.28 10*3/MM3 (ref 0.1–0.9)
MONOCYTES NFR BLD AUTO: 11.3 % (ref 5–12)
NEUTROPHILS NFR BLD AUTO: 1.67 10*3/MM3 (ref 1.7–7)
NEUTROPHILS NFR BLD AUTO: 67.3 % (ref 42.7–76)
NRBC BLD AUTO-RTO: 0 /100 WBC (ref 0–0.2)
PLATELET # BLD AUTO: 69 10*3/MM3 (ref 140–450)
PMV BLD AUTO: 9.2 FL (ref 6–12)
POTASSIUM SERPL-SCNC: 3.8 MMOL/L (ref 3.5–5.2)
PROT SERPL-MCNC: 5.6 G/DL (ref 6–8.5)
RBC # BLD AUTO: 3.06 10*6/MM3 (ref 4.14–5.8)
SODIUM SERPL-SCNC: 138 MMOL/L (ref 136–145)
WBC NRBC COR # BLD AUTO: 2.48 10*3/MM3 (ref 3.4–10.8)

## 2024-05-15 PROCEDURE — 99232 SBSQ HOSP IP/OBS MODERATE 35: CPT | Performed by: STUDENT IN AN ORGANIZED HEALTH CARE EDUCATION/TRAINING PROGRAM

## 2024-05-15 PROCEDURE — 25010000002 PIPERACILLIN SOD-TAZOBACTAM PER 1 G: Performed by: SURGERY

## 2024-05-15 PROCEDURE — 82948 REAGENT STRIP/BLOOD GLUCOSE: CPT

## 2024-05-15 PROCEDURE — 25010000002 PIPERACILLIN SOD-TAZOBACTAM PER 1 G: Performed by: PHYSICIAN ASSISTANT

## 2024-05-15 PROCEDURE — 25810000003 LACTATED RINGERS PER 1000 ML: Performed by: NURSE PRACTITIONER

## 2024-05-15 PROCEDURE — 85025 COMPLETE CBC W/AUTO DIFF WBC: CPT | Performed by: STUDENT IN AN ORGANIZED HEALTH CARE EDUCATION/TRAINING PROGRAM

## 2024-05-15 PROCEDURE — 80053 COMPREHEN METABOLIC PANEL: CPT | Performed by: STUDENT IN AN ORGANIZED HEALTH CARE EDUCATION/TRAINING PROGRAM

## 2024-05-15 RX ADMIN — LISINOPRIL 10 MG: 10 TABLET ORAL at 08:20

## 2024-05-15 RX ADMIN — Medication 10 ML: at 21:05

## 2024-05-15 RX ADMIN — LEVOTHYROXINE SODIUM 25 MCG: 25 TABLET ORAL at 05:24

## 2024-05-15 RX ADMIN — PIPERACILLIN AND TAZOBACTAM 3.38 G: 3; .375 INJECTION, POWDER, LYOPHILIZED, FOR SOLUTION INTRAVENOUS at 13:21

## 2024-05-15 RX ADMIN — SODIUM CHLORIDE, POTASSIUM CHLORIDE, SODIUM LACTATE AND CALCIUM CHLORIDE 125 ML/HR: 600; 310; 30; 20 INJECTION, SOLUTION INTRAVENOUS at 11:42

## 2024-05-15 RX ADMIN — CARVEDILOL 6.25 MG: 6.25 TABLET, FILM COATED ORAL at 08:20

## 2024-05-15 RX ADMIN — PIPERACILLIN AND TAZOBACTAM 3.38 G: 3; .375 INJECTION, POWDER, LYOPHILIZED, FOR SOLUTION INTRAVENOUS at 21:06

## 2024-05-15 RX ADMIN — PIPERACILLIN AND TAZOBACTAM 3.38 G: 3; .375 INJECTION, POWDER, LYOPHILIZED, FOR SOLUTION INTRAVENOUS at 05:24

## 2024-05-15 RX ADMIN — SODIUM CHLORIDE, POTASSIUM CHLORIDE, SODIUM LACTATE AND CALCIUM CHLORIDE 125 ML/HR: 600; 310; 30; 20 INJECTION, SOLUTION INTRAVENOUS at 17:19

## 2024-05-15 NOTE — PLAN OF CARE
Goal Outcome Evaluation:  Plan of Care Reviewed With: patient        Progress: improving  Outcome Evaluation: VSS. RA. A&O x4. No complaints of pain or nausea. Tolerating clear liquids. Percutaneous drain in place with yellowish/orange/red streaked output. IVFs infusing. Pt ambulating independenly. Adequate UOP. Ileostomy in place. Plan of care discussed with patient who expresses understanding. Pt resting comfortably at this time.

## 2024-05-15 NOTE — PROGRESS NOTES
[facility] Medicine Services  PROGRESS NOTE    Patient Name: Hesham Arevalo Jr.  : 1965  MRN: [id number]    Date of Admission: 2024  Primary Care Physician: Myke Mendoza MD    Subjective   Subjective     CC:  F/U abdominal pain    HPI:  Doing better each day. Tolerating current diet. Pain controlled      Objective   Objective     Vital Signs:   Temp:  [98 °F (36.7 °C)-98.9 °F (37.2 °C)] 98.8 °F (37.1 °C)  Heart Rate:  [59-80] 67  Resp:  [16-18] 18  BP: (102-125)/(55-64) 102/55     Physical Exam:  Gen-no acute distress  HENT-NCAT, mucous membranes moist  CV-RRR, S1 S2 normal, no m/r/g  Resp-CTAB, no wheezes or rales  Abd-soft, less tender today, ND, +BS, ostomy in place, cholecystostomy tube in place  Ext-no edema  Neuro-A&Ox3, no focal deficits  Skin-no rashes  Psych-appropriate mood      Results Reviewed:  LAB RESULTS:      Lab 05/15/24  0521 05/14/24  0640 24  0700 24   WBC 2.48* 3.02* 4.55 8.28   HEMOGLOBIN 9.2* 9.1* 10.1* 12.5*   HEMATOCRIT 27.5* 27.0* 29.7* 36.6*   PLATELETS 69* 68* 66* 111*   NEUTROS ABS 1.67*  --  3.29 7.09*   IMMATURE GRANS (ABS) 0.01  --  0.02 0.04   LYMPHS ABS 0.34*  --  0.58* 0.49*   MONOS ABS 0.28  --  0.51 0.54   EOS ABS 0.17  --  0.13 0.08   MCV 89.9 90.6 89.7 88.6   LACTATE  --   --   --  2.0   PROTIME  --   --   --  15.0*         Lab 05/15/24  0524  0640 24  0700 24   SODIUM 138 134* 135* 128*   POTASSIUM 3.8 4.1 3.9 3.6   CHLORIDE 104 100 101 90*   CO2 28.0 26.0 26.0 26.0   ANION GAP 6.0 8.0 8.0 12.0   BUN 15 * 14 13   CREATININE 1.11 1.50* 0.97 0.97   EGFR 76.0 53.0* 89.4 89.4   GLUCOSE 95 90 95 164*   CALCIUM 8.1* 7.9* 8.3* 9.1   MAGNESIUM  --   --   --  1.6   HEMOGLOBIN A1C  --   --  8.30*  --          Lab 05/15/24  0521 24  0640 24  0700 24   TOTAL PROTEIN 5.6* 5.0* 6.2 7.5   ALBUMIN 2.4* 2.5* 3.0* 3.7   GLOBULIN 3.2 2.5 3.2 3.8   ALT (SGPT) 60* 70* 84* 102*    AST (SGOT) 93* 126* 148* 207*   BILIRUBIN 5.7* 9.3* 10.6* 11.2*   ALK PHOS 120* 121* 108 122*   LIPASE  --   --   --  >3,000*         Lab 05/12/24 2029   HSTROP T 11   PROTIME 15.0*   INR 1.16*         Lab 05/13/24  0700 05/12/24 2029   CHOLESTEROL 71  --    LDL CHOL 25  --    HDL CHOL 27*  --    TRIGLYCERIDES 93 97             Brief Urine Lab Results  (Last result in the past 365 days)        Color   Clarity   Blood   Leuk Est   Nitrite   Protein   CREAT   Urine HCG        05/12/24 2032 Dark Yellow   Clear   Negative   Trace   Positive   Trace                   Microbiology Results Abnormal       Procedure Component Value - Date/Time    Blood Culture - Blood, Arm, Left [[id number]]  (Normal) Collected: 05/12/24 2050    Lab Status: Preliminary result Specimen: Blood from Arm, Left Updated: 05/14/24 2116     Blood Culture No growth at 2 days    Narrative:      Less than seven (7) mL's of blood was collected.  Insufficient quantity may yield false negative results.    Blood Culture - Blood, Arm, Right [[id number]]  (Normal) Collected: 05/12/24 2025    Lab Status: Preliminary result Specimen: Blood from Arm, Right Updated: 05/14/24 2100     Blood Culture No growth at 2 days    Narrative:      Less than seven (7) mL's of blood was collected.  Insufficient quantity may yield false negative results.            No radiology results from the last 24 hrs        Current medications:  Scheduled Meds:carvedilol, 6.25 mg, Oral, BID With Meals  insulin lispro, 2-9 Units, Subcutaneous, 4x Daily AC & at Bedtime  levothyroxine, 25 mcg, Oral, Q AM  lisinopril, 10 mg, Oral, Q24H  piperacillin-tazobactam, 3.375 g, Intravenous, Q8H  sodium chloride, 10 mL, Intravenous, Q12H      Continuous Infusions:lactated ringers, 125 mL/hr, Last Rate: 125 mL/hr (05/15/24 1142)      PRN Meds:.  dextrose    dextrose    glucagon (human recombinant)    HYDROmorphone    melatonin    ondansetron    sodium chloride    sodium chloride    Assessment & Plan    Assessment & Plan     Active Hospital Problems    Diagnosis  POA    **Acute cholecystitis [K81.0]  Unknown    Gallstone pancreatitis [K85.10]  Yes    Cirrhosis of liver [K74.60]  Unknown    Acute hepatitis [B17.9]  Unknown    Acute pancreatitis [K85.90]  Unknown    Abdominal pain [R10.9]  Yes    Hyponatremia [E87.1]  Yes    Type 2 diabetes mellitus without complication, without long-term current use of insulin [E11.9]  Yes    Thrombocytopenia [D69.6]  Yes    Essential hypertension [I10]  Yes    Other specified hypothyroidism [E03.8]  Yes      Resolved Hospital Problems    Diagnosis Date Resolved POA    Hyperglycemia [R73.9] 05/12/2024 Yes        Brief Hospital Course to date:  Hesham Arevalo Jr. is a 58 y.o. male with hx of HTN, HLD, T2DM,  cirrhosis, and hx of rectal adenocarcinoma s/p resection and ileostomy who presents to [facility] ED for complaint of RUQ abdominal pain. He had been admitted here 5/2-5/3/24 due to similar complaints, had imaging that showed gallstones without signs of cholecystitis or pancreatitis - improved with supportive care and patient was discharged home. He is here w worsening LFT and biliary elevations. Gen surg and GI following    This patient's problems and plans were partially entered by my partner and updated as appropriate by me 05/15/24. Copied text in this note has been reviewed and is accurate as of today's date.     Acute cholecystitis  Elevated LFT's/hyperbilirubinemia  Acute gallstone pancreatitis  --CT A/P with acute cholecystitis   --US gallbladder with cholelithiasis but no acute signs of cholecystitis  --MRCP with acute cholecystitis but no filling defects/biliary ductal dilatation, mild diffuse edema surrounding pancreas  --, , Total Bilirubin 11.2 on admission - markedly elevated compared to 5/3/24 labs. LFTs now improving  --Continue IV fluids, Zosyn. Will need 7 day course  --Dr. Garcia consulted, due to underlying cirrhosis and ileostomy,  patient is very high risk for cholecystectomy at this time - s/p IR for cholecystostomy tube placement. Needs to remain in for 6 wks, monitor output  --GI following, recommends liq diet, supportive care, abx. 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 Lebron March 2022 - no varices noted  --Continue Coreg  --GI following  --Monitor platelets closely, no new bleeding     HTN  HLD  --Takes Coreg and Lisinopril, continue  --Not currently on a statin, lipid profile acceptable (LDL 25)     T2DM  --HbA1c 8.3%  --SSI     Hypothyroidism  --Continue home Synthroid    Hx of rectal adenocarcinoma s/p resection and ileostomy April 2019  --Follows with Dr. Kelly and Dr. Moeller, no evidence of recurrence per last note from Oncology 3/28/24  --He reports he has been told he is too high risk for ileostomy reversal    Expected Discharge Location and Transportation: home  Expected Discharge   Expected Discharge Date: 5/16/2024; Expected Discharge Time:      DVT prophylaxis:  Mechanical DVT prophylaxis orders are present.         AM-PAC 6 Clicks Score (PT): 24 (05/15/24 0800)    CODE STATUS:   Code Status and Medical Interventions:   Ordered at: 05/13/24 0037     Code Status (Patient has no pulse and is not breathing):    CPR (Attempt to Resuscitate)     Medical Interventions (Patient has pulse or is breathing):    Full Support       Shreya Simon MD  05/15/24

## 2024-05-15 NOTE — PAYOR COMM NOTE
"MickeyHesham Jr. (58 y.o. Male)       Date of Birth   1965    Social Security Number       Address   [address] [id number]    Home Phone   [phone]    MRN   [id number]       Sikh   Latter-day    Marital Status                               Admission Date   5/12/24    Admission Type   Emergency    Admitting Provider   Shreya Simon MD    Attending Provider   Shreya Simon MD    Department, Room/Bed   [address], S217/1       Discharge Date       Discharge Disposition       Discharge Destination                                 Attending Provider: Shreya Simon MD    Allergies: No Known Allergies    Isolation: None   Infection: None   Code Status: CPR    Ht: 185.4 cm (73\")   Wt: 88 kg (194 lb)    Admission Cmt: None   Principal Problem: Acute cholecystitis [K81.0]                   Active Insurance as of 5/12/2024       Primary Coverage       Payor Plan Insurance Group Employer/Plan Group    [employer] [facility] EMPLOYEE [id number]       Payor Plan Address Payor Plan Phone Number Payor Plan Fax Number Effective Dates    [address] [phone]  1/1/2022 - None Entered    [place] [id number]         Subscriber Name Subscriber Birth Date Member ID       HESHAM LA SHARONDA PRATT 1965 [id number]                     Emergency Contacts        (Rel.) Home Phone Work Phone Mobile Phone    Livier LA (Spouse) [phone] -- [phone]              [place]: NPI [id number] Tax ID [id number]  Insurance Information                  [employer] EMPLOYEE Phone: [phone]    Subscriber: Hesham La Jr. Subscriber#: [id number]    Group#: [id number] Precert#: --          Current Facility-Administered Medications   Medication Dose Route Frequency Provider Last Rate Last Admin    carvedilol (COREG) tablet 6.25 mg  6.25 mg Oral BID With Meals Parmjit Jerez PA-C   6.25 mg at " 05/15/24 0820    dextrose (D50W) (25 g/50 mL) IV injection 25 g  25 g Intravenous Q15 Min PRN Parmjit Jerez PA-C        dextrose (GLUTOSE) oral gel 15 g  15 g Oral Q15 Min PRN Parmjit Jerez PA-C        glucagon (GLUCAGEN) injection 1 mg  1 mg Intramuscular Q15 Min PRN Parmjit Jerez PA-C        HYDROmorphone (DILAUDID) injection 0.5 mg  0.5 mg Intravenous Q4H PRN Lilia Milner DO   0.5 mg at 05/13/24 1247    Insulin Lispro (humaLOG) injection 2-9 Units  2-9 Units Subcutaneous 4x Daily AC & at Bedtime Parmjit Jerez PA-C        lactated ringers infusion  125 mL/hr Intravenous Continuous Angel Gustafson APRN 125 mL/hr at 05/15/24 1142 125 mL/hr at 05/15/24 1142    levothyroxine (SYNTHROID, LEVOTHROID) tablet 25 mcg  25 mcg Oral Q AM Parmjit Jerez PA-C   25 mcg at 05/15/24 0524    lisinopril (PRINIVIL,ZESTRIL) tablet 10 mg  10 mg Oral Q24H Parmjit Jerez PA-C   10 mg at 05/15/24 0820    melatonin tablet 5 mg  5 mg Oral Nightly PRN Parmjit Jerez PA-C        ondansetron (ZOFRAN) injection 4 mg  4 mg Intravenous Q6H PRN Lilia Milner DO   4 mg at 05/13/24 0308    piperacillin-tazobactam (ZOSYN) 3.375 g IVPB in 100 mL NS MBP (CD)  3.375 g Intravenous Q8H Konstantin Garcia MD 0 mL/hr at 05/13/24 1641 3.375 g at 05/15/24 1321    sodium chloride 0.9 % flush 10 mL  10 mL Intravenous Q12H Parmjit Jerez PA-C   10 mL at 05/13/24 0652    sodium chloride 0.9 % flush 10 mL  10 mL Intravenous PRN Parmjit Jerez PA-C        sodium chloride 0.9 % infusion 40 mL  40 mL Intravenous PRN Parmjit Jerez PA-C         Lab Results (last 24 hours)       Procedure Component Value Units Date/Time    POC Glucose Once [[id number]]  (Abnormal) Collected: 05/15/24 1111    Specimen: Blood Updated: 05/15/24 1113     Glucose 151 mg/dL     Body Fluid Culture - Body Fluid, Peritoneum [[id number]]  (Abnormal) Collected: 05/13/24 1153     Specimen: Body Fluid from Peritoneum Updated: 05/15/24 0822     Body Fluid Culture Scant growth (1+) Gram Negative Bacilli     Gram Stain Moderate (3+) WBCs seen      No organisms seen    POC Glucose Once [[id number]]  (Normal) Collected: 05/15/24 0704    Specimen: Blood Updated: 05/15/24 0705     Glucose 92 mg/dL     Comprehensive Metabolic Panel [[id number]]  (Abnormal) Collected: 05/15/24 0521    Specimen: Blood Updated: 05/15/24 0611     Glucose 95 mg/dL      BUN 15 mg/dL      Creatinine 1.11 mg/dL      Sodium 138 mmol/L      Potassium 3.8 mmol/L      Chloride 104 mmol/L      CO2 28.0 mmol/L      Calcium 8.1 mg/dL      Total Protein 5.6 g/dL      Albumin 2.4 g/dL      ALT (SGPT) 60 U/L      AST (SGOT) 93 U/L      Alkaline Phosphatase 120 U/L      Total Bilirubin 5.7 mg/dL      Globulin 3.2 gm/dL      Comment: Calculated Result        A/G Ratio 0.8 g/dL      BUN/Creatinine Ratio 13.5     Anion Gap 6.0 mmol/L      eGFR 76.0 mL/min/1.73     Narrative:      GFR Normal >60  Chronic Kidney Disease <60  Kidney Failure <15      CBC & Differential [[id number]]  (Abnormal) Collected: 05/15/24 0521    Specimen: Blood Updated: 05/15/24 0556    Narrative:      The following orders were created for panel order CBC & Differential.  Procedure                               Abnormality         Status                     ---------                               -----------         ------                     CBC Auto Differential[[id number]]        Abnormal            Final result                 Please view results for these tests on the individual orders.    CBC Auto Differential [[id number]]  (Abnormal) Collected: 05/15/24 0521    Specimen: Blood Updated: 05/15/24 0556     WBC 2.48 10*3/mm3      RBC 3.06 10*6/mm3      Hemoglobin 9.2 g/dL      Hematocrit 27.5 %      MCV 89.9 fL      MCH 30.1 pg      MCHC 33.5 g/dL      RDW 14.2 %      RDW-SD 46.1 fl      MPV 9.2 fL      Platelets 69 10*3/mm3      Neutrophil % 67.3 %      Lymphocyte %  13.7 %      Monocyte % 11.3 %      Eosinophil % 6.9 %      Basophil % 0.4 %      Immature Grans % 0.4 %      Neutrophils, Absolute 1.67 10*3/mm3      Lymphocytes, Absolute 0.34 10*3/mm3      Monocytes, Absolute 0.28 10*3/mm3      Eosinophils, Absolute 0.17 10*3/mm3      Basophils, Absolute 0.01 10*3/mm3      Immature Grans, Absolute 0.01 10*3/mm3      nRBC 0.0 /100 WBC     Blood Culture - Blood, Arm, Left [[id number]]  (Normal) Collected: 05/12/24 2050    Specimen: Blood from Arm, Left Updated: 05/14/24 2116     Blood Culture No growth at 2 days    Narrative:      Less than seven (7) mL's of blood was collected.  Insufficient quantity may yield false negative results.    Blood Culture - Blood, Arm, Right [[id number]]  (Normal) Collected: 05/12/24 2025    Specimen: Blood from Arm, Right Updated: 05/14/24 2100     Blood Culture No growth at 2 days    Narrative:      Less than seven (7) mL's of blood was collected.  Insufficient quantity may yield false negative results.    POC Glucose Once [[id number]]  (Normal) Collected: 05/14/24 2007    Specimen: Blood Updated: 05/14/24 2008     Glucose 112 mg/dL     POC Glucose Once [[id number]]  (Normal) Collected: 05/14/24 1639    Specimen: Blood Updated: 05/14/24 1641     Glucose 98 mg/dL           Imaging Results (Last 24 Hours)       ** No results found for the last 24 hours. **          Orders (last 24 hrs)        Start     Ordered    05/16/24 0600  Comprehensive Metabolic Panel  Morning Draw         05/15/24 1224    05/16/24 0600  CBC & Differential  Morning Draw         05/15/24 1224    05/15/24 1140  DIET MESSAGE Please send patient full liquid diet tray  Once        Comments: Please send patient full liquid diet tray    05/15/24 1139    05/15/24 1139  Diet: Liquid, Cardiac, Diabetic; Full Liquid; Healthy Heart (2-3 Na+); Consistent Carbohydrate; Fluid Consistency: Thin (IDDSI 0)  Diet Effective Now         05/15/24 1139    05/15/24 1114  POC Glucose Once  PROCEDURE ONCE         Comments: Complete no more than 45 minutes prior to patient eating      05/15/24 1111    05/15/24 0706  POC Glucose Once  PROCEDURE ONCE        Comments: Complete no more than 45 minutes prior to patient eating      05/15/24 0704    05/15/24 0600  Comprehensive Metabolic Panel  Morning Draw         05/14/24 1535    05/15/24 0600  CBC & Differential  Morning Draw         05/14/24 1535    05/15/24 0600  CBC Auto Differential  PROCEDURE ONCE         05/14/24 2202 05/14/24 2009  POC Glucose Once  PROCEDURE ONCE        Comments: Complete no more than 45 minutes prior to patient eating      05/14/24 2007 05/14/24 1642  POC Glucose Once  PROCEDURE ONCE        Comments: Complete no more than 45 minutes prior to patient eating      05/14/24 1639    05/13/24 0900  lisinopril (PRINIVIL,ZESTRIL) tablet 10 mg  Every 24 Hours Scheduled         05/13/24 0223 05/13/24 0800  carvedilol (COREG) tablet 6.25 mg  2 Times Daily With Meals         05/13/24 0223    05/13/24 0800  Oral Care  2 Times Daily       05/13/24 0223    05/13/24 0800  lactated ringers infusion  Continuous         05/13/24 0744    05/13/24 0730  Insulin Lispro (humaLOG) injection 2-9 Units  4 Times Daily Before Meals & Nightly         05/13/24 0038    05/13/24 0700  POC Glucose 4x Daily Before Meals & at Bedtime  4 Times Daily Before Meals & at Bedtime      Comments: Complete no more than 45 minutes prior to patient eating      05/13/24 0038    05/13/24 0600  levothyroxine (SYNTHROID, LEVOTHROID) tablet 25 mcg  Every Early Morning         05/13/24 0223    05/13/24 0600  piperacillin-tazobactam (ZOSYN) 3.375 g IVPB in 100 mL NS MBP (CD)  Every 8 Hours         05/13/24 0041    05/13/24 0400  Vital Signs  Every 4 Hours       05/13/24 0223    05/13/24 0239  sodium chloride 0.9 % flush 10 mL  Every 12 Hours Scheduled         05/13/24 0223    05/13/24 0227  ondansetron (ZOFRAN) injection 4 mg  Every 6 Hours PRN         05/13/24 0227 05/13/24 0223  Intake &  Output  Every Shift       24  sodium chloride 0.9 % flush 10 mL  As Needed         24  sodium chloride 0.9 % infusion 40 mL  As Needed         24  melatonin tablet 5 mg  Nightly PRN         243    24  HYDROmorphone (DILAUDID) injection 0.5 mg  Every 4 Hours PRN         248    24  dextrose (GLUTOSE) oral gel 15 g  Every 15 Minutes PRN         248    24  dextrose (D50W) (25 g/50 mL) IV injection 25 g  Every 15 Minutes PRN         248    24  glucagon (GLUCAGEN) injection 1 mg  Every 15 Minutes PRN         24    Unscheduled  Up With Assistance  As Needed       24    Unscheduled  Follow Hypoglycemia Standing Orders For Blood Glucose <70 & Notify Provider of Treatment  As Needed      Comments: Follow Hypoglycemia Orders As Outlined in Process Instructions (Open Order Report to View Full Instructions)  Notify Provider Any Time Hypoglycemia Treatment is Administered    24                     Physician Progress Notes (last 24 hours)        Shreya Simon MD at 05/15/24 1214              [facility] Medicine Services  PROGRESS NOTE    Patient Name: Hesham Arevalo Jr.  : 1965  MRN: [id number]    Date of Admission: 2024  Primary Care Physician: Myke Mendoza MD    Subjective   Subjective     CC:  F/U abdominal pain    HPI:  Doing better each day. Tolerating current diet. Pain controlled      Objective   Objective     Vital Signs:   Temp:  [98 °F (36.7 °C)-98.9 °F (37.2 °C)] 98.8 °F (37.1 °C)  Heart Rate:  [59-80] 67  Resp:  [16-18] 18  BP: (102-125)/(55-64) 102/55     Physical Exam:  Gen-no acute distress  HENT-NCAT, mucous membranes moist  CV-RRR, S1 S2 normal, no m/r/g  Resp-CTAB, no wheezes or rales  Abd-soft, less tender today, ND, +BS, ostomy in place, cholecystostomy tube in  place  Ext-no edema  Neuro-A&Ox3, no focal deficits  Skin-no rashes  Psych-appropriate mood      Results Reviewed:  LAB RESULTS:      Lab 05/15/24  0521 05/14/24 0640 05/13/24 0700 05/12/24 2029   WBC 2.48* 3.02* 4.55 8.28   HEMOGLOBIN 9.2* 9.1* 10.1* 12.5*   HEMATOCRIT 27.5* 27.0* 29.7* 36.6*   PLATELETS 69* 68* 66* 111*   NEUTROS ABS 1.67*  --  3.29 7.09*   IMMATURE GRANS (ABS) 0.01  --  0.02 0.04   LYMPHS ABS 0.34*  --  0.58* 0.49*   MONOS ABS 0.28  --  0.51 0.54   EOS ABS 0.17  --  0.13 0.08   MCV 89.9 90.6 89.7 88.6   LACTATE  --   --   --  2.0   PROTIME  --   --   --  15.0*         Lab 05/15/24  0521 05/14/24 06 05/13/24 0700 05/12/24 2029   SODIUM 138 134* 135* 128*   POTASSIUM 3.8 4.1 3.9 3.6   CHLORIDE 104 100 101 90*   CO2 28.0 26.0 26.0 26.0   ANION GAP 6.0 8.0 8.0 12.0   BUN 15 24* 14 13   CREATININE 1.11 1.50* 0.97 0.97   EGFR 76.0 53.0* 89.4 89.4   GLUCOSE 95 90 95 164*   CALCIUM 8.1* 7.9* 8.3* 9.1   MAGNESIUM  --   --   --  1.6   HEMOGLOBIN A1C  --   --  8.30*  --          Lab 05/15/24  0521 05/14/24 0640 05/13/24 0700 05/12/24 2029   TOTAL PROTEIN 5.6* 5.0* 6.2 7.5   ALBUMIN 2.4* 2.5* 3.0* 3.7   GLOBULIN 3.2 2.5 3.2 3.8   ALT (SGPT) 60* 70* 84* 102*   AST (SGOT) 93* 126* 148* 207*   BILIRUBIN 5.7* 9.3* 10.6* 11.2*   ALK PHOS 120* 121* 108 122*   LIPASE  --   --   --  >3,000*         Lab 05/12/24 2029   HSTROP T 11   PROTIME 15.0*   INR 1.16*         Lab 05/13/24  0700 05/12/24 2029   CHOLESTEROL 71  --    LDL CHOL 25  --    HDL CHOL 27*  --    TRIGLYCERIDES 93 97             Brief Urine Lab Results  (Last result in the past 365 days)        Color   Clarity   Blood   Leuk Est   Nitrite   Protein   CREAT   Urine HCG        05/12/24 2032 Dark Yellow   Clear   Negative   Trace   Positive   Trace                   Microbiology Results Abnormal       Procedure Component Value - Date/Time    Blood Culture - Blood, Arm, Left [[id number]]  (Normal) Collected: 05/12/24 2050    Lab Status:  Preliminary result Specimen: Blood from Arm, Left Updated: 05/14/24 2116     Blood Culture No growth at 2 days    Narrative:      Less than seven (7) mL's of blood was collected.  Insufficient quantity may yield false negative results.    Blood Culture - Blood, Arm, Right [[id number]]  (Normal) Collected: 05/12/24 2025    Lab Status: Preliminary result Specimen: Blood from Arm, Right Updated: 05/14/24 2100     Blood Culture No growth at 2 days    Narrative:      Less than seven (7) mL's of blood was collected.  Insufficient quantity may yield false negative results.            No radiology results from the last 24 hrs        Current medications:  Scheduled Meds:carvedilol, 6.25 mg, Oral, BID With Meals  insulin lispro, 2-9 Units, Subcutaneous, 4x Daily AC & at Bedtime  levothyroxine, 25 mcg, Oral, Q AM  lisinopril, 10 mg, Oral, Q24H  piperacillin-tazobactam, 3.375 g, Intravenous, Q8H  sodium chloride, 10 mL, Intravenous, Q12H      Continuous Infusions:lactated ringers, 125 mL/hr, Last Rate: 125 mL/hr (05/15/24 1142)      PRN Meds:.  dextrose    dextrose    glucagon (human recombinant)    HYDROmorphone    melatonin    ondansetron    sodium chloride    sodium chloride    Assessment & Plan   Assessment & Plan     Active Hospital Problems    Diagnosis  POA    **Acute cholecystitis [K81.0]  Unknown    Gallstone pancreatitis [K85.10]  Yes    Cirrhosis of liver [K74.60]  Unknown    Acute hepatitis [B17.9]  Unknown    Acute pancreatitis [K85.90]  Unknown    Abdominal pain [R10.9]  Yes    Hyponatremia [E87.1]  Yes    Type 2 diabetes mellitus without complication, without long-term current use of insulin [E11.9]  Yes    Thrombocytopenia [D69.6]  Yes    Essential hypertension [I10]  Yes    Other specified hypothyroidism [E03.8]  Yes      Resolved Hospital Problems    Diagnosis Date Resolved POA    Hyperglycemia [R73.9] 05/12/2024 Yes        Brief Hospital Course to date:  Hesham Arevalo Jr. is a 58 y.o. male with hx of  HTN, HLD, T2DM,  cirrhosis, and hx of rectal adenocarcinoma s/p resection and ileostomy who presents to [facility] ED for complaint of RUQ abdominal pain. He had been admitted here 5/2-5/3/24 due to similar complaints, had imaging that showed gallstones without signs of cholecystitis or pancreatitis - improved with supportive care and patient was discharged home. He is here w worsening LFT and biliary elevations. Gen surg and GI following    This patient's problems and plans were partially entered by my partner and updated as appropriate by me 05/15/24. Copied text in this note has been reviewed and is accurate as of today's date.     Acute cholecystitis  Elevated LFT's/hyperbilirubinemia  Acute gallstone pancreatitis  --CT A/P with acute cholecystitis   --US gallbladder with cholelithiasis but no acute signs of cholecystitis  --MRCP with acute cholecystitis but no filling defects/biliary ductal dilatation, mild diffuse edema surrounding pancreas  --, , Total Bilirubin 11.2 on admission - markedly elevated compared to 5/3/24 labs. LFTs now improving  --Continue IV fluids, Zosyn. Will need 7 day course  --Dr. Garcia consulted, due to underlying cirrhosis and ileostomy, patient is very high risk for cholecystectomy at this time - s/p IR for cholecystostomy tube placement. Needs to remain in for 6 wks, monitor output  --GI following, recommends liq diet, supportive care, abx. 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 Lebron March 2022 - no varices noted  --Continue Coreg  --GI following  --Monitor platelets closely, no new bleeding     HTN  HLD  --Takes Coreg and Lisinopril,  "continue  --Not currently on a statin, lipid profile acceptable (LDL 25)     T2DM  --HbA1c 8.3%  --SSI     Hypothyroidism  --Continue home Synthroid    Hx of rectal adenocarcinoma s/p resection and ileostomy April 2019  --Follows with Dr. Kelly and Dr. Moeller, no evidence of recurrence per last note from Oncology 3/28/24  --He reports he has been told he is too high risk for ileostomy reversal    Expected Discharge Location and Transportation: home  Expected Discharge   Expected Discharge Date: 5/16/2024; Expected Discharge Time:      DVT prophylaxis:  Mechanical DVT prophylaxis orders are present.         AM-PAC 6 Clicks Score (PT): 24 (05/15/24 0800)    CODE STATUS:   Code Status and Medical Interventions:   Ordered at: 05/13/24 0037     Code Status (Patient has no pulse and is not breathing):    CPR (Attempt to Resuscitate)     Medical Interventions (Patient has pulse or is breathing):    Full Support       Shreya Simon MD  05/15/24        Electronically signed by hSreya Simon MD at 05/15/24 1224       Konstantin Garcia MD at 05/15/24 0708          Patient Name:  Hesham Arevalo Jr.  YOB: 1965  [id number]    Surgery Progress Note    Date of visit: 5/15/2024      Subjective: No acute events.  Overall feeling better.  Denies abdominal pain.  No nausea or vomiting.  Tolerated liquid diet yesterday without difficulty.  Had some stool output per rectum as well as ileostomy output.          Objective:     /60 (BP Location: Left arm, Patient Position: Lying)   Pulse 59   Temp 98.8 °F (37.1 °C) (Oral)   Resp 16   Ht 185.4 cm (73\")   Wt 88 kg (194 lb)   SpO2 99%   BMI 25.60 kg/m²     Intake/Output Summary (Last 24 hours) at 5/15/2024 0708  Last data filed at 5/15/2024 0530  Gross per 24 hour   Intake --   Output 1435 ml   Net -1435 ml       GEN:   Awake, alert, in no acute distress, resting comfortably in bed   CV:   Regular rate and rhythm  L:  Symmetric expansion, not labored on room " air  Abd:  Soft, only some very mild tenderness to palpation across the epigastrium, percutaneous cholecystostomy in the right upper quadrant with a small amount of serous and bilious tinged output, ileostomy in the right upper quadrant  Ext:  No cyanosis, clubbing, or edema    Recent labs that are back at this time have been reviewed.           Assessment/ Plan:    Mr. Arevalo is a 58-year-old gentleman with history of hypertension, hyperlipidemia, diabetes, [place] cirrhosis, and rectal cancer status post resection with diverting ileostomy who presents with gallstone pancreatitis and cholecystitis.      # Gallstone pancreatitis  # Cholecystitis  # Cirrhosis  # Rectal cancer status post resection with diverting loop ileostomy  -Underwent percutaneous cholecystostomy placement on May 13, 2024  -Afebrile vital stable  -Labs show improvement.  Bilirubin is down at 5.7.  Hemoglobin is stable.  -Continue percutaneous cholecystostomy and monitor output.  This would likely need to remain in place for the next 6 weeks.  -Supportive management of pancreatitis per primary team and gastroenterology.  -Would recommend at least a 7-day course of antibiotics following percutaneous cholecystostomy placement for cholecystitis  -No changes from my standpoint today         Konstantin Garcia MD  5/15/2024  07:08 EDT        Electronically signed by Konstantin Garcia MD at 05/15/24 0710       Shreya Simon MD at 24 1526              [facility] Medicine Services  PROGRESS NOTE    Patient Name: Hesham Arevalo Jr.  : 1965  MRN: [id number]    Date of Admission: 2024  Primary Care Physician: Myke Mendoza MD    Subjective   Subjective     CC:  F/U abdominal pain    HPI:  Pain is improving, he is tolerating diet. Had cholecystostomy placed yesterday      Objective   Objective     Vital Signs:   Temp:  [98.9 °F (37.2 °C)-100.3 °F (37.9 °C)] 98.9 °F (37.2 °C)  Heart Rate:  [67-89] 67  Resp:   [16] 16  BP: ()/(46-53) 103/53     Physical Exam:  Gen-no acute distress  HENT-NCAT, mucous membranes moist  CV-RRR, S1 S2 normal, no m/r/g  Resp-CTAB, no wheezes or rales  Abd-soft, mild RUQ TTP, ND, +BS, ostomy in place, cholecystostomy tube in place  Ext-no edema  Neuro-A&Ox3, no focal deficits  Skin-no rashes  Psych-appropriate mood      Results Reviewed:  LAB RESULTS:      Lab 05/14/24  0640 05/13/24  0700 05/12/24 2029   WBC 3.02* 4.55 8.28   HEMOGLOBIN 9.1* 10.1* 12.5*   HEMATOCRIT 27.0* 29.7* 36.6*   PLATELETS 68* 66* 111*   NEUTROS ABS  --  3.29 7.09*   IMMATURE GRANS (ABS)  --  0.02 0.04   LYMPHS ABS  --  0.58* 0.49*   MONOS ABS  --  0.51 0.54   EOS ABS  --  0.13 0.08   MCV 90.6 89.7 88.6   LACTATE  --   --  2.0   PROTIME  --   --  15.0*         Lab 05/14/24  0640 05/13/24  0700 05/12/24 2029   SODIUM 134* 135* 128*   POTASSIUM 4.1 3.9 3.6   CHLORIDE 100 101 90*   CO2 26.0 26.0 26.0   ANION GAP 8.0 8.0 12.0   BUN 24* 14 13   CREATININE 1.50* 0.97 0.97   EGFR 53.0* 89.4 89.4   GLUCOSE 90 95 164*   CALCIUM 7.9* 8.3* 9.1   MAGNESIUM  --   --  1.6   HEMOGLOBIN A1C  --  8.30*  --          Lab 05/14/24  0640 05/13/24  0700 05/12/24 2029   TOTAL PROTEIN 5.0* 6.2 7.5   ALBUMIN 2.5* 3.0* 3.7   GLOBULIN 2.5 3.2 3.8   ALT (SGPT) 70* 84* 102*   AST (SGOT) 126* 148* 207*   BILIRUBIN 9.3* 10.6* 11.2*   ALK PHOS 121* 108 122*   LIPASE  --   --  >3,000*         Lab 05/12/24 2029   HSTROP T 11   PROTIME 15.0*   INR 1.16*         Lab 05/13/24  0700 05/12/24 2029   CHOLESTEROL 71  --    LDL CHOL 25  --    HDL CHOL 27*  --    TRIGLYCERIDES 93 97             Brief Urine Lab Results  (Last result in the past 365 days)        Color   Clarity   Blood   Leuk Est   Nitrite   Protein   CREAT   Urine HCG        05/12/24 2032 Dark Yellow   Clear   Negative   Trace   Positive   Trace                   Microbiology Results Abnormal       Procedure Component Value - Date/Time    Body Fluid Culture - Body Fluid, Peritoneum  [[id number]] Collected: 05/13/24 1153    Lab Status: Preliminary result Specimen: Body Fluid from Peritoneum Updated: 05/14/24 0937     Body Fluid Culture Growth present, too young to evaluate     Gram Stain Moderate (3+) WBCs seen      No organisms seen    Blood Culture - Blood, Arm, Left [[id number]]  (Normal) Collected: 05/12/24 2050    Lab Status: Preliminary result Specimen: Blood from Arm, Left Updated: 05/13/24 2117     Blood Culture No growth at 24 hours    Narrative:      Less than seven (7) mL's of blood was collected.  Insufficient quantity may yield false negative results.    Blood Culture - Blood, Arm, Right [[id number]]  (Normal) Collected: 05/12/24 2025    Lab Status: Preliminary result Specimen: Blood from Arm, Right Updated: 05/13/24 2100     Blood Culture No growth at 24 hours    Narrative:      Less than seven (7) mL's of blood was collected.  Insufficient quantity may yield false negative results.            MRI abdomen wo contrast mrcp    Result Date: 5/13/2024  MRI ABDOMEN WO CONTRAST MRCP Date of Exam: 5/12/2024 11:39 PM EDT Indication: acute hepatitis, pancreatitis.  Comparison: 5/12/2024. Technique:  Routine multiplanar/multisequence images of the abdomen were obtained with MRCP sequences without contrast administration. Findings: Redemonstration of cirrhotic morphology of the liver with surface nodularity present and diffuse low T1 signal changes. No evidence of ascites. The gallbladder appears distended. Gallstones are present. Wall thickening is present with a small amount of surrounding inflammatory changes and free fluid. No evidence of biliary ductal dilatation. No filling defects identified within the biliary tree. There does hypertrophy a stones seen either within the gallbladder neck or within the cystic duct (series 21  image 56). Spleen appears enlarged likely related to portal venous hypertension, stable. No evidence of pancreatic ductal dilatation. No evidence of focal collection.  Mild edema is seen diffusely surrounding the pancreas kidneys appear unremarkable. No significant inflammatory changes.     Impression: Impression: 1. Findings consistent with acute cholecystitis. No evidence of biliary ductal dilatation or filling defects within the biliary tree. There is a stone seen within the gallbladder neck or within the proximal portion of the cystic duct. 2. Mild diffuse edema seen surrounding the pancreas consistent with acute uncomplicated pancreatitis. 3. Cirrhotic morphology of the liver with evidence of portal venous hypertension. No focal lesions or evidence of ascites. Electronically Signed: Salome Peace MD  5/13/2024 12:52 AM EDT  Workstation ID: [id number]    CT Abdomen Pelvis With Contrast    Result Date: 5/12/2024  CT ABDOMEN PELVIS W CONTRAST Date of Exam: 5/12/2024 10:14 PM EDT Indication: right sided abd pain, nausea, chills, h/o gallstones, r/o cholecystitis. Comparison: 5/2/2024. Technique: Axial CT images were obtained of the abdomen and pelvis following the uneventful intravenous administration of intravenous contrast. Reconstructed coronal and sagittal images were also obtained. Automated exposure control and iterative construction methods were used. Findings: Lung Bases:   The visualized lung bases and lower mediastinal structures are unremarkable. Liver: The liver appears diffusely hypodense consistent with steatosis.. No evidence of ascites. Contour lobular appearance present consistent with cirrhosis.. No focal lesions. Biliary/Gallbladder:  The gallbladder is mildly distended. Diffuse gallbladder wall thickening is present. Stranding is seen within the adjacent fat. A trace amount of free fluid is seen along the inferior portion of the second portion of the duodenum. Gallstones are present.. The biliary tree is nondilated. Spleen: Spleen appears mildly enlarged, stable as compared to the previous study.. Pancreas:  Pancreas is normal. There is no evidence of pancreatic  mass or peripancreatic fluid. Kidneys:  Kidneys are normal in size. There are no stones or hydronephrosis. Adrenals:  Adrenal glands are unremarkable. Retroperitoneal/Lymph Nodes/Vasculature:  No retroperitoneal adenopathy is identified. Gastrointestinal/Mesentery:  The bowel loops are non-dilated without wall thickening. Presacral fluid and soft tissue thickening present, similar as compared to the previous study. Postsurgical changes are present within the rectum. Right lower quadrant colostomy present. No significant stool burden identified. Varicosities are present. No evidence of hernia.. The appendix appears within normal limits. No evidence of obstruction. No free air. No mesenteric fluid collections identified. Bladder:  The bladder is normal. Genital:   Unremarkable       Bony Structures:   Visualized bony structures are consistent with the patient's age.     Impression: Impression: 1. Findings consistent with acute cholecystitis. 2. Ancillary findings as described above. Electronically Signed: Salome Peace MD  5/12/2024 10:33 PM EDT  Workstation ID: [id number]    US Gallbladder    Result Date: 5/12/2024  US GALLBLADDER Date of Exam: 5/12/2024 9:24 PM EDT Indication: RUQ abd pain, pt appears jaundiced, nausea, chills, known gallstones. Comparison: 5/2/2024. Technique: Grayscale and color Doppler ultrasound evaluation of the right upper quadrant was performed. Findings: Limited evaluation due to patient condition. The liver appears echogenic.. No focal lesions identified. Normal flow is present within the hepatic vasculature. Limited visualization of the pancreas appears grossly unremarkable. No evidence of ascites. The gallbladder appears normal in caliber. No wall thickening identified. Stones present within the lumen.. The common bile duct appears normal in caliber. The sonographic Rea sign was negative. The right kidney appears normal in size with no focal lesions. No evidence of nephrolithiasis or  hydronephrosis.     Impression: Impression: Cholelithiasis without secondary findings of acute cholecystitis. Increased echogenicity of the liver parenchyma likely related to steatosis or nonspecific hepatitis. Electronically Signed: Salome Peace MD  5/12/2024 9:49 PM EDT  Workstation ID: [id number]         Current medications:  Scheduled Meds:carvedilol, 6.25 mg, Oral, BID With Meals  insulin lispro, 2-9 Units, Subcutaneous, 4x Daily AC & at Bedtime  levothyroxine, 25 mcg, Oral, Q AM  lisinopril, 10 mg, Oral, Q24H  piperacillin-tazobactam, 3.375 g, Intravenous, Q8H  sodium chloride, 10 mL, Intravenous, Q12H      Continuous Infusions:lactated ringers, 125 mL/hr, Last Rate: 125 mL/hr (05/14/24 0424)      PRN Meds:.  dextrose    dextrose    glucagon (human recombinant)    HYDROmorphone    melatonin    ondansetron    sodium chloride    sodium chloride    Assessment & Plan   Assessment & Plan     Active Hospital Problems    Diagnosis  POA    **Acute cholecystitis [K81.0]  Unknown    Gallstone pancreatitis [K85.10]  Yes    Cirrhosis of liver [K74.60]  Unknown    Acute hepatitis [B17.9]  Unknown    Acute pancreatitis [K85.90]  Unknown    Abdominal pain [R10.9]  Yes    Hyponatremia [E87.1]  Yes    Type 2 diabetes mellitus without complication, without long-term current use of insulin [E11.9]  Yes    Thrombocytopenia [D69.6]  Yes    Essential hypertension [I10]  Yes    Other specified hypothyroidism [E03.8]  Yes      Resolved Hospital Problems    Diagnosis Date Resolved POA    Hyperglycemia [R73.9] 05/12/2024 Yes        Brief Hospital Course to date:  Hesham Arevalo Jr. is a 58 y.o. male with hx of HTN, HLD, T2DM,  cirrhosis, and hx of rectal adenocarcinoma s/p resection and ileostomy who presents to [facility] ED for complaint of RUQ abdominal pain. He had been admitted here 5/2-5/3/24 due to similar complaints, had imaging that showed gallstones without signs of cholecystitis or pancreatitis - improved with  supportive care and patient was discharged home. He is here w worsening LFT and biliary elevations. Gen surg and GI following    This patient's problems and plans were partially entered by my partner and updated as appropriate by me 05/14/24. Copied text in this note has been reviewed and is accurate as of today's date.     Acute cholecystitis  Elevated LFT's/hyperbilirubinemia  Acute gallstone pancreatitis  --CT A/P with acute cholecystitis   --US gallbladder with cholelithiasis but no acute signs of cholecystitis  --MRCP with acute cholecystitis but no filling defects/biliary ductal dilatation, mild diffuse edema surrounding pancreas  --, , Total Bilirubin 11.2 - markedly elevated compared to 5/3/24 labs  --Lipase elevated > 3000  --Continue IV fluids, Zosyn  --Dr. Garcia consulted, due to underlying cirrhosis and ileostomy, patient is very high risk for cholecystectomy at this time - s/p IR for cholecystostomy tube placement. 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 Lebron March 2022 - no varices noted  --Continue Coreg  --GI following  --Monitor platelets closely, no new bleeding     HTN  HLD  --Takes Coreg and Lisinopril, continue  --Not currently on a statin, lipid profile acceptable (LDL 25)     T2DM  --HbA1c 8.3%  --SSI     Hypothyroidism  --Continue home Synthroid    Hx of rectal adenocarcinoma s/p resection and ileostomy April 2019  --Follows with Dr. Kelly and Dr. Moeller, no evidence of recurrence per last note from Oncology 3/28/24  --He reports he has been told he is too high risk for ileostomy  reversal    Expected Discharge Location and Transportation: home  Expected Discharge   Expected Discharge Date: 5/17/2024; Expected Discharge Time:      DVT prophylaxis:  Mechanical DVT prophylaxis orders are present.         AM-PAC 6 Clicks Score (PT): 24 (05/14/24 0848)    CODE STATUS:   Code Status and Medical Interventions:   Ordered at: 05/13/24 0037     Code Status (Patient has no pulse and is not breathing):    CPR (Attempt to Resuscitate)     Medical Interventions (Patient has pulse or is breathing):    Full Support       Shreya Simon MD  05/14/24        Electronically signed by Shreya Simon MD at 05/14/24 1535       Consult Notes (last 24 hours)  Notes from 05/14/24 1502 through 05/15/24 1502   No notes of this type exist for this encounter.

## 2024-05-15 NOTE — PLAN OF CARE
Goal Outcome Evaluation:  Plan of Care Reviewed With: patient        Progress: improving     VSS, RA, A/Ox4. Tolerating diet. Drain to bulb suction, CDI. Will continue POC.   Problem: Adult Inpatient Plan of Care  Goal: Plan of Care Review  Outcome: Ongoing, Progressing  Flowsheets (Taken 5/15/2024 1501)  Progress: improving  Plan of Care Reviewed With: patient  Goal: Patient-Specific Goal (Individualized)  Outcome: Ongoing, Progressing  Goal: Absence of Hospital-Acquired Illness or Injury  Outcome: Ongoing, Progressing  Intervention: Identify and Manage Fall Risk  Recent Flowsheet Documentation  Taken 5/15/2024 1400 by Kimberly Maldonado RN  Safety Promotion/Fall Prevention:   activity supervised   assistive device/personal items within reach   clutter free environment maintained   fall prevention program maintained   room organization consistent   safety round/check completed   toileting scheduled   nonskid shoes/slippers when out of bed   lighting adjusted  Taken 5/15/2024 1200 by Kimberly Maldonado RN  Safety Promotion/Fall Prevention:   activity supervised   assistive device/personal items within reach   clutter free environment maintained   fall prevention program maintained   room organization consistent   safety round/check completed   toileting scheduled   nonskid shoes/slippers when out of bed   lighting adjusted  Taken 5/15/2024 1000 by Kimberly Maldonado RN  Safety Promotion/Fall Prevention:   clutter free environment maintained   assistive device/personal items within reach   activity supervised   fall prevention program maintained   room organization consistent   safety round/check completed   toileting scheduled   nonskid shoes/slippers when out of bed   lighting adjusted  Taken 5/15/2024 0800 by Kimberly Maldonado, LEEROY  Safety Promotion/Fall Prevention:   activity supervised   assistive device/personal items within reach   clutter free environment maintained   fall prevention program maintained   lighting adjusted    toileting scheduled   safety round/check completed   room organization consistent   nonskid shoes/slippers when out of bed  Intervention: Prevent Skin Injury  Recent Flowsheet Documentation  Taken 5/15/2024 1400 by Kimberly Maldonado RN  Body Position: position changed independently  Taken 5/15/2024 1200 by Kimberly Maldonado RN  Body Position: position changed independently  Taken 5/15/2024 1000 by Kimberly Maldonado RN  Body Position: position changed independently  Taken 5/15/2024 0800 by Kimberly Maldonado RN  Body Position: position changed independently  Intervention: Prevent and Manage VTE (Venous Thromboembolism) Risk  Recent Flowsheet Documentation  Taken 5/15/2024 1400 by Kimberly Maldonado RN  Activity Management: activity encouraged  Taken 5/15/2024 1200 by Kimberly Maldonado RN  Activity Management: activity encouraged  Taken 5/15/2024 1000 by Kimberly Maldonado RN  Activity Management: activity encouraged  Taken 5/15/2024 0800 by Kimberly Maldonado RN  Activity Management: activity encouraged  VTE Prevention/Management:   bilateral   sequential compression devices off  Intervention: Prevent Infection  Recent Flowsheet Documentation  Taken 5/15/2024 1400 by Kimberly Maldonado RN  Infection Prevention:   rest/sleep promoted   single patient room provided   visitors restricted/screened   hand hygiene promoted  Taken 5/15/2024 1200 by Kimberly Maldonado RN  Infection Prevention:   rest/sleep promoted   single patient room provided   visitors restricted/screened   hand hygiene promoted  Taken 5/15/2024 1000 by Kimberly Maldonado RN  Infection Prevention:   rest/sleep promoted   single patient room provided   hand hygiene promoted   visitors restricted/screened  Taken 5/15/2024 0800 by Kimberly Maldonado RN  Infection Prevention:   rest/sleep promoted   visitors restricted/screened   single patient room provided   hand hygiene promoted  Goal: Optimal Comfort and Wellbeing  Outcome: Ongoing, Progressing  Intervention: Provide Person-Centered Care  Recent  Flowsheet Documentation  Taken 5/15/2024 0800 by Kimberly Maldonado RN  Trust Relationship/Rapport:   care explained   choices provided  Goal: Readiness for Transition of Care  Outcome: Ongoing, Progressing     Problem: Diabetes Comorbidity  Goal: Blood Glucose Level Within Targeted Range  Outcome: Ongoing, Progressing     Problem: Hypertension Comorbidity  Goal: Blood Pressure in Desired Range  Outcome: Ongoing, Progressing  Intervention: Maintain Blood Pressure Management  Recent Flowsheet Documentation  Taken 5/15/2024 1400 by Kimberly Maldonado RN  Medication Review/Management: medications reviewed  Taken 5/15/2024 1200 by Kimberly Maldonado RN  Medication Review/Management: medications reviewed  Taken 5/15/2024 1000 by Kimberly Maldonado RN  Medication Review/Management: medications reviewed  Taken 5/15/2024 0800 by Kimberly Maldonado RN  Medication Review/Management: medications reviewed     Problem: Fall Injury Risk  Goal: Absence of Fall and Fall-Related Injury  Outcome: Ongoing, Progressing  Intervention: Identify and Manage Contributors  Recent Flowsheet Documentation  Taken 5/15/2024 1400 by Kimberly Maldonado RN  Medication Review/Management: medications reviewed  Taken 5/15/2024 1200 by Kimberly Maldonado RN  Medication Review/Management: medications reviewed  Taken 5/15/2024 1000 by Kimberly Maldonado RN  Medication Review/Management: medications reviewed  Taken 5/15/2024 0800 by Kimberly Maldonado RN  Medication Review/Management: medications reviewed  Intervention: Promote Injury-Free Environment  Recent Flowsheet Documentation  Taken 5/15/2024 1400 by Kimberly Maldonado RN  Safety Promotion/Fall Prevention:   activity supervised   assistive device/personal items within reach   clutter free environment maintained   fall prevention program maintained   room organization consistent   safety round/check completed   toileting scheduled   nonskid shoes/slippers when out of bed   lighting adjusted  Taken 5/15/2024 1200 by Kimberly Maldonado  RN  Safety Promotion/Fall Prevention:   activity supervised   assistive device/personal items within reach   clutter free environment maintained   fall prevention program maintained   room organization consistent   safety round/check completed   toileting scheduled   nonskid shoes/slippers when out of bed   lighting adjusted  Taken 5/15/2024 1000 by Kimberly Maldonado RN  Safety Promotion/Fall Prevention:   clutter free environment maintained   assistive device/personal items within reach   activity supervised   fall prevention program maintained   room organization consistent   safety round/check completed   toileting scheduled   nonskid shoes/slippers when out of bed   lighting adjusted  Taken 5/15/2024 0800 by Kimberly Maldonado, LEEROY  Safety Promotion/Fall Prevention:   activity supervised   assistive device/personal items within reach   clutter free environment maintained   fall prevention program maintained   lighting adjusted   toileting scheduled   safety round/check completed   room organization consistent   nonskid shoes/slippers when out of bed

## 2024-05-15 NOTE — CASE MANAGEMENT/SOCIAL WORK
Continued Stay Note   [facility]     Patient Name: Hesham Arevalo Jr.  MRN: [id number]  Today's Date: 5/15/2024    Admit Date: 5/12/2024    Plan: home   Discharge Plan       Row Name 05/15/24 0813       Plan    Plan home    Patient/Family in Agreement with Plan yes    Plan Comments I met with this patient bedside. His plan remains home at discharge , with his wife to transport. Uncertain of discharge date. CM will follow.    Final Discharge Disposition Code 01 - home or self-care                   Discharge Codes    No documentation.                 Expected Discharge Date and Time       Expected Discharge Date Expected Discharge Time    May 17, 2024               Marline Huynh RN

## 2024-05-15 NOTE — PROGRESS NOTES
"Patient Name:  Hesham Arevalo Jr.  YOB: 1965  [id number]    Surgery Progress Note    Date of visit: 5/15/2024      Subjective: No acute events.  Overall feeling better.  Denies abdominal pain.  No nausea or vomiting.  Tolerated liquid diet yesterday without difficulty.  Had some stool output per rectum as well as ileostomy output.          Objective:     /60 (BP Location: Left arm, Patient Position: Lying)   Pulse 59   Temp 98.8 °F (37.1 °C) (Oral)   Resp 16   Ht 185.4 cm (73\")   Wt 88 kg (194 lb)   SpO2 99%   BMI 25.60 kg/m²     Intake/Output Summary (Last 24 hours) at 5/15/2024 0708  Last data filed at 5/15/2024 0530  Gross per 24 hour   Intake --   Output 1435 ml   Net -1435 ml       GEN:   Awake, alert, in no acute distress, resting comfortably in bed   CV:   Regular rate and rhythm  L:  Symmetric expansion, not labored on room air  Abd:  Soft, only some very mild tenderness to palpation across the epigastrium, percutaneous cholecystostomy in the right upper quadrant with a small amount of serous and bilious tinged output, ileostomy in the right upper quadrant  Ext:  No cyanosis, clubbing, or edema    Recent labs that are back at this time have been reviewed.           Assessment/ Plan:    Mr. Arevalo is a 58-year-old gentleman with history of hypertension, hyperlipidemia, diabetes, [place] cirrhosis, and rectal cancer status post resection with diverting ileostomy who presents with gallstone pancreatitis and cholecystitis.      # Gallstone pancreatitis  # Cholecystitis  # Cirrhosis  # Rectal cancer status post resection with diverting loop ileostomy  -Underwent percutaneous cholecystostomy placement on May 13, 2024  -Afebrile vital stable  -Labs show improvement.  Bilirubin is down at 5.7.  Hemoglobin is stable.  -Continue percutaneous cholecystostomy and monitor output.  This would likely need to remain in place for the next 6 weeks.  -Supportive management of pancreatitis per " primary team and gastroenterology.  -Would recommend at least a 7-day course of antibiotics following percutaneous cholecystostomy placement for cholecystitis  -No changes from my standpoint today         Konstantin Garcia MD  5/15/2024  07:08 EDT

## 2024-05-15 NOTE — PROGRESS NOTES
Nutrition Services    Patient Name:  Hesham Arevalo Jr.  YOB: 1965  MRN: [id number]  Admit Date:  5/12/2024    Pt identified NPO/Clear Liquid Diet >72 hrs. Advance diet as medically appropriate, per surgery. RD to monitor for diet advance. Please consult RD if nutrition support indicated.        Electronically signed by:  Yadi Fay MS,DIGNA,DAVID  05/15/24 10:22 EDT

## 2024-05-16 ENCOUNTER — READMISSION MANAGEMENT (OUTPATIENT)
Dept: CALL CENTER | Facility: HOSPITAL | Age: 59
End: 2024-05-16
Payer: COMMERCIAL

## 2024-05-16 VITALS
SYSTOLIC BLOOD PRESSURE: 117 MMHG | DIASTOLIC BLOOD PRESSURE: 68 MMHG | BODY MASS INDEX: 25.71 KG/M2 | HEART RATE: 62 BPM | WEIGHT: 194 LBS | OXYGEN SATURATION: 99 % | HEIGHT: 73 IN | TEMPERATURE: 98.1 F | RESPIRATION RATE: 18 BRPM

## 2024-05-16 LAB
ALBUMIN SERPL-MCNC: 2.9 G/DL (ref 3.5–5.2)
ALBUMIN/GLOB SERPL: 0.9 G/DL
ALP SERPL-CCNC: 136 U/L (ref 39–117)
ALT SERPL W P-5'-P-CCNC: 50 U/L (ref 1–41)
ANION GAP SERPL CALCULATED.3IONS-SCNC: 8 MMOL/L (ref 5–15)
AST SERPL-CCNC: 67 U/L (ref 1–40)
BACTERIA FLD CULT: ABNORMAL
BASOPHILS # BLD AUTO: 0.02 10*3/MM3 (ref 0–0.2)
BASOPHILS NFR BLD AUTO: 0.8 % (ref 0–1.5)
BILIRUB SERPL-MCNC: 3.9 MG/DL (ref 0–1.2)
BUN SERPL-MCNC: 11 MG/DL (ref 8–23)
BUN/CREAT SERPL: 11.3 (ref 7–25)
CALCIUM SPEC-SCNC: 8.8 MG/DL (ref 8.6–10.5)
CHLORIDE SERPL-SCNC: 102 MMOL/L (ref 98–107)
CO2 SERPL-SCNC: 27 MMOL/L (ref 22–29)
CREAT SERPL-MCNC: 0.97 MG/DL (ref 0.76–1.27)
DEPRECATED RDW RBC AUTO: 47 FL (ref 37–54)
EGFRCR SERPLBLD CKD-EPI 2021: 89.4 ML/MIN/1.73
EOSINOPHIL # BLD AUTO: 0.16 10*3/MM3 (ref 0–0.4)
EOSINOPHIL NFR BLD AUTO: 6.5 % (ref 0.3–6.2)
ERYTHROCYTE [DISTWIDTH] IN BLOOD BY AUTOMATED COUNT: 14.2 % (ref 12.3–15.4)
GLOBULIN UR ELPH-MCNC: 3.1 GM/DL
GLUCOSE BLDC GLUCOMTR-MCNC: 128 MG/DL (ref 70–130)
GLUCOSE BLDC GLUCOMTR-MCNC: 90 MG/DL (ref 70–130)
GLUCOSE SERPL-MCNC: 115 MG/DL (ref 65–99)
GRAM STN SPEC: ABNORMAL
GRAM STN SPEC: ABNORMAL
HCT VFR BLD AUTO: 31.3 % (ref 37.5–51)
HGB BLD-MCNC: 10.2 G/DL (ref 13–17.7)
IMM GRANULOCYTES # BLD AUTO: 0.02 10*3/MM3 (ref 0–0.05)
IMM GRANULOCYTES NFR BLD AUTO: 0.8 % (ref 0–0.5)
LYMPHOCYTES # BLD AUTO: 0.29 10*3/MM3 (ref 0.7–3.1)
LYMPHOCYTES NFR BLD AUTO: 11.7 % (ref 19.6–45.3)
MCH RBC QN AUTO: 29.6 PG (ref 26.6–33)
MCHC RBC AUTO-ENTMCNC: 32.6 G/DL (ref 31.5–35.7)
MCV RBC AUTO: 90.7 FL (ref 79–97)
MONOCYTES # BLD AUTO: 0.24 10*3/MM3 (ref 0.1–0.9)
MONOCYTES NFR BLD AUTO: 9.7 % (ref 5–12)
NEUTROPHILS NFR BLD AUTO: 1.75 10*3/MM3 (ref 1.7–7)
NEUTROPHILS NFR BLD AUTO: 70.5 % (ref 42.7–76)
NRBC BLD AUTO-RTO: 0 /100 WBC (ref 0–0.2)
PLATELET # BLD AUTO: 96 10*3/MM3 (ref 140–450)
PMV BLD AUTO: 9.3 FL (ref 6–12)
POTASSIUM SERPL-SCNC: 3.8 MMOL/L (ref 3.5–5.2)
PROT SERPL-MCNC: 6 G/DL (ref 6–8.5)
RBC # BLD AUTO: 3.45 10*6/MM3 (ref 4.14–5.8)
SODIUM SERPL-SCNC: 137 MMOL/L (ref 136–145)
WBC NRBC COR # BLD AUTO: 2.48 10*3/MM3 (ref 3.4–10.8)

## 2024-05-16 PROCEDURE — 25010000002 CEFEPIME PER 500 MG: Performed by: NURSE PRACTITIONER

## 2024-05-16 PROCEDURE — 85025 COMPLETE CBC W/AUTO DIFF WBC: CPT | Performed by: STUDENT IN AN ORGANIZED HEALTH CARE EDUCATION/TRAINING PROGRAM

## 2024-05-16 PROCEDURE — 25010000002 PIPERACILLIN SOD-TAZOBACTAM PER 1 G: Performed by: SURGERY

## 2024-05-16 PROCEDURE — 82948 REAGENT STRIP/BLOOD GLUCOSE: CPT

## 2024-05-16 PROCEDURE — 99239 HOSP IP/OBS DSCHRG MGMT >30: CPT | Performed by: NURSE PRACTITIONER

## 2024-05-16 PROCEDURE — 80053 COMPREHEN METABOLIC PANEL: CPT | Performed by: STUDENT IN AN ORGANIZED HEALTH CARE EDUCATION/TRAINING PROGRAM

## 2024-05-16 RX ORDER — METRONIDAZOLE 500 MG/1
500 TABLET ORAL EVERY 12 HOURS SCHEDULED
Qty: 10 TABLET | Refills: 0 | Status: DISCONTINUED | OUTPATIENT
Start: 2024-05-16 | End: 2024-05-16 | Stop reason: HOSPADM

## 2024-05-16 RX ORDER — LEVOFLOXACIN 750 MG/1
750 TABLET, FILM COATED ORAL DAILY
Qty: 5 TABLET | Refills: 0 | Status: DISCONTINUED | OUTPATIENT
Start: 2024-05-16 | End: 2024-05-16 | Stop reason: HOSPADM

## 2024-05-16 RX ORDER — LEVOFLOXACIN 750 MG/1
750 TABLET, FILM COATED ORAL DAILY
Qty: 5 TABLET | Refills: 0 | Status: SHIPPED | OUTPATIENT
Start: 2024-05-16 | End: 2024-05-21

## 2024-05-16 RX ORDER — METRONIDAZOLE 500 MG/1
500 TABLET ORAL EVERY 12 HOURS SCHEDULED
Qty: 10 TABLET | Refills: 0 | Status: SHIPPED | OUTPATIENT
Start: 2024-05-16 | End: 2024-05-21

## 2024-05-16 RX ORDER — CARVEDILOL 6.25 MG/1
6.25 TABLET ORAL 2 TIMES DAILY WITH MEALS
Qty: 60 TABLET | Refills: 0 | Status: SHIPPED | OUTPATIENT
Start: 2024-05-16

## 2024-05-16 RX ADMIN — PIPERACILLIN AND TAZOBACTAM 3.38 G: 3; .375 INJECTION, POWDER, LYOPHILIZED, FOR SOLUTION INTRAVENOUS at 05:51

## 2024-05-16 RX ADMIN — METRONIDAZOLE 500 MG: 500 TABLET ORAL at 13:40

## 2024-05-16 RX ADMIN — CEFEPIME HYDROCHLORIDE 1000 MG: 1 INJECTION, POWDER, FOR SOLUTION INTRAMUSCULAR; INTRAVENOUS at 08:54

## 2024-05-16 RX ADMIN — LISINOPRIL 10 MG: 10 TABLET ORAL at 08:54

## 2024-05-16 RX ADMIN — LEVOTHYROXINE SODIUM 25 MCG: 25 TABLET ORAL at 05:51

## 2024-05-16 RX ADMIN — CARVEDILOL 6.25 MG: 6.25 TABLET, FILM COATED ORAL at 08:54

## 2024-05-16 RX ADMIN — LEVOFLOXACIN 750 MG: 750 TABLET, FILM COATED ORAL at 13:40

## 2024-05-16 NOTE — DISCHARGE SUMMARY
[facility] Medicine Services  DISCHARGE SUMMARY    Patient Name: Hesham Arevalo Jr.  : 1965  MRN: [id number]    Date of Admission: 2024  6:30 PM  Date of Discharge:  2024  Primary Care Physician: Myke Mendoza MD    Consults       Date and Time Order Name Status Description    2024  8:52 AM Inpatient Infectious Diseases Consult Completed     2024  6:33 AM Inpatient Gastroenterology Consult Completed     2024  2:23 AM Inpatient General Surgery Consult Completed             Hospital Course     Presenting Problem: f/u abd pain     Active Hospital Problems    Diagnosis  POA   • **Acute cholecystitis [K81.0]  Unknown   • Gallstone pancreatitis [K85.10]  Yes   • Cirrhosis of liver [K74.60]  Unknown   • Acute hepatitis [B17.9]  Unknown   • Acute pancreatitis [K85.90]  Unknown   • Abdominal pain [R10.9]  Yes   • Hyponatremia [E87.1]  Yes   • Type 2 diabetes mellitus without complication, without long-term current use of insulin [E11.9]  Yes   • Thrombocytopenia [D69.6]  Yes   • Essential hypertension [I10]  Yes   • Other specified hypothyroidism [E03.8]  Yes      Resolved Hospital Problems    Diagnosis Date Resolved POA   • Hyperglycemia [R73.9] 2024 Yes          Hospital Course:  Hesham Arevalo Jr. is a 58 y.o. male  with hx of HTN, HLD, T2DM,  cirrhosis, and hx of rectal adenocarcinoma s/p resection and ileostomy who presents to [facility] ED for complaint of RUQ abdominal pain. He had been admitted here -5/3/24 due to similar complaints, had imaging that showed gallstones without signs of cholecystitis or pancreatitis - improved with supportive care and patient was discharged home. He is here w worsening LFT and biliary elevations. Gen surg, ID and GI following, Cholecystostomy tube was placed and culture with Enterobacter. ID was consulted for abx advice. Surgery did not recommend any surgery at this time       Acute  cholecystitis  Elevated LFT's/hyperbilirubinemia  Acute gallstone pancreatitis  --CT A/P with acute cholecystitis   --US gallbladder with cholelithiasis but no acute signs of cholecystitis  --MRCP with acute cholecystitis but no filling defects/biliary ductal dilatation, mild diffuse edema surrounding pancreas  --, , Total Bilirubin 11.2 on admission - markedly elevated compared to 5/3/24 labs. LFTs now improving  --s/p IV fluids, s/p Zosyn. Will transition over to levaquin and flagyl at dc to complete 7 days   --Dr. Garcia consulted, due to underlying cirrhosis and ileostomy, patient is very high risk for cholecystectomy at this time - s/p IR for cholecystostomy tube placement. Needs to remain in for 6 wks,empty drain at least twice a day and record  --GI following, recommends liq diet, supportive care, abx. 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 Lebron March 2022 - no varices noted  --Continue Coreg  --GI following  --Monitor platelets closely, no new bleeding     HTN  HLD  --Takes Coreg and Lisinopril, continue  --Not currently on a statin, lipid profile acceptable (LDL 25)     T2DM  --HbA1c 8.3%  --SSI     Hypothyroidism  --Continue home Synthroid     Hx of rectal adenocarcinoma s/p resection and ileostomy April 2019  --Follows with Dr. Kelly and Dr. Moeller, no evidence of recurrence per last note from Oncology 3/28/24  --He reports he has been told he is too high risk for ileostomy reversal    Patient has remained clinically stable and will be discharged home today.      Discharge Follow Up Recommendations for outpatient labs/diagnostics:   Follow up with pcp  one week   Follow up with DR. Garcia in 2 weeks     Day of Discharge     HPI:   Patient is resting in bed in NAD. He states he feels better. Pain controlled. Plan for home today     Review of Systems  Gen- No fevers, chills  CV- No chest pain, palpitations  Resp- No cough, dyspnea  GI- No N/V/D, abd pain      Vital Signs:   Temp:  [98 °F (36.7 °C)-98.6 °F (37 °C)] 98.1 °F (36.7 °C)  Heart Rate:  [58-72] 66  Resp:  [16-18] 18  BP: (100-117)/(54-68) 117/68      Physical Exam:  Constitutional: No acute distress, awake, alert  HENT: NCAT, mucous membranes moist  Respiratory: Clear to auscultation bilaterally, respiratory effort normal room maryann 99%  Cardiovascular: RRR, no murmurs, rubs, or gallops  Gastrointestinal: Positive bowel sounds, soft, nontender, nondistended, ostomy in place, cholecystostomy tube in place   Musculoskeletal: No bilateral ankle edema  Psychiatric: Appropriate affect, cooperative  Neurologic: Oriented x 3, strength symmetric in all extremities, Cranial Nerves grossly intact to confrontation, speech clear  Skin: No rashes      Pertinent  and/or Most Recent Results     LAB RESULTS:      Lab 05/16/24  0856 05/15/24  0521 05/14/24  0640 05/13/24  0700 05/12/24 2029   WBC 2.48* 2.48* 3.02* 4.55 8.28   HEMOGLOBIN 10.2* 9.2* 9.1* 10.1* 12.5*   HEMATOCRIT 31.3* 27.5* 27.0* 29.7* 36.6*   PLATELETS 96* 69* 68* 66* 111*   NEUTROS ABS 1.75 1.67*  --  3.29 7.09*   IMMATURE GRANS (ABS) 0.02 0.01  --  0.02 0.04   LYMPHS ABS 0.29* 0.34*  --  0.58* 0.49*   MONOS ABS 0.24 0.28  --  0.51 0.54   EOS ABS 0.16 0.17  --  0.13 0.08   MCV 90.7 89.9 90.6 89.7 88.6   LACTATE  --   --   --   --  2.0   PROTIME  --   --   --   --  15.0*         Lab 05/16/24  0856 05/15/24  0521 05/14/24  0640 05/13/24  0700 05/12/24 2029   SODIUM 137 138 134* 135* 128*   POTASSIUM 3.8 3.8 4.1 3.9 3.6   CHLORIDE 102 104 100 101 90*   CO2 27.0 28.0 26.0 26.0 26.0   ANION GAP 8.0 6.0 8.0 8.0 12.0   BUN 11 15 24* 14 13   CREATININE 0.97 1.11  1.50* 0.97 0.97   EGFR 89.4 76.0 53.0* 89.4 89.4   GLUCOSE 115* 95 90 95 164*   CALCIUM 8.8 8.1* 7.9* 8.3* 9.1   MAGNESIUM  --   --   --   --  1.6   HEMOGLOBIN A1C  --   --   --  8.30*  --          Lab 05/16/24  0856 05/15/24  0521 05/14/24  0640 05/13/24  0700 05/12/24 2029   TOTAL PROTEIN 6.0 5.6* 5.0* 6.2 7.5   ALBUMIN 2.9* 2.4* 2.5* 3.0* 3.7   GLOBULIN 3.1 3.2 2.5 3.2 3.8   ALT (SGPT) 50* 60* 70* 84* 102*   AST (SGOT) 67* 93* 126* 148* 207*   BILIRUBIN 3.9* 5.7* 9.3* 10.6* 11.2*   ALK PHOS 136* 120* 121* 108 122*   LIPASE  --   --   --   --  >3,000*         Lab 05/12/24 2029   HSTROP T 11   PROTIME 15.0*   INR 1.16*         Lab 05/13/24  0700 05/12/24 2029   CHOLESTEROL 71  --    LDL CHOL 25  --    HDL CHOL 27*  --    TRIGLYCERIDES 93 97             Brief Urine Lab Results  (Last result in the past 365 days)        Color   Clarity   Blood   Leuk Est   Nitrite   Protein   CREAT   Urine HCG        05/12/24 2032 Dark Yellow   Clear   Negative   Trace   Positive   Trace                 Microbiology Results (last 10 days)       Procedure Component Value - Date/Time    Body Fluid Culture - Body Fluid, Peritoneum [[id number]]  (Abnormal)  (Susceptibility) Collected: 05/13/24 1153    Lab Status: Final result Specimen: Body Fluid from Peritoneum Updated: 05/16/24 0638     Body Fluid Culture Scant growth (1+) Enterobacter cloacae complex     Gram Stain Moderate (3+) WBCs seen      No organisms seen    Susceptibility        Enterobacter cloacae complex      MEAGAN      Cefepime Susceptible      Gentamicin Susceptible      Levofloxacin Susceptible      Meropenem Susceptible      Trimethoprim + Sulfamethoxazole Susceptible                       Susceptibility Comments       Enterobacter cloacae complex    Cefazolin sensitivity will not be reported for Enterobacteriaceae in non-urine isolates. If cefazolin is preferred, please call the microbiology lab to request an E-test.  With the exception of urinary-sourced infections,  aminoglycosides should not be used as monotherapy.               Anaerobic Culture - Drainage, Gallbladder [[id number]]  (Normal) Collected: 05/13/24 1153    Lab Status: Preliminary result Specimen: Drainage from Gallbladder Updated: 05/16/24 0735     Anaerobic Culture No anaerobes isolated at 3 days    Blood Culture - Blood, Arm, Left [[id number]]  (Normal) Collected: 05/12/24 2050    Lab Status: Preliminary result Specimen: Blood from Arm, Left Updated: 05/15/24 2115     Blood Culture No growth at 3 days    Narrative:      Less than seven (7) mL's of blood was collected.  Insufficient quantity may yield false negative results.    Blood Culture - Blood, Arm, Right [[id number]]  (Normal) Collected: 05/12/24 2025    Lab Status: Preliminary result Specimen: Blood from Arm, Right Updated: 05/15/24 2100     Blood Culture No growth at 3 days    Narrative:      Less than seven (7) mL's of blood was collected.  Insufficient quantity may yield false negative results.            CT Percutaneous Cholecystostomy    Result Date: 5/16/2024  CT PERCUTANEOUS CHOLECYSTOSTOMY Date of Exam: 5/13/2024 11:01 AM EDT Indication: cholecystitis in setting of cirrhosis without ascites and RUQ ileostomy, eval for percutaneous cholecystostomy. Comparison: CT abdomen and pelvis 5/12/2024 Technique: The procedure, risks and options were discussed with the patient and informed written consent was obtained. The patient was placed in the supine position in the CT fluoroscopy suite. Preliminary images were obtained and a site was chosen. The skin was marked, prepped and draped. IV conscious sedation was administered consisting of Versed and fentanyl. The patient was monitored by the IR nurse during the entire procedure. Total physician monitored sedation time was 19 minutes. Using maximal sterile barrier technique and under local anesthesia, a 18-gauge needle was inserted from a lateral approach into the  gallbladder near the neck. An 035 wire was  coiled within the gallbladder. An 8 Iranian pigtail drain was then advanced over the wire and coiled within the gallbladder. Samples were sent to the lab for analysis. The catheter was left to bag drainage and sutured in place utilizing a 2-0 nylon suture. Sterile dressings were applied and the patient was returned to the nunez in stable condition. Fluoroscopic Time: 4.8 seconds     Impression: Technically successful CT-guided placement of a percutaneous cholecystostomy tube. Electronically Signed: Jose Eduardo Stein MD  5/16/2024 7:14 AM EDT  Workstation ID: [id number]    MRI abdomen wo contrast mrcp    Result Date: 5/13/2024  MRI ABDOMEN WO CONTRAST MRCP Date of Exam: 5/12/2024 11:39 PM EDT Indication: acute hepatitis, pancreatitis.  Comparison: 5/12/2024. Technique:  Routine multiplanar/multisequence images of the abdomen were obtained with MRCP sequences without contrast administration. Findings: Redemonstration of cirrhotic morphology of the liver with surface nodularity present and diffuse low T1 signal changes. No evidence of ascites. The gallbladder appears distended. Gallstones are present. Wall thickening is present with a small amount of surrounding inflammatory changes and free fluid. No evidence of biliary ductal dilatation. No filling defects identified within the biliary tree. There does hypertrophy a stones seen either within the gallbladder neck or within the cystic duct (series 21  image 56). Spleen appears enlarged likely related to portal venous hypertension, stable. No evidence of pancreatic ductal dilatation. No evidence of focal collection. Mild edema is seen diffusely surrounding the pancreas kidneys appear unremarkable. No significant inflammatory changes.     Impression: 1. Findings consistent with acute cholecystitis. No evidence of biliary ductal dilatation or filling defects within the biliary tree. There is a stone seen within the gallbladder neck or within the proximal portion of the cystic  duct. 2. Mild diffuse edema seen surrounding the pancreas consistent with acute uncomplicated pancreatitis. 3. Cirrhotic morphology of the liver with evidence of portal venous hypertension. No focal lesions or evidence of ascites. Electronically Signed: Salome Peace MD  5/13/2024 12:52 AM EDT  Workstation ID: [id number]    CT Abdomen Pelvis With Contrast    Result Date: 5/12/2024  CT ABDOMEN PELVIS W CONTRAST Date of Exam: 5/12/2024 10:14 PM EDT Indication: right sided abd pain, nausea, chills, h/o gallstones, r/o cholecystitis. Comparison: 5/2/2024. Technique: Axial CT images were obtained of the abdomen and pelvis following the uneventful intravenous administration of intravenous contrast. Reconstructed coronal and sagittal images were also obtained. Automated exposure control and iterative construction methods were used. Findings: Lung Bases:   The visualized lung bases and lower mediastinal structures are unremarkable. Liver: The liver appears diffusely hypodense consistent with steatosis.. No evidence of ascites. Contour lobular appearance present consistent with cirrhosis.. No focal lesions. Biliary/Gallbladder:  The gallbladder is mildly distended. Diffuse gallbladder wall thickening is present. Stranding is seen within the adjacent fat. A trace amount of free fluid is seen along the inferior portion of the second portion of the duodenum. Gallstones are present.. The biliary tree is nondilated. Spleen: Spleen appears mildly enlarged, stable as compared to the previous study.. Pancreas:  Pancreas is normal. There is no evidence of pancreatic mass or peripancreatic fluid. Kidneys:  Kidneys are normal in size. There are no stones or hydronephrosis. Adrenals:  Adrenal glands are unremarkable. Retroperitoneal/Lymph Nodes/Vasculature:  No retroperitoneal adenopathy is identified. Gastrointestinal/Mesentery:  The bowel loops are non-dilated without wall thickening. Presacral fluid and soft tissue thickening present,  similar as compared to the previous study. Postsurgical changes are present within the rectum. Right lower quadrant colostomy present. No significant stool burden identified. Varicosities are present. No evidence of hernia.. The appendix appears within normal limits. No evidence of obstruction. No free air. No mesenteric fluid collections identified. Bladder:  The bladder is normal. Genital:   Unremarkable       Bony Structures:   Visualized bony structures are consistent with the patient's age.     Impression: 1. Findings consistent with acute cholecystitis. 2. Ancillary findings as described above. Electronically Signed: Salome Peace MD  5/12/2024 10:33 PM EDT  Workstation ID: [id number]    US Gallbladder    Result Date: 5/12/2024  US GALLBLADDER Date of Exam: 5/12/2024 9:24 PM EDT Indication: RUQ abd pain, pt appears jaundiced, nausea, chills, known gallstones. Comparison: 5/2/2024. Technique: Grayscale and color Doppler ultrasound evaluation of the right upper quadrant was performed. Findings: Limited evaluation due to patient condition. The liver appears echogenic.. No focal lesions identified. Normal flow is present within the hepatic vasculature. Limited visualization of the pancreas appears grossly unremarkable. No evidence of ascites. The gallbladder appears normal in caliber. No wall thickening identified. Stones present within the lumen.. The common bile duct appears normal in caliber. The sonographic Rea sign was negative. The right kidney appears normal in size with no focal lesions. No evidence of nephrolithiasis or hydronephrosis.     Impression: Cholelithiasis without secondary findings of acute cholecystitis. Increased echogenicity of the liver parenchyma likely related to steatosis or nonspecific hepatitis. Electronically Signed: Salome Peace MD  5/12/2024 9:49 PM EDT  Workstation ID: [id number]                 Plan for Follow-up of Pending Labs/Results:   Pending Labs       Order Current Status     Anaerobic Culture - Drainage, Gallbladder Preliminary result    Blood Culture - Blood, Arm, Left Preliminary result    Blood Culture - Blood, Arm, Right Preliminary result          Discharge Details        Discharge Medications        New Medications        Instructions Start Date   levoFLOXacin 750 MG tablet  Commonly known as: LEVAQUIN   750 mg, Oral, Daily      metroNIDAZOLE 500 MG tablet  Commonly known as: FLAGYL   500 mg, Oral, Every 12 Hours Scheduled             Changes to Medications        Instructions Start Date   carvedilol 6.25 MG tablet  Commonly known as: COREG  What changed: when to take this   6.25 mg, Oral, 2 Times Daily With Meals             Continue These Medications        Instructions Start Date   cyanocobalamin 1000 MCG/ML injection   No dose, route, or frequency recorded.      ezetimibe 10 MG tablet  Commonly known as: ZETIA   10 mg, Oral, Daily      fenofibrate 145 MG tablet  Commonly known as: TRICOR   145 mg, Oral, Daily      FeroSul 325 (65 Fe) MG tablet  Generic drug: ferrous sulfate   TAKE 1 TABLET BY MOUTH TWICE DAILY EVERY OTHER DAY WITH VITAMINC      levothyroxine 25 MCG tablet  Commonly known as: SYNTHROID, LEVOTHROID   25 mcg, Oral, Daily      lisinopril 10 MG tablet  Commonly known as: PRINIVIL,ZESTRIL   Oral, Daily      metFORMIN 1000 MG tablet  Commonly known as: GLUCOPHAGE   1,000 mg, Oral, 2 Times Daily      multivitamin with minerals tablet tablet   1 dose, Oral, Every Morning      RA Anti-Diarrheal 2 MG tablet  Generic drug: loperamide   take 1 tablet by mouth before meals and at bedtime               No Known Allergies      Discharge Disposition:  Home or Self Care    Diet:  Hospital:  Diet Order   Procedures   • Diet: Gastrointestinal, Cardiac, Diabetic; Healthy Heart (2-3 Na+); Consistent Carbohydrate; Fiber-Restricted; Texture: Soft to Chew (NDD 3); Soft to Chew: Whole Meat; Fluid Consistency: Thin (IDDSI 0)       Diet Instructions       Diet: Diabetic Diets, Cardiac  Diets, Gastrointestinal Diets; Healthy Heart (2-3 Na+); Regular (IDDSI 7); Thin (IDDSI 0); Consistent Carbohydrate; Fiber-Restricted      Discharge Diet:  Diabetic Diets  Cardiac Diets  Gastrointestinal Diets       Cardiac Diet: Healthy Heart (2-3 Na+)    Texture: Regular (IDDSI 7)    Fluid Consistency: Thin (IDDSI 0)    Diabetic Diet: Consistent Carbohydrate    Gastrointestinal Diet: Fiber-Restricted             Activity:  Activity Instructions       Activity as Tolerated      Measure Blood Pressure              Restrictions or Other Recommendations:         CODE STATUS:    Code Status and Medical Interventions:   Ordered at: 05/13/24 0037     Code Status (Patient has no pulse and is not breathing):    CPR (Attempt to Resuscitate)     Medical Interventions (Patient has pulse or is breathing):    Full Support       Future Appointments   Date Time Provider Department Center   9/23/2024  9:30 AM [facility] CT 1 [facility] CT [facility]   9/27/2024  9:00 AM Ana Self APRN [facility]       Additional Instructions for the Follow-ups that You Need to Schedule       Discharge Follow-up with PCP   As directed       Currently Documented PCP:    Myke Mendoza MD    PCP Phone Number:    [phone]     Follow Up Details: follow up with pcp on week        Discharge Follow-up with Specified Provider: Follow up with DR. Garcia in 2 weeks   As directed      To: Follow up with DR. Garcia in 2 weeks                      AMIRAH Corea  05/16/24      Time Spent on Discharge:  I spent  45  minutes on this discharge activity which included: face-to-face encounter with the patient, reviewing the data in the system, coordination of the care with the nursing staff as well as consultants, documentation, and entering orders.        bur

## 2024-05-16 NOTE — PLAN OF CARE
Goal Outcome Evaluation:  Plan of Care Reviewed With: patient        Progress: improving  Outcome Evaluation: Pt alert and oriented x4. VSS on room air. Pt has rested well in bed throughout shift. Tolerating diet. IVF and IV abx infusing as ordered. Pt emptying and maintaing ileostomy. Pt voices no needs at this time. Call light and personal items within reach,.

## 2024-05-16 NOTE — PROGRESS NOTES
"Patient Name:  Hesham Arevalo Jr.  YOB: 1965  [id number]    Surgery Progress Note    Date of visit: 5/16/2024      Subjective: No acute events.  Feeling much better.  Denies any abdominal pain or bloating.  Tolerated regular food yesterday without difficulty.  Having ileostomy output.  Denies fevers or chills          Objective:     /67 (BP Location: Left arm, Patient Position: Lying)   Pulse 63   Temp 98.3 °F (36.8 °C) (Oral)   Resp 18   Ht 185.4 cm (73\")   Wt 88 kg (194 lb)   SpO2 99%   BMI 25.60 kg/m²     Intake/Output Summary (Last 24 hours) at 5/16/2024 0911  Last data filed at 5/16/2024 0315  Gross per 24 hour   Intake 240 ml   Output 465 ml   Net -225 ml       GEN:   Awake, alert, in no acute distress, resting comfortably in bed   CV:   Regular rate and rhythm  L:  Symmetric expansion, not labored on room ai  Abd:  Soft, no significant tenderness to light or deep palpation throughout the abdomen, percutaneous cholecystostomy in place in the right upper quadrant with some thin serosanguineous and slightly bilious tinged output, ileostomy in the right upper quadrant  Ext:  No cyanosis, clubbing, or edema    Recent labs that are back at this time have been reviewed.           Assessment/ Plan:    Mr. Arevalo is a 58-year-old gentleman with history of hypertension, hyperlipidemia, diabetes, [place] cirrhosis, and rectal cancer status post resection with diverting ileostomy who presents with gallstone pancreatitis and cholecystitis.      # Gallstone pancreatitis  # Cholecystitis  # Cirrhosis  # Rectal cancer status post resection with diverting loop ileostomy  -Underwent percutaneous cholecystostomy placement on May 13, 2024  -Labs show improvement  -Clinically improving.  No abdominal pain.  Having bowel function.  -Continue percutaneous cholecystostomy and monitor output.  This would likely need to remain in place for the next 6 weeks.  -Would recommend at least a 7-day course of " antibiotics following percutaneous cholecystostomy placement for cholecystitis. Ok to transition to oral antibiotics  -Ok for discharge from my standpoint. He should be instructed to empty and record drain output BID and follow-up with me in 2 weeks            Konstantin Garcia MD  5/16/2024  09:11 EDT

## 2024-05-16 NOTE — CONSULTS
Patient Name: Hesham Arevalo Jr.  : 1965  MRN: [id number]    Date of Consult: 2024  Admission Date: 2024    Requesting Provider: Shreya Simon  Evaluating Physician: Arian Harley MD    Chief Complaint: abdominal pain    Reason for Consultation: cholecystitis    Subjective   Patient is a 58 y.o. male with history of colon cancer status post resection and loop ileostomy.  History of [place] cirrhosis.  Last seen by Dr. Radames Sosa in  with pelvic abscess and treated with ertapenem daily at [facility].  Patient presents with intermittent abdominal pain.  Admitted  and diagnosed with acute cholecystitis.  Determined to be too high risk for cholecystectomy due to cirrhosis.  Cholecystostomy drain was placed.  Started on Zosyn and has improved.  cholecystostomy drain culture with Enterobacter.  Switch to cefepime today.  Feeling much better and tolerating oral intake.  Hopeful for discharge home.  No known antibiotic allergies.  He complains of some stool output per rectum which has increased over the past few weeks.  Ileostomy output is stable.  Cholecystostomy drain output is blood-tinged    Review of Systems  Afebrile and hemodynamically stable. No nausea. No rash. See above, otherwise negative.       Past Medical History:   Diagnosis Date    Arthritis     knees     Cancer 2018    colon    Disease of thyroid gland     Fatty liver     Hashimoto's disease     History of radiation therapy 2019    rectal cancer    Hypertension     Ileostomy in place     Psoriasis     Wears glasses        Past Surgical History:   Procedure Laterality Date    COLON RESECTION N/A 2019    Procedure: LAPAROSCOPIC LOWER ANTERIOR RESECTION WITH DIVERTING LOOP ILEOOSTOMY, SPLENIC FLEIXURE MOBILIZATION AND LIVER BIOPSY, AND PROCTOSCOPY;  Surgeon: Pau Kelly MD;  Location: [facility];  Service: General    COLONOSCOPY      2018    ILEOSTOMY  2019    LIVER BIOPSY  2003    VASECTOMY      VENOUS  ACCESS DEVICE (PORT) INSERTION N/A 2019    Procedure: PORT PLACEMENT;  Surgeon: Franky Cazares MD;  Location: [facility];  Service: General       Social History     Socioeconomic History    Marital status:    Tobacco Use    Smoking status: Former     Current packs/day: 0.00     Average packs/day: 1 pack/day for 14.0 years (14.0 ttl pk-yrs)     Types: Cigarettes     Start date:      Quit date:      Years since quittin.3    Smokeless tobacco: Never   Vaping Use    Vaping status: Never Used   Substance and Sexual Activity    Alcohol use: No    Drug use: No    Sexual activity: Defer        Family History   Problem Relation Age of Onset    Breast cancer Mother     Cancer Father         bladder/prostate       No Known Allergies    Objective   Antibiotics:  Anti-Infectives (From admission, onward)      Ordered     Dose/Rate Route Frequency Start Stop    24 1158  levoFLOXacin (LEVAQUIN) tablet 750 mg        Ordering Provider: Arian Harley MD    750 mg Oral Daily 24 1245 24 0859    24 1158  metroNIDAZOLE (FLAGYL) tablet 500 mg        Ordering Provider: Arian Harley MD    500 mg Oral Every 12 Hours Scheduled 24 1245 24 0859    24 0750  cefepime 1000 mg IVPB in 100 mL NS (MBP)        Ordering Provider: Caitlyn Figueroa APRN    1,000 mg  over 30 Minutes Intravenous Once 24 0900 24 0924    24 2241  piperacillin-tazobactam (ZOSYN) 3.375 g IVPB in 100 mL NS MBP (CD)        Ordering Provider: Micki Ryan PA-C    3.375 g  over 30 Minutes Intravenous Once 24 2257 24 0005            Other Medications:  Current Facility-Administered Medications   Medication Dose Route Frequency Provider Last Rate Last Admin    carvedilol (COREG) tablet 6.25 mg  6.25 mg Oral BID With Meals Parmjit Jerez PA-C   6.25 mg at 24 0854    dextrose (D50W) (25 g/50 mL) IV injection 25 g  25 g Intravenous Q15 Min PRN  "Parmjit Jerez PA-C        dextrose (GLUTOSE) oral gel 15 g  15 g Oral Q15 Min PRN Parmjit Jerez PA-C        glucagon (GLUCAGEN) injection 1 mg  1 mg Intramuscular Q15 Min PRN Parmjit Jerez PA-C        HYDROmorphone (DILAUDID) injection 0.5 mg  0.5 mg Intravenous Q4H PRN Rona Milnere G, DO   0.5 mg at 05/13/24 1247    Insulin Lispro (humaLOG) injection 2-9 Units  2-9 Units Subcutaneous 4x Daily AC & at Bedtime Parmjit Jerez PA-C        lactated ringers infusion  125 mL/hr Intravenous Continuous Angel Gustafson APRN 125 mL/hr at 05/15/24 1719 125 mL/hr at 05/15/24 1719    levoFLOXacin (LEVAQUIN) tablet 750 mg  750 mg Oral Daily Arian Harley MD        levothyroxine (SYNTHROID, LEVOTHROID) tablet 25 mcg  25 mcg Oral Q AM Parmjit Jerez PA-C   25 mcg at 05/16/24 0551    lisinopril (PRINIVIL,ZESTRIL) tablet 10 mg  10 mg Oral Q24H Parmjit Jerez PA-C   10 mg at 05/16/24 0854    melatonin tablet 5 mg  5 mg Oral Nightly PRN Parmjit Jerez PA-C        metroNIDAZOLE (FLAGYL) tablet 500 mg  500 mg Oral Q12H Arian Harley MD        ondansetron (ZOFRAN) injection 4 mg  4 mg Intravenous Q6H PRN Fatuma Milnersie G, DO   4 mg at 05/13/24 0308    sodium chloride 0.9 % flush 10 mL  10 mL Intravenous Q12H Parmjit Jerez PA-C   10 mL at 05/15/24 2105    sodium chloride 0.9 % flush 10 mL  10 mL Intravenous PRN Parmjit Jerez PA-C        sodium chloride 0.9 % infusion 40 mL  40 mL Intravenous PRN Parmjit Jerez PA-C           Physical Exam:   Vital Signs   /68 (BP Location: Left arm, Patient Position: Lying)   Pulse 66   Temp 98.1 °F (36.7 °C) (Oral)   Resp 18   Ht 185.4 cm (73\")   Wt 88 kg (194 lb)   SpO2 100%   BMI 25.60 kg/m²     GENERAL: Awake and alert. Non-toxic  HEENT: Normocephalic, atraumatic.  EOMI. No icterus.    NECK: Supple without nuchal rigidity.   HEART: RRR; No murmur.  LUNGS: Clear to " auscultation bilaterally. Nonlabored.  ABDOMEN: Soft, nontender, nondistended.  Nontender.  Drain in right upper quadrant with blood-tinged output  EXT:  No edema.  : Without Frey catheter.  MSK: No major joint swelling noted  SKIN: No diffuse rash  NEURO: AOx3  PSYCHIATRIC: Appropriate    Laboratory Data  Lab Results   Component Value Date    WBC 2.48 (L) 05/16/2024    HGB 10.2 (L) 05/16/2024    HCT 31.3 (L) 05/16/2024    MCV 90.7 05/16/2024    PLT 96 (L) 05/16/2024     Lab Results   Component Value Date    GLUCOSE 115 (H) 05/16/2024    CALCIUM 8.8 05/16/2024     05/16/2024    K 3.8 05/16/2024    CO2 27.0 05/16/2024     05/16/2024    BUN 11 05/16/2024    CREATININE 0.97 05/16/2024     Estimated Creatinine Clearance: 100.8 mL/min (by C-G formula based on SCr of 0.97 mg/dL).  Lab Results   Component Value Date    ALT 50 (H) 05/16/2024    AST 67 (H) 05/16/2024    ALKPHOS 136 (H) 05/16/2024    BILITOT 3.9 (H) 05/16/2024     Lab Results   Component Value Date    CRP 0.14 06/17/2019     Lab Results   Component Value Date    SEDRATE 19 06/17/2019       The above labs were reviewed.     Microbiology:  Microbiology Results (last 10 days)       Procedure Component Value - Date/Time    Body Fluid Culture - Body Fluid, Peritoneum [[id number]]  (Abnormal)  (Susceptibility) Collected: 05/13/24 1153    Lab Status: Final result Specimen: Body Fluid from Peritoneum Updated: 05/16/24 0618     Body Fluid Culture Scant growth (1+) Enterobacter cloacae complex     Gram Stain Moderate (3+) WBCs seen      No organisms seen    Susceptibility        Enterobacter cloacae complex      MEAGAN      Cefepime Susceptible      Gentamicin Susceptible      Levofloxacin Susceptible      Meropenem Susceptible      Trimethoprim + Sulfamethoxazole Susceptible                       Susceptibility Comments       Enterobacter cloacae complex    Cefazolin sensitivity will not be reported for Enterobacteriaceae in non-urine isolates. If cefazolin  is preferred, please call the microbiology lab to request an E-test.  With the exception of urinary-sourced infections, aminoglycosides should not be used as monotherapy.               Anaerobic Culture - Drainage, Gallbladder [[id number]]  (Normal) Collected: 05/13/24 1153    Lab Status: Preliminary result Specimen: Drainage from Gallbladder Updated: 05/16/24 0735     Anaerobic Culture No anaerobes isolated at 3 days    Blood Culture - Blood, Arm, Left [[id number]]  (Normal) Collected: 05/12/24 2050    Lab Status: Preliminary result Specimen: Blood from Arm, Left Updated: 05/15/24 2115     Blood Culture No growth at 3 days    Narrative:      Less than seven (7) mL's of blood was collected.  Insufficient quantity may yield false negative results.    Blood Culture - Blood, Arm, Right [[id number]]  (Normal) Collected: 05/12/24 2025    Lab Status: Preliminary result Specimen: Blood from Arm, Right Updated: 05/15/24 2100     Blood Culture No growth at 3 days    Narrative:      Less than seven (7) mL's of blood was collected.  Insufficient quantity may yield false negative results.            Radiology:  CT Percutaneous Cholecystostomy    Result Date: 5/16/2024  Impression: Technically successful CT-guided placement of a percutaneous cholecystostomy tube. Electronically Signed: Jose Eduardo Stein MD  5/16/2024 7:14 AM EDT  Workstation ID: [id number]    MRI abdomen wo contrast mrcp    Result Date: 5/13/2024  Impression: 1. Findings consistent with acute cholecystitis. No evidence of biliary ductal dilatation or filling defects within the biliary tree. There is a stone seen within the gallbladder neck or within the proximal portion of the cystic duct. 2. Mild diffuse edema seen surrounding the pancreas consistent with acute uncomplicated pancreatitis. 3. Cirrhotic morphology of the liver with evidence of portal venous hypertension. No focal lesions or evidence of ascites. Electronically Signed: Salome Peace MD  5/13/2024 12:52 AM  EDT  Workstation ID: [id number]    CT Abdomen Pelvis With Contrast    Result Date: 5/12/2024  Impression: 1. Findings consistent with acute cholecystitis. 2. Ancillary findings as described above. Electronically Signed: Salome Peace MD  5/12/2024 10:33 PM EDT  Workstation ID: [id number]    US Gallbladder    Result Date: 5/12/2024  Impression: Cholelithiasis without secondary findings of acute cholecystitis. Increased echogenicity of the liver parenchyma likely related to steatosis or nonspecific hepatitis. Electronically Signed: Salome Peace MD  5/12/2024 9:49 PM EDT  Workstation ID: [id number]     Assessment   Acute cholecystitis: Intermediate to high risk for cholecystectomy due to cirrhosis so cholecystostomy drain placed 5/13.  Cultures with scant growth of Enterobacter.  Improved on 3 days of Zosyn.  Oral intake is good  Colon cancer status post resection and loop ileostomy   cirrhosis  Chronic leukopenia and thrombocytopenia: Related to cirrhosis    PLAN:   Discontinue cefepime.  Start oral levofloxacin 750 mg daily and Flagyl 500 mg twice daily to continue for 5 more days.  Okay to discharge home from my perspective.  Discussed with Dr. Simon.  Offered patient an outpatient follow-up at [facility] but patient prefers to call if needed       Arian Harley MD  5/16/2024

## 2024-05-16 NOTE — CASE MANAGEMENT/SOCIAL WORK
Continued Stay Note   [facility]     Patient Name: Hesham Arevalo Jr.  MRN: [id number]  Today's Date: 5/16/2024    Admit Date: 5/12/2024    Plan: home   Discharge Plan       Row Name 05/16/24 1225       Plan    Plan home    Patient/Family in Agreement with Plan yes    Plan Comments I spoke with this patient regarding his discharge home today. He is in agreement. His wife can transport. He denies having any further discharge planning needs at this time.    Final Discharge Disposition Code 01 - home or self-care                   Discharge Codes    No documentation.                 Expected Discharge Date and Time       Expected Discharge Date Expected Discharge Time    May 16, 2024               Marline Huynh RN

## 2024-05-17 LAB
BACTERIA SPEC AEROBE CULT: NORMAL
BACTERIA SPEC AEROBE CULT: NORMAL

## 2024-05-17 NOTE — OUTREACH NOTE
Prep Survey      Flowsheet Row Responses   [facility] facility patient discharged from? [facility]   Is LACE score < 7 ? No   Eligibility Readm Mgmt   Discharge diagnosis Acute cholecystitis        Gallstone pancreatitis-Cholecystostomy tube   Does the patient have one of the following disease processes/diagnoses(primary or secondary)? General Surgery   Does the patient have Home health ordered? No   Is there a DME ordered? No   Prep survey completed? Yes            GRACIELA TRAN - Registered Nurse

## 2024-05-18 LAB — BACTERIA SPEC ANAEROBE CULT: NORMAL

## 2024-05-20 NOTE — PAYOR COMM NOTE
"Silvia Reyna RN  Utilization Management  P:[phone]  F:[phone]    Auth# [id number]     Cristina La Jr. (58 y.o. Male)       Date of Birth   1965    Social Security Number       Address   [address] [place], [place] [address]    Home Phone   [phone]    MRN   [id number]       Moravian   Sikhism    Marital Status                               Admission Date   5/12/24    Admission Type   Emergency    Admitting Provider   Shreya Simon MD    Attending Provider       Department, Room/Bed   [address], S217/1       Discharge Date   5/16/2024    Discharge Disposition   Home or Self Care    Discharge Destination                                 Attending Provider: (none)   Allergies: No Known Allergies    Isolation: None   Infection: None   Code Status: Prior    Ht: 185.4 cm (73\")   Wt: 88 kg (194 lb)    Admission Cmt: None   Principal Problem: Acute cholecystitis [K81.0]                   Active Insurance as of 5/12/2024       Primary Coverage       Payor Plan Insurance Group Employer/Plan Group    [employer] [facility] BLUE CROSS [facility] [employer] EMPLOYEE [id number]       Payor Plan Address Payor Plan Phone Number Payor Plan Fax Number Effective Dates    [address] [phone]  1/1/2022 - None Entered    [place] [address]         Subscriber Name Subscriber Birth Date Member ID       CRISTINA LA JR. 1965 [id number]                     Emergency Contacts        (Rel.) Home Phone Work Phone Mobile Phone    Livier LA (Spouse) [phone] -- [phone]                 Discharge Summary        Konstantin Mosqueda MD   Physician  Surgery     Progress Notes      Signed     Date of Service: 05/16/24 0911  Creation Time: 05/16/24 0911     Signed         Patient Name:  Cristina La Jr.  YOB: 1965  [id number]     Surgery Progress Note     Date of visit: 5/16/2024        Subjective: No acute events.  Feeling much " "better.  Denies any abdominal pain or bloating.  Tolerated regular food yesterday without difficulty.  Having ileostomy output.  Denies fevers or chills              Objective:      /67 (BP Location: Left arm, Patient Position: Lying)   Pulse 63   Temp 98.3 °F (36.8 °C) (Oral)   Resp 18   Ht 185.4 cm (73\")   Wt 88 kg (194 lb)   SpO2 99%   BMI 25.60 kg/m²      Intake/Output Summary (Last 24 hours) at 5/16/2024 0911  Last data filed at 5/16/2024 0315      Gross per 24 hour   Intake 240 ml   Output 465 ml   Net -225 ml         GEN:   Awake, alert, in no acute distress, resting comfortably in bed   CV:      Regular rate and rhythm  L:         Symmetric expansion, not labored on room ai  Abd:    Soft, no significant tenderness to light or deep palpation throughout the abdomen, percutaneous cholecystostomy in place in the right upper quadrant with some thin serosanguineous and slightly bilious tinged output, ileostomy in the right upper quadrant  Ext:     No cyanosis, clubbing, or edema     Recent labs that are back at this time have been reviewed.               Assessment/ Plan:     Mr. Arevalo is a 58-year-old gentleman with history of hypertension, hyperlipidemia, diabetes, [place] cirrhosis, and rectal cancer status post resection with diverting ileostomy who presents with gallstone pancreatitis and cholecystitis.      # Gallstone pancreatitis  # Cholecystitis  # Cirrhosis  # Rectal cancer status post resection with diverting loop ileostomy  -Underwent percutaneous cholecystostomy placement on May 13, 2024  -Labs show improvement  -Clinically improving.  No abdominal pain.  Having bowel function.  -Continue percutaneous cholecystostomy and monitor output.  This would likely need to remain in place for the next 6 weeks.  -Would recommend at least a 7-day course of antibiotics following percutaneous cholecystostomy placement for cholecystitis. Ok to transition to oral antibiotics  -Ok for discharge from my " standpoint. He should be instructed to empty and record drain output BID and follow-up with me in 2 weeks              Konstantin Garcia MD  2024  09:11 EDT                       Arian Harley MD   Physician  Infectious Disease     Consults      Signed     Date of Service: 24 1158  Creation Time: 24 115  Consult Orders   Inpatient Infectious Diseases Consult [[id number]] ordered by Caitlyn Figueroa APRN at 24 0852          Signed       Expand All Collapse All      Patient Name: Hesham Arevalo Jr.  : 1965  MRN: [id number]     Date of Consult: 2024  Admission Date: 2024     Requesting Provider: Shreya Simon  Evaluating Physician: Arian Harley MD     Chief Complaint: abdominal pain     Reason for Consultation: cholecystitis        Subjective  Patient is a 58 y.o. male with history of colon cancer status post resection and loop ileostomy.  History of [place] cirrhosis.  Last seen by Dr. Radames Sosa in 2019 with pelvic abscess and treated with ertapenem daily at [facility] [place][note 2].  Patient presents with intermittent abdominal pain.  Admitted  and diagnosed with acute cholecystitis.  Determined to be too high risk for cholecystectomy due to cirrhosis.  Cholecystostomy drain was placed.  Started on Zosyn and has improved.  cholecystostomy drain culture with Enterobacter.  Switch to cefepime today.  Feeling much better and tolerating oral intake.  Hopeful for discharge home.  No known antibiotic allergies.  He complains of some stool output per rectum which has increased over the past few weeks.  Ileostomy output is stable.  Cholecystostomy drain output is blood-tinged     Review of Systems  Afebrile and hemodynamically stable. No nausea. No rash. See above, otherwise negative.         Medical History        Past Medical History:   Diagnosis Date    Arthritis       knees     Cancer 2018     colon    Disease of thyroid gland      Fatty liver      Hashimoto's disease       History of radiation therapy 2019     rectal cancer    Hypertension      Ileostomy in place      Psoriasis      Wears glasses              Surgical History         Past Surgical History:   Procedure Laterality Date    COLON RESECTION N/A 2019     Procedure: LAPAROSCOPIC LOWER ANTERIOR RESECTION WITH DIVERTING LOOP ILEOOSTOMY, SPLENIC FLEIXURE MOBILIZATION AND LIVER BIOPSY, AND PROCTOSCOPY;  Surgeon: Pau Kelly MD;  Location:  [facility];  Service: General    COLONOSCOPY         2018    ILEOSTOMY   2019    LIVER BIOPSY   2003    VASECTOMY       VENOUS ACCESS DEVICE (PORT) INSERTION N/A 2019     Procedure: PORT PLACEMENT;  Surgeon: Franky Cazares MD;  Location:  [facility];  Service: General            Social History   Social History            Socioeconomic History    Marital status:    Tobacco Use    Smoking status: Former       Current packs/day: 0.00       Average packs/day: 1 pack/day for 14.0 years (14.0 ttl pk-yrs)       Types: Cigarettes       Start date:        Quit date:        Years since quittin.3    Smokeless tobacco: Never   Vaping Use    Vaping status: Never Used   Substance and Sexual Activity    Alcohol use: No    Drug use: No    Sexual activity: Defer                  Family History   Problem Relation Age of Onset    Breast cancer Mother      Cancer Father           bladder/prostate         Allergies   No Known Allergies              Objective  Antibiotics:  Anti-Infectives (From admission, onward)        Ordered     Dose/Rate Route Frequency Start Stop     24 1158   levoFLOXacin (LEVAQUIN) tablet 750 mg        Ordering Provider: Arian Harley MD    750 mg Oral Daily 24 1245 24 0859     24 1158   metroNIDAZOLE (FLAGYL) tablet 500 mg        Ordering Provider: Arian Harley MD    500 mg Oral Every 12 Hours Scheduled 24 1245 24 0859     24 0750   cefepime 1000 mg IVPB in 100 mL NS (MBP)         Ordering Provider: Caitlyn Figueroa APRN    1,000 mg  over 30 Minutes Intravenous Once 05/16/24 0900 05/16/24 0924     05/12/24 2241   piperacillin-tazobactam (ZOSYN) 3.375 g IVPB in 100 mL NS MBP (CD)        Ordering Provider: Micki Ryan PA-C    3.375 g  over 30 Minutes Intravenous Once 05/12/24 2257 05/13/24 0005                Other Medications:  Current Medications             Current Facility-Administered Medications   Medication Dose Route Frequency Provider Last Rate Last Admin    carvedilol (COREG) tablet 6.25 mg  6.25 mg Oral BID With Meals Parmjit Jerez PA-C   6.25 mg at 05/16/24 0854    dextrose (D50W) (25 g/50 mL) IV injection 25 g  25 g Intravenous Q15 Min PRN Parmjit Jerez PA-C        dextrose (GLUTOSE) oral gel 15 g  15 g Oral Q15 Min PRN Parmjit Jerez PA-C        glucagon (GLUCAGEN) injection 1 mg  1 mg Intramuscular Q15 Min PRN Parmjit Jerez PA-C        HYDROmorphone (DILAUDID) injection 0.5 mg  0.5 mg Intravenous Q4H PRN Lilia Milner DO   0.5 mg at 05/13/24 1247    Insulin Lispro (humaLOG) injection 2-9 Units  2-9 Units Subcutaneous 4x Daily AC & at Bedtime Parmjit Jerez PA-C        lactated ringers infusion  125 mL/hr Intravenous Continuous Angel Gustafson APRN 125 mL/hr at 05/15/24 1719 125 mL/hr at 05/15/24 1719    levoFLOXacin (LEVAQUIN) tablet 750 mg  750 mg Oral Daily Arian Harley MD        levothyroxine (SYNTHROID, LEVOTHROID) tablet 25 mcg  25 mcg Oral Q AM Parmjit Jerez PA-C   25 mcg at 05/16/24 0551    lisinopril (PRINIVIL,ZESTRIL) tablet 10 mg  10 mg Oral Q24H Parmjit Jerez PA-C   10 mg at 05/16/24 0854    melatonin tablet 5 mg  5 mg Oral Nightly PRN Jah Jerez-BRANDIN Chandler        metroNIDAZOLE (FLAGYL) tablet 500 mg  500 mg Oral Q12H Arian Harley MD        ondansetron (ZOFRAN) injection 4 mg  4 mg Intravenous Q6H PRN Lilia Milner DO   4 mg at 05/13/24 0308    sodium  "chloride 0.9 % flush 10 mL  10 mL Intravenous Q12H Parmjit Jerez PA-C   10 mL at 05/15/24 2105    sodium chloride 0.9 % flush 10 mL  10 mL Intravenous PRN Parmjit Jerez PA-C        sodium chloride 0.9 % infusion 40 mL  40 mL Intravenous PRN Parmjit Jerez PA-C                Physical Exam:   Vital Signs   /68 (BP Location: Left arm, Patient Position: Lying)   Pulse 66   Temp 98.1 °F (36.7 °C) (Oral)   Resp 18   Ht 185.4 cm (73\")   Wt 88 kg (194 lb)   SpO2 100%   BMI 25.60 kg/m²      GENERAL: Awake and alert. Non-toxic  HEENT: Normocephalic, atraumatic.  EOMI. No icterus.    NECK: Supple without nuchal rigidity.   HEART: RRR; No murmur.  LUNGS: Clear to auscultation bilaterally. Nonlabored.  ABDOMEN: Soft, nontender, nondistended.  Nontender.  Drain in right upper quadrant with blood-tinged output  EXT:  No edema.  : Without Frey catheter.  MSK: No major joint swelling noted  SKIN: No diffuse rash  NEURO: AOx3  PSYCHIATRIC: Appropriate     Laboratory Data        Lab Results   Component Value Date     WBC 2.48 (L) 05/16/2024     HGB 10.2 (L) 05/16/2024     HCT 31.3 (L) 05/16/2024     MCV 90.7 05/16/2024     PLT 96 (L) 05/16/2024            Lab Results   Component Value Date     GLUCOSE 115 (H) 05/16/2024     CALCIUM 8.8 05/16/2024      05/16/2024     K 3.8 05/16/2024     CO2 27.0 05/16/2024      05/16/2024     BUN 11 05/16/2024     CREATININE 0.97 05/16/2024      Estimated Creatinine Clearance: 100.8 mL/min (by C-G formula based on SCr of 0.97 mg/dL).        Lab Results   Component Value Date     ALT 50 (H) 05/16/2024     AST 67 (H) 05/16/2024     ALKPHOS 136 (H) 05/16/2024     BILITOT 3.9 (H) 05/16/2024            Lab Results   Component Value Date     CRP 0.14 06/17/2019            Lab Results   Component Value Date     SEDRATE 19 06/17/2019         The above labs were reviewed.     Microbiology:  Microbiology Results (last 10 days)         Procedure " Component Value - Date/Time     Body Fluid Culture - Body Fluid, Peritoneum [[id number]]  (Abnormal)  (Susceptibility) Collected: 05/13/24 1153     Lab Status: Final result Specimen: Body Fluid from Peritoneum Updated: 05/16/24 0638       Body Fluid Culture Scant growth (1+) Enterobacter cloacae complex       Gram Stain Moderate (3+) WBCs seen         No organisms seen     Susceptibility                   Enterobacter cloacae complex         MEAGAN         Cefepime Susceptible         Gentamicin Susceptible         Levofloxacin Susceptible         Meropenem Susceptible         Trimethoprim + Sulfamethoxazole Susceptible                                Susceptibility Comments         Enterobacter cloacae complex     Cefazolin sensitivity will not be reported for Enterobacteriaceae in non-urine isolates. If cefazolin is preferred, please call the microbiology lab to request an E-test.  With the exception of urinary-sourced infections, aminoglycosides should not be used as monotherapy.                  Anaerobic Culture - Drainage, Gallbladder [[id number]]  (Normal) Collected: 05/13/24 1153     Lab Status: Preliminary result Specimen: Drainage from Gallbladder Updated: 05/16/24 0735       Anaerobic Culture No anaerobes isolated at 3 days     Blood Culture - Blood, Arm, Left [[id number]]  (Normal) Collected: 05/12/24 2050     Lab Status: Preliminary result Specimen: Blood from Arm, Left Updated: 05/15/24 2115       Blood Culture No growth at 3 days     Narrative:       Less than seven (7) mL's of blood was collected.  Insufficient quantity may yield false negative results.     Blood Culture - Blood, Arm, Right [[id number]]  (Normal) Collected: 05/12/24 2025     Lab Status: Preliminary result Specimen: Blood from Arm, Right Updated: 05/15/24 2100       Blood Culture No growth at 3 days     Narrative:       Less than seven (7) mL's of blood was collected.  Insufficient quantity may yield false negative results.                 Radiology:  CT Percutaneous Cholecystostomy     Result Date: 5/16/2024  Impression: Technically successful CT-guided placement of a percutaneous cholecystostomy tube. Electronically Signed: Jose Eduardo Stein MD  5/16/2024 7:14 AM EDT  Workstation ID: [id number]     MRI abdomen wo contrast mrcp     Result Date: 5/13/2024  Impression: 1. Findings consistent with acute cholecystitis. No evidence of biliary ductal dilatation or filling defects within the biliary tree. There is a stone seen within the gallbladder neck or within the proximal portion of the cystic duct. 2. Mild diffuse edema seen surrounding the pancreas consistent with acute uncomplicated pancreatitis. 3. Cirrhotic morphology of the liver with evidence of portal venous hypertension. No focal lesions or evidence of ascites. Electronically Signed: Salome Peace MD  5/13/2024 12:52 AM EDT  Workstation ID: [id number]     CT Abdomen Pelvis With Contrast     Result Date: 5/12/2024  Impression: 1. Findings consistent with acute cholecystitis. 2. Ancillary findings as described above. Electronically Signed: Salome Peace MD  5/12/2024 10:33 PM EDT  Workstation ID: [id number]     US Gallbladder     Result Date: 5/12/2024  Impression: Cholelithiasis without secondary findings of acute cholecystitis. Increased echogenicity of the liver parenchyma likely related to steatosis or nonspecific hepatitis. Electronically Signed: Salome Peace MD  5/12/2024 9:49 PM EDT  Workstation ID: [id number]            Assessment  Acute cholecystitis: Intermediate to high risk for cholecystectomy due to cirrhosis so cholecystostomy drain placed 5/13.  Cultures with scant growth of Enterobacter.  Improved on 3 days of Zosyn.  Oral intake is good  Colon cancer status post resection and loop ileostomy   cirrhosis  Chronic leukopenia and thrombocytopenia: Related to cirrhosis     PLAN:   Discontinue cefepime.  Start oral levofloxacin 750 mg daily and Flagyl 500 mg twice daily to continue for 5 more  days.  Okay to discharge home from my perspective.  Discussed with Dr. Simon.  Offered patient an outpatient follow-up at [facility] but patient prefers to call if needed        Arian Harley MD  2024                       Routing History       Caitlyn Figueroa, APRN at 24 1204       Attestation signed by Shreya Simon MD at 24 3785    I have reviewed this documentation and agree.                      [facility] Medicine Services  DISCHARGE SUMMARY    Patient Name: Hesham Arevalo Jr.  : 1965  MRN: [id number]    Date of Admission: 2024  6:30 PM  Date of Discharge:  2024  Primary Care Physician: Myke Mendoza MD    Consults       Date and Time Order Name Status Description    2024  8:52 AM Inpatient Infectious Diseases Consult Completed     2024  6:33 AM Inpatient Gastroenterology Consult Completed     2024  2:23 AM Inpatient General Surgery Consult Completed             Hospital Course     Presenting Problem: f/u abd pain     Active Hospital Problems    Diagnosis  POA    **Acute cholecystitis [K81.0]  Unknown    Gallstone pancreatitis [K85.10]  Yes    Cirrhosis of liver [K74.60]  Unknown    Acute hepatitis [B17.9]  Unknown    Acute pancreatitis [K85.90]  Unknown    Abdominal pain [R10.9]  Yes    Hyponatremia [E87.1]  Yes    Type 2 diabetes mellitus without complication, without long-term current use of insulin [E11.9]  Yes    Thrombocytopenia [D69.6]  Yes    Essential hypertension [I10]  Yes    Other specified hypothyroidism [E03.8]  Yes      Resolved Hospital Problems    Diagnosis Date Resolved POA    Hyperglycemia [R73.9] 2024 Yes          Hospital Course:  Hesham Arevalo Jr. is a 58 y.o. male  with hx of HTN, HLD, T2DM,  cirrhosis, and hx of rectal adenocarcinoma s/p resection and ileostomy who presents to [facility] ED for complaint of RUQ abdominal pain. He had been admitted here -5/3/24 due to similar  complaints, had imaging that showed gallstones without signs of cholecystitis or pancreatitis - improved with supportive care and patient was discharged home. He is here w worsening LFT and biliary elevations. Gen surg, ID and GI following, Cholecystostomy tube was placed and culture with Enterobacter. ID was consulted for abx advice. Surgery did not recommend any surgery at this time       Acute cholecystitis  Elevated LFT's/hyperbilirubinemia  Acute gallstone pancreatitis  --CT A/P with acute cholecystitis   --US gallbladder with cholelithiasis but no acute signs of cholecystitis  --MRCP with acute cholecystitis but no filling defects/biliary ductal dilatation, mild diffuse edema surrounding pancreas  --, , Total Bilirubin 11.2 on admission - markedly elevated compared to 5/3/24 labs. LFTs now improving  --s/p IV fluids, s/p Zosyn. Will transition over to levaquin and flagyl at dc to complete 7 days   --Dr. Garcia consulted, due to underlying cirrhosis and ileostomy, patient is very high risk for cholecystectomy at this time - s/p IR for cholecystostomy tube placement. Needs to remain in for 6 wks,empty drain at least twice a day and record  --GI following, recommends liq diet, supportive care, abx. Has tolerated a solid diet         Hyponatremia  --Likely secondary to dehydration/poor oral intake  --improving, monitor      cirrhosis  Hx of portal vein and superior mesenteric vein thrombosis  Chronic thrombocytopenia   --Follows with Hepatology at , appt on 5/17  --Off Coumadin since November 2022 due to repeat imaging showing resolution of thrombosis  --Last EGD with Dr. Lebron March 2022 - no varices noted  --Continue Coreg  --GI following  --Monitor platelets closely, no new bleeding     HTN  HLD  --Takes Coreg and Lisinopril, continue  --Not currently on a statin, lipid profile acceptable (LDL 25)     T2DM  --HbA1c 8.3%  --SSI     Hypothyroidism  --Continue home Synthroid     Hx of rectal  adenocarcinoma s/p resection and ileostomy April 2019  --Follows with Dr. Kelly and Dr. Moeller, no evidence of recurrence per last note from Oncology 3/28/24  --He reports he has been told he is too high risk for ileostomy reversal    Patient has remained clinically stable and will be discharged home today.      Discharge Follow Up Recommendations for outpatient labs/diagnostics:   Follow up with pcp one week   Follow up with DR. Garcia in 2 weeks     Day of Discharge     HPI:   Patient is resting in bed in NAD. He states he feels better. Pain controlled. Plan for home today     Review of Systems  Gen- No fevers, chills  CV- No chest pain, palpitations  Resp- No cough, dyspnea  GI- No N/V/D, abd pain      Vital Signs:   Temp:  [98 °F (36.7 °C)-98.6 °F (37 °C)] 98.1 °F (36.7 °C)  Heart Rate:  [58-72] 66  Resp:  [16-18] 18  BP: (100-117)/(54-68) 117/68      Physical Exam:  Constitutional: No acute distress, awake, alert  HENT: NCAT, mucous membranes moist  Respiratory: Clear to auscultation bilaterally, respiratory effort normal room maryann 99%  Cardiovascular: RRR, no murmurs, rubs, or gallops  Gastrointestinal: Positive bowel sounds, soft, nontender, nondistended, ostomy in place, cholecystostomy tube in place   Musculoskeletal: No bilateral ankle edema  Psychiatric: Appropriate affect, cooperative  Neurologic: Oriented x 3, strength symmetric in all extremities, Cranial Nerves grossly intact to confrontation, speech clear  Skin: No rashes      Pertinent  and/or Most Recent Results     LAB RESULTS:      Lab 05/16/24  0856 05/15/24  0521 05/14/24  0640 05/13/24  0700 05/12/24 2029   WBC 2.48* 2.48* 3.02* 4.55 8.28   HEMOGLOBIN 10.2* 9.2* 9.1* 10.1* 12.5*   HEMATOCRIT 31.3* 27.5* 27.0* 29.7* 36.6*   PLATELETS 96* 69* 68* 66* 111*   NEUTROS ABS 1.75 1.67*  --  3.29 7.09*   IMMATURE GRANS (ABS) 0.02 0.01  --  0.02 0.04   LYMPHS ABS 0.29* 0.34*  --  0.58* 0.49*   MONOS ABS 0.24 0.28  --  0.51 0.54   EOS ABS 0.16 0.17  --   0.13 0.08   MCV 90.7 89.9 90.6 89.7 88.6   LACTATE  --   --   --   --  2.0   PROTIME  --   --   --   --  15.0*         Lab 05/16/24  0856 05/15/24  0521 05/14/24  0640 05/13/24 0700 05/12/24 2029   SODIUM 137 138 134* 135* 128*   POTASSIUM 3.8 3.8 4.1 3.9 3.6   CHLORIDE 102 104 100 101 90*   CO2 27.0 28.0 26.0 26.0 26.0   ANION GAP 8.0 6.0 8.0 8.0 12.0   BUN 11 15 24* 14 13   CREATININE 0.97 1.11 1.50* 0.97 0.97   EGFR 89.4 76.0 53.0* 89.4 89.4   GLUCOSE 115* 95 90 95 164*   CALCIUM 8.8 8.1* 7.9* 8.3* 9.1   MAGNESIUM  --   --   --   --  1.6   HEMOGLOBIN A1C  --   --   --  8.30*  --          Lab 05/16/24  0856 05/15/24  0521 05/14/24  0640 05/13/24 0700 05/12/24 2029   TOTAL PROTEIN 6.0 5.6* 5.0* 6.2 7.5   ALBUMIN 2.9* 2.4* 2.5* 3.0* 3.7   GLOBULIN 3.1 3.2 2.5 3.2 3.8   ALT (SGPT) 50* 60* 70* 84* 102*   AST (SGOT) 67* 93* 126* 148* 207*   BILIRUBIN 3.9* 5.7* 9.3* 10.6* 11.2*   ALK PHOS 136* 120* 121* 108 122*   LIPASE  --   --   --   --  >3,000*         Lab 05/12/24 2029   HSTROP T 11   PROTIME 15.0*   INR 1.16*         Lab 05/13/24  0700 05/12/24 2029   CHOLESTEROL 71  --    LDL CHOL 25  --    HDL CHOL 27*  --    TRIGLYCERIDES 93 97             Brief Urine Lab Results  (Last result in the past 365 days)        Color   Clarity   Blood   Leuk Est   Nitrite   Protein   CREAT   Urine HCG        05/12/24 2032 Dark Yellow   Clear   Negative   Trace   Positive   Trace                 Microbiology Results (last 10 days)       Procedure Component Value - Date/Time    Body Fluid Culture - Body Fluid, Peritoneum [[id number]]  (Abnormal)  (Susceptibility) Collected: 05/13/24 1153    Lab Status: Final result Specimen: Body Fluid from Peritoneum Updated: 05/16/24 0638     Body Fluid Culture Scant growth (1+) Enterobacter cloacae complex     Gram Stain Moderate (3+) WBCs seen      No organisms seen    Susceptibility        Enterobacter cloacae complex      MEAGAN      Cefepime Susceptible      Gentamicin Susceptible       Levofloxacin Susceptible      Meropenem Susceptible      Trimethoprim + Sulfamethoxazole Susceptible                       Susceptibility Comments       Enterobacter cloacae complex    Cefazolin sensitivity will not be reported for Enterobacteriaceae in non-urine isolates. If cefazolin is preferred, please call the microbiology lab to request an E-test.  With the exception of urinary-sourced infections, aminoglycosides should not be used as monotherapy.               Anaerobic Culture - Drainage, Gallbladder [[id number]]  (Normal) Collected: 05/13/24 1153    Lab Status: Preliminary result Specimen: Drainage from Gallbladder Updated: 05/16/24 0735     Anaerobic Culture No anaerobes isolated at 3 days    Blood Culture - Blood, Arm, Left [[id number]]  (Normal) Collected: 05/12/24 2050    Lab Status: Preliminary result Specimen: Blood from Arm, Left Updated: 05/15/24 2115     Blood Culture No growth at 3 days    Narrative:      Less than seven (7) mL's of blood was collected.  Insufficient quantity may yield false negative results.    Blood Culture - Blood, Arm, Right [[id number]]  (Normal) Collected: 05/12/24 2025    Lab Status: Preliminary result Specimen: Blood from Arm, Right Updated: 05/15/24 2100     Blood Culture No growth at 3 days    Narrative:      Less than seven (7) mL's of blood was collected.  Insufficient quantity may yield false negative results.            CT Percutaneous Cholecystostomy    Result Date: 5/16/2024  CT PERCUTANEOUS CHOLECYSTOSTOMY Date of Exam: 5/13/2024 11:01 AM EDT Indication: cholecystitis in setting of cirrhosis without ascites and RUQ ileostomy, eval for percutaneous cholecystostomy. Comparison: CT abdomen and pelvis 5/12/2024 Technique: The procedure, risks and options were discussed with the patient and informed written consent was obtained. The patient was placed in the supine position in the CT fluoroscopy suite. Preliminary images were obtained and a site was chosen. The skin was  marked, prepped and draped. IV conscious sedation was administered consisting of Versed and fentanyl. The patient was monitored by the IR nurse during the entire procedure. Total physician monitored sedation time was 19 minutes. Using maximal sterile barrier technique and under local anesthesia, a 18-gauge needle was inserted from a lateral approach into the  gallbladder near the neck. An 035 wire was coiled within the gallbladder. An 8 Australian pigtail drain was then advanced over the wire and coiled within the gallbladder. Samples were sent to the lab for analysis. The catheter was left to bag drainage and sutured in place utilizing a 2-0 nylon suture. Sterile dressings were applied and the patient was returned to the nunez in stable condition. Fluoroscopic Time: 4.8 seconds     Impression: Technically successful CT-guided placement of a percutaneous cholecystostomy tube. Electronically Signed: Jose Eduardo Stein MD  5/16/2024 7:14 AM EDT  Workstation ID: [id number]    MRI abdomen wo contrast mrcp    Result Date: 5/13/2024  MRI ABDOMEN WO CONTRAST MRCP Date of Exam: 5/12/2024 11:39 PM EDT Indication: acute hepatitis, pancreatitis.  Comparison: 5/12/2024. Technique:  Routine multiplanar/multisequence images of the abdomen were obtained with MRCP sequences without contrast administration. Findings: Redemonstration of cirrhotic morphology of the liver with surface nodularity present and diffuse low T1 signal changes. No evidence of ascites. The gallbladder appears distended. Gallstones are present. Wall thickening is present with a small amount of surrounding inflammatory changes and free fluid. No evidence of biliary ductal dilatation. No filling defects identified within the biliary tree. There does hypertrophy a stones seen either within the gallbladder neck or within the cystic duct (series 21  image 56). Spleen appears enlarged likely related to portal venous hypertension, stable. No evidence of pancreatic ductal  dilatation. No evidence of focal collection. Mild edema is seen diffusely surrounding the pancreas kidneys appear unremarkable. No significant inflammatory changes.     Impression: 1. Findings consistent with acute cholecystitis. No evidence of biliary ductal dilatation or filling defects within the biliary tree. There is a stone seen within the gallbladder neck or within the proximal portion of the cystic duct. 2. Mild diffuse edema seen surrounding the pancreas consistent with acute uncomplicated pancreatitis. 3. Cirrhotic morphology of the liver with evidence of portal venous hypertension. No focal lesions or evidence of ascites. Electronically Signed: Salome Peace MD  5/13/2024 12:52 AM EDT  Workstation ID: [id number]    CT Abdomen Pelvis With Contrast    Result Date: 5/12/2024  CT ABDOMEN PELVIS W CONTRAST Date of Exam: 5/12/2024 10:14 PM EDT Indication: right sided abd pain, nausea, chills, h/o gallstones, r/o cholecystitis. Comparison: 5/2/2024. Technique: Axial CT images were obtained of the abdomen and pelvis following the uneventful intravenous administration of intravenous contrast. Reconstructed coronal and sagittal images were also obtained. Automated exposure control and iterative construction methods were used. Findings: Lung Bases:   The visualized lung bases and lower mediastinal structures are unremarkable. Liver: The liver appears diffusely hypodense consistent with steatosis.. No evidence of ascites. Contour lobular appearance present consistent with cirrhosis.. No focal lesions. Biliary/Gallbladder:  The gallbladder is mildly distended. Diffuse gallbladder wall thickening is present. Stranding is seen within the adjacent fat. A trace amount of free fluid is seen along the inferior portion of the second portion of the duodenum. Gallstones are present.. The biliary tree is nondilated. Spleen: Spleen appears mildly enlarged, stable as compared to the previous study.. Pancreas:  Pancreas is normal.  There is no evidence of pancreatic mass or peripancreatic fluid. Kidneys:  Kidneys are normal in size. There are no stones or hydronephrosis. Adrenals:  Adrenal glands are unremarkable. Retroperitoneal/Lymph Nodes/Vasculature:  No retroperitoneal adenopathy is identified. Gastrointestinal/Mesentery:  The bowel loops are non-dilated without wall thickening. Presacral fluid and soft tissue thickening present, similar as compared to the previous study. Postsurgical changes are present within the rectum. Right lower quadrant colostomy present. No significant stool burden identified. Varicosities are present. No evidence of hernia.. The appendix appears within normal limits. No evidence of obstruction. No free air. No mesenteric fluid collections identified. Bladder:  The bladder is normal. Genital:   Unremarkable       Bony Structures:   Visualized bony structures are consistent with the patient's age.     Impression: 1. Findings consistent with acute cholecystitis. 2. Ancillary findings as described above. Electronically Signed: Salome Peace MD  5/12/2024 10:33 PM EDT  Workstation ID: [id number]    US Gallbladder    Result Date: 5/12/2024  US GALLBLADDER Date of Exam: 5/12/2024 9:24 PM EDT Indication: RUQ abd pain, pt appears jaundiced, nausea, chills, known gallstones. Comparison: 5/2/2024. Technique: Grayscale and color Doppler ultrasound evaluation of the right upper quadrant was performed. Findings: Limited evaluation due to patient condition. The liver appears echogenic.. No focal lesions identified. Normal flow is present within the hepatic vasculature. Limited visualization of the pancreas appears grossly unremarkable. No evidence of ascites. The gallbladder appears normal in caliber. No wall thickening identified. Stones present within the lumen.. The common bile duct appears normal in caliber. The sonographic Rea sign was negative. The right kidney appears normal in size with no focal lesions. No evidence of  nephrolithiasis or hydronephrosis.     Impression: Cholelithiasis without secondary findings of acute cholecystitis. Increased echogenicity of the liver parenchyma likely related to steatosis or nonspecific hepatitis. Electronically Signed: Salome Peace MD  5/12/2024 9:49 PM EDT  Workstation ID: [id number]                 Plan for Follow-up of Pending Labs/Results:   Pending Labs       Order Current Status    Anaerobic Culture - Drainage, Gallbladder Preliminary result    Blood Culture - Blood, Arm, Left Preliminary result    Blood Culture - Blood, Arm, Right Preliminary result          Discharge Details        Discharge Medications        New Medications        Instructions Start Date   levoFLOXacin 750 MG tablet  Commonly known as: LEVAQUIN   750 mg, Oral, Daily      metroNIDAZOLE 500 MG tablet  Commonly known as: FLAGYL   500 mg, Oral, Every 12 Hours Scheduled             Changes to Medications        Instructions Start Date   carvedilol 6.25 MG tablet  Commonly known as: COREG  What changed: when to take this   6.25 mg, Oral, 2 Times Daily With Meals             Continue These Medications        Instructions Start Date   cyanocobalamin 1000 MCG/ML injection   No dose, route, or frequency recorded.      ezetimibe 10 MG tablet  Commonly known as: ZETIA   10 mg, Oral, Daily      fenofibrate 145 MG tablet  Commonly known as: TRICOR   145 mg, Oral, Daily      FeroSul 325 (65 Fe) MG tablet  Generic drug: ferrous sulfate   TAKE 1 TABLET BY MOUTH TWICE DAILY EVERY OTHER DAY WITH VITAMINC      levothyroxine 25 MCG tablet  Commonly known as: SYNTHROID, LEVOTHROID   25 mcg, Oral, Daily      lisinopril 10 MG tablet  Commonly known as: PRINIVIL,ZESTRIL   Oral, Daily      metFORMIN 1000 MG tablet  Commonly known as: GLUCOPHAGE   1,000 mg, Oral, 2 Times Daily      multivitamin with minerals tablet tablet   1 dose, Oral, Every Morning      RA Anti-Diarrheal 2 MG tablet  Generic drug: loperamide   take 1 tablet by mouth before  meals and at bedtime               No Known Allergies      Discharge Disposition:  Home or Self Care    Diet:  Hospital:  Diet Order   Procedures    Diet: Gastrointestinal, Cardiac, Diabetic; Healthy Heart (2-3 Na+); Consistent Carbohydrate; Fiber-Restricted; Texture: Soft to Chew (NDD 3); Soft to Chew: Whole Meat; Fluid Consistency: Thin (IDDSI 0)       Diet Instructions       Diet: Diabetic Diets, Cardiac Diets, Gastrointestinal Diets; Healthy Heart (2-3 Na+); Regular (IDDSI 7); Thin (IDDSI 0); Consistent Carbohydrate; Fiber-Restricted      Discharge Diet:  Diabetic Diets  Cardiac Diets  Gastrointestinal Diets       Cardiac Diet: Healthy Heart (2-3 Na+)    Texture: Regular (IDDSI 7)    Fluid Consistency: Thin (IDDSI 0)    Diabetic Diet: Consistent Carbohydrate    Gastrointestinal Diet: Fiber-Restricted             Activity:  Activity Instructions       Activity as Tolerated      Measure Blood Pressure              Restrictions or Other Recommendations:         CODE STATUS:    Code Status and Medical Interventions:   Ordered at: 05/13/24 0037     Code Status (Patient has no pulse and is not breathing):    CPR (Attempt to Resuscitate)     Medical Interventions (Patient has pulse or is breathing):    Full Support       Future Appointments   Date Time Provider Department Center   9/23/2024  9:30 AM [facility] CT [facility]   9/27/2024  9:00 AM Ana Self APRN [facility]       Additional Instructions for the Follow-ups that You Need to Schedule       Discharge Follow-up with PCP   As directed       Currently Documented PCP:    Myke Mendoza MD    PCP Phone Number:    [phone]     Follow Up Details: follow up with pcp on week        Discharge Follow-up with Specified Provider: Follow up with DR. Garcia in 2 weeks   As directed      To: Follow up with DR. Garcia in 2 weeks                      AMIRAH Corea  05/16/24      Time Spent on Discharge:  I spent  45  minutes on  this discharge activity which included: face-to-face encounter with the patient, reviewing the data in the system, coordination of the care with the nursing staff as well as consultants, documentation, and entering orders.        bur    Electronically signed by Shreya Simon MD at 05/16/24 8054

## 2024-05-21 ENCOUNTER — READMISSION MANAGEMENT (OUTPATIENT)
Dept: CALL CENTER | Facility: HOSPITAL | Age: 59
End: 2024-05-21
Payer: COMMERCIAL

## 2024-05-21 NOTE — OUTREACH NOTE
General Surgery Week 1 Survey      Flowsheet Row Responses   [facility] facility patient discharged from? [place]   Does the patient have one of the following disease processes/diagnoses(primary or secondary)? General Surgery   Week 1 attempt successful? No   Unsuccessful attempts Attempt 1            Adriana WRIGHT - Licensed Nurse

## 2024-06-03 ENCOUNTER — LAB (OUTPATIENT)
Dept: LAB | Facility: HOSPITAL | Age: 59
End: 2024-06-03
Payer: COMMERCIAL

## 2024-06-03 ENCOUNTER — TRANSCRIBE ORDERS (OUTPATIENT)
Dept: LAB | Facility: HOSPITAL | Age: 59
End: 2024-06-03
Payer: COMMERCIAL

## 2024-06-03 DIAGNOSIS — K81.9 CHOLECYSTITIS, UNSPECIFIED: Primary | ICD-10-CM

## 2024-06-03 LAB
ALBUMIN SERPL-MCNC: 3.6 G/DL (ref 3.5–5.2)
ALBUMIN/GLOB SERPL: 1.2 G/DL
ALP SERPL-CCNC: 76 U/L (ref 39–117)
ALT SERPL W P-5'-P-CCNC: 31 U/L (ref 1–41)
ANION GAP SERPL CALCULATED.3IONS-SCNC: 8 MMOL/L (ref 5–15)
AST SERPL-CCNC: 51 U/L (ref 1–40)
BASOPHILS # BLD AUTO: 0.03 10*3/MM3 (ref 0–0.2)
BASOPHILS NFR BLD AUTO: 1 % (ref 0–1.5)
BILIRUB SERPL-MCNC: 1.7 MG/DL (ref 0–1.2)
BUN SERPL-MCNC: 20 MG/DL (ref 8–23)
BUN/CREAT SERPL: 24.4 (ref 7–25)
CALCIUM SPEC-SCNC: 8.9 MG/DL (ref 8.6–10.5)
CHLORIDE SERPL-SCNC: 106 MMOL/L (ref 98–107)
CO2 SERPL-SCNC: 26 MMOL/L (ref 22–29)
CREAT SERPL-MCNC: 0.82 MG/DL (ref 0.76–1.27)
DEPRECATED RDW RBC AUTO: 48.8 FL (ref 37–54)
EGFRCR SERPLBLD CKD-EPI 2021: 100.6 ML/MIN/1.73
EOSINOPHIL # BLD AUTO: 0.35 10*3/MM3 (ref 0–0.4)
EOSINOPHIL NFR BLD AUTO: 12.1 % (ref 0.3–6.2)
ERYTHROCYTE [DISTWIDTH] IN BLOOD BY AUTOMATED COUNT: 14.8 % (ref 12.3–15.4)
GLOBULIN UR ELPH-MCNC: 3.1 GM/DL
GLUCOSE SERPL-MCNC: 104 MG/DL (ref 65–99)
HCT VFR BLD AUTO: 33.1 % (ref 37.5–51)
HGB BLD-MCNC: 10.9 G/DL (ref 13–17.7)
IMM GRANULOCYTES # BLD AUTO: 0.01 10*3/MM3 (ref 0–0.05)
IMM GRANULOCYTES NFR BLD AUTO: 0.3 % (ref 0–0.5)
INR PPP: 1.14 (ref 0.89–1.12)
LYMPHOCYTES # BLD AUTO: 0.66 10*3/MM3 (ref 0.7–3.1)
LYMPHOCYTES NFR BLD AUTO: 22.8 % (ref 19.6–45.3)
MCH RBC QN AUTO: 30 PG (ref 26.6–33)
MCHC RBC AUTO-ENTMCNC: 32.9 G/DL (ref 31.5–35.7)
MCV RBC AUTO: 91.2 FL (ref 79–97)
MONOCYTES # BLD AUTO: 0.32 10*3/MM3 (ref 0.1–0.9)
MONOCYTES NFR BLD AUTO: 11.1 % (ref 5–12)
NEUTROPHILS NFR BLD AUTO: 1.52 10*3/MM3 (ref 1.7–7)
NEUTROPHILS NFR BLD AUTO: 52.7 % (ref 42.7–76)
NRBC BLD AUTO-RTO: 0 /100 WBC (ref 0–0.2)
PLATELET # BLD AUTO: 54 10*3/MM3 (ref 140–450)
PMV BLD AUTO: 9.9 FL (ref 6–12)
POTASSIUM SERPL-SCNC: 4.3 MMOL/L (ref 3.5–5.2)
PROT SERPL-MCNC: 6.7 G/DL (ref 6–8.5)
PROTHROMBIN TIME: 14.7 SECONDS (ref 12.2–14.5)
RBC # BLD AUTO: 3.63 10*6/MM3 (ref 4.14–5.8)
SODIUM SERPL-SCNC: 140 MMOL/L (ref 136–145)
WBC NRBC COR # BLD AUTO: 2.89 10*3/MM3 (ref 3.4–10.8)

## 2024-06-03 PROCEDURE — 80053 COMPREHEN METABOLIC PANEL: CPT

## 2024-06-03 PROCEDURE — 85610 PROTHROMBIN TIME: CPT

## 2024-06-03 PROCEDURE — 36415 COLL VENOUS BLD VENIPUNCTURE: CPT

## 2024-06-03 PROCEDURE — 85025 COMPLETE CBC W/AUTO DIFF WBC: CPT

## 2024-06-05 ENCOUNTER — READMISSION MANAGEMENT (OUTPATIENT)
Dept: CALL CENTER | Facility: HOSPITAL | Age: 59
End: 2024-06-05
Payer: COMMERCIAL

## 2024-06-24 ENCOUNTER — LAB (OUTPATIENT)
Dept: FAMILY MEDICINE CLINIC | Facility: CLINIC | Age: 59
End: 2024-06-24
Payer: COMMERCIAL

## 2024-06-25 ENCOUNTER — TRANSCRIBE ORDERS (OUTPATIENT)
Dept: ADMINISTRATIVE | Facility: HOSPITAL | Age: 59
End: 2024-06-25
Payer: COMMERCIAL

## 2024-06-25 DIAGNOSIS — K81.0 ACUTE CHOLECYSTITIS: Primary | ICD-10-CM

## 2024-06-28 ENCOUNTER — HOSPITAL ENCOUNTER (OUTPATIENT)
Facility: HOSPITAL | Age: 59
Discharge: HOME | End: 2024-06-28
Admitting: SURGERY
Payer: COMMERCIAL

## 2024-06-28 DIAGNOSIS — K81.0 ACUTE CHOLECYSTITIS: ICD-10-CM

## 2024-06-28 PROCEDURE — 25510000001 IOPAMIDOL 61 % SOLUTION: Performed by: SURGERY

## 2024-06-28 PROCEDURE — 74177 CT ABD & PELVIS W/CONTRAST: CPT

## 2024-06-28 RX ADMIN — IOPAMIDOL 100 ML: 612 INJECTION, SOLUTION INTRAVENOUS at 12:56

## 2024-07-07 ENCOUNTER — ANESTHESIA EVENT (OUTPATIENT)
Dept: PERIOP | Facility: HOSPITAL | Age: 59
End: 2024-07-07
Payer: COMMERCIAL

## 2024-07-07 ENCOUNTER — HOSPITAL ENCOUNTER (INPATIENT)
Facility: HOSPITAL | Age: 59
LOS: 4 days | Discharge: HOME | End: 2024-07-12
Attending: SURGERY | Admitting: INTERNAL MEDICINE
Payer: COMMERCIAL

## 2024-07-07 DIAGNOSIS — K81.9 CHOLECYSTITIS: ICD-10-CM

## 2024-07-07 DIAGNOSIS — K81.0 ACUTE CHOLECYSTITIS: ICD-10-CM

## 2024-07-07 DIAGNOSIS — E11.9 TYPE 2 DIABETES MELLITUS WITHOUT COMPLICATION, WITHOUT LONG-TERM CURRENT USE OF INSULIN: ICD-10-CM

## 2024-07-07 DIAGNOSIS — K85.90 ACUTE PANCREATITIS, UNSPECIFIED COMPLICATION STATUS, UNSPECIFIED PANCREATITIS TYPE: Primary | ICD-10-CM

## 2024-07-07 LAB
ABO GROUP BLD: NORMAL
ALBUMIN SERPL-MCNC: 3.3 G/DL (ref 3.5–5.2)
ALBUMIN/GLOB SERPL: 1 G/DL
ALP SERPL-CCNC: 76 U/L (ref 39–117)
ALT SERPL W P-5'-P-CCNC: 22 U/L (ref 1–41)
AMMONIA BLD-SCNC: 23 UMOL/L (ref 16–60)
ANION GAP SERPL CALCULATED.3IONS-SCNC: 11 MMOL/L (ref 5–15)
AST SERPL-CCNC: 48 U/L (ref 1–40)
BASOPHILS # BLD AUTO: 0.04 10*3/MM3 (ref 0–0.2)
BASOPHILS NFR BLD AUTO: 1.4 % (ref 0–1.5)
BILIRUB SERPL-MCNC: 2 MG/DL (ref 0–1.2)
BLD GP AB SCN SERPL QL: NEGATIVE
BUN SERPL-MCNC: 16 MG/DL (ref 8–23)
BUN/CREAT SERPL: 17.2 (ref 7–25)
CALCIUM SPEC-SCNC: 8.9 MG/DL (ref 8.6–10.5)
CHLORIDE SERPL-SCNC: 104 MMOL/L (ref 98–107)
CO2 SERPL-SCNC: 23 MMOL/L (ref 22–29)
CREAT SERPL-MCNC: 0.93 MG/DL (ref 0.76–1.27)
D-LACTATE SERPL-SCNC: 1.2 MMOL/L (ref 0.5–2)
DEPRECATED RDW RBC AUTO: 48.8 FL (ref 37–54)
EGFRCR SERPLBLD CKD-EPI 2021: 94 ML/MIN/1.73
EOSINOPHIL # BLD AUTO: 0.23 10*3/MM3 (ref 0–0.4)
EOSINOPHIL NFR BLD AUTO: 8.1 % (ref 0.3–6.2)
ERYTHROCYTE [DISTWIDTH] IN BLOOD BY AUTOMATED COUNT: 14.6 % (ref 12.3–15.4)
GLOBULIN UR ELPH-MCNC: 3.4 GM/DL
GLUCOSE BLDC GLUCOMTR-MCNC: 128 MG/DL (ref 70–130)
GLUCOSE BLDC GLUCOMTR-MCNC: 201 MG/DL (ref 70–130)
GLUCOSE SERPL-MCNC: 229 MG/DL (ref 65–99)
HBA1C MFR BLD: 6.5 % (ref 4.8–5.6)
HCT VFR BLD AUTO: 35.6 % (ref 37.5–51)
HGB BLD-MCNC: 11.8 G/DL (ref 13–17.7)
IMM GRANULOCYTES # BLD AUTO: 0.01 10*3/MM3 (ref 0–0.05)
IMM GRANULOCYTES NFR BLD AUTO: 0.4 % (ref 0–0.5)
INR PPP: 1.22 (ref 0.89–1.12)
LYMPHOCYTES # BLD AUTO: 0.55 10*3/MM3 (ref 0.7–3.1)
LYMPHOCYTES NFR BLD AUTO: 19.4 % (ref 19.6–45.3)
MAGNESIUM SERPL-MCNC: 1.9 MG/DL (ref 1.6–2.4)
MCH RBC QN AUTO: 30.2 PG (ref 26.6–33)
MCHC RBC AUTO-ENTMCNC: 33.1 G/DL (ref 31.5–35.7)
MCV RBC AUTO: 91 FL (ref 79–97)
MONOCYTES # BLD AUTO: 0.23 10*3/MM3 (ref 0.1–0.9)
MONOCYTES NFR BLD AUTO: 8.1 % (ref 5–12)
NEUTROPHILS NFR BLD AUTO: 1.77 10*3/MM3 (ref 1.7–7)
NEUTROPHILS NFR BLD AUTO: 62.6 % (ref 42.7–76)
NRBC BLD AUTO-RTO: 0 /100 WBC (ref 0–0.2)
PLATELET # BLD AUTO: 51 10*3/MM3 (ref 140–450)
PMV BLD AUTO: 9.9 FL (ref 6–12)
POTASSIUM SERPL-SCNC: 4.2 MMOL/L (ref 3.5–5.2)
PROT SERPL-MCNC: 6.7 G/DL (ref 6–8.5)
PROTHROMBIN TIME: 15.6 SECONDS (ref 12.2–14.5)
RBC # BLD AUTO: 3.91 10*6/MM3 (ref 4.14–5.8)
RH BLD: NEGATIVE
SODIUM SERPL-SCNC: 138 MMOL/L (ref 136–145)
T&S EXPIRATION DATE: NORMAL
T4 FREE SERPL-MCNC: 0.8 NG/DL (ref 0.92–1.68)
TSH SERPL DL<=0.05 MIU/L-ACNC: 6.63 UIU/ML (ref 0.27–4.2)
WBC NRBC COR # BLD AUTO: 2.83 10*3/MM3 (ref 3.4–10.8)

## 2024-07-07 PROCEDURE — 36430 TRANSFUSION BLD/BLD COMPNT: CPT

## 2024-07-07 PROCEDURE — 83735 ASSAY OF MAGNESIUM: CPT | Performed by: FAMILY MEDICINE

## 2024-07-07 PROCEDURE — 86900 BLOOD TYPING SEROLOGIC ABO: CPT | Performed by: FAMILY MEDICINE

## 2024-07-07 PROCEDURE — P9100 PATHOGEN TEST FOR PLATELETS: HCPCS

## 2024-07-07 PROCEDURE — 86850 RBC ANTIBODY SCREEN: CPT | Performed by: FAMILY MEDICINE

## 2024-07-07 PROCEDURE — 83036 HEMOGLOBIN GLYCOSYLATED A1C: CPT | Performed by: FAMILY MEDICINE

## 2024-07-07 PROCEDURE — 86901 BLOOD TYPING SEROLOGIC RH(D): CPT | Performed by: FAMILY MEDICINE

## 2024-07-07 PROCEDURE — 84439 ASSAY OF FREE THYROXINE: CPT | Performed by: FAMILY MEDICINE

## 2024-07-07 PROCEDURE — 82140 ASSAY OF AMMONIA: CPT | Performed by: SURGERY

## 2024-07-07 PROCEDURE — P9035 PLATELET PHERES LEUKOREDUCED: HCPCS

## 2024-07-07 PROCEDURE — 85610 PROTHROMBIN TIME: CPT | Performed by: FAMILY MEDICINE

## 2024-07-07 PROCEDURE — 82948 REAGENT STRIP/BLOOD GLUCOSE: CPT

## 2024-07-07 PROCEDURE — G0378 HOSPITAL OBSERVATION PER HR: HCPCS

## 2024-07-07 PROCEDURE — 80050 GENERAL HEALTH PANEL: CPT | Performed by: FAMILY MEDICINE

## 2024-07-07 PROCEDURE — 87040 BLOOD CULTURE FOR BACTERIA: CPT | Performed by: FAMILY MEDICINE

## 2024-07-07 PROCEDURE — 99222 1ST HOSP IP/OBS MODERATE 55: CPT | Performed by: FAMILY MEDICINE

## 2024-07-07 PROCEDURE — 83605 ASSAY OF LACTIC ACID: CPT | Performed by: FAMILY MEDICINE

## 2024-07-07 PROCEDURE — 86923 COMPATIBILITY TEST ELECTRIC: CPT

## 2024-07-07 RX ORDER — LEVOTHYROXINE SODIUM 0.03 MG/1
25 TABLET ORAL DAILY
Status: DISCONTINUED | OUTPATIENT
Start: 2024-07-07 | End: 2024-07-12 | Stop reason: HOSPADM

## 2024-07-07 RX ORDER — DEXTROSE MONOHYDRATE 25 G/50ML
25 INJECTION, SOLUTION INTRAVENOUS
Status: DISCONTINUED | OUTPATIENT
Start: 2024-07-07 | End: 2024-07-12 | Stop reason: HOSPADM

## 2024-07-07 RX ORDER — SODIUM CHLORIDE 0.9 % (FLUSH) 0.9 %
10 SYRINGE (ML) INJECTION EVERY 12 HOURS SCHEDULED
Status: CANCELLED | OUTPATIENT
Start: 2024-07-07

## 2024-07-07 RX ORDER — CARVEDILOL 6.25 MG/1
6.25 TABLET ORAL 2 TIMES DAILY WITH MEALS
Status: DISCONTINUED | OUTPATIENT
Start: 2024-07-07 | End: 2024-07-12 | Stop reason: HOSPADM

## 2024-07-07 RX ORDER — ACETAMINOPHEN 325 MG/1
650 TABLET ORAL EVERY 4 HOURS PRN
Status: DISCONTINUED | OUTPATIENT
Start: 2024-07-07 | End: 2024-07-12 | Stop reason: HOSPADM

## 2024-07-07 RX ORDER — SODIUM CHLORIDE 0.9 % (FLUSH) 0.9 %
10 SYRINGE (ML) INJECTION AS NEEDED
Status: DISCONTINUED | OUTPATIENT
Start: 2024-07-07 | End: 2024-07-12 | Stop reason: HOSPADM

## 2024-07-07 RX ORDER — IBUPROFEN 600 MG/1
1 TABLET ORAL
Status: DISCONTINUED | OUTPATIENT
Start: 2024-07-07 | End: 2024-07-12 | Stop reason: HOSPADM

## 2024-07-07 RX ORDER — INSULIN LISPRO 100 [IU]/ML
2-9 INJECTION, SOLUTION INTRAVENOUS; SUBCUTANEOUS
Status: DISCONTINUED | OUTPATIENT
Start: 2024-07-07 | End: 2024-07-08

## 2024-07-07 RX ORDER — FERROUS SULFATE 325(65) MG
325 TABLET ORAL EVERY OTHER DAY
Status: DISCONTINUED | OUTPATIENT
Start: 2024-07-07 | End: 2024-07-12 | Stop reason: HOSPADM

## 2024-07-07 RX ORDER — AMOXICILLIN 250 MG
2 CAPSULE ORAL 2 TIMES DAILY PRN
Status: DISCONTINUED | OUTPATIENT
Start: 2024-07-07 | End: 2024-07-08

## 2024-07-07 RX ORDER — ACETAMINOPHEN 650 MG/1
650 SUPPOSITORY RECTAL EVERY 4 HOURS PRN
Status: DISCONTINUED | OUTPATIENT
Start: 2024-07-07 | End: 2024-07-11

## 2024-07-07 RX ORDER — NICOTINE POLACRILEX 4 MG
15 LOZENGE BUCCAL
Status: DISCONTINUED | OUTPATIENT
Start: 2024-07-07 | End: 2024-07-12 | Stop reason: HOSPADM

## 2024-07-07 RX ORDER — NITROGLYCERIN 0.4 MG/1
0.4 TABLET SUBLINGUAL
Status: DISCONTINUED | OUTPATIENT
Start: 2024-07-07 | End: 2024-07-12 | Stop reason: HOSPADM

## 2024-07-07 RX ORDER — BISACODYL 10 MG
10 SUPPOSITORY, RECTAL RECTAL DAILY PRN
Status: DISCONTINUED | OUTPATIENT
Start: 2024-07-07 | End: 2024-07-08

## 2024-07-07 RX ORDER — ONDANSETRON 2 MG/ML
4 INJECTION INTRAMUSCULAR; INTRAVENOUS EVERY 6 HOURS PRN
Status: DISCONTINUED | OUTPATIENT
Start: 2024-07-07 | End: 2024-07-08 | Stop reason: SDUPTHER

## 2024-07-07 RX ORDER — POLYETHYLENE GLYCOL 3350 17 G/17G
17 POWDER, FOR SOLUTION ORAL DAILY PRN
Status: DISCONTINUED | OUTPATIENT
Start: 2024-07-07 | End: 2024-07-08

## 2024-07-07 RX ORDER — FAMOTIDINE 20 MG/1
20 TABLET, FILM COATED ORAL ONCE
Status: CANCELLED | OUTPATIENT
Start: 2024-07-07 | End: 2024-07-07

## 2024-07-07 RX ORDER — SODIUM CHLORIDE 0.9 % (FLUSH) 0.9 %
10 SYRINGE (ML) INJECTION EVERY 12 HOURS SCHEDULED
Status: DISCONTINUED | OUTPATIENT
Start: 2024-07-07 | End: 2024-07-08

## 2024-07-07 RX ORDER — SODIUM CHLORIDE 9 MG/ML
40 INJECTION, SOLUTION INTRAVENOUS AS NEEDED
Status: DISCONTINUED | OUTPATIENT
Start: 2024-07-07 | End: 2024-07-12 | Stop reason: HOSPADM

## 2024-07-07 RX ORDER — ACETAMINOPHEN 160 MG/5ML
650 SOLUTION ORAL EVERY 4 HOURS PRN
Status: DISCONTINUED | OUTPATIENT
Start: 2024-07-07 | End: 2024-07-11

## 2024-07-07 RX ORDER — BISACODYL 5 MG/1
5 TABLET, DELAYED RELEASE ORAL DAILY PRN
Status: DISCONTINUED | OUTPATIENT
Start: 2024-07-07 | End: 2024-07-08

## 2024-07-07 RX ADMIN — CARVEDILOL 6.25 MG: 6.25 TABLET, FILM COATED ORAL at 17:19

## 2024-07-07 RX ADMIN — Medication 10 ML: at 21:16

## 2024-07-07 NOTE — CONSULTS
General Surgery Consultation Note    Date of Service: 7/7/2024  Hesham Arevalo JrDestiny  [id number]  1965      Referring Provider: SHRADDHA Triana DO    Location of Consult: Inpatient     Reason for Consultation: Cholecystitis       History of Present Illness:  I am seeing, Hesham Arevalo , in consultation for SHRADDHA Triana DO regarding cholecystitis.  58-year-old gentleman with past medical history significant for [place] cirrhosis presented with acute cholecystitis earlier this year.  At that point he was treated with a percutaneous cholecystostomy tube.  He has improved since that time.  He has been unable to tolerate clamping trials regarding his cholecystostomy tube.  He is here for cholecystectomy.  Otherwise there are no specific complaints.    Past Medical History:   Diagnosis Date    Arthritis     knees     Cancer 11/2018    colon with resection    Disease of thyroid gland     Fatty liver     Hashimoto's disease     History of radiation therapy 02/14/2019    rectal cancer    Hypertension     Ileostomy in place     Prediabetes     Psoriasis     Wears glasses        Past Surgical History:    COLON RESECTION    Procedure: LAPAROSCOPIC LOWER ANTERIOR RESECTION WITH DIVERTING LOOP ILEOOSTOMY, SPLENIC FLEIXURE MOBILIZATION AND LIVER BIOPSY, AND PROCTOSCOPY;  Surgeon: Pau Kelly MD;  Location:  [facility] OR;  Service: General    COLONOSCOPY    2018    ILEOSTOMY    LIVER BIOPSY    VASECTOMY    VENOUS ACCESS DEVICE (PORT) INSERTION    Procedure: PORT PLACEMENT;  Surgeon: Franky Cazares MD;  Location:  [facility] OR;  Service: General       No Known Allergies    No current facility-administered medications on file prior to encounter.     Current Outpatient Medications on File Prior to Encounter   Medication Sig Dispense Refill    Bioflavonoid Products (Vitamin C) chewable tablet Chew 1 dose Every Other Day.      carvedilol (COREG) 6.25 MG tablet Take 1 tablet by mouth 2 (Two) Times a Day With  Meals. 60 tablet 0    cyanocobalamin 1000 MCG/ML injection 1 mL Every 28 (Twenty-Eight) Days.      ezetimibe (ZETIA) 10 MG tablet Take 1 tablet by mouth Daily.      fenofibrate (TRICOR) 145 MG tablet Take 1 tablet by mouth Daily.      FeroSul 325 (65 Fe) MG tablet TAKE 1 TABLET BY MOUTH TWICE DAILY EVERY OTHER DAY WITH VITAMINC (Patient taking differently: Take 1 tablet by mouth Every Other Day.) 30 tablet 11    levothyroxine (SYNTHROID, LEVOTHROID) 25 MCG tablet Take 1 tablet by mouth Daily.      metFORMIN (GLUCOPHAGE) 1000 MG tablet Take 1 tablet by mouth 2 (Two) Times a Day.      RA ANTI-DIARRHEAL 2 MG tablet Take 1 tablet by mouth Daily.  0    Multiple Vitamins-Minerals (MULTIVITAMIN ADULT PO) Take 1 dose by mouth Every Morning. (Patient not taking: Reported on 7/7/2024)           Current Facility-Administered Medications:     acetaminophen (TYLENOL) tablet 650 mg, 650 mg, Oral, Q4H PRN **OR** acetaminophen (TYLENOL) 160 MG/5ML oral solution 650 mg, 650 mg, Oral, Q4H PRN **OR** acetaminophen (TYLENOL) suppository 650 mg, 650 mg, Rectal, Q4H PRN, SHRADDHA Triana,     sennosides-docusate (PERICOLACE) 8.6-50 MG per tablet 2 tablet, 2 tablet, Oral, BID PRN **AND** polyethylene glycol (MIRALAX) packet 17 g, 17 g, Oral, Daily PRN **AND** bisacodyl (DULCOLAX) EC tablet 5 mg, 5 mg, Oral, Daily PRN **AND** bisacodyl (DULCOLAX) suppository 10 mg, 10 mg, Rectal, Daily PRN, SHRADDHA Triana DO    Calcium Replacement - Follow Nurse / BPA Driven Protocol, , Does not apply, PRN, SHRADDHA Triana,     carvedilol (COREG) tablet 6.25 mg, 6.25 mg, Oral, BID With Meals, SHRADDHA Triana,     dextrose (D50W) (25 g/50 mL) IV injection 25 g, 25 g, Intravenous, Q15 Min PRN, SHRADDHA Triana,     dextrose (GLUTOSE) oral gel 15 g, 15 g, Oral, Q15 Min PRN, SHRADDHA Triana, DO    [Held by provider] ferrous sulfate tablet 325 mg, 325 mg, Oral, Every Other Day, SHRADDHA Triana, DO    glucagon (GLUCAGEN) injection 1 mg, 1 mg,  Intramuscular, Q15 Min PRN, SHRADDHA Triana, DO    Insulin Lispro (humaLOG) injection 2-9 Units, 2-9 Units, Subcutaneous, 4x Daily AC & at Bedtime, SHRADDHA Triana, DO    levothyroxine (SYNTHROID, LEVOTHROID) tablet 25 mcg, 25 mcg, Oral, Daily, SHRADDHA Triana, DO    Magnesium Standard Dose Replacement - Follow Nurse / BPA Driven Protocol, , Does not apply, PRN, SHRADDHA Triana, DO    nitroglycerin (NITROSTAT) SL tablet 0.4 mg, 0.4 mg, Sublingual, Q5 Min PRN, SHRADDHA Triana, DO    ondansetron (ZOFRAN) injection 4 mg, 4 mg, Intravenous, Q6H PRN, SHRADDHA Triana, DO    Phosphorus Replacement - Follow Nurse / BPA Driven Protocol, , Does not apply, PRN, SHRADDHA Triana, DO    Potassium Replacement - Follow Nurse / BPA Driven Protocol, , Does not apply, PRN, SHRADDHA Triana, DO    sodium chloride 0.9 % flush 10 mL, 10 mL, Intravenous, Q12H, SHRADDHA Triana, DO    sodium chloride 0.9 % flush 10 mL, 10 mL, Intravenous, PRN, SHRADDHA Triana,     sodium chloride 0.9 % infusion 40 mL, 40 mL, Intravenous, PRN, SHRADDHA Triana, DO    Family History   Problem Relation Age of Onset    Breast cancer Mother     Cancer Father         bladder/prostate     Social History     Socioeconomic History    Marital status:    Tobacco Use    Smoking status: Former     Current packs/day: 0.00     Average packs/day: 1 pack/day for 14.0 years (14.0 ttl pk-yrs)     Types: Cigarettes     Start date:      Quit date:      Years since quittin.5    Smokeless tobacco: Never   Vaping Use    Vaping status: Never Used   Substance and Sexual Activity    Alcohol use: No    Drug use: No    Sexual activity: Defer       Review of Systems:  Review of Systems   Constitutional:  Positive for appetite change. Negative for fatigue and fever.   HENT:  Negative for congestion, drooling and facial swelling.    Eyes:  Negative for photophobia and visual disturbance.   Respiratory:  Negative for apnea, choking and shortness of breath.   "  Cardiovascular:  Negative for chest pain and leg swelling.   Gastrointestinal:  Positive for abdominal pain. Negative for diarrhea, nausea and vomiting.   Endocrine: Negative for polyphagia and polyuria.   Genitourinary:  Negative for difficulty urinating, frequency and urgency.   Musculoskeletal:  Negative for arthralgias, joint swelling and neck stiffness.   Skin:  Negative for color change and pallor.   Allergic/Immunologic: Negative for immunocompromised state.   Neurological:  Negative for dizziness, syncope and numbness.   Hematological:  Negative for adenopathy.   Psychiatric/Behavioral:  Negative for agitation and sleep disturbance. The patient is not hyperactive.      Otherwise the 12 point review of systems is negative.    /64 (BP Location: Left arm, Patient Position: Sitting)   Pulse 73   Temp 98.4 °F (36.9 °C) (Oral)   Resp 18   Ht 185.4 cm (73\")   Wt 89.9 kg (198 lb 3.2 oz)   SpO2 99%   BMI 26.15 kg/m²   Body mass index is 26.15 kg/m².    General: No apparent distress  HEENT: PER, no icterus, normal sclerae  Cardiac: regular rhythm,  no audible rubs  Pulmonary: bilateral breath sounds, nonlabored  Abdominal: Soft, no generalized peritonitis, drain noted  Neurologic: awake, alert, no obvious focal deficits  Extremities: warm, no edema  Skin: no obvious rashes nor worrisome lesions seen     CBC  Results from last 7 days   Lab Units 07/07/24  1419   WBC 10*3/mm3 2.83*   HEMOGLOBIN g/dL 11.8*   HEMATOCRIT % 35.6*   PLATELETS 10*3/mm3 51*       Assessment:  Cholecystitis   cirrhosis  Diabetes mellitus  Thrombocytopenia    Plan:  N.p.o. after midnight.  Obtain labs.  Type and screen.     Luis Cao MD  07/07/24  16:09 EDT     "

## 2024-07-07 NOTE — H&P
[facility] Medicine Services  HISTORY AND PHYSICAL    Patient Name: Hesham Arevalo Jr.  : 1965  MRN: [id number]  Primary Care Physician: Myke Mendoza MD  Date of admission: 2024      Subjective   Subjective     Chief Complaint:  Planned cholecystectomy     HPI:  Hesham Arevalo Jr. is a 58 y.o. male that presented today as a direct admission with plans for cholecystectomy on  with Dr. Garcia. Patient has recent hospitalization for acute cholecystitis, gallstone pancreatitis, and history of cirrhosis s/p perichole drain placement by IR on  with plans for future cholecystectomy in 6 weeks. Patient reports overall doing well this afternoon, he denies any new acute complaints or problems at this time. No recent fever, chills, N/V, current abdominal pain, or other acute complaints at this time. He will be admitted and plans for surgery in the AM. Will make him NPO after MN and consult surgery for evaluation.       Personal History     Past Medical History:   Diagnosis Date    Arthritis     knees     Cancer 2018    colon with resection    Disease of thyroid gland     Fatty liver     Hashimoto's disease     History of radiation therapy 2019    rectal cancer    Hypertension     Ileostomy in place     Prediabetes     Psoriasis     Wears glasses          Oncology Problem List:  Rectal cancer (2019; Status: Active)  Rectal carcinoma (2018; Status: Active)    Oncology/Hematology History   Rectal carcinoma   2018 Initial Diagnosis    Rectal carcinoma (CMS/HCC)     2019 - 2019 Chemotherapy    Xeloda radiation on protocol     2019 - 2019 Radiation    Radiation OncologyTreatment Course:  Hesham Arevalo received 5040 cGy in 28 fractions to pelvis via External Beam Radiation - EBRT.     2019 Surgery    Surgery rectosigmoidectomy pathology report:  Final Diagnosis    1. RECTOSIGMOID COLON, RECTOSIGMOID RESECTION:  Residual  adenoma with focal high grade dysplasia with no residual invasive carcinoma identified post-treatment (see comment).  Seventeen lymph nodes negative for carcinoma (0/17)   2. LIVER, CORE NEEDLE BIOPSY:  Mild steatosis with mild chronic inflammation and increased fibrosis including bridging fibrosis (Stage 3-4) (see comment)        COLON AND RECTUM TEMPLATE:  TYPE OF SPECIMEN/PROCEDURE: Rectosigmoid resection.   SPECIMEN INTEGRITY:  Intact.  TUMOR SITE:  Rectum.  TUMOR SIZE (greatest dimension):  Indeterminate  TUMOR LOCATION (applicable only to rectal primaries): Entirely below anterior peritoneal reflection.  MACROSCOPIC INTACTNESS OF MESORECTUM (If applicable): Intact.  MACROSCOPIC TUMOR PERFORATION: Not identified.  TUMOR CONFIGURATION:  Ulcerated.  HISTOLOGIC TYPE:  Adenocarcinoma.  HISTOLOGIC GRADE:  G3-4 (per previous biopsy).  MICROSCOPIC DEPTH OF TUMOR INVASION/EXTENSION:  No residual tumor identified.  PERITONEAL INVOLVEMENT:  Not identified.  LYMPHVASCULAR INVASION:  Not identified.  PERINEURAL INVASION: Not identified.  SURGICAL MARGINS: Uninvolved.  STATUS AND DISTANCE FROM PROXIMAL MUCOSAL MARGIN:  Uninvolved, 13.0 cm.  STATUS AND DISTANCE FROM DISTAL MUCOSAL MARGIN: Uninvolved, 1.2 cm.  STATUS AND DISTANCE FROM MESENTERIC MARGIN: Not applicable.  STATUS AND DISTANCE FROM RADIAL MARGIN: Uninvolved, 1.7 cm.  DISTANCE FROM DENTATE LINE (RECTAL TUMOR ONLY):  Indeterminate.  TUMOR DEPOSITS (DISCONTINUOUS EXTRAMURAL EXTENSION):  Not identified.  NUMBER OF LYMPH NODES INVOLVED BY METASTATIC NEOPLASM: 0.  NUMBER OF REGIONAL LYMPH NODES EXAMINED: 17.  EXTRANODAL EXTENSION:  Not applicable.  TREATMENT EFFECT:  Present, no viable cancer cells identified (complete response, score 0).  ADDITIONAL PATHOLOGIC FINDINGS:  None.  MSI TESTING:  No evidence of MSI based on reported IHC on outside biopsy  OTHER ANCILLARY STUDIES (BRAF, KRAS, ETC.):  Should be performed on initial diagnostic biopsy if needed.  AJCC  PATHOLOGIC STAGE:  (COMPLETED BY PATHOLOGIST, BASED ONLY ON TISSUE FINDINGS, MORE EXTENSIVE DISEASE MAY NOT BE KNOWN TO THE PATHOLOGIST)  ypT=  0  ypN=  0  AJCC PATHOLOGIC STAGE:  y0.                7/22/2019 - 10/23/2019 Chemotherapy    OP COLON [id number] OXALIplatin / Leucovorin / Fluorouracil  Start of adjuvant therapy delayed due to the above postoperative pelvic abscess.  This was treated surgically initially but then by CT-guided percutaneous drainage as above.  Serial follow-up with Radames Sosa including CTs and antibiotics eventually cleared him adequately to consider resumption of plans for adjuvant therapy.  CTs of the pelvis done on 6/5/2019, 6/13/2019, 6/26/2019 monitoring for this abscess and possible drainage but not able to drain due to decreasing size and minimal residual edema with CT 7/10/2019 showingStable to slight decrease in size of presacral perirectal abscess with presacral edema measuring 3.9 x 1.9 previously 4.1 x 2.5 cm. No new sites of focal fluid collection or loculated fluid.       Past Surgical History:   Procedure Laterality Date    COLON RESECTION N/A 4/23/2019    Procedure: LAPAROSCOPIC LOWER ANTERIOR RESECTION WITH DIVERTING LOOP ILEOOSTOMY, SPLENIC FLEIXURE MOBILIZATION AND LIVER BIOPSY, AND PROCTOSCOPY;  Surgeon: Pau Kelly MD;  Location: [facility] OR;  Service: General    COLONOSCOPY      2018    ILEOSTOMY  04/2019    LIVER BIOPSY  2003    VASECTOMY  1998    VENOUS ACCESS DEVICE (PORT) INSERTION N/A 7/18/2019    Procedure: PORT PLACEMENT;  Surgeon: Franky Cazares MD;  Location: [facility] OR;  Service: General       Family History: family history includes Breast cancer in his mother; Cancer in his father.     Social History:  reports that he quit smoking about 31 years ago. His smoking use included cigarettes. He started smoking about 45 years ago. He has a 14 pack-year smoking history. He has never used smokeless tobacco. He reports that he does not drink alcohol  and does not use drugs.  Social History     Social History Narrative    Lives in [place] with wife and son    Still works, drives a van for [employer]       Medications:  Available home medication information reviewed.  Vitamin C, carvedilol, cyanocobalamin, ezetimibe, fenofibrate, ferrous sulfate, levothyroxine, loperamide, metFORMIN, and multivitamin with minerals    No Known Allergies    Objective   Objective     Vital Signs:   Temp:  [98.4 °F (36.9 °C)] 98.4 °F (36.9 °C)  Heart Rate:  [75] 75  Resp:  [18] 18  BP: (141)/(72) 141/72       Physical Exam   Constitutional: No acute distress, awake, alert, sitting up in chair, on RA and resting comfortably at this time.   HENT: NCAT, nares patent, mucous membranes moist  Respiratory: Clear to auscultation bilaterally, no rhonchi or wheezing, respiratory effort normal   Cardiovascular: RRR, no murmurs, rubs, or gallops  Gastrointestinal: Positive bowel sounds, soft, nontender, nondistended, right sided percutaneous cholecystostomy drain in place, ileostomy present   Musculoskeletal: 1+ bilateral ankle edema, no clubbing or cyanosis   Psychiatric: Appropriate affect, cooperative  Neurologic: Oriented x 3, strength symmetric in all extremities, Cranial Nerves grossly intact to confrontation, speech clear  Skin: No rashes      Result Review:  I have personally reviewed the results from the time of this admission to 7/7/2024 14:56 EDT and agree with these findings:  [x]  Laboratory list / accordion  []  Microbiology  [x]  Radiology  []  EKG/Telemetry   []  Cardiology/Vascular   []  Pathology  [x]  Old records  []  Other:  Most notable findings include:   CT abd/pelvis from 6/28:   1. Cirrhotic morphology of the liver with splenomegaly and mesenteric varices compatible with portal vein hypertension     2. Interval placement of a cholecystostomy catheter with interval decompression of the gallbladder containing gallstones. There is no pericholecystic fluid or fluid  collections     3. Chronic stable surgical changes in the rectosigmoid colon     4. No acute CT abnormalities in the abdomen or pelvis    LAB RESULTS:      Lab 07/07/24  1419 07/07/24  1338   WBC 2.83*  --    HEMOGLOBIN 11.8*  --    HEMATOCRIT 35.6*  --    PLATELETS 51*  --    NEUTROS ABS 1.77  --    IMMATURE GRANS (ABS) 0.01  --    LYMPHS ABS 0.55*  --    MONOS ABS 0.23  --    EOS ABS 0.23  --    MCV 91.0  --    LACTATE  --  1.2   PROTIME  --  15.6*   INR  --  1.22*         Lab 07/07/24  1338   MAGNESIUM 1.9   HEMOGLOBIN A1C 6.50*   TSH 6.630*                         UA          5/2/2024    12:00 5/12/2024    20:32   Urinalysis   Squamous Epithelial Cells, UA 0-2  None Seen    Specific Gravity, UA 1.036  >=1.030    Ketones, UA Trace  Trace    Blood, UA Large (3+)  Negative    Leukocytes, UA Negative  Trace    Nitrite, UA Negative  Positive    RBC, UA 3-5  0-2    WBC, UA 0-2  None Seen    Bacteria, UA None Seen  None Seen        Microbiology Results (last 10 days)       ** No results found for the last 240 hours. **            No radiology results from the last 24 hrs        Assessment & Plan   Assessment & Plan       Acute cholecystitis    Essential hypertension    Thrombocytopenia    Type 2 diabetes mellitus without complication, without long-term current use of insulin    Cirrhosis of liver        Hesham Arevalo is a 59 yo M that presented as a direct admit with plans for cholecystectomy with Dr. Garcia on 7/8, patient currently with percutaneous cholecystectomy drain that was placed by IR during previous hospitalization on 5/13 when he was admitted with acute cholecystitis, gallstone pancreatitis and evaluated by ID, GI, and GS at that time. He has since followed up with Dr. Garcia and plans are for surgery in the AM. He denies any recent acute complaints or problems, reports overall doing well, most recent CT abd/pelvis from 6/28 as above. Plans to admit and will make patient NPO after MN for planned surgery  in the AM. He completed antibiotics following discharge during last hospitalization and was seen by Dr. 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 Garcia and  Hepatology, plans for cholecystectomy on 7/8, NPO after MN   - Currently with percutaneous cholecystectomy drain in place, patient reports now minimal output and  reviewed recent CT abd/pelvis from 6/28  - GI has seen previously during last hospitalization, labs ordered, no current needs for consult but will follow   - Patient was previously on Coumadin but has been off since 11/2022 due to resolution of previous thrombosis   - Last EGD with Dr. Lebron March 2022 - no varices noted  - Check platelets on admission, 51, with planned surgery for tomorrow will plan to transfusion platelets this evening to maintain above 50, will also order platelets on standby for tomorrow AM   - Pancytopenia likely secondary to above , monitor and follow, Hgb above baseline at 11.8 on admission, monitor and follow   - Awaiting CMP at this time, will follow     HTN  HLD   - Continue home Coreg  - Does not appear to be on statin at home.     T2DM   - A1C ordered, now 6.5 last A1C from 5/13 8.3   - Hold home metformin   - FSBS c SSI coverage     Hypothyroidism   - TSH ordered 6.63, reflex t4 pending   - Continue home synthroid       Hx of rectal adenocarcinoma s/p resection and ileostomy April 2019  --Follows with Dr. Kelly and Dr. Moeller, no evidence of recurrence per last note from Oncology 3/28/24, recent negative CT abd/pelvis as above   -- CEA 1.69 from 3/2024   --He reports he has been told he is too high risk for ileostomy reversal         VTE Prophylaxis:  Mechanical VTE prophylaxis orders are signed & held. Mechanical VTE prophylaxis orders are present.          CODE STATUS:    Code Status  and Medical Interventions:   Ordered at: 07/07/24 1400     Level Of Support Discussed With:    Patient     Code Status (Patient has no pulse and is not breathing):    CPR (Attempt to Resuscitate)     Medical Interventions (Patient has pulse or is breathing):    Full Support       Expected Discharge   Expected discharge date/ time has not been documented.     SUKH Triana, DO  07/07/24

## 2024-07-08 ENCOUNTER — ANESTHESIA (OUTPATIENT)
Dept: PERIOP | Facility: HOSPITAL | Age: 59
End: 2024-07-08
Payer: COMMERCIAL

## 2024-07-08 ENCOUNTER — ANESTHESIA EVENT CONVERTED (OUTPATIENT)
Dept: ANESTHESIOLOGY | Facility: HOSPITAL | Age: 59
End: 2024-07-08
Payer: COMMERCIAL

## 2024-07-08 LAB
ALBUMIN SERPL-MCNC: 3 G/DL (ref 3.5–5.2)
ALBUMIN SERPL-MCNC: 3.4 G/DL (ref 3.5–5.2)
ALBUMIN/GLOB SERPL: 1 G/DL
ALBUMIN/GLOB SERPL: 1 G/DL
ALP SERPL-CCNC: 70 U/L (ref 39–117)
ALP SERPL-CCNC: 81 U/L (ref 39–117)
ALT SERPL W P-5'-P-CCNC: 19 U/L (ref 1–41)
ALT SERPL W P-5'-P-CCNC: 20 U/L (ref 1–41)
ANION GAP SERPL CALCULATED.3IONS-SCNC: 8 MMOL/L (ref 5–15)
ANION GAP SERPL CALCULATED.3IONS-SCNC: 9 MMOL/L (ref 5–15)
AST SERPL-CCNC: 37 U/L (ref 1–40)
AST SERPL-CCNC: 38 U/L (ref 1–40)
BASOPHILS # BLD AUTO: 0.03 10*3/MM3 (ref 0–0.2)
BASOPHILS # BLD AUTO: 0.04 10*3/MM3 (ref 0–0.2)
BASOPHILS NFR BLD AUTO: 0.6 % (ref 0–1.5)
BASOPHILS NFR BLD AUTO: 1 % (ref 0–1.5)
BH BB BLOOD EXPIRATION DATE: NORMAL
BH BB BLOOD TYPE BARCODE: 2800
BH BB DISPENSE STATUS: NORMAL
BH BB PRODUCT CODE: NORMAL
BH BB UNIT NUMBER: NORMAL
BILIRUB SERPL-MCNC: 2.4 MG/DL (ref 0–1.2)
BILIRUB SERPL-MCNC: 2.6 MG/DL (ref 0–1.2)
BUN SERPL-MCNC: 15 MG/DL (ref 8–23)
BUN SERPL-MCNC: 16 MG/DL (ref 8–23)
BUN/CREAT SERPL: 17.4 (ref 7–25)
BUN/CREAT SERPL: 18.4 (ref 7–25)
CALCIUM SPEC-SCNC: 8.2 MG/DL (ref 8.6–10.5)
CALCIUM SPEC-SCNC: 8.8 MG/DL (ref 8.6–10.5)
CHLORIDE SERPL-SCNC: 105 MMOL/L (ref 98–107)
CHLORIDE SERPL-SCNC: 105 MMOL/L (ref 98–107)
CO2 SERPL-SCNC: 23 MMOL/L (ref 22–29)
CO2 SERPL-SCNC: 25 MMOL/L (ref 22–29)
CREAT SERPL-MCNC: 0.86 MG/DL (ref 0.76–1.27)
CREAT SERPL-MCNC: 0.87 MG/DL (ref 0.76–1.27)
DEPRECATED RDW RBC AUTO: 47.8 FL (ref 37–54)
DEPRECATED RDW RBC AUTO: 48 FL (ref 37–54)
DEPRECATED RDW RBC AUTO: 48.2 FL (ref 37–54)
EGFRCR SERPLBLD CKD-EPI 2021: 98.8 ML/MIN/1.73
EGFRCR SERPLBLD CKD-EPI 2021: 99.1 ML/MIN/1.73
EOSINOPHIL # BLD AUTO: 0.11 10*3/MM3 (ref 0–0.4)
EOSINOPHIL # BLD AUTO: 0.36 10*3/MM3 (ref 0–0.4)
EOSINOPHIL NFR BLD AUTO: 2.2 % (ref 0.3–6.2)
EOSINOPHIL NFR BLD AUTO: 9 % (ref 0.3–6.2)
ERYTHROCYTE [DISTWIDTH] IN BLOOD BY AUTOMATED COUNT: 14.6 % (ref 12.3–15.4)
GLOBULIN UR ELPH-MCNC: 3.1 GM/DL
GLOBULIN UR ELPH-MCNC: 3.4 GM/DL
GLUCOSE BLDC GLUCOMTR-MCNC: 142 MG/DL (ref 70–130)
GLUCOSE BLDC GLUCOMTR-MCNC: 162 MG/DL (ref 70–130)
GLUCOSE BLDC GLUCOMTR-MCNC: 195 MG/DL (ref 70–130)
GLUCOSE BLDC GLUCOMTR-MCNC: 250 MG/DL (ref 70–130)
GLUCOSE SERPL-MCNC: 125 MG/DL (ref 65–99)
GLUCOSE SERPL-MCNC: 189 MG/DL (ref 65–99)
HCT VFR BLD AUTO: 30.5 % (ref 37.5–51)
HCT VFR BLD AUTO: 31.8 % (ref 37.5–51)
HCT VFR BLD AUTO: 35.4 % (ref 37.5–51)
HGB BLD-MCNC: 10.1 G/DL (ref 13–17.7)
HGB BLD-MCNC: 10.7 G/DL (ref 13–17.7)
HGB BLD-MCNC: 11.7 G/DL (ref 13–17.7)
IMM GRANULOCYTES # BLD AUTO: 0.01 10*3/MM3 (ref 0–0.05)
IMM GRANULOCYTES # BLD AUTO: 0.03 10*3/MM3 (ref 0–0.05)
IMM GRANULOCYTES NFR BLD AUTO: 0.2 % (ref 0–0.5)
IMM GRANULOCYTES NFR BLD AUTO: 0.6 % (ref 0–0.5)
INR PPP: 1.21 (ref 0.89–1.12)
INR PPP: 1.33 (ref 0.89–1.12)
LYMPHOCYTES # BLD AUTO: 0.57 10*3/MM3 (ref 0.7–3.1)
LYMPHOCYTES # BLD AUTO: 0.84 10*3/MM3 (ref 0.7–3.1)
LYMPHOCYTES NFR BLD AUTO: 11.6 % (ref 19.6–45.3)
LYMPHOCYTES NFR BLD AUTO: 20.9 % (ref 19.6–45.3)
MAGNESIUM SERPL-MCNC: 1.8 MG/DL (ref 1.6–2.4)
MCH RBC QN AUTO: 29.7 PG (ref 26.6–33)
MCH RBC QN AUTO: 30 PG (ref 26.6–33)
MCH RBC QN AUTO: 30.4 PG (ref 26.6–33)
MCHC RBC AUTO-ENTMCNC: 33.1 G/DL (ref 31.5–35.7)
MCHC RBC AUTO-ENTMCNC: 33.1 G/DL (ref 31.5–35.7)
MCHC RBC AUTO-ENTMCNC: 33.6 G/DL (ref 31.5–35.7)
MCV RBC AUTO: 89.8 FL (ref 79–97)
MCV RBC AUTO: 90.3 FL (ref 79–97)
MCV RBC AUTO: 90.5 FL (ref 79–97)
MONOCYTES # BLD AUTO: 0.12 10*3/MM3 (ref 0.1–0.9)
MONOCYTES # BLD AUTO: 0.38 10*3/MM3 (ref 0.1–0.9)
MONOCYTES NFR BLD AUTO: 2.4 % (ref 5–12)
MONOCYTES NFR BLD AUTO: 9.5 % (ref 5–12)
NEUTROPHILS NFR BLD AUTO: 2.38 10*3/MM3 (ref 1.7–7)
NEUTROPHILS NFR BLD AUTO: 4.04 10*3/MM3 (ref 1.7–7)
NEUTROPHILS NFR BLD AUTO: 59.4 % (ref 42.7–76)
NEUTROPHILS NFR BLD AUTO: 82.6 % (ref 42.7–76)
NRBC BLD AUTO-RTO: 0 /100 WBC (ref 0–0.2)
NRBC BLD AUTO-RTO: 0 /100 WBC (ref 0–0.2)
PHOSPHATE SERPL-MCNC: 3.9 MG/DL (ref 2.5–4.5)
PLATELET # BLD AUTO: 73 10*3/MM3 (ref 140–450)
PLATELET # BLD AUTO: 73 10*3/MM3 (ref 140–450)
PLATELET # BLD AUTO: 75 10*3/MM3 (ref 140–450)
PMV BLD AUTO: 10.1 FL (ref 6–12)
PMV BLD AUTO: 9.7 FL (ref 6–12)
PMV BLD AUTO: 9.8 FL (ref 6–12)
POTASSIUM SERPL-SCNC: 4.3 MMOL/L (ref 3.5–5.2)
POTASSIUM SERPL-SCNC: 4.3 MMOL/L (ref 3.5–5.2)
PROT SERPL-MCNC: 6.1 G/DL (ref 6–8.5)
PROT SERPL-MCNC: 6.8 G/DL (ref 6–8.5)
PROTHROMBIN TIME: 15.4 SECONDS (ref 12.2–14.5)
PROTHROMBIN TIME: 16.7 SECONDS (ref 12.2–14.5)
RBC # BLD AUTO: 3.37 10*6/MM3 (ref 4.14–5.8)
RBC # BLD AUTO: 3.52 10*6/MM3 (ref 4.14–5.8)
RBC # BLD AUTO: 3.94 10*6/MM3 (ref 4.14–5.8)
SODIUM SERPL-SCNC: 136 MMOL/L (ref 136–145)
SODIUM SERPL-SCNC: 139 MMOL/L (ref 136–145)
UNIT  ABO: NORMAL
UNIT  RH: NORMAL
WBC NRBC COR # BLD AUTO: 4.01 10*3/MM3 (ref 3.4–10.8)
WBC NRBC COR # BLD AUTO: 4.9 10*3/MM3 (ref 3.4–10.8)
WBC NRBC COR # BLD AUTO: 5.61 10*3/MM3 (ref 3.4–10.8)

## 2024-07-08 PROCEDURE — 0FT40ZZ RESECTION OF GALLBLADDER, OPEN APPROACH: ICD-10-PCS | Performed by: SURGERY

## 2024-07-08 PROCEDURE — 25810000003 LACTATED RINGERS PER 1000 ML: Performed by: SURGERY

## 2024-07-08 PROCEDURE — 25010000002 MEPERIDINE PER 100 MG

## 2024-07-08 PROCEDURE — 84100 ASSAY OF PHOSPHORUS: CPT | Performed by: SURGERY

## 2024-07-08 PROCEDURE — 85610 PROTHROMBIN TIME: CPT | Performed by: SURGERY

## 2024-07-08 PROCEDURE — 25010000002 DEXAMETHASONE SODIUM PHOSPHATE 10 MG/ML SOLUTION: Performed by: ANESTHESIOLOGY

## 2024-07-08 PROCEDURE — 25010000002 FENTANYL CITRATE (PF) 50 MCG/ML SOLUTION

## 2024-07-08 PROCEDURE — 85027 COMPLETE CBC AUTOMATED: CPT | Performed by: FAMILY MEDICINE

## 2024-07-08 PROCEDURE — 85025 COMPLETE CBC W/AUTO DIFF WBC: CPT | Performed by: SURGERY

## 2024-07-08 PROCEDURE — 25010000002 FENTANYL CITRATE (PF) 100 MCG/2ML SOLUTION: Performed by: NURSE ANESTHETIST, CERTIFIED REGISTERED

## 2024-07-08 PROCEDURE — 25010000002 CEFAZOLIN PER 500 MG: Performed by: SURGERY

## 2024-07-08 PROCEDURE — 80053 COMPREHEN METABOLIC PANEL: CPT | Performed by: FAMILY MEDICINE

## 2024-07-08 PROCEDURE — 80053 COMPREHEN METABOLIC PANEL: CPT | Performed by: SURGERY

## 2024-07-08 PROCEDURE — P9100 PATHOGEN TEST FOR PLATELETS: HCPCS

## 2024-07-08 PROCEDURE — 25010000002 PROPOFOL 10 MG/ML EMULSION: Performed by: NURSE ANESTHETIST, CERTIFIED REGISTERED

## 2024-07-08 PROCEDURE — 63710000001 INSULIN LISPRO (HUMAN) PER 5 UNITS: Performed by: FAMILY MEDICINE

## 2024-07-08 PROCEDURE — 83735 ASSAY OF MAGNESIUM: CPT | Performed by: FAMILY MEDICINE

## 2024-07-08 PROCEDURE — 36430 TRANSFUSION BLD/BLD COMPNT: CPT

## 2024-07-08 PROCEDURE — 25010000002 BUPIVACAINE (PF) 0.25 % SOLUTION: Performed by: ANESTHESIOLOGY

## 2024-07-08 PROCEDURE — 25010000002 SUGAMMADEX 200 MG/2ML SOLUTION: Performed by: NURSE ANESTHETIST, CERTIFIED REGISTERED

## 2024-07-08 PROCEDURE — 88304 TISSUE EXAM BY PATHOLOGIST: CPT | Performed by: SURGERY

## 2024-07-08 PROCEDURE — P9037 PLATE PHERES LEUKOREDU IRRAD: HCPCS

## 2024-07-08 PROCEDURE — 25810000003 LACTATED RINGERS PER 1000 ML: Performed by: NURSE ANESTHETIST, CERTIFIED REGISTERED

## 2024-07-08 PROCEDURE — 82948 REAGENT STRIP/BLOOD GLUCOSE: CPT

## 2024-07-08 PROCEDURE — 25810000003 LACTATED RINGERS PER 1000 ML: Performed by: ANESTHESIOLOGY

## 2024-07-08 PROCEDURE — 25010000002 HYDROMORPHONE 1 MG/ML SOLUTION

## 2024-07-08 PROCEDURE — 25010000002 ONDANSETRON PER 1 MG: Performed by: NURSE ANESTHETIST, CERTIFIED REGISTERED

## 2024-07-08 PROCEDURE — 85025 COMPLETE CBC W/AUTO DIFF WBC: CPT | Performed by: FAMILY MEDICINE

## 2024-07-08 PROCEDURE — 99232 SBSQ HOSP IP/OBS MODERATE 35: CPT | Performed by: FAMILY MEDICINE

## 2024-07-08 DEVICE — FLOSEAL WITH RECOTHROM - 10ML.
Type: IMPLANTABLE DEVICE | Site: ABDOMEN | Status: FUNCTIONAL
Brand: FLOSEAL HEMOSTATIC MATRIX

## 2024-07-08 DEVICE — CLIP LIGAT VASC HORIZON TI MD/LG GRN 6CT: Type: IMPLANTABLE DEVICE | Site: ABDOMEN | Status: FUNCTIONAL

## 2024-07-08 DEVICE — CLIP LIGAT VASC HORIZON TI LG ORNG 6CT: Type: IMPLANTABLE DEVICE | Site: ABDOMEN | Status: FUNCTIONAL

## 2024-07-08 RX ORDER — DROPERIDOL 2.5 MG/ML
0.62 INJECTION, SOLUTION INTRAMUSCULAR; INTRAVENOUS
Status: DISCONTINUED | OUTPATIENT
Start: 2024-07-08 | End: 2024-07-08 | Stop reason: HOSPADM

## 2024-07-08 RX ORDER — BUPIVACAINE HYDROCHLORIDE 2.5 MG/ML
INJECTION, SOLUTION EPIDURAL; INFILTRATION; INTRACAUDAL
Status: COMPLETED | OUTPATIENT
Start: 2024-07-08 | End: 2024-07-08

## 2024-07-08 RX ORDER — PROMETHAZINE HYDROCHLORIDE 25 MG/1
25 SUPPOSITORY RECTAL ONCE AS NEEDED
Status: DISCONTINUED | OUTPATIENT
Start: 2024-07-08 | End: 2024-07-08 | Stop reason: HOSPADM

## 2024-07-08 RX ORDER — MEPERIDINE HYDROCHLORIDE 25 MG/ML
12.5 INJECTION INTRAMUSCULAR; INTRAVENOUS; SUBCUTANEOUS
Status: DISCONTINUED | OUTPATIENT
Start: 2024-07-08 | End: 2024-07-08 | Stop reason: HOSPADM

## 2024-07-08 RX ORDER — PANTOPRAZOLE SODIUM 40 MG/10ML
40 INJECTION, POWDER, LYOPHILIZED, FOR SOLUTION INTRAVENOUS
Status: DISCONTINUED | OUTPATIENT
Start: 2024-07-09 | End: 2024-07-12 | Stop reason: HOSPADM

## 2024-07-08 RX ORDER — MIDAZOLAM HYDROCHLORIDE 1 MG/ML
1 INJECTION INTRAMUSCULAR; INTRAVENOUS
Status: DISCONTINUED | OUTPATIENT
Start: 2024-07-08 | End: 2024-07-08 | Stop reason: HOSPADM

## 2024-07-08 RX ORDER — ONDANSETRON 2 MG/ML
4 INJECTION INTRAMUSCULAR; INTRAVENOUS EVERY 6 HOURS PRN
Status: DISCONTINUED | OUTPATIENT
Start: 2024-07-08 | End: 2024-07-12 | Stop reason: HOSPADM

## 2024-07-08 RX ORDER — HYDROCODONE BITARTRATE AND ACETAMINOPHEN 5; 325 MG/1; MG/1
1 TABLET ORAL ONCE AS NEEDED
Status: DISCONTINUED | OUTPATIENT
Start: 2024-07-08 | End: 2024-07-08 | Stop reason: HOSPADM

## 2024-07-08 RX ORDER — ONDANSETRON 2 MG/ML
INJECTION INTRAMUSCULAR; INTRAVENOUS AS NEEDED
Status: DISCONTINUED | OUTPATIENT
Start: 2024-07-08 | End: 2024-07-08 | Stop reason: SURG

## 2024-07-08 RX ORDER — FENTANYL CITRATE 50 UG/ML
50 INJECTION, SOLUTION INTRAMUSCULAR; INTRAVENOUS
Status: DISCONTINUED | OUTPATIENT
Start: 2024-07-08 | End: 2024-07-08 | Stop reason: HOSPADM

## 2024-07-08 RX ORDER — SODIUM CHLORIDE 9 MG/ML
INJECTION, SOLUTION INTRAVENOUS CONTINUOUS PRN
Status: DISCONTINUED | OUTPATIENT
Start: 2024-07-08 | End: 2024-07-08 | Stop reason: SURG

## 2024-07-08 RX ORDER — HYDROMORPHONE HYDROCHLORIDE 1 MG/ML
0.5 INJECTION, SOLUTION INTRAMUSCULAR; INTRAVENOUS; SUBCUTANEOUS
Status: DISCONTINUED | OUTPATIENT
Start: 2024-07-08 | End: 2024-07-08 | Stop reason: HOSPADM

## 2024-07-08 RX ORDER — NALOXONE HCL 0.4 MG/ML
0.1 VIAL (ML) INJECTION
Status: DISCONTINUED | OUTPATIENT
Start: 2024-07-08 | End: 2024-07-12 | Stop reason: HOSPADM

## 2024-07-08 RX ORDER — SODIUM CHLORIDE 0.9 % (FLUSH) 0.9 %
3-10 SYRINGE (ML) INJECTION AS NEEDED
Status: DISCONTINUED | OUTPATIENT
Start: 2024-07-08 | End: 2024-07-08 | Stop reason: HOSPADM

## 2024-07-08 RX ORDER — INSULIN LISPRO 100 [IU]/ML
2-9 INJECTION, SOLUTION INTRAVENOUS; SUBCUTANEOUS
Status: DISCONTINUED | OUTPATIENT
Start: 2024-07-08 | End: 2024-07-12 | Stop reason: HOSPADM

## 2024-07-08 RX ORDER — SODIUM CHLORIDE 0.9 % (FLUSH) 0.9 %
3 SYRINGE (ML) INJECTION EVERY 12 HOURS SCHEDULED
Status: DISCONTINUED | OUTPATIENT
Start: 2024-07-08 | End: 2024-07-08 | Stop reason: HOSPADM

## 2024-07-08 RX ORDER — ROCURONIUM BROMIDE 10 MG/ML
INJECTION, SOLUTION INTRAVENOUS AS NEEDED
Status: DISCONTINUED | OUTPATIENT
Start: 2024-07-08 | End: 2024-07-08 | Stop reason: SURG

## 2024-07-08 RX ORDER — HYDROMORPHONE HCL/0.9% NACL/PF 10 MG/50ML
PATIENT CONTROLLED ANALGESIA SYRINGE INTRAVENOUS CONTINUOUS
Status: DISCONTINUED | OUTPATIENT
Start: 2024-07-08 | End: 2024-07-09

## 2024-07-08 RX ORDER — SODIUM CHLORIDE, SODIUM LACTATE, POTASSIUM CHLORIDE, CALCIUM CHLORIDE 600; 310; 30; 20 MG/100ML; MG/100ML; MG/100ML; MG/100ML
75 INJECTION, SOLUTION INTRAVENOUS CONTINUOUS
Status: DISCONTINUED | OUTPATIENT
Start: 2024-07-08 | End: 2024-07-09

## 2024-07-08 RX ORDER — FENTANYL CITRATE 50 UG/ML
INJECTION, SOLUTION INTRAMUSCULAR; INTRAVENOUS
Status: COMPLETED
Start: 2024-07-08 | End: 2024-07-08

## 2024-07-08 RX ORDER — SODIUM CHLORIDE 9 MG/ML
40 INJECTION, SOLUTION INTRAVENOUS AS NEEDED
Status: DISCONTINUED | OUTPATIENT
Start: 2024-07-08 | End: 2024-07-08 | Stop reason: HOSPADM

## 2024-07-08 RX ORDER — PROMETHAZINE HYDROCHLORIDE 25 MG/1
25 TABLET ORAL ONCE AS NEEDED
Status: DISCONTINUED | OUTPATIENT
Start: 2024-07-08 | End: 2024-07-08 | Stop reason: HOSPADM

## 2024-07-08 RX ORDER — MEPERIDINE HYDROCHLORIDE 25 MG/ML
INJECTION INTRAMUSCULAR; INTRAVENOUS; SUBCUTANEOUS
Status: COMPLETED
Start: 2024-07-08 | End: 2024-07-08

## 2024-07-08 RX ORDER — HYDROMORPHONE HCL/0.9% NACL/PF 10 MG/50ML
PATIENT CONTROLLED ANALGESIA SYRINGE INTRAVENOUS
Status: COMPLETED
Start: 2024-07-08 | End: 2024-07-08

## 2024-07-08 RX ORDER — SODIUM CHLORIDE 0.9 % (FLUSH) 0.9 %
10 SYRINGE (ML) INJECTION AS NEEDED
Status: DISCONTINUED | OUTPATIENT
Start: 2024-07-08 | End: 2024-07-08 | Stop reason: HOSPADM

## 2024-07-08 RX ORDER — FAMOTIDINE 10 MG/ML
20 INJECTION, SOLUTION INTRAVENOUS ONCE
Status: COMPLETED | OUTPATIENT
Start: 2024-07-08 | End: 2024-07-08

## 2024-07-08 RX ORDER — FENTANYL CITRATE 50 UG/ML
INJECTION, SOLUTION INTRAMUSCULAR; INTRAVENOUS AS NEEDED
Status: DISCONTINUED | OUTPATIENT
Start: 2024-07-08 | End: 2024-07-08 | Stop reason: SURG

## 2024-07-08 RX ORDER — SODIUM CHLORIDE, SODIUM LACTATE, POTASSIUM CHLORIDE, CALCIUM CHLORIDE 600; 310; 30; 20 MG/100ML; MG/100ML; MG/100ML; MG/100ML
9 INJECTION, SOLUTION INTRAVENOUS CONTINUOUS
Status: DISCONTINUED | OUTPATIENT
Start: 2024-07-08 | End: 2024-07-09

## 2024-07-08 RX ORDER — DEXAMETHASONE SODIUM PHOSPHATE 10 MG/ML
INJECTION, SOLUTION INTRAMUSCULAR; INTRAVENOUS
Status: COMPLETED | OUTPATIENT
Start: 2024-07-08 | End: 2024-07-08

## 2024-07-08 RX ORDER — LIDOCAINE HYDROCHLORIDE 10 MG/ML
INJECTION, SOLUTION EPIDURAL; INFILTRATION; INTRACAUDAL; PERINEURAL AS NEEDED
Status: DISCONTINUED | OUTPATIENT
Start: 2024-07-08 | End: 2024-07-08 | Stop reason: SURG

## 2024-07-08 RX ORDER — HYDRALAZINE HYDROCHLORIDE 20 MG/ML
5 INJECTION INTRAMUSCULAR; INTRAVENOUS
Status: DISCONTINUED | OUTPATIENT
Start: 2024-07-08 | End: 2024-07-08 | Stop reason: HOSPADM

## 2024-07-08 RX ORDER — LIDOCAINE HYDROCHLORIDE 10 MG/ML
0.5 INJECTION, SOLUTION EPIDURAL; INFILTRATION; INTRACAUDAL; PERINEURAL ONCE AS NEEDED
Status: DISCONTINUED | OUTPATIENT
Start: 2024-07-08 | End: 2024-07-08 | Stop reason: HOSPADM

## 2024-07-08 RX ORDER — IPRATROPIUM BROMIDE AND ALBUTEROL SULFATE 2.5; .5 MG/3ML; MG/3ML
3 SOLUTION RESPIRATORY (INHALATION) ONCE AS NEEDED
Status: DISCONTINUED | OUTPATIENT
Start: 2024-07-08 | End: 2024-07-08 | Stop reason: HOSPADM

## 2024-07-08 RX ORDER — DEXAMETHASONE SODIUM PHOSPHATE 10 MG/ML
INJECTION, SOLUTION INTRAMUSCULAR; INTRAVENOUS AS NEEDED
Status: DISCONTINUED | OUTPATIENT
Start: 2024-07-08 | End: 2024-07-08 | Stop reason: SURG

## 2024-07-08 RX ORDER — PROMETHAZINE HYDROCHLORIDE 12.5 MG/1
12.5 TABLET ORAL EVERY 6 HOURS PRN
Status: DISCONTINUED | OUTPATIENT
Start: 2024-07-08 | End: 2024-07-12 | Stop reason: HOSPADM

## 2024-07-08 RX ORDER — PROPOFOL 10 MG/ML
VIAL (ML) INTRAVENOUS AS NEEDED
Status: DISCONTINUED | OUTPATIENT
Start: 2024-07-08 | End: 2024-07-08 | Stop reason: SURG

## 2024-07-08 RX ORDER — ONDANSETRON 2 MG/ML
4 INJECTION INTRAMUSCULAR; INTRAVENOUS ONCE AS NEEDED
Status: DISCONTINUED | OUTPATIENT
Start: 2024-07-08 | End: 2024-07-08 | Stop reason: HOSPADM

## 2024-07-08 RX ORDER — NALOXONE HCL 0.4 MG/ML
0.4 VIAL (ML) INJECTION AS NEEDED
Status: DISCONTINUED | OUTPATIENT
Start: 2024-07-08 | End: 2024-07-08 | Stop reason: HOSPADM

## 2024-07-08 RX ORDER — ONDANSETRON 4 MG/1
4 TABLET, ORALLY DISINTEGRATING ORAL EVERY 6 HOURS PRN
Status: DISCONTINUED | OUTPATIENT
Start: 2024-07-08 | End: 2024-07-12 | Stop reason: HOSPADM

## 2024-07-08 RX ORDER — LABETALOL HYDROCHLORIDE 5 MG/ML
5 INJECTION, SOLUTION INTRAVENOUS
Status: DISCONTINUED | OUTPATIENT
Start: 2024-07-08 | End: 2024-07-08 | Stop reason: HOSPADM

## 2024-07-08 RX ORDER — DROPERIDOL 2.5 MG/ML
0.62 INJECTION, SOLUTION INTRAMUSCULAR; INTRAVENOUS ONCE AS NEEDED
Status: DISCONTINUED | OUTPATIENT
Start: 2024-07-08 | End: 2024-07-08 | Stop reason: HOSPADM

## 2024-07-08 RX ORDER — MAGNESIUM HYDROXIDE 1200 MG/15ML
LIQUID ORAL AS NEEDED
Status: DISCONTINUED | OUTPATIENT
Start: 2024-07-08 | End: 2024-07-08 | Stop reason: HOSPADM

## 2024-07-08 RX ORDER — SODIUM CHLORIDE, SODIUM LACTATE, POTASSIUM CHLORIDE, CALCIUM CHLORIDE 600; 310; 30; 20 MG/100ML; MG/100ML; MG/100ML; MG/100ML
INJECTION, SOLUTION INTRAVENOUS CONTINUOUS PRN
Status: DISCONTINUED | OUTPATIENT
Start: 2024-07-08 | End: 2024-07-08 | Stop reason: SURG

## 2024-07-08 RX ORDER — DIPHENHYDRAMINE HYDROCHLORIDE 50 MG/ML
25 INJECTION INTRAMUSCULAR; INTRAVENOUS EVERY 6 HOURS PRN
Status: DISCONTINUED | OUTPATIENT
Start: 2024-07-08 | End: 2024-07-12 | Stop reason: HOSPADM

## 2024-07-08 RX ADMIN — MEPERIDINE HYDROCHLORIDE 12.5 MG: 25 INJECTION INTRAMUSCULAR; INTRAVENOUS; SUBCUTANEOUS at 10:10

## 2024-07-08 RX ADMIN — BUPIVACAINE HYDROCHLORIDE 60 ML: 2.5 INJECTION, SOLUTION EPIDURAL; INFILTRATION; INTRACAUDAL; PERINEURAL at 07:44

## 2024-07-08 RX ADMIN — Medication: at 10:46

## 2024-07-08 RX ADMIN — INSULIN LISPRO 4 UNITS: 100 INJECTION, SOLUTION INTRAVENOUS; SUBCUTANEOUS at 20:57

## 2024-07-08 RX ADMIN — SODIUM CHLORIDE 2000 MG: 900 INJECTION INTRAVENOUS at 07:40

## 2024-07-08 RX ADMIN — SODIUM CHLORIDE, POTASSIUM CHLORIDE, SODIUM LACTATE AND CALCIUM CHLORIDE 75 ML/HR: 600; 310; 30; 20 INJECTION, SOLUTION INTRAVENOUS at 13:27

## 2024-07-08 RX ADMIN — SODIUM CHLORIDE, POTASSIUM CHLORIDE, SODIUM LACTATE AND CALCIUM CHLORIDE: 600; 310; 30; 20 INJECTION, SOLUTION INTRAVENOUS at 09:40

## 2024-07-08 RX ADMIN — PROPOFOL 100 MG: 10 INJECTION, EMULSION INTRAVENOUS at 07:43

## 2024-07-08 RX ADMIN — CARVEDILOL 6.25 MG: 6.25 TABLET, FILM COATED ORAL at 17:13

## 2024-07-08 RX ADMIN — FAMOTIDINE 20 MG: 10 INJECTION INTRAVENOUS at 06:37

## 2024-07-08 RX ADMIN — ONDANSETRON 4 MG: 2 INJECTION INTRAMUSCULAR; INTRAVENOUS at 09:24

## 2024-07-08 RX ADMIN — PROPOFOL 200 MG: 10 INJECTION, EMULSION INTRAVENOUS at 07:38

## 2024-07-08 RX ADMIN — SODIUM CHLORIDE, POTASSIUM CHLORIDE, SODIUM LACTATE AND CALCIUM CHLORIDE: 600; 310; 30; 20 INJECTION, SOLUTION INTRAVENOUS at 07:30

## 2024-07-08 RX ADMIN — MUPIROCIN 1 APPLICATION: 20 OINTMENT TOPICAL at 06:37

## 2024-07-08 RX ADMIN — INSULIN LISPRO 2 UNITS: 100 INJECTION, SOLUTION INTRAVENOUS; SUBCUTANEOUS at 13:26

## 2024-07-08 RX ADMIN — DEXAMETHASONE SODIUM PHOSPHATE 4 MG: 10 INJECTION INTRAMUSCULAR; INTRAVENOUS at 07:44

## 2024-07-08 RX ADMIN — ROCURONIUM BROMIDE 10 MG: 10 INJECTION INTRAVENOUS at 08:45

## 2024-07-08 RX ADMIN — HYDROMORPHONE HYDROCHLORIDE 0.5 MG: 1 INJECTION, SOLUTION INTRAMUSCULAR; INTRAVENOUS; SUBCUTANEOUS at 10:20

## 2024-07-08 RX ADMIN — FENTANYL CITRATE 50 MCG: 50 INJECTION, SOLUTION INTRAMUSCULAR; INTRAVENOUS at 08:09

## 2024-07-08 RX ADMIN — ROCURONIUM BROMIDE 50 MG: 10 INJECTION INTRAVENOUS at 07:38

## 2024-07-08 RX ADMIN — DEXAMETHASONE SODIUM PHOSPHATE 5 MG: 10 INJECTION, SOLUTION INTRAMUSCULAR; INTRAVENOUS at 07:38

## 2024-07-08 RX ADMIN — FENTANYL CITRATE 50 MCG: 50 INJECTION, SOLUTION INTRAMUSCULAR; INTRAVENOUS at 08:08

## 2024-07-08 RX ADMIN — SUGAMMADEX 200 MG: 100 INJECTION, SOLUTION INTRAVENOUS at 09:45

## 2024-07-08 RX ADMIN — SODIUM CHLORIDE, POTASSIUM CHLORIDE, SODIUM LACTATE AND CALCIUM CHLORIDE 9 ML/HR: 600; 310; 30; 20 INJECTION, SOLUTION INTRAVENOUS at 06:37

## 2024-07-08 RX ADMIN — SODIUM CHLORIDE: 9 INJECTION, SOLUTION INTRAVENOUS at 08:56

## 2024-07-08 RX ADMIN — INSULIN LISPRO 2 UNITS: 100 INJECTION, SOLUTION INTRAVENOUS; SUBCUTANEOUS at 17:13

## 2024-07-08 RX ADMIN — FENTANYL CITRATE 50 MCG: 50 INJECTION, SOLUTION INTRAMUSCULAR; INTRAVENOUS at 10:07

## 2024-07-08 RX ADMIN — LIDOCAINE HYDROCHLORIDE 50 MG: 10 INJECTION, SOLUTION EPIDURAL; INFILTRATION; INTRACAUDAL; PERINEURAL at 07:38

## 2024-07-08 RX ADMIN — ACETAMINOPHEN 650 MG: 325 TABLET ORAL at 20:57

## 2024-07-08 RX ADMIN — ROCURONIUM BROMIDE 10 MG: 10 INJECTION INTRAVENOUS at 09:06

## 2024-07-08 NOTE — PROGRESS NOTES
[facility] Medicine Services  PROGRESS NOTE    Patient Name: Hesham Arevalo Jr.  : 1965  MRN: [id number]    Date of Admission: 2024  Primary Care Physician: Myke Mendoza MD    Subjective   Subjective     CC:  Acute cholecystitis     HPI:  Patient is a 58-year-old male seen and examined by me this a.m., he is resting comfortably in bed following return from open cholecystectomy earlier this a.m.  Family updated at bedside, he reports overall doing well since surgery and denies any new acute complaints or problems at this time. Continue to follow with Dr. Garcia       Objective   Objective     Vital Signs:   Temp:  [97 °F (36.1 °C)-98.9 °F (37.2 °C)] 98.4 °F (36.9 °C)  Heart Rate:  [61-75] 70  Resp:  [16-22] 18  BP: (114-144)/(61-80) 132/71  Flow (L/min):  [2] 2     Physical Exam:  Constitutional: No acute distress, awake, alert, resting comfortably in bed, frail appearing   HENT: NCAT, nares patent, mucous membranes moist  Respiratory: Decreased BS bilaterally, no rhonchi or wheezing, respiratory effort normal   Cardiovascular: RRR, no murmurs, rubs, or gallops  Gastrointestinal: Positive bowel sounds, soft, nontender, nondistended, ileostomy present, FRANTZ drain present with bloody drainage  Musculoskeletal: No bilateral ankle edema, no clubbing or cyanosis   Psychiatric: Appropriate affect, cooperative  Neurologic: Oriented x 3, strength symmetric in all extremities, Cranial Nerves grossly intact to confrontation, speech clear  Skin: No rashes      Results Reviewed:  LAB RESULTS:      Lab 24  1027 24  0630 24  0539 24  1419 24  1338   WBC 4.90  --  4.01 2.83*  --    HEMOGLOBIN 10.1*  --  11.7* 11.8*  --    HEMATOCRIT 30.5*  --  35.4* 35.6*  --    PLATELETS 73*  --  73* 51*  --    NEUTROS ABS 4.04  --  2.38 1.77  --    IMMATURE GRANS (ABS) 0.03  --  0.01 0.01  --    LYMPHS ABS 0.57*  --  0.84 0.55*  --    MONOS ABS 0.12  --  0.38 0.23  --     EOS ABS 0.11  --  0.36 0.23  --    MCV 90.5  --  89.8 91.0  --    LACTATE  --   --   --   --  1.2   PROTIME 16.7* 15.4*  --   --  15.6*         Lab 07/08/24  1027 07/08/24  0539 07/07/24  1842 07/07/24  1338   SODIUM 136 139 138  --    POTASSIUM 4.3 4.3 4.2  --    CHLORIDE 105 105 104  --    CO2 23.0 25.0 23.0  --    ANION GAP 8.0 9.0 11.0  --    BUN 15 16 16  --    CREATININE 0.86 0.87 0.93  --    EGFR 99.1 98.8 94.0  --    GLUCOSE 189* 125* 229*  --    CALCIUM 8.2* 8.8 8.9  --    MAGNESIUM  --  1.8  --  1.9   PHOSPHORUS  --  3.9  --   --    HEMOGLOBIN A1C  --   --   --  6.50*   TSH  --   --   --  6.630*         Lab 07/08/24  1027 07/08/24  0539 07/07/24  1842   TOTAL PROTEIN 6.1 6.8 6.7   ALBUMIN 3.0* 3.4* 3.3*   GLOBULIN 3.1 3.4 3.4   ALT (SGPT) 19 20 22   AST (SGOT) 37 38 48*   BILIRUBIN 2.4* 2.6* 2.0*   ALK PHOS 70 81 76         Lab 07/08/24 1027 07/08/24  0630 07/07/24  1338   PROTIME 16.7* 15.4* 15.6*   INR 1.33* 1.21* 1.22*             Lab 07/07/24  1604   ABO TYPING A   RH TYPING Negative   ANTIBODY SCREEN Negative         Brief Urine Lab Results  (Last result in the past 365 days)        Color   Clarity   Blood   Leuk Est   Nitrite   Protein   CREAT   Urine HCG        05/12/24 2032 Dark Yellow   Clear   Negative   Trace   Positive   Trace                   Microbiology Results Abnormal       None            Peripheral Block    Result Date: 7/8/2024  Estelle Wilkerson CRNA     7/8/2024  8:08 AM Peripheral Block Pre-sedation assessment completed: 7/8/2024 7:40 AM Patient reassessed immediately prior to procedure Patient location during procedure: OR Start time: 7/8/2024 7:41 AM Stop time: 7/8/2024 7:44 AM Reason for block: at surgeon's request and post-op pain management Performed by Anesthesiologist: Jesus Curran MD Preanesthetic Checklist Completed: patient identified, IV checked, site marked, risks and benefits discussed, surgical consent, monitors and equipment checked, pre-op evaluation and  "timeout performed Prep: Pt Position: supine Sterile barriers:cap, gloves, mask and washed/disinfected hands Prep: ChloraPrep Patient monitoring: blood pressure monitoring, continuous pulse oximetry and EKG Procedure Sedation: yes Performed under: general Guidance:ultrasound guided Images:still images obtained, printed/placed on chart Laterality:Bilateral Block Type:TAP Injection Technique:single-shot Needle Type:short-bevel and echogenic Needle Gauge:20 G Resistance on Injection: none Medications Used: bupivacaine PF (MARCAINE) 0.25 % injection - Injection  60 mL - 7/8/2024 7:44:00 AM dexamethasone sodium phosphate injection - Injection  4 mg - 7/8/2024 7:44:00 AM Medications Comment:Block Injection:  LA dose divided between Right and Left block Post Assessment Injection Assessment: negative aspiration for heme, incremental injection and no paresthesia on injection Patient Tolerance:comfortable throughout block Complications:no Additional Notes Mid-Axillary/Lateral TAPs A high-frequency linear transducer, with sterile cover, was placed in the midaxillary line between the ASIS and costal margin. The External Oblique Muscle (EOM), Internal Oblique Muscle (IOM), Transverse Abdominus Muscle (BROWNE), and Peritoneum were identified. The insertion site was prepped in sterile fashion and then localized with 2-5 ml of 1% Lidocaine. Using ultrasound-guidance, a 20-gauge B-Arroyo 4\" Ultraplex 360 non-stimulating echogenic needle was advanced in plane, from medial to lateral, until the tip of the needle was in the fascial plane between the IOM and BROWNE. 1-3ml of preservative free normal saline was used to hydro-dissect the fascial planes. After the fascial plane was verified, the local anesthetic (LA) was injected. The procedure was repeated on the opposite side for bilateral coverage. Aspiration every 5 ml to prevent intravascular injection. Injection was completed with negative aspiration of blood and negative intravascular " injection. Injection pressures were normal with minimal resistance. Midaxillary TAPs should reach intercostal nerves T10- T11 and the subcostal nerve T12.  Performed by: Jesus Curran MD          Current medications:  Scheduled Meds:carvedilol, 6.25 mg, Oral, BID With Meals  [Held by provider] ferrous sulfate, 325 mg, Oral, Every Other Day  insulin lispro, 2-9 Units, Subcutaneous, 4x Daily AC & at Bedtime  levothyroxine, 25 mcg, Oral, Daily  [START ON 7/9/2024] pantoprazole, 40 mg, Intravenous, Q AM      Continuous Infusions:HYDROmorphone HCl-NaCl,   lactated ringers, 9 mL/hr, Last Rate: 9 mL/hr (07/08/24 0637)  lactated ringers, 75 mL/hr  Pharmacy Consult,       PRN Meds:.  acetaminophen **OR** acetaminophen **OR** acetaminophen    Calcium Replacement - Follow Nurse / BPA Driven Protocol    dextrose    dextrose    diphenhydrAMINE    glucagon (human recombinant)    Magnesium Standard Dose Replacement - Follow Nurse / BPA Driven Protocol    naloxone    nitroglycerin    ondansetron ODT **OR** ondansetron    Pharmacy Consult    Phosphorus Replacement - Follow Nurse / BPA Driven Protocol    Potassium Replacement - Follow Nurse / BPA Driven Protocol    promethazine    sodium chloride    sodium chloride    Assessment & Plan   Assessment & Plan     Active Hospital Problems    Diagnosis  POA    **Acute cholecystitis [K81.0]  Yes    Cirrhosis of liver [K74.60]  Yes    Type 2 diabetes mellitus without complication, without long-term current use of insulin [E11.9]  Yes    Thrombocytopenia [D69.6]  Yes    Essential hypertension [I10]  Yes      Resolved Hospital Problems   No resolved problems to display.        Brief Hospital Course to date:  Hesham Arevalo Jr. is a 58 y.o. male  that presented as a direct admit with plans for cholecystectomy with Dr. Garcia on 7/8, patient currently with percutaneous cholecystectomy drain that was placed by IR during previous hospitalization on 5/13 when he was admitted with acute  cholecystitis, gallstone pancreatitis and evaluated by ID, GI, and GS at that time. He has since followed up with Dr. Garcia and plans are for surgery in the AM. He denies any recent acute complaints or problems, reports overall doing well, most recent CT abd/pelvis from 6/28 as above. Plans to admit and will make patient NPO after MN for planned surgery in the AM. He completed antibiotics following discharge during last hospitalization and was seen by Dr. Harley.      Patient seen again on 7/8 following return from open cholecystectomy with Dr. Garcia, discussed with him this AM as well following surgery, he did have about 500 cc of blood loss during surgery and complicated with previous surgical history and cirrhosis, will continue to monitor his labs at this time.      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 Garcia and  Hepatology, s/p open cholecystectomy with Dr. Garcia on 7/8   - GI has seen previously during last hospitalization, labs ordered, no current needs for consult but will follow   - Patient was previously on Coumadin but has been off since 11/2022 due to resolution of previous thrombosis   - Last EGD with Dr. Lebron March 2022 - no varices noted  - Check platelets on admission, 51, given 2 U platelets in total, 1 on 7/7 and 1 on 7/8 during surgery  - Pancytopenia likely secondary to above , monitor and follow, Hgb above baseline at 11.8 on admission, monitor and follow   - Continue to follow serial labs, transfuse additional platelets or blood as needed following surgery   - Ordered for CLD, advance as tolerated per Dr. Garcia  - Pain control, currently on PCA pump at this time.      HTN  HLD   - Continue home Coreg  - Does not appear to be on statin at home.      T2DM   - A1C ordered, now 6.5 last A1C from 5/13 8.3   -  Hold home metformin   - FSBS c SSI coverage      Hypothyroidism   - TSH ordered 6.63, reflex t4 0.8  - Will look at possibly adjusting home synthroid, only on 25 mcg daily but will follow after surgery      Hx of rectal adenocarcinoma s/p resection and ileostomy April 2019  --Follows with Dr. Kelly and Dr. Moeller, no evidence of recurrence per last note from Oncology 3/28/24, recent negative CT abd/pelvis as above   -- CEA 1.69 from 3/2024   --He reports he has been told he is too high risk for ileostomy reversal       Expected Discharge Location and Transportation: [facility] Home   Expected Discharge 7/10/2024  Expected Discharge Date: 7/10/2024; Expected Discharge Time:      VTE Prophylaxis:  Mechanical VTE prophylaxis orders are present.         AM-PAC 6 Clicks Score (PT): 14 (07/08/24 1138)    CODE STATUS:   Code Status and Medical Interventions:   Ordered at: 07/07/24 1400     Level Of Support Discussed With:    Patient     Code Status (Patient has no pulse and is not breathing):    CPR (Attempt to Resuscitate)     Medical Interventions (Patient has pulse or is breathing):    Full Support       SUKH Triana, DO  07/08/24

## 2024-07-08 NOTE — BRIEF OP NOTE
CHOLECYSTECTOMY  Progress Note    Hesham Arevalo Jr.  7/8/2024    Pre-op Diagnosis:   Cholecystitis, cirrhosis, history of rectal cancer status post LAR with diverting loop ileostomy       Post-Op Diagnosis Codes:  Same    Procedure/CPT® Codes:        Procedure(s):  OPEN CHOLECYSTECTOMY              Surgeon(s):  Konstantin Garcia MD Stokes, Sean M, MD    Anesthesia: General    Staff:   Circulator: Vika Tirado RN  Scrub Person: Melissa Perkins  Nursing Assistant: Areli Giraldo PCT         Estimated Blood Loss: 700 mL    Urine Voided: * No values recorded between 7/8/2024  7:30 AM and 7/8/2024  9:47 AM *    Specimens:            Gallbladder            Drains:   Closed/Suction Drain 1 Right Abdomen Bulb 19 Fr. (Active)       Ileostomy Loop RLQ (Active)   Stomal Appliance Clean;Dry;Intact 07/08/24 0400   Stoma Appearance pink;moist;protruding above skin level 07/08/24 0400   Peristomal Assessment Clean;Intact 07/08/24 0400   Stoma Function stool;flatus 07/08/24 0400   Stool Color brown 07/08/24 0400   Stool Consistency soft 07/07/24 1450   Treatment Placement checked 07/07/24 1610       [REMOVED] Closed/Suction Drain Lateral RUQ (Removed)   Site Description Unable to view 07/08/24 0400   Dressing Status Clean;Dry;Intact 07/08/24 0400   Drainage Appearance Serosanguineous 07/08/24 0400   Status To bulb suction 07/08/24 0400   Output (mL) 30 mL 07/07/24 1450       Findings: Cirrhotic liver, oozing from the cystic plate and mesentery throughout the case consistent with his history of thrombocytopenia and portal hypertension, gallbladder was fibrotic and thickened and was opened at the junction of the infundibulum and cystic duct with the cystic duct os controlled using a 2-0 Prolene suture.  A 19 drain was left in place in the right upper quadrant        Complications: None immediate          Konstantin Garcia MD     Date: 7/8/2024  Time: 09:49 EDT

## 2024-07-08 NOTE — PROGRESS NOTES
"Patient Name:  Hesham Arevalo Jr.  YOB: 1965  [id number]    Surgery Post - Operative Note    Date of visit: 7/8/2024      Subjective: Resting in bed. Reports no pain. Vitals stable        Objective:    /71 (BP Location: Right arm, Patient Position: Lying)   Pulse 70   Temp 98.4 °F (36.9 °C) (Oral)   Resp 18   Ht 185.4 cm (73\")   Wt 95.3 kg (210 lb)   SpO2 96%   BMI 27.71 kg/m²     CV:  Regular rate and rhythm   L:  Clear to auscultation bilaterally. Not labored on O2 by NC   ABD:  Soft, appropriately tender. Dressings clean, dry and intact, ileostomy pink and patent in RUQ  EXT:  No cyanosis, clubbing or edema        Assessment/ Plan:     Recovering well after open cholecystectomy. Vitals stable. Postop labs without acute findings. FRANTZ drain with roughly 100 mL bloody output so far. Continue Pulmonary toilet        Konstantin Garcia MD  7/8/2024  15:30 EDT    "

## 2024-07-08 NOTE — PROGRESS NOTES
"Patient Name:  Hesham Arevalo Jr.  YOB: 1965  [id number]    Surgery Progress Note    Date of visit: 7/8/2024      Subjective: No acute events.  Feels well.  Continues to have percutaneous cholecystostomy output usually 50 mL/day          Objective:     /71 (BP Location: Right arm, Patient Position: Lying)   Pulse 70   Temp 97 °F (36.1 °C) (Temporal)   Resp 18   Ht 185.4 cm (72.99\")   Wt 89.9 kg (198 lb 3.1 oz)   SpO2 98%   BMI 26.15 kg/m²     Intake/Output Summary (Last 24 hours) at 7/8/2024 0654  Last data filed at 7/7/2024 2115  Gross per 24 hour   Intake 250 ml   Output 30 ml   Net 220 ml       GEN:   Awake, alert, in no acute distress, resting comfortably in bed   CV:   Regular rate and rhythm  L:  Symmetric expansion, not labored on room air  Abd:  Soft, not tender, not distended, percutaneous cholecystostomy drain in right upper quadrant  Ext:  No cyanosis, clubbing, or edema    Recent labs that are back at this time have been reviewed.           Assessment/ Plan:    Mr. Arevalo is a 58-year-old gentleman who is well-known to me after a recent bout of acute cholecystitis and an underlying history of [place] cirrhosis for which he previously underwent percutaneous cholecystostomy.  He and I have had multiple long discussions regarding the fact that he has not been able to tolerate clamping of his percutaneous cholecystostomy drain and the concern for ongoing cholecystitis.  We have discussed operative management extensively as well as the associated risks especially in light of his liver disease, and he wishes to proceed forward.  We will plan to proceed forward today with open cholecystectomy      Konstantin Garcia MD  7/8/2024  06:54 EDT      "

## 2024-07-08 NOTE — OP NOTE
Operative Report    Patient Name:  Hesham Arevalo Jr.  YOB: 1965  [id number]    7/8/2024      PREOPERATIVE DIAGNOSIS: Acute cholecystitis status post percutaneous cholecystostomy placement, cirrhosis, history of rectal cancer status post LAR with diverting loop ileostomy      POSTOPERATIVE DIAGNOSIS: Same       PROCEDURE PERFORMED:      Open Cholecystectomy       SURGEON: Konstantin Garcia MD      ASSISTANT:    Panchito Dickens MD    Assistant surgeon responsibilities: Dr. Dickens assisted with dissection, retraction, removal of the gallbladder, hemostasis, and closure.  His assistance was necessary and required for successful completion of the case given the complexity of the case     SPECIMENS: Partial gallbladder    ESTIMATED BLOOD LOSS: 700 mL      ANESTHESIA: General with TAP blocks.        FINDINGS:  1.  The gallbladder was fibrotic and thickened with the percutaneous cholecystostomy drain in place.  The gallbladder was dissected free of the cystic plate and opened at the junction of the infundibulum of the gallbladder and cystic duct with the cystic duct os controlled using a 2-0 Prolene suture.  There was a cirrhotic liver morphology with diffuse oozing and bleeding from the cystic plate, abdominal wall, and mesentery consistent with his history of thrombocytopenia and portal hypertension.  A 19 round drain was left in place in the right upper quadrant       INDICATIONS:      This patient is a 58 y.o. male with a history of cirrhosis and previous rectal cancer status post LAR with diverting loop ileostomy.  His diverting loop ileostomy was never taken down.  He was hospitalized at our facility in May 2024 with acute cholecystitis and underwent percutaneous cholecystostomy placement.  He improved following percutaneous cholecystostomy placement.  He was never able to tolerate clamping of his cholecystostomy drain.  Extensive discussions were had with the patient regarding treatment options  especially in light of his cirrhosis.  He was recommended to undergo open cholecystectomy.. Preoperative imaging including CT scan confirmed the diagnosis. The risks, benefits, and alternatives of the procedure were discussed with the patient and their family preoperatively and they agreed to proceed.          DESCRIPTION OF PROCEDURE:      After obtaining informed consent, the patient was taken to the operating room and placed in the supine position on the operating room table. General anesthesia was initiated. The abdomen was prepped and draped in the usual sterile fashion.  The percutaneous cholecystostomy drain was prepped into the field as well as his ileostomy.  A time out was properly performed. Antibiotics were given preoperatively. SCDs were properly placed on the patient and turned on. Blocks were performed by the Anesthesia service prior to the start of the case.      We began the procedure with a right upper quadrant subcostal skin incision.  This was above his ileostomy site.  A skin incision was made with a 10 blade scalpel, and dissection was carried down to the level of the anterior abdominal wall fascia using electrocautery.  The anterior abdominal wall fascia was incised using electrocautery.  The rectus muscle was then divided using electrocautery.  The posterior sheath and peritoneum was then opened sharply and the peritoneal cavity was entered.  The exposure of the posterior sheath and peritoneum was then extended along the length of our skin incision.     Entrance into the abdominal cavity immediately demonstrated a cirrhotic liver morphology.  There were some omental adhesions to the right upper quadrant abdominal wall and falciform ligament which were taken down using the LigaSure device.  There was diffuse oozing with all dissection planes consistent with his history of portal hypertension and thrombocytopenia.  We did identify the falciform ligament had some attachments to the transverse  colon and this was .  The falciform ligament was then isolated, and ligated using 0 silk ties.  The falciform ligament was then  from the abdominal wall proceeding superiorly.  At this point we had exposure of the right upper quadrant.  A Bookwalter retractor was then placed to aid with visualization.     We turned our attention the dome of the gallbladder, where he found the gallbladder was quite thickened and fibrotic.  We began to take the gallbladder off of the cystic plate in a top-down approach.  There was diffuse oozing from the cystic plate with this dissection, and dissection was tedious.  We proceeded with our dissection inferiorly, carefully  the gallbladder from the cystic plate.  The previous percutaneous cholecystostomy drain was identified, and ligated.  The drain was then removed from the abdominal wall.  As we approached the infundibulum of the gallbladder we found that there was some omental adhesions to the duodenum which were taken down using careful blunt dissection as well as the [place] LigaSure device.  At this point the peritoneum surrounding the cystic pedicle was stripped.  There was diffuse thickening within the cystic structures.  At this point the gallbladder was fairly well mobilized from the cystic plate.  The gallbladder was then opened low on the infundibulum using a combination of electrocautery as well as sharp dissection.  Using a [place] LigaSure, the gallbladder was then completely ligated circumferentially at the level of the infundibulum.  The specimen was then passed off the field as partial gallbladder.  At this point we carefully examined the remaining small portion of infundibulum and found that there was only a small rim surrounding the cystic duct os.  There were 2 stones that appeared to be impacted within the cystic duct os and these were removed.  The cystic duct os was then controlled using a 2-0 Prolene suture in a figure-of-eight  fashion.  Any small remaining portions of gallbladder mucosa were then fulgurated using electrocautery.     There was diffuse oozing from the cystic plate and all of the dissection planes throughout the course of the procedure with a total blood loss of approximately 700 mL.  An additional unit of platelets was transfused during the operation and there was some improvement with this.  There was no evidence of any surgical bleeding.  Floseal topical hemostatic was then applied to the cystic plate and right upper quadrant abdominal wall.  At this point there was only a small amount of oozing and it appeared that hemostasis was obtained.  A 19 Uzbek drain was then brought onto the abdomen through a right upper quadrant stab incision.  This drain was left in place within the right upper quadrant and secured to the abdominal wall using a 2-0 nylon suture.  We again confirmed that there was no evidence of any surgical bleeding and that there was hemostasis in the right upper quadrant.  The Bookwalter retractor was removed.     The posterior fascia was then reapproximated using a running 0 Vicryl suture with great care taken to ensure that the bowel to the ileostomy site was free of injury.  The anterior fascia was then closed using a running #1 PDS suture.  The subcutaneous wound was irrigated.  The skin was then closed using interrupted 3-0 Vicryl suture in the deep dermal layer and 4-0 Monocryl in subcuticular layer.  Dermal glue was then applied overlying this.     After the procedure, the patient was awakened, extubated, and taken to the postoperative anesthesia care unit for recovery. All needle, instrument, and lap counts were correct. I was personally present and performed all portions of the procedure. There were no immediate complications      The complexity of this case was significantly increased given the history of cirrhosis, thrombocytopenia, presence of right upper quadrant stoma, and degree of gallbladder  inflammation in the right upper quadrant.  This significantly increased the complexity of the case and required an additional surgeon as well as an additional at least 60 minutes of operative time over what would typically be required for case like this.      Konstantin Garcia MD  7/8/2024  10:13 EDT

## 2024-07-08 NOTE — ANESTHESIA POSTPROCEDURE EVALUATION
Patient: Hesham Arevalo Jr.    Procedure Summary       Date: 07/08/24 Room / Location:  [facility] [address] [place] [address] [facility]    Anesthesia Start: 0730 Anesthesia Stop: 1001    Procedure: OPEN CHOLECYSTECTOMY (Abdomen) Diagnosis:     Surgeons: Konstantin Garcia MD Provider: Jesus Curran MD    Anesthesia Type: general with block ASA Status: 3            Anesthesia Type: general with block    Vitals  Vitals Value Taken Time   BP     Temp     Pulse 75 07/08/24 1000   Resp     SpO2 100 % 07/08/24 1000   Vitals shown include unfiled device data.        Post Anesthesia Care and Evaluation    Patient location during evaluation: PACU  Patient participation: complete - patient participated  Level of consciousness: awake and alert  Pain management: adequate    Airway patency: patent  Anesthetic complications: No anesthetic complications  PONV Status: none  Cardiovascular status: hemodynamically stable and acceptable  Respiratory status: nonlabored ventilation, acceptable and nasal cannula  Hydration status: acceptable

## 2024-07-08 NOTE — ANESTHESIA PROCEDURE NOTES
Airway  Urgency: elective    Date/Time: 7/8/2024 7:44 AM  Airway not difficult    General Information and Staff    Patient location during procedure: OR  CRNA/CAA: Estelle Wilkerson CRNA    Indications and Patient Condition  Indications for airway management: airway protection    Preoxygenated: yes  Mask difficulty assessment: 1 - vent by mask    Final Airway Details  Final airway type: endotracheal airway      Successful airway: ETT  Cuffed: yes   Successful intubation technique: direct laryngoscopy  Facilitating devices/methods: intubating stylet and anterior pressure/BURP  Endotracheal tube insertion site: oral  Blade: Nation  Blade size: 2  ETT size (mm): 7.5  Cormack-Lehane Classification: grade I - full view of glottis  Placement verified by: chest auscultation and capnometry   Measured from: lips  ETT/EBT  to lips (cm): 21  Number of attempts at approach: 1  Assessment: lips, teeth, and gum same as pre-op and atraumatic intubation

## 2024-07-08 NOTE — ANESTHESIA PREPROCEDURE EVALUATION
Anesthesia Evaluation     Patient summary reviewed and Nursing notes reviewed                Airway   Mallampati: II  TM distance: >3 FB  Neck ROM: full  No difficulty expected  Dental - normal exam     Pulmonary - normal exam   (+) a smoker (1993) Former,  Cardiovascular - normal exam    (+) hypertension      Neuro/Psych- negative ROS  GI/Hepatic/Renal/Endo    (+) hepatitis (), liver disease cirrhosis, diabetes mellitus, thyroid problem hypothyroidism    Musculoskeletal (-) negative ROS    Abdominal  - normal exam    Bowel sounds: normal.   Substance History - negative use     OB/GYN negative ob/gyn ROS         Other      history of cancer (colorectal s/p resection/ostomy)    ROS/Med Hx Other: HCT 35  PLT 73K s/p transfusion                Anesthesia Plan    ASA 3     general with block     intravenous induction     Anesthetic plan, risks, benefits, and alternatives have been provided, discussed and informed consent has been obtained with: patient.    Plan discussed with CRNA.    CODE STATUS:    Level Of Support Discussed With: Patient  Code Status (Patient has no pulse and is not breathing): CPR (Attempt to Resuscitate)  Medical Interventions (Patient has pulse or is breathing): Full Support

## 2024-07-09 LAB
ALBUMIN SERPL-MCNC: 2.9 G/DL (ref 3.5–5.2)
ALBUMIN/GLOB SERPL: 1.1 G/DL
ALP SERPL-CCNC: 64 U/L (ref 39–117)
ALT SERPL W P-5'-P-CCNC: 16 U/L (ref 1–41)
ANION GAP SERPL CALCULATED.3IONS-SCNC: 7 MMOL/L (ref 5–15)
AST SERPL-CCNC: 34 U/L (ref 1–40)
BASOPHILS # BLD AUTO: 0.02 10*3/MM3 (ref 0–0.2)
BASOPHILS NFR BLD AUTO: 0.2 % (ref 0–1.5)
BH BB BLOOD EXPIRATION DATE: NORMAL
BH BB BLOOD EXPIRATION DATE: NORMAL
BH BB BLOOD TYPE BARCODE: 6200
BH BB BLOOD TYPE BARCODE: 6200
BH BB DISPENSE STATUS: NORMAL
BH BB DISPENSE STATUS: NORMAL
BH BB PRODUCT CODE: NORMAL
BH BB PRODUCT CODE: NORMAL
BH BB UNIT NUMBER: NORMAL
BH BB UNIT NUMBER: NORMAL
BILIRUB SERPL-MCNC: 2.7 MG/DL (ref 0–1.2)
BUN SERPL-MCNC: 18 MG/DL (ref 8–23)
BUN/CREAT SERPL: 19.4 (ref 7–25)
CALCIUM SPEC-SCNC: 8.2 MG/DL (ref 8.6–10.5)
CHLORIDE SERPL-SCNC: 105 MMOL/L (ref 98–107)
CO2 SERPL-SCNC: 27 MMOL/L (ref 22–29)
CREAT SERPL-MCNC: 0.93 MG/DL (ref 0.76–1.27)
CYTO UR: NORMAL
DEPRECATED RDW RBC AUTO: 47.8 FL (ref 37–54)
EGFRCR SERPLBLD CKD-EPI 2021: 94 ML/MIN/1.73
EOSINOPHIL # BLD AUTO: 0.02 10*3/MM3 (ref 0–0.4)
EOSINOPHIL NFR BLD AUTO: 0.2 % (ref 0.3–6.2)
ERYTHROCYTE [DISTWIDTH] IN BLOOD BY AUTOMATED COUNT: 14.5 % (ref 12.3–15.4)
GLOBULIN UR ELPH-MCNC: 2.7 GM/DL
GLUCOSE BLDC GLUCOMTR-MCNC: 129 MG/DL (ref 70–130)
GLUCOSE BLDC GLUCOMTR-MCNC: 137 MG/DL (ref 70–130)
GLUCOSE BLDC GLUCOMTR-MCNC: 143 MG/DL (ref 70–130)
GLUCOSE BLDC GLUCOMTR-MCNC: 202 MG/DL (ref 70–130)
GLUCOSE SERPL-MCNC: 128 MG/DL (ref 65–99)
HCT VFR BLD AUTO: 29.2 % (ref 37.5–51)
HGB BLD-MCNC: 9.7 G/DL (ref 13–17.7)
IMM GRANULOCYTES # BLD AUTO: 0.06 10*3/MM3 (ref 0–0.05)
IMM GRANULOCYTES NFR BLD AUTO: 0.6 % (ref 0–0.5)
LAB AP CASE REPORT: NORMAL
LAB AP CLINICAL INFORMATION: NORMAL
LYMPHOCYTES # BLD AUTO: 0.96 10*3/MM3 (ref 0.7–3.1)
LYMPHOCYTES NFR BLD AUTO: 10 % (ref 19.6–45.3)
MAGNESIUM SERPL-MCNC: 1.6 MG/DL (ref 1.6–2.4)
MCH RBC QN AUTO: 30 PG (ref 26.6–33)
MCHC RBC AUTO-ENTMCNC: 33.2 G/DL (ref 31.5–35.7)
MCV RBC AUTO: 90.4 FL (ref 79–97)
MONOCYTES # BLD AUTO: 1.14 10*3/MM3 (ref 0.1–0.9)
MONOCYTES NFR BLD AUTO: 11.8 % (ref 5–12)
NEUTROPHILS NFR BLD AUTO: 7.43 10*3/MM3 (ref 1.7–7)
NEUTROPHILS NFR BLD AUTO: 77.2 % (ref 42.7–76)
NRBC BLD AUTO-RTO: 0 /100 WBC (ref 0–0.2)
PATH REPORT.FINAL DX SPEC: NORMAL
PATH REPORT.GROSS SPEC: NORMAL
PLATELET # BLD AUTO: 76 10*3/MM3 (ref 140–450)
PMV BLD AUTO: 10.5 FL (ref 6–12)
POTASSIUM SERPL-SCNC: 4.5 MMOL/L (ref 3.5–5.2)
PROT SERPL-MCNC: 5.6 G/DL (ref 6–8.5)
RBC # BLD AUTO: 3.23 10*6/MM3 (ref 4.14–5.8)
SODIUM SERPL-SCNC: 139 MMOL/L (ref 136–145)
UNIT  ABO: NORMAL
UNIT  ABO: NORMAL
UNIT  RH: NORMAL
UNIT  RH: NORMAL
WBC NRBC COR # BLD AUTO: 9.63 10*3/MM3 (ref 3.4–10.8)

## 2024-07-09 PROCEDURE — 82948 REAGENT STRIP/BLOOD GLUCOSE: CPT

## 2024-07-09 PROCEDURE — 99232 SBSQ HOSP IP/OBS MODERATE 35: CPT | Performed by: STUDENT IN AN ORGANIZED HEALTH CARE EDUCATION/TRAINING PROGRAM

## 2024-07-09 PROCEDURE — 63710000001 INSULIN LISPRO (HUMAN) PER 5 UNITS: Performed by: FAMILY MEDICINE

## 2024-07-09 PROCEDURE — 25810000003 SODIUM CHLORIDE 0.9 % SOLUTION: Performed by: SURGERY

## 2024-07-09 PROCEDURE — 97530 THERAPEUTIC ACTIVITIES: CPT

## 2024-07-09 PROCEDURE — 85025 COMPLETE CBC W/AUTO DIFF WBC: CPT | Performed by: FAMILY MEDICINE

## 2024-07-09 PROCEDURE — 25810000003 LACTATED RINGERS PER 1000 ML: Performed by: SURGERY

## 2024-07-09 PROCEDURE — 83735 ASSAY OF MAGNESIUM: CPT | Performed by: FAMILY MEDICINE

## 2024-07-09 PROCEDURE — 80053 COMPREHEN METABOLIC PANEL: CPT | Performed by: FAMILY MEDICINE

## 2024-07-09 PROCEDURE — 97161 PT EVAL LOW COMPLEX 20 MIN: CPT

## 2024-07-09 RX ORDER — OXYCODONE HYDROCHLORIDE AND ACETAMINOPHEN 5; 325 MG/1; MG/1
1 TABLET ORAL EVERY 4 HOURS PRN
Status: DISCONTINUED | OUTPATIENT
Start: 2024-07-09 | End: 2024-07-12 | Stop reason: HOSPADM

## 2024-07-09 RX ORDER — SODIUM CHLORIDE 9 MG/ML
50 INJECTION, SOLUTION INTRAVENOUS CONTINUOUS
Status: DISCONTINUED | OUTPATIENT
Start: 2024-07-09 | End: 2024-07-10

## 2024-07-09 RX ORDER — BLOOD-GLUCOSE METER
1 KIT MISCELLANEOUS AS NEEDED
Qty: 1 KIT | Refills: 0 | Status: SHIPPED | OUTPATIENT
Start: 2024-07-09

## 2024-07-09 RX ORDER — HYDROMORPHONE HYDROCHLORIDE 1 MG/ML
0.2 INJECTION, SOLUTION INTRAMUSCULAR; INTRAVENOUS; SUBCUTANEOUS
Status: DISCONTINUED | OUTPATIENT
Start: 2024-07-09 | End: 2024-07-12 | Stop reason: HOSPADM

## 2024-07-09 RX ADMIN — OXYCODONE HYDROCHLORIDE AND ACETAMINOPHEN 1 TABLET: 5; 325 TABLET ORAL at 18:51

## 2024-07-09 RX ADMIN — CARVEDILOL 6.25 MG: 6.25 TABLET, FILM COATED ORAL at 08:04

## 2024-07-09 RX ADMIN — CARVEDILOL 6.25 MG: 6.25 TABLET, FILM COATED ORAL at 18:06

## 2024-07-09 RX ADMIN — SODIUM CHLORIDE 50 ML/HR: 9 INJECTION, SOLUTION INTRAVENOUS at 09:59

## 2024-07-09 RX ADMIN — PANTOPRAZOLE SODIUM 40 MG: 40 INJECTION, POWDER, FOR SOLUTION INTRAVENOUS at 05:18

## 2024-07-09 RX ADMIN — LEVOTHYROXINE SODIUM 25 MCG: 25 TABLET ORAL at 08:04

## 2024-07-09 RX ADMIN — OXYCODONE HYDROCHLORIDE AND ACETAMINOPHEN 1 TABLET: 5; 325 TABLET ORAL at 14:37

## 2024-07-09 RX ADMIN — SODIUM CHLORIDE, POTASSIUM CHLORIDE, SODIUM LACTATE AND CALCIUM CHLORIDE 75 ML/HR: 600; 310; 30; 20 INJECTION, SOLUTION INTRAVENOUS at 00:21

## 2024-07-09 RX ADMIN — ACETAMINOPHEN 650 MG: 325 TABLET ORAL at 19:52

## 2024-07-09 RX ADMIN — INSULIN LISPRO 2 UNITS: 100 INJECTION, SOLUTION INTRAVENOUS; SUBCUTANEOUS at 19:52

## 2024-07-09 NOTE — CONSULTS
"Diabetes Education  Assessment/Teaching    Patient Name:  Hesham rAevalo Jr.  YOB: 1965  MRN: [id number]  Admit Date:  7/7/2024      Assessment Date:  7/9/2024  Flowsheet Row Most Recent Value   General Information     Referral From: MD order   Height 185.4 cm (73\")   Height Method Stated   Weight 95.3 kg (210 lb)   Weight Method Bed scale   Pregnancy Assessment    Diabetes History    What type of diabetes do you have? Type 2   Length of Diabetes Diagnosis 1 - 5 years   Current DM knowledge fair   Have you had diabetes education/teaching in the past? no   Do you test your blood sugar at home? no   Education Preferences    What areas of diabetes would you like to learn about? avoiding high blood sugar, avoiding low blood sugar, diabetes complications   Nutrition Information    Assessment Topics    Healthy Eating - Assessment Needs education   Problem Solving - Assessment Needs education   Reducing Risk - Assessment Needs education   Healthy Coping - Assessment Needs education   Monitoring - Assessment Needs education   DM Goals             Flowsheet Row Most Recent Value   DM Education Needs    Meter Needs meter   Frequency of Testing Other (comment)  [1-2 X daily]   Blood Glucose Target Range Ranges per ADA guidelines   Medication Oral, Actions, Side effects   Problem Solving Hypoglycemia, Hyperglycemia, Sick days, Signs, Symptoms, Treatment   Reducing Risks A1C testing, Other (comment)  [Glucose monitoring]   Physical Activity Walking   Physical Activity Frequency Occasionally   Healthy Coping Appropriate   Discharge Plan Home   Motivation Engaged   Teaching Method Explanation, Discussion, Handouts, Teach back   Patient Response Needs reinforcement          Total time spent reviewing chart, preparing education/materials, providing education at bedside, and coordinating care approx 30 minutes.    Consult for diabetes education received per MD order Chart reviewed. Pt was seen at bedside today. " "Permission given for visit.     Discussed and taught about type 2 diabetes self-management, risk factors, and importance of blood glucose control to reduce complications. Target blood glucose readings and A1c goals per ADA were reviewed. Reviewed with patient current A1c 6.5% and discussed its significance. Signs, symptoms, and treatment of hyperglycemia and hypoglycemia were discussed.     Mr. Arevalo will be needing a glucose monitor at discharge, messaged Dr. Nation, attending, who placed order. Patient states his parents and his sister have diabetes and he is familiar with using a meter. Stressed the importance of strict blood sugar control after surgery to prevent complications such as infection and to promote healing of incision. Encouraged him to monitor blood sugar at home 1-2X daily, fasting and/or 2 hours post prandial. To call PCP if blood sugar is trending high. Encouraged to keep record of blood glucose readings to take to follow up appointment with PCP.    Lifestyle changes such as physical activity with MD approval and healthy eating were encouraged. . Provided patient with copy of [facility] \"What is Diabetes\" handout, \"Blood Glucose Goals\" handout, and \"What is A1c\" handout.     Thank you for this consult.     Electronically signed by:  Kelsey Trujillo RN [facility]  07/09/24 10:44 EDT  "

## 2024-07-09 NOTE — THERAPY EVALUATION
Patient Name: Hesham Arevalo Jr.  : 1965    MRN: [id number]                              Today's Date: 2024       Admit Date: 2024    Visit Dx:     ICD-10-CM ICD-9-CM   1. Cholecystitis  K81.9 575.10     Patient Active Problem List   Diagnosis    Rectal carcinoma    Rectal cancer    low anterior resection with loop ileostomy, liver biopsy and proctoscopy 19    Other specified hypothyroidism    Essential hypertension    Prediabetes    Acute postoperative pain    Abdominopelvic abscess    Fever    Urine retention    Intra-abdominal abscess    Hypomagnesemia, replaced    Thrombocytopenia    History of rectal cancer    Other dietary vitamin B12 deficiency anemia    Abdominal pain with nonbilious vomiting    Hyperbilirubinemia    Cholelithiasis    Abdominal pain    Hyponatremia    Type 2 diabetes mellitus without complication, without long-term current use of insulin    Cirrhosis of liver    Acute cholecystitis    Acute hepatitis    Acute pancreatitis    Gallstone pancreatitis     Past Medical History:   Diagnosis Date    Arthritis     knees     Cancer 2018    colon with resection    Disease of thyroid gland     Fatty liver     Hashimoto's disease     History of radiation therapy 2019    rectal cancer    Hypertension     Ileostomy in place     Prediabetes     Psoriasis     Wears glasses      Past Surgical History:   Procedure Laterality Date    CHOLECYSTECTOMY N/A 2024    Procedure: OPEN CHOLECYSTECTOMY;  Surgeon: Konstantin Garcia MD;  Location:  [facility];  Service: General;  Laterality: N/A;    COLON RESECTION N/A 2019    Procedure: LAPAROSCOPIC LOWER ANTERIOR RESECTION WITH DIVERTING LOOP ILEOOSTOMY, SPLENIC FLEIXURE MOBILIZATION AND LIVER BIOPSY, AND PROCTOSCOPY;  Surgeon: Pau Kelly MD;  Location:  [facility];  Service: General    COLONOSCOPY      2018    ILEOSTOMY  2019    LIVER BIOPSY  2003    VASECTOMY  1998    VENOUS ACCESS DEVICE (PORT) INSERTION N/A 2019     Procedure: PORT PLACEMENT;  Surgeon: Franky Cazares MD;  Location: Replaced by [facility];  Service: General      General Information       Row Name 07/09/24 1311          Physical Therapy Time and Intention    Document Type evaluation  -AE     Mode of Treatment physical therapy  -AE       Row Name 07/09/24 1311          General Information    Patient Profile Reviewed yes  -AE     Prior Level of Function independent:;all household mobility;gait;transfer;bed mobility;ADL's;dressing;bathing  -AE     Existing Precautions/Restrictions fall;other (see comments)  FRANTZ drain; ileostomy; abdominal incision  -AE     Barriers to Rehab medically complex  -AE       Row Name 07/09/24 1311          Living Environment    People in Home child(jorge), adult;spouse  -AE       Row Name 07/09/24 1311          Home Main Entrance    Number of Stairs, Main Entrance three  -AE     Stair Railings, Main Entrance none  -AE       Row Name 07/09/24 1311          Stairs Within Home, Primary    Number of Stairs, Within Home, Primary none  -AE     Stair Railings, Within Home, Primary none  -AE       Row Name 07/09/24 1311          Cognition    Orientation Status (Cognition) oriented x 4  -AE       Row Name 07/09/24 1311          Safety Issues, Functional Mobility    Safety Issues Affecting Function (Mobility) awareness of need for assistance;insight into deficits/self-awareness;safety precaution awareness;sequencing abilities  -AE     Impairments Affecting Function (Mobility) balance;endurance/activity tolerance;postural/trunk control;strength;pain  -AE               User Key  (r) = Recorded By, (t) = Taken By, (c) = Cosigned By      Initials Name Provider Type    AE Harrison Nickerson PT Physical Therapist                   Mobility       Row Name 07/09/24 1313          Bed Mobility    Bed Mobility supine-sit  -AE     Supine-Sit [place] (Bed Mobility) minimum assist (75% patient effort);1 person assist;verbal cues  -AE     Assistive Device (Bed  Mobility) bed rails  -AE     Comment, (Bed Mobility) VCs for hand placement and sequencing with education on log rolling technique to reduce abdominal pain.  -AE       Row Name 07/09/24 1313          Transfers    Comment, (Transfers) VCs for hand placement and sequencing. Pt required increased time for STS.  -AE       Row Name 07/09/24 1313          Sit-Stand Transfer    Sit-Stand [place] (Transfers) minimum assist (75% patient effort);1 person assist;verbal cues  -AE     Assistive Device (Sit-Stand Transfers) walker, front-wheeled  -AE       Row Name 07/09/24 1313          Gait/Stairs (Locomotion)    [place] Level (Gait) contact guard;standby assist;1 person assist;verbal cues  -AE     Assistive Device (Gait) walker, front-wheeled  -AE     Distance in Feet (Gait) 325  -AE     Deviations/Abnormal Patterns (Gait) bilateral deviations;soha decreased;gait speed decreased;stride length decreased  -AE     Bilateral Gait Deviations forward flexed posture  -AE     Comment, (Gait/Stairs) Pt demo step through gait pattern with slowed soha initially but able to improve with time. Pt also demo forward flexed posture but was able to improve. Pt demo good effort and reduced pain while ambulating this session. Further distance limited by fatigue.  -AE               User Key  (r) = Recorded By, (t) = Taken By, (c) = Cosigned By      Initials Name Provider Type    AE Harrison Nickerson, PT Physical Therapist                   Obj/Interventions       Row Name 07/09/24 1316          Range of Motion Comprehensive    General Range of Motion bilateral lower extremity ROM WFL  -AE       Row Name 07/09/24 1316          Strength Comprehensive (MMT)    General Manual Muscle Testing (MMT) Assessment lower extremity strength deficits identified  -AE     Comment, General Manual Muscle Testing (MMT) Assessment BLE grossly 4/5  -AE       Row Name 07/09/24 1316          Balance    Balance Assessment sitting static balance;sitting  dynamic balance;sit to stand dynamic balance;standing static balance;standing dynamic balance  -AE     Static Sitting Balance standby assist  -AE     Dynamic Sitting Balance contact guard  -AE     Position, Sitting Balance unsupported;sitting edge of bed  -AE     Sit to Stand Dynamic Balance minimal assist;1-person assist;verbal cues  -AE     Static Standing Balance contact guard  -AE     Dynamic Standing Balance contact guard  -AE     Position/Device Used, Standing Balance supported;walker, front-wheeled  -AE       Row Name 07/09/24 1316          Sensory Assessment (Somatosensory)    Sensory Assessment (Somatosensory) LE sensation intact  -AE               User Key  (r) = Recorded By, (t) = Taken By, (c) = Cosigned By      Initials Name Provider Type    AE Harrison Nickerson, PT Physical Therapist                   Goals/Plan       Row Name 07/09/24 1319          Bed Mobility Goal 1 (PT)    Activity/Assistive Device (Bed Mobility Goal 1, PT) sit to supine/supine to sit  -AE     [place] Level/Cues Needed (Bed Mobility Goal 1, PT) modified independence  -AE     Time Frame (Bed Mobility Goal 1, PT) short term goal (STG);5 days  -AE     Progress/Outcomes (Bed Mobility Goal 1, PT) new goal  -AE       Row Name 07/09/24 1319          Transfer Goal 1 (PT)    Activity/Assistive Device (Transfer Goal 1, PT) sit-to-stand/stand-to-sit;bed-to-chair/chair-to-bed  -AE     [place] Level/Cues Needed (Transfer Goal 1, PT) modified independence  -AE     Time Frame (Transfer Goal 1, PT) long term goal (LTG);10 days  -AE     Progress/Outcome (Transfer Goal 1, PT) new goal  -AE       Row Name 07/09/24 1319          Gait Training Goal 1 (PT)    Activity/Assistive Device (Gait Training Goal 1, PT) gait (walking locomotion);assistive device use  -AE     [place] Level (Gait Training Goal 1, PT) modified independence  -AE     Distance (Gait Training Goal 1, PT) 600ft  -AE     Time Frame (Gait Training Goal 1, PT) long term goal  (LTG);10 days  -AE     Progress/Outcome (Gait Training Goal 1, PT) new goal  -AE       Row Name 07/09/24 1319          Stairs Goal 1 (PT)    Activity/Assistive Device (Stairs Goal 1, PT) ascending stairs;descending stairs;step-over step;step-to-step  -AE     [place] Level/Cues Needed (Stairs Goal 1, PT) standby assist  -AE     Number of Stairs (Stairs Goal 1, PT) 3  -AE     Time Frame (Stairs Goal 1, PT) long term goal (LTG);10 days  -AE     Progress/Outcome (Stairs Goal 1, PT) new goal  -AE       Row Name 07/09/24 1319          Therapy Assessment/Plan (PT)    Planned Therapy Interventions (PT) balance training;bed mobility training;gait training;home exercise program;patient/family education;postural re-education;ROM (range of motion);strengthening;transfer training;stair training  -AE               User Key  (r) = Recorded By, (t) = Taken By, (c) = Cosigned By      Initials Name Provider Type    AE Harrison Nickerson, PT Physical Therapist                   Clinical Impression       Row Name 07/09/24 1317          Pain    Additional Documentation Pain Scale: FACES Pre/Post-Treatment (Group)  -AE       [facility] Name 07/09/24 1317          Pain Scale: FACES Pre/Post-Treatment    Pain: FACES Scale, Pretreatment 4-->hurts little more  -AE     Posttreatment Pain Rating 4-->hurts little more  -AE     Pain Location incisional  -AE     Pain Location - abdomen  -AE     Pre/Posttreatment Pain Comment tolerated; RN managing  -AE       Row Name 07/09/24 1317          Plan of Care Review    Plan of Care Reviewed With patient  -AE     Progress no change  -AE     Outcome Evaluation Pt presents with abdominal pain, generalized fatigue, and decreased activity tolerance compared to baseline. Pt ambulated 325ft with CGA-SBA and RW for support. Recommend continued skilled IP PT interventions. Recommend D/C home with assist when medically appropriate.  -AE       Row Name 07/09/24 1317          Therapy Assessment/Plan (PT)     Patient/Family Therapy Goals Statement (PT) Home  -AE     Rehab Potential (PT) good, to achieve stated therapy goals  -AE     Criteria for Skilled Interventions Met (PT) yes  -AE     Therapy Frequency (PT) daily  -AE     Predicted Duration of Therapy Intervention (PT) 1 week  -AE       Row Name 07/09/24 1317          Vital Signs    Pre Systolic BP Rehab 126  -AE     Pre Treatment Diastolic BP 63  -AE     Pretreatment Heart Rate (beats/min) 73  -AE     Posttreatment Heart Rate (beats/min) 72  -AE     Pre SpO2 (%) 97  -AE     O2 Delivery Pre Treatment room air  -AE     O2 Delivery Intra Treatment room air  -AE     Post SpO2 (%) 96  -AE     O2 Delivery Post Treatment room air  -AE     Pre Patient Position Supine  -AE     Intra Patient Position Standing  -AE     Post Patient Position Sitting  -AE       Row Name 07/09/24 1317          Positioning and Restraints    Pre-Treatment Position in bed  -AE     Post Treatment Position chair  -AE     In Chair notified nsg;reclined;call light within reach;encouraged to call for assist;exit alarm on;waffle cushion;legs elevated  -AE               User Key  (r) = Recorded By, (t) = Taken By, (c) = Cosigned By      Initials Name Provider Type    AE Harrison Nickerson, PT Physical Therapist                   Outcome Measures       Row Name 07/09/24 1321 07/09/24 0755       How much help from another person do you currently need...    Turning from your back to your side while in flat bed without using bedrails? 3  -AE 3  -RK    Moving from lying on back to sitting on the side of a flat bed without bedrails? 2  -AE 3  -RK    Moving to and from a bed to a chair (including a wheelchair)? 3  -AE 2  -RK    Standing up from a chair using your arms (e.g., wheelchair, bedside chair)? 3  -AE 2  -RK    Climbing 3-5 steps with a railing? 3  -AE 2  -RK    To walk in hospital room? 3  -AE 2  -RK    AM-PAC 6 Clicks Score (PT) 17  -AE 14  -RK    Highest Level of Mobility Goal 5 --> Static standing  -AE  4 --> Transfer to chair/commode  -JESSIE      Row Name 07/09/24 1321          Functional Assessment    Outcome Measure Options AM-PAC 6 Clicks Basic Mobility (PT)  -AE               User Key  (r) = Recorded By, (t) = Taken By, (c) = Cosigned By      Initials Name Provider Type    AE Harrison Nickerson, ANABELLA Physical Therapist    Orlando Coats RN Registered Nurse                                 Physical Therapy Education       Title: PT OT SLP Therapies (In Progress)       Topic: Physical Therapy (In Progress)       Point: Mobility training (Done)       Learning Progress Summary             Patient Acceptance, E, VU by AE at 7/9/2024 1101                         Point: Home exercise program (Not Started)       Learner Progress:  Not documented in this visit.              Point: Body mechanics (Done)       Learning Progress Summary             Patient Acceptance, E, VU by AE at 7/9/2024 1101                         Point: Precautions (Done)       Learning Progress Summary             Patient Acceptance, E, VU by AE at 7/9/2024 1101                                         User Key       Initials Effective Dates Name Provider Type Discipline    AE 09/21/21 -  Harrison Nickerson PT Physical Therapist PT                  PT Recommendation and Plan  Planned Therapy Interventions (PT): balance training, bed mobility training, gait training, home exercise program, patient/family education, postural re-education, ROM (range of motion), strengthening, transfer training, stair training  Plan of Care Reviewed With: patient  Progress: no change  Outcome Evaluation: Pt presents with abdominal pain, generalized fatigue, and decreased activity tolerance compared to baseline. Pt ambulated 325ft with CGA-SBA and RW for support. Recommend continued skilled IP PT interventions. Recommend D/C home with assist when medically appropriate.     Time Calculation:   PT Evaluation Complexity  History, PT Evaluation Complexity: 1-2 personal factors  and/or comorbidities  Examination of Body Systems (PT Eval Complexity): total of 3 or more elements  Clinical Presentation (PT Evaluation Complexity): stable  Clinical Decision Making (PT Evaluation Complexity): low complexity  Overall Complexity (PT Evaluation Complexity): low complexity     PT Charges       Row Name 07/09/24 1322             Time Calculation    Start Time 1101  -AE      PT Received On 07/09/24  -AE      PT Goal Re-Cert Due Date 07/19/24  -AE         Timed Charges    33096 - PT Therapeutic Activity Minutes 9  -AE         Untimed Charges    PT Eval/Re-eval Minutes 32  -AE         Total Minutes    Timed Charges Total Minutes 9  -AE      Untimed Charges Total Minutes 32  -AE       Total Minutes 41  -AE                User Key  (r) = Recorded By, (t) = Taken By, (c) = Cosigned By      Initials Name Provider Type    AE Harrison Nickerson, PT Physical Therapist                  Therapy Charges for Today       Code Description Service Date Service Provider Modifiers Qty    04887937529 HC PT THERAPEUTIC ACT EA 15 MIN 7/9/2024 Harrison Nickerson, PT GP 1    [id number] HC PT EVAL LOW COMPLEXITY 3 7/9/2024 Harrison Nickerson, PT GP 1            PT G-Codes  Outcome Measure Options: AM-PAC 6 Clicks Basic Mobility (PT)  AM-PAC 6 Clicks Score (PT): 17  PT Discharge Summary  Anticipated Discharge Disposition (PT): home with assist    Harrison Nickerson PT  7/9/2024

## 2024-07-09 NOTE — CASE MANAGEMENT/SOCIAL WORK
Discharge Planning Assessment  [facility]     Patient Name: Hesham Arevalo Jr.  MRN: [id number]  Today's Date: 7/9/2024    Admit Date: 7/7/2024    Plan: Home/family   Discharge Needs Assessment       Row Name 07/09/24 1050       Living Environment    People in Home child(jorge), adult;spouse    Name(s) of People in Home wife and son    Current Living Arrangements home    Potentially Unsafe Housing Conditions none    Primary Care Provided by self    Provides Primary Care For no one    Family Caregiver if Needed spouse;child(jorge), adult    Quality of Family Relationships helpful;involved;supportive    Able to Return to Prior Arrangements yes    Living Arrangement Comments Plan is home       Resource/Environmental Concerns    Resource/Environmental Concerns none    Transportation Concerns none       Transition Planning    Patient/Family Anticipates Transition to home with family    Patient/Family Anticipated Services at Transition none    Transportation Anticipated family or friend will provide       Discharge Needs Assessment    Equipment Currently Used at Home none    Concerns to be Addressed no discharge needs identified    Equipment Needed After Discharge none    Discharge Coordination/Progress home                   Discharge Plan       Row Name 07/09/24 1051       Plan    Plan Home/family    Patient/Family in Agreement with Plan yes    Plan Comments I spoke with patient at the  and he lives in [facility] with wife and son. Works FT, independent, no dme, no home 02 and plan is home. Patient has transportation home.    Final Discharge Disposition Code 01 - home or self-care                  Continued Care and Services - Admitted Since 7/7/2024    No active coordination exists for this encounter.       Expected Discharge Date and Time       Expected Discharge Date Expected Discharge Time    Jul 10, 2024            Demographic Summary       Row Name 07/09/24 1048       General Information    Admission Type  inpatient    Referral Source admission list    Reason for Consult discharge planning    Preferred Language English       Contact Information    Permission Granted to Share Info With     Contact Information Obtained for     Contact Information Comments PCP: Myke Mendoza                   Functional Status       Row Name 07/09/24 1050       Functional Status    Usual Activity Tolerance excellent    Current Activity Tolerance moderate       Functional Status, IADL    Medications independent    Meal Preparation assistive person    Housekeeping assistive person    Laundry assistive person    Shopping assistive person    IADL Comments Has coverage for meds       Mental Status    General Appearance WDL WDL       Mental Status Summary    Recent Changes in Mental Status/Cognitive Functioning ability to speak clearly;ability to understand       Employment/    Employment Status employed full-time                   Psychosocial    No documentation.                  Abuse/Neglect    No documentation.                  Legal    No documentation.                  Substance Abuse    No documentation.                  Patient Forms    No documentation.                     Helen Reyes RN

## 2024-07-09 NOTE — PROGRESS NOTES
"Patient Name:  Hesham Arevalo Jr.  YOB: 1965  [id number]    Surgery Progress Note    Date of visit: 7/9/2024      Subjective: No acute events. Pain is controlled and not using PCA much. Having ileostomy output. No nausea or vomiting. Drain with more thin output, total 270 mL. Tolerated clears          Objective:     /63 (BP Location: Left arm, Patient Position: Lying)   Pulse 72   Temp 98.4 °F (36.9 °C) (Oral)   Resp 20   Ht 185.4 cm (73\")   Wt 95.3 kg (210 lb)   SpO2 98%   BMI 27.71 kg/m²     Intake/Output Summary (Last 24 hours) at 7/9/2024 0804  Last data filed at 7/9/2024 0759  Gross per 24 hour   Intake 1411 ml   Output 1025 ml   Net 386 ml       GEN:   Awake, alert, in no acute distress, resting comfortably in bed   CV:   Regular rate and rhythm  L:  Symmetric expansion, not labored on oxygen by NC  Abd:  Soft, appropriately tender along incision, RUQ incision well approximated and clean, dry, and intact, ileostomy just below this is pink with stool in the bag, drain with thin serosang drainage   Ext:  No cyanosis, clubbing, or edema    Recent labs that are back at this time have been reviewed.           Assessment/ Plan:    Mr. Arevalo is a 58 year old gentleman with history of cirrhosis and rectal cancer s/p LAR with DLI who is admitted after undergoing open cholecystectomy on 7/8/24 for cholecystitis s/p percutaneous cholecystostomy    #Cholecystitis  -postop day 1  -Vitals stable  -Labs without significant change. Bili 2.7. Hb 9.7. Plt 76. Repeat tomorrow  -No need for antibiotics at this point  -Discontinue PCA. Start PRN percocet  -Advance to regular diet  -Monitor drain output  -Wound ostomy consult today for assistance with pouching RUQ stoma in proximity to incision  -Ambulate. Mobilize      Konstantin Garcia MD  7/9/2024  08:04 EDT      "

## 2024-07-09 NOTE — PLAN OF CARE
Goal Outcome Evaluation:  Plan of Care Reviewed With: patient        Progress: improving  Outcome Evaluation: VSS, on room air. pain controlled with oral and iv pain meds. no nausea reported. No acute distress at this time    Problem: Adult Inpatient Plan of Care  Goal: Plan of Care Review  Outcome: Ongoing, Progressing  Flowsheets (Taken 7/9/2024 1838)  Progress: improving  Plan of Care Reviewed With: patient  Outcome Evaluation: VSS, on room air. pain controlled with oral and iv pain meds. no nausea reported. No acute distress at this time  Goal: Patient-Specific Goal (Individualized)  Outcome: Ongoing, Progressing  Goal: Absence of Hospital-Acquired Illness or Injury  Outcome: Ongoing, Progressing  Intervention: Identify and Manage Fall Risk  Recent Flowsheet Documentation  Taken 7/9/2024 1203 by Orlando Ordoñez RN  Safety Promotion/Fall Prevention: assistive device/personal items within reach  Taken 7/9/2024 0755 by Orlando Ordoñez RN  Safety Promotion/Fall Prevention:   activity supervised   assistive device/personal items within reach  Intervention: Prevent Skin Injury  Recent Flowsheet Documentation  Taken 7/9/2024 1602 by Orlando Ordoñez RN  Body Position:   position changed independently   supine, legs elevated  Skin Protection:   adhesive use limited   tubing/devices free from skin contact  Taken 7/9/2024 1203 by Orlando Ordoñez RN  Body Position: position changed independently  Skin Protection:   adhesive use limited   tubing/devices free from skin contact  Taken 7/9/2024 0755 by Orlando Ordoñez RN  Body Position: position changed independently  Skin Protection: adhesive use limited  Intervention: Prevent and Manage VTE (Venous Thromboembolism) Risk  Recent Flowsheet Documentation  Taken 7/9/2024 1400 by Orlando Ordoñez RN  Activity Management:   ambulated outside room   activity encouraged  Taken 7/9/2024 1203 by Orlando Ordoñez RN  VTE Prevention/Management:   bilateral   sequential compression devices on  Taken 7/9/2024  0755 by Orlando Ordoñez RN  Activity Management: activity encouraged  VTE Prevention/Management:   bilateral   sequential compression devices on  Range of Motion: active ROM (range of motion) encouraged  Goal: Optimal Comfort and Wellbeing  Outcome: Ongoing, Progressing  Intervention: Monitor Pain and Promote Comfort  Recent Flowsheet Documentation  Taken 7/9/2024 1437 by Orlando Ordoñez RN  Pain Management Interventions: see MAR  Taken 7/9/2024 0755 by Orlando Ordoñez RN  Pain Management Interventions: pain pump in use  Intervention: Provide Person-Centered Care  Recent Flowsheet Documentation  Taken 7/9/2024 0755 by Orlando Ordoñez RN  Trust Relationship/Rapport:   care explained   thoughts/feelings acknowledged  Goal: Readiness for Transition of Care  Outcome: Ongoing, Progressing     Problem: Bleeding (Cholecystectomy)  Goal: Absence of Bleeding  Outcome: Ongoing, Progressing     Problem: Bowel Motility Impaired (Cholecystectomy)  Goal: Effective Bowel Elimination  Outcome: Ongoing, Progressing     Problem: Fluid and Electrolyte Imbalance (Cholecystectomy)  Goal: Fluid and Electrolyte Balance  Outcome: Ongoing, Progressing  Intervention: Monitor and Manage Fluid and Electrolyte Balance  Recent Flowsheet Documentation  Taken 7/9/2024 0755 by Orlando Ordoñez RN  Fluid/Electrolyte Management: fluids provided     Problem: Infection (Cholecystectomy)  Goal: Absence of Infection Signs and Symptoms  Outcome: Ongoing, Progressing  Intervention: Prevent or Manage Infection  Recent Flowsheet Documentation  Taken 7/9/2024 0755 by Orlando Ordoñez RN  Fever Reduction/Comfort Measures:   lightweight bedding   lightweight clothing     Problem: Ongoing Anesthesia Effects (Cholecystectomy)  Goal: Anesthesia/Sedation Recovery  Outcome: Ongoing, Progressing  Intervention: Optimize Anesthesia Recovery  Recent Flowsheet Documentation  Taken 7/9/2024 1203 by Orlando Ordoñez RN  Safety Promotion/Fall Prevention: assistive device/personal items within  reach  Administration (IS): self-administered  Level Incentive Spirometer (mL): 1500  Number of Repetitions (IS): 5  Taken 7/9/2024 0800 by Orlando Ordoñez RN  Patient Tolerance (IS): good  Administration (IS): instruction provided, follow-up  Level Incentive Spirometer (mL): 1500  Number of Repetitions (IS): 10  Taken 7/9/2024 0755 by Orlando Ordoñez RN  Safety Promotion/Fall Prevention:   activity supervised   assistive device/personal items within reach     Problem: Pain (Cholecystectomy)  Goal: Acceptable Pain Control  Outcome: Ongoing, Progressing  Intervention: Prevent or Manage Pain  Recent Flowsheet Documentation  Taken 7/9/2024 1437 by Orlando Ordoñez RN  Pain Management Interventions: see MAR  Taken 7/9/2024 0755 by Orlando Ordoñez RN  Pain Management Interventions: pain pump in use  Diversional Activities: television     Problem: Postoperative Nausea and Vomiting (Cholecystectomy)  Goal: Nausea and Vomiting Relief  Outcome: Ongoing, Progressing  Intervention: Prevent or Manage Nausea and Vomiting  Recent Flowsheet Documentation  Taken 7/9/2024 0755 by Orlando Ordoñez RN  Nausea/Vomiting Interventions: (denies) other (see comments)     Problem: Postoperative Urinary Retention (Cholecystectomy)  Goal: Effective Urinary Elimination  Outcome: Ongoing, Progressing     Problem: Respiratory Compromise (Cholecystectomy)  Goal: Effective Oxygenation and Ventilation  Outcome: Ongoing, Progressing  Intervention: Optimize Oxygenation and Ventilation  Recent Flowsheet Documentation  Taken 7/9/2024 1203 by Orlando Ordoñez RN  Head of Bed (HOB) Positioning: HOB elevated  Taken 7/9/2024 0755 by Orlando Ordoñez RN  Head of Bed (HOB) Positioning: HOB elevated  Airway/Ventilation Management:   airway patency maintained   pulmonary hygiene promoted     Problem: Diabetes Comorbidity  Goal: Blood Glucose Level Within Targeted Range  Outcome: Ongoing, Progressing  Intervention: Monitor and Manage Glycemia  Recent Flowsheet Documentation  Taken  7/9/2024 0755 by Orlando Ordoñez RN  Glycemic Management: blood glucose monitored     Problem: Hypertension Comorbidity  Goal: Blood Pressure in Desired Range  Outcome: Ongoing, Progressing  Intervention: Maintain Blood Pressure Management  Recent Flowsheet Documentation  Taken 7/9/2024 1602 by Orlando Ordoñez RN  Syncope Management: legs elevated  Taken 7/9/2024 0755 by Orlando Ordoñez RN  Syncope Management: legs elevated  Medication Review/Management: medications reviewed     Problem: Fall Injury Risk  Goal: Absence of Fall and Fall-Related Injury  Outcome: Ongoing, Progressing  Intervention: Identify and Manage Contributors  Recent Flowsheet Documentation  Taken 7/9/2024 0755 by Orlando Ordoñez RN  Medication Review/Management: medications reviewed  Self-Care Promotion: independence encouraged  Intervention: Promote Injury-Free Environment  Recent Flowsheet Documentation  Taken 7/9/2024 1203 by Orlando Ordoñez RN  Safety Promotion/Fall Prevention: assistive device/personal items within reach  Taken 7/9/2024 0755 by Orlando Ordoñez RN  Safety Promotion/Fall Prevention:   activity supervised   assistive device/personal items within reach     Problem: Skin Injury Risk Increased  Goal: Skin Health and Integrity  Outcome: Ongoing, Progressing  Intervention: Optimize Skin Protection  Recent Flowsheet Documentation  Taken 7/9/2024 1602 by Orlando Ordoñez RN  Pressure Reduction Techniques: frequent weight shift encouraged  Pressure Reduction Devices:   positioning supports utilized   pressure-redistributing mattress utilized  Skin Protection:   adhesive use limited   tubing/devices free from skin contact  Taken 7/9/2024 1203 by Orlando Ordoñez RN  Pressure Reduction Techniques: frequent weight shift encouraged  Head of Bed (HOB) Positioning: HOB elevated  Pressure Reduction Devices: chair cushion utilized  Skin Protection:   adhesive use limited   tubing/devices free from skin contact  Taken 7/9/2024 0755 by Orlando Ordoñez RN  Head of Bed (HOB)  Positioning: HOB elevated  Pressure Reduction Devices:   positioning supports utilized   pressure-redistributing mattress utilized  Skin Protection: adhesive use limited

## 2024-07-09 NOTE — PLAN OF CARE
Goal Outcome Evaluation:  Plan of Care Reviewed With: patient        Progress: no change  Outcome Evaluation: Pt presents with abdominal pain, generalized fatigue, and decreased activity tolerance compared to baseline. Pt ambulated 325ft with CGA-SBA and RW for support. Recommend continued skilled IP PT interventions. Recommend D/C home with assist when medically appropriate.      Anticipated Discharge Disposition (PT): home with assist

## 2024-07-09 NOTE — PROGRESS NOTES
[facility] Medicine Services  PROGRESS NOTE    Patient Name: Hesham Arevalo Jr.  : 1965  MRN: [id number]    Date of Admission: 2024  Primary Care Physician: Myke Mendoza MD    Subjective   Subjective     CC:  cholecystitis       HPI:  Patient reports abdominal pain but currently controlled, now off PCA.  He denies fevers, chills, sweats or nausea.      Objective   Objective     Vital Signs:   Temp:  [98.1 °F (36.7 °C)-99.6 °F (37.6 °C)] 98.4 °F (36.9 °C)  Heart Rate:  [69-91] 72  Resp:  [18-20] 20  BP: (112-132)/(60-69) 126/63     Physical Exam:  General appearance: alert, awake, oriented, no acute distress   Cardiovascular: RRR, no murmurs or rubs, radial pulse full 2/4 BL   Respiratory: CTAB, no crackles or wheezes   Abdomen: soft, mild tenderness to palpation, right upper quadrant incision dressed without saturation, drain with serosanguineous output  Neuro/CNS: alert and oriented x3, normal speech    Results Reviewed:  LAB RESULTS:      Lab 24  0355 24  1551 24  1027 24  0630 24  0539 24  1419 24  1338   WBC 9.63 5.61 4.90  --  4.01 2.83*  --    HEMOGLOBIN 9.7* 10.7* 10.1*  --  11.7* 11.8*  --    HEMATOCRIT 29.2* 31.8* 30.5*  --  35.4* 35.6*  --    PLATELETS 76* 75* 73*  --  73* 51*  --    NEUTROS ABS 7.43*  --  4.04  --  2.38 1.77  --    IMMATURE GRANS (ABS) 0.06*  --  0.03  --  0.01 0.01  --    LYMPHS ABS 0.96  --  0.57*  --  0.84 0.55*  --    MONOS ABS 1.14*  --  0.12  --  0.38 0.23  --    EOS ABS 0.02  --  0.11  --  0.36 0.23  --    MCV 90.4 90.3 90.5  --  89.8 91.0  --    LACTATE  --   --   --   --   --   --  1.2   PROTIME  --   --  16.7* 15.4*  --   --  15.6*         Lab 24  0355 24  1027 24  0539 24  1842 24  1338   SODIUM 139 136 139 138  --    POTASSIUM 4.5 4.3 4.3 4.2  --    CHLORIDE 105 105 105 104  --    CO2 27.0 23.0 25.0 23.0  --    ANION GAP 7.0 8.0 9.0 11.0  --    BUN 18 15  16 16  --    CREATININE 0.93 0.86 0.87 0.93  --    EGFR 94.0 99.1 98.8 94.0  --    GLUCOSE 128* 189* 125* 229*  --    CALCIUM 8.2* 8.2* 8.8 8.9  --    MAGNESIUM 1.6  --  1.8  --  1.9   PHOSPHORUS  --   --  3.9  --   --    HEMOGLOBIN A1C  --   --   --   --  6.50*   TSH  --   --   --   --  6.630*         Lab 07/09/24  0355 07/08/24  1027 07/08/24  0539 07/07/24  1842   TOTAL PROTEIN 5.6* 6.1 6.8 6.7   ALBUMIN 2.9* 3.0* 3.4* 3.3*   GLOBULIN 2.7 3.1 3.4 3.4   ALT (SGPT) 16 19 20 22   AST (SGOT) 34 37 38 48*   BILIRUBIN 2.7* 2.4* 2.6* 2.0*   ALK PHOS 64 70 81 76         Lab 07/08/24  1027 07/08/24  0630 07/07/24  1338   PROTIME 16.7* 15.4* 15.6*   INR 1.33* 1.21* 1.22*             Lab 07/07/24  1604   ABO TYPING A   RH TYPING Negative   ANTIBODY SCREEN Negative         Brief Urine Lab Results  (Last result in the past 365 days)        Color   Clarity   Blood   Leuk Est   Nitrite   Protein   CREAT   Urine HCG        05/12/24 2032 Dark Yellow   Clear   Negative   Trace   Positive   Trace                   Microbiology Results Abnormal       Procedure Component Value - Date/Time    Blood Culture - Blood, Arm, Right [[id number]]  (Normal) Collected: 07/07/24 1338    Lab Status: Preliminary result Specimen: Blood from Arm, Right Updated: 07/08/24 1446     Blood Culture No growth at 24 hours    Blood Culture - Blood, Arm, Left [[id number]]  (Normal) Collected: 07/07/24 1338    Lab Status: Preliminary result Specimen: Blood from Arm, Left Updated: 07/08/24 1446     Blood Culture No growth at 24 hours            Peripheral Block    Result Date: 7/8/2024  Estelle Wilkerson CRNA     7/8/2024  8:08 AM Peripheral Block Pre-sedation assessment completed: 7/8/2024 7:40 AM Patient reassessed immediately prior to procedure Patient location during procedure: OR Start time: 7/8/2024 7:41 AM Stop time: 7/8/2024 7:44 AM Reason for block: at surgeon's request and post-op pain management Performed by Anesthesiologist: Jesus Curran MD  "Preanesthetic Checklist Completed: patient identified, IV checked, site marked, risks and benefits discussed, surgical consent, monitors and equipment checked, pre-op evaluation and timeout performed Prep: Pt Position: supine Sterile barriers:cap, gloves, mask and washed/disinfected hands Prep: ChloraPrep Patient monitoring: blood pressure monitoring, continuous pulse oximetry and EKG Procedure Sedation: yes Performed under: general Guidance:ultrasound guided Images:still images obtained, printed/placed on chart Laterality:Bilateral Block Type:TAP Injection Technique:single-shot Needle Type:short-bevel and echogenic Needle Gauge:20 G Resistance on Injection: none Medications Used: bupivacaine PF (MARCAINE) 0.25 % injection - Injection  60 mL - 7/8/2024 7:44:00 AM dexamethasone sodium phosphate injection - Injection  4 mg - 7/8/2024 7:44:00 AM Medications Comment:Block Injection:  LA dose divided between Right and Left block Post Assessment Injection Assessment: negative aspiration for heme, incremental injection and no paresthesia on injection Patient Tolerance:comfortable throughout block Complications:no Additional Notes Mid-Axillary/Lateral TAPs A high-frequency linear transducer, with sterile cover, was placed in the midaxillary line between the ASIS and costal margin. The External Oblique Muscle (EOM), Internal Oblique Muscle (IOM), Transverse Abdominus Muscle (BROWNE), and Peritoneum were identified. The insertion site was prepped in sterile fashion and then localized with 2-5 ml of 1% Lidocaine. Using ultrasound-guidance, a 20-gauge B-Arroyo 4\" Ultraplex 360 non-stimulating echogenic needle was advanced in plane, from medial to lateral, until the tip of the needle was in the fascial plane between the IOM and BROWNE. 1-3ml of preservative free normal saline was used to hydro-dissect the fascial planes. After the fascial plane was verified, the local anesthetic (LA) was injected. The procedure was repeated on the " opposite side for bilateral coverage. Aspiration every 5 ml to prevent intravascular injection. Injection was completed with negative aspiration of blood and negative intravascular injection. Injection pressures were normal with minimal resistance. Midaxillary TAPs should reach intercostal nerves T10- T11 and the subcostal nerve T12.  Performed by: Jesus Curran MD          Current medications:  Scheduled Meds:carvedilol, 6.25 mg, Oral, BID With Meals  [Held by provider] ferrous sulfate, 325 mg, Oral, Every Other Day  insulin lispro, 2-9 Units, Subcutaneous, 4x Daily AC & at Bedtime  levothyroxine, 25 mcg, Oral, Daily  pantoprazole, 40 mg, Intravenous, Q AM      Continuous Infusions:sodium chloride, 50 mL/hr, Last Rate: 50 mL/hr (07/09/24 0959)      PRN Meds:.  acetaminophen **OR** acetaminophen **OR** acetaminophen    Calcium Replacement - Follow Nurse / BPA Driven Protocol    dextrose    dextrose    diphenhydrAMINE    glucagon (human recombinant)    HYDROmorphone    Magnesium Standard Dose Replacement - Follow Nurse / BPA Driven Protocol    naloxone    nitroglycerin    ondansetron ODT **OR** ondansetron    oxyCODONE-acetaminophen    Phosphorus Replacement - Follow Nurse / BPA Driven Protocol    Potassium Replacement - Follow Nurse / BPA Driven Protocol    promethazine    sodium chloride    sodium chloride    Assessment & Plan   Assessment & Plan     Active Hospital Problems    Diagnosis  POA    **Acute cholecystitis [K81.0]  Yes    Cirrhosis of liver [K74.60]  Yes    Type 2 diabetes mellitus without complication, without long-term current use of insulin [E11.9]  Yes    Thrombocytopenia [D69.6]  Yes    Essential hypertension [I10]  Yes      Resolved Hospital Problems   No resolved problems to display.        Brief Hospital Course to date:  Hesham Arevalo Jr. is a 58 y.o. male  that presented as a direct admit with plans for cholecystectomy with Dr. Garcia on 7/8, after previously treated for cholecystitis  with percutaneous cholecystostomy tube that failed to resolve the issue.  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 Garcia and  Hepatology, s/p open cholecystectomy with Dr. Garcia on 7/8   - Patient was previously on Coumadin but has been off since 11/2022 due to resolution of previous thrombosis   -Intraoperatively estimated 700 cc blood loss  -Transfused 1 unit platelets intraoperatively  -Postoperatively, hemodynamically stable  -PCA discontinued  -No active signs of bleeding  -Advance to regular diet  -Mobilize as tolerated  -Wound care consult to optimize pouching right upper quadrant stoma due to close proximity to surgical incision, in efforts to minimize contamination risk  -CBC, BMP in a.m.     HTN  HLD   - Continue home Coreg  - Does not appear to be on statin at home.      T2DM   - A1C now 6.5 last A1C from 5/13 8.3   - Hold home metformin   - FSBS c SSI coverage   -Glucometer ordered to be provided on discharge     Hypothyroidism   - TSH ordered 6.63, reflex t4 0.8  -Due to acute illness, will recommend outpatient TSH and T4      Hx of rectal adenocarcinoma s/p resection and ileostomy April 2019  --Follows with Dr. Kelly and Dr. Moeller, no evidence of recurrence per last note from Oncology 3/28/24, recent negative CT abd/pelvis as above   -- CEA 1.69 from 3/2024   --He reports he has been told he is too high risk for ileostomy reversal    Expected Discharge Location and Transportation: Home   Expected Discharge   Expected Discharge Date: 7/10/2024; Expected Discharge Time:      VTE Prophylaxis:  Mechanical VTE prophylaxis orders are present.         AM-PAC 6 Clicks Score (PT): 14 (07/09/24 6661)    CODE STATUS:   Code Status and Medical Interventions:    Ordered at: 07/07/24 1400     Level Of Support Discussed With:    Patient     Code Status (Patient has no pulse and is not breathing):    CPR (Attempt to Resuscitate)     Medical Interventions (Patient has pulse or is breathing):    Full Support       Luis Nation, DO  07/09/24

## 2024-07-10 LAB
ALBUMIN SERPL-MCNC: 2.7 G/DL (ref 3.5–5.2)
ALBUMIN/GLOB SERPL: 1 G/DL
ALP SERPL-CCNC: 52 U/L (ref 39–117)
ALT SERPL W P-5'-P-CCNC: 13 U/L (ref 1–41)
ANION GAP SERPL CALCULATED.3IONS-SCNC: 6 MMOL/L (ref 5–15)
AST SERPL-CCNC: 27 U/L (ref 1–40)
BASOPHILS # BLD AUTO: 0.02 10*3/MM3 (ref 0–0.2)
BASOPHILS NFR BLD AUTO: 0.4 % (ref 0–1.5)
BILIRUB SERPL-MCNC: 2.9 MG/DL (ref 0–1.2)
BUN SERPL-MCNC: 19 MG/DL (ref 8–23)
BUN/CREAT SERPL: 19.6 (ref 7–25)
CALCIUM SPEC-SCNC: 7.7 MG/DL (ref 8.6–10.5)
CHLORIDE SERPL-SCNC: 104 MMOL/L (ref 98–107)
CO2 SERPL-SCNC: 28 MMOL/L (ref 22–29)
CREAT SERPL-MCNC: 0.97 MG/DL (ref 0.76–1.27)
DEPRECATED RDW RBC AUTO: 49.7 FL (ref 37–54)
EGFRCR SERPLBLD CKD-EPI 2021: 89.4 ML/MIN/1.73
EOSINOPHIL # BLD AUTO: 0.13 10*3/MM3 (ref 0–0.4)
EOSINOPHIL NFR BLD AUTO: 2.4 % (ref 0.3–6.2)
ERYTHROCYTE [DISTWIDTH] IN BLOOD BY AUTOMATED COUNT: 14.7 % (ref 12.3–15.4)
GLOBULIN UR ELPH-MCNC: 2.6 GM/DL
GLUCOSE BLDC GLUCOMTR-MCNC: 134 MG/DL (ref 70–130)
GLUCOSE BLDC GLUCOMTR-MCNC: 148 MG/DL (ref 70–130)
GLUCOSE BLDC GLUCOMTR-MCNC: 171 MG/DL (ref 70–130)
GLUCOSE BLDC GLUCOMTR-MCNC: 193 MG/DL (ref 70–130)
GLUCOSE SERPL-MCNC: 122 MG/DL (ref 65–99)
HCT VFR BLD AUTO: 27.3 % (ref 37.5–51)
HGB BLD-MCNC: 8.8 G/DL (ref 13–17.7)
IMM GRANULOCYTES # BLD AUTO: 0.02 10*3/MM3 (ref 0–0.05)
IMM GRANULOCYTES NFR BLD AUTO: 0.4 % (ref 0–0.5)
LYMPHOCYTES # BLD AUTO: 0.7 10*3/MM3 (ref 0.7–3.1)
LYMPHOCYTES NFR BLD AUTO: 13 % (ref 19.6–45.3)
MAGNESIUM SERPL-MCNC: 1.6 MG/DL (ref 1.6–2.4)
MCH RBC QN AUTO: 29.8 PG (ref 26.6–33)
MCHC RBC AUTO-ENTMCNC: 32.2 G/DL (ref 31.5–35.7)
MCV RBC AUTO: 92.5 FL (ref 79–97)
MONOCYTES # BLD AUTO: 0.74 10*3/MM3 (ref 0.1–0.9)
MONOCYTES NFR BLD AUTO: 13.7 % (ref 5–12)
NEUTROPHILS NFR BLD AUTO: 3.79 10*3/MM3 (ref 1.7–7)
NEUTROPHILS NFR BLD AUTO: 70.1 % (ref 42.7–76)
NRBC BLD AUTO-RTO: 0 /100 WBC (ref 0–0.2)
PLATELET # BLD AUTO: 44 10*3/MM3 (ref 140–450)
PMV BLD AUTO: 10.5 FL (ref 6–12)
POTASSIUM SERPL-SCNC: 4.1 MMOL/L (ref 3.5–5.2)
PROT SERPL-MCNC: 5.3 G/DL (ref 6–8.5)
RBC # BLD AUTO: 2.95 10*6/MM3 (ref 4.14–5.8)
RBC MORPH BLD: NORMAL
SMALL PLATELETS BLD QL SMEAR: NORMAL
SODIUM SERPL-SCNC: 138 MMOL/L (ref 136–145)
WBC MORPH BLD: NORMAL
WBC NRBC COR # BLD AUTO: 5.4 10*3/MM3 (ref 3.4–10.8)

## 2024-07-10 PROCEDURE — P9035 PLATELET PHERES LEUKOREDUCED: HCPCS

## 2024-07-10 PROCEDURE — 80053 COMPREHEN METABOLIC PANEL: CPT | Performed by: FAMILY MEDICINE

## 2024-07-10 PROCEDURE — 83735 ASSAY OF MAGNESIUM: CPT | Performed by: FAMILY MEDICINE

## 2024-07-10 PROCEDURE — 99232 SBSQ HOSP IP/OBS MODERATE 35: CPT | Performed by: INTERNAL MEDICINE

## 2024-07-10 PROCEDURE — 82948 REAGENT STRIP/BLOOD GLUCOSE: CPT

## 2024-07-10 PROCEDURE — 97530 THERAPEUTIC ACTIVITIES: CPT

## 2024-07-10 PROCEDURE — P9100 PATHOGEN TEST FOR PLATELETS: HCPCS

## 2024-07-10 PROCEDURE — 85025 COMPLETE CBC W/AUTO DIFF WBC: CPT | Performed by: FAMILY MEDICINE

## 2024-07-10 PROCEDURE — 85007 BL SMEAR W/DIFF WBC COUNT: CPT | Performed by: FAMILY MEDICINE

## 2024-07-10 PROCEDURE — 25810000003 SODIUM CHLORIDE 0.9 % SOLUTION: Performed by: SURGERY

## 2024-07-10 PROCEDURE — 36430 TRANSFUSION BLD/BLD COMPNT: CPT

## 2024-07-10 RX ADMIN — PANTOPRAZOLE SODIUM 40 MG: 40 INJECTION, POWDER, FOR SOLUTION INTRAVENOUS at 04:22

## 2024-07-10 RX ADMIN — CARVEDILOL 6.25 MG: 6.25 TABLET, FILM COATED ORAL at 08:25

## 2024-07-10 RX ADMIN — OXYCODONE HYDROCHLORIDE AND ACETAMINOPHEN 1 TABLET: 5; 325 TABLET ORAL at 08:25

## 2024-07-10 RX ADMIN — OXYCODONE HYDROCHLORIDE AND ACETAMINOPHEN 1 TABLET: 5; 325 TABLET ORAL at 04:22

## 2024-07-10 RX ADMIN — OXYCODONE HYDROCHLORIDE AND ACETAMINOPHEN 1 TABLET: 5; 325 TABLET ORAL at 00:29

## 2024-07-10 RX ADMIN — CARVEDILOL 6.25 MG: 6.25 TABLET, FILM COATED ORAL at 18:01

## 2024-07-10 RX ADMIN — LEVOTHYROXINE SODIUM 25 MCG: 25 TABLET ORAL at 08:25

## 2024-07-10 RX ADMIN — OXYCODONE HYDROCHLORIDE AND ACETAMINOPHEN 1 TABLET: 5; 325 TABLET ORAL at 18:01

## 2024-07-10 RX ADMIN — ACETAMINOPHEN 650 MG: 325 TABLET ORAL at 10:06

## 2024-07-10 RX ADMIN — ACETAMINOPHEN 650 MG: 325 TABLET ORAL at 23:20

## 2024-07-10 RX ADMIN — SODIUM CHLORIDE 50 ML/HR: 9 INJECTION, SOLUTION INTRAVENOUS at 04:22

## 2024-07-10 NOTE — PROGRESS NOTES
"Patient Name:  Hesham Arevalo Jr.  YOB: 1965  [id number]    Surgery Progress Note    Date of visit: 7/10/2024      Subjective: No acute events.  Tmax 100.2.  Tolerated regular diet yesterday without any nausea or vomiting.  Having ileostomy output.  FRANTZ drain with continued serosanguineous output, 205 mL yesterday.  Ambulated.  Pain controlled          Objective:     /61 (BP Location: Right arm, Patient Position: Lying)   Pulse 73   Temp 98.9 °F (37.2 °C) (Oral)   Resp 20   Ht 185.4 cm (73\")   Wt 95.3 kg (210 lb)   SpO2 98%   BMI 27.71 kg/m²     Intake/Output Summary (Last 24 hours) at 7/10/2024 0646  Last data filed at 7/10/2024 0434  Gross per 24 hour   Intake 485 ml   Output 1165 ml   Net -680 ml       GEN:   Awake, alert, in no acute distress, resting comfortably in bed   CV:   Regular rate and rhythm  L:  Symmetric expansion, not labored on room air  Abd:  Soft, not distended, appropriately tender to palpation throughout the right upper quadrant, right upper quadrant incision is well-approximated, ileostomy in the right upper quadrant is pink and patent with stool in the bag, FRANTZ drain in the right upper quadrant is serosanguineous  Ext:  No cyanosis, clubbing, or edema    Recent labs that are back at this time have been reviewed.           Assessment/ Plan:    Mr. Arevalo is a 58 year old gentleman with history of cirrhosis and rectal cancer s/p LAR with DLI who is admitted after undergoing open cholecystectomy on 7/8/24 for cholecystitis s/p percutaneous cholecystostomy     #Cholecystitis  -postop day 2  -Vitals stable  -Liver function tests essentially stable with bilirubin of 2.9 from 2.7 yesterday.  Hemoglobin is down at 8.8 from 9.7 yesterday.  No tachycardia or hypotension.  Platelets are down at 44.  His decrease in H&H may likely be dilutional, however with his thrombocytopenia and continued serosanguineous drain output I will order for 1 unit of platelet transfusion " today  -No need for antibiotics at this point  -Continue regular diet  -Monitor drain output  -Repeat labs tomorrow.  If labs are stable, may be possibly ready for discharge home tomorrow.         Konstantin Garcia MD  7/10/2024  06:46 EDT

## 2024-07-10 NOTE — NURSING NOTE
WOC follow-up for appliance near incision. Pouching system that was placed yesterday holding well, pt reports no problems and appreciation for co-worker's visit and expertise. Left several one piece matt nails [phone] in room for patient's use. Also left 2 [phone] one piece if he would like to try in future.   Please call if problems with leakage. [phone]

## 2024-07-10 NOTE — THERAPY TREATMENT NOTE
Patient Name: Hesham Arevalo Jr.  : 1965    MRN: [id number]                              Today's Date: 7/10/2024       Admit Date: 2024    Visit Dx:     ICD-10-CM ICD-9-CM   1. Cholecystitis  K81.9 575.10     Patient Active Problem List   Diagnosis    Rectal carcinoma    Rectal cancer    low anterior resection with loop ileostomy, liver biopsy and proctoscopy 19    Other specified hypothyroidism    Essential hypertension    Prediabetes    Acute postoperative pain    Abdominopelvic abscess    Fever    Urine retention    Intra-abdominal abscess    Hypomagnesemia, replaced    Thrombocytopenia    History of rectal cancer    Other dietary vitamin B12 deficiency anemia    Abdominal pain with nonbilious vomiting    Hyperbilirubinemia    Cholelithiasis    Abdominal pain    Hyponatremia    Type 2 diabetes mellitus without complication, without long-term current use of insulin    Cirrhosis of liver    Acute cholecystitis    Acute hepatitis    Acute pancreatitis    Gallstone pancreatitis     Past Medical History:   Diagnosis Date    Arthritis     knees     Cancer 2018    colon with resection    Disease of thyroid gland     Fatty liver     Hashimoto's disease     History of radiation therapy 2019    rectal cancer    Hypertension     Ileostomy in place     Prediabetes     Psoriasis     Wears glasses      Past Surgical History:   Procedure Laterality Date    CHOLECYSTECTOMY N/A 2024    Procedure: OPEN CHOLECYSTECTOMY;  Surgeon: Konstantin Garcia MD;  Location:  [facility];  Service: General;  Laterality: N/A;    COLON RESECTION N/A 2019    Procedure: LAPAROSCOPIC LOWER ANTERIOR RESECTION WITH DIVERTING LOOP ILEOOSTOMY, SPLENIC FLEIXURE MOBILIZATION AND LIVER BIOPSY, AND PROCTOSCOPY;  Surgeon: Pau Kelly MD;  Location:  [facility];  Service: General    COLONOSCOPY      2018    ILEOSTOMY  2019    LIVER BIOPSY  2003    VASECTOMY      VENOUS ACCESS DEVICE (PORT) INSERTION N/A  7/18/2019    Procedure: PORT PLACEMENT;  Surgeon: Franky Cazares MD;  Location: [facility] OR;  Service: General      General Information       Row Name 07/10/24 1606          Physical Therapy Time and Intention    Document Type therapy note (daily note)  -AE     Mode of Treatment physical therapy  -AE       Row Name 07/10/24 1606          General Information    Patient Profile Reviewed yes  -AE     Existing Precautions/Restrictions fall;other (see comments)  FRANTZ drain; ileostomy; abdominal incision  -AE     Barriers to Rehab medically complex  -AE       Row Name 07/10/24 1606          Cognition    Orientation Status (Cognition) oriented x 4  -AE       Row Name 07/10/24 1606          Safety Issues, Functional Mobility    Safety Issues Affecting Function (Mobility) awareness of need for assistance;insight into deficits/self-awareness;safety precaution awareness;sequencing abilities  -AE     Impairments Affecting Function (Mobility) balance;endurance/activity tolerance;postural/trunk control;strength;pain  -AE               User Key  (r) = Recorded By, (t) = Taken By, (c) = Cosigned By      Initials Name Provider Type    AE Harrison Nickerson, PT Physical Therapist                   Mobility       Row Name 07/10/24 1608          Bed Mobility    Bed Mobility supine-sit  -AE     Supine-Sit [place] (Bed Mobility) minimum assist (75% patient effort);1 person assist;verbal cues  -AE     Assistive Device (Bed Mobility) bed rails;head of bed elevated  -AE     Comment, (Bed Mobility) VCs for hand placement and sequencing. Pt demo good use of log rolling technique to reduce abdominal discomfort.  -AE       Row Name 07/10/24 1608          Transfers    Comment, (Transfers) VCs for hand placement and sequencing. Pt required increased cues and time for STS. Initially attempted without AD, mild unsteadiness noted so gave patient RW to assist with balance.  -AE       Row Name 07/10/24 1608          Sit-Stand Transfer     Sit-Stand [place] (Transfers) contact guard;1 person assist;verbal cues  -AE     Assistive Device (Sit-Stand Transfers) walker, front-wheeled  -AE       Row Name 07/10/24 1608          Gait/Stairs (Locomotion)    [place] Level (Gait) contact guard;1 person assist  -AE     Assistive Device (Gait) walker, front-wheeled  -AE     Distance in Feet (Gait) 375  -AE     Deviations/Abnormal Patterns (Gait) bilateral deviations;soha decreased;gait speed decreased;stride length decreased  -AE     Bilateral Gait Deviations forward flexed posture  -AE     Comment, (Gait/Stairs) Pt demo step through gait pattern with slowed soha, decreased step length, and forward flexed posture. Pt demo improved endurance with activity and balance this session. Further distance limited by pain.  -AE               User Key  (r) = Recorded By, (t) = Taken By, (c) = Cosigned By      Initials Name Provider Type    AE Harrison Nickerson PT Physical Therapist                   Obj/Interventions       Row Name 07/10/24 1611          Balance    Balance Assessment sitting static balance;sitting dynamic balance;sit to stand dynamic balance;standing static balance;standing dynamic balance  -AE     Static Sitting Balance standby assist  -AE     Dynamic Sitting Balance contact guard  -AE     Position, Sitting Balance unsupported;sitting edge of bed  -AE     Sit to Stand Dynamic Balance contact guard;1-person assist;verbal cues  -AE     Static Standing Balance contact guard  -AE     Dynamic Standing Balance contact guard  -AE     Position/Device Used, Standing Balance supported;walker, front-wheeled  -AE               User Key  (r) = Recorded By, (t) = Taken By, (c) = Cosigned By      Initials Name Provider Type    Harrison Bauman PT Physical Therapist                   Goals/Plan    No documentation.                  Clinical Impression       Row Name 07/10/24 1611          Pain Scale: FACES Pre/Post-Treatment    Pain: FACES Scale,  Pretreatment 2-->hurts little bit  -AE     Posttreatment Pain Rating 4-->hurts little more  -AE     Pain Location incisional  -AE     Pain Location - abdomen  -AE     Pre/Posttreatment Pain Comment At FRANTZ drain; RN aware  -AE       Row Name 07/10/24 1611          Plan of Care Review    Plan of Care Reviewed With patient  -AE     Progress improving  -AE     Outcome Evaluation Pt continues to present with decreased functional mobility and decreased activity tolerance. Pt ambulated 375ft with CGA and RW for support. Continue to progress per pt tolerance.  -AE       Row Name 07/10/24 1611          Vital Signs    Pre Systolic BP Rehab 109  -AE     Pre Treatment Diastolic BP 58  -AE     Pretreatment Heart Rate (beats/min) 78  -AE     Posttreatment Heart Rate (beats/min) 77  -AE     O2 Delivery Pre Treatment room air  -AE     O2 Delivery Intra Treatment room air  -AE     Post SpO2 (%) 98  -AE     O2 Delivery Post Treatment room air  -AE     Pre Patient Position Supine  -AE     Intra Patient Position Standing  -AE     Post Patient Position Sitting  -AE       Row Name 07/10/24 1611          Positioning and Restraints    Pre-Treatment Position in bed  -AE     Post Treatment Position chair  -AE     In Chair notified nsg;reclined;call light within reach;encouraged to call for assist;exit alarm on;waffle cushion;legs elevated  -AE               User Key  (r) = Recorded By, (t) = Taken By, (c) = Cosigned By      Initials Name Provider Type    AE Harrison Nickerson, PT Physical Therapist                   Outcome Measures       Row Name 07/10/24 1613 07/10/24 0800       How much help from another person do you currently need...    Turning from your back to your side while in flat bed without using bedrails? 3  -AE 3  -MW    Moving from lying on back to sitting on the side of a flat bed without bedrails? 3  -AE 2  -MW    Moving to and from a bed to a chair (including a wheelchair)? 3  -AE 3  -MW    Standing up from a chair using your  arms (e.g., wheelchair, bedside chair)? 3  -AE 3  -MW    Climbing 3-5 steps with a railing? 3  -AE 3  -MW    To walk in hospital room? 3  -AE 3  -MW    AM-PAC 6 Clicks Score (PT) 18  -AE 17  -MW    Highest Level of Mobility Goal 6 --> Walk 10 steps or more  -AE 5 --> Static standing  -MW      Row Name 07/10/24 1613          Functional Assessment    Outcome Measure Options AM-PAC 6 Clicks Basic Mobility (PT)  -AE               User Key  (r) = Recorded By, (t) = Taken By, (c) = Cosigned By      Initials Name Provider Type    AE Harrison Nickerson, PT Physical Therapist    Donta Coyle, RN Registered Nurse                                 Physical Therapy Education       Title: PT OT SLP Therapies (In Progress)       Topic: Physical Therapy (In Progress)       Point: Mobility training (Done)       Learning Progress Summary             Patient Acceptance, E, VU by AE at 7/10/2024 1520    Acceptance, E, VU by AE at 7/9/2024 1101                         Point: Home exercise program (Not Started)       Learner Progress:  Not documented in this visit.              Point: Body mechanics (Done)       Learning Progress Summary             Patient Acceptance, E, VU by AE at 7/10/2024 1520    Acceptance, E, VU by AE at 7/9/2024 1101                         Point: Precautions (Done)       Learning Progress Summary             Patient Acceptance, E, VU by AE at 7/10/2024 1520    Acceptance, E, VU by AE at 7/9/2024 1101                                         User Key       Initials Effective Dates Name Provider Type Discipline    AE 09/21/21 -  Harrison Nickerson, PT Physical Therapist PT                  PT Recommendation and Plan  Planned Therapy Interventions (PT): balance training, bed mobility training, gait training, home exercise program, patient/family education, postural re-education, ROM (range of motion), strengthening, transfer training, stair training  Plan of Care Reviewed With: patient  Progress:  improving  Outcome Evaluation: Pt continues to present with decreased functional mobility and decreased activity tolerance. Pt ambulated 375ft with CGA and RW for support. Continue to progress per pt tolerance.     Time Calculation:   PT Evaluation Complexity  History, PT Evaluation Complexity: 1-2 personal factors and/or comorbidities  Examination of Body Systems (PT Eval Complexity): total of 3 or more elements  Clinical Presentation (PT Evaluation Complexity): stable  Clinical Decision Making (PT Evaluation Complexity): low complexity  Overall Complexity (PT Evaluation Complexity): low complexity     PT Charges       Row Name 07/10/24 1614             Time Calculation    Start Time 1520  -AE      PT Received On 07/10/24  -AE      PT Goal Re-Cert Due Date 07/19/24  -AE         Timed Charges    83150 - PT Therapeutic Activity Minutes 23  -AE         Total Minutes    Timed Charges Total Minutes 23  -AE       Total Minutes 23  -AE                User Key  (r) = Recorded By, (t) = Taken By, (c) = Cosigned By      Initials Name Provider Type    AE Harrison Nickerson, PT Physical Therapist                  Therapy Charges for Today       Code Description Service Date Service Provider Modifiers Qty    93183494016 HC PT THERAPEUTIC ACT EA 15 MIN 7/9/2024 Harrison Nickerson, PT GP 1    10137711904 HC PT EVAL LOW COMPLEXITY 3 7/9/2024 Harrison Nickerson, PT GP 1    40237552787 HC PT THERAPEUTIC ACT EA 15 MIN 7/10/2024 Harrison Nickerson, PT GP 2            PT G-Codes  Outcome Measure Options: AM-PAC 6 Clicks Basic Mobility (PT)  AM-PAC 6 Clicks Score (PT): 18  PT Discharge Summary  Anticipated Discharge Disposition (PT): home with assist    Harrison Nickerson PT  7/10/2024

## 2024-07-10 NOTE — PLAN OF CARE
Goal Outcome Evaluation:  Plan of Care Reviewed With: patient        Progress: improving  Outcome Evaluation: Pt continues to present with decreased functional mobility and decreased activity tolerance. Pt ambulated 375ft with CGA and RW for support. Continue to progress per pt tolerance.      Anticipated Discharge Disposition (PT): home with assist

## 2024-07-10 NOTE — CASE MANAGEMENT/SOCIAL WORK
Continued Stay Note  MARLENE Fraser     Patient Name: Hesham Arevalo Jr.  MRN: [id number]  Today's Date: 7/10/2024    Admit Date: 7/7/2024    Plan: home   Discharge Plan       Row Name 07/10/24 0844       Plan    Plan home    Patient/Family in Agreement with Plan yes    Plan Comments I met with this patient bedside. Per surgeon, if his labs are stable tomorrow, then he will be discharged home. The patient is in agreement and denies needing HH for wound care. His wife can transport. CM to follow.    Final Discharge Disposition Code 01 - home or self-care                   Discharge Codes    No documentation.                 Expected Discharge Date and Time       Expected Discharge Date Expected Discharge Time    Jul 10, 2024               Marline Huynh RN

## 2024-07-10 NOTE — NURSING NOTE
[facility] saw patient at request of surgeon and patient.    Patient had an ileostomy since 2019, had recent gallbladder surgery laparoscopic incision is about 2 and half inches to 3 inches above the ostomy.    Patient worried about stool leaking back up into the incision.  The incision however is almost completely approximated already with a thick dermal glue over it.    Ostomy measures about 32 mm in diameter and is slightly diagonal however patient cuts at 30 and the side-to-side was about 30 mm or so.  Think this this possible swelling from the recent surgery.  Patient also states 1 month stoma usually protrudes more than this however I think it is also a little bit of swelling.    Patient has some mild dermal erosion irritant contact dermatitis to the 8:00 aspect of the stoma.  Patient reports changing pouch twice a week he has gone a day sometimes and sometimes just 1 and he can tell when it needs to be changed.  Is using the [place] [address] and still measuring and cutting himself.    He is also using powder and spray and paste.  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  For this reason I would continue with the barrier spray to protect the peristomal skin I also did talk to him about an old [employer] product called calm feel that patients with caput medusa use however the new calm field is like a thin DuoDERM and it is not quite as effective.  I asked him that if he would like to research it with his [employer] representative and that we could possibly to do it I wrote told him about our outpatient clinic.    Changed the pouch and we did circumferential paste and then I covered the Sharri incision with a elastic barrier extender.  The supplies were given to the patient however I told him that if he wanted I can give him more 8951 once before he goes home.    [facility] will check up on him to make sure the pouching worked in to see  if there is any other needs.

## 2024-07-10 NOTE — PAYOR COMM NOTE
"[facility]# [id number]   Pre-approved surgical admission  Clinical Update    Utilization Review  Phone [phone]  Fax [phone]    [facility]  [address] [place] [address]         Cristina La Jr. (58 y.o. Male)       Date of Birth   1965    Social Security Number       Address   [address] [address]    Home Phone   [phone]    MRN   [id number]       P[employer]alism   Jain    Marital Status                               Admission Date   7/7/24    Admission Type   Elective    Admitting Provider   Reyna Meadows DO    Attending Provider   Reyna Meadows DO    Department, Room/Bed   [facility] 5H, S584/1       Discharge Date       Discharge Disposition       Discharge Destination                                 Attending Provider: Reyna Meadows DO    Allergies: No Known Allergies    Isolation: None   Infection: None   Code Status: CPR    Ht: 185.4 cm (73\")   Wt: 95.3 kg (210 lb)    Admission Cmt: None   Principal Problem: Acute cholecystitis [K81.0]                   Active Insurance as of 7/7/2024       Primary Coverage       Payor Plan Insurance Group Employer/Plan Group    [employer] [employer] EMPL[employer]EE [id number]       Payor Plan Address Payor Plan Phone Number Payor Plan Fax Number Effective Dates    [address] [phone]  1/1/2022 - None Entered    [place] [id number]         Subscriber Name Subscriber Birth Date Member ID       CRISTINA LA JR. 1965 [id number]                     Emergency Contacts        (Rel.) Home Phone Work Phone Mobile Phone    Livier La (Spouse) [phone] -- [phone]              Current Facility-Administered Medications   Medication Dose Route Frequency Provider Last Rate Last Admin    acetaminophen (TYLENOL) tablet 650 mg  650 mg Oral Q4H PRN Konstantin Garcia MD   650 mg at 07/10/24 1006    Or    acetaminophen (TYLENOL) 160 " MG/5ML oral solution 650 mg  650 mg Oral Q4H PRN Konstantin Garcia MD        Or    acetaminophen (TYLENOL) suppository 650 mg  650 mg Rectal Q4H PRN Konstantin Garcia MD        Calcium Replacement - Follow Nurse / BPA Driven Protocol   Does not apply PRN Konstantin Garcia MD        carvedilol (COREG) tablet 6.25 mg  6.25 mg Oral BID With Meals Konstantin Garcia MD   6.25 mg at 07/10/24 0825    dextrose (D50W) (25 g/50 mL) IV injection 25 g  25 g Intravenous Q15 Min PRN Konstantin Garcia MD        dextrose (GLUTOSE) oral gel 15 g  15 g Oral Q15 Min PRN Konstantin Garcia MD        diphenhydrAMINE (BENADRYL) injection 25 mg  25 mg Intravenous Q6H PRN Konstantin Garcia MD        [Held by provider] ferrous sulfate tablet 325 mg  325 mg Oral Every Other Day SHRADDHA Triana DO        glucagon (GLUCAGEN) injection 1 mg  1 mg Intramuscular Q15 Min PRN Konstantin Garcia MD        HYDROmorphone (DILAUDID) injection 0.2 mg  0.2 mg Intravenous Q2H PRN Konstantin Garcia MD        Insulin Lispro (humaLOG) injection 2-9 Units  2-9 Units Subcutaneous 4x Daily AC & at Bedtime SHRADDHA Triana DO   2 Units at 07/09/24 1952    levothyroxine (SYNTHROID, LEVOTHROID) tablet 25 mcg  25 mcg Oral Daily Konstantin Garcia MD   25 mcg at 07/10/24 0825    Magnesium Standard Dose Replacement - Follow Nurse / BPA Driven Protocol   Does not apply PRN Konstantin Garcia MD        naloxone (NARCAN) injection 0.1 mg  0.1 mg Intravenous Q5 Min PRN Konstantin Garcia MD        nitroglycerin (NITROSTAT) SL tablet 0.4 mg  0.4 mg Sublingual Q5 Min PRN Konstantin Garcia MD        ondansetron ODT (ZOFRAN-ODT) disintegrating tablet 4 mg  4 mg Oral Q6H PRN Konstantin Garcia MD        Or    ondansetron (ZOFRAN) injection 4 mg  4 mg Intravenous Q6H PRN Konstantin Garcia MD        oxyCODONE-acetaminophen (PERCOCET) 5-325 MG per tablet 1 tablet  1 tablet Oral Q4H PRN Konstantin Garcia MD   1 tablet at 07/10/24 0825    pantoprazole (PROTONIX)  injection 40 mg  40 mg Intravenous Q AM Konstantin Garcia MD   40 mg at 07/10/24 0422    Phosphorus Replacement - Follow Nurse / BPA Driven Protocol   Does not apply Konstantin Griffin MD        Potassium Replacement - Follow Nurse / BPA Driven Protocol   Does not apply Konstantin Griffni MD        promethazine (PHENERGAN) tablet 12.5 mg  12.5 mg Oral Q6H PRKonstantin Villagomez MD        sodium chloride 0.9 % flush 10 mL  10 mL Intravenous Konstantin Griffin MD        sodium chloride 0.9 % infusion 40 mL  40 mL Intravenous Konstantin Griffin MD         Lab Results (last 48 hours)       Procedure Component Value Units Date/Time    Blood Culture - Blood, Arm, Right [[id number]]  (Normal) Collected: 07/07/24 1338    Specimen: Blood from Arm, Right Updated: 07/10/24 1445     Blood Culture No growth at 3 days    Blood Culture - Blood, Arm, Left [[id number]]  (Normal) Collected: 07/07/24 1338    Specimen: Blood from Arm, Left Updated: 07/10/24 1445     Blood Culture No growth at 3 days    POC Glucose Once [[id number]]  (Abnormal) Collected: 07/10/24 1217    Specimen: Blood Updated: 07/10/24 1219     Glucose 148 mg/dL     POC Glucose Once [[id number]]  (Abnormal) Collected: 07/10/24 0739    Specimen: Blood Updated: 07/10/24 0740     Glucose 134 mg/dL     CBC & Differential [[id number]]  (Abnormal) Collected: 07/10/24 0333    Specimen: Blood Updated: 07/10/24 0431    Narrative:      The following orders were created for panel order CBC & Differential.  Procedure                               Abnormality         Status                     ---------                               -----------         ------                     CBC Auto Differential[[id number]]        Abnormal            Final result               Scan Slide[[id number]]                                       Final result                 Please view results for these tests on the individual orders.    CBC Auto Differential [[id number]]  (Abnormal)  Collected: 07/10/24 0333    Specimen: Blood Updated: 07/10/24 0431     WBC 5.40 10*3/mm3      RBC 2.95 10*6/mm3      Hemoglobin 8.8 g/dL      Hematocrit 27.3 %      MCV 92.5 fL      MCH 29.8 pg      MCHC 32.2 g/dL      RDW 14.7 %      RDW-SD 49.7 fl      MPV 10.5 fL      Platelets 44 10*3/mm3      Neutrophil % 70.1 %      Lymphocyte % 13.0 %      Monocyte % 13.7 %      Eosinophil % 2.4 %      Basophil % 0.4 %      Immature Grans % 0.4 %      Neutrophils, Absolute 3.79 10*3/mm3      Lymphocytes, Absolute 0.70 10*3/mm3      Monocytes, Absolute 0.74 10*3/mm3      Eosinophils, Absolute 0.13 10*3/mm3      Basophils, Absolute 0.02 10*3/mm3      Immature Grans, Absolute 0.02 10*3/mm3      nRBC 0.0 /100 WBC     Narrative:      Appended report. These results have been appended to a previously verified report.    Scan Slide [[id number]] Collected: 07/10/24 0333    Specimen: Blood Updated: 07/10/24 0431     RBC Morphology Normal     WBC Morphology Normal     Platelet Estimate Decreased    Magnesium [[id number]]  (Normal) Collected: 07/10/24 0333    Specimen: Blood Updated: 07/10/24 0413     Magnesium 1.6 mg/dL     Comprehensive Metabolic Panel [[id number]]  (Abnormal) Collected: 07/10/24 0333    Specimen: Blood Updated: 07/10/24 0413     Glucose 122 mg/dL      BUN 19 mg/dL      Creatinine 0.97 mg/dL      Sodium 138 mmol/L      Potassium 4.1 mmol/L      Chloride 104 mmol/L      CO2 28.0 mmol/L      Calcium 7.7 mg/dL      Total Protein 5.3 g/dL      Albumin 2.7 g/dL      ALT (SGPT) 13 U/L      AST (SGOT) 27 U/L      Alkaline Phosphatase 52 U/L      Total Bilirubin 2.9 mg/dL      Globulin 2.6 gm/dL      Comment: Calculated Result        A/G Ratio 1.0 g/dL      BUN/Creatinine Ratio 19.6     Anion Gap 6.0 mmol/L      eGFR 89.4 mL/min/1.73     Narrative:      GFR Normal >60  Chronic Kidney Disease <60  Kidney Failure <15      POC Glucose Once [[id number]]  (Abnormal) Collected: 07/09/24 1951    Specimen: Blood Updated: 07/09/24 1955      "Glucose 202 mg/dL     POC Glucose Once [[id number]]  (Abnormal) Collected: 07/09/24 1708    Specimen: Blood Updated: 07/09/24 1709     Glucose 143 mg/dL     POC Glucose Once [[id number]]  (Abnormal) Collected: 07/09/24 1211    Specimen: Blood Updated: 07/09/24 1212     Glucose 137 mg/dL     Tissue Pathology Exam [[id number]] Collected: 07/08/24 0808    Specimen: Tissue from Gallbladder Updated: 07/09/24 1053     Case Report --     Surgical Pathology Report                         Case: [id number]                                  Authorizing Provider:  Konstantin Garcia MD      Collected:           07/08/2024 08:08 AM          Ordering Location:     [facility]   Received:            07/08/2024 11:12 AM                                 OR                                                                           Pathologist:           Kari Pulido DO                                                       Specimen:    Gallbladder, partial gallbladder and contents                                               Clinical Information --     Cholecystitis         Final Diagnosis --     Gallbladder, cholecystectomy:  Acute on chronic cholecystitis with cholelithiasis         Gross Description --     1. Gallbladder.  Received in formalin labeled \"partial gallbladder and contents\", is a 4.0 x 3.8 x 2.2 cm partial gallbladder consisting of the body and fundus.  The intact serosal surface is pink-tan, ragged and glistening.  The margin is inked blue.  Opening of the gallbladder reveals a minimal amount of blood clot.  No bile is identified within the specimen.  Received free-floating in the same container are 2 black oxalate stones ranging from 0.7-0.9 cm.  The mucosa is pink-red, velvety and unremarkable.  No masses, lesions or polyps are identified.  The wall thickness measures up to 0.4 cm.  No pericystic lymph node is identified.  Representative sections are submitted as follows:  1A: Margin, en face  1B: " Gallbladder mucosa from the body, neck, fundus   MLA       Microscopic Description --     The slides are reviewed and demonstrate histopathologic features supporting the above rendered diagnosis.        POC Glucose Once [[id number]]  (Normal) Collected: 07/09/24 0738    Specimen: Blood Updated: 07/09/24 0739     Glucose 129 mg/dL     Magnesium [[id number]]  (Normal) Collected: 07/09/24 0355    Specimen: Blood Updated: 07/09/24 0432     Magnesium 1.6 mg/dL     Comprehensive Metabolic Panel [[id number]]  (Abnormal) Collected: 07/09/24 0355    Specimen: Blood Updated: 07/09/24 0432     Glucose 128 mg/dL      BUN 18 mg/dL      Creatinine 0.93 mg/dL      Sodium 139 mmol/L      Potassium 4.5 mmol/L      Chloride 105 mmol/L      CO2 27.0 mmol/L      Calcium 8.2 mg/dL      Total Protein 5.6 g/dL      Albumin 2.9 g/dL      ALT (SGPT) 16 U/L      AST (SGOT) 34 U/L      Alkaline Phosphatase 64 U/L      Total Bilirubin 2.7 mg/dL      Globulin 2.7 gm/dL      Comment: Calculated Result        A/G Ratio 1.1 g/dL      BUN/Creatinine Ratio 19.4     Anion Gap 7.0 mmol/L      eGFR 94.0 mL/min/1.73     Narrative:      GFR Normal >60  Chronic Kidney Disease <60  Kidney Failure <15      CBC & Differential [[id number]]  (Abnormal) Collected: 07/09/24 0355    Specimen: Blood Updated: 07/09/24 0423    Narrative:      The following orders were created for panel order CBC & Differential.  Procedure                               Abnormality         Status                     ---------                               -----------         ------                     CBC Auto Differential[[id number]]        Abnormal            Final result                 Please view results for these tests on the individual orders.    CBC Auto Differential [[id number]]  (Abnormal) Collected: 07/09/24 0355    Specimen: Blood Updated: 07/09/24 0423     WBC 9.63 10*3/mm3      RBC 3.23 10*6/mm3      Hemoglobin 9.7 g/dL      Hematocrit 29.2 %      MCV 90.4 fL      MCH 30.0 pg       MCHC 33.2 g/dL      RDW 14.5 %      RDW-SD 47.8 fl      MPV 10.5 fL      Platelets 76 10*3/mm3      Neutrophil % 77.2 %      Lymphocyte % 10.0 %      Monocyte % 11.8 %      Eosinophil % 0.2 %      Basophil % 0.2 %      Immature Grans % 0.6 %      Neutrophils, Absolute 7.43 10*3/mm3      Lymphocytes, Absolute 0.96 10*3/mm3      Monocytes, Absolute 1.14 10*3/mm3      Eosinophils, Absolute 0.02 10*3/mm3      Basophils, Absolute 0.02 10*3/mm3      Immature Grans, Absolute 0.06 10*3/mm3      nRBC 0.0 /100 WBC     POC Glucose Once [[id number]]  (Abnormal) Collected: 07/08/24 2021    Specimen: Blood Updated: 07/08/24 2025     Glucose 250 mg/dL     POC Glucose Once [[id number]]  (Abnormal) Collected: 07/08/24 1634    Specimen: Blood Updated: 07/08/24 1636     Glucose 162 mg/dL           Imaging Results (Last 48 Hours)       ** No results found for the last 48 hours. **             Operative/Procedure Notes (all)        Konstantin Garcia MD at 07/08/24 0800  Version 1 of 1         CHOLECYSTECTOMY  Progress Note    Hesham Arevalo Jr.  7/8/2024    Pre-op Diagnosis:   Cholecystitis, cirrhosis, history of rectal cancer status post LAR with diverting loop ileostomy       Post-Op Diagnosis Codes:  Same    Procedure/CPT® Codes:        Procedure(s):  OPEN CHOLECYSTECTOMY              Surgeon(s):  Konstantin Garcia MD Stokes, Sean M, MD    Anesthesia: General    Staff:   Circulator: Vika Tirado RN  Scrub Person: Melissa Perkins  Nursing Assistant: Areli Giraldo PCT         Estimated Blood Loss: 700 mL    Urine Voided: * No values recorded between 7/8/2024  7:30 AM and 7/8/2024  9:47 AM *    Specimens:            Gallbladder            Drains:   Closed/Suction Drain 1 Right Abdomen Bulb 19 Fr. (Active)       Ileostomy Loop RLQ (Active)   Stomal Appliance Clean;Dry;Intact 07/08/24 0400   Stoma Appearance pink;moist;protruding above skin level 07/08/24 0400   Peristomal Assessment Clean;Intact 07/08/24 0400   Stoma  Function stool;flatus 07/08/24 0400   Stool Color brown 07/08/24 0400   Stool Consistency soft 07/07/24 1450   Treatment Placement checked 07/07/24 1610       [REMOVED] Closed/Suction Drain Lateral RUQ (Removed)   Site Description Unable to view 07/08/24 0400   Dressing Status Clean;Dry;Intact 07/08/24 0400   Drainage Appearance Serosanguineous 07/08/24 0400   Status To bulb suction 07/08/24 0400   Output (mL) 30 mL 07/07/24 1450       Findings: Cirrhotic liver, oozing from the cystic plate and mesentery throughout the case consistent with his history of thrombocytopenia and portal hypertension, gallbladder was fibrotic and thickened and was opened at the junction of the infundibulum and cystic duct with the cystic duct os controlled using a 2-0 Prolene suture.  A 19 drain was left in place in the right upper quadrant        Complications: None immediate          Konstantin Garcia MD     Date: 7/8/2024  Time: 09:49 EDT          Electronically signed by Konstantin Garcia MD at 07/08/24 0951       Konstantin Garcia MD at 07/08/24 0800  Version 2 of 2         Operative Report    Patient Name:  Hesham Arevalo Jr.  YOB: 1965  [id number]    7/8/2024      PREOPERATIVE DIAGNOSIS: Acute cholecystitis status post percutaneous cholecystostomy placement, cirrhosis, history of rectal cancer status post LAR with diverting loop ileostomy      POSTOPERATIVE DIAGNOSIS: Same       PROCEDURE PERFORMED:      Open Cholecystectomy       SURGEON: Konstantin Garcia MD      ASSISTANT:    Panchito Dickens MD    Assistant surgeon responsibilities: Dr. Dickens assisted with dissection, retraction, removal of the gallbladder, hemostasis, and closure.  His assistance was necessary and required for successful completion of the case given the complexity of the case     SPECIMENS: Partial gallbladder    ESTIMATED BLOOD LOSS: 700 mL      ANESTHESIA: General with TAP blocks.        FINDINGS:  1.  The gallbladder was fibrotic and  thickened with the percutaneous cholecystostomy drain in place.  The gallbladder was dissected free of the cystic plate and opened at the junction of the infundibulum of the gallbladder and cystic duct with the cystic duct os controlled using a 2-0 Prolene suture.  There was a cirrhotic liver morphology with diffuse oozing and bleeding from the cystic plate, abdominal wall, and mesentery consistent with his history of thrombocytopenia and portal hypertension.  A 19 round drain was left in place in the right upper quadrant       INDICATIONS:      This patient is a 58 y.o. male with a history of cirrhosis and previous rectal cancer status post LAR with diverting loop ileostomy.  His diverting loop ileostomy was never taken down.  He was hospitalized at our facility in May 2024 with acute cholecystitis and underwent percutaneous cholecystostomy placement.  He improved following percutaneous cholecystostomy placement.  He was never able to tolerate clamping of his cholecystostomy drain.  Extensive discussions were had with the patient regarding treatment options especially in light of his cirrhosis.  He was recommended to undergo open cholecystectomy.. Preoperative imaging including CT scan confirmed the diagnosis. The risks, benefits, and alternatives of the procedure were discussed with the patient and their family preoperatively and they agreed to proceed.          DESCRIPTION OF PROCEDURE:      After obtaining informed consent, the patient was taken to the operating room and placed in the supine position on the operating room table. General anesthesia was initiated. The abdomen was prepped and draped in the usual sterile fashion.  The percutaneous cholecystostomy drain was prepped into the field as well as his ileostomy.  A time out was properly performed. Antibiotics were given preoperatively. SCDs were properly placed on the patient and turned on. Blocks were performed by the Anesthesia service prior to the start  of the case.      We began the procedure with a right upper quadrant subcostal skin incision.  This was above his ileostomy site.  A skin incision was made with a 10 blade scalpel, and dissection was carried down to the level of the anterior abdominal wall fascia using electrocautery.  The anterior abdominal wall fascia was incised using electrocautery.  The rectus muscle was then divided using electrocautery.  The posterior sheath and peritoneum was then opened sharply and the peritoneal cavity was entered.  The exposure of the posterior sheath and peritoneum was then extended along the length of our skin incision.     Entrance into the abdominal cavity immediately demonstrated a cirrhotic liver morphology.  There were some omental adhesions to the right upper quadrant abdominal wall and falciform ligament which were taken down using the LigaSure device.  There was diffuse oozing with all dissection planes consistent with his history of portal hypertension and thrombocytopenia.  We did identify the falciform ligament had some attachments to the transverse colon and this was .  The falciform ligament was then isolated, and ligated using 0 silk ties.  The falciform ligament was then  from the abdominal wall proceeding superiorly.  At this point we had exposure of the right upper quadrant.  A Bookwalter retractor was then placed to aid with visualization.     We turned our attention the dome of the gallbladder, where he found the gallbladder was quite thickened and fibrotic.  We began to take the gallbladder off of the cystic plate in a top-down approach.  There was diffuse oozing from the cystic plate with this dissection, and dissection was tedious.  We proceeded with our dissection inferiorly, carefully  the gallbladder from the cystic plate.  The previous percutaneous cholecystostomy drain was identified, and ligated.  The drain was then removed from the abdominal wall.  As we  approached the infundibulum of the gallbladder we found that there was some omental adhesions to the duodenum which were taken down using careful blunt dissection as well as the [place] LigaSure device.  At this point the peritoneum surrounding the cystic pedicle was stripped.  There was diffuse thickening within the cystic structures.  At this point the gallbladder was fairly well mobilized from the cystic plate.  The gallbladder was then opened low on the infundibulum using a combination of electrocautery as well as sharp dissection.  Using a [place] LigaSure, the gallbladder was then completely ligated circumferentially at the level of the infundibulum.  The specimen was then passed off the field as partial gallbladder.  At this point we carefully examined the remaining small portion of infundibulum and found that there was only a small rim surrounding the cystic duct os.  There were 2 stones that appeared to be impacted within the cystic duct os and these were removed.  The cystic duct os was then controlled using a 2-0 Prolene suture in a figure-of-eight fashion.  Any small remaining portions of gallbladder mucosa were then fulgurated using electrocautery.     There was diffuse oozing from the cystic plate and all of the dissection planes throughout the course of the procedure with a total blood loss of approximately 700 mL.  An additional unit of platelets was transfused during the operation and there was some improvement with this.  There was no evidence of any surgical bleeding.  Floseal topical hemostatic was then applied to the cystic plate and right upper quadrant abdominal wall.  At this point there was only a small amount of oozing and it appeared that hemostasis was obtained.  A 19 [place]er drain was then brought onto the abdomen through a right upper quadrant stab incision.  This drain was left in place within the right upper quadrant and secured to the abdominal wall using a 2-0 nylon suture.  We  again confirmed that there was no evidence of any surgical bleeding and that there was hemostasis in the right upper quadrant.  The Bookwalter retractor was removed.     The posterior fascia was then reapproximated using a running 0 Vicryl suture with great care taken to ensure that the bowel to the ileostomy site was free of injury.  The anterior fascia was then closed using a running #1 PDS suture.  The subcutaneous wound was irrigated.  The skin was then closed using interrupted 3-0 Vicryl suture in the deep dermal layer and 4-0 Monocryl in subcuticular layer.  Dermal glue was then applied overlying this.     After the procedure, the patient was awakened, extubated, and taken to the postoperative anesthesia care unit for recovery. All needle, instrument, and lap counts were correct. I was personally present and performed all portions of the procedure. There were no immediate complications      The complexity of this case was significantly increased given the history of cirrhosis, thrombocytopenia, presence of right upper quadrant stoma, and degree of gallbladder inflammation in the right upper quadrant.  This significantly increased the complexity of the case and required an additional surgeon as well as an additional at least 60 minutes of operative time over what would typically be required for case like this.      Konstantin Garcia MD  7/8/2024  10:13 EDT      Electronically signed by Konstantin Garcia MD at 07/08/24 1030       Konstantin Garcia MD at 07/08/24 0800  Version 1 of 2         Operative Report    Patient Name:  Hesham Arevalo Jr.  YOB: 1965  [id number]    7/8/2024      PREOPERATIVE DIAGNOSIS: Acute cholecystitis status post percutaneous cholecystostomy placement, cirrhosis, history of rectal cancer status post LAR with diverting loop ileostomy      POSTOPERATIVE DIAGNOSIS: Same       PROCEDURE PERFORMED:      Open Cholecystectomy       SURGEON: Konstantin Garcia  MD      ASSISTANT:    Panchito Dickens MD    Assistant surgeon responsibilities: Dr. Dickens assisted with dissection, retraction, removal of the gallbladder, hemostasis, and closure.  His assistance was necessary and required for successful completion of the case given the complexity of the case     SPECIMENS: Partial gallbladder    ESTIMATED BLOOD LOSS: 700 mL      ANESTHESIA: General with TAP blocks.        FINDINGS:  1.  The gallbladder was fibrotic and thickened with the percutaneous cholecystostomy drain in place.  The gallbladder was dissected free of the cystic plate and opened at the junction of the infundibulum of the gallbladder and cystic duct with the cystic duct os controlled using a 2-0 Prolene suture.  There was a cirrhotic liver morphology with diffuse oozing and bleeding from the cystic plate, abdominal wall, and mesentery consistent with his history of thrombocytopenia and portal hypertension.  A 19 round drain was left in place in the right upper quadrant       INDICATIONS:      This patient is a 58 y.o. male with a history of cirrhosis and previous rectal cancer status post LAR with diverting loop ileostomy.  His diverting loop ileostomy was never taken down.  He was hospitalized at our facility in May 2024 with acute cholecystitis and underwent percutaneous cholecystostomy placement.  He improved following percutaneous cholecystostomy placement.  He was never able to tolerate clamping of his cholecystostomy drain.  Extensive discussions were had with the patient regarding treatment options especially in light of his cirrhosis.  He was recommended to undergo open cholecystectomy.. Preoperative imaging including CT scan confirmed the diagnosis. The risks, benefits, and alternatives of the procedure were discussed with the patient and their family preoperatively and they agreed to proceed.          DESCRIPTION OF PROCEDURE:      After obtaining informed consent, the patient was taken to the operating  room and placed in the supine position on the operating room table. General anesthesia was initiated. The abdomen was prepped and draped in the usual sterile fashion.  The percutaneous cholecystostomy drain was prepped into the field as well as his ileostomy.  A time out was properly performed. Antibiotics were given preoperatively. SCDs were properly placed on the patient and turned on. Blocks were performed by the Anesthesia service prior to the start of the case.      We began the procedure with a right upper quadrant subcostal skin incision.  This was above his ileostomy site.  A skin incision was made with a 10 blade scalpel, and dissection was carried down to the level of the anterior abdominal wall fascia using electrocautery.  The anterior abdominal wall fascia was incised using electrocautery.  The rectus muscle was then divided using electrocautery.  The posterior sheath and peritoneum was then opened sharply and the peritoneal cavity was entered.  The exposure of the posterior sheath and peritoneum was then extended along the length of our skin incision.     Entrance into the abdominal cavity immediately demonstrated a cirrhotic liver morphology.  There were some omental adhesions to the right upper quadrant abdominal wall and falciform ligament which were taken down using the LigaSure device.  There was diffuse oozing with all dissection planes consistent with his history of portal hypertension and thrombocytopenia.  We did identify the falciform ligament had some attachments to the transverse colon and this was .  The falciform ligament was then isolated, and ligated using 0 silk ties.  The falciform ligament was then  from the abdominal wall proceeding superiorly.  At this point we had exposure of the right upper quadrant.  A Bookwalter retractor was then placed to aid with visualization.     We turned our attention the dome of the gallbladder, where he found the gallbladder was quite  thickened and fibrotic.  We began to take the gallbladder off of the cystic plate in a top-down approach.  There was diffuse oozing from the cystic plate with this dissection, and dissection was tedious.  We proceeded with our dissection inferiorly, carefully  the gallbladder from the cystic plate.  The previous percutaneous cholecystostomy drain was identified, and ligated.  The drain was then removed from the abdominal wall.  As we approached the infundibulum of the gallbladder we found that there was some omental adhesions to the duodenum which were taken down using careful blunt dissection as well as the [place] LigaSure device.  At this point the peritoneum surrounding the cystic pedicle was stripped.  There was diffuse thickening within the cystic structures.  At this point the gallbladder was fairly well mobilized from the cystic plate.  The gallbladder was then opened low on the infundibulum using a combination of electrocautery as well as sharp dissection.  Using a [place] LigaSure, the gallbladder was then completely ligated circumferentially at the level of the infundibulum.  The specimen was then passed off the field as partial gallbladder.  At this point we carefully examined the remaining small portion of infundibulum and found that there was only a small rim surrounding the cystic duct os.  There were 2 stones that appeared to be impacted within the cystic duct os and these were removed.  The cystic duct os was then controlled using a 2-0 Prolene suture in a figure-of-eight fashion.  Any small remaining portions of gallbladder mucosa were then fulgurated using electrocautery.     There was diffuse oozing from the cystic plate and all of the dissection planes throughout the course of the procedure with a total blood loss of approximately 700 mL.  An additional unit of platelets was transfused during the operation and there was some improvement with this.  There was no evidence of any  surgical bleeding.  Floseal topical hemostatic was then applied to the cystic plate and right upper quadrant abdominal wall.  At this point there was only a small amount of oozing and it appeared that hemostasis was obtained.  A 19 [place] drain was then brought onto the abdomen through a right upper quadrant stab incision.  This drain was left in place within the right upper quadrant and secured to the abdominal wall using a 2-0 nylon suture.  We again confirmed that there was no evidence of any surgical bleeding and that there was hemostasis in the right upper quadrant.  The Bookwalter retractor was removed.     The posterior fascia was then reapproximated using a running 0 Vicryl suture with great care taken to ensure that the bowel to the ileostomy site was free of injury.  The anterior fascia was then closed using a running #1 PDS suture.  The subcutaneous wound was irrigated.  The skin was then closed using interrupted 3-0 Vicryl suture in the deep dermal layer and 4-0 Monocryl in subcuticular layer.  Dermal glue was then applied overlying this.     After the procedure, the patient was awakened, extubated, and taken to the postoperative anesthesia care unit for recovery. All needle, instrument, and lap counts were correct. I was personally present and performed all portions of the procedure. There were no immediate complications         Konstantin Garcia MD  2024  10:13 EDT      Electronically signed by Konstantin Garcia MD at 24 1029          Physician Progress Notes (all)        Reyna Meadows DO at 07/10/24 1601              [facility] Medicine Services  PROGRESS NOTE    Patient Name: Hesham Arevalo Jr.  : 1965  MRN: [id number]    Date of Admission: 2024  Primary Care Physician: Myke Mendoza MD    Subjective   Subjective     CC:  CCY     HPI:  No acute events. Feels ok. Pain is tolerable. Tolerating food. Encouraged ambulation.       Objective    Objective     Vital Signs:   Temp:  [98.7 °F (37.1 °C)-100.2 °F (37.9 °C)] 99.4 °F (37.4 °C)  Heart Rate:  [73-81] 80  Resp:  [16-20] 16  BP: (101-130)/(58-61) 109/58     Physical Exam:  Constitutional: No acute distress, awake, alert  Respiratory: Clear to auscultation bilaterally, respiratory effort normal   Cardiovascular: RRR, no murmurs, rubs, or gallops  Gastrointestinal: Positive bowel sounds, soft, mild tenderness; incision with no erythema; ostomy   Musculoskeletal: No bilateral ankle edema  Neurologic: Oriented x 3, strength symmetric in all extremities, Cranial Nerves grossly intact to confrontation, speech clear      Results Reviewed:  LAB RESULTS:      Lab 07/10/24  0333 07/09/24  0355 07/08/24  1551 07/08/24  1027 07/08/24  0630 07/08/24  0539 07/07/24  1419 07/07/24  1419 07/07/24  1338   WBC 5.40 9.63 5.61 4.90  --  4.01   < > 2.83*  --    HEMOGLOBIN 8.8* 9.7* 10.7* 10.1*  --  11.7*   < > 11.8*  --    HEMATOCRIT 27.3* 29.2* 31.8* 30.5*  --  35.4*   < > 35.6*  --    PLATELETS 44* 76* 75* 73*  --  73*   < > 51*  --    NEUTROS ABS 3.79 7.43*  --  4.04  --  2.38  --  1.77  --    IMMATURE GRANS (ABS) 0.02 0.06*  --  0.03  --  0.01  --  0.01  --    LYMPHS ABS 0.70 0.96  --  0.57*  --  0.84  --  0.55*  --    MONOS ABS 0.74 1.14*  --  0.12  --  0.38  --  0.23  --    EOS ABS 0.13 0.02  --  0.11  --  0.36  --  0.23  --    MCV 92.5 90.4 90.3 90.5  --  89.8   < > 91.0  --    LACTATE  --   --   --   --   --   --   --   --  1.2   PROTIME  --   --   --  16.7* 15.4*  --   --   --  15.6*    < > = values in this interval not displayed.         Lab 07/10/24  0333 07/09/24  0355 07/08/24  1027 07/08/24  0539 07/07/24  1842 07/07/24  1338   SODIUM 138 139 136 139 138  --    POTASSIUM 4.1 4.5 4.3 4.3 4.2  --    CHLORIDE 104 105 105 105 104  --    CO2 28.0 27.0 23.0 25.0 23.0  --    ANION GAP 6.0 7.0 8.0 9.0 11.0  --    BUN 19 18 15 16 16  --    CREATININE 0.97 0.93 0.86 0.87 0.93  --    EGFR 89.4 94.0 99.1 98.8 94.0  --     GLUCOSE 122* 128* 189* 125* 229*  --    CALCIUM 7.7* 8.2* 8.2* 8.8 8.9  --    MAGNESIUM 1.6 1.6  --  1.8  --  1.9   PHOSPHORUS  --   --   --  3.9  --   --    HEMOGLOBIN A1C  --   --   --   --   --  6.50*   TSH  --   --   --   --   --  6.630*         Lab 07/10/24  0333 07/09/24  0355 07/08/24  1027 07/08/24  0539 07/07/24  1842   TOTAL PROTEIN 5.3* 5.6* 6.1 6.8 6.7   ALBUMIN 2.7* 2.9* 3.0* 3.4* 3.3*   GLOBULIN 2.6 2.7 3.1 3.4 3.4   ALT (SGPT) 13 16 19 20 22   AST (SGOT) 27 34 37 38 48*   BILIRUBIN 2.9* 2.7* 2.4* 2.6* 2.0*   ALK PHOS 52 64 70 81 76         Lab 07/08/24  1027 07/08/24  0630 07/07/24  1338   PROTIME 16.7* 15.4* 15.6*   INR 1.33* 1.21* 1.22*             Lab 07/07/24  1604   ABO TYPING A   RH TYPING Negative   ANTIBODY SCREEN Negative         Brief Urine Lab Results  (Last result in the past 365 days)        Color   Clarity   Blood   Leuk Est   Nitrite   Protein   CREAT   Urine HCG        05/12/24 2032 Dark Yellow   Clear   Negative   Trace   Positive   Trace                   Microbiology Results Abnormal       Procedure Component Value - Date/Time    Blood Culture - Blood, Arm, Right [[id number]]  (Normal) Collected: 07/07/24 1338    Lab Status: Preliminary result Specimen: Blood from Arm, Right Updated: 07/10/24 1445     Blood Culture No growth at 3 days    Blood Culture - Blood, Arm, Left [[id number]]  (Normal) Collected: 07/07/24 1338    Lab Status: Preliminary result Specimen: Blood from Arm, Left Updated: 07/10/24 1445     Blood Culture No growth at 3 days            No radiology results from the last 24 hrs        Current medications:  Scheduled Meds:carvedilol, 6.25 mg, Oral, BID With Meals  [Held by provider] ferrous sulfate, 325 mg, Oral, Every Other Day  insulin lispro, 2-9 Units, Subcutaneous, 4x Daily AC & at Bedtime  levothyroxine, 25 mcg, Oral, Daily  pantoprazole, 40 mg, Intravenous, Q AM      Continuous Infusions:   PRN Meds:.  acetaminophen **OR** acetaminophen **OR** acetaminophen     Calcium Replacement - Follow Nurse / BPA Driven Protocol    dextrose    dextrose    diphenhydrAMINE    glucagon (human recombinant)    HYDROmorphone    Magnesium Standard Dose Replacement - Follow Nurse / BPA Driven Protocol    naloxone    nitroglycerin    ondansetron ODT **OR** ondansetron    oxyCODONE-acetaminophen    Phosphorus Replacement - Follow Nurse / BPA Driven Protocol    Potassium Replacement - Follow Nurse / BPA Driven Protocol    promethazine    sodium chloride    sodium chloride    Assessment & Plan   Assessment & Plan     Active Hospital Problems    Diagnosis  POA    **Acute cholecystitis [K81.0]  Yes    Cirrhosis of liver [K74.60]  Yes    Type 2 diabetes mellitus without complication, without long-term current use of insulin [E11.9]  Yes    Thrombocytopenia [D69.6]  Yes    Essential hypertension [I10]  Yes      Resolved Hospital Problems   No resolved problems to display.        Brief Hospital Course to date:  Hesham Arevalo Jr. is a 58 y.o. male  that presented as a direct admit with plans for cholecystectomy with Dr. Garcia on 7/8, after previously treated for cholecystitis with percutaneous cholecystostomy tube that failed to resolve the issue.  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 Garcia and  Hepatology, s/p open cholecystectomy with Dr. Garcia on 7/8   - Patient was previously on Coumadin but has been off since 11/2022 due to resolution of previous thrombosis   -Transfused 1 unit platelets intraoperatively and again 7/10  -PCA discontinued  -Advance to regular diet  -Mobilize as tolerated  -Wound care consult to optimize pouching right upper quadrant stoma due to close proximity to surgical incision, in efforts to minimize  "contamination risk     HTN  HLD   - Continue home Coreg  - Does not appear to be on statin at home.      T2DM   - A1C now 6.5 last A1C from 5/13 8.3   - Hold home metformin   - FSBS c SSI coverage. Stable.   -Glucometer ordered to be provided on discharge     Hypothyroidism   - TSH ordered 6.63, reflex t4 0.8  -Due to acute illness, will recommend outpatient TSH and T4       Hx of rectal adenocarcinoma s/p resection and ileostomy April 2019  --Follows with Dr. Kelly and Dr. Moeller, no evidence of recurrence per last note from Oncology 3/28/24, recent negative CT abd/pelvis as above   -- CEA 1.69 from 3/2024   --He reports he has been told he is too high risk for ileostomy reversal     Expected Discharge Location and Transportation: home  Expected Discharge   Expected Discharge Date: 7/10/2024; Expected Discharge Time:      VTE Prophylaxis:  Mechanical VTE prophylaxis orders are present.         AM-PAC 6 Clicks Score (PT): 17 (07/10/24 0800)    CODE STATUS:   Code Status and Medical Interventions:   Ordered at: 07/07/24 1400     Level Of Support Discussed With:    Patient     Code Status (Patient has no pulse and is not breathing):    CPR (Attempt to Resuscitate)     Medical Interventions (Patient has pulse or is breathing):    Full Support       Reyna Meadows DO  07/10/24        Electronically signed by Reyna Meadows DO at 07/10/24 1603       Konstantin Garcia MD at 07/10/24 0683          Patient Name:  Hesham Arevalo Jr.  YOB: 1965  [id number]    Surgery Progress Note    Date of visit: 7/10/2024      Subjective: No acute events.  Tmax 100.2.  Tolerated regular diet yesterday without any nausea or vomiting.  Having ileostomy output.  FRANTZ drain with continued serosanguineous output, 205 mL yesterday.  Ambulated.  Pain controlled          Objective:     /61 (BP Location: Right arm, Patient Position: Lying)   Pulse 73   Temp 98.9 °F (37.2 °C) (Oral)   Resp 20   Ht 185.4 cm (73\")   Wt " 95.3 kg (210 lb)   SpO2 98%   BMI 27.71 kg/m²     Intake/Output Summary (Last 24 hours) at 7/10/2024 0646  Last data filed at 7/10/2024 0434  Gross per 24 hour   Intake 485 ml   Output 1165 ml   Net -680 ml       GEN:   Awake, alert, in no acute distress, resting comfortably in bed   CV:   Regular rate and rhythm  L:  Symmetric expansion, not labored on room air  Abd:  Soft, not distended, appropriately tender to palpation throughout the right upper quadrant, right upper quadrant incision is well-approximated, ileostomy in the right upper quadrant is pink and patent with stool in the bag, FRANTZ drain in the right upper quadrant is serosanguineous  Ext:  No cyanosis, clubbing, or edema    Recent labs that are back at this time have been reviewed.           Assessment/ Plan:    Mr. Arevalo is a 58 year old gentleman with history of cirrhosis and rectal cancer s/p LAR with DLI who is admitted after undergoing open cholecystectomy on 24 for cholecystitis s/p percutaneous cholecystostomy     #Cholecystitis  -postop day 2  -Vitals stable  -Liver function tests essentially stable with bilirubin of 2.9 from 2.7 yesterday.  Hemoglobin is down at 8.8 from 9.7 yesterday.  No tachycardia or hypotension.  Platelets are down at 44.  His decrease in H&H may likely be dilutional, however with his thrombocytopenia and continued serosanguineous drain output I will order for 1 unit of platelet transfusion today  -No need for antibiotics at this point  -Continue regular diet  -Monitor drain output  -Repeat labs tomorrow.  If labs are stable, may be possibly ready for discharge home tomorrow.         Konstantin Garcia MD  7/10/2024  06:46 EDT        Electronically signed by Konstantin Garcia MD at 07/10/24 0650       Luis Nation DO at 24 1153              [facility] Medicine Services  PROGRESS NOTE    Patient Name: Hesham Arevalo Jr.  : 1965  MRN: [id number]    Date of Admission:  7/7/2024  Primary Care Physician: Myke Mendoza MD    Subjective   Subjective     CC:  cholecystitis       HPI:  Patient reports abdominal pain but currently controlled, now off PCA.  He denies fevers, chills, sweats or nausea.      Objective   Objective     Vital Signs:   Temp:  [98.1 °F (36.7 °C)-99.6 °F (37.6 °C)] 98.4 °F (36.9 °C)  Heart Rate:  [69-91] 72  Resp:  [18-20] 20  BP: (112-132)/(60-69) 126/63     Physical Exam:  General appearance: alert, awake, oriented, no acute distress   Cardiovascular: RRR, no murmurs or rubs, radial pulse full 2/4 BL   Respiratory: CTAB, no crackles or wheezes   Abdomen: soft, mild tenderness to palpation, right upper quadrant incision dressed without saturation, drain with serosanguineous output  Neuro/CNS: alert and oriented x3, normal speech    Results Reviewed:  LAB RESULTS:      Lab 07/09/24  0355 07/08/24  1551 07/08/24  1027 07/08/24  0630 07/08/24  0539 07/07/24  1419 07/07/24  1338   WBC 9.63 5.61 4.90  --  4.01 2.83*  --    HEMOGLOBIN 9.7* 10.7* 10.1*  --  11.7* 11.8*  --    HEMATOCRIT 29.2* 31.8* 30.5*  --  35.4* 35.6*  --    PLATELETS 76* 75* 73*  --  73* 51*  --    NEUTROS ABS 7.43*  --  4.04  --  2.38 1.77  --    IMMATURE GRANS (ABS) 0.06*  --  0.03  --  0.01 0.01  --    LYMPHS ABS 0.96  --  0.57*  --  0.84 0.55*  --    MONOS ABS 1.14*  --  0.12  --  0.38 0.23  --    EOS ABS 0.02  --  0.11  --  0.36 0.23  --    MCV 90.4 90.3 90.5  --  89.8 91.0  --    LACTATE  --   --   --   --   --   --  1.2   PROTIME  --   --  16.7* 15.4*  --   --  15.6*         Lab 07/09/24  0355 07/08/24  1027 07/08/24  0539 07/07/24  1842 07/07/24  1338   SODIUM 139 136 139 138  --    POTASSIUM 4.5 4.3 4.3 4.2  --    CHLORIDE 105 105 105 104  --    CO2 27.0 23.0 25.0 23.0  --    ANION GAP 7.0 8.0 9.0 11.0  --    BUN 18 15 16 16  --    CREATININE 0.93 0.86 0.87 0.93  --    EGFR 94.0 99.1 98.8 94.0  --    GLUCOSE 128* 189* 125* 229*  --    CALCIUM 8.2* 8.2* 8.8 8.9  --    MAGNESIUM 1.6  --   1.8  --  1.9   PHOSPHORUS  --   --  3.9  --   --    HEMOGLOBIN A1C  --   --   --   --  6.50*   TSH  --   --   --   --  6.630*         Lab 07/09/24  0355 07/08/24  1027 07/08/24  0539 07/07/24  1842   TOTAL PROTEIN 5.6* 6.1 6.8 6.7   ALBUMIN 2.9* 3.0* 3.4* 3.3*   GLOBULIN 2.7 3.1 3.4 3.4   ALT (SGPT) 16 19 20 22   AST (SGOT) 34 37 38 48*   BILIRUBIN 2.7* 2.4* 2.6* 2.0*   ALK PHOS 64 70 81 76         Lab 07/08/24  1027 07/08/24  0630 07/07/24  1338   PROTIME 16.7* 15.4* 15.6*   INR 1.33* 1.21* 1.22*             Lab 07/07/24  1604   ABO TYPING A   RH TYPING Negative   ANTIBODY SCREEN Negative         Brief Urine Lab Results  (Last result in the past 365 days)        Color   Clarity   Blood   Leuk Est   Nitrite   Protein   CREAT   Urine HCG        05/12/24 2032 Dark Yellow   Clear   Negative   Trace   Positive   Trace                   Microbiology Results Abnormal       Procedure Component Value - Date/Time    Blood Culture - Blood, Arm, Right [[id number]]  (Normal) Collected: 07/07/24 1338    Lab Status: Preliminary result Specimen: Blood from Arm, Right Updated: 07/08/24 1446     Blood Culture No growth at 24 hours    Blood Culture - Blood, Arm, Left [[id number]]  (Normal) Collected: 07/07/24 1338    Lab Status: Preliminary result Specimen: Blood from Arm, Left Updated: 07/08/24 1446     Blood Culture No growth at 24 hours            Peripheral Block    Result Date: 7/8/2024  Estelle Wilkerson CRNA     7/8/2024  8:08 AM Peripheral Block Pre-sedation assessment completed: 7/8/2024 7:40 AM Patient reassessed immediately prior to procedure Patient location during procedure: OR Start time: 7/8/2024 7:41 AM Stop time: 7/8/2024 7:44 AM Reason for block: at surgeon's request and post-op pain management Performed by Anesthesiologist: Jesus Curran MD Preanesthetic Checklist Completed: patient identified, IV checked, site marked, risks and benefits discussed, surgical consent, monitors and equipment checked, pre-op  "evaluation and timeout performed Prep: Pt Position: supine Sterile barriers:cap, gloves, mask and washed/disinfected hands Prep: ChloraPrep Patient monitoring: blood pressure monitoring, continuous pulse oximetry and EKG Procedure Sedation: yes Performed under: general Guidance:ultrasound guided Images:still images obtained, printed/placed on chart Laterality:Bilateral Block Type:TAP Injection Technique:single-shot Needle Type:short-bevel and echogenic Needle Gauge:20 G Resistance on Injection: none Medications Used: bupivacaine PF (MARCAINE) 0.25 % injection - Injection  60 mL - 7/8/2024 7:44:00 AM dexamethasone sodium phosphate injection - Injection  4 mg - 7/8/2024 7:44:00 AM Medications Comment:Block Injection:  LA dose divided between Right and Left block Post Assessment Injection Assessment: negative aspiration for heme, incremental injection and no paresthesia on injection Patient Tolerance:comfortable throughout block Complications:no Additional Notes Mid-Axillary/Lateral TAPs A high-frequency linear transducer, with sterile cover, was placed in the midaxillary line between the ASIS and costal margin. The External Oblique Muscle (EOM), Internal Oblique Muscle (IOM), Transverse Abdominus Muscle (BROWNE), and Peritoneum were identified. The insertion site was prepped in sterile fashion and then localized with 2-5 ml of 1% Lidocaine. Using ultrasound-guidance, a 20-gauge B-Arroyo 4\" Ultraplex 360 non-stimulating echogenic needle was advanced in plane, from medial to lateral, until the tip of the needle was in the fascial plane between the IOM and BROWNE. 1-3ml of preservative free normal saline was used to hydro-dissect the fascial planes. After the fascial plane was verified, the local anesthetic (LA) was injected. The procedure was repeated on the opposite side for bilateral coverage. Aspiration every 5 ml to prevent intravascular injection. Injection was completed with negative aspiration of blood and negative " intravascular injection. Injection pressures were normal with minimal resistance. Midaxillary TAPs should reach intercostal nerves T10- T11 and the subcostal nerve T12.  Performed by: Jesus Curran MD          Current medications:  Scheduled Meds:carvedilol, 6.25 mg, Oral, BID With Meals  [Held by provider] ferrous sulfate, 325 mg, Oral, Every Other Day  insulin lispro, 2-9 Units, Subcutaneous, 4x Daily AC & at Bedtime  levothyroxine, 25 mcg, Oral, Daily  pantoprazole, 40 mg, Intravenous, Q AM      Continuous Infusions:sodium chloride, 50 mL/hr, Last Rate: 50 mL/hr (07/09/24 0983)      PRN Meds:.  acetaminophen **OR** acetaminophen **OR** acetaminophen    Calcium Replacement - Follow Nurse / BPA Driven Protocol    dextrose    dextrose    diphenhydrAMINE    glucagon (human recombinant)    HYDROmorphone    Magnesium Standard Dose Replacement - Follow Nurse / BPA Driven Protocol    naloxone    nitroglycerin    ondansetron ODT **OR** ondansetron    oxyCODONE-acetaminophen    Phosphorus Replacement - Follow Nurse / BPA Driven Protocol    Potassium Replacement - Follow Nurse / BPA Driven Protocol    promethazine    sodium chloride    sodium chloride    Assessment & Plan   Assessment & Plan     Active Hospital Problems    Diagnosis  POA    **Acute cholecystitis [K81.0]  Yes    Cirrhosis of liver [K74.60]  Yes    Type 2 diabetes mellitus without complication, without long-term current use of insulin [E11.9]  Yes    Thrombocytopenia [D69.6]  Yes    Essential hypertension [I10]  Yes      Resolved Hospital Problems   No resolved problems to display.        Brief Hospital Course to date:  Hesham Arevalo Jr. is a 58 y.o. male  that presented as a direct admit with plans for cholecystectomy with Dr. Garcia on 7/8, after previously treated for cholecystitis with percutaneous cholecystostomy tube that failed to resolve the issue.  Patient underwent open cholecystectomy on 7/8     POD#1 s/p open cholecystectomy    Subacute/Acute Cholecystitis  Recent Gallstone pancreatitis   Hx  Cirrhosis with previous portal vein and superior mesenteric vein thrombosis  Hx of chronic thrombocytopenia   Pancytopenia   - Follows with Dr. Garcia and  Hepatology, s/p open cholecystectomy with Dr. Garcia on 7/8   - Patient was previously on Coumadin but has been off since 11/2022 due to resolution of previous thrombosis   -Intraoperatively estimated 700 cc blood loss  -Transfused 1 unit platelets intraoperatively  -Postoperatively, hemodynamically stable  -PCA discontinued  -No active signs of bleeding  -Advance to regular diet  -Mobilize as tolerated  -Wound care consult to optimize pouching right upper quadrant stoma due to close proximity to surgical incision, in efforts to minimize contamination risk  -CBC, BMP in a.m.     HTN  HLD   - Continue home Coreg  - Does not appear to be on statin at home.      T2DM   - A1C now 6.5 last A1C from 5/13 8.3   - Hold home metformin   - FSBS c SSI coverage   -Glucometer ordered to be provided on discharge     Hypothyroidism   - TSH ordered 6.63, reflex t4 0.8  -Due to acute illness, will recommend outpatient TSH and T4      Hx of rectal adenocarcinoma s/p resection and ileostomy April 2019  --Follows with Dr. Kelly and Dr. Moeller, no evidence of recurrence per last note from Oncology 3/28/24, recent negative CT abd/pelvis as above   -- CEA 1.69 from 3/2024   --He reports he has been told he is too high risk for ileostomy reversal    Expected Discharge Location and Transportation: Home   Expected Discharge   Expected Discharge Date: 7/10/2024; Expected Discharge Time:      VTE Prophylaxis:  Mechanical VTE prophylaxis orders are present.         AM-PAC 6 Clicks Score (PT): 14 (07/09/24 1355)    CODE STATUS:   Code Status and Medical Interventions:   Ordered at: 07/07/24 1400     Level Of Support Discussed With:    Patient     Code Status (Patient has no pulse and is not breathing):    CPR (Attempt to  "Resuscitate)     Medical Interventions (Patient has pulse or is breathing):    Full Support       Luis Nation DO  07/09/24        Electronically signed by Luis Nation DO at 07/09/24 1200       Konstantin Garcia MD at 07/09/24 0804          Patient Name:  Hesham Arevalo Jr.  YOB: 1965  [id number]    Surgery Progress Note    Date of visit: 7/9/2024      Subjective: No acute events. Pain is controlled and not using PCA much. Having ileostomy output. No nausea or vomiting. Drain with more thin output, total 270 mL. Tolerated clears          Objective:     /63 (BP Location: Left arm, Patient Position: Lying)   Pulse 72   Temp 98.4 °F (36.9 °C) (Oral)   Resp 20   Ht 185.4 cm (73\")   Wt 95.3 kg (210 lb)   SpO2 98%   BMI 27.71 kg/m²     Intake/Output Summary (Last 24 hours) at 7/9/2024 0804  Last data filed at 7/9/2024 0759  Gross per 24 hour   Intake 1411 ml   Output 1025 ml   Net 386 ml       GEN:   Awake, alert, in no acute distress, resting comfortably in bed   CV:   Regular rate and rhythm  L:  Symmetric expansion, not labored on oxygen by NC  Abd:  Soft, appropriately tender along incision, RUQ incision well approximated and clean, dry, and intact, ileostomy just below this is pink with stool in the bag, drain with thin serosang drainage   Ext:  No cyanosis, clubbing, or edema    Recent labs that are back at this time have been reviewed.           Assessment/ Plan:    Mr. Arevalo is a 58 year old gentleman with history of cirrhosis and rectal cancer s/p LAR with DLI who is admitted after undergoing open cholecystectomy on 7/8/24 for cholecystitis s/p percutaneous cholecystostomy    #Cholecystitis  -postop day 1  -Vitals stable  -Labs without significant change. Bili 2.7. Hb 9.7. Plt 76. Repeat tomorrow  -No need for antibiotics at this point  -Discontinue PCA. Start PRN percocet  -Advance to regular diet  -Monitor drain output  -Wound ostomy consult today for assistance " "with pouching RUQ stoma in proximity to incision  -Ambulate. Mobilize      Konstantin Garcia MD  2024  08:04 EDT        Electronically signed by Konstantin Garcia MD at 24 0809       Konstantin Garcia MD at 24 1530          Patient Name:  Hesham Arevalo Jr.  YOB: 1965  [id number]    Surgery Post - Operative Note    Date of visit: 2024      Subjective: Resting in bed. Reports no pain. Vitals stable        Objective:    /71 (BP Location: Right arm, Patient Position: Lying)   Pulse 70   Temp 98.4 °F (36.9 °C) (Oral)   Resp 18   Ht 185.4 cm (73\")   Wt 95.3 kg (210 lb)   SpO2 96%   BMI 27.71 kg/m²     CV:  Regular rate and rhythm   L:  Clear to auscultation bilaterally. Not labored on O2 by NC   ABD:  Soft, appropriately tender. Dressings clean, dry and intact, ileostomy pink and patent in RUQ  EXT:  No cyanosis, clubbing or edema        Assessment/ Plan:     Recovering well after open cholecystectomy. Vitals stable. Postop labs without acute findings. FRANTZ drain with roughly 100 mL bloody output so far. Continue Pulmonary toilet        Konstantin Garcia MD  2024  15:30 EDT      Electronically signed by Konstantin Garcia MD at 24 1531       SHRADDHA Triana DO at 24 1219              [facility] Medicine Services  PROGRESS NOTE    Patient Name: Hesham Arevalo Jr.  : 1965  MRN: [id number]    Date of Admission: 2024  Primary Care Physician: Myke Mendoza MD    Subjective   Subjective     CC:  Acute cholecystitis     HPI:  Patient is a 58-year-old male seen and examined by me this a.m., he is resting comfortably in bed following return from open cholecystectomy earlier this a.m.  Family updated at bedside, he reports overall doing well since surgery and denies any new acute complaints or problems at this time. Continue to follow with Dr. Garcia       Objective   Objective     Vital Signs:   Temp:  [97 °F (36.1 " °C)-98.9 °F (37.2 °C)] 98.4 °F (36.9 °C)  Heart Rate:  [61-75] 70  Resp:  [16-22] 18  BP: (114-144)/(61-80) 132/71  Flow (L/min):  [2] 2     Physical Exam:  Constitutional: No acute distress, awake, alert, resting comfortably in bed, frail appearing   HENT: NCAT, nares patent, mucous membranes moist  Respiratory: Decreased BS bilaterally, no rhonchi or wheezing, respiratory effort normal   Cardiovascular: RRR, no murmurs, rubs, or gallops  Gastrointestinal: Positive bowel sounds, soft, nontender, nondistended, ileostomy present, FRANTZ drain present with bloody drainage  Musculoskeletal: No bilateral ankle edema, no clubbing or cyanosis   Psychiatric: Appropriate affect, cooperative  Neurologic: Oriented x 3, strength symmetric in all extremities, Cranial Nerves grossly intact to confrontation, speech clear  Skin: No rashes      Results Reviewed:  LAB RESULTS:      Lab 07/08/24  1027 07/08/24  0630 07/08/24  0539 07/07/24  1419 07/07/24  1338   WBC 4.90  --  4.01 2.83*  --    HEMOGLOBIN 10.1*  --  11.7* 11.8*  --    HEMATOCRIT 30.5*  --  35.4* 35.6*  --    PLATELETS 73*  --  73* 51*  --    NEUTROS ABS 4.04  --  2.38 1.77  --    IMMATURE GRANS (ABS) 0.03  --  0.01 0.01  --    LYMPHS ABS 0.57*  --  0.84 0.55*  --    MONOS ABS 0.12  --  0.38 0.23  --    EOS ABS 0.11  --  0.36 0.23  --    MCV 90.5  --  89.8 91.0  --    LACTATE  --   --   --   --  1.2   PROTIME 16.7* 15.4*  --   --  15.6*         Lab 07/08/24  1027 07/08/24  0539 07/07/24  1842 07/07/24  1338   SODIUM 136 139 138  --    POTASSIUM 4.3 4.3 4.2  --    CHLORIDE 105 105 104  --    CO2 23.0 25.0 23.0  --    ANION GAP 8.0 9.0 11.0  --    BUN 15 16 16  --    CREATININE 0.86 0.87 0.93  --    EGFR 99.1 98.8 94.0  --    GLUCOSE 189* 125* 229*  --    CALCIUM 8.2* 8.8 8.9  --    MAGNESIUM  --  1.8  --  1.9   PHOSPHORUS  --  3.9  --   --    HEMOGLOBIN A1C  --   --   --  6.50*   TSH  --   --   --  6.630*         Lab 07/08/24  1027 07/08/24  0539 07/07/24  1842   TOTAL  PROTEIN 6.1 6.8 6.7   ALBUMIN 3.0* 3.4* 3.3*   GLOBULIN 3.1 3.4 3.4   ALT (SGPT) 19 20 22   AST (SGOT) 37 38 48*   BILIRUBIN 2.4* 2.6* 2.0*   ALK PHOS 70 81 76         Lab 07/08/24  1027 07/08/24  0630 07/07/24  1338   PROTIME 16.7* 15.4* 15.6*   INR 1.33* 1.21* 1.22*             Lab 07/07/24  1604   ABO TYPING A   RH TYPING Negative   ANTIBODY SCREEN Negative         Brief Urine Lab Results  (Last result in the past 365 days)        Color   Clarity   Blood   Leuk Est   Nitrite   Protein   CREAT   Urine HCG        05/12/24 2032 Dark Yellow   Clear   Negative   Trace   Positive   Trace                   Microbiology Results Abnormal       None            Peripheral Block    Result Date: 7/8/2024  Estelle Wilkerson CRNA     7/8/2024  8:08 AM Peripheral Block Pre-sedation assessment completed: 7/8/2024 7:40 AM Patient reassessed immediately prior to procedure Patient location during procedure: OR Start time: 7/8/2024 7:41 AM Stop time: 7/8/2024 7:44 AM Reason for block: at surgeon's request and post-op pain management Performed by Anesthesiologist: Jesus Curran MD Preanesthetic Checklist Completed: patient identified, IV checked, site marked, risks and benefits discussed, surgical consent, monitors and equipment checked, pre-op evaluation and timeout performed Prep: Pt Position: supine Sterile barriers:cap, gloves, mask and washed/disinfected hands Prep: ChloraPrep Patient monitoring: blood pressure monitoring, continuous pulse oximetry and EKG Procedure Sedation: yes Performed under: general Guidance:ultrasound guided Images:still images obtained, printed/placed on chart Laterality:Bilateral Block Type:TAP Injection Technique:single-shot Needle Type:short-bevel and echogenic Needle Gauge:20 G Resistance on Injection: none Medications Used: bupivacaine PF (MARCAINE) 0.25 % injection - Injection  60 mL - 7/8/2024 7:44:00 AM dexamethasone sodium phosphate injection - Injection  4 mg - 7/8/2024 7:44:00 AM  "Medications Comment:Block Injection:  LA dose divided between Right and Left block Post Assessment Injection Assessment: negative aspiration for heme, incremental injection and no paresthesia on injection Patient Tolerance:comfortable throughout block Complications:no Additional Notes Mid-Axillary/Lateral TAPs A high-frequency linear transducer, with sterile cover, was placed in the midaxillary line between the ASIS and costal margin. The External Oblique Muscle (EOM), Internal Oblique Muscle (IOM), Transverse Abdominus Muscle (BROWNE), and Peritoneum were identified. The insertion site was prepped in sterile fashion and then localized with 2-5 ml of 1% Lidocaine. Using ultrasound-guidance, a 20-gauge B-Arroyo 4\" Ultraplex 360 non-stimulating echogenic needle was advanced in plane, from medial to lateral, until the tip of the needle was in the fascial plane between the IOM and BROWNE. 1-3ml of preservative free normal saline was used to hydro-dissect the fascial planes. After the fascial plane was verified, the local anesthetic (LA) was injected. The procedure was repeated on the opposite side for bilateral coverage. Aspiration every 5 ml to prevent intravascular injection. Injection was completed with negative aspiration of blood and negative intravascular injection. Injection pressures were normal with minimal resistance. Midaxillary TAPs should reach intercostal nerves T10- T11 and the subcostal nerve T12.  Performed by: Jesus Curran MD          Current medications:  Scheduled Meds:carvedilol, 6.25 mg, Oral, BID With Meals  [Held by provider] ferrous sulfate, 325 mg, Oral, Every Other Day  insulin lispro, 2-9 Units, Subcutaneous, 4x Daily AC & at Bedtime  levothyroxine, 25 mcg, Oral, Daily  [START ON 7/9/2024] pantoprazole, 40 mg, Intravenous, Q AM      Continuous Infusions:HYDROmorphone HCl-NaCl,   lactated ringers, 9 mL/hr, Last Rate: 9 mL/hr (07/08/24 0637)  lactated ringers, 75 mL/hr  Pharmacy Consult, "       PRN Meds:.  acetaminophen **OR** acetaminophen **OR** acetaminophen    Calcium Replacement - Follow Nurse / BPA Driven Protocol    dextrose    dextrose    diphenhydrAMINE    glucagon (human recombinant)    Magnesium Standard Dose Replacement - Follow Nurse / BPA Driven Protocol    naloxone    nitroglycerin    ondansetron ODT **OR** ondansetron    Pharmacy Consult    Phosphorus Replacement - Follow Nurse / BPA Driven Protocol    Potassium Replacement - Follow Nurse / BPA Driven Protocol    promethazine    sodium chloride    sodium chloride    Assessment & Plan   Assessment & Plan     Active Hospital Problems    Diagnosis  POA    **Acute cholecystitis [K81.0]  Yes    Cirrhosis of liver [K74.60]  Yes    Type 2 diabetes mellitus without complication, without long-term current use of insulin [E11.9]  Yes    Thrombocytopenia [D69.6]  Yes    Essential hypertension [I10]  Yes      Resolved Hospital Problems   No resolved problems to display.        Brief Hospital Course to date:  Hesham Arevalo Jr. is a 58 y.o. male  that presented as a direct admit with plans for cholecystectomy with Dr. Garcia on 7/8, patient currently with percutaneous cholecystectomy drain that was placed by IR during previous hospitalization on 5/13 when he was admitted with acute cholecystitis, gallstone pancreatitis and evaluated by ID, GI, and GS at that time. He has since followed up with Dr. Garcia and plans are for surgery in the AM. He denies any recent acute complaints or problems, reports overall doing well, most recent CT abd/pelvis from 6/28 as above. Plans to admit and will make patient NPO after MN for planned surgery in the AM. He completed antibiotics following discharge during last hospitalization and was seen by Dr. Harley.      Patient seen again on 7/8 following return from open cholecystectomy with Dr. Garcia, discussed with him this AM as well following surgery, he did have about 500 cc of blood loss during surgery  and complicated with previous surgical history and cirrhosis, will continue to monitor his labs at this time.      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 Garcia and  Hepatology, s/p open cholecystectomy with Dr. Garcia on 7/8   - GI has seen previously during last hospitalization, labs ordered, no current needs for consult but will follow   - Patient was previously on Coumadin but has been off since 11/2022 due to resolution of previous thrombosis   - Last EGD with Dr. Lebron March 2022 - no varices noted  - Check platelets on admission, 51, given 2 U platelets in total, 1 on 7/7 and 1 on 7/8 during surgery  - Pancytopenia likely secondary to above , monitor and follow, Hgb above baseline at 11.8 on admission, monitor and follow   - Continue to follow serial labs, transfuse additional platelets or blood as needed following surgery   - Ordered for CLD, advance as tolerated per Dr. Garcia  - Pain control, currently on PCA pump at this time.      HTN  HLD   - Continue home Coreg  - Does not appear to be on statin at home.      T2DM   - A1C ordered, now 6.5 last A1C from 5/13 8.3   - Hold home metformin   - FSBS c SSI coverage      Hypothyroidism   - TSH ordered 6.63, reflex t4 0.8  - Will look at possibly adjusting home synthroid, only on 25 mcg daily but will follow after surgery      Hx of rectal adenocarcinoma s/p resection and ileostomy April 2019  --Follows with Dr. Kelly and Dr. Moeller, no evidence of recurrence per last note from Oncology 3/28/24, recent negative CT abd/pelvis as above   -- CEA 1.69 from 3/2024   --He reports he has been told he is too high risk for ileostomy reversal       Expected Discharge Location and Transportation: Suspect Home   Expected Discharge 7/10/2024  Expected Discharge Date: 7/10/2024; Expected Discharge  "Time:      VTE Prophylaxis:  Mechanical VTE prophylaxis orders are present.         AM-PAC 6 Clicks Score (PT): 14 (07/08/24 1138)    CODE STATUS:   Code Status and Medical Interventions:   Ordered at: 07/07/24 1400     Level Of Support Discussed With:    Patient     Code Status (Patient has no pulse and is not breathing):    CPR (Attempt to Resuscitate)     Medical Interventions (Patient has pulse or is breathing):    Full Support       SUKH Triana DO  07/08/24        Electronically signed by SHRADDHA Triana DO at 07/08/24 1228       Konstantin Garcia MD at 07/08/24 0654          Patient Name:  Hesham Arevalo Jr.  YOB: 1965  [id number]    Surgery Progress Note    Date of visit: 7/8/2024      Subjective: No acute events.  Feels well.  Continues to have percutaneous cholecystostomy output usually 50 mL/day          Objective:     /71 (BP Location: Right arm, Patient Position: Lying)   Pulse 70   Temp 97 °F (36.1 °C) (Temporal)   Resp 18   Ht 185.4 cm (72.99\")   Wt 89.9 kg (198 lb 3.1 oz)   SpO2 98%   BMI 26.15 kg/m²     Intake/Output Summary (Last 24 hours) at 7/8/2024 0654  Last data filed at 7/7/2024 2115  Gross per 24 hour   Intake 250 ml   Output 30 ml   Net 220 ml       GEN:   Awake, alert, in no acute distress, resting comfortably in bed   CV:   Regular rate and rhythm  L:  Symmetric expansion, not labored on room air  Abd:  Soft, not tender, not distended, percutaneous cholecystostomy drain in right upper quadrant  Ext:  No cyanosis, clubbing, or edema    Recent labs that are back at this time have been reviewed.           Assessment/ Plan:    Mr. Arevalo is a 58-year-old gentleman who is well-known to me after a recent bout of acute cholecystitis and an underlying history of [place] cirrhosis for which he previously underwent percutaneous cholecystostomy.  He and I have had multiple long discussions regarding the fact that he has not been able to tolerate clamping of his " percutaneous cholecystostomy drain and the concern for ongoing cholecystitis.  We have discussed operative management extensively as well as the associated risks especially in light of his liver disease, and he wishes to proceed forward.  We will plan to proceed forward today with open cholecystectomy      Konstantin Garcia MD  7/8/2024  06:54 EDT        Electronically signed by Konstantin Garcia MD at 07/08/24 0656          Consult Notes (all)        Luis Cao MD at 07/07/24 1609        Consult Orders    1. Inpatient General Surgery Consult [[id number]] ordered by SHRADDHA Triana DO at 07/07/24 1326                 General Surgery Consultation Note    Date of Service: 7/7/2024  Hesham Arevalo Jr.  [id number]  1965      Referring Provider: SHRADDHA Triana DO    Location of Consult: Inpatient     Reason for Consultation: Cholecystitis       History of Present Illness:  I am seeing, Hesham Arevalo Jr., in consultation for SHRADDHA Triana DO regarding cholecystitis.  58-year-old gentleman with past medical history significant for [place] cirrhosis presented with acute cholecystitis earlier this year.  At that point he was treated with a percutaneous cholecystostomy tube.  He has improved since that time.  He has been unable to tolerate clamping trials regarding his cholecystostomy tube.  He is here for cholecystectomy.  Otherwise there are no specific complaints.    Past Medical History:   Diagnosis Date    Arthritis     knees     Cancer 11/2018    colon with resection    Disease of thyroid gland     Fatty liver     Hashimoto's disease     History of radiation therapy 02/14/2019    rectal cancer    Hypertension     Ileostomy in place     Prediabetes     Psoriasis     Wears glasses        Past Surgical History:    COLON RESECTION    Procedure: LAPAROSCOPIC LOWER ANTERIOR RESECTION WITH DIVERTING LOOP ILEOOSTOMY, SPLENIC FLEIXURE MOBILIZATION AND LIVER BIOPSY, AND PROCTOSCOPY;  Surgeon: Leticia  Pau KAUFMAN MD;  Location:  [facility];  Service: General    COLONOSCOPY    2018    ILEOSTOMY    LIVER BIOPSY    VASECTOMY    VENOUS ACCESS DEVICE (PORT) INSERTION    Procedure: PORT PLACEMENT;  Surgeon: Franky Cazares MD;  Location:  [facility];  Service: General       No Known Allergies    No current facility-administered medications on file prior to encounter.     Current Outpatient Medications on File Prior to Encounter   Medication Sig Dispense Refill    Bioflavonoid Products (Vitamin C) chewable tablet Chew 1 dose Every Other Day.      carvedilol (COREG) 6.25 MG tablet Take 1 tablet by mouth 2 (Two) Times a Day With Meals. 60 tablet 0    cyanocobalamin 1000 MCG/ML injection 1 mL Every 28 (Twenty-Eight) Days.      ezetimibe (ZETIA) 10 MG tablet Take 1 tablet by mouth Daily.      fenofibrate (TRICOR) 145 MG tablet Take 1 tablet by mouth Daily.      FeroSul 325 (65 Fe) MG tablet TAKE 1 TABLET BY MOUTH TWICE DAILY EVERY OTHER DAY WITH VITAMINC (Patient taking differently: Take 1 tablet by mouth Every Other Day.) 30 tablet 11    levothyroxine (SYNTHROID, LEVOTHROID) 25 MCG tablet Take 1 tablet by mouth Daily.      metFORMIN (GLUCOPHAGE) 1000 MG tablet Take 1 tablet by mouth 2 (Two) Times a Day.      RA ANTI-DIARRHEAL 2 MG tablet Take 1 tablet by mouth Daily.  0    Multiple Vitamins-Minerals (MULTIVITAMIN ADULT PO) Take 1 dose by mouth Every Morning. (Patient not taking: Reported on 7/7/2024)           Current Facility-Administered Medications:     acetaminophen (TYLENOL) tablet 650 mg, 650 mg, Oral, Q4H PRN **OR** acetaminophen (TYLENOL) 160 MG/5ML oral solution 650 mg, 650 mg, Oral, Q4H PRN **OR** acetaminophen (TYLENOL) suppository 650 mg, 650 mg, Rectal, Q4H PRN, SHRADDHA Triana,     sennosides-docusate (PERICOLACE) 8.6-50 MG per tablet 2 tablet, 2 tablet, Oral, BID PRN **AND** polyethylene glycol (MIRALAX) packet 17 g, 17 g, Oral, Daily PRN **AND** bisacodyl (DULCOLAX) EC tablet 5 mg, 5 mg, Oral, Daily  PRN **AND** bisacodyl (DULCOLAX) suppository 10 mg, 10 mg, Rectal, Daily PRN, SHRADDHA Triana DO    Calcium Replacement - Follow Nurse / BPA Driven Protocol, , Does not apply, PRN, SHRADDHA Triana DO    carvedilol (COREG) tablet 6.25 mg, 6.25 mg, Oral, BID With Meals, SHRADDHA Triana,     dextrose (D50W) (25 g/50 mL) IV injection 25 g, 25 g, Intravenous, Q15 Min PRN, SHRADDHA Triana DO    dextrose (GLUTOSE) oral gel 15 g, 15 g, Oral, Q15 Min PRN, SHRADDHA Triana, DO    [Held by provider] ferrous sulfate tablet 325 mg, 325 mg, Oral, Every Other Day, SHRADDHA Triana DO    glucagon (GLUCAGEN) injection 1 mg, 1 mg, Intramuscular, Q15 Min PRN, SHRADDHA Triana DO    Insulin Lispro (humaLOG) injection 2-9 Units, 2-9 Units, Subcutaneous, 4x Daily AC & at Bedtime, SHRADDHA Triana DO    levothyroxine (SYNTHROID, LEVOTHROID) tablet 25 mcg, 25 mcg, Oral, Daily, SHRADDHA Triana DO    Magnesium Standard Dose Replacement - Follow Nurse / BPA Driven Protocol, , Does not apply, PRN, SHRADDHA Triana DO    nitroglycerin (NITROSTAT) SL tablet 0.4 mg, 0.4 mg, Sublingual, Q5 Min PRN, SHRADDHA Triana DO    ondansetron (ZOFRAN) injection 4 mg, 4 mg, Intravenous, Q6H PRN, SHRADDHA Triana DO    Phosphorus Replacement - Follow Nurse / BPA Driven Protocol, , Does not apply, PRN, SHRADDHA Triana DO    Potassium Replacement - Follow Nurse / BPA Driven Protocol, , Does not apply, PRN, SHRADDHA Triana,     sodium chloride 0.9 % flush 10 mL, 10 mL, Intravenous, Q12H, SHRADDHA Triana DO    sodium chloride 0.9 % flush 10 mL, 10 mL, Intravenous, PRN, SHRADDHA Triana,     sodium chloride 0.9 % infusion 40 mL, 40 mL, Intravenous, PRN, SHRADDHA Triana, DO    Family History   Problem Relation Age of Onset    Breast cancer Mother     Cancer Father         bladder/prostate     Social History     Socioeconomic History    Marital status:    Tobacco Use    Smoking status: Former     Current packs/day: 0.00     Average packs/day: 1  "pack/day for 14.0 years (14.0 ttl pk-yrs)     Types: Cigarettes     Start date:      Quit date:      Years since quittin.5    Smokeless tobacco: Never   Vaping Use    Vaping status: Never Used   Substance and Sexual Activity    Alcohol use: No    Drug use: No    Sexual activity: Defer       Review of Systems:  Review of Systems   Constitutional:  Positive for appetite change. Negative for fatigue and fever.   HENT:  Negative for congestion, drooling and facial swelling.    Eyes:  Negative for photophobia and visual disturbance.   Respiratory:  Negative for apnea, choking and shortness of breath.    Cardiovascular:  Negative for chest pain and leg swelling.   Gastrointestinal:  Positive for abdominal pain. Negative for diarrhea, nausea and vomiting.   Endocrine: Negative for polyphagia and polyuria.   Genitourinary:  Negative for difficulty urinating, frequency and urgency.   Musculoskeletal:  Negative for arthralgias, joint swelling and neck stiffness.   Skin:  Negative for color change and pallor.   Allergic/Immunologic: Negative for immunocompromised state.   Neurological:  Negative for dizziness, syncope and numbness.   Hematological:  Negative for adenopathy.   Psychiatric/Behavioral:  Negative for agitation and sleep disturbance. The patient is not hyperactive.      Otherwise the 12 point review of systems is negative.    /64 (BP Location: Left arm, Patient Position: Sitting)   Pulse 73   Temp 98.4 °F (36.9 °C) (Oral)   Resp 18   Ht 185.4 cm (73\")   Wt 89.9 kg (198 lb 3.2 oz)   SpO2 99%   BMI 26.15 kg/m²   Body mass index is 26.15 kg/m².    General: No apparent distress  HEENT: PER, no icterus, normal sclerae  Cardiac: regular rhythm,  no audible rubs  Pulmonary: bilateral breath sounds, nonlabored  Abdominal: Soft, no generalized peritonitis, drain noted  Neurologic: awake, alert, no obvious focal deficits  Extremities: warm, no edema  Skin: no obvious rashes nor worrisome lesions " seen     CBC  Results from last 7 days   Lab Units 07/07/24  1419   WBC 10*3/mm3 2.83*   HEMOGLOBIN g/dL 11.8*   HEMATOCRIT % 35.6*   PLATELETS 10*3/mm3 51*       Assessment:  Cholecystitis   cirrhosis  Diabetes mellitus  Thrombocytopenia    Plan:  N.p.o. after midnight.  Obtain labs.  Type and screen.     Luis Cao MD  07/07/24  16:09 EDT       Electronically signed by Luis Cao MD at 07/07/24 3453

## 2024-07-10 NOTE — PROGRESS NOTES
[facility] Medicine Services  PROGRESS NOTE    Patient Name: Hesham Arevalo Jr.  : 1965  MRN: [id number]    Date of Admission: 2024  Primary Care Physician: Myke Mendoza MD    Subjective   Subjective     CC:  CCY     HPI:  No acute events. Feels ok. Pain is tolerable. Tolerating food. Encouraged ambulation.       Objective   Objective     Vital Signs:   Temp:  [98.7 °F (37.1 °C)-100.2 °F (37.9 °C)] 99.4 °F (37.4 °C)  Heart Rate:  [73-81] 80  Resp:  [16-20] 16  BP: (101-130)/(58-61) 109/58     Physical Exam:  Constitutional: No acute distress, awake, alert  Respiratory: Clear to auscultation bilaterally, respiratory effort normal   Cardiovascular: RRR, no murmurs, rubs, or gallops  Gastrointestinal: Positive bowel sounds, soft, mild tenderness; incision with no erythema; ostomy   Musculoskeletal: No bilateral ankle edema  Neurologic: Oriented x 3, strength symmetric in all extremities, Cranial Nerves grossly intact to confrontation, speech clear      Results Reviewed:  LAB RESULTS:      Lab 07/10/24  0333 24  0355 24  1551 24  1027 24  0630 24  0539 24  1419 24  1419 24  1338   WBC 5.40 9.63 5.61 4.90  --  4.01   < > 2.83*  --    HEMOGLOBIN 8.8* 9.7* 10.7* 10.1*  --  11.7*   < > 11.8*  --    HEMATOCRIT 27.3* 29.2* 31.8* 30.5*  --  35.4*   < > 35.6*  --    PLATELETS 44* 76* 75* 73*  --  73*   < > 51*  --    NEUTROS ABS 3.79 7.43*  --  4.04  --  2.38  --  1.77  --    IMMATURE GRANS (ABS) 0.02 0.06*  --  0.03  --  0.01  --  0.01  --    LYMPHS ABS 0.70 0.96  --  0.57*  --  0.84  --  0.55*  --    MONOS ABS 0.74 1.14*  --  0.12  --  0.38  --  0.23  --    EOS ABS 0.13 0.02  --  0.11  --  0.36  --  0.23  --    MCV 92.5 90.4 90.3 90.5  --  89.8   < > 91.0  --    LACTATE  --   --   --   --   --   --   --   --  1.2   PROTIME  --   --   --  16.7* 15.4*  --   --   --  15.6*    < > = values in this interval not displayed.         Lab  07/10/24  0333 07/09/24  0355 07/08/24  1027 07/08/24  0539 07/07/24  1842 07/07/24  1338   SODIUM 138 139 136 139 138  --    POTASSIUM 4.1 4.5 4.3 4.3 4.2  --    CHLORIDE 104 105 105 105 104  --    CO2 28.0 27.0 23.0 25.0 23.0  --    ANION GAP 6.0 7.0 8.0 9.0 11.0  --    BUN 19 18 15 16 16  --    CREATININE 0.97 0.93 0.86 0.87 0.93  --    EGFR 89.4 94.0 99.1 98.8 94.0  --    GLUCOSE 122* 128* 189* 125* 229*  --    CALCIUM 7.7* 8.2* 8.2* 8.8 8.9  --    MAGNESIUM 1.6 1.6  --  1.8  --  1.9   PHOSPHORUS  --   --   --  3.9  --   --    HEMOGLOBIN A1C  --   --   --   --   --  6.50*   TSH  --   --   --   --   --  6.630*         Lab 07/10/24  0333 07/09/24  0355 07/08/24  1027 07/08/24  0539 07/07/24  1842   TOTAL PROTEIN 5.3* 5.6* 6.1 6.8 6.7   ALBUMIN 2.7* 2.9* 3.0* 3.4* 3.3*   GLOBULIN 2.6 2.7 3.1 3.4 3.4   ALT (SGPT) 13 16 19 20 22   AST (SGOT) 27 34 37 38 48*   BILIRUBIN 2.9* 2.7* 2.4* 2.6* 2.0*   ALK PHOS 52 64 70 81 76         Lab 07/08/24  1027 07/08/24  0630 07/07/24  1338   PROTIME 16.7* 15.4* 15.6*   INR 1.33* 1.21* 1.22*             Lab 07/07/24  1604   ABO TYPING A   RH TYPING Negative   ANTIBODY SCREEN Negative         Brief Urine Lab Results  (Last result in the past 365 days)        Color   Clarity   Blood   Leuk Est   Nitrite   Protein   CREAT   Urine HCG        05/12/24 2032 Dark Yellow   Clear   Negative   Trace   Positive   Trace                   Microbiology Results Abnormal       Procedure Component Value - Date/Time    Blood Culture - Blood, Arm, Right [[id number]]  (Normal) Collected: 07/07/24 1338    Lab Status: Preliminary result Specimen: Blood from Arm, Right Updated: 07/10/24 1445     Blood Culture No growth at 3 days    Blood Culture - Blood, Arm, Left [[id number]]  (Normal) Collected: 07/07/24 1338    Lab Status: Preliminary result Specimen: Blood from Arm, Left Updated: 07/10/24 1445     Blood Culture No growth at 3 days            No radiology results from the last 24 hrs        Current  medications:  Scheduled Meds:carvedilol, 6.25 mg, Oral, BID With Meals  [Held by provider] ferrous sulfate, 325 mg, Oral, Every Other Day  insulin lispro, 2-9 Units, Subcutaneous, 4x Daily AC & at Bedtime  levothyroxine, 25 mcg, Oral, Daily  pantoprazole, 40 mg, Intravenous, Q AM      Continuous Infusions:   PRN Meds:.  acetaminophen **OR** acetaminophen **OR** acetaminophen    Calcium Replacement - Follow Nurse / BPA Driven Protocol    dextrose    dextrose    diphenhydrAMINE    glucagon (human recombinant)    HYDROmorphone    Magnesium Standard Dose Replacement - Follow Nurse / BPA Driven Protocol    naloxone    nitroglycerin    ondansetron ODT **OR** ondansetron    oxyCODONE-acetaminophen    Phosphorus Replacement - Follow Nurse / BPA Driven Protocol    Potassium Replacement - Follow Nurse / BPA Driven Protocol    promethazine    sodium chloride    sodium chloride    Assessment & Plan   Assessment & Plan     Active Hospital Problems    Diagnosis  POA    **Acute cholecystitis [K81.0]  Yes    Cirrhosis of liver [K74.60]  Yes    Type 2 diabetes mellitus without complication, without long-term current use of insulin [E11.9]  Yes    Thrombocytopenia [D69.6]  Yes    Essential hypertension [I10]  Yes      Resolved Hospital Problems   No resolved problems to display.        Brief Hospital Course to date:  Hesham Arevalo Jr. is a 58 y.o. male  that presented as a direct admit with plans for cholecystectomy with Dr. Garcia on 7/8, after previously treated for cholecystitis with percutaneous cholecystostomy tube that failed to resolve the issue.  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 Garcia and  Hepatology, s/p open cholecystectomy with Dr. Garcia on 7/8   -  Patient was previously on Coumadin but has been off since 11/2022 due to resolution of previous thrombosis   -Transfused 1 unit platelets intraoperatively and again 7/10  -PCA discontinued  -Advance to regular diet  -Mobilize as tolerated  -Wound care consult to optimize pouching right upper quadrant stoma due to close proximity to surgical incision, in efforts to minimize contamination risk     HTN  HLD   - Continue home Coreg  - Does not appear to be on statin at home.      T2DM   - A1C now 6.5 last A1C from 5/13 8.3   - Hold home metformin   - FSBS c SSI coverage. Stable.   -Glucometer ordered to be provided on discharge     Hypothyroidism   - TSH ordered 6.63, reflex t4 0.8  -Due to acute illness, will recommend outpatient TSH and T4       Hx of rectal adenocarcinoma s/p resection and ileostomy April 2019  --Follows with Dr. Kelly and Dr. Moeller, no evidence of recurrence per last note from Oncology 3/28/24, recent negative CT abd/pelvis as above   -- CEA 1.69 from 3/2024   --He reports he has been told he is too high risk for ileostomy reversal     Expected Discharge Location and Transportation: home  Expected Discharge   Expected Discharge Date: 7/10/2024; Expected Discharge Time:      VTE Prophylaxis:  Mechanical VTE prophylaxis orders are present.         AM-PAC 6 Clicks Score (PT): 17 (07/10/24 0800)    CODE STATUS:   Code Status and Medical Interventions:   Ordered at: 07/07/24 1400     Level Of Support Discussed With:    Patient     Code Status (Patient has no pulse and is not breathing):    CPR (Attempt to Resuscitate)     Medical Interventions (Patient has pulse or is breathing):    Full Support       Reyna Meadows,   07/10/24

## 2024-07-11 ENCOUNTER — APPOINTMENT (OUTPATIENT)
Dept: GENERAL RADIOLOGY | Facility: HOSPITAL | Age: 59
End: 2024-07-11
Payer: COMMERCIAL

## 2024-07-11 LAB
ALBUMIN SERPL-MCNC: 2.6 G/DL (ref 3.5–5.2)
ALBUMIN/GLOB SERPL: 0.9 G/DL
ALP SERPL-CCNC: 59 U/L (ref 39–117)
ALT SERPL W P-5'-P-CCNC: 12 U/L (ref 1–41)
ANION GAP SERPL CALCULATED.3IONS-SCNC: 9 MMOL/L (ref 5–15)
AST SERPL-CCNC: 22 U/L (ref 1–40)
BASOPHILS # BLD AUTO: 0.02 10*3/MM3 (ref 0–0.2)
BASOPHILS NFR BLD AUTO: 0.4 % (ref 0–1.5)
BH BB BLOOD EXPIRATION DATE: NORMAL
BH BB BLOOD TYPE BARCODE: 600
BH BB BLOOD TYPE BARCODE: 600
BH BB BLOOD TYPE BARCODE: 6200
BH BB DISPENSE STATUS: NORMAL
BH BB PRODUCT CODE: NORMAL
BH BB UNIT NUMBER: NORMAL
BILIRUB SERPL-MCNC: 2.3 MG/DL (ref 0–1.2)
BILIRUB UR QL STRIP: ABNORMAL
BUN SERPL-MCNC: 17 MG/DL (ref 8–23)
BUN/CREAT SERPL: 17.2 (ref 7–25)
CALCIUM SPEC-SCNC: 7.7 MG/DL (ref 8.6–10.5)
CHLORIDE SERPL-SCNC: 103 MMOL/L (ref 98–107)
CLARITY UR: CLEAR
CO2 SERPL-SCNC: 25 MMOL/L (ref 22–29)
COLOR UR: ABNORMAL
CREAT SERPL-MCNC: 0.99 MG/DL (ref 0.76–1.27)
CROSSMATCH INTERPRETATION: NORMAL
CROSSMATCH INTERPRETATION: NORMAL
DEPRECATED RDW RBC AUTO: 47.6 FL (ref 37–54)
EGFRCR SERPLBLD CKD-EPI 2021: 87.2 ML/MIN/1.73
EOSINOPHIL # BLD AUTO: 0.13 10*3/MM3 (ref 0–0.4)
EOSINOPHIL NFR BLD AUTO: 2.7 % (ref 0.3–6.2)
ERYTHROCYTE [DISTWIDTH] IN BLOOD BY AUTOMATED COUNT: 14.3 % (ref 12.3–15.4)
GLOBULIN UR ELPH-MCNC: 2.9 GM/DL
GLUCOSE BLDC GLUCOMTR-MCNC: 144 MG/DL (ref 70–130)
GLUCOSE BLDC GLUCOMTR-MCNC: 173 MG/DL (ref 70–130)
GLUCOSE BLDC GLUCOMTR-MCNC: 194 MG/DL (ref 70–130)
GLUCOSE BLDC GLUCOMTR-MCNC: 205 MG/DL (ref 70–130)
GLUCOSE SERPL-MCNC: 118 MG/DL (ref 65–99)
GLUCOSE UR STRIP-MCNC: NEGATIVE MG/DL
HCT VFR BLD AUTO: 26.5 % (ref 37.5–51)
HCT VFR BLD AUTO: 26.7 % (ref 37.5–51)
HGB BLD-MCNC: 8.7 G/DL (ref 13–17.7)
HGB BLD-MCNC: 8.8 G/DL (ref 13–17.7)
HGB UR QL STRIP.AUTO: NEGATIVE
IMM GRANULOCYTES # BLD AUTO: 0.02 10*3/MM3 (ref 0–0.05)
IMM GRANULOCYTES NFR BLD AUTO: 0.4 % (ref 0–0.5)
KETONES UR QL STRIP: NEGATIVE
LEUKOCYTE ESTERASE UR QL STRIP.AUTO: NEGATIVE
LYMPHOCYTES # BLD AUTO: 0.71 10*3/MM3 (ref 0.7–3.1)
LYMPHOCYTES NFR BLD AUTO: 14.5 % (ref 19.6–45.3)
MAGNESIUM SERPL-MCNC: 1.7 MG/DL (ref 1.6–2.4)
MCH RBC QN AUTO: 30 PG (ref 26.6–33)
MCHC RBC AUTO-ENTMCNC: 32.8 G/DL (ref 31.5–35.7)
MCV RBC AUTO: 91.4 FL (ref 79–97)
MONOCYTES # BLD AUTO: 0.64 10*3/MM3 (ref 0.1–0.9)
MONOCYTES NFR BLD AUTO: 13.1 % (ref 5–12)
NEUTROPHILS NFR BLD AUTO: 3.37 10*3/MM3 (ref 1.7–7)
NEUTROPHILS NFR BLD AUTO: 68.9 % (ref 42.7–76)
NITRITE UR QL STRIP: NEGATIVE
NRBC BLD AUTO-RTO: 0 /100 WBC (ref 0–0.2)
PH UR STRIP.AUTO: 5.5 [PH] (ref 5–8)
PLATELET # BLD AUTO: 49 10*3/MM3 (ref 140–450)
PMV BLD AUTO: 10.4 FL (ref 6–12)
POTASSIUM SERPL-SCNC: 3.9 MMOL/L (ref 3.5–5.2)
PROCALCITONIN SERPL-MCNC: 0.15 NG/ML (ref 0–0.25)
PROT SERPL-MCNC: 5.5 G/DL (ref 6–8.5)
PROT UR QL STRIP: NEGATIVE
RBC # BLD AUTO: 2.9 10*6/MM3 (ref 4.14–5.8)
SODIUM SERPL-SCNC: 137 MMOL/L (ref 136–145)
SP GR UR STRIP: 1.03 (ref 1–1.03)
UNIT  ABO: NORMAL
UNIT  RH: NORMAL
UROBILINOGEN UR QL STRIP: ABNORMAL
WBC NRBC COR # BLD AUTO: 4.89 10*3/MM3 (ref 3.4–10.8)

## 2024-07-11 PROCEDURE — 82948 REAGENT STRIP/BLOOD GLUCOSE: CPT

## 2024-07-11 PROCEDURE — 81003 URINALYSIS AUTO W/O SCOPE: CPT | Performed by: SURGERY

## 2024-07-11 PROCEDURE — 83735 ASSAY OF MAGNESIUM: CPT | Performed by: FAMILY MEDICINE

## 2024-07-11 PROCEDURE — 71045 X-RAY EXAM CHEST 1 VIEW: CPT

## 2024-07-11 PROCEDURE — 85014 HEMATOCRIT: CPT | Performed by: PHYSICIAN ASSISTANT

## 2024-07-11 PROCEDURE — 85025 COMPLETE CBC W/AUTO DIFF WBC: CPT | Performed by: FAMILY MEDICINE

## 2024-07-11 PROCEDURE — 99232 SBSQ HOSP IP/OBS MODERATE 35: CPT | Performed by: PHYSICIAN ASSISTANT

## 2024-07-11 PROCEDURE — 80053 COMPREHEN METABOLIC PANEL: CPT | Performed by: FAMILY MEDICINE

## 2024-07-11 PROCEDURE — 85018 HEMOGLOBIN: CPT | Performed by: PHYSICIAN ASSISTANT

## 2024-07-11 PROCEDURE — 84145 PROCALCITONIN (PCT): CPT | Performed by: PHYSICIAN ASSISTANT

## 2024-07-11 RX ADMIN — CARVEDILOL 6.25 MG: 6.25 TABLET, FILM COATED ORAL at 08:40

## 2024-07-11 RX ADMIN — LEVOTHYROXINE SODIUM 25 MCG: 25 TABLET ORAL at 08:40

## 2024-07-11 RX ADMIN — CARVEDILOL 6.25 MG: 6.25 TABLET, FILM COATED ORAL at 17:39

## 2024-07-11 RX ADMIN — ACETAMINOPHEN 650 MG: 325 TABLET ORAL at 08:40

## 2024-07-11 RX ADMIN — PANTOPRAZOLE SODIUM 40 MG: 40 INJECTION, POWDER, FOR SOLUTION INTRAVENOUS at 05:08

## 2024-07-11 RX ADMIN — OXYCODONE HYDROCHLORIDE AND ACETAMINOPHEN 1 TABLET: 5; 325 TABLET ORAL at 12:39

## 2024-07-11 RX ADMIN — ACETAMINOPHEN 650 MG: 325 TABLET ORAL at 12:28

## 2024-07-11 NOTE — PLAN OF CARE
Goal Outcome Evaluation:              VSS, RA, NSR,  Pt still running low grade temps. PRN tylenol given. FRANTZ drain pout out over 250 this shift. Will continue with POC.

## 2024-07-11 NOTE — PROGRESS NOTES
"Patient Name:  Hesham Arevalo Jr.  YOB: 1965  [id number]    Surgery Progress Note    Date of visit: 7/11/2024      Subjective: Febrile overnight to 101.8.  Otherwise feeling well.  Denies nausea or vomiting and tolerated a regular diet yesterday without difficulty.  Having good ileostomy output, 650 mL yesterday.  Voiding without difficulty.  Still has some incisional pain in the right upper quadrant but otherwise denies diffuse abdominal pain, distention, nausea, or vomiting.  Ambulated with assistance yesterday.  Received 1 platelet transfusion yesterday          Objective:     /65 (BP Location: Right arm, Patient Position: Lying)   Pulse 86   Temp 99.7 °F (37.6 °C) (Oral)   Resp 16   Ht 185.4 cm (73\")   Wt 95.3 kg (210 lb)   SpO2 96%   BMI 27.71 kg/m²     Intake/Output Summary (Last 24 hours) at 7/11/2024 0640  Last data filed at 7/11/2024 0300  Gross per 24 hour   Intake 1151 ml   Output 1380 ml   Net -229 ml       GEN:   Awake, alert, in no acute distress, resting comfortably in bed   CV:   Regular rate and rhythm  L:  Symmetric expansion, not labored on room air  Abd:  Soft, not distended, appropriately tender palpation along the right upper quadrant incision, incision is well-approximated and partially covered by his stoma appliance but has no erythema, no tenderness palpation throughout the abdomen otherwise, ileostomy with stool in the bag  Ext:  No cyanosis, clubbing, or edema    Recent labs that are back at this time have been reviewed.           Assessment/ Plan:    Mr. Arevalo is a 58 year old gentleman with history of cirrhosis and rectal cancer s/p LAR with DLI who is admitted after undergoing open cholecystectomy on 7/8/24 for cholecystitis s/p percutaneous cholecystostomy     #Cholecystitis  -postop day 3  -Labs without significant change.  White blood cell count is 4.8.  Hemoglobin is stable at 8.7.  Continued thrombocytopenia at 49.  Liver function test " stable  -Drain output 330 mL yesterday.  Remains serosanguineous and not bilious.  Likely a small amount of ascites component to this given that the output is more serous and decent volume  -Febrile to 101.8 overnight.  Otherwise feeling very well however and making progress.  I will initiate more of an infectious workup with a chest x-ray and UA.  I do not see a need for abdominal imaging yet at this point given that he is having normal bowel function and his drain output is appropriate and nonbilious.  He needs to be afebrile for 24 hours before discharge.   -Incentive spirometer. Mobilize          Konstantin Garcia MD  7/11/2024  06:40 EDT

## 2024-07-12 ENCOUNTER — READMISSION MANAGEMENT (OUTPATIENT)
Dept: CALL CENTER | Facility: HOSPITAL | Age: 59
End: 2024-07-12
Payer: COMMERCIAL

## 2024-07-12 VITALS
HEIGHT: 73 IN | RESPIRATION RATE: 18 BRPM | WEIGHT: 210 LBS | DIASTOLIC BLOOD PRESSURE: 67 MMHG | HEART RATE: 77 BPM | BODY MASS INDEX: 27.83 KG/M2 | SYSTOLIC BLOOD PRESSURE: 117 MMHG | OXYGEN SATURATION: 96 % | TEMPERATURE: 98.1 F

## 2024-07-12 LAB
ALBUMIN SERPL-MCNC: 2.7 G/DL (ref 3.5–5.2)
ALP SERPL-CCNC: 68 U/L (ref 39–117)
ALT SERPL W P-5'-P-CCNC: 11 U/L (ref 1–41)
ANION GAP SERPL CALCULATED.3IONS-SCNC: 7 MMOL/L (ref 5–15)
AST SERPL-CCNC: 21 U/L (ref 1–40)
BACTERIA SPEC AEROBE CULT: NORMAL
BACTERIA SPEC AEROBE CULT: NORMAL
BASOPHILS # BLD AUTO: 0.02 10*3/MM3 (ref 0–0.2)
BASOPHILS NFR BLD AUTO: 0.5 % (ref 0–1.5)
BILIRUB CONJ SERPL-MCNC: 0.6 MG/DL (ref 0–0.3)
BILIRUB INDIRECT SERPL-MCNC: 1.1 MG/DL
BILIRUB SERPL-MCNC: 1.7 MG/DL (ref 0–1.2)
BUN SERPL-MCNC: 15 MG/DL (ref 8–23)
BUN/CREAT SERPL: 20 (ref 7–25)
CALCIUM SPEC-SCNC: 7.5 MG/DL (ref 8.6–10.5)
CHLORIDE SERPL-SCNC: 102 MMOL/L (ref 98–107)
CO2 SERPL-SCNC: 26 MMOL/L (ref 22–29)
CREAT SERPL-MCNC: 0.75 MG/DL (ref 0.76–1.27)
DEPRECATED RDW RBC AUTO: 46.6 FL (ref 37–54)
EGFRCR SERPLBLD CKD-EPI 2021: 103.3 ML/MIN/1.73
EOSINOPHIL # BLD AUTO: 0.19 10*3/MM3 (ref 0–0.4)
EOSINOPHIL NFR BLD AUTO: 4.7 % (ref 0.3–6.2)
ERYTHROCYTE [DISTWIDTH] IN BLOOD BY AUTOMATED COUNT: 14.5 % (ref 12.3–15.4)
GLUCOSE BLDC GLUCOMTR-MCNC: 149 MG/DL (ref 70–130)
GLUCOSE BLDC GLUCOMTR-MCNC: 186 MG/DL (ref 70–130)
GLUCOSE SERPL-MCNC: 138 MG/DL (ref 65–99)
HCT VFR BLD AUTO: 25.7 % (ref 37.5–51)
HGB BLD-MCNC: 8.4 G/DL (ref 13–17.7)
IMM GRANULOCYTES # BLD AUTO: 0.02 10*3/MM3 (ref 0–0.05)
IMM GRANULOCYTES NFR BLD AUTO: 0.5 % (ref 0–0.5)
LYMPHOCYTES # BLD AUTO: 0.54 10*3/MM3 (ref 0.7–3.1)
LYMPHOCYTES NFR BLD AUTO: 13.4 % (ref 19.6–45.3)
MAGNESIUM SERPL-MCNC: 1.7 MG/DL (ref 1.6–2.4)
MCH RBC QN AUTO: 29.3 PG (ref 26.6–33)
MCHC RBC AUTO-ENTMCNC: 32.7 G/DL (ref 31.5–35.7)
MCV RBC AUTO: 89.5 FL (ref 79–97)
MONOCYTES # BLD AUTO: 0.48 10*3/MM3 (ref 0.1–0.9)
MONOCYTES NFR BLD AUTO: 11.9 % (ref 5–12)
NEUTROPHILS NFR BLD AUTO: 2.77 10*3/MM3 (ref 1.7–7)
NEUTROPHILS NFR BLD AUTO: 69 % (ref 42.7–76)
NRBC BLD AUTO-RTO: 0 /100 WBC (ref 0–0.2)
PLATELET # BLD AUTO: 62 10*3/MM3 (ref 140–450)
PMV BLD AUTO: 9.5 FL (ref 6–12)
POTASSIUM SERPL-SCNC: 3.8 MMOL/L (ref 3.5–5.2)
PROT SERPL-MCNC: 5.2 G/DL (ref 6–8.5)
RBC # BLD AUTO: 2.87 10*6/MM3 (ref 4.14–5.8)
SODIUM SERPL-SCNC: 135 MMOL/L (ref 136–145)
WBC NRBC COR # BLD AUTO: 4.02 10*3/MM3 (ref 3.4–10.8)

## 2024-07-12 PROCEDURE — 99239 HOSP IP/OBS DSCHRG MGMT >30: CPT | Performed by: NURSE PRACTITIONER

## 2024-07-12 PROCEDURE — 85025 COMPLETE CBC W/AUTO DIFF WBC: CPT | Performed by: PHYSICIAN ASSISTANT

## 2024-07-12 PROCEDURE — 83735 ASSAY OF MAGNESIUM: CPT | Performed by: PHYSICIAN ASSISTANT

## 2024-07-12 PROCEDURE — 80048 BASIC METABOLIC PNL TOTAL CA: CPT | Performed by: PHYSICIAN ASSISTANT

## 2024-07-12 PROCEDURE — 80076 HEPATIC FUNCTION PANEL: CPT | Performed by: SURGERY

## 2024-07-12 PROCEDURE — 82948 REAGENT STRIP/BLOOD GLUCOSE: CPT

## 2024-07-12 RX ORDER — OXYCODONE HYDROCHLORIDE AND ACETAMINOPHEN 5; 325 MG/1; MG/1
1 TABLET ORAL EVERY 4 HOURS PRN
Qty: 18 TABLET | Refills: 0 | Status: SHIPPED | OUTPATIENT
Start: 2024-07-12

## 2024-07-12 RX ORDER — PANTOPRAZOLE SODIUM 40 MG/1
40 TABLET, DELAYED RELEASE ORAL DAILY
Qty: 30 TABLET | Refills: 0 | Status: SHIPPED | OUTPATIENT
Start: 2024-07-12

## 2024-07-12 RX ADMIN — CARVEDILOL 6.25 MG: 6.25 TABLET, FILM COATED ORAL at 08:21

## 2024-07-12 RX ADMIN — LEVOTHYROXINE SODIUM 25 MCG: 25 TABLET ORAL at 08:21

## 2024-07-12 RX ADMIN — PANTOPRAZOLE SODIUM 40 MG: 40 INJECTION, POWDER, FOR SOLUTION INTRAVENOUS at 05:28

## 2024-07-12 NOTE — NURSING NOTE
[facility] was asked to see patient via patient request and due to leakage of ileostomy appliance.  Patient wanted a little bit more instruction regarding incision superior to ostomy.    Visited patient and removed previous appliance, noted that the hydrocolloid barrier that was used actually made the skin glue come off and was difficult to remove from incision.  Cleansed measured reapplied [id number].    Before applying a little bit of [place] skin protectant was applied over incision and just the tape was against incision now.  Some powder was lightly used and barrier spray was used.    Gave patient our business card card was given to patient in case he has needs in the future, we can either get him set up with [facility] with our group or whoever is available.

## 2024-07-12 NOTE — PLAN OF CARE
Problem: Adult Inpatient Plan of Care  Goal: Absence of Hospital-Acquired Illness or Injury  Intervention: Identify and Manage Fall Risk  Recent Flowsheet Documentation  Taken 7/12/2024 0400 by Malorie Sampson RN  Safety Promotion/Fall Prevention: safety round/check completed  Taken 7/12/2024 0000 by Malorie Sampson RN  Safety Promotion/Fall Prevention: safety round/check completed  Taken 7/11/2024 2200 by Malorie Sampson RN  Safety Promotion/Fall Prevention:   activity supervised   assistive device/personal items within reach   clutter free environment maintained   fall prevention program maintained   nonskid shoes/slippers when out of bed   safety round/check completed     Problem: Adult Inpatient Plan of Care  Goal: Optimal Comfort and Wellbeing  Outcome: Ongoing, Progressing  Intervention: Provide Person-Centered Care  Recent Flowsheet Documentation  Taken 7/11/2024 2200 by Malorie Sampson RN  Trust Relationship/Rapport:   care explained   choices provided   questions answered   thoughts/feelings acknowledged   Goal Outcome Evaluation:

## 2024-07-12 NOTE — PROGRESS NOTES
"Patient Name:  Hesham Arevalo Jr.  YOB: 1965  [id number]    Surgery Progress Note    Date of visit: 7/12/2024      Subjective: No acute events.  Overall feeling fairly well.  Abdominal pain has been much improved.  Tolerating a diet without difficulty.  Having excellent ileostomy output.  No further fevers over the last 24 hours          Objective:     /62 (BP Location: Left arm, Patient Position: Lying)   Pulse 72   Temp 99.3 °F (37.4 °C) (Oral)   Resp 18   Ht 185.4 cm (73\")   Wt 95.3 kg (210 lb)   SpO2 98%   BMI 27.71 kg/m²     Intake/Output Summary (Last 24 hours) at 7/12/2024 0703  Last data filed at 7/12/2024 0419  Gross per 24 hour   Intake 225 ml   Output 1760 ml   Net -1535 ml       GEN:   Awake, alert, in no acute distress, resting comfortably in bed   CV:      Regular rate and rhythm  L:         Symmetric expansion, not labored on room air  Abd:    Soft, not distended, appropriately tender palpation along the right upper quadrant incision, incision is well-approximated and partially covered by his stoma appliance but has no erythema, no tenderness palpation throughout the abdomen otherwise, ileostomy with stool in the bag, FRANTZ in the right upper quadrant is serosanguineous and not bilious  Ext:     No cyanosis, clubbing, or edema    Recent labs that are back at this time have been reviewed.           Assessment/ Plan:    Mr. Arevalo is a 58 year old gentleman with history of cirrhosis and rectal cancer s/p LAR with DLI who is admitted after undergoing open cholecystectomy on 7/8/24 for cholecystitis s/p percutaneous cholecystostomy     #Cholecystitis  -postop day 4  -Labs from today are still pending  -Feeling well.  Tolerating a diet.  Pain is controlled.  Having bowel function.  Drain output to 60 mL but is not bilious and serosanguineous  -No further fevers over the last 24 hours.  As long as labs are stable, I think he is okay for discharge today.  I will see him back " shortly on Tuesday, July 16 for drain evaluation and likely removal in the office.  He should continue with the FRANTZ drain at discharge and empty and record the output to twice daily      Konstantin Garcia MD  7/12/2024  07:03 EDT

## 2024-07-12 NOTE — DISCHARGE SUMMARY
[facility] Medicine Services  DISCHARGE SUMMARY    Patient Name: Hesham Arevalo Jr.  : 1965  MRN: [id number]    Date of Admission: 2024  9:48 AM  Date of Discharge:  2024  Primary Care Physician: Myke Mendoza MD    Consults       Date and Time Order Name Status Description    2024  1:26 PM Inpatient General Surgery Consult Completed             Hospital Course     Presenting Problem: Planned cholecystectomy     Active Hospital Problems    Diagnosis  POA    **Acute cholecystitis [K81.0]  Yes    Cirrhosis of liver [K74.60]  Yes    Type 2 diabetes mellitus without complication, without long-term current use of insulin [E11.9]  Yes    Thrombocytopenia [D69.6]  Yes    Essential hypertension [I10]  Yes      Resolved Hospital Problems   No resolved problems to display.          Hospital Course:  Hesham Arevalo Jr. is a 58 y.o. male with PMH significant for HTN, non-insulin dependent DMII, liver cirrhosis and chronic thrombocytopenia. Admitted to [facility] -24 for acute cholecystitis. Felt to be high-risk for CCY due to cirrhosis and RUQ ileostomy so management with cholecystostomy tube was attempted. Ultimately directly admitted to [facility] 24 for planned open cholecystectomy by Dr. Garcia on 24.      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 Garcia and  Hepatology, s/p open cholecystectomy with Dr. Garcia on 24  - Patient was previously on Coumadin but has been off since 2022 due to resolution of previous thrombosis   - s/p 1 unit platelets intraoperatively and again on 7/10 - platelets 49 today. Continue to monitor  - PCA discontinued. PRN pain control with PO Percocet   - Encourage ambulation / IS / pulmonary toilet  - Wound care following to optimize pouching right upper  quadrant stoma due to close proximity to surgical incision, in efforts to minimize contamination risk  --Incision dry and intact with Dermabond.  Pain well-controlled.  BP remains in place to right abdomen to bulb suction with serosanguineous output.  Will keep in place until follow-up with surgeon on Tuesday of next week.  --Advised to call PCP or surgeon if temp greater than 100.5, or return to ER if other symptoms such as nausea vomiting, significant increased pain or fever     Postoperative fever  - Temp 101.8 overnight night before last.  - CXR and UA reassuring. Procalcitonin normal at 0.15  - Monitor off of antibiotics for now  - Surgery okay with 99.3-99.7 fevers overnight.  Improved.     HTN  HLD   - Continue home Carvedilol  - Not on statin. Lipids reviewed and appropriate      Non-insulin dependent DMII  - Holding home Metformin  - A1c 6.5% (improved from 8.3% on 5/13/24)  - SSI  - Glucometer ordered to be provided on discharge.  However patient declines meter at time of discharge and states he does not need supplies as well.     Hypothyroidism   - TSH elevated at 6.63, reflex T4 normal at 0.8  - Due to acute illness, recommend repeat TSH and T4 in 4-6 weeks on an outpatient basis       Hx of rectal adenocarcinoma s/p resection and ileostomy April 2019  - Follows with Dr. Kelly and Dr. Moeller  - No evidence of recurrence per last note from Oncology 3/28/24, recent negative CT abd/pelvis as above   - CEA 1.69 from 3/2024   - He reports he has been told he is too high risk for ileostomy reversal  --Manages his ostomy at baseline.  Not new.       Patient was seen resting up in the chair no acute distress.  States he feels pretty good today.  Pain is fairly well-controlled currently rated 3/10 scale.  Hemodynamically stable and afebrile.  Feels ready for discharge home.  Going on medications as below with follow-up as noted.      Discharge Follow Up Recommendations for outpatient labs/diagnostics:  The patient  has been cleared for discharge home today bowel services.  Going on medications as below with follow-up as noted.    --Follow-up PCP 1 week of discharge  -- Follow-up Dr. Garcia with general surgery on Tuesday 7/16    Day of Discharge     HPI:   Patient was seen today resting up in the chair no acute distress.  Awake and alert.  States he feels pretty good.  Rates abdominal incision/FRANTZ site pain 3/10 scale.  Tolerating diet with no nausea or vomiting.  States he has been educated on care and emptying of his FRANTZ drain.  Reports he had 1 years ago.  Feels ready for discharge home.    Review of Systems  Gen- No fevers, chills  CV- No chest pain, palpitations  Resp- No cough, dyspnea  GI-as above      Vital Signs:   Temp:  [98 °F (36.7 °C)-99.7 °F (37.6 °C)] 98.1 °F (36.7 °C)  Heart Rate:  [72-77] 77  Resp:  [18] 18  BP: (117-130)/(62-70) 117/67      Physical Exam:  Constitutional: No acute distress, awake, alert.  Resting up in the chair.  HENT: NCAT, mucous membranes moist  Respiratory: Clear to auscultation bilaterally, respiratory effort normal room air.  Cardiovascular: RRR  Gastrointestinal: Positive bowel sounds, soft, appropriately tender to operative. nondistended.  Right mid abdomen surgical incision c/d/i without surrounding erythema with Dermabond in place, dry and intact..  Right abdomen FRANTZ drain to bulb suction with serosanguineous output.  Chronic RUQ ileostomy -liquid brown stool in bag.   Musculoskeletal: No bilateral ankle edema.  Nunez spontaneously.  Psychiatric: Appropriate affect, cooperative with exam  Neurologic: Oriented x 3, moves all extremities spontaneously without focal deficits, speech clear.  Follows commands.  Skin: No rashes to exposed surfaces     Pertinent  and/or Most Recent Results     LAB RESULTS:      Lab 07/12/24  0625 07/11/24  0759 07/11/24  0314 07/10/24  0333 07/09/24  0355 07/08/24  1551 07/08/24  1027 07/08/24  0630 07/07/24  1419 07/07/24  1338   WBC 4.02  --  4.89 5.40 9.63  5.61 4.90  --    < >  --    HEMOGLOBIN 8.4* 8.8* 8.7* 8.8* 9.7* 10.7* 10.1*  --    < >  --    HEMATOCRIT 25.7* 26.7* 26.5* 27.3* 29.2* 31.8* 30.5*  --    < >  --    PLATELETS 62*  --  49* 44* 76* 75* 73*  --    < >  --    NEUTROS ABS 2.77  --  3.37 3.79 7.43*  --  4.04  --    < >  --    IMMATURE GRANS (ABS) 0.02  --  0.02 0.02 0.06*  --  0.03  --    < >  --    LYMPHS ABS 0.54*  --  0.71 0.70 0.96  --  0.57*  --    < >  --    MONOS ABS 0.48  --  0.64 0.74 1.14*  --  0.12  --    < >  --    EOS ABS 0.19  --  0.13 0.13 0.02  --  0.11  --    < >  --    MCV 89.5  --  91.4 92.5 90.4 90.3 90.5  --    < >  --    PROCALCITONIN  --   --  0.15  --   --   --   --   --   --   --    LACTATE  --   --   --   --   --   --   --   --   --  1.2   PROTIME  --   --   --   --   --   --  16.7* 15.4*  --  15.6*    < > = values in this interval not displayed.         Lab 07/12/24  0625 07/11/24  0314 07/10/24  0333 07/09/24  0355 07/08/24  1027 07/08/24  0539 07/07/24  1842 07/07/24  1338   SODIUM 135* 137 138 139 136 139   < >  --    POTASSIUM 3.8 3.9 4.1 4.5 4.3 4.3   < >  --    CHLORIDE 102 103 104 105 105 105   < >  --    CO2 26.0 25.0 28.0 27.0 23.0 25.0   < >  --    ANION GAP 7.0 9.0 6.0 7.0 8.0 9.0   < >  --    BUN 15 17 19 18 15 16   < >  --    CREATININE 0.75* 0.99 0.97 0.93 0.86 0.87   < >  --    EGFR 103.3 87.2 89.4 94.0 99.1 98.8   < >  --    GLUCOSE 138* 118* 122* 128* 189* 125*   < >  --    CALCIUM 7.5* 7.7* 7.7* 8.2* 8.2* 8.8   < >  --    MAGNESIUM 1.7 1.7 1.6 1.6  --  1.8  --  1.9   PHOSPHORUS  --   --   --   --   --  3.9  --   --    HEMOGLOBIN A1C  --   --   --   --   --   --   --  6.50*   TSH  --   --   --   --   --   --   --  6.630*    < > = values in this interval not displayed.         Lab 07/12/24  0625 07/11/24  0314 07/10/24  0333 07/09/24  0355 07/08/24  1027 07/08/24  0539   TOTAL PROTEIN 5.2* 5.5* 5.3* 5.6* 6.1 6.8   ALBUMIN 2.7* 2.6* 2.7* 2.9* 3.0* 3.4*   GLOBULIN  --  2.9 2.6 2.7 3.1 3.4   ALT (SGPT) 11 12 13 16  19 20   AST (SGOT) 21 22 27 34 37 38   BILIRUBIN 1.7* 2.3* 2.9* 2.7* 2.4* 2.6*   INDIRECT BILIRUBIN 1.1  --   --   --   --   --    BILIRUBIN DIRECT 0.6*  --   --   --   --   --    ALK PHOS 68 59 52 64 70 81         Lab 07/08/24  1027 07/08/24  0630 07/07/24  1338   PROTIME 16.7* 15.4* 15.6*   INR 1.33* 1.21* 1.22*             Lab 07/07/24  1604   ABO TYPING A   RH TYPING Negative   ANTIBODY SCREEN Negative         Brief Urine Lab Results  (Last result in the past 365 days)        Color   Clarity   Blood   Leuk Est   Nitrite   Protein   CREAT   Urine HCG        07/11/24 0833 Orange   Clear   Negative   Negative   Negative   Negative                 Microbiology Results (last 10 days)       Procedure Component Value - Date/Time    Blood Culture - Blood, Arm, Right [[id number]]  (Normal) Collected: 07/07/24 1338    Lab Status: Preliminary result Specimen: Blood from Arm, Right Updated: 07/11/24 1445     Blood Culture No growth at 4 days    Blood Culture - Blood, Arm, Left [[id number]]  (Normal) Collected: 07/07/24 1338    Lab Status: Preliminary result Specimen: Blood from Arm, Left Updated: 07/11/24 1445     Blood Culture No growth at 4 days            XR Chest 1 View    Result Date: 7/11/2024  XR CHEST 1 VW Date of Exam: 7/11/2024 8:58 AM EDT Indication: postop day 3 open cholecystectomy, fever, eval for atelectasis/PNA Comparison: None available. Findings: Heart and pulmonary vessels appear within normal limits. There are low lung volumes with poor inspiration. There is mild linear atelectasis of the right lower lobe medially. There are no effusions.     Impression: Mild right basilar atelectasis. Electronically Signed: Erika Fisher MD  7/11/2024 9:15 AM EDT  Workstation ID: [id number]    Peripheral Block    Result Date: 7/8/2024  Estelle Wilkerson CRNA     7/8/2024  8:08 AM Peripheral Block Pre-sedation assessment completed: 7/8/2024 7:40 AM Patient reassessed immediately prior to procedure Patient location  "during procedure: OR Start time: 7/8/2024 7:41 AM Stop time: 7/8/2024 7:44 AM Reason for block: at surgeon's request and post-op pain management Performed by Anesthesiologist: Jesus Curran MD Preanesthetic Checklist Completed: patient identified, IV checked, site marked, risks and benefits discussed, surgical consent, monitors and equipment checked, pre-op evaluation and timeout performed Prep: Pt Position: supine Sterile barriers:cap, gloves, mask and washed/disinfected hands Prep: ChloraPrep Patient monitoring: blood pressure monitoring, continuous pulse oximetry and EKG Procedure Sedation: yes Performed under: general Guidance:ultrasound guided Images:still images obtained, printed/placed on chart Laterality:Bilateral Block Type:TAP Injection Technique:single-shot Needle Type:short-bevel and echogenic Needle Gauge:20 G Resistance on Injection: none Medications Used: bupivacaine PF (MARCAINE) 0.25 % injection - Injection  60 mL - 7/8/2024 7:44:00 AM dexamethasone sodium phosphate injection - Injection  4 mg - 7/8/2024 7:44:00 AM Medications Comment:Block Injection:  LA dose divided between Right and Left block Post Assessment Injection Assessment: negative aspiration for heme, incremental injection and no paresthesia on injection Patient Tolerance:comfortable throughout block Complications:no Additional Notes Mid-Axillary/Lateral TAPs A high-frequency linear transducer, with sterile cover, was placed in the midaxillary line between the ASIS and costal margin. The External Oblique Muscle (EOM), Internal Oblique Muscle (IOM), Transverse Abdominus Muscle (BROWNE), and Peritoneum were identified. The insertion site was prepped in sterile fashion and then localized with 2-5 ml of 1% Lidocaine. Using ultrasound-guidance, a 20-gauge B-Arroyo 4\" Ultraplex 360 non-stimulating echogenic needle was advanced in plane, from medial to lateral, until the tip of the needle was in the fascial plane between the IOM and BROWNE. 1-3ml " of preservative free normal saline was used to hydro-dissect the fascial planes. After the fascial plane was verified, the local anesthetic (LA) was injected. The procedure was repeated on the opposite side for bilateral coverage. Aspiration every 5 ml to prevent intravascular injection. Injection was completed with negative aspiration of blood and negative intravascular injection. Injection pressures were normal with minimal resistance. Midaxillary TAPs should reach intercostal nerves T10- T11 and the subcostal nerve T12.  Performed by: Jesus Curran MD                  Plan for Follow-up of Pending Labs/Results:   Pending Labs       Order Current Status    Blood Culture - Blood, Arm, Left Preliminary result    Blood Culture - Blood, Arm, Right Preliminary result          Discharge Details        Discharge Medications        New Medications        Instructions Start Date   Blood Glucose Monitor System w/Device kit   Use Monitor as directed by provider As Needed to check Blood glucose.      oxyCODONE-acetaminophen 5-325 MG per tablet  Commonly known as: PERCOCET   1 tablet, Oral, Every 4 Hours PRN      pantoprazole 40 MG EC tablet  Commonly known as: Protonix   40 mg, Oral, Daily             Changes to Medications        Instructions Start Date   FeroSul 325 (65 Fe) MG tablet  Generic drug: ferrous sulfate  What changed: See the new instructions.   TAKE 1 TABLET BY MOUTH TWICE DAILY EVERY OTHER DAY WITH VITAMINC             Continue These Medications        Instructions Start Date   carvedilol 6.25 MG tablet  Commonly known as: COREG   6.25 mg, Oral, 2 Times Daily With Meals      cyanocobalamin 1000 MCG/ML injection   1,000 mcg, Every 28 Days      ezetimibe 10 MG tablet  Commonly known as: ZETIA   10 mg, Oral, Daily      fenofibrate 145 MG tablet  Commonly known as: TRICOR   145 mg, Oral, Daily      levothyroxine 25 MCG tablet  Commonly known as: SYNTHROID, LEVOTHROID   25 mcg, Oral, Daily      metFORMIN 1000 MG  tablet  Commonly known as: GLUCOPHAGE   1,000 mg, Oral, 2 Times Daily      RA Anti-Diarrheal 2 MG tablet  Generic drug: loperamide   Take 1 tablet by mouth Daily.      Vitamin C chewable tablet   1 dose, Oral, Every Other Day             ASK your doctor about these medications        Instructions Start Date   multivitamin with minerals tablet tablet   1 dose, Every Morning               No Known Allergies      Discharge Disposition:  Home or Self Care    Diet:  Hospital:  Diet Order   Procedures    Diet: Renal; Low Sodium (2-3g); Fluid Consistency: Thin (IDDSI 0)       Diet Instructions       Diet: Regular/House Diet, Cardiac Diets, Diabetic Diets; Healthy Heart (2-3 Na+); Regular (IDDSI 7); Thin (IDDSI 0); Consistent Carbohydrate      Discharge Diet:  Regular/House Diet  Cardiac Diets  Diabetic Diets       Cardiac Diet: Healthy Heart (2-3 Na+)    Texture: Regular (IDDSI 7)    Fluid Consistency: Thin (IDDSI 0)    Diabetic Diet: Consistent Carbohydrate             Activity:  Activity Instructions       Activity as Tolerated      Measure Blood Pressure              Restrictions or Other Recommendations:         CODE STATUS:    Code Status and Medical Interventions:   Ordered at: 07/07/24 1400     Level Of Support Discussed With:    Patient     Code Status (Patient has no pulse and is not breathing):    CPR (Attempt to Resuscitate)     Medical Interventions (Patient has pulse or is breathing):    Full Support       Future Appointments   Date Time Provider Department [place]   9/23/2024  9:30 AM [facility] 1 BH [facility] CT [facility]   9/27/2024  9:00 AM Ana Self APRN [facility]       Additional Instructions for the Follow-ups that You Need to Schedule       Discharge Follow-up with PCP   As directed       Currently Documented PCP:    Myke Mendoza MD    PCP Phone Number:    [phone]     Follow Up Details: Follow-up PCP 1 week of discharge (please schedule appointment prior to DC)         Discharge Follow-up with Specialty: Follow-up closely with  on Tuesday of next week 7/16.  (Please call and ensure appointment is arranged prior to discharge)   As directed      Specialty: Follow-up closely with  on Tuesday of next week 7/16.  (Please call and ensure appointment is arranged prior to discharge)                      Melody Summers, APRN  07/12/24      Time Spent on Discharge:  I spent 50 minutes on this discharge activity which included: face-to-face encounter with the patient, reviewing the data in the system, coordination of the care with the nursing staff as well as consultants, documentation, and entering orders.

## 2024-07-12 NOTE — PAYOR COMM NOTE
"[facility]# [id number]   7/12/24 Discharge Date  Discharge Summary    Utilization Review  Phone [phone]  Fax [phone]      [address] [place] [address]             Cristina La Jr. (58 y.o. Male)       Date of Birth   1965    Social Security Number       Address   [address] [facility] [address]    Home Phone   [phone]    MRN   [id number]       Anabaptism   Advent    Marital Status                               Admission Date   7/7/24    Admission Type   Elective    Admitting Provider   Reyna Meadows DO    Attending Provider   Reyna Meadows DO    Department, Room/Bed   [facility] 5H, S584/1       Discharge Date       Discharge Disposition   Home or Self Care    Discharge Destination                                 Attending Provider: Reyna Meadows DO    Allergies: No Known Allergies    Isolation: None   Infection: None   Code Status: CPR    Ht: 185.4 cm (73\")   Wt: 95.3 kg (210 lb)    Admission Cmt: None   Principal Problem: Acute cholecystitis [K81.0]                   Active Insurance as of 7/7/2024       Primary Coverage       Payor Plan Insurance Group Employer/Plan Group    Formerly [facility], [facility] [employer] CR[employer] EMPLOYEE [id number]       Payor Plan Address Payor Plan Phone Number Payor Plan Fax Number Effective Dates    [phone]  1/1/2022 - None Entered    [place] [id number]         Subscriber Name Subscriber Birth Date Member ID       CRISTINA LA JR. 1965 [id number]                     Emergency Contacts        (Rel.) Home Phone Work Phone Mobile Phone    Livier La (Spouse) [phone] -- [phone]              Current Facility-Administered Medications   Medication Dose Route Frequency Provider Last Rate Last Admin    acetaminophen (TYLENOL) tablet 650 mg  650 mg Oral Q4H PRN Konstantin Garcia MD   650 mg at 07/11/24 1228    Calcium Replacement - " Follow Nurse / BPA Driven Protocol   Does not apply PRN Konstantin Garcia MD        carvedilol (COREG) tablet 6.25 mg  6.25 mg Oral BID With Meals Konstantin Garcia MD   6.25 mg at 07/12/24 0821    dextrose (D50W) (25 g/50 mL) IV injection 25 g  25 g Intravenous Q15 Min PRN Konstantin Garcia MD        dextrose (GLUTOSE) oral gel 15 g  15 g Oral Q15 Min PRN Konstantin Garcia MD        diphenhydrAMINE (BENADRYL) injection 25 mg  25 mg Intravenous Q6H PRN Konstantin Garcia MD        [Held by provider] ferrous sulfate tablet 325 mg  325 mg Oral Every Other Day SRHADDHA Triana DO        glucagon (GLUCAGEN) injection 1 mg  1 mg Intramuscular Q15 Min PRN Konstantin Garcia MD        HYDROmorphone (DILAUDID) injection 0.2 mg  0.2 mg Intravenous Q2H PRN Konstantin Garcia MD        Insulin Lispro (humaLOG) injection 2-9 Units  2-9 Units Subcutaneous 4x Daily AC & at Bedtime SHRADDHA Triana DO   2 Units at 07/09/24 1952    levothyroxine (SYNTHROID, LEVOTHROID) tablet 25 mcg  25 mcg Oral Daily Konstantin Garcia MD   25 mcg at 07/12/24 0821    Magnesium Standard Dose Replacement - Follow Nurse / BPA Driven Protocol   Does not apply PRN Konstantin Garcia MD        naloxone (NARCAN) injection 0.1 mg  0.1 mg Intravenous Q5 Min PRN Konstantin Garcia MD        nitroglycerin (NITROSTAT) SL tablet 0.4 mg  0.4 mg Sublingual Q5 Min PRN Konstantin Garcia MD        ondansetron ODT (ZOFRAN-ODT) disintegrating tablet 4 mg  4 mg Oral Q6H PRN Konstantin Garcia MD        Or    ondansetron (ZOFRAN) injection 4 mg  4 mg Intravenous Q6H PRN Konstantin Garcia MD        oxyCODONE-acetaminophen (PERCOCET) 5-325 MG per tablet 1 tablet  1 tablet Oral Q4H PRN Jose, Konstantin V, MD   1 tablet at 07/11/24 1239    pantoprazole (PROTONIX) injection 40 mg  40 mg Intravenous Q AM Konstantin Garcia MD   40 mg at 07/12/24 0528    Phosphorus Replacement - Follow Nurse / BPA Driven Protocol   Does not apply Konstantin Griffin MD         "Potassium Replacement - Follow Nurse / BPA Driven Protocol   Does not apply PRN Konstantin Garcia MD        promethazine (PHENERGAN) tablet 12.5 mg  12.5 mg Oral Q6H PRN Konstantin Garcia MD        sodium chloride 0.9 % flush 10 mL  10 mL Intravenous PRN Konstantin Garcia MD        sodium chloride 0.9 % infusion 40 mL  40 mL Intravenous PRN Konstantin Garcia MD            Physician Progress Notes (last 72 hours)        Konstantin Garcia MD at 07/12/24 0703          Patient Name:  Hesham Arevalo Jr.  YOB: 1965  [id number]    Surgery Progress Note    Date of visit: 7/12/2024      Subjective: No acute events.  Overall feeling fairly well.  Abdominal pain has been much improved.  Tolerating a diet without difficulty.  Having excellent ileostomy output.  No further fevers over the last 24 hours          Objective:     /62 (BP Location: Left arm, Patient Position: Lying)   Pulse 72   Temp 99.3 °F (37.4 °C) (Oral)   Resp 18   Ht 185.4 cm (73\")   Wt 95.3 kg (210 lb)   SpO2 98%   BMI 27.71 kg/m²     Intake/Output Summary (Last 24 hours) at 7/12/2024 0703  Last data filed at 7/12/2024 0419  Gross per 24 hour   Intake 225 ml   Output 1760 ml   Net -1535 ml       GEN:   Awake, alert, in no acute distress, resting comfortably in bed   CV:      Regular rate and rhythm  L:         Symmetric expansion, not labored on room air  Abd:    Soft, not distended, appropriately tender palpation along the right upper quadrant incision, incision is well-approximated and partially covered by his stoma appliance but has no erythema, no tenderness palpation throughout the abdomen otherwise, ileostomy with stool in the bag, FRANTZ in the right upper quadrant is serosanguineous and not bilious  Ext:     No cyanosis, clubbing, or edema    Recent labs that are back at this time have been reviewed.           Assessment/ Plan:    Mr. Arevalo is a 58 year old gentleman with history of cirrhosis and rectal cancer s/p LAR " "with DLI who is admitted after undergoing open cholecystectomy on 24 for cholecystitis s/p percutaneous cholecystostomy     #Cholecystitis  -postop day 4  -Labs from today are still pending  -Feeling well.  Tolerating a diet.  Pain is controlled.  Having bowel function.  Drain output to 60 mL but is not bilious and serosanguineous  -No further fevers over the last 24 hours.  As long as labs are stable, I think he is okay for discharge today.  I will see him back shortly on  for drain evaluation and likely removal in the office.  He should continue with the FRANTZ drain at discharge and empty and record the output to twice daily      Konstantin Garcia MD  2024  07:03 EDT        Electronically signed by Konstantni Garcia MD at 24 0705       Sofie Stapleton PA-C at 24 1312       Attestation signed by Reyna Meadows DO at 24 1507    I have reviewed this documentation and agree.                      [facility] Medicine Services  PROGRESS NOTE    Patient Name: Hesham Arevalo Jr.  : 1965  MRN: [id number]    Date of Admission: 2024  Primary Care Physician: Myke Mendoza MD    Subjective     CC: f/u acute cholecystitis     HPI:  Sitting up on side of bed. Reports he slept fine but felt \"feverish\" last night. Temp 101.8 overnight. Notes post-surgical abdominal pain, worst at drain side. Overall, pain is manageable. Tolerating PO intake without nausea or vomiting. Ileostomy functioning well.     Objective     Vital Signs:   Temp:  [99.4 °F (37.4 °C)-101.8 °F (38.8 °C)] 99.4 °F (37.4 °C)  Heart Rate:  [80-86] 80  Resp:  [16-18] 18  BP: (113-127)/(60-65) 113/60     Physical Exam:  Constitutional: No acute distress, awake, alert and conversant  HENT: NCAT, mucous membranes moist  Respiratory: Clear to auscultation bilaterally, respiratory effort normal  Cardiovascular: RRR  Gastrointestinal: Positive bowel sounds, soft, appropriately " tender, nondistended. Surgical incision c/d/i without surrounding erythema, edema or induration. RUQ ileostomy - stool in bag.   Musculoskeletal: No bilateral ankle edema  Psychiatric: Appropriate affect, cooperative with exam  Neurologic: Oriented x 3, moves all extremities spontaneously without focal deficits, speech clear  Skin: No rashes to exposed surfaces     Results Reviewed:  LAB RESULTS:      Lab 07/11/24  0759 07/11/24  0314 07/10/24  0333 07/09/24  0355 07/08/24  1551 07/08/24  1027 07/08/24  0630 07/08/24  0539 07/07/24  1419 07/07/24  1338   WBC  --  4.89 5.40 9.63 5.61 4.90  --  4.01   < >  --    HEMOGLOBIN 8.8* 8.7* 8.8* 9.7* 10.7* 10.1*  --  11.7*   < >  --    HEMATOCRIT 26.7* 26.5* 27.3* 29.2* 31.8* 30.5*  --  35.4*   < >  --    PLATELETS  --  49* 44* 76* 75* 73*  --  73*   < >  --    NEUTROS ABS  --  3.37 3.79 7.43*  --  4.04  --  2.38   < >  --    IMMATURE GRANS (ABS)  --  0.02 0.02 0.06*  --  0.03  --  0.01   < >  --    LYMPHS ABS  --  0.71 0.70 0.96  --  0.57*  --  0.84   < >  --    MONOS ABS  --  0.64 0.74 1.14*  --  0.12  --  0.38   < >  --    EOS ABS  --  0.13 0.13 0.02  --  0.11  --  0.36   < >  --    MCV  --  91.4 92.5 90.4 90.3 90.5  --  89.8   < >  --    PROCALCITONIN  --  0.15  --   --   --   --   --   --   --   --    LACTATE  --   --   --   --   --   --   --   --   --  1.2   PROTIME  --   --   --   --   --  16.7* 15.4*  --   --  15.6*    < > = values in this interval not displayed.         Lab 07/11/24  0314 07/10/24  0333 07/09/24  0355 07/08/24  1027 07/08/24  0539 07/07/24  1842 07/07/24  1338   SODIUM 137 138 139 136 139   < >  --    POTASSIUM 3.9 4.1 4.5 4.3 4.3   < >  --    CHLORIDE 103 104 105 105 105   < >  --    CO2 25.0 28.0 27.0 23.0 25.0   < >  --    ANION GAP 9.0 6.0 7.0 8.0 9.0   < >  --    BUN 17 19 18 15 16   < >  --    CREATININE 0.99 0.97 0.93 0.86 0.87   < >  --    EGFR 87.2 89.4 94.0 99.1 98.8   < >  --    GLUCOSE 118* 122* 128* 189* 125*   < >  --    CALCIUM 7.7*  7.7* 8.2* 8.2* 8.8   < >  --    MAGNESIUM 1.7 1.6 1.6  --  1.8  --  1.9   PHOSPHORUS  --   --   --   --  3.9  --   --    HEMOGLOBIN A1C  --   --   --   --   --   --  6.50*   TSH  --   --   --   --   --   --  6.630*    < > = values in this interval not displayed.         Lab 07/11/24  0314 07/10/24  0333 07/09/24  0355 07/08/24  1027 07/08/24  0539   TOTAL PROTEIN 5.5* 5.3* 5.6* 6.1 6.8   ALBUMIN 2.6* 2.7* 2.9* 3.0* 3.4*   GLOBULIN 2.9 2.6 2.7 3.1 3.4   ALT (SGPT) 12 13 16 19 20   AST (SGOT) 22 27 34 37 38   BILIRUBIN 2.3* 2.9* 2.7* 2.4* 2.6*   ALK PHOS 59 52 64 70 81         Lab 07/08/24  1027 07/08/24  0630 07/07/24  1338   PROTIME 16.7* 15.4* 15.6*   INR 1.33* 1.21* 1.22*             Lab 07/07/24  1604   ABO TYPING A   RH TYPING Negative   ANTIBODY SCREEN Negative     Brief Urine Lab Results  (Last result in the past 365 days)        Color   Clarity   Blood   Leuk Est   Nitrite   Protein   CREAT   Urine HCG        07/11/24 0833 Orange   Clear   Negative   Negative   Negative   Negative                 Microbiology Results Abnormal       Procedure Component Value - Date/Time    Blood Culture - Blood, Arm, Right [[id number]]  (Normal) Collected: 07/07/24 1338    Lab Status: Preliminary result Specimen: Blood from Arm, Right Updated: 07/10/24 1445     Blood Culture No growth at 3 days    Blood Culture - Blood, Arm, Left [[id number]]  (Normal) Collected: 07/07/24 1338    Lab Status: Preliminary result Specimen: Blood from Arm, Left Updated: 07/10/24 1445     Blood Culture No growth at 3 days          XR Chest 1 View    Result Date: 7/11/2024  XR CHEST 1 VW Date of Exam: 7/11/2024 8:58 AM EDT Indication: postop day 3 open cholecystectomy, fever, eval for atelectasis/PNA Comparison: None available. Findings: Heart and pulmonary vessels appear within normal limits. There are low lung volumes with poor inspiration. There is mild linear atelectasis of the right lower lobe medially. There are no effusions.     Impression:  Impression: Mild right basilar atelectasis. Electronically Signed: Erika Fisher MD  7/11/2024 9:15 AM EDT  Workstation ID: [id number]     Current medications:  Scheduled Meds:carvedilol, 6.25 mg, Oral, BID With Meals  [Held by provider] ferrous sulfate, 325 mg, Oral, Every Other Day  insulin lispro, 2-9 Units, Subcutaneous, 4x Daily AC & at Bedtime  levothyroxine, 25 mcg, Oral, Daily  pantoprazole, 40 mg, Intravenous, Q AM      Continuous Infusions:   PRN Meds:.  acetaminophen **OR** acetaminophen **OR** acetaminophen    Calcium Replacement - Follow Nurse / BPA Driven Protocol    dextrose    dextrose    diphenhydrAMINE    glucagon (human recombinant)    HYDROmorphone    Magnesium Standard Dose Replacement - Follow Nurse / BPA Driven Protocol    naloxone    nitroglycerin    ondansetron ODT **OR** ondansetron    oxyCODONE-acetaminophen    Phosphorus Replacement - Follow Nurse / BPA Driven Protocol    Potassium Replacement - Follow Nurse / BPA Driven Protocol    promethazine    sodium chloride    sodium chloride    Assessment & Plan     Active Hospital Problems    Diagnosis  POA    **Acute cholecystitis [K81.0]  Yes    Cirrhosis of liver [K74.60]  Yes    Type 2 diabetes mellitus without complication, without long-term current use of insulin [E11.9]  Yes    Thrombocytopenia [D69.6]  Yes    Essential hypertension [I10]  Yes      Resolved Hospital Problems   No resolved problems to display.     Brief Hospital Course to date:  Hesham Arevalo Jr. is a 58 y.o. male with PMH significant for HTN, non-insulin dependent DMII, liver cirrhosis and chronic thrombocytopenia. Admitted to [facility] 5/12-5/16/24 for acute cholecystitis. Felt to be high-risk for CCY due to cirrhosis and RUQ ileostomy so management with cholecystostomy tube was attempted. Ultimately directly admitted to [facility] 7/7/24 for planned open cholecystectomy by Dr. Garcia on 7/8/24.     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 Garcia and  Hepatology, s/p open cholecystectomy with Dr. Garcia on 7/8/24  - Patient was previously on Coumadin but has been off since 11/2022 due to resolution of previous thrombosis   - s/p 1 unit platelets intraoperatively and again on 7/10 - platelets 49 today. Continue to monitor  - PCA discontinued. PRN pain control with PO Percocet / IV Dilaudid  - Encourage ambulation / IS / pulmonary toilet  - Wound care following to optimize pouching right upper quadrant stoma due to close proximity to surgical incision, in efforts to minimize contamination risk    Postoperative fever  - Temp 101.8 overnight  - CXR and UA reassuring. Procalcitonin normal at 0.15  - Monitor off of antibiotics for now  - AM CBC     HTN  HLD   - Continue home Carvedilol  - Not on statin. Lipids reviewed and appropriate      Non-insulin dependent DMII  - Holding home Metformin  - A1c 6.5% (improved from 8.3% on 5/13/24)  - SSI  - Glucometer ordered to be provided on discharge     Hypothyroidism   - TSH elevated at 6.63, reflex T4 normal at 0.8  - Due to acute illness, recommend repeat TSH and T4 in 4-6 weeks on an outpatient basis       Hx of rectal adenocarcinoma s/p resection and ileostomy April 2019  - Follows with Dr. Kelly and Dr. Moeller  - No evidence of recurrence per last note from Oncology 3/28/24, recent negative CT abd/pelvis as above   - CEA 1.69 from 3/2024   - He reports he has been told he is too high risk for ileostomy reversal    Expected Discharge Location and Transportation: home when afebrile x 24H  Expected Discharge Expected Discharge Date: 7/12/2024; Expected Discharge Time:      VTE Prophylaxis: Mechanical VTE prophylaxis orders are present.    AM-PAC 6 Clicks Score (PT): 18 (07/11/24 3921)    CODE STATUS:   Code Status and Medical Interventions:   Ordered at: 07/07/24 1400     Level Of  "Support Discussed With:    Patient     Code Status (Patient has no pulse and is not breathing):    CPR (Attempt to Resuscitate)     Medical Interventions (Patient has pulse or is breathing):    Full Support     Sofie Stapleton PA-C  07/11/24        Electronically signed by Reyna Meadows DO at 07/11/24 8859       Konstantin Garcia MD at 07/11/24 0640          Patient Name:  Hesham Arevalo Jr.  YOB: 1965  [id number]    Surgery Progress Note    Date of visit: 7/11/2024      Subjective: Febrile overnight to 101.8.  Otherwise feeling well.  Denies nausea or vomiting and tolerated a regular diet yesterday without difficulty.  Having good ileostomy output, 650 mL yesterday.  Voiding without difficulty.  Still has some incisional pain in the right upper quadrant but otherwise denies diffuse abdominal pain, distention, nausea, or vomiting.  Ambulated with assistance yesterday.  Received 1 platelet transfusion yesterday          Objective:     /65 (BP Location: Right arm, Patient Position: Lying)   Pulse 86   Temp 99.7 °F (37.6 °C) (Oral)   Resp 16   Ht 185.4 cm (73\")   Wt 95.3 kg (210 lb)   SpO2 96%   BMI 27.71 kg/m²     Intake/Output Summary (Last 24 hours) at 7/11/2024 0640  Last data filed at 7/11/2024 0300  Gross per 24 hour   Intake 1151 ml   Output 1380 ml   Net -229 ml       GEN:   Awake, alert, in no acute distress, resting comfortably in bed   CV:   Regular rate and rhythm  L:  Symmetric expansion, not labored on room air  Abd:  Soft, not distended, appropriately tender palpation along the right upper quadrant incision, incision is well-approximated and partially covered by his stoma appliance but has no erythema, no tenderness palpation throughout the abdomen otherwise, ileostomy with stool in the bag  Ext:  No cyanosis, clubbing, or edema    Recent labs that are back at this time have been reviewed.           Assessment/ Plan:    Mr. Arevalo is a 58 year old gentleman with " history of cirrhosis and rectal cancer s/p LAR with DLI who is admitted after undergoing open cholecystectomy on 24 for cholecystitis s/p percutaneous cholecystostomy     #Cholecystitis  -postop day 3  -Labs without significant change.  White blood cell count is 4.8.  Hemoglobin is stable at 8.7.  Continued thrombocytopenia at 49.  Liver function test stable  -Drain output 330 mL yesterday.  Remains serosanguineous and not bilious.  Likely a small amount of ascites component to this given that the output is more serous and decent volume  -Febrile to 101.8 overnight.  Otherwise feeling very well however and making progress.  I will initiate more of an infectious workup with a chest x-ray and UA.  I do not see a need for abdominal imaging yet at this point given that he is having normal bowel function and his drain output is appropriate and nonbilious.  He needs to be afebrile for 24 hours before discharge.   -Incentive spirometer. Mobilize          Konstantin Garcia MD  2024  06:40 EDT        Electronically signed by Konstantin Garcia MD at 24 0644       Reyna Meadows DO at 07/10/24 1601              [facility] Medicine Services  PROGRESS NOTE    Patient Name: Hesham Arevalo Jr.  : 1965  MRN: [id number]    Date of Admission: 2024  Primary Care Physician: Myke Mendoza MD    Subjective   Subjective     CC:  CCY     HPI:  No acute events. Feels ok. Pain is tolerable. Tolerating food. Encouraged ambulation.       Objective   Objective     Vital Signs:   Temp:  [98.7 °F (37.1 °C)-100.2 °F (37.9 °C)] 99.4 °F (37.4 °C)  Heart Rate:  [73-81] 80  Resp:  [16-20] 16  BP: (101-130)/(58-61) 109/58     Physical Exam:  Constitutional: No acute distress, awake, alert  Respiratory: Clear to auscultation bilaterally, respiratory effort normal   Cardiovascular: RRR, no murmurs, rubs, or gallops  Gastrointestinal: Positive bowel sounds, soft, mild tenderness; incision  with no erythema; ostomy   Musculoskeletal: No bilateral ankle edema  Neurologic: Oriented x 3, strength symmetric in all extremities, Cranial Nerves grossly intact to confrontation, speech clear      Results Reviewed:  LAB RESULTS:      Lab 07/10/24  0333 07/09/24  0355 07/08/24  1551 07/08/24  1027 07/08/24  0630 07/08/24  0539 07/07/24  1419 07/07/24  1419 07/07/24  1338   WBC 5.40 9.63 5.61 4.90  --  4.01   < > 2.83*  --    HEMOGLOBIN 8.8* 9.7* 10.7* 10.1*  --  11.7*   < > 11.8*  --    HEMATOCRIT 27.3* 29.2* 31.8* 30.5*  --  35.4*   < > 35.6*  --    PLATELETS 44* 76* 75* 73*  --  73*   < > 51*  --    NEUTROS ABS 3.79 7.43*  --  4.04  --  2.38  --  1.77  --    IMMATURE GRANS (ABS) 0.02 0.06*  --  0.03  --  0.01  --  0.01  --    LYMPHS ABS 0.70 0.96  --  0.57*  --  0.84  --  0.55*  --    MONOS ABS 0.74 1.14*  --  0.12  --  0.38  --  0.23  --    EOS ABS 0.13 0.02  --  0.11  --  0.36  --  0.23  --    MCV 92.5 90.4 90.3 90.5  --  89.8   < > 91.0  --    LACTATE  --   --   --   --   --   --   --   --  1.2   PROTIME  --   --   --  16.7* 15.4*  --   --   --  15.6*    < > = values in this interval not displayed.         Lab 07/10/24  0333 07/09/24  0355 07/08/24  1027 07/08/24  0539 07/07/24  1842 07/07/24  1338   SODIUM 138 139 136 139 138  --    POTASSIUM 4.1 4.5 4.3 4.3 4.2  --    CHLORIDE 104 105 105 105 104  --    CO2 28.0 27.0 23.0 25.0 23.0  --    ANION GAP 6.0 7.0 8.0 9.0 11.0  --    BUN 19 18 15 16 16  --    CREATININE 0.97 0.93 0.86 0.87 0.93  --    EGFR 89.4 94.0 99.1 98.8 94.0  --    GLUCOSE 122* 128* 189* 125* 229*  --    CALCIUM 7.7* 8.2* 8.2* 8.8 8.9  --    MAGNESIUM 1.6 1.6  --  1.8  --  1.9   PHOSPHORUS  --   --   --  3.9  --   --    HEMOGLOBIN A1C  --   --   --   --   --  6.50*   TSH  --   --   --   --   --  6.630*         Lab 07/10/24  0333 07/09/24  0355 07/08/24  1027 07/08/24  0539 07/07/24  1842   TOTAL PROTEIN 5.3* 5.6* 6.1 6.8 6.7   ALBUMIN 2.7* 2.9* 3.0* 3.4* 3.3*   GLOBULIN 2.6 2.7 3.1 3.4 3.4    ALT (SGPT) 13 16 19 20 22   AST (SGOT) 27 34 37 38 48*   BILIRUBIN 2.9* 2.7* 2.4* 2.6* 2.0*   ALK PHOS 52 64 70 81 76         Lab 07/08/24  1027 07/08/24  0630 07/07/24  1338   PROTIME 16.7* 15.4* 15.6*   INR 1.33* 1.21* 1.22*             Lab 07/07/24  1604   ABO TYPING A   RH TYPING Negative   ANTIBODY SCREEN Negative         Brief Urine Lab Results  (Last result in the past 365 days)        Color   Clarity   Blood   Leuk Est   Nitrite   Protein   CREAT   Urine HCG        05/12/24 2032 Dark Yellow   Clear   Negative   Trace   Positive   Trace                   Microbiology Results Abnormal       Procedure Component Value - Date/Time    Blood Culture - Blood, Arm, Right [[id number]]  (Normal) Collected: 07/07/24 1338    Lab Status: Preliminary result Specimen: Blood from Arm, Right Updated: 07/10/24 1445     Blood Culture No growth at 3 days    Blood Culture - Blood, Arm, Left [[id number]]  (Normal) Collected: 07/07/24 1338    Lab Status: Preliminary result Specimen: Blood from Arm, Left Updated: 07/10/24 1445     Blood Culture No growth at 3 days            No radiology results from the last 24 hrs        Current medications:  Scheduled Meds:carvedilol, 6.25 mg, Oral, BID With Meals  [Held by provider] ferrous sulfate, 325 mg, Oral, Every Other Day  insulin lispro, 2-9 Units, Subcutaneous, 4x Daily AC & at Bedtime  levothyroxine, 25 mcg, Oral, Daily  pantoprazole, 40 mg, Intravenous, Q AM      Continuous Infusions:   PRN Meds:.  acetaminophen **OR** acetaminophen **OR** acetaminophen    Calcium Replacement - Follow Nurse / BPA Driven Protocol    dextrose    dextrose    diphenhydrAMINE    glucagon (human recombinant)    HYDROmorphone    Magnesium Standard Dose Replacement - Follow Nurse / BPA Driven Protocol    naloxone    nitroglycerin    ondansetron ODT **OR** ondansetron    oxyCODONE-acetaminophen    Phosphorus Replacement - Follow Nurse / BPA Driven Protocol    Potassium Replacement - Follow Nurse / BPA Driven  Protocol    promethazine    sodium chloride    sodium chloride    Assessment & Plan   Assessment & Plan     Active Hospital Problems    Diagnosis  POA    **Acute cholecystitis [K81.0]  Yes    Cirrhosis of liver [K74.60]  Yes    Type 2 diabetes mellitus without complication, without long-term current use of insulin [E11.9]  Yes    Thrombocytopenia [D69.6]  Yes    Essential hypertension [I10]  Yes      Resolved Hospital Problems   No resolved problems to display.        Brief Hospital Course to date:  Hesham Arevalo Jr. is a 58 y.o. male  that presented as a direct admit with plans for cholecystectomy with Dr. Garcia on 7/8, after previously treated for cholecystitis with percutaneous cholecystostomy tube that failed to resolve the issue.  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 Garcia and  Hepatology, s/p open cholecystectomy with Dr. Garcia on 7/8   - Patient was previously on Coumadin but has been off since 11/2022 due to resolution of previous thrombosis   -Transfused 1 unit platelets intraoperatively and again 7/10  -PCA discontinued  -Advance to regular diet  -Mobilize as tolerated  -Wound care consult to optimize pouching right upper quadrant stoma due to close proximity to surgical incision, in efforts to minimize contamination risk     HTN  HLD   - Continue home Coreg  - Does not appear to be on statin at home.      T2DM   - A1C now 6.5 last A1C from 5/13 8.3   - Hold home metformin   - FSBS c SSI coverage. Stable.   -Glucometer ordered to be provided on discharge     Hypothyroidism   - TSH ordered 6.63, reflex t4 0.8  -Due to acute illness, will recommend outpatient TSH and T4       Hx of rectal adenocarcinoma s/p resection and ileostomy April  "2019  --Follows with Dr. Kelly and Dr. Moeller, no evidence of recurrence per last note from Oncology 3/28/24, recent negative CT abd/pelvis as above   -- CEA 1.69 from 3/2024   --He reports he has been told he is too high risk for ileostomy reversal     Expected Discharge Location and Transportation: home  Expected Discharge   Expected Discharge Date: 7/10/2024; Expected Discharge Time:      VTE Prophylaxis:  Mechanical VTE prophylaxis orders are present.         AM-PAC 6 Clicks Score (PT): 17 (07/10/24 0800)    CODE STATUS:   Code Status and Medical Interventions:   Ordered at: 07/07/24 1400     Level Of Support Discussed With:    Patient     Code Status (Patient has no pulse and is not breathing):    CPR (Attempt to Resuscitate)     Medical Interventions (Patient has pulse or is breathing):    Full Support       Reyna Meadows DO  07/10/24        Electronically signed by Reyna Meadows DO at 07/10/24 1603       Konstantin Garcia MD at 07/10/24 0646          Patient Name:  Hesham Arevalo Jr.  YOB: 1965  [id number]    Surgery Progress Note    Date of visit: 7/10/2024      Subjective: No acute events.  Tmax 100.2.  Tolerated regular diet yesterday without any nausea or vomiting.  Having ileostomy output.  FRANTZ drain with continued serosanguineous output, 205 mL yesterday.  Ambulated.  Pain controlled          Objective:     /61 (BP Location: Right arm, Patient Position: Lying)   Pulse 73   Temp 98.9 °F (37.2 °C) (Oral)   Resp 20   Ht 185.4 cm (73\")   Wt 95.3 kg (210 lb)   SpO2 98%   BMI 27.71 kg/m²     Intake/Output Summary (Last 24 hours) at 7/10/2024 0646  Last data filed at 7/10/2024 0434  Gross per 24 hour   Intake 485 ml   Output 1165 ml   Net -680 ml       GEN:   Awake, alert, in no acute distress, resting comfortably in bed   CV:   Regular rate and rhythm  L:  Symmetric expansion, not labored on room air  Abd:  Soft, not distended, appropriately tender to palpation throughout " the right upper quadrant, right upper quadrant incision is well-approximated, ileostomy in the right upper quadrant is pink and patent with stool in the bag, FRANTZ drain in the right upper quadrant is serosanguineous  Ext:  No cyanosis, clubbing, or edema    Recent labs that are back at this time have been reviewed.           Assessment/ Plan:    Mr. Arevalo is a 58 year old gentleman with history of cirrhosis and rectal cancer s/p LAR with DLI who is admitted after undergoing open cholecystectomy on 24 for cholecystitis s/p percutaneous cholecystostomy     #Cholecystitis  -postop day 2  -Vitals stable  -Liver function tests essentially stable with bilirubin of 2.9 from 2.7 yesterday.  Hemoglobin is down at 8.8 from 9.7 yesterday.  No tachycardia or hypotension.  Platelets are down at 44.  His decrease in H&H may likely be dilutional, however with his thrombocytopenia and continued serosanguineous drain output I will order for 1 unit of platelet transfusion today  -No need for antibiotics at this point  -Continue regular diet  -Monitor drain output  -Repeat labs tomorrow.  If labs are stable, may be possibly ready for discharge home tomorrow.         Konstantin Garcia MD  7/10/2024  06:46 EDT        Electronically signed by Konstantin Garcia MD at 07/10/24 0650       Consult Notes (last 72 hours)  Notes from 24 1609 through 24 1609   No notes of this type exist for this encounter.          Discharge Summary        Melody Summers APRN at 24 1249       Attestation signed by Reyna Meadows DO at 24 1531    I have reviewed this documentation and agree.                      [facility] Medicine Services  DISCHARGE SUMMARY    Patient Name: Hesham Arevalo Jr.  : 1965  MRN: [id number]    Date of Admission: 2024  9:48 AM  Date of Discharge:  2024  Primary Care Physician: Myke Mendoza MD    Consults       Date and Time Order Name Status  Description    7/7/2024  1:26 PM Inpatient General Surgery Consult Completed             Hospital Course     Presenting Problem: Planned cholecystectomy     Active Hospital Problems    Diagnosis  POA    **Acute cholecystitis [K81.0]  Yes    Cirrhosis of liver [K74.60]  Yes    Type 2 diabetes mellitus without complication, without long-term current use of insulin [E11.9]  Yes    Thrombocytopenia [D69.6]  Yes    Essential hypertension [I10]  Yes      Resolved Hospital Problems   No resolved problems to display.          Hospital Course:  Hesham Arevalo Jr. is a 58 y.o. male with PMH significant for HTN, non-insulin dependent DMII, liver cirrhosis and chronic thrombocytopenia. Admitted to [facility] 5/12-5/16/24 for acute cholecystitis. Felt to be high-risk for CCY due to cirrhosis and RUQ ileostomy so management with cholecystostomy tube was attempted. Ultimately directly admitted to [facility] 7/7/24 for planned open cholecystectomy by Dr. Garcia on 7/8/24.      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 Garcia and  Hepatology, s/p open cholecystectomy with Dr. Garcia on 7/8/24  - Patient was previously on Coumadin but has been off since 11/2022 due to resolution of previous thrombosis   - s/p 1 unit platelets intraoperatively and again on 7/10 - platelets 49 today. Continue to monitor  - PCA discontinued. PRN pain control with PO Percocet   - Encourage ambulation / IS / pulmonary toilet  - Wound care following to optimize pouching right upper quadrant stoma due to close proximity to surgical incision, in efforts to minimize contamination risk  --Incision dry and intact with Dermabond.  Pain well-controlled.  BP remains in place to right abdomen to bulb suction with serosanguineous output.  Will keep in place until follow-up with surgeon on Tuesday of next  week.  --Advised to call PCP or surgeon if temp greater than 100.5, or return to ER if other symptoms such as nausea vomiting, significant increased pain or fever     Postoperative fever  - Temp 101.8 overnight night before last.  - CXR and UA reassuring. Procalcitonin normal at 0.15  - Monitor off of antibiotics for now  - Surgery okay with 99.3-99.7 fevers overnight.  Improved.     HTN  HLD   - Continue home Carvedilol  - Not on statin. Lipids reviewed and appropriate      Non-insulin dependent DMII  - Holding home Metformin  - A1c 6.5% (improved from 8.3% on 5/13/24)  - SSI  - Glucometer ordered to be provided on discharge.  However patient declines meter at time of discharge and states he does not need supplies as well.     Hypothyroidism   - TSH elevated at 6.63, reflex T4 normal at 0.8  - Due to acute illness, recommend repeat TSH and T4 in 4-6 weeks on an outpatient basis       Hx of rectal adenocarcinoma s/p resection and ileostomy April 2019  - Follows with Dr. Kelly and Dr. Moeller  - No evidence of recurrence per last note from Oncology 3/28/24, recent negative CT abd/pelvis as above   - CEA 1.69 from 3/2024   - He reports he has been told he is too high risk for ileostomy reversal  --Manages his ostomy at baseline.  Not new.       Patient was seen resting up in the chair no acute distress.  States he feels pretty good today.  Pain is fairly well-controlled currently rated 3/10 scale.  Hemodynamically stable and afebrile.  Feels ready for discharge home.  Going on medications as below with follow-up as noted.      Discharge Follow Up Recommendations for outpatient labs/diagnostics:  The patient has been cleared for discharge home today bowel services.  Going on medications as below with follow-up as noted.    --Follow-up PCP 1 week of discharge  -- Follow-up Dr. Garcia with general surgery on Tuesday 7/16    Day of Discharge     HPI:   Patient was seen today resting up in the chair no acute distress.  Awake  and alert.  States he feels pretty good.  Rates abdominal incision/FRANTZ site pain 3/10 scale.  Tolerating diet with no nausea or vomiting.  States he has been educated on care and emptying of his FRANTZ drain.  Reports he had 1 years ago.  Feels ready for discharge home.    Review of Systems  Gen- No fevers, chills  CV- No chest pain, palpitations  Resp- No cough, dyspnea  GI-as above      Vital Signs:   Temp:  [98 °F (36.7 °C)-99.7 °F (37.6 °C)] 98.1 °F (36.7 °C)  Heart Rate:  [72-77] 77  Resp:  [18] 18  BP: (117-130)/(62-70) 117/67      Physical Exam:  Constitutional: No acute distress, awake, alert.  Resting up in the chair.  HENT: NCAT, mucous membranes moist  Respiratory: Clear to auscultation bilaterally, respiratory effort normal room air.  Cardiovascular: RRR  Gastrointestinal: Positive bowel sounds, soft, appropriately tender to operative. nondistended.  Right mid abdomen surgical incision c/d/i without surrounding erythema with Dermabond in place, dry and intact..  Right abdomen FRANTZ drain to bulb suction with serosanguineous output.  Chronic RUQ ileostomy -liquid brown stool in bag.   Musculoskeletal: No bilateral ankle edema.  Nunez spontaneously.  Psychiatric: Appropriate affect, cooperative with exam  Neurologic: Oriented x 3, moves all extremities spontaneously without focal deficits, speech clear.  Follows commands.  Skin: No rashes to exposed surfaces     Pertinent  and/or Most Recent Results     LAB RESULTS:      Lab 07/12/24  0625 07/11/24  0759 07/11/24  0314 07/10/24  0333 07/09/24  0355 07/08/24  1551 07/08/24  1027 07/08/24  0630 07/07/24  1419 07/07/24  1338   WBC 4.02  --  4.89 5.40 9.63 5.61 4.90  --    < >  --    HEMOGLOBIN 8.4* 8.8* 8.7* 8.8* 9.7* 10.7* 10.1*  --    < >  --    HEMATOCRIT 25.7* 26.7* 26.5* 27.3* 29.2* 31.8* 30.5*  --    < >  --    PLATELETS 62*  --  49* 44* 76* 75* 73*  --    < >  --    NEUTROS ABS 2.77  --  3.37 3.79 7.43*  --  4.04  --    < >  --    IMMATURE GRANS (ABS) 0.02  --   0.02 0.02 0.06*  --  0.03  --    < >  --    LYMPHS ABS 0.54*  --  0.71 0.70 0.96  --  0.57*  --    < >  --    MONOS ABS 0.48  --  0.64 0.74 1.14*  --  0.12  --    < >  --    EOS ABS 0.19  --  0.13 0.13 0.02  --  0.11  --    < >  --    MCV 89.5  --  91.4 92.5 90.4 90.3 90.5  --    < >  --    PROCALCITONIN  --   --  0.15  --   --   --   --   --   --   --    LACTATE  --   --   --   --   --   --   --   --   --  1.2   PROTIME  --   --   --   --   --   --  16.7* 15.4*  --  15.6*    < > = values in this interval not displayed.         Lab 07/12/24  0625 07/11/24  0314 07/10/24  0333 07/09/24  0355 07/08/24  1027 07/08/24  0539 07/07/24  1842 07/07/24  1338   SODIUM 135* 137 138 139 136 139   < >  --    POTASSIUM 3.8 3.9 4.1 4.5 4.3 4.3   < >  --    CHLORIDE 102 103 104 105 105 105   < >  --    CO2 26.0 25.0 28.0 27.0 23.0 25.0   < >  --    ANION GAP 7.0 9.0 6.0 7.0 8.0 9.0   < >  --    BUN 15 17 19 18 15 16   < >  --    CREATININE 0.75* 0.99 0.97 0.93 0.86 0.87   < >  --    EGFR 103.3 87.2 89.4 94.0 99.1 98.8   < >  --    GLUCOSE 138* 118* 122* 128* 189* 125*   < >  --    CALCIUM 7.5* 7.7* 7.7* 8.2* 8.2* 8.8   < >  --    MAGNESIUM 1.7 1.7 1.6 1.6  --  1.8  --  1.9   PHOSPHORUS  --   --   --   --   --  3.9  --   --    HEMOGLOBIN A1C  --   --   --   --   --   --   --  6.50*   TSH  --   --   --   --   --   --   --  6.630*    < > = values in this interval not displayed.         Lab 07/12/24  0625 07/11/24  0314 07/10/24  0333 07/09/24  0355 07/08/24  1027 07/08/24  0539   TOTAL PROTEIN 5.2* 5.5* 5.3* 5.6* 6.1 6.8   ALBUMIN 2.7* 2.6* 2.7* 2.9* 3.0* 3.4*   GLOBULIN  --  2.9 2.6 2.7 3.1 3.4   ALT (SGPT) 11 12 13 16 19 20   AST (SGOT) 21 22 27 34 37 38   BILIRUBIN 1.7* 2.3* 2.9* 2.7* 2.4* 2.6*   INDIRECT BILIRUBIN 1.1  --   --   --   --   --    BILIRUBIN DIRECT 0.6*  --   --   --   --   --    ALK PHOS 68 59 52 64 70 81         Lab 07/08/24  1027 07/08/24  0630 07/07/24  1338   PROTIME 16.7* 15.4* 15.6*   INR 1.33* 1.21* 1.22*              Lab 07/07/24  1604   ABO TYPING A   RH TYPING Negative   ANTIBODY SCREEN Negative         Brief Urine Lab Results  (Last result in the past 365 days)        Color   Clarity   Blood   Leuk Est   Nitrite   Protein   CREAT   Urine HCG        07/11/24 0833 Orange   Clear   Negative   Negative   Negative   Negative                 Microbiology Results (last 10 days)       Procedure Component Value - Date/Time    Blood Culture - Blood, Arm, Right [[id number]]  (Normal) Collected: 07/07/24 1338    Lab Status: Preliminary result Specimen: Blood from Arm, Right Updated: 07/11/24 1445     Blood Culture No growth at 4 days    Blood Culture - Blood, Arm, Left [[id number]]  (Normal) Collected: 07/07/24 1338    Lab Status: Preliminary result Specimen: Blood from Arm, Left Updated: 07/11/24 1445     Blood Culture No growth at 4 days            XR Chest 1 View    Result Date: 7/11/2024  XR CHEST 1 VW Date of Exam: 7/11/2024 8:58 AM EDT Indication: postop day 3 open cholecystectomy, fever, eval for atelectasis/PNA Comparison: None available. Findings: Heart and pulmonary vessels appear within normal limits. There are low lung volumes with poor inspiration. There is mild linear atelectasis of the right lower lobe medially. There are no effusions.     Impression: Mild right basilar atelectasis. Electronically Signed: Erika Fisher MD  7/11/2024 9:15 AM EDT  Workstation ID: [id number]    Peripheral Block    Result Date: 7/8/2024  Estelle Wilkerson CRNA     7/8/2024  8:08 AM Peripheral Block Pre-sedation assessment completed: 7/8/2024 7:40 AM Patient reassessed immediately prior to procedure Patient location during procedure: OR Start time: 7/8/2024 7:41 AM Stop time: 7/8/2024 7:44 AM Reason for block: at surgeon's request and post-op pain management Performed by Anesthesiologist: Jesus Curran MD Preanesthetic Checklist Completed: patient identified, IV checked, site marked, risks and benefits discussed, surgical consent,  "monitors and equipment checked, pre-op evaluation and timeout performed Prep: Pt Position: supine Sterile barriers:cap, gloves, mask and washed/disinfected hands Prep: ChloraPrep Patient monitoring: blood pressure monitoring, continuous pulse oximetry and EKG Procedure Sedation: yes Performed under: general Guidance:ultrasound guided Images:still images obtained, printed/placed on chart Laterality:Bilateral Block Type:TAP Injection Technique:single-shot Needle Type:short-bevel and echogenic Needle Gauge:20 G Resistance on Injection: none Medications Used: bupivacaine PF (MARCAINE) 0.25 % injection - Injection  60 mL - 7/8/2024 7:44:00 AM dexamethasone sodium phosphate injection - Injection  4 mg - 7/8/2024 7:44:00 AM Medications Comment:Block Injection:  LA dose divided between Right and Left block Post Assessment Injection Assessment: negative aspiration for heme, incremental injection and no paresthesia on injection Patient Tolerance:comfortable throughout block Complications:no Additional Notes Mid-Axillary/Lateral TAPs A high-frequency linear transducer, with sterile cover, was placed in the midaxillary line between the ASIS and costal margin. The External Oblique Muscle (EOM), Internal Oblique Muscle (IOM), Transverse Abdominus Muscle (BROWNE), and Peritoneum were identified. The insertion site was prepped in sterile fashion and then localized with 2-5 ml of 1% Lidocaine. Using ultrasound-guidance, a 20-gauge B-Arroyo 4\" Ultraplex 360 non-stimulating echogenic needle was advanced in plane, from medial to lateral, until the tip of the needle was in the fascial plane between the IOM and BROWNE. 1-3ml of preservative free normal saline was used to hydro-dissect the fascial planes. After the fascial plane was verified, the local anesthetic (LA) was injected. The procedure was repeated on the opposite side for bilateral coverage. Aspiration every 5 ml to prevent intravascular injection. Injection was completed with " negative aspiration of blood and negative intravascular injection. Injection pressures were normal with minimal resistance. Midaxillary TAPs should reach intercostal nerves T10- T11 and the subcostal nerve T12.  Performed by: Jesus Curran MD                  Plan for Follow-up of Pending Labs/Results:   Pending Labs       Order Current Status    Blood Culture - Blood, Arm, Left Preliminary result    Blood Culture - Blood, Arm, Right Preliminary result          Discharge Details        Discharge Medications        New Medications        Instructions Start Date   Blood Glucose Monitor System w/Device kit   Use Monitor as directed by provider As Needed to check Blood glucose.      oxyCODONE-acetaminophen 5-325 MG per tablet  Commonly known as: PERCOCET   1 tablet, Oral, Every 4 Hours PRN      pantoprazole 40 MG EC tablet  Commonly known as: Protonix   40 mg, Oral, Daily             Changes to Medications        Instructions Start Date   FeroSul 325 (65 Fe) MG tablet  Generic drug: ferrous sulfate  What changed: See the new instructions.   TAKE 1 TABLET BY MOUTH TWICE DAILY EVERY OTHER DAY WITH VITAMINC             Continue These Medications        Instructions Start Date   carvedilol 6.25 MG tablet  Commonly known as: COREG   6.25 mg, Oral, 2 Times Daily With Meals      cyanocobalamin 1000 MCG/ML injection   1,000 mcg, Every 28 Days      ezetimibe 10 MG tablet  Commonly known as: ZETIA   10 mg, Oral, Daily      fenofibrate 145 MG tablet  Commonly known as: TRICOR   145 mg, Oral, Daily      levothyroxine 25 MCG tablet  Commonly known as: SYNTHROID, LEVOTHROID   25 mcg, Oral, Daily      metFORMIN 1000 MG tablet  Commonly known as: GLUCOPHAGE   1,000 mg, Oral, 2 Times Daily      RA Anti-Diarrheal 2 MG tablet  Generic drug: loperamide   Take 1 tablet by mouth Daily.      Vitamin C chewable tablet   1 dose, Oral, Every Other Day             ASK your doctor about these medications        Instructions Start Date    multivitamin with minerals tablet tablet   1 dose, Every Morning               No Known Allergies      Discharge Disposition:  Home or Self Care    Diet:  Hospital:  Diet Order   Procedures    Diet: Renal; Low Sodium (2-3g); Fluid Consistency: Thin (IDDSI 0)       Diet Instructions       Diet: Regular/House Diet, Cardiac Diets, Diabetic Diets; Healthy Heart (2-3 Na+); Regular (IDDSI 7); Thin (IDDSI 0); Consistent Carbohydrate      Discharge Diet:  Regular/House Diet  Cardiac Diets  Diabetic Diets       Cardiac Diet: Healthy Heart (2-3 Na+)    Texture: Regular (IDDSI 7)    Fluid Consistency: Thin (IDDSI 0)    Diabetic Diet: Consistent Carbohydrate             Activity:  Activity Instructions       Activity as Tolerated      Measure Blood Pressure              Restrictions or Other Recommendations:         CODE STATUS:    Code Status and Medical Interventions:   Ordered at: 07/07/24 1400     Level Of Support Discussed With:    Patient     Code Status (Patient has no pulse and is not breathing):    CPR (Attempt to Resuscitate)     Medical Interventions (Patient has pulse or is breathing):    Full Support       Future Appointments   Date Time Provider Department Center   9/23/2024  9:30 AM JACK[facility] CT 1 BH [facility] CT SO [facility]   9/27/2024  9:00 AM Ana Self APRN [facility]       Additional Instructions for the Follow-ups that You Need to Schedule       Discharge Follow-up with PCP   As directed       Currently Documented PCP:    Myke Mendoza MD    PCP Phone Number:    [phone]     Follow Up Details: Follow-up PCP 1 week of discharge (please schedule appointment prior to DC)        Discharge Follow-up with Specialty: Follow-up closely with  on Tuesday of next week 7/16.  (Please call and ensure appointment is arranged prior to discharge)   As directed      Specialty: Follow-up closely with  on Tuesday of next week 7/16.  (Please call and ensure appointment is arranged prior  to discharge)                      Melody Walters Melina, APRN  07/12/24      Time Spent on Discharge:  I spent 50 minutes on this discharge activity which included: face-to-face encounter with the patient, reviewing the data in the system, coordination of the care with the nursing staff as well as consultants, documentation, and entering orders.            Electronically signed by Darinel Kowalski DO at 07/12/24 1531       Discharge Order (From admission, onward)       Start     Ordered    07/12/24 1241  Discharge patient  Once        Expected Discharge Date: 07/12/24   Discharge Disposition: Home or Self Care   Physician of Record for Attribution - Please select from Treatment Team: DARINEL KOWALSKI [[id number]]   Review needed by CMO to determine Physician of Record: No      Question Answer Comment   Physician of Record for Attribution - Please select from Treatment Team DARINEL KOWALSKI    Review needed by CMO to determine Physician of Record No        07/12/24 1247

## 2024-07-13 NOTE — OUTREACH NOTE
Prep Survey      Flowsheet Row Responses   [facility] facility patient discharged from? [facility]   Is LACE score < 7 ? No   Eligibility Readm Mgmt   Discharge diagnosis Acute cholecystitis, OPEN CHOLECYSTECTOMY   Does the patient have one of the following disease processes/diagnoses(primary or secondary)? General Surgery   Does the patient have Home health ordered? No   Is there a DME ordered? No   Prep survey completed? Yes            Meena GODWIN - Registered Nurse

## 2024-07-17 ENCOUNTER — READMISSION MANAGEMENT (OUTPATIENT)
Dept: CALL CENTER | Facility: HOSPITAL | Age: 59
End: 2024-07-17
Payer: COMMERCIAL

## 2024-07-17 NOTE — OUTREACH NOTE
General Surgery Week 1 Survey      Flowsheet Row Responses   [facility] patient discharged from? [place]   Does the patient have one of the following disease processes/diagnoses(primary or secondary)? General Surgery   Week 1 attempt successful? Yes   Call start time 1508   Call end time 1510   Discharge diagnosis Acute cholecystitis, OPEN CHOLECYSTECTOMY   Meds reviewed with patient/caregiver? Yes   Is the patient having any side effects they believe may be caused by any medication additions or changes? No   Does the patient have all medications related to this admission filled (includes all antibiotics, pain medications, etc.) Yes   Is the patient taking all medications as directed (includes completed medication regime)? Yes   Does the patient have a follow up appointment scheduled with their surgeon? Yes   Has the patient kept scheduled appointments due by today? N/A   Comments 7/30/24   Did the patient receive a copy of their discharge instructions? Yes   Nursing interventions Reviewed instructions with patient   What is the patient's perception of their health status since discharge? Improving   Is the patient /caregiver able to teach back basic post-op care? Lifting as instructed by MD in discharge instructions, No tub bath, swimming, or hot tub until instructed by MD, Drive as instructed by MD in discharge instructions, Take showers only when approved by MD-sponge bathe until then   Is the patient/caregiver able to teach back signs and symptoms of incisional infection? Fever, Pus or odor from incision, Incisional warmth, Increased drainage or bleeding, Increased redness, swelling or pain at the incisonal site   Is the patient/caregiver able to teach back steps to recovery at home? Set small, achievable goals for return to baseline health, Eat a well-balance diet   Is the patient/caregiver able to teach back the hierarchy of who to call/visit for symptoms/problems? PCP, Specialist, Home health nurse,  Urgent Care, ED, 911 Yes   Week 1 call completed? Yes   Graduated Yes   Wrap up additional comments Pt reports that he is doing better. Encouraged pt to check BG levels and write down for his Dr.   Call end time 1510            TIAGO MOHAMUD - Registered Nurse

## 2024-09-23 ENCOUNTER — LAB (OUTPATIENT)
Dept: LAB | Facility: HOSPITAL | Age: 59
End: 2024-09-23
Payer: COMMERCIAL

## 2024-09-23 ENCOUNTER — HOSPITAL ENCOUNTER (OUTPATIENT)
Dept: CT IMAGING | Facility: HOSPITAL | Age: 59
Discharge: HOME | End: 2024-09-23
Payer: COMMERCIAL

## 2024-09-23 DIAGNOSIS — C20 RECTAL CARCINOMA: ICD-10-CM

## 2024-09-23 LAB
ALBUMIN SERPL-MCNC: 3.6 G/DL (ref 3.5–5.2)
ALBUMIN/GLOB SERPL: 1 G/DL
ALP SERPL-CCNC: 85 U/L (ref 39–117)
ALT SERPL W P-5'-P-CCNC: 16 U/L (ref 1–41)
ANION GAP SERPL CALCULATED.3IONS-SCNC: 7 MMOL/L (ref 5–15)
AST SERPL-CCNC: 36 U/L (ref 1–40)
BASOPHILS # BLD AUTO: 0.04 10*3/MM3 (ref 0–0.2)
BASOPHILS NFR BLD AUTO: 1.5 % (ref 0–1.5)
BILIRUB SERPL-MCNC: 1.6 MG/DL (ref 0–1.2)
BUN SERPL-MCNC: 15 MG/DL (ref 8–23)
BUN/CREAT SERPL: 14.4 (ref 7–25)
CALCIUM SPEC-SCNC: 9.2 MG/DL (ref 8.6–10.5)
CEA SERPL-MCNC: 1.88 NG/ML
CHLORIDE SERPL-SCNC: 105 MMOL/L (ref 98–107)
CO2 SERPL-SCNC: 26 MMOL/L (ref 22–29)
CREAT SERPL-MCNC: 1.04 MG/DL (ref 0.76–1.27)
DEPRECATED RDW RBC AUTO: 44.9 FL (ref 37–54)
EGFRCR SERPLBLD CKD-EPI 2021: 82.2 ML/MIN/1.73
EOSINOPHIL # BLD AUTO: 0.24 10*3/MM3 (ref 0–0.4)
EOSINOPHIL NFR BLD AUTO: 8.8 % (ref 0.3–6.2)
ERYTHROCYTE [DISTWIDTH] IN BLOOD BY AUTOMATED COUNT: 14.4 % (ref 12.3–15.4)
GLOBULIN UR ELPH-MCNC: 3.6 GM/DL
GLUCOSE SERPL-MCNC: 270 MG/DL (ref 65–99)
HCT VFR BLD AUTO: 33.7 % (ref 37.5–51)
HGB BLD-MCNC: 10.9 G/DL (ref 13–17.7)
IMM GRANULOCYTES # BLD AUTO: 0.01 10*3/MM3 (ref 0–0.05)
IMM GRANULOCYTES NFR BLD AUTO: 0.4 % (ref 0–0.5)
LYMPHOCYTES # BLD AUTO: 0.49 10*3/MM3 (ref 0.7–3.1)
LYMPHOCYTES NFR BLD AUTO: 18 % (ref 19.6–45.3)
MCH RBC QN AUTO: 27.9 PG (ref 26.6–33)
MCHC RBC AUTO-ENTMCNC: 32.3 G/DL (ref 31.5–35.7)
MCV RBC AUTO: 86.2 FL (ref 79–97)
MONOCYTES # BLD AUTO: 0.28 10*3/MM3 (ref 0.1–0.9)
MONOCYTES NFR BLD AUTO: 10.3 % (ref 5–12)
NEUTROPHILS NFR BLD AUTO: 1.66 10*3/MM3 (ref 1.7–7)
NEUTROPHILS NFR BLD AUTO: 61 % (ref 42.7–76)
NRBC BLD AUTO-RTO: 0 /100 WBC (ref 0–0.2)
PLATELET # BLD AUTO: 51 10*3/MM3 (ref 140–450)
PMV BLD AUTO: 10.9 FL (ref 6–12)
POTASSIUM SERPL-SCNC: 4.5 MMOL/L (ref 3.5–5.2)
PROT SERPL-MCNC: 7.2 G/DL (ref 6–8.5)
RBC # BLD AUTO: 3.91 10*6/MM3 (ref 4.14–5.8)
SODIUM SERPL-SCNC: 138 MMOL/L (ref 136–145)
WBC NRBC COR # BLD AUTO: 2.72 10*3/MM3 (ref 3.4–10.8)

## 2024-09-23 PROCEDURE — 74177 CT ABD & PELVIS W/CONTRAST: CPT

## 2024-09-23 PROCEDURE — 80053 COMPREHEN METABOLIC PANEL: CPT

## 2024-09-23 PROCEDURE — 82378 CARCINOEMBRYONIC ANTIGEN: CPT

## 2024-09-23 PROCEDURE — 25510000001 IOPAMIDOL 61 % SOLUTION: Performed by: INTERNAL MEDICINE

## 2024-09-23 PROCEDURE — 36415 COLL VENOUS BLD VENIPUNCTURE: CPT

## 2024-09-23 PROCEDURE — 71260 CT THORAX DX C+: CPT

## 2024-09-23 PROCEDURE — 85025 COMPLETE CBC W/AUTO DIFF WBC: CPT

## 2024-09-23 RX ORDER — IOPAMIDOL 612 MG/ML
100 INJECTION, SOLUTION INTRAVASCULAR
Status: COMPLETED | OUTPATIENT
Start: 2024-09-23 | End: 2024-09-23

## 2024-09-23 RX ADMIN — IOPAMIDOL 90 ML: 612 INJECTION, SOLUTION INTRAVENOUS at 10:00

## 2024-09-27 ENCOUNTER — OFFICE VISIT (OUTPATIENT)
Dept: ONCOLOGY | Facility: CLINIC | Age: 59
End: 2024-09-27
Payer: COMMERCIAL

## 2024-09-27 VITALS
OXYGEN SATURATION: 99 % | WEIGHT: 198 LBS | TEMPERATURE: 98 F | HEIGHT: 73 IN | DIASTOLIC BLOOD PRESSURE: 69 MMHG | BODY MASS INDEX: 26.24 KG/M2 | RESPIRATION RATE: 20 BRPM | SYSTOLIC BLOOD PRESSURE: 147 MMHG | HEART RATE: 62 BPM

## 2024-09-27 DIAGNOSIS — Z85.048 HISTORY OF RECTAL CANCER: Primary | ICD-10-CM

## 2024-09-27 PROBLEM — C20 RECTAL CARCINOMA: Status: RESOLVED | Noted: 2018-12-18 | Resolved: 2024-09-27

## 2024-09-27 PROBLEM — C20 RECTAL CANCER: Status: RESOLVED | Noted: 2019-04-23 | Resolved: 2024-09-27

## 2024-11-11 ENCOUNTER — TELEPHONE (OUTPATIENT)
Dept: FAMILY MEDICINE CLINIC | Facility: CLINIC | Age: 59
End: 2024-11-11

## 2024-11-11 NOTE — TELEPHONE ENCOUNTER
Caller: Hesham Arevalo Jr.    Relationship to patient: Self    Best call back number: [phone]    Chief complaint: ESTABLISH CARE MEDS     Type of visit: NEW PATIENT     Requested date: AFTER NOVEMBER      If rescheduling, when is the original appointment: 11/21/2024      Additional notes:PATIENT IS NEEDING TO RESCHEDUL NEW PATIENT APPT THERE ISNT ANYTHING AVAILABLE.

## 2024-11-13 ENCOUNTER — TELEPHONE (OUTPATIENT)
Dept: FAMILY MEDICINE CLINIC | Facility: CLINIC | Age: 59
End: 2024-11-13
Payer: COMMERCIAL

## 2024-11-13 NOTE — TELEPHONE ENCOUNTER
HUB TO RELAY       RECEIVED MESSAGE THAT PT NEEDED TO BE RESCHEDULED. DR GOLDSTEIN NEXT AVAILABLE WAS 12/31/24 @10:30.I WENT AHEAD AND SCHEDULED HIM THERE BECAUSE DR DENIS BOOKS UP FAST BUT IF THIS DOES NOT WORK, PLEASE SEND THROUGH TO THE OFFICE TO GET RESCHEDULED.

## 2024-11-16 NOTE — PROGRESS NOTES
"    [facility] Medicine Services  PROGRESS NOTE    Patient Name: Hesham Arevalo Jr.  : 1965  MRN: [id number]    Date of Admission: 2024  Primary Care Physician: Myke Mendoza MD    Subjective     CC: f/u acute cholecystitis     HPI:  Sitting up on side of bed. Reports he slept fine but felt \"feverish\" last night. Temp 101.8 overnight. Notes post-surgical abdominal pain, worst at drain side. Overall, pain is manageable. Tolerating PO intake without nausea or vomiting. Ileostomy functioning well.     Objective     Vital Signs:   Temp:  [99.4 °F (37.4 °C)-101.8 °F (38.8 °C)] 99.4 °F (37.4 °C)  Heart Rate:  [80-86] 80  Resp:  [16-18] 18  BP: (113-127)/(60-65) 113/60     Physical Exam:  Constitutional: No acute distress, awake, alert and conversant  HENT: NCAT, mucous membranes moist  Respiratory: Clear to auscultation bilaterally, respiratory effort normal  Cardiovascular: RRR  Gastrointestinal: Positive bowel sounds, soft, appropriately tender, nondistended. Surgical incision c/d/i without surrounding erythema, edema or induration. RUQ ileostomy - stool in bag.   Musculoskeletal: No bilateral ankle edema  Psychiatric: Appropriate affect, cooperative with exam  Neurologic: Oriented x 3, moves all extremities spontaneously without focal deficits, speech clear  Skin: No rashes to exposed surfaces     Results Reviewed:  LAB RESULTS:      Lab 24  0759 24  0314 07/10/24  0333 24  0355 24  1551 24  1027 24  0630 24  0539 24  1419 24  1338   WBC  --  4.89 5.40 9.63 5.61 4.90  --  4.01   < >  --    HEMOGLOBIN 8.8* 8.7* 8.8* 9.7* 10.7* 10.1*  --  11.7*   < >  --    HEMATOCRIT 26.7* 26.5* 27.3* 29.2* 31.8* 30.5*  --  35.4*   < >  --    PLATELETS  --  49* 44* 76* 75* 73*  --  73*   < >  --    NEUTROS ABS  --  3.37 3.79 7.43*  --  4.04  --  2.38   < >  --    IMMATURE GRANS (ABS)  --  0.02 0.02 0.06*  --  0.03  --  0.01   < >  --  "   LYMPHS ABS  --  0.71 0.70 0.96  --  0.57*  --  0.84   < >  --    MONOS ABS  --  0.64 0.74 1.14*  --  0.12  --  0.38   < >  --    EOS ABS  --  0.13 0.13 0.02  --  0.11  --  0.36   < >  --    MCV  --  91.4 92.5 90.4 90.3 90.5  --  89.8   < >  --    PROCALCITONIN  --  0.15  --   --   --   --   --   --   --   --    LACTATE  --   --   --   --   --   --   --   --   --  1.2   PROTIME  --   --   --   --   --  16.7* 15.4*  --   --  15.6*    < > = values in this interval not displayed.         Lab 07/11/24  0314 07/10/24  0333 07/09/24  0355 07/08/24  1027 07/08/24  0539 07/07/24  1842 07/07/24  1338   SODIUM 137 138 139 136 139   < >  --    POTASSIUM 3.9 4.1 4.5 4.3 4.3   < >  --    CHLORIDE 103 104 105 105 105   < >  --    CO2 25.0 28.0 27.0 23.0 25.0   < >  --    ANION GAP 9.0 6.0 7.0 8.0 9.0   < >  --    BUN 17 19 18 15 16   < >  --    CREATININE 0.99 0.97 0.93 0.86 0.87   < >  --    EGFR 87.2 89.4 94.0 99.1 98.8   < >  --    GLUCOSE 118* 122* 128* 189* 125*   < >  --    CALCIUM 7.7* 7.7* 8.2* 8.2* 8.8   < >  --    MAGNESIUM 1.7 1.6 1.6  --  1.8  --  1.9   PHOSPHORUS  --   --   --   --  3.9  --   --    HEMOGLOBIN A1C  --   --   --   --   --   --  6.50*   TSH  --   --   --   --   --   --  6.630*    < > = values in this interval not displayed.         Lab 07/11/24  0314 07/10/24  0333 07/09/24  0355 07/08/24  1027 07/08/24  0539   TOTAL PROTEIN 5.5* 5.3* 5.6* 6.1 6.8   ALBUMIN 2.6* 2.7* 2.9* 3.0* 3.4*   GLOBULIN 2.9 2.6 2.7 3.1 3.4   ALT (SGPT) 12 13 16 19 20   AST (SGOT) 22 27 34 37 38   BILIRUBIN 2.3* 2.9* 2.7* 2.4* 2.6*   ALK PHOS 59 52 64 70 81         Lab 07/08/24  1027 07/08/24  0630 07/07/24  1338   PROTIME 16.7* 15.4* 15.6*   INR 1.33* 1.21* 1.22*             Lab 07/07/24  1604   ABO TYPING A   RH TYPING Negative   ANTIBODY SCREEN Negative     Brief Urine Lab Results  (Last result in the past 365 days)        Color   Clarity   Blood   Leuk Est   Nitrite   Protein   CREAT   Urine HCG        07/11/24 0833 Orange    Clear   Negative   Negative   Negative   Negative                 Microbiology Results Abnormal       Procedure Component Value - Date/Time    Blood Culture - Blood, Arm, Right [[id number]]  (Normal) Collected: 07/07/24 1338    Lab Status: Preliminary result Specimen: Blood from Arm, Right Updated: 07/10/24 1445     Blood Culture No growth at 3 days    Blood Culture - Blood, Arm, Left [[id number]]  (Normal) Collected: 07/07/24 1338    Lab Status: Preliminary result Specimen: Blood from Arm, Left Updated: 07/10/24 1445     Blood Culture No growth at 3 days          XR Chest 1 View    Result Date: 7/11/2024  XR CHEST 1 VW Date of Exam: 7/11/2024 8:58 AM EDT Indication: postop day 3 open cholecystectomy, fever, eval for atelectasis/PNA Comparison: None available. Findings: Heart and pulmonary vessels appear within normal limits. There are low lung volumes with poor inspiration. There is mild linear atelectasis of the right lower lobe medially. There are no effusions.     Impression: Impression: Mild right basilar atelectasis. Electronically Signed: Erika Fisher MD  7/11/2024 9:15 AM EDT  Workstation ID: [id number]     Current medications:  Scheduled Meds:carvedilol, 6.25 mg, Oral, BID With Meals  [Held by provider] ferrous sulfate, 325 mg, Oral, Every Other Day  insulin lispro, 2-9 Units, Subcutaneous, 4x Daily AC & at Bedtime  levothyroxine, 25 mcg, Oral, Daily  pantoprazole, 40 mg, Intravenous, Q AM      Continuous Infusions:   PRN Meds:.  acetaminophen **OR** acetaminophen **OR** acetaminophen    Calcium Replacement - Follow Nurse / BPA Driven Protocol    dextrose    dextrose    diphenhydrAMINE    glucagon (human recombinant)    HYDROmorphone    Magnesium Standard Dose Replacement - Follow Nurse / BPA Driven Protocol    naloxone    nitroglycerin    ondansetron ODT **OR** ondansetron    oxyCODONE-acetaminophen    Phosphorus Replacement - Follow Nurse / BPA Driven Protocol    Potassium Replacement - Follow Nurse / BPA  Driven Protocol    promethazine    sodium chloride    sodium chloride    Assessment & Plan     Active Hospital Problems    Diagnosis  POA    **Acute cholecystitis [K81.0]  Yes    Cirrhosis of liver [K74.60]  Yes    Type 2 diabetes mellitus without complication, without long-term current use of insulin [E11.9]  Yes    Thrombocytopenia [D69.6]  Yes    Essential hypertension [I10]  Yes      Resolved Hospital Problems   No resolved problems to display.     Brief Hospital Course to date:  Hesham Arevalo Jr. is a 58 y.o. male with PMH significant for HTN, non-insulin dependent DMII, liver cirrhosis and chronic thrombocytopenia. Admitted to [facility] 5/12-5/16/24 for acute cholecystitis. Felt to be high-risk for CCY due to cirrhosis and RUQ ileostomy so management with cholecystostomy tube was attempted. Ultimately directly admitted to [facility] 7/7/24 for planned open cholecystectomy by Dr. Garcia on 7/8/24.     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 Garcia and  Hepatology, s/p open cholecystectomy with Dr. Garcia on 7/8/24  - Patient was previously on Coumadin but has been off since 11/2022 due to resolution of previous thrombosis   - s/p 1 unit platelets intraoperatively and again on 7/10 - platelets 49 today. Continue to monitor  - PCA discontinued. PRN pain control with PO Percocet / IV Dilaudid  - Encourage ambulation / IS / pulmonary toilet  - Wound care following to optimize pouching right upper quadrant stoma due to close proximity to surgical incision, in efforts to minimize contamination risk    Postoperative fever  - Temp 101.8 overnight  - CXR and UA reassuring. Procalcitonin normal at 0.15  - Monitor off of antibiotics for now  - AM CBC     HTN  HLD   - Continue home Carvedilol  - Not on statin. Lipids reviewed and appropriate       Non-insulin dependent DMII  - Holding home Metformin  - A1c 6.5% (improved from 8.3% on 5/13/24)  - SSI  - Glucometer ordered to be provided on discharge     Hypothyroidism   - TSH elevated at 6.63, reflex T4 normal at 0.8  - Due to acute illness, recommend repeat TSH and T4 in 4-6 weeks on an outpatient basis       Hx of rectal adenocarcinoma s/p resection and ileostomy April 2019  - Follows with Dr. Kelly and Dr. Moeller  - No evidence of recurrence per last note from Oncology 3/28/24, recent negative CT abd/pelvis as above   - CEA 1.69 from 3/2024   - He reports he has been told he is too high risk for ileostomy reversal    Expected Discharge Location and Transportation: home when afebrile x 24H  Expected Discharge Expected Discharge Date: 7/12/2024; Expected Discharge Time:      VTE Prophylaxis: Mechanical VTE prophylaxis orders are present.    AM-PAC 6 Clicks Score (PT): 18 (07/11/24 0752)    CODE STATUS:   Code Status and Medical Interventions:   Ordered at: 07/07/24 1400     Level Of Support Discussed With:    Patient     Code Status (Patient has no pulse and is not breathing):    CPR (Attempt to Resuscitate)     Medical Interventions (Patient has pulse or is breathing):    Full Support     Sofie Stapleton PA-C  07/11/24       show

## 2024-11-18 RX ORDER — CYANOCOBALAMIN 1000 UG/ML
INJECTION, SOLUTION INTRAMUSCULAR; SUBCUTANEOUS
Qty: 3 ML | Refills: 0 | OUTPATIENT
Start: 2024-11-18

## 2024-11-18 RX ORDER — FENOFIBRATE 145 MG/1
145 TABLET, COATED ORAL DAILY
Qty: 270 TABLET | Refills: 0 | OUTPATIENT
Start: 2024-11-18

## 2024-11-18 RX ORDER — LISINOPRIL 10 MG/1
10 TABLET ORAL DAILY
Qty: 180 TABLET | Refills: 0 | OUTPATIENT
Start: 2024-11-18

## 2025-01-14 ENCOUNTER — OFFICE VISIT (OUTPATIENT)
Dept: FAMILY MEDICINE CLINIC | Facility: CLINIC | Age: 60
End: 2025-01-14
Payer: COMMERCIAL

## 2025-01-14 VITALS
SYSTOLIC BLOOD PRESSURE: 140 MMHG | HEIGHT: 73 IN | WEIGHT: 188.6 LBS | DIASTOLIC BLOOD PRESSURE: 80 MMHG | OXYGEN SATURATION: 100 % | HEART RATE: 83 BPM | BODY MASS INDEX: 25 KG/M2

## 2025-01-14 DIAGNOSIS — I10 PRIMARY HYPERTENSION: ICD-10-CM

## 2025-01-14 DIAGNOSIS — E06.3 HYPOTHYROIDISM DUE TO HASHIMOTO THYROIDITIS: ICD-10-CM

## 2025-01-14 DIAGNOSIS — R73.03 PREDIABETES: ICD-10-CM

## 2025-01-14 DIAGNOSIS — E78.5 HYPERLIPIDEMIA, UNSPECIFIED HYPERLIPIDEMIA TYPE: ICD-10-CM

## 2025-01-14 DIAGNOSIS — K74.69 OTHER CIRRHOSIS OF LIVER: ICD-10-CM

## 2025-01-14 DIAGNOSIS — Z00.00 ANNUAL PHYSICAL EXAM: Primary | ICD-10-CM

## 2025-01-14 DIAGNOSIS — D69.6 THROMBOCYTOPENIA: ICD-10-CM

## 2025-01-14 DIAGNOSIS — E53.8 VITAMIN B 12 DEFICIENCY: ICD-10-CM

## 2025-01-14 DIAGNOSIS — D64.9 ANEMIA, UNSPECIFIED TYPE: ICD-10-CM

## 2025-01-14 LAB
EXPIRATION DATE: NORMAL
Lab: NORMAL
POC MICROALBUMIN URINE: 20

## 2025-01-14 PROCEDURE — 99396 PREV VISIT EST AGE 40-64: CPT | Performed by: FAMILY MEDICINE

## 2025-01-14 PROCEDURE — 82044 UR ALBUMIN SEMIQUANTITATIVE: CPT | Performed by: FAMILY MEDICINE

## 2025-01-14 RX ORDER — LISINOPRIL 10 MG/1
10 TABLET ORAL DAILY
Qty: 90 TABLET | Refills: 1 | Status: SHIPPED | OUTPATIENT
Start: 2025-01-14

## 2025-01-14 RX ORDER — LISINOPRIL 10 MG/1
10 TABLET ORAL DAILY
COMMUNITY
End: 2025-01-14 | Stop reason: SDUPTHER

## 2025-01-14 RX ORDER — CYANOCOBALAMIN 1000 UG/ML
1000 INJECTION, SOLUTION INTRAMUSCULAR; SUBCUTANEOUS ONCE
Qty: 1 ML | Refills: 5 | Status: SHIPPED | OUTPATIENT
Start: 2025-01-14 | End: 2025-01-14

## 2025-01-14 NOTE — PROGRESS NOTES
Male Physical Note      Patient Name: Hesham Arevalo Jr.  : 1965   MRN: [id number]     Chief Complaint:    Chief Complaint   Patient presents with    Annual Exam    Med Refill       History of Present Illness: Hesham Arevalo Jr. is a 59 y.o. male who is here today for their annual health maintenance and physical.  He is here to establish care as he was formally seen by Dr. Myke Mendoza and also needs physical and blood work.    History of Present Illness  The patient is a 59-year-old male who presents today as a new patient to establish care.    He has been diagnosed with prediabetes but has never had significantly elevated blood glucose levels, even during hospitalizations or surgeries. He was provided with a home glucose monitoring kit but has not utilized it. He has expressed interest in exploring the potential benefits of elderberry juice for his condition. He has consumed breakfast today. He has been on a regimen of metformin 1000 mg once daily for several years under the care of Dr. Parker.    He has a history of hypothyroidism due to Hashimoto's thyroiditis. He is currently on levothyroxine 25 mcg.    He has a history of hypertension. He is currently on lisinopril 10 mg and carvedilol 6.25 mg twice daily. He has run out of his blood pressure medication and requires a refill.    He has a history of anemia. He is currently receiving monthly B12 injections at his local pharmacy. He is also taking iron supplements.    He has a history of hyperlipidemia. He is currently on fenofibrate and ezetimibe 10 mg.    He has a history of rectal cancer, diagnosed in 2018 and treated with surgery in 2019. He underwent chemotherapy and radiation therapy as part of his treatment. He has been undergoing PET scans every 3 months until he was declared cancer-free. He is due for another colonoscopy and is under the care of Dr. Pollo Lebron and Dr. Pau Page.    He has a history of  and  cirrhosis, which are not related to alcohol consumption.    He has a history of pancreatitis and gallstone pancreatitis.    He has a history of cholecystectomy performed in July 2024. He has an ileostomy in place, which is not expected to be reversible.    He has a history of weight loss, having lost 40 pounds over the past 5 to 6 years. He reports no issues with reflux. He reports no ear, nose, or throat problems. He reports no back pain. He reports no chest pain, palpitations, heaviness, or tightness. He reports no syncope during exercise or neck or jaw pain with exertion. He reports no gastrointestinal symptoms such as nausea, vomiting, diarrhea, hematemesis, melena, or hematochezia. He reports no genitourinary symptoms such as dysuria, hematuria, or any penile or testicular abnormalities. He is not currently on oxycodone or Percocet. He is not currently on pantoprazole. He is taking Imodium daily.    SOCIAL HISTORY  He does not report alcohol intake.  He is  and has a son who is a  age 29 still lives at home    FAMILY HISTORY  His grandmother had polyps. His mother had polyps and breast cancer. His father had bladder cancer . Both of his sisters have type 2 diabetes.    MEDICATIONS  Current: Metformin, Imodium, ezetimibe, fenofibrate, lisinopril, carvedilol, iron, B12 shots, levothyroxine.  Discontinued: Oxycodone, Percocet, pantoprazole.  Review of Systems   Constitutional: Negative for fatigue and fever.   HENT: Negative for ear pain and sore throat.    Eyes: Negative for visual disturbance.   Respiratory: Negative for cough, chest tightness and shortness of breath.    Cardiovascular: Negative for chest pain and palpitations.   Gastrointestinal: Negative for abdominal pain, blood in stool, melena, constipation, diarrhea, nausea and vomiting.   Endocrine: Negative for cold intolerance and heat intolerance.   Genitourinary: Negative for dysuria and hematuria.   Musculoskeletal: Negative for back  pain and joint swelling.   Skin: Negative for rash and wound.   Allergic/Immunologic: Negative for environmental allergies and food allergies.       Subjective      Review of Systems:   Review of Systems    Past Medical History, Social History, Family History and Care Team were all reviewed with patient and updated as appropriate.     Medications:     Current Outpatient Medications:     Accu-Chek Softclix Lancets lancets, Use as instructed to test blood glucose twice daily, Disp: 100 each, Rfl: 1    Blood Glucose Monitoring Suppl (FreeStyle Lite) w/Device kit, Use Monitor as directed by provider As Needed to check Blood glucose., Disp: 1 kit, Rfl: 0    carvedilol (COREG) 6.25 MG tablet, Take 1 tablet by mouth 2 (Two) Times a Day With Meals., Disp: 60 tablet, Rfl: 0    cyanocobalamin 1000 MCG/ML injection, Inject 1 mL into the appropriate muscle as directed by prescriber 1 (One) Time for 1 dose. monthly, Disp: 1 mL, Rfl: 5    ezetimibe (ZETIA) 10 MG tablet, Take 1 tablet by mouth Daily., Disp: , Rfl:     fenofibrate (TRICOR) 145 MG tablet, Take 1 tablet by mouth Daily., Disp: , Rfl:     FeroSul 325 (65 Fe) MG tablet, TAKE 1 TABLET BY MOUTH TWICE DAILY EVERY OTHER DAY WITH VITAMINC (Patient taking differently: Take 1 tablet by mouth Every Other Day.), Disp: 30 tablet, Rfl: 11    glucose blood test strip, Use as instructed to test blood glucose twice daily, Disp: 100 each, Rfl: 1    levothyroxine (SYNTHROID, LEVOTHROID) 25 MCG tablet, Take 1 tablet by mouth Daily., Disp: , Rfl:     lisinopril (PRINIVIL,ZESTRIL) 10 MG tablet, Take 1 tablet by mouth Daily., Disp: 90 tablet, Rfl: 1    metFORMIN (GLUCOPHAGE) 1000 MG tablet, Take 1 tablet by mouth 4 (Four) Times a Day., Disp: , Rfl:     RA ANTI-DIARRHEAL 2 MG tablet, Take 1 tablet by mouth Daily., Disp: , Rfl: 0    Allergies:   No Known Allergies    I  CT for Smoker (Age 55-75, 30pk yr): N/A      Depression: PHQ-2 Depression Screening  PHQ-9 Total Score:         Intimate  "partner violence: (Screen on initial visit, older adult with injury or evidence of neglect):  Violence can be a problem in many people's lives, so I now ask every patient about trauma or abuse they may have experienced in a relationship.  Stress/Safety - Do you feel safe in your relationship?  Afraid/Abused - Have you ever been in a relationship where you were threatened, hurt, or afraid?  Friend/Family - Are your friends aware you have been hurt?  Emergency Plan - Do you have a safe place to go and the resources you need in an emergency?    Osteoporosis:   Men: history of low trauma fracture, androgen deprivation therapy for prostate cancer, hypogonadism, primary hyperparathyroidism, intestinal disorders.     Objective     Physical Exam:  Vital Signs:   Vitals:    01/14/25 1428   BP: 140/80   BP Location: Right arm   Patient Position: Sitting   Cuff Size: Adult   Pulse: 83   SpO2: 100%   Weight: 85.5 kg (188 lb 9.6 oz)   Height: 185.4 cm (73\")   PainSc: 0-No pain     Body mass index is 24.88 kg/m².        Physical Exam  Vitals and nursing note reviewed.   Constitutional:       General: He is not in acute distress.     Appearance: Normal appearance.   HENT:      Head: Normocephalic and atraumatic.      Right Ear: Tympanic membrane, ear canal and external ear normal.      Left Ear: Tympanic membrane, ear canal and external ear normal.      Nose: Nose normal.      Mouth/Throat:      Mouth: Mucous membranes are moist.      Pharynx: Oropharynx is clear.   Eyes:      Extraocular Movements: Extraocular movements intact.      Conjunctiva/sclera: Conjunctivae normal.      Pupils: Pupils are equal, round, and reactive to light.   Neck:      Thyroid: No thyroid mass or thyroid tenderness.   Cardiovascular:      Rate and Rhythm: Normal rate and regular rhythm.      Heart sounds: Normal heart sounds.      Comments: RADIAL PULSES NML  Pulmonary:      Effort: Pulmonary effort is normal.      Breath sounds: Normal breath sounds. "   Abdominal:      General: Abdomen is flat. Bowel sounds are normal.      Palpations: Abdomen is soft. There is no mass.      Tenderness: There is no abdominal tenderness. There is no guarding or rebound.      Comments: Ileostomy present in the right mid abdomen   Genitourinary:     Comments: He deferred  rectal exam today  Musculoskeletal:      Cervical back: Normal range of motion and neck supple.      Right lower leg: No edema.      Left lower leg: No edema.   Lymphadenopathy:      Cervical: No cervical adenopathy.   Skin:     General: Skin is warm and dry.      Findings: No rash.   Neurological:      General: No focal deficit present.      Mental Status: He is alert and oriented to person, place, and time.      Sensory: Sensation is intact.      Motor: Motor function is intact.      Deep Tendon Reflexes: Reflexes normal.      Comments: SYMMETRIC PATELLAR REFLEXES  Cranial nerves 2 through 12 intact   Psychiatric:         Attention and Perception: Attention normal.         Mood and Affect: Mood normal.         Behavior: Behavior normal.         Thought Content: Thought content normal.         Judgment: Judgment normal.         Procedures    Assessment / Plan      Assessment/Plan:   Diagnoses and all orders for this visit:    1. Annual physical exam (Primary)  -     CBC Auto Differential; Future  -     Comprehensive Metabolic Panel; Future  -     Iron Profile; Future  -     Folate; Future  -     Ferritin; Future  -     Vitamin B12; Future  -     Hemoglobin A1c; Future  -     Lipid Panel; Future  -     Lipid Panel  -     Hemoglobin A1c  -     Vitamin B12  -     Ferritin  -     Folate  -     Iron Profile  -     Comprehensive Metabolic Panel  -     CBC Auto Differential    2. Thrombocytopenia  -     CBC Auto Differential; Future  -     CBC Auto Differential    3. Prediabetes  -     Comprehensive Metabolic Panel; Future  -     Hemoglobin A1c; Future  -     Hemoglobin A1c  -     Comprehensive Metabolic Panel  -      POCT microalbumin    4. Other cirrhosis of liver    5. Anemia, unspecified type  -     CBC Auto Differential; Future  -     Iron Profile; Future  -     Folate; Future  -     Ferritin; Future  -     Vitamin B12; Future  -     Vitamin B12  -     Ferritin  -     Folate  -     Iron Profile  -     CBC Auto Differential    6. Hyperlipidemia, unspecified hyperlipidemia type  -     Lipid Panel; Future  -     Lipid Panel    7. Hypothyroidism due to Hashimoto thyroiditis  -     TSH; Future  -     TSH    8. Vitamin B 12 deficiency  -     cyanocobalamin 1000 MCG/ML injection; Inject 1 mL into the appropriate muscle as directed by prescriber 1 (One) Time for 1 dose. monthly  Dispense: 1 mL; Refill: 5    9. Primary hypertension  -     lisinopril (PRINIVIL,ZESTRIL) 10 MG tablet; Take 1 tablet by mouth Daily.  Dispense: 90 tablet; Refill: 1    Annual physical completed as well as regular office visit.    Assessment & Plan  1. Prediabetes.  His blood glucose level was recorded at 270 on 09/23/2024, indicating a potential progression towards diabetes. A comprehensive blood work will be conducted today, including CBC, chemistry, iron studies, A1c, B12, and folate levels. A urine sample will also be collected for analysis for microalbumin. If the blood work confirms diabetes, appropriate medication will be initiated.    2. Hypertension.  He has run out of his blood pressure medication. A prescription for lisinopril 10 mg will be sent to [facility].    3. Anemia.  He will continue with his monthly B12 injections. Blood work will include B12 and folate levels to monitor his anemia.    4. Hyperlipidemia.  He is currently taking fenofibrate and ezetimibe 10 mg. Blood work will include lipid panel to monitor his cholesterol levels.    5. Hypothyroidism.  He is currently taking levothyroxine 25 mcg. A TSH test will be included in the blood work to monitor his thyroid function.    6. Rectal cancer.  He had surgery in 2019 and has been  cancer-free. He mentioned the need for a follow-up colonoscopy. He will be advised to schedule this with his gastroenterologist, Dr. Pollo Lebron.    7. Cirrhosis.  He is currently taking fenofibrate, which is contraindicated in patients with cirrhosis. This will be reviewed, and alternative medications will be considered based on the results of the blood work.    8. Cholecystectomy.  He had his gallbladder removed in July 2024 and has an ileostomy that is not reversible. No immediate action is required, but his condition will be monitored.    9. Pancreatitis.  He has a history of gallstone pancreatitis. No immediate action is required, but his condition will be monitored.    10. Health maintenance.  A comprehensive blood work will be conducted today, including CBC, chemistry, iron studies, A1c, B12, folate, and lipid panel. A urine sample will also be collected for analysis.    Follow-up on blood work within a week    Follow-up checkup in a month and then we will expand to every 6 months follow-ups if appropriate  Follow-up  The patient is scheduled for a follow-up visit in 1 month.    PROCEDURE  The patient underwent cholecystectomy in July 2024.  And hospital records note that ileostomy reversal would be too risky    BMI is within normal parameters. No other follow-up for BMI required.    Microalbumin is 20 today    Follow Up:   Return in about 1 month (around 2/14/2025) for Recheck.    Healthcare Maintenance:   Hesham Arevalo Jr. voices understanding and acceptance of this advice and will call back with any further questions or concerns. AVS with preventive healthcare tips printed for patient.         Patient or patient representative verbalized consent for the use of Ambient Listening during the visit with  Martell Young MD for chart documentation. 1/14/2025  15:24 EST    Martell Young MD  [facility]  Portions of note created with Dragon voice recognition technology

## 2025-01-14 NOTE — PATIENT INSTRUCTIONS
Health Maintenance, Male  A healthy lifestyle and preventive care is important for your health and wellness. Ask your health care provider about what schedule of regular examinations is right for you.  What should I know about weight and diet?    Eat a Healthy Diet  Eat plenty of vegetables, fruits, whole grains, low-fat dairy products, and lean protein.  Do not eat a lot of foods high in solid fats, added sugars, or salt.     Maintain a Healthy Weight  Regular exercise can help you achieve or maintain a healthy weight. You should:  Do at least 150 minutes of exercise each week. The exercise should increase your heart rate and make you sweat (moderate-intensity exercise).  Do strength-training exercises at least twice a week.     Watch Your Levels of Cholesterol and Blood Lipids  Have your blood tested for lipids and cholesterol every 5 years starting at 35 years of age. If you are at high risk for heart disease, you should start having your blood tested when you are 20 years old. You may need to have your cholesterol levels checked more often if:  Your lipid or cholesterol levels are high.  You are older than 50 years of age.  You are at high risk for heart disease.     What should I know about cancer screening?  Many types of cancers can be detected early and may often be prevented.  Lung Cancer  You should be screened every year for lung cancer if:  You are a current smoker who has smoked for at least 30 years.  You are a former smoker who has quit within the past 15 years.  Talk to your health care provider about your screening options, when you should start screening, and how often you should be screened.     Colorectal Cancer  Routine colorectal cancer screening usually begins at 50 years of age and should be repeated every 5-10 years until you are 75 years old. You may need to be screened more often if early forms of precancerous polyps or small growths are found. Your health care provider may recommend  screening at an earlier age if you have risk factors for colon cancer.  Your health care provider may recommend using home test kits to check for hidden blood in the stool.  A small camera at the end of a tube can be used to examine your colon (sigmoidoscopy or colonoscopy). This checks for the earliest forms of colorectal cancer.     Prostate and Testicular Cancer  Depending on your age and overall health, your health care provider may do certain tests to screen for prostate and testicular cancer.  Talk to your health care provider about any symptoms or concerns you have about testicular or prostate cancer.     Skin Cancer  Check your skin from head to toe regularly.  Tell your health care provider about any new moles or changes in moles, especially if:  There is a change in a mole’s size, shape, or color.  You have a mole that is larger than a pencil eraser.  Always use sunscreen. Apply sunscreen liberally and repeat throughout the day.  Protect yourself by wearing long sleeves, pants, a wide-brimmed hat, and sunglasses when outside.     What should I know about heart disease, diabetes, and high blood pressure?  If you are 18-39 years of age, have your blood pressure checked every 3-5 years. If you are 40 years of age or older, have your blood pressure checked every year. You should have your blood pressure measured twice--once when you are at a hospital or clinic, and once when you are not at a hospital or clinic. Record the average of the two measurements. To check your blood pressure when you are not at a hospital or clinic, you can use:  An automated blood pressure machine at a pharmacy.  A home blood pressure monitor.  Talk to your health care provider about your target blood pressure.  If you are between 45-79 years old, ask your health care provider if you should take aspirin to prevent heart disease.  Have regular diabetes screenings by checking your fasting blood sugar level.  If you are at a normal  weight and have a low risk for diabetes, have this test once every three years after the age of 45.  If you are overweight and have a high risk for diabetes, consider being tested at a younger age or more often.  A one-time screening for abdominal aortic aneurysm (AAA) by ultrasound is recommended for men aged 65-75 years who are current or former smokers.  What should I know about preventing infection?  Hepatitis B  If you have a higher risk for hepatitis B, you should be screened for this virus. Talk with your health care provider to find out if you are at risk for hepatitis B infection.  Hepatitis C  Blood testing is recommended for:  Everyone born from 1945 through 1965.  Anyone with known risk factors for hepatitis C.     Sexually Transmitted Diseases (STDs)  You should be screened each year for STDs including gonorrhea and chlamydia if:  You are sexually active and are younger than 24 years of age.  You are older than 24 years of age and your health care provider tells you that you are at risk for this type of infection.  Your sexual activity has changed since you were last screened and you are at an increased risk for chlamydia or gonorrhea. Ask your health care provider if you are at risk.  Talk with your health care provider about whether you are at high risk of being infected with HIV. Your health care provider may recommend a prescription medicine to help prevent HIV infection.     What else can I do?    Schedule regular health, dental, and eye exams.  Stay current with your vaccines (immunizations).  Do not use any tobacco products, such as cigarettes, chewing tobacco, and e-cigarettes. If you need help quitting, ask your health care provider.  Limit alcohol intake to no more than 2 drinks per day. One drink equals 12 ounces of beer, 5 ounces of wine, or 1½ ounces of hard liquor.  Do not use street drugs.  Do not share needles.  Ask your health care provider for help if you need support or information  about quitting drugs.  Tell your health care provider if you often feel depressed.  Tell your health care provider if you have ever been abused or do not feel safe at home.      This information is not intended to replace advice given to you by your health care provider. Make sure you discuss any questions you have with your health care provider.  Document Released: 06/15/2009 Document Revised: 08/16/2017 Document Reviewed: 09/20/2016  [employer] Interactive Patient Education © 2018 [employer]

## 2025-01-15 LAB
ALBUMIN SERPL-MCNC: 3.5 G/DL (ref 3.9–4.9)
ALP SERPL-CCNC: 93 IU/L (ref 44–121)
ALT SERPL-CCNC: 23 IU/L (ref 0–44)
AST SERPL-CCNC: 37 IU/L (ref 0–40)
BASOPHILS # BLD AUTO: 0 X10E3/UL (ref 0–0.2)
BASOPHILS NFR BLD AUTO: 1 %
BILIRUB SERPL-MCNC: 2.1 MG/DL (ref 0–1.2)
BUN SERPL-MCNC: 10 MG/DL (ref 8–27)
BUN/CREAT SERPL: 10 (ref 10–24)
CALCIUM SERPL-MCNC: 8.9 MG/DL (ref 8.6–10.2)
CHLORIDE SERPL-SCNC: 97 MMOL/L (ref 96–106)
CHOLEST SERPL-MCNC: 181 MG/DL (ref 100–199)
CO2 SERPL-SCNC: 22 MMOL/L (ref 20–29)
CREAT SERPL-MCNC: 1.01 MG/DL (ref 0.76–1.27)
EGFRCR SERPLBLD CKD-EPI 2021: 85 ML/MIN/1.73
EOSINOPHIL # BLD AUTO: 0.2 X10E3/UL (ref 0–0.4)
EOSINOPHIL NFR BLD AUTO: 7 %
ERYTHROCYTE [DISTWIDTH] IN BLOOD BY AUTOMATED COUNT: 14.2 % (ref 11.6–15.4)
FERRITIN SERPL-MCNC: 33 NG/ML (ref 30–400)
FOLATE SERPL-MCNC: 11.7 NG/ML
GLOBULIN SER CALC-MCNC: 3.8 G/DL (ref 1.5–4.5)
GLUCOSE SERPL-MCNC: 437 MG/DL (ref 70–99)
HBA1C MFR BLD: 12.6 % (ref 4.8–5.6)
HCT VFR BLD AUTO: 34.1 % (ref 37.5–51)
HDLC SERPL-MCNC: 21 MG/DL
HGB BLD-MCNC: 11 G/DL (ref 13–17.7)
IMM GRANULOCYTES # BLD AUTO: 0 X10E3/UL (ref 0–0.1)
IMM GRANULOCYTES NFR BLD AUTO: 0 %
IRON SATN MFR SERPL: 17 % (ref 15–55)
IRON SERPL-MCNC: 63 UG/DL (ref 38–169)
LDLC SERPL CALC-MCNC: 48 MG/DL (ref 0–99)
LYMPHOCYTES # BLD AUTO: 0.6 X10E3/UL (ref 0.7–3.1)
LYMPHOCYTES NFR BLD AUTO: 21 %
MCH RBC QN AUTO: 28.1 PG (ref 26.6–33)
MCHC RBC AUTO-ENTMCNC: 32.3 G/DL (ref 31.5–35.7)
MCV RBC AUTO: 87 FL (ref 79–97)
MONOCYTES # BLD AUTO: 0.3 X10E3/UL (ref 0.1–0.9)
MONOCYTES NFR BLD AUTO: 9 %
MORPHOLOGY BLD-IMP: ABNORMAL
NEUTROPHILS # BLD AUTO: 1.8 X10E3/UL (ref 1.4–7)
NEUTROPHILS NFR BLD AUTO: 62 %
PLATELET # BLD AUTO: 55 X10E3/UL (ref 150–450)
POTASSIUM SERPL-SCNC: 4 MMOL/L (ref 3.5–5.2)
PROT SERPL-MCNC: 7.3 G/DL (ref 6–8.5)
RBC # BLD AUTO: 3.91 X10E6/UL (ref 4.14–5.8)
SODIUM SERPL-SCNC: 132 MMOL/L (ref 134–144)
TIBC SERPL-MCNC: 362 UG/DL (ref 250–450)
TRIGL SERPL-MCNC: 787 MG/DL (ref 0–149)
TSH SERPL DL<=0.005 MIU/L-ACNC: 10.6 UIU/ML (ref 0.45–4.5)
UIBC SERPL-MCNC: 299 UG/DL (ref 111–343)
VIT B12 SERPL-MCNC: 447 PG/ML (ref 232–1245)
VLDLC SERPL CALC-MCNC: 112 MG/DL (ref 5–40)
WBC # BLD AUTO: 3 X10E3/UL (ref 3.4–10.8)

## 2025-01-16 ENCOUNTER — TELEPHONE (OUTPATIENT)
Dept: FAMILY MEDICINE CLINIC | Facility: CLINIC | Age: 60
End: 2025-01-16
Payer: COMMERCIAL

## 2025-01-16 NOTE — TELEPHONE ENCOUNTER
----- Message from Martell Young sent at 1/16/2025  1:02 PM EST -----  Your labs/ tests are abnormal --please make appointment to discuss.  Sooner appointment than February if possible

## 2025-01-20 ENCOUNTER — OFFICE VISIT (OUTPATIENT)
Dept: FAMILY MEDICINE CLINIC | Facility: CLINIC | Age: 60
End: 2025-01-20
Payer: COMMERCIAL

## 2025-01-20 VITALS
DIASTOLIC BLOOD PRESSURE: 74 MMHG | HEIGHT: 73 IN | SYSTOLIC BLOOD PRESSURE: 136 MMHG | HEART RATE: 62 BPM | WEIGHT: 186.5 LBS | BODY MASS INDEX: 24.72 KG/M2 | RESPIRATION RATE: 12 BRPM

## 2025-01-20 DIAGNOSIS — E78.2 MIXED HYPERLIPIDEMIA: ICD-10-CM

## 2025-01-20 DIAGNOSIS — Z23 IMMUNIZATION DUE: ICD-10-CM

## 2025-01-20 DIAGNOSIS — I10 PRIMARY HYPERTENSION: ICD-10-CM

## 2025-01-20 DIAGNOSIS — E11.65 TYPE 2 DIABETES MELLITUS WITH HYPERGLYCEMIA, WITHOUT LONG-TERM CURRENT USE OF INSULIN: Primary | ICD-10-CM

## 2025-01-20 DIAGNOSIS — Z79.899 HIGH RISK MEDICATION USE: ICD-10-CM

## 2025-01-20 DIAGNOSIS — D61.818 PANCYTOPENIA: ICD-10-CM

## 2025-01-20 DIAGNOSIS — E03.9 ACQUIRED HYPOTHYROIDISM: ICD-10-CM

## 2025-01-20 DIAGNOSIS — C20 RECTAL CARCINOMA: ICD-10-CM

## 2025-01-20 PROCEDURE — 90677 PCV20 VACCINE IM: CPT | Performed by: FAMILY MEDICINE

## 2025-01-20 PROCEDURE — 90471 IMMUNIZATION ADMIN: CPT | Performed by: FAMILY MEDICINE

## 2025-01-20 PROCEDURE — 99214 OFFICE O/P EST MOD 30 MIN: CPT | Performed by: FAMILY MEDICINE

## 2025-01-20 RX ORDER — FERROUS SULFATE 325(65) MG
325 TABLET ORAL EVERY OTHER DAY
Qty: 90 TABLET | Refills: 1 | Status: SHIPPED | OUTPATIENT
Start: 2025-01-20

## 2025-01-20 RX ORDER — CYANOCOBALAMIN 1000 UG/ML
1000 INJECTION, SOLUTION INTRAMUSCULAR; SUBCUTANEOUS
Qty: 3 ML | Refills: 3 | Status: SHIPPED | OUTPATIENT
Start: 2025-01-20

## 2025-01-20 RX ORDER — LEVOTHYROXINE SODIUM 25 UG/1
25 TABLET ORAL DAILY
Qty: 90 TABLET | Refills: 0 | Status: SHIPPED | OUTPATIENT
Start: 2025-01-20

## 2025-01-20 RX ORDER — CYANOCOBALAMIN 1000 UG/ML
1000 INJECTION, SOLUTION INTRAMUSCULAR; SUBCUTANEOUS
COMMUNITY
Start: 2025-01-14 | End: 2025-01-20 | Stop reason: SDUPTHER

## 2025-01-20 RX ORDER — CARVEDILOL 6.25 MG/1
6.25 TABLET ORAL 2 TIMES DAILY WITH MEALS
Qty: 180 TABLET | Refills: 3 | Status: SHIPPED | OUTPATIENT
Start: 2025-01-20

## 2025-01-20 RX ORDER — DAPAGLIFLOZIN 10 MG/1
10 TABLET, FILM COATED ORAL DAILY
Qty: 90 TABLET | Refills: 1 | Status: SHIPPED | OUTPATIENT
Start: 2025-01-20

## 2025-01-20 NOTE — PROGRESS NOTES
Follow Up Office Visit      Patient Name: Hesham Arvealo Jr.  : 1965   MRN: [id number]     Chief Complaint:    Chief Complaint   Patient presents with    Hypertension     1 mo follow up       History of Present Illness: Hesham Arevalo Jr. is a 59 y.o. male who is here today to   follow-up on his recent blood work and admits he had not been taking his medicine all December stopped all his medicines.    History of Present Illness  The patient is a 59-year-old male who presents for a follow-up visit.    He discontinued all his medications in mid-2024 due to running out of lisinopril. He has since resumed his medication regimen, which includes metformin 1000 mg 4 times daily, lisinopril 10 mg, levothyroxine 25 mcg, carvedilol 6.25 mg twice daily, Zetia 10 mg, TriCor 145 mg, ferrous sulfate every other day with vitamin C, and monthly B12 injections. He has never been on insulin, glimepiride, glipizide, Januvia, Jardiance, or statins. He is currently taking fish oil supplements twice daily. He is uncertain about the status of his tetanus vaccination.    He has a history of thrombocytopenia, requiring platelet transfusions before and after surgeries, including gallbladder surgery on 2024. He also experienced low platelet counts during chemotherapy for colon cancer. He continues to be under the care of Dr. Moeller from Hematology/Oncology.    He has cirrhosis of the liver and is followed by Dr. Martinez, who performs his endoscopies and colonoscopies.    He reports no chest pain, heaviness, tightness, or shortness of breath.    Supplemental Information  He takes an antidiarrheal medication for his ileostomy.    FAMILY HISTORY  His father had a lot of problems with circulation in his feet.    MEDICATIONS  Current: Metformin, lisinopril, levothyroxine, carvedilol, ezetimibe, ferrous sulfate, vitamin C, B12 injections, fish oil supplements.  Discontinued: Fenofibrate.    IMMUNIZATIONS  He  received the Prevnar 20 vaccine.  No chest pains palpitations heaviness tightness shortness of breath    Subjective      Review of Systems:   Review of Systems    Past Medical History:   Past Medical History:   Diagnosis Date    Arthritis     knees     Cancer 2018    colon with resection    Cholelithiasis     Disease of thyroid gland     Fatty liver     Hashimoto's disease     History of radiation therapy 2019    rectal cancer    Hypertension     Ileostomy in place     Prediabetes     Psoriasis     Wears glasses        Past Surgical History:   Past Surgical History:   Procedure Laterality Date    CHOLECYSTECTOMY N/A 2024    Procedure: OPEN CHOLECYSTECTOMY;  Surgeon: Konstantin Garcia MD;  Location:  [facility];  Service: General;  Laterality: N/A;    COLON RESECTION N/A 2019    Procedure: LAPAROSCOPIC LOWER ANTERIOR RESECTION WITH DIVERTING LOOP ILEOOSTOMY, SPLENIC FLEIXURE MOBILIZATION AND LIVER BIOPSY, AND PROCTOSCOPY;  Surgeon: Pau Kelly MD;  Location:  [facility];  Service: General    COLONOSCOPY      2018    ILEOSTOMY  2019    LIVER BIOPSY  2003    VASECTOMY      VENOUS ACCESS DEVICE (PORT) INSERTION N/A 2019    Procedure: PORT PLACEMENT;  Surgeon: Franky Cazares MD;  Location:  [facility];  Service: General       Family History:   Family History   Problem Relation Age of Onset    Breast cancer Mother     Cancer Mother         Breast Cancer Double Masectomy    Cancer Father         Bladder cancer    Diabetes type II Sister        Social History:   Social History     Socioeconomic History    Marital status:    Tobacco Use    Smoking status: Former     Current packs/day: 0.00     Average packs/day: 1 pack/day for 14.0 years (14.0 ttl pk-yrs)     Types: Cigarettes     Start date:      Quit date:      Years since quittin.0    Smokeless tobacco: Never   Vaping Use    Vaping status: Never Used   Substance and Sexual Activity    Alcohol use: No    Drug use:  "No    Sexual activity: Defer       Medications:     Current Outpatient Medications:     Accu-Chek Softclix Lancets lancets, Use as instructed to test blood glucose twice daily, Disp: 100 each, Rfl: 1    Blood Glucose Monitoring Suppl (FreeStyle Lite) w/Device kit, Use Monitor as directed by provider As Needed to check Blood glucose., Disp: 1 kit, Rfl: 0    carvedilol (COREG) 6.25 MG tablet, Take 1 tablet by mouth 2 (Two) Times a Day With Meals., Disp: 180 tablet, Rfl: 3    cyanocobalamin 1000 MCG/ML injection, Inject 1 mL into the appropriate muscle as directed by prescriber Every 30 (Thirty) Days., Disp: 3 mL, Rfl: 3    ezetimibe (ZETIA) 10 MG tablet, Take 1 tablet by mouth Daily., Disp: , Rfl:     ferrous sulfate (FeroSul) 325 (65 FE) MG tablet, Take 1 tablet by mouth Every Other Day., Disp: 90 tablet, Rfl: 1    glucose blood test strip, Use as instructed to test blood glucose twice daily, Disp: 100 each, Rfl: 1    levothyroxine (SYNTHROID, LEVOTHROID) 25 MCG tablet, Take 1 tablet by mouth Daily., Disp: 90 tablet, Rfl: 0    lisinopril (PRINIVIL,ZESTRIL) 10 MG tablet, Take 1 tablet by mouth Daily., Disp: 90 tablet, Rfl: 1    metFORMIN (GLUCOPHAGE) 1000 MG tablet, Take 1 tablet by mouth 2 (Two) Times a Day With Meals., Disp: 180 tablet, Rfl: 1    RA ANTI-DIARRHEAL 2 MG tablet, Take 1 tablet by mouth Daily., Disp: , Rfl: 0    dapagliflozin Propanediol 10 MG tablet, Take 10 mg by mouth Daily., Disp: 90 tablet, Rfl: 1    Allergies:   No Known Allergies    Objective     Physical Exam:  Vital Signs:   Vitals:    01/20/25 0817   BP: 136/74   BP Location: Left arm   Patient Position: Sitting   Cuff Size: Adult   Pulse: 62   Resp: 12   Weight: 84.6 kg (186 lb 8 oz)   Height: 185.4 cm (73\")   PainSc: 0-No pain     Facility age limit for growth %elva is 20 years.  Body mass index is 24.61 kg/m².     Physical Exam  Vitals and nursing note reviewed.   Constitutional:       Appearance: Normal appearance.   HENT:      Head: " Normocephalic and atraumatic.   Cardiovascular:      Rate and Rhythm: Normal rate and regular rhythm.   Pulmonary:      Effort: Pulmonary effort is normal.      Breath sounds: Normal breath sounds.   Musculoskeletal:         General: Normal range of motion.      Cervical back: Normal range of motion and neck supple.      Right lower leg: No edema.      Left lower leg: No edema.   Skin:     General: Skin is warm and dry.   Neurological:      General: No focal deficit present.      Mental Status: He is alert.         Procedures    PHQ-9 Total Score:      Assessment / Plan      Assessment/Plan:   Diagnoses and all orders for this visit:    1. Type 2 diabetes mellitus with hyperglycemia, without long-term current use of insulin (Primary)  -     metFORMIN (GLUCOPHAGE) 1000 MG tablet; Take 1 tablet by mouth 2 (Two) Times a Day With Meals.  Dispense: 180 tablet; Refill: 1  -     dapagliflozin Propanediol 10 MG tablet; Take 10 mg by mouth Daily.  Dispense: 90 tablet; Refill: 1  -     Comprehensive Metabolic Panel; Future  -     Hemoglobin A1c; Future    2. Immunization due  -     Pneumococcal Conjugate Vaccine 20-Valent (PCV20)    3. Pancytopenia  -     ferrous sulfate (FeroSul) 325 (65 FE) MG tablet; Take 1 tablet by mouth Every Other Day.  Dispense: 90 tablet; Refill: 1  -     cyanocobalamin 1000 MCG/ML injection; Inject 1 mL into the appropriate muscle as directed by prescriber Every 30 (Thirty) Days.  Dispense: 3 mL; Refill: 3  -     CBC Auto Differential; Future    4. Rectal carcinoma  -     ferrous sulfate (FeroSul) 325 (65 FE) MG tablet; Take 1 tablet by mouth Every Other Day.  Dispense: 90 tablet; Refill: 1    5. Primary hypertension  -     carvedilol (COREG) 6.25 MG tablet; Take 1 tablet by mouth 2 (Two) Times a Day With Meals.  Dispense: 180 tablet; Refill: 3    6. Mixed hyperlipidemia    7. Acquired hypothyroidism  -     levothyroxine (SYNTHROID, LEVOTHROID) 25 MCG tablet; Take 1 tablet by mouth Daily.  Dispense:  90 tablet; Refill: 0  -     TSH; Future    8. High risk medication use  -     CBC Auto Differential; Future  -     Comprehensive Metabolic Panel; Future         Assessment & Plan  1. Diabetes mellitus.  His A1c level is significantly elevated at 12.6, indicating poorly controlled diabetes. He has been taking metformin 1000 mg four times a day, which is not appropriate.     Will start Farxiga.    He is advised to maintain good hygiene, particularly in the genital area, and to keep it clean and dry. He should discontinue the medication and seek medical attention if he experiences any burning, stinging, itching, or redness in the genital area. He is also advised to limit his carbohydrate intake, including potatoes and rice, to approximately one-third of a cup per meal, and to engage in 30 minutes of walking daily. He should inspect his feet nightly for any sores, spots, or blisters. He will continue metformin 1000 mg twice daily. A prescription for Farxiga (dapagliflozin) has been provided to help manage his blood sugar levels.    2. Pancytopenia.  His white blood cell count is low at 3.0, hemoglobin is low at 11, and platelet count is 55. He has a history of low platelets, especially during chemotherapy for colon cancer. He is advised to continue follow-up with Dr. Moeller in Hematology/Oncology.    3. Cirrhosis of the liver.  He is currently taking fenofibrate, which is contraindicated in patients with cirrhosis. He is advised to discontinue fenofibrate. He is under the care of Dr. Martinez for this condition.    4. Hypothyroidism.  His TSH level is elevated at 10.60. He will continue taking levothyroxine 25 mcg daily. Blood work will be done to monitor his thyroid levels.    5. Hypertriglyceridemia.  His triglycerides are high at 787. He is advised to continue taking fish oil supplements, which can help manage triglyceride levels.    6. Health maintenance.  He received the Prevnar 20 pneumonia vaccine today.     He is  advised to check his feet nightly for any sores, spots, or blisters. He is also advised to maintain good diet and exercise, limiting carbs, potatoes, and rice, and to walk 30 minutes daily.    Follow-up  The patient will follow up in 2 months.    PROCEDURE  The patient underwent gallbladder surgery on 07/08/2024.    BMI is within normal parameters. No other follow-up for BMI required.      Follow Up:   Return in about 2 months (around 3/20/2025) for Recheck, Labs prior next visit.        Patient or patient representative verbalized consent for the use of Ambient Listening during the visit with  Martell Young MD for chart documentation. 1/20/2025  08:53 EST    Martell Young MD  [facility]   Portions of note created with Dragon voice recognition technology

## 2025-01-30 NOTE — OUTREACH NOTE
General Surgery Week 3 Survey      Flowsheet Row Responses   [facility] patient discharged from? [place]   Does the patient have one of the following disease processes/diagnoses(primary or secondary)? General Surgery   Week 3 attempt successful? Yes   Call start time 1339   Call end time 1340   Discharge diagnosis Acute cholecystitis        Gallstone pancreatitis-Cholecystostomy tube   Meds reviewed with patient/caregiver? Yes   Is the patient having any side effects they believe may be caused by any medication additions or changes? No   Does the patient have all medications related to this admission filled (includes all antibiotics, pain medications, etc.) Yes   Is the patient taking all medications as directed (includes completed medication regime)? Yes   Does the patient have a follow up appointment scheduled with their surgeon? Yes   Has the patient kept scheduled appointments due by today? Yes   Comments Has had PCP and general surgery follow   Has home health visited the patient within 72 hours of discharge? N/A   Psychosocial issues? No   Did the patient receive a copy of their discharge instructions? Yes   Nursing interventions Reviewed instructions with patient   What is the patient's perception of their health status since discharge? Improving   Is the patient/caregiver able to teach back signs and symptoms of incisional infection? Increased redness, swelling or pain at the incisonal site, Increased drainage or bleeding, Incisional warmth, Fever, Pus or odor from incision   Is the patient/caregiver able to teach back steps to recovery at home? Set small, achievable goals for return to baseline health, Rest and rebuild strength, gradually increase activity, Eat a well-balance diet   If the patient is a current smoker, are they able to teach back resources for cessation? Not a smoker   Is the patient/caregiver able to teach back the hierarchy of who to call/visit for symptoms/problems? PCP,  Patient has a diagnosis of pneumonia. The cause of the pneumonia is viral in etiology due to  rhinovirus . The pneumonia is improving. The patient has the following signs/symptoms of pneumonia: cough, sputum production, and shortness of breath. The patient does have a current oxygen requirement and the patient does not have a home oxygen requirement. I have reviewed the pertinent imaging. The following cultures have been collected: Blood cultures The culture results are listed below.     Current antimicrobial regimen consists of the antibiotics listed below. Will monitor patient closely and continue current treatment plan unchanged.    Antibiotics (From admission, onward)      Start     Stop Route Frequency Ordered    01/25/25 2100  doxycycline tablet 100 mg         -- Oral Every 12 hours 01/25/25 1809    01/25/25 1400  cefTRIAXone injection 1 g         -- IV Every 24 hours (non-standard times) 01/24/25 2351            Microbiology Results (last 7 days)       Procedure Component Value Units Date/Time    Culture, Respiratory with Gram Stain [[id number]]  (Abnormal) Collected: 01/27/25 1919    Order Status: Completed Specimen: Respiratory from Sputum Updated: 01/30/25 1100     Respiratory Culture No S aureus or Pseudomonas isolated.      CANDIDA ALBICANS  Moderate       Gram Stain (Respiratory) <10 epithelial cells per low power field.     Gram Stain (Respiratory) Rare WBC's     Gram Stain (Respiratory) Rare yeast    Blood culture x two cultures. Draw prior to antibiotics. [[id number]] Collected: 01/24/25 1250    Order Status: Completed Specimen: Blood from Peripheral, Forearm, Left Updated: 01/30/25 0612     Blood Culture, Routine No growth after 5 days.    Narrative:      Aerobic and anaerobic    Blood culture x two cultures. Draw prior to antibiotics. [[id number]] Collected: 01/24/25 1256    Order Status: Completed Specimen: Blood from Peripheral, Antecubital, Right Updated: 01/30/25 0612     Blood Culture, Routine  No growth after 5 days.    Narrative:      Aerobic and anaerobic    Respiratory Infection Panel (PCR), Nasopharyngeal [[id number]]  (Abnormal) Collected: 01/25/25 1406    Order Status: Completed Specimen: Nasopharyngeal Swab Updated: 01/27/25 1211     Respiratory Infection Panel Source NP Swab     Adenovirus Not Detected     Coronavirus 229E, Common Cold Virus Not Detected     Coronavirus HKU1, Common Cold Virus Not Detected     Coronavirus NL63, Common Cold Virus Not Detected     Coronavirus OC43, Common Cold Virus Not Detected     Comment: The Coronavirus strains detected in this test cause the common cold.  These strains are not the COVID-19 (novel Coronavirus)strain   associated with the respiratory disease outbreak.          SARS-CoV2 (COVID-19) Qualitative PCR Not Detected     Human Metapneumovirus Not Detected     Human Rhinovirus/Enterovirus Detected     Influenza A (subtypes H1, H1-2009,H3) Not Detected     Influenza B Not Detected     Parainfluenza Virus 1 Not Detected     Parainfluenza Virus 2 Not Detected     Parainfluenza Virus 3 Not Detected     Parainfluenza Virus 4 Not Detected     Respiratory Syncytial Virus Not Detected     Bordetella Parapertussis (AM4884) Not Detected     Bordetella pertussis (ptxP) Not Detected     Chlamydia pneumoniae Not Detected     Mycoplasma pneumoniae Not Detected     Comment: Respiratory Infection Panel testing performed by Multiplex PCR.       Narrative:      Assay not valid for lower respiratory specimens, alternate  testing required.        Worsening   Abg reviewed   Blood culture negative growth to date x 4 days  On vapotherm   Add pulmicort and systemic steroids continued   Has incentive spirometer and aerobika   Physical/occupational therapy recommending homehealth physical/occupational therapy     1/29/25: On 1/28/25, respiratory status worsened requiring 80% Vapotherm. IV Lasix added. Blood cultrues show NGTD. Mycoplasm, legionella, fungitell ordered. Sputum  Specialist, Home health nurse, Urgent Care, ED, 911 Yes   Week 3 call completed? Yes   Graduated Yes   Is the patient interested in additional calls from an ambulatory ? No   Would this patient benefit from a Referral to [facility] Social Work? No   Call end time 1340            Patito LEO - Registered Nurse   cultures pending. +Mycoplasm IgG, IgM negative.   +SASKIA, elevated CRP and ESR. Complements normal. dsDNA ab normal. Pulmonology recommended Rheumatology review case. Per Dr. Pfeiffer-low suspicion for connective tissue disease based on these labs and reported symptoms. No additional medication recommendations at this point. He recommended to follow up on the SASKIA profile and reach back out him if anything in the profile is positive and at that point I can set her up for outpatient follow up (or external referral for faster Rheumatology access).     1/30/25: Vapotherm weaned to 60%. Legionella and fungitell negative. Sputum culture growing Candida Albicans

## 2025-03-13 ENCOUNTER — LAB (OUTPATIENT)
Dept: FAMILY MEDICINE CLINIC | Facility: CLINIC | Age: 60
End: 2025-03-13
Payer: COMMERCIAL

## 2025-03-13 ENCOUNTER — TELEPHONE (OUTPATIENT)
Dept: FAMILY MEDICINE CLINIC | Facility: CLINIC | Age: 60
End: 2025-03-13
Payer: COMMERCIAL

## 2025-03-13 DIAGNOSIS — E03.9 ACQUIRED HYPOTHYROIDISM: ICD-10-CM

## 2025-03-13 DIAGNOSIS — E11.65 TYPE 2 DIABETES MELLITUS WITH HYPERGLYCEMIA, WITHOUT LONG-TERM CURRENT USE OF INSULIN: ICD-10-CM

## 2025-03-13 DIAGNOSIS — Z79.899 HIGH RISK MEDICATION USE: ICD-10-CM

## 2025-03-13 DIAGNOSIS — D61.818 PANCYTOPENIA: ICD-10-CM

## 2025-03-14 LAB
ALBUMIN SERPL-MCNC: 3.5 G/DL (ref 3.9–4.9)
ALP SERPL-CCNC: 93 IU/L (ref 44–121)
ALT SERPL-CCNC: 34 IU/L (ref 0–44)
AST SERPL-CCNC: 57 IU/L (ref 0–40)
BASOPHILS # BLD AUTO: 0.1 X10E3/UL (ref 0–0.2)
BASOPHILS NFR BLD AUTO: 2 %
BILIRUB SERPL-MCNC: 1.4 MG/DL (ref 0–1.2)
BUN SERPL-MCNC: 19 MG/DL (ref 8–27)
BUN/CREAT SERPL: 17 (ref 10–24)
CALCIUM SERPL-MCNC: 9.2 MG/DL (ref 8.6–10.2)
CHLORIDE SERPL-SCNC: 97 MMOL/L (ref 96–106)
CO2 SERPL-SCNC: 22 MMOL/L (ref 20–29)
CREAT SERPL-MCNC: 1.09 MG/DL (ref 0.76–1.27)
EGFRCR SERPLBLD CKD-EPI 2021: 77 ML/MIN/1.73
EOSINOPHIL # BLD AUTO: 0.2 X10E3/UL (ref 0–0.4)
EOSINOPHIL NFR BLD AUTO: 6 %
ERYTHROCYTE [DISTWIDTH] IN BLOOD BY AUTOMATED COUNT: 15.3 % (ref 11.6–15.4)
GLOBULIN SER CALC-MCNC: 3.5 G/DL (ref 1.5–4.5)
GLUCOSE SERPL-MCNC: 271 MG/DL (ref 70–99)
HBA1C MFR BLD: 11.8 % (ref 4.8–5.6)
HCT VFR BLD AUTO: 34.7 % (ref 37.5–51)
HGB BLD-MCNC: 11.2 G/DL (ref 13–17.7)
IMM GRANULOCYTES # BLD AUTO: 0 X10E3/UL (ref 0–0.1)
IMM GRANULOCYTES NFR BLD AUTO: 0 %
LYMPHOCYTES # BLD AUTO: 0.7 X10E3/UL (ref 0.7–3.1)
LYMPHOCYTES NFR BLD AUTO: 20 %
MCH RBC QN AUTO: 27.5 PG (ref 26.6–33)
MCHC RBC AUTO-ENTMCNC: 32.3 G/DL (ref 31.5–35.7)
MCV RBC AUTO: 85 FL (ref 79–97)
MONOCYTES # BLD AUTO: 0.4 X10E3/UL (ref 0.1–0.9)
MONOCYTES NFR BLD AUTO: 13 %
MORPHOLOGY BLD-IMP: ABNORMAL
NEUTROPHILS # BLD AUTO: 2 X10E3/UL (ref 1.4–7)
NEUTROPHILS NFR BLD AUTO: 59 %
PLATELET # BLD AUTO: 71 X10E3/UL (ref 150–450)
POTASSIUM SERPL-SCNC: 4.5 MMOL/L (ref 3.5–5.2)
PROT SERPL-MCNC: 7 G/DL (ref 6–8.5)
RBC # BLD AUTO: 4.08 X10E6/UL (ref 4.14–5.8)
SODIUM SERPL-SCNC: 132 MMOL/L (ref 134–144)
TSH SERPL DL<=0.005 MIU/L-ACNC: 7.82 UIU/ML (ref 0.45–4.5)
WBC # BLD AUTO: 3.4 X10E3/UL (ref 3.4–10.8)

## 2025-03-20 ENCOUNTER — OFFICE VISIT (OUTPATIENT)
Dept: FAMILY MEDICINE CLINIC | Facility: CLINIC | Age: 60
End: 2025-03-20
Payer: COMMERCIAL

## 2025-03-20 VITALS
RESPIRATION RATE: 14 BRPM | BODY MASS INDEX: 24.18 KG/M2 | DIASTOLIC BLOOD PRESSURE: 82 MMHG | SYSTOLIC BLOOD PRESSURE: 138 MMHG | HEIGHT: 73 IN | WEIGHT: 182.4 LBS | OXYGEN SATURATION: 100 % | HEART RATE: 58 BPM

## 2025-03-20 DIAGNOSIS — E11.65 TYPE 2 DIABETES MELLITUS WITH HYPERGLYCEMIA, WITHOUT LONG-TERM CURRENT USE OF INSULIN: Primary | ICD-10-CM

## 2025-03-20 DIAGNOSIS — E03.9 ACQUIRED HYPOTHYROIDISM: ICD-10-CM

## 2025-03-20 PROCEDURE — 99214 OFFICE O/P EST MOD 30 MIN: CPT | Performed by: FAMILY MEDICINE

## 2025-03-20 RX ORDER — LACTOSE-REDUCED FOOD/FIBER
237 LIQUID (ML) ORAL 3 TIMES DAILY
COMMUNITY
Start: 2025-02-24 | End: 2026-02-24

## 2025-03-20 RX ORDER — LEVOTHYROXINE SODIUM 25 UG/1
25 TABLET ORAL DAILY
Qty: 90 TABLET | Refills: 0 | Status: SHIPPED | OUTPATIENT
Start: 2025-03-20

## 2025-03-20 NOTE — PROGRESS NOTES
Follow Up Office Visit      Patient Name: Hesham Arevalo Jr.  : 1965   MRN: [id number]     Chief Complaint:    Chief Complaint   Patient presents with    Diabetes     2 mo follow up       History of Present Illness: Hesham Arevalo Jr. is a 59 y.o. male who is here today to   follow-up on his diabetes and chronic medical problems and to go over blood work.    History of Present Illness  The patient is a 59-year-old male who presents today for a checkup.    He underwent blood work last week and reports feeling well overall. He is scheduled for a colonoscopy tomorrow, for which he is currently fasting. He has an ileostomy and typically undergoes endoscopy via the ileostomy as well as rectum. He has been consuming Boost nutritional supplement daily for the past month, as recommended by his hepatologist, due to difficulty gaining weight. He reports no significant weight gain but notes a slight increase in energy levels. He received a B12 injection at the end of 2025. He is not currently taking any iron supplements.    He has a history of Hashimoto's thyroiditis and has been on levothyroxine 25 mcg for approximately 15 years. He has not missed any doses of his thyroid medication. He takes ferrous sulfate every other day, as prescribed by Dr. Parker, and acknowledges that he takes it concurrently with his thyroid medication.    He has a known history of diabetes mellitus and is currently on Farxiga 10 mg and metformin 1000 mg twice daily. He admits to a fondness for sweets but has recently decided to eliminate sugar from his diet. He has not been monitoring his blood glucose levels at home.  But he has a machine at home that they gave him when he left the hospital.    Supplemental Information  He has a history of cancer and completed his treatment in . He was declared cancer-free for 5 years by Dr. Moeller during his last visit in . He has a follow-up appointment with Dr. Moeller in May  2025 after his colonoscopy.    FAMILY HISTORY  His mother had Graves' disease.  His sister has the same thyroid condition as the patient.  His father had diabetes.    MEDICATIONS  Current: Farxiga, metformin, Evenity, Zetia, lisinopril, carvedilol, Boost nutritional supplement, B12 injections, ferrous sulfate, levothyroxine.  Review of Systems   Constitutional: Negative for fatigue and fever.   Respiratory: Negative for cough and shortness of breath.    Cardiovascular: Negative for chest pain and palpitations.   Skin: Negative for rash or itching      Subjective      Review of Systems:   Review of Systems    Past Medical History:   Past Medical History:   Diagnosis Date    Arthritis     knees     Cancer 11/2018    colon with resection    Cholelithiasis     Disease of thyroid gland     Fatty liver     Hashimoto's disease     History of radiation therapy 02/14/2019    rectal cancer    Hypertension     Ileostomy in place     Prediabetes     Psoriasis     Wears glasses        Past Surgical History:   Past Surgical History:   Procedure Laterality Date    CHOLECYSTECTOMY N/A 7/8/2024    Procedure: OPEN CHOLECYSTECTOMY;  Surgeon: Konstantin Garcia MD;  Location:  [facility];  Service: General;  Laterality: N/A;    COLON RESECTION N/A 4/23/2019    Procedure: LAPAROSCOPIC LOWER ANTERIOR RESECTION WITH DIVERTING LOOP ILEOOSTOMY, SPLENIC FLEIXURE MOBILIZATION AND LIVER BIOPSY, AND PROCTOSCOPY;  Surgeon: Pau Kelly MD;  Location:  [facility];  Service: General    COLONOSCOPY      2018    ILEOSTOMY  04/2019    LIVER BIOPSY  2003    VASECTOMY  1998    VENOUS ACCESS DEVICE (PORT) INSERTION N/A 7/18/2019    Procedure: PORT PLACEMENT;  Surgeon: Franky Cazares MD;  Location:  [facility];  Service: General       Family History:   Family History   Problem Relation Age of Onset    Breast cancer Mother     Cancer Mother         Breast Cancer Double Masectomy    Cancer Father         Bladder cancer    Diabetes type II Sister         Social History:   Social History     Socioeconomic History    Marital status:    Tobacco Use    Smoking status: Former     Current packs/day: 0.00     Average packs/day: 1 pack/day for 14.0 years (14.0 ttl pk-yrs)     Types: Cigarettes     Start date: 1979     Quit date:      Years since quittin.2    Smokeless tobacco: Never   Vaping Use    Vaping status: Never Used   Substance and Sexual Activity    Alcohol use: No    Drug use: No    Sexual activity: Defer       Medications:     Current Outpatient Medications:     Accu-Chek Softclix Lancets lancets, Use as instructed to test blood glucose twice daily, Disp: 100 each, Rfl: 1    Blood Glucose Monitoring Suppl (FreeStyle Lite) w/Device kit, Use Monitor as directed by provider As Needed to check Blood glucose., Disp: 1 kit, Rfl: 0    carvedilol (COREG) 6.25 MG tablet, Take 1 tablet by mouth 2 (Two) Times a Day With Meals., Disp: 180 tablet, Rfl: 3    cyanocobalamin 1000 MCG/ML injection, Inject 1 mL into the appropriate muscle as directed by prescriber Every 30 (Thirty) Days., Disp: 3 mL, Rfl: 3    dapagliflozin Propanediol 10 MG tablet, Take 10 mg by mouth Daily., Disp: 90 tablet, Rfl: 1    ezetimibe (ZETIA) 10 MG tablet, Take 1 tablet by mouth Daily., Disp: , Rfl:     ferrous sulfate (FeroSul) 325 (65 FE) MG tablet, Take 1 tablet by mouth Every Other Day., Disp: 90 tablet, Rfl: 1    glucose blood test strip, Use as instructed to test blood glucose twice daily, Disp: 100 each, Rfl: 1    levothyroxine (SYNTHROID, LEVOTHROID) 25 MCG tablet, Take 1 tablet by mouth Daily., Disp: 90 tablet, Rfl: 0    lisinopril (PRINIVIL,ZESTRIL) 10 MG tablet, Take 1 tablet by mouth Daily., Disp: 90 tablet, Rfl: 1    metFORMIN (GLUCOPHAGE) 1000 MG tablet, Take 1 tablet by mouth 2 (Two) Times a Day With Meals., Disp: 180 tablet, Rfl: 1    Nutritional Supplements (Boost Glucose Control) liquid, Take 237 mL by mouth 3 (Three) Times a Day., Disp: , Rfl:     RA  "ANTI-DIARRHEAL 2 MG tablet, Take 1 tablet by mouth Daily., Disp: , Rfl: 0    Insulin Glargine (LANTUS SOLOSTAR) 100 UNIT/ML injection pen, Inject 10 Units under the skin into the appropriate area as directed Daily. Taper up based on fasting blood sugars on Sunday morning and Wednesday morning as directed.  If blood sugar greater than 150 increase dose by 3 units, if blood sugar less than 70 decrease dose by 3 units., Disp: 15 mL, Rfl: 1    Allergies:   No Known Allergies    Objective     Physical Exam:  Vital Signs:   Vitals:    03/20/25 1437   BP: 138/82   Pulse: 58   Resp: 14   SpO2: 100%   Weight: 82.7 kg (182 lb 6.4 oz)   Height: 185.4 cm (73\")   PainSc: 0-No pain     Facility age limit for growth %elva is 20 years.  Body mass index is 24.06 kg/m².     Physical Exam  Vitals and nursing note reviewed.   Constitutional:       Appearance: Normal appearance.   HENT:      Head: Normocephalic and atraumatic.   Cardiovascular:      Rate and Rhythm: Normal rate and regular rhythm.   Pulmonary:      Effort: Pulmonary effort is normal.      Breath sounds: Normal breath sounds.   Musculoskeletal:         General: Normal range of motion.      Cervical back: Normal range of motion and neck supple.      Right lower leg: No edema.      Left lower leg: No edema.   Skin:     General: Skin is warm and dry.   Neurological:      General: No focal deficit present.      Mental Status: He is alert.   Psychiatric:         Mood and Affect: Mood normal.         Behavior: Behavior normal.         Procedures    PHQ-9 Total Score:      Assessment / Plan      Assessment/Plan:   Diagnoses and all orders for this visit:    1. Type 2 diabetes mellitus with hyperglycemia, without long-term current use of insulin (Primary)  -     Microalbumin / Creatinine Urine Ratio - Urine, Clean Catch; Future  -     Insulin Glargine (LANTUS SOLOSTAR) 100 UNIT/ML injection pen; Inject 10 Units under the skin into the appropriate area as directed Daily. Taper up " based on fasting blood sugars on Sunday morning and Wednesday morning as directed.  If blood sugar greater than 150 increase dose by 3 units, if blood sugar less than 70 decrease dose by 3 units.  Dispense: 15 mL; Refill: 1  -     Comprehensive Metabolic Panel; Future  -     Hemoglobin A1c; Future    2. Acquired hypothyroidism  -     levothyroxine (SYNTHROID, LEVOTHROID) 25 MCG tablet; Take 1 tablet by mouth Daily.  Dispense: 90 tablet; Refill: 0  -     TSH; Future         Assessment & Plan  1. Diabetes Mellitus.  His blood glucose levels have shown a decrease from 400 to 271, and his hemoglobin A1c has reduced from 12 to 11.8. Despite these improvements, his levels remain elevated. He is currently on Farxiga 10 mg and metformin 1000 mg twice a day. A urine specimen will be collected today for further analysis. He will be initiated on Lantus insulin therapy, starting with a dose of 10 units. He has been instructed to adjust the dose based on fasting blood glucose levels on Sunday and Wednesday mornings, increasing by 3 units if levels exceed 150 and decreasing by 3 units if levels fall below 70. He has been educated on the signs and symptoms of hypoglycemia and appropriate treatment measures. He will continue his current regimen of Farxiga and metformin.    I gave him my handout on long-acting insulin and how to taper the dosage    Gave him my regular diabetes handout regarding diabetes and hypoglycemia diagnosis and treatment and discussed all that with him in detail    2. Hashimoto's Thyroiditis.  His thyroid function has shown improvement, with levels decreasing from 10.6 to 7.8. He is currently on levothyroxine 25 mcg. The dosage will be increased to 50 mcg. He has been advised to take his thyroid medication separately from iron supplements or vitamins, ensuring a gap of at least 4 hours between the two.    3. Anemia.  His hemoglobin level is slightly low at 11.2, and platelet count is also low at 67. He is  taking ferrous sulfate every other day. He has been advised to separate the intake of iron supplements from his thyroid medication by at least 4 hours to improve absorption.    Follow-up  The patient will follow up in 2 months.    BMI is within normal parameters. No other follow-up for BMI required.    Microalbumin today  Follow Up:   Return in about 2 months (around 5/20/2025) for Recheck, Labs prior next visit.        Patient or patient representative verbalized consent for the use of Ambient Listening during the visit with  Martell Young MD for chart documentation. 3/20/2025  15:29 EDT    Martell Young MD  [facility]   Portions of note created with Dragon voice recognition technology

## 2025-03-22 LAB
ALBUMIN/CREAT UR: 4 MG/G CREAT (ref 0–29)
CREAT UR-MCNC: 120.2 MG/DL
MICROALBUMIN UR-MCNC: 5.1 UG/ML

## 2025-03-24 ENCOUNTER — TELEPHONE (OUTPATIENT)
Dept: FAMILY MEDICINE CLINIC | Facility: CLINIC | Age: 60
End: 2025-03-24

## 2025-03-24 NOTE — TELEPHONE ENCOUNTER
Caller: Hesham Arevalo Jr.    Relationship: Self    Best call back number: [phone] ANYTIME    What medication are you requesting: CONTINUES GLUCOSE SENSOR AND PARTS NEEDED TO GO WITH IT.      What are your current symptoms: DIABETES     How long have you been experiencing symptoms:    Have you had these symptoms before:    [x] Yes  [] No    Have you been treated for these symptoms before:   [x] Yes  [] No    If a prescription is needed, what is your preferred pharmacy and phone number:  [facility], [place].      Additional notes: PATIENT REQUESTS SOMEONE CALL TO DISCUSS OPTIONS WITH HIM OF THIS INSTEAD OF STICKING HIMSELF TO CHECK LEVELS AND USING STRIPS, ETC.

## 2025-03-28 DIAGNOSIS — Z79.899 HIGH RISK MEDICATION USE: ICD-10-CM

## 2025-03-28 DIAGNOSIS — I10 PRIMARY HYPERTENSION: ICD-10-CM

## 2025-03-28 DIAGNOSIS — E03.9 ACQUIRED HYPOTHYROIDISM: Primary | ICD-10-CM

## 2025-03-28 DIAGNOSIS — E11.65 TYPE 2 DIABETES MELLITUS WITH HYPERGLYCEMIA, WITHOUT LONG-TERM CURRENT USE OF INSULIN: ICD-10-CM

## 2025-03-28 NOTE — TELEPHONE ENCOUNTER
Spoke with patient, patient states he is ok with working with Yenny with chronic care management.

## 2025-03-31 ENCOUNTER — REFERRAL TRIAGE (OUTPATIENT)
Dept: CASE MANAGEMENT | Facility: OTHER | Age: 60
End: 2025-03-31
Payer: COMMERCIAL

## 2025-04-02 ENCOUNTER — PATIENT OUTREACH (OUTPATIENT)
Dept: CASE MANAGEMENT | Facility: OTHER | Age: 60
End: 2025-04-02
Payer: COMMERCIAL

## 2025-04-02 DIAGNOSIS — K75.81 NASH (NONALCOHOLIC STEATOHEPATITIS): ICD-10-CM

## 2025-04-02 DIAGNOSIS — E11.65 TYPE 2 DIABETES MELLITUS WITH HYPERGLYCEMIA, WITH LONG-TERM CURRENT USE OF INSULIN: Primary | Chronic | ICD-10-CM

## 2025-04-02 DIAGNOSIS — I10 ESSENTIAL HYPERTENSION: ICD-10-CM

## 2025-04-02 DIAGNOSIS — Z79.4 TYPE 2 DIABETES MELLITUS WITH HYPERGLYCEMIA, WITH LONG-TERM CURRENT USE OF INSULIN: Primary | Chronic | ICD-10-CM

## 2025-04-02 RX ORDER — ACYCLOVIR 400 MG/1
1 TABLET ORAL CONTINUOUS
Qty: 3 EACH | Refills: 5 | Status: SHIPPED | OUTPATIENT
Start: 2025-04-02

## 2025-04-02 NOTE — OUTREACH NOTE
AMBULATORY CASE MANAGEMENT NOTE    Names and Relationships of Patient/Support Persons: Contact: Hesham Arevalo Jr.; Relationship: Self -     Adult Patient Profile  Questions/Answers      Flowsheet Row Most Recent Value   Symptoms/Conditions Managed at Home diabetes, type 2, gastrointestinal, cardiovascular   Barriers to Managing Health none   Cardiovascular Symptoms/Conditions hypertension, high blood cholesterol   Cardiovascular Management Strategies activity, routine screening, coping strategies, diet modification, exercise, medication therapy   Diabetes Management Strategies activity, blood glucose testing, coping strategies, diet modification, exercise, insulin therapy, medication therapy, routine screenings   A1C Target 6.5   Last A1C Result 11.8   Gastrointestinal Symptoms/Conditions other (see comments)  []   Gastrointestinal Comment ileostomy, history of colon cancer   Source of Information patient   Patient Aware of Diagnosis yes   Communication Difficulty no   Eating/Swallowing Difficulty no   Walking or Climbing Stairs Difficulty no   Dressing/Bathing Difficulty no   Difficulty Managing Errands Independently no   Equipment Currently Used at Home glucometer   Fall Risk Category Low   Q1: How often do you have a drink containing alcohol? Never   Q2: How many drinks containing alcohol do you have on a typical day when you are drinking? None   Q3: How often do you have six or more drinks on one occasion? Never   Audit-C Score 0        Social Work Assessment  Questions/Answers      Flowsheet Row Most Recent Value   Q1: How often do you have a drink containing alcohol? Never   Q2: How many drinks containing alcohol do you have on a typical day when you are drinking? None   Q3: How often do you have six or more drinks on one occasion? Never   Audit-C Score 0   Equipment Currently Used at Home glucometer        CCM Interim Update    Spoke with Mr. Arevalo and explained CCM program including billing.  Verbal consent given by patient. He is motivated to work on his diabetes. He has already cut out sweets and sugar. Discussed other foods with him that have a high glycemic effect and foods that he can consume that have low effect.     He is interested in a CGM. It appears that Dexcom is more preferred with his insurance than dexcom but does require prior auth. PA started on covermymeds [id number]. Script pended for pcp to sign and send to pharmacy.    Patient is monitoring his blood sugar. Encouraged him to keep a log and to also keep food diary for when his blood sugar is elevated.    [facility] questionnaire sent to patient.        Education Documentation  No documentation found.        Olena EUCEDA  Ambulatory Case Management    4/2/2025, 14:52 EDT

## 2025-04-02 NOTE — PLAN OF CARE
Problem: Diabetes Type 2  Goal: Monitor My Blood Sugar  Intervention: My Blood Sugar Management To Do List  Description: Why is this important?Checking your blood sugar (glucose) at home helps to keep it from getting very high or very low.Writing the results in a diary or log, or using an dhruv, helps your diabetes care provider know how to care for you.Your blood sugar (glucose) log should have the time, date and results.Also write down the amount of insulin or other medicine that you take.  Flowsheets (Taken 4/2/2025 1431)  My Blood Sugar Management To Do List: take the blood sugar (glucose) log to all provider visits  Goal: Learn About Diabetes  Intervention: My Learn About Diabetes To Do List  Description: Why is this important?Learning about diabetes can help you to manage it.  Flowsheets (Taken 4/2/2025 1431)  My Learn About Diabetes To Do List:   ask questions   tell someone when I don't understand  Goal: Optimal Care Coordination of a Patient Experiencing Diabetes, Type 2  Intervention: Alleviate Barriers to Glycemic Management  Flowsheets (Taken 4/2/2025 1431)  Alleviate Barriers to Glycemic Management: (referred for CGM - dexcom)   A1C testing facilitated   blood glucose monitoring encouraged   use of blood glucose monitoring log promoted     Problem: Hypertension  Goal: Make Healthy Diet Choices  Outcome: Progressing  Goal: Stop or Cut Down Drinking Alcohol  Outcome: Met  Intervention: Reduce My Alcohol To Do List  Description: Why is this important?To stop or cut down on drinking, it is important to have support from a person or group of people that you can count on.You will also need to think about the things that make you feel like using alcohol, then plan for how to handle them.

## 2025-04-10 ENCOUNTER — PATIENT OUTREACH (OUTPATIENT)
Dept: CASE MANAGEMENT | Facility: OTHER | Age: 60
End: 2025-04-10
Payer: COMMERCIAL

## 2025-04-10 DIAGNOSIS — I10 ESSENTIAL HYPERTENSION: ICD-10-CM

## 2025-04-10 DIAGNOSIS — Z79.4 TYPE 2 DIABETES MELLITUS WITH HYPERGLYCEMIA, WITH LONG-TERM CURRENT USE OF INSULIN: Primary | ICD-10-CM

## 2025-04-10 DIAGNOSIS — K75.81 NASH (NONALCOHOLIC STEATOHEPATITIS): ICD-10-CM

## 2025-04-10 DIAGNOSIS — E11.65 TYPE 2 DIABETES MELLITUS WITH HYPERGLYCEMIA, WITH LONG-TERM CURRENT USE OF INSULIN: Primary | ICD-10-CM

## 2025-04-10 NOTE — TELEPHONE ENCOUNTER
Patient is interested in dexcom to monitor BS. PA was started through covermymeds and was approved. Script was sent to [facility]. Call placed to [facility] [phone]. They have his sensors ready and he has 0 copay. Patient made aware.

## 2025-04-10 NOTE — OUTREACH NOTE
AMBULATORY CASE MANAGEMENT NOTE    Names and Relationships of Patient/Support Persons: Contact: Hesham Arevalo .; Relationship: Self -     CCM Interim Update    Patient has been approved for Dexcom at 0 copay. Please see refill note for further documentation.    He has been checking his blood sugar but only before meals. He reports it has ranged 140-165. He is looking forward to having continuous data.         Education Documentation  blood glucose monitoring, taught by Olena Baron RN at 4/10/2025 11:04 AM.  Learner: Patient  Readiness: Eager  Method: Explanation  Response: Verbalizes Understanding  Comment: Educated patient that if doing finger sticks to also monitor fasting blood sugar and 2 hours following meals.          Olena EUCEDA  Ambulatory Case Management    4/10/2025, 11:04 EDT

## 2025-04-14 ENCOUNTER — TELEPHONE (OUTPATIENT)
Dept: FAMILY MEDICINE CLINIC | Facility: CLINIC | Age: 60
End: 2025-04-14

## 2025-04-14 NOTE — TELEPHONE ENCOUNTER
Caller: Hesham Arevalo Jr.    Relationship: Self    Best call back number: [phone]    Which medication are you concerned about: PT STATED THAT HE WAS GIVEN A SCALE TO FOLLOW FOR HIS INSULIN, WAS TOLD TO CHECK TWICE A WEEK WEDNESDAYS AND SUNDAYS, SUNDAY IT WAS OVER 150 SO HE INCREASED TO 3 UNITS, PT WILL LIKE TO KNOW IF HE IS TO KEEP IT AT 13 UNITS UNTIL HE CHECKS IT ON WEDNESDAY.

## 2025-04-15 ENCOUNTER — OFFICE VISIT (OUTPATIENT)
Dept: ONCOLOGY | Facility: CLINIC | Age: 60
End: 2025-04-15
Payer: COMMERCIAL

## 2025-04-15 VITALS
WEIGHT: 187 LBS | BODY MASS INDEX: 24.78 KG/M2 | DIASTOLIC BLOOD PRESSURE: 67 MMHG | RESPIRATION RATE: 18 BRPM | HEIGHT: 73 IN | HEART RATE: 59 BPM | TEMPERATURE: 98.2 F | OXYGEN SATURATION: 98 % | SYSTOLIC BLOOD PRESSURE: 130 MMHG

## 2025-04-15 DIAGNOSIS — Z90.49 S/P COLON RESECTION: Primary | ICD-10-CM

## 2025-04-15 NOTE — LETTER
April 15, 2025     Martell Young MD  [address] [facility] [place] [address]    Patient: Hesham Arevalo Jr.   YOB: 1965   Date of Visit: 4/15/2025     Dear Martell Young MD:       Thank you for referring Hesham Arevalo to me for evaluation. Below are the relevant portions of my assessment and plan of care.    If you have questions, please do not hesitate to call me. I look forward to following Hesham along with you.         Sincerely,        Ousmane Moeller MD        CC: MD Pau Martin MD David F Dougherty, MD Thomas Charles Knopp, MD Ignacia Ocampo, LEEROY Hsu Lee G, MD  04/15/25 0937  Sign when Signing Visit  CHIEF COMPLAINT: History of rectal cancer with no change in color caliber or consistency of his stools    Problem List:  Oncology/Hematology History Overview Note   1.  Stage IIIB rectal carcinoma clinical T3b N1 aM0  2.  Protracted postoperative course due to abdominal pelvic abscess following neoadjuvant Xeloda radiation  3.  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  B12 deficiency anemia  5.  Iron deficiency anemia  6.  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  Per note review from office visit with Dr. Ignacia Donnelly -10/14/2022 MRI liver protocol at  showed patent portal vein, superior mesenteric vein, and splenic vein with resolution of previous near occlusive SMV portal vein thrombus.  Completed treatment with warfarin.  No hepatic thrombosis and repeat imaging.  Coumadin discontinued.  7.  Intestinal metaplasia on EGD    -11/30/18 CT abdomen pelvis and chest x-ray negative for metastasis.  Colonoscopy positive for high rectal or low sigmoid poorly  differentiated adenocarcinoma with MSI intact on IHC.    -12/6/18 MRI of rectum showed T3b with suspicious right internal iliac lymph node and CT chest noncontrast negative except for gallstones  -Per Dr. Kelly, CEA was normal.  -12/18/18 saw me for the first time.  I reviewed her case in our multidisciplinary conference and went over that in detail with her.  She had presented with hematochezia.  Dr. Kelly repeated endoscopy for more exact detailing of the primary location and this appears to be rectal on MRI and endoscopy.  Plan is for randomization on clinical trial N 1048 to neoadjuvant FOLFOX versus standard chemoradiation.  We'll get him to radiation and our research staff for randomization and get his baseline CBC and CMP.    -1/4/19 followed up: Randomized to the Xeloda radiation arm.  He will get that and then 4-6 weeks later had his surgery, and then follow that with 6 months of adjuvant FOLFOX per protocol.  My research assistant met with him today.  He has a Xeloda prescription.  -3/4/2019 MRI: Good response with T2, less likely T3 with spiculation N0 disease.  -4/23/2019 pathology: Laparoscopic assisted converted to lower anterior resection open with stapled coloanal anastomosis and diverting loop ileostomy with liver biopsy showed residual adenoma with focal high-grade dysplasia but no residual invasive carcinoma.  0 out of 17 lymph nodes.  Mild steatosis on liver biopsy with bridging fibrosis.  -5/9/2019 to 5/15/2019 admitted with abdominal pelvic abscess status post CT-guided percutaneous drain placement presenting with sepsis improved with antibiotics and drainage.  Temp of 104 on presentation.  2 weeks of protracted IV antibiotics with Radames Sosa planned at discharge.  -7/11/2019 follow-up visit:Start of adjuvant therapy delayed due to the above postoperative pelvic abscess.  This was treated surgically initially but then by CT-guided percutaneous drainage as above.  Serial follow-up with Radames  Sosa including CTs and antibiotics eventually cleared him adequately to consider resumption of plans for adjuvant therapy.  CTs of the pelvis done on 6/5/2019, 6/13/2019, 6/26/2019 monitoring for this abscess and possible drainage but not able to drain due to decreasing size and minimal residual edema with CT 7/10/2019 showingStable to slight decrease in size of presacral perirectal abscess with presacral edema measuring 3.9 x 1.9 previously 4.1 x 2.5 cm. No new sites of focal fluid collection or loculated fluid.  Hence, modified FOLFOX 6 started at this junction.  -9/30/2019 office visit: Oxaliplatin discontinued due to ongoing thrombocytopenia.  Platelet count today 59,000.  We will continue course #5 and 6 with 5-FU and leucovorin alone to complete adjuvant therapy.  -10/23/2019 completed adjuvant chemotherapy.  -10/28/2019 CT chest abdomen pelvis with contrast showed decreased and fluid in the presacral fat.  Decrease in surrounding soft tissue stranding.  Gallstone seen with enlargement of spleen.  Stranding of the mesenteric roots with varices in the mesentery slightly increased compared to July 2019.  Chest is unremarkable.  CEA 1.53 with bilirubin 1.4, AST 46, alkaline phosphatase 135, white count 2500, hemoglobin 9.8, and platelets 62,000 on 10/21/2019.  -11/2019 colonoscopy with Dr. Lebron negative according to patient.  -2/23/2020: Saw Dr. Lebron regarding Hatfield cirrhosis-induced portal hypertension.  Did not recommend TIPS.  Started nadolol  -2/25/20 20 CT chest abdomen pelvis negative for metastasis but with portal hypertension and varices.  CEA 1.1.  ALT 51.  White count 3970 with hemoglobin 10.9.  -6/2/2020 CT abdomen and pelvis with stable postsurgical changes, no evidence of disease progression, CEA 0.86.  -8/28/2020 ileocolonoscopy  -9/8/2020 CT abdomen and pelvis without evidence of disease recurrence, CEA 0.87.  -3/9/2021 CT abdomen and pelvis stable changes, no evidence of progression of  disease or recurrence.  CEA 1.21  -8/30/2021 CT chest, abdomen and pelvis with nonspecific small area of groundglass nodularity near the periphery of the right lower lobe favored infectious/inflammatory, otherwise CT scan stable without specific evidence of disease progression.  Of note, patient did have Covid infection 2 months ago.  Currently no respiratory concerns.  Has seen Dr. Gerardo at  for evaluation regarding his desire for ileostomy reversal, he reports that he had liver ultrasound that showed questionable liver lesion, he is scheduled for additional CT on 9/24 and then follow-up with Dr. Gerardo.  I discussed with the patient that current CT scan shows no abnormalities in the liver, he does have known cirrhosis with [place].  We will continue surveillance as per NCCN guidelines with repeat CT scans and serum testing in 6 months.  CEA currently normal at 1.13, platelet count stable at 73,000.    -2/24/2022 follow-up Dr. Racheal Gerardo Hepatology.  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    -2/28/2022 data from : Ferritin low at 12 with iron low at 38 and decreased transferrin saturation 8% with increased total iron-binding capacity 498 and increased transferrin 398 consistent with iron deficiency.  B12 level immeasurably low less than 150.  Folic acid normal 15.5.  Alpha-fetoprotein 2.5.  INR 2.3.  Hemoglobin 8.9 MCV 77 with white count 3250 and platelets 77k.    -3/10/2022 data:  White count stable 3230.  Hemoglobin down to 8.5 with MCV down to 76.3 with platelets stable 72,000.  CEA 1.22.  AST 48 with bilirubin 1.4Glucose 156.  Otherwise unremarkable CMP.   CT chest abdomen pelvisShows resolution of right lower lobe groundglass opacities.  Fatty liver infiltration.  Spleen 15.6 cm with cirrhotic liver and portal venous hypertensive changes and small amount  of nonocclusive thrombus within the superior mesenteric vein.  Cholelithiasis    -3/18/2022 [facility] medical oncology follow-up visit: I reviewed multiple data sets from  and current CTs and labs and CEA from [facility].  Both images of the scans as well as reports reviewed and went over the above with the patient.  He started B12 already and has iron deficiency anemia as well documented at  and I have started him on ferrous sulfate 325 twice a day every other day with vitamin C and we will in 6 months repeat his CTs and CBC and CEA per NCCN guidelines from the cancer standpoint.  He will continue to follow with  liver specialists from the portal hypertension standpoint and is currently on Coumadin.  He had EGD and colonoscopy according to the patient yesterday at  and the varices were improved.  There was still some thrombus in the current CT but I suspect it is improving.  Coumadin is a Catch-22 and may actually complicate variceal bleeding but it also may diminish the likelihood of bleeding if it helps decompress the portal system by treating the thrombus and I leave that decision to the capable hands of his gastroenterologist/liver specialist at     - 8/26/2022 [facility] liver clinic note.  [place]field cirrhosis.  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  Following with anticoagulation clinic.  They plan to arrange for MRI liver protocol to assess for possible resolution of thrombus as surgeon planning ileostomy reversal once liver clinic clears him for surgery.  AST 49.  ALT 29.  Bilirubin 1.0.  Alkaline phosphatase 52.  White count 2980.  Hemoglobin 11.4.  Platelets 77,000.  Alpha-fetoprotein 2.6.  INR 2.0.    -9/12/2022 CT chest abdomen pelvis shows cirrhotic changes with portal hypertension stable no ascites.  No acute cardiopulmonary process.  No metastatic disease.  Stable presacral soft tissue thickening posttreatment  related most likely.  Spleen is enlarged.      -9/16/2022 [facility] medical oncology follow-up visit: I reviewed the above liver clinic note and CT images and reports with patient.  No evidence of recurrence.  Today's white count is 2740 with hemoglobin 11.3 platelets 64,000 generally with platelets in the 70,000's over the last year.  MCV however has improved now 87.8 on iron and B12 in the hemoglobin has gone from 8.5 up to 11.3 in the last 6 months.  He did have gastritis and intestinal metaplasia without dysplasia with Dr. Lebron February 2022 with atrophic gastric body.  Iron, B12, folate, CEA pending at time of dictation.  We will have my nurse practitioner call him the results of the pending lab and if the CEA is rising he may need to see me sooner but otherwise the plan is to repeat his tumor markers and labs and CTs again in 6 months and will do so every 6 months through January 2024.  He is contemplating reversal of the ostomy and I left a message with Dr. Kelly that Avatrombopag might be helpful but his platelets are close to 60,000 which is the general goal for most preoperative clearances but I will defer to her on that and of course the portal hypertension itself increases the risk of surgery simply from vascular engorgement and an MRI liver is planned by the liver surgeons to see if he can safely come off of the Coumadin that they are managing through the  Coumadin clinic.  Asked him to touch base with Dr. Lebron as to when to get the next EGD relative to the intestinal metaplasia on EGD.    -10/13/2022 MRI of the abdomen at  was stable, no new or suspicious liver lesions.    -3/14/2023 CT chest, abdomen and pelvis with no evidence of recurrent or metastatic disease.  Stable postsurgical changes from bowel resection and right lower quadrant ostomy.  Soft tissue in the presacral space similar to prior study.  Cirrhosis of the liver with splenomegaly and multiple right renal varices.  Cholelithiasis. CEA  1.05.  CBC is unremarkable, platelet count stable at 75,000.  CMP creatinine stable at 1.28, normal AST and ALT of 34/33, total bilirubin 1.3.    -3/20/2023 [facility] follow-up: Mr. Arevalo continues to do well, no evidence of recurrent rectal cancer on clinical exam and no new worrisome symptoms.  Current CT chest, abdomen and pelvis from 3/14/2023 showed no evidence of recurrence, stable postsurgical changes and cirrhosis.  CEA normal at 1.05.  Platelet count is stable at 75,000.  He continues to follow with GI clinic at  in light of his cirrhosis, he is now off of Coumadin.  He has not had follow-up with Dr. Kelly, he was due to see her in September for repeat flexible sigmoidoscopy but he states when he called the office they did not show that he needed an appointment.  I have asked him to contact her office now to get an appointment.  He also had questions regarding his ileostomy reversal but I defer to Dr. Kelly for that discussion.  He is now off of Coumadin.  He is asking whether or not he should consider having his reversal at  due to his high risk with cirrhosis but again I have asked him to discuss this with Dr. Kelly.  We will continue surveillance per NCCN guidelines with repeat CT chest, abdomen and pelvis and serum testing again in 6 months and I have ordered that today.  We plan on surveillance through January 2024.    -6/9/2023  hepatology  Cony MOOREN follow-up HATFIELD cirrhosis off Coumadin since November 2022 had ultrasound abdomen this day.  MELD score 11.  There ordering alpha-fetoprotein and MRI abdomen along with iron indices.  Presumably [place] due to weight, diabetes, hyperlipidemia, hypertension.  Tylenol as needed.  They plan MRI abdomen in 3-4 months to assess liver RADS 3 lesion.  If stable x2 years then they will continue ultrasound abdomen for HCC screening.  They recommended follow-up EGD March 2024 with Dr. Lebron.  Alpha-fetoprotein this day normal 2.7 With normal ferritin  33 and iron normal 80 with normal total iron-binding capacity and normal saturation 19% with normal total iron-binding capacity and transferrin.    -9/13/2023 CT chest abdomen pelvis compared to March 2023 showed postsurgical changes without recurrence in the chest abdomen or pelvis.  Cirrhotic portal hypertensive findings including splenomegaly with large mesenteric veins draining into the right renal vein.  Hemoglobin 12.6 up from 11.6 in June and stable from March 12 0.8 with normochromic normocytic indices.  Platelet count 69,000 where it fluctuates around 70,000 give her take 10,000.  AST 51 bilirubin 1.8 glucose 189 otherwise unremarkable CMP.  CEA normal 1.49.  This is up from 0.87 a year ago but was as high as 1.55 November 2019.    - 9/21/2023 [facility] hematology oncology follow-up: Per patient, patient did flexible sigmoidoscopy in May.  From the CAT scan and blood and tumor marker standpoint, nothing to suggest recurrence.  CEA waxes and wanes but no significant rise.  He has persistent mild anemia and thrombocytopenia with his portal hypertension from [place] cirrhosis being monitored by hepatology at .  They plan MRI as outlined above to follow-up on prior liver mass but nothing on current CTs other than chronic cirrhotic portal hypertensive variceal changes.  From the rectal cancer standpoint we will repeat his CBC CMP CEA and CTs again in 6 months and at that point he will be more than 5 years out from diagnosis and we would stop routine oncologic laboratory and imaging follow-up from the rectal cancer standpoint.  Dr. Kelly is not inclined towards reversal of his ostomy.  I asked him to [place] back with Dr. Kelly as to need for surveillance colonoscopy proximal to the ostomy and not just sigmoidoscopy.    -11/17/2023 MRI abdomen 10 mm liver RADS 3 intermediate probability for hepatocellular carcinoma with splenomegaly and no ascites.    -3/28/2024 white count 3050 hemoglobin 13.3 platelets 62,000.  Glucose  299 creatinine 0.72 otherwise unremarkable CMP.  CEA pending.    -3/28/2024 [facility]ology follow-up: With reference to his cirrhosis and screening for hepatocellular carcinoma he is following with hepatology at  and his labs are fairly stable.  We will call him his CEA results from today and if rising we will push for CTs which this time around were denied and we will check his tumor marker with labs and CT prior to return in 6 months.  I have asked him to get back with Dr. Kelly for repeat endoscopy.    -7/8/2024 open cholecystectomy    -9/23/2024 CT chest, abdomen and pelvis with no evidence of disease recurrence, interval cholecystectomy.  CEA 1.88.  -9/27/2024 [facility] clinic follow-up: Hesham is doing well, he has no evidence of recurrent colon cancer on clinical exam and no new worrisome symptoms.  Current CT chest, abdomen and pelvis showed no evidence of disease recurrence, his CEA remains normal at 1.88.  He was not able to get his ileocolonoscopy this year due to complications with his gallbladder, he underwent open cholecystectomy 7/8/2024.  I have put in a referral to get him back to Dr. Lebron.  In regards to his history of rectal cancer he has completed 5 years surveillance per NCCN guidelines.  I did check with research, clinical trial in 1048 is closed.  He continues to follow with hepatology at , he is scheduled for MRI and follow-up in November.  We will plan on seeing him back in 6 months for follow-up.  No plans on routine imaging or labs in the absence of new symptoms.     -10/7/2024 received notice from [employer] that patient was not due for colonoscopy until 09/2026.    - 2/17/2025 MRI abdomen cirrhotic liver protocol at  showed unchanged moderate splenomegaly without ascites.  Cirrhotic liver with unchanged perfusional LR 3 focus segment 3 and no suspicious LR 4 or LR 5 lesions.     Rectal carcinoma (Resolved)   12/18/2018 Initial Diagnosis    Rectal carcinoma (CMS/HCC)      1/7/2019 - 2/4/2019 Chemotherapy    Xeloda radiation on protocol     1/8/2019 - 2/14/2019 Radiation    Radiation OncologyTreatment Course:  Hesham Arevalo received 5040 cGy in 28 fractions to pelvis via External Beam Radiation - EBRT.     3/4/2019 Imaging    MRI of the pelvis:  IMPRESSIONS:  MRI rectal cancer T category is: T2, LESS LIKELY EARLY T3 SPICULATIONS  Maximum EMD of invasion is: 0  Minimum tumor to MRF distance is: 12 MM  Low rectal tumor component: NO  Mesorectal nodes/tumor deposits: NO  EMVI: NO  Extramesorectal nodes: NO     4/23/2019 Surgery    Surgery rectosigmoidectomy pathology report:  Final Diagnosis    1. RECTOSIGMOID COLON, RECTOSIGMOID RESECTION:  Residual adenoma with focal high grade dysplasia with no residual invasive carcinoma identified post-treatment (see comment).  Seventeen lymph nodes negative for carcinoma (0/17)   2. LIVER, CORE NEEDLE BIOPSY:  Mild steatosis with mild chronic inflammation and increased fibrosis including bridging fibrosis (Stage 3-4) (see comment)        COLON AND RECTUM TEMPLATE:  TYPE OF SPECIMEN/PROCEDURE: Rectosigmoid resection.   SPECIMEN INTEGRITY:  Intact.  TUMOR SITE:  Rectum.  TUMOR SIZE (greatest dimension):  Indeterminate  TUMOR LOCATION (applicable only to rectal primaries): Entirely below anterior peritoneal reflection.  MACROSCOPIC INTACTNESS OF MESORECTUM (If applicable): Intact.  MACROSCOPIC TUMOR PERFORATION: Not identified.  TUMOR CONFIGURATION:  Ulcerated.  HISTOLOGIC TYPE:  Adenocarcinoma.  HISTOLOGIC GRADE:  G3-4 (per previous biopsy).  MICROSCOPIC DEPTH OF TUMOR INVASION/EXTENSION:  No residual tumor identified.  PERITONEAL INVOLVEMENT:  Not identified.  LYMPHVASCULAR INVASION:  Not identified.  PERINEURAL INVASION: Not identified.  SURGICAL MARGINS: Uninvolved.  STATUS AND DISTANCE FROM PROXIMAL MUCOSAL MARGIN:  Uninvolved, 13.0 cm.  STATUS AND DISTANCE FROM DISTAL MUCOSAL MARGIN: Uninvolved, 1.2 cm.  STATUS AND DISTANCE FROM MESENTERIC  MARGIN: Not applicable.  STATUS AND DISTANCE FROM RADIAL MARGIN: Uninvolved, 1.7 cm.  DISTANCE FROM DENTATE LINE (RECTAL TUMOR ONLY):  Indeterminate.  TUMOR DEPOSITS (DISCONTINUOUS EXTRAMURAL EXTENSION):  Not identified.  NUMBER OF LYMPH NODES INVOLVED BY METASTATIC NEOPLASM: 0.  NUMBER OF REGIONAL LYMPH NODES EXAMINED: 17.  EXTRANODAL EXTENSION:  Not applicable.  TREATMENT EFFECT:  Present, no viable cancer cells identified (complete response, score 0).  ADDITIONAL PATHOLOGIC FINDINGS:  None.  MSI TESTING:  No evidence of MSI based on reported IHC on outside biopsy  OTHER ANCILLARY STUDIES (BRAF, KRAS, ETC.):  Should be performed on initial diagnostic biopsy if needed.  AJCC PATHOLOGIC STAGE:  (COMPLETED BY PATHOLOGIST, BASED ONLY ON TISSUE FINDINGS, MORE EXTENSIVE DISEASE MAY NOT BE KNOWN TO THE PATHOLOGIST)  ypT=  0  ypN=  0  AJCC PATHOLOGIC STAGE:  y0.                5/15/2019 Adverse Reaction    -5/9/2019 to 5/15/2019 admitted with abdominal pelvic abscess status post CT-guided percutaneous drain placement presenting with sepsis improved with antibiotics and drainage.  Temp of 104 on presentation.  2 weeks of protracted IV antibiotics with Radames Sosa planned at discharge     7/22/2019 - 10/23/2019 Chemotherapy    OP COLON [id number] OXALIplatin / Leucovorin / Fluorouracil  Start of adjuvant therapy delayed due to the above postoperative pelvic abscess.  This was treated surgically initially but then by CT-guided percutaneous drainage as above.  Serial follow-up with Radames Sosa including CTs and antibiotics eventually cleared him adequately to consider resumption of plans for adjuvant therapy.  CTs of the pelvis done on 6/5/2019, 6/13/2019, 6/26/2019 monitoring for this abscess and possible drainage but not able to drain due to decreasing size and minimal residual edema with CT 7/10/2019 showingStable to slight decrease in size of presacral perirectal abscess with presacral edema measuring 3.9 x 1.9  previously 4.1 x 2.5 cm. No new sites of focal fluid collection or loculated fluid.     9/30/2019 Adverse Reaction    Oxaliplatin discontinued due to ongoing thrombocytopenia.     10/28/2019 Imaging    10/28/2019 CT chest abdomen pelvis with contrast showed decreased and fluid in the presacral fat.  Decrease in surrounding soft tissue stranding.  Gallstone seen with enlargement of spleen.  Stranding of the mesenteric roots with varices in the mesentery slightly increased compared to July 2019.  Chest is unremarkable.  CEA 1.53 with bilirubin 1.4, AST 46, alkaline phosphatase 135, white count 2500, hemoglobin 9.8, and platelets 62,000 on 10/21/2019.     8/28/2020 Procedure    Ileocolonoscopy     3/10/2022 Imaging    -3/10/2022 data:  White count stable 3230.  Hemoglobin down to 8.5 with MCV down to 76.3 with platelets stable 72,000.  CEA 1.22.  AST 48 with bilirubin 1.4Glucose 156.  Otherwise unremarkable CMP.   CT chest abdomen pelvisShows resolution of right lower lobe groundglass opacities.  Fatty liver infiltration.  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  Cholelithiasis     9/16/2022 Imaging    CT chest abdomen pelvis shows cirrhotic changes with portal hypertension stable no ascites.  No acute cardiopulmonary process.  No metastatic disease.  Stable presacral soft tissue thickening posttreatment related most likely.  Spleen is enlarged.     Rectal cancer (Resolved)   4/23/2019 Initial Diagnosis    Rectal cancer     7/22/2019 - 1/23/2020 Chemotherapy    OP CENTRAL VENOUS ACCESS DEVICE CARE AND MAINTENANCE         HISTORY OF PRESENT ILLNESS:  The patient is a 59 y.o. male, here for follow up on management of history of rectal cancer.  No change in color caliber or consistency of stools    Past Medical History:   Diagnosis Date   • Arthritis     knees    • Cancer 11/2018    colon with resection   •  "Cholelithiasis    • Disease of thyroid gland    • Fatty liver    • Hashimoto's disease    • History of radiation therapy 02/14/2019    rectal cancer   • Hypertension    • Ileostomy in place    • Prediabetes    • Psoriasis    • Wears glasses      Past Surgical History:   Procedure Laterality Date   • CHOLECYSTECTOMY N/A 7/8/2024    Procedure: OPEN CHOLECYSTECTOMY;  Surgeon: Konstantin Garcia MD;  Location:  [facility];  Service: General;  Laterality: N/A;   • COLON RESECTION N/A 4/23/2019    Procedure: LAPAROSCOPIC LOWER ANTERIOR RESECTION WITH DIVERTING LOOP ILEOOSTOMY, SPLENIC FLEIXURE MOBILIZATION AND LIVER BIOPSY, AND PROCTOSCOPY;  Surgeon: Pau Kelly MD;  Location:  [facility];  Service: General   • COLONOSCOPY      2018   • ILEOSTOMY  04/2019   • LIVER BIOPSY  2003   • VASECTOMY  1998   • VENOUS ACCESS DEVICE (PORT) INSERTION N/A 7/18/2019    Procedure: PORT PLACEMENT;  Surgeon: Franky Cazares MD;  Location:  [facility];  Service: General       No Known Allergies    Family History and Social History reviewed and changed as necessary    REVIEW OF SYSTEM:   Hemostatically stable.  No new somatic complaints.    PHYSICAL EXAM:  No jaundice icterus or pallor.  No petechiae or ecchymoses.  No abdominal tenderness or shifting dullness    Vitals:    04/15/25 0903   BP: 130/67   Pulse: 59   Resp: 18   Temp: 98.2 °F (36.8 °C)   SpO2: 98%   Weight: 84.8 kg (187 lb)   Height: 185.4 cm (73\")     Vitals:    04/15/25 0903   PainSc: 0-No pain          ECOG score: 0           Vitals reviewed.    ECOG: (0) Fully Active - Able to Carry On All Pre-disease Performance Without Restriction    Lab Results   Component Value Date    HGB 11.2 (L) 03/13/2025    HCT 34.7 (L) 03/13/2025    MCV 85 03/13/2025    PLT 71 (L) 03/13/2025    WBC 3.4 03/13/2025    NEUTROABS 2.0 03/13/2025    LYMPHSABS 0.7 03/13/2025    MONOSABS 0.4 03/13/2025    EOSABS 0.2 03/13/2025    BASOSABS 0.1 03/13/2025       Lab Results   Component Value Date    " GLUCOSE 271 (H) 03/13/2025    BUN 19 03/13/2025    CREATININE 1.09 03/13/2025     (L) 03/13/2025    K 4.5 03/13/2025    CL 97 03/13/2025    CO2 22 03/13/2025    CALCIUM 9.2 03/13/2025    PROTEINTOT 7.0 03/13/2025    ALBUMIN 3.5 (L) 03/13/2025    BILITOT 1.4 (H) 03/13/2025    ALKPHOS 93 03/13/2025    AST 57 (H) 03/13/2025    ALT 34 03/13/2025             ASSESSMENT & PLAN:  1.  Stage IIIB rectal carcinoma clinical T3b N1 aM0  2.  Protracted postoperative course due to abdominal pelvic abscess following neoadjuvant Xeloda radiation  3.  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  B12 deficiency anemia  5.  Iron deficiency anemia  6.  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  Per note review from office visit with Dr. Ignacia Donnelly -10/14/2022 MRI liver protocol at  showed patent portal vein, superior mesenteric vein, and splenic vein with resolution of previous near occlusive SMV portal vein thrombus.  Completed treatment with warfarin.  No hepatic thrombosis and repeat imaging.  Coumadin discontinued.  7.  Intestinal metaplasia on EGD    -11/30/18 CT abdomen pelvis and chest x-ray negative for metastasis.  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    -12/6/18 MRI of rectum showed T3b with suspicious right internal iliac lymph node and CT chest noncontrast negative except for gallstones  -Per Dr. Kelly, CEA was normal.  -12/18/18 saw me for the first time.  I reviewed her case in our multidisciplinary conference and went over that in detail with her.  She had presented with hematochezia.  Dr. Kelly repeated endoscopy for more exact detailing of the primary location and this appears to be  rectal on MRI and endoscopy.  Plan is for randomization on clinical trial N 1048 to neoadjuvant FOLFOX versus standard chemoradiation.  We'll get him to radiation and our research staff for randomization and get his baseline CBC and CMP.    -1/4/19 followed up: Randomized to the Xeloda radiation arm.  He will get that and then 4-6 weeks later had his surgery, and then follow that with 6 months of adjuvant FOLFOX per protocol.  My research assistant met with him today.  He has a Xeloda prescription.  -3/4/2019 MRI: Good response with T2, less likely T3 with spiculation N0 disease.  -4/23/2019 pathology: Laparoscopic assisted converted to lower anterior resection open with stapled coloanal anastomosis and diverting loop ileostomy with liver biopsy showed residual adenoma with focal high-grade dysplasia but no residual invasive carcinoma.  0 out of 17 lymph nodes.  Mild steatosis on liver biopsy with bridging fibrosis.  -5/9/2019 to 5/15/2019 admitted with abdominal pelvic abscess status post CT-guided percutaneous drain placement presenting with sepsis improved with antibiotics and drainage.  Temp of 104 on presentation.  2 weeks of protracted IV antibiotics with Radames Sosa planned at discharge.  -7/11/2019 follow-up visit:Start of adjuvant therapy delayed due to the above postoperative pelvic abscess.  This was treated surgically initially but then by CT-guided percutaneous drainage as above.  Serial follow-up with Radames Sosa including CTs and antibiotics eventually cleared him adequately to consider resumption of plans for adjuvant therapy.  CTs of the pelvis done on 6/5/2019, 6/13/2019, 6/26/2019 monitoring for this abscess and possible drainage but not able to drain due to decreasing size and minimal residual edema with CT 7/10/2019 showingStable to slight decrease in size of presacral perirectal abscess with presacral edema measuring 3.9 x 1.9 previously 4.1 x 2.5 cm. No new sites of focal fluid  collection or loculated fluid.  Hence, modified FOLFOX 6 started at this junction.  -9/30/2019 office visit: Oxaliplatin discontinued due to ongoing thrombocytopenia.  Platelet count today 59,000.  We will continue course #5 and 6 with 5-FU and leucovorin alone to complete adjuvant therapy.  -10/23/2019 completed adjuvant chemotherapy.  -10/28/2019 CT chest abdomen pelvis with contrast showed decreased and fluid in the presacral fat.  Decrease in surrounding soft tissue stranding.  Gallstone seen with enlargement of spleen.  Stranding of the mesenteric roots with varices in the mesentery slightly increased compared to July 2019.  Chest is unremarkable.  CEA 1.53 with bilirubin 1.4, AST 46, alkaline phosphatase 135, white count 2500, hemoglobin 9.8, and platelets 62,000 on 10/21/2019.  -11/2019 colonoscopy with Dr. Lebron negative according to patient.  -2/23/2020: Saw Dr. Lebron regarding [place] cirrhosis-induced portal hypertension.  Did not recommend TIPS.  Started nadolol  -2/25/20 20 CT chest abdomen pelvis negative for metastasis but with portal hypertension and varices.  CEA 1.1.  ALT 51.  White count 3970 with hemoglobin 10.9.  -6/2/2020 CT abdomen and pelvis with stable postsurgical changes, no evidence of disease progression, CEA 0.86.  -8/28/2020 ileocolonoscopy  -9/8/2020 CT abdomen and pelvis without evidence of disease recurrence, CEA 0.87.  -3/9/2021 CT abdomen and pelvis stable changes, no evidence of progression of disease or recurrence.  CEA 1.21  -8/30/2021 CT chest, abdomen and pelvis with nonspecific small area of groundglass nodularity near the periphery of the right lower lobe favored infectious/inflammatory, otherwise CT scan stable without specific evidence of disease progression.  Of note, patient did have Covid infection 2 months ago.  Currently no respiratory concerns.  Has seen Dr. Gerardo at  for evaluation regarding his desire for ileostomy reversal, he reports that he had liver  ultrasound that showed questionable liver lesion, he is scheduled for additional CT on 9/24 and then follow-up with Dr. Gerardo.  I discussed with the patient that current CT scan shows no abnormalities in the liver, he does have known cirrhosis with [place].  We will continue surveillance as per NCCN guidelines with repeat CT scans and serum testing in 6 months.  CEA currently normal at 1.13, platelet count stable at 73,000.    -2/24/2022 follow-up Dr. Racheal Gerardo Hepatology.  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    -2/28/2022 data from : Ferritin low at 12 with iron low at 38 and decreased transferrin saturation 8% with increased total iron-binding capacity 498 and increased transferrin 398 consistent with iron deficiency.  B12 level immeasurably low less than 150.  Folic acid normal 15.5.  Alpha-fetoprotein 2.5.  INR 2.3.  Hemoglobin 8.9 MCV 77 with white count 3250 and platelets 77k.    -3/10/2022 data:  White count stable 3230.  Hemoglobin down to 8.5 with MCV down to 76.3 with platelets stable 72,000.  CEA 1.22.  AST 48 with bilirubin 1.4Glucose 156.  Otherwise unremarkable CMP.   CT chest abdomen pelvisShows resolution of right lower lobe groundglass opacities.  Fatty liver infiltration.  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  Cholelithiasis    -3/18/2022 [facility] medical oncology follow-up visit: I reviewed multiple data sets from  and current CTs and labs and CEA from [facility].  Both images of the scans as well as reports reviewed and went over the above with the patient.  He started B12 already and has iron deficiency anemia as well documented at  and I have started him on ferrous sulfate 325 twice a day every other day with vitamin C and we  will in 6 months repeat his CTs and CBC and CEA per NCCN guidelines from the cancer standpoint.  He will continue to follow with  liver specialists from the portal hypertension standpoint and is currently on Coumadin.  He had EGD and colonoscopy according to the patient yesterday at  and the varices were improved.  There was still some thrombus in the current CT but I suspect it is improving.  Coumadin is a Catch-22 and may actually complicate variceal bleeding but it also may diminish the likelihood of bleeding if it helps decompress the portal system by treating the thrombus and I leave that decision to the capable hands of his gastroenterologist/liver specialist at     - 8/26/2022 [facility] liver clinic note.  [place] cirrhosis.  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  Following with anticoagulation clinic.  They plan to arrange for MRI liver protocol to assess for possible resolution of thrombus as surgeon planning ileostomy reversal once liver clinic clears him for surgery.  AST 49.  ALT 29.  Bilirubin 1.0.  Alkaline phosphatase 52.  White count 2980.  Hemoglobin 11.4.  Platelets 77,000.  Alpha-fetoprotein 2.6.  INR 2.0.    -9/12/2022 CT chest abdomen pelvis shows cirrhotic changes with portal hypertension stable no ascites.  No acute cardiopulmonary process.  No metastatic disease.  Stable presacral soft tissue thickening posttreatment related most likely.  Spleen is enlarged.      -9/16/2022 [facility] medical oncology follow-up visit: I reviewed the above liver clinic note and CT images and reports with patient.  No evidence of recurrence.  Today's white count is 2740 with hemoglobin 11.3 platelets 64,000 generally with platelets in the 70,000's over the last year.  MCV however has improved now 87.8 on iron and B12 in the hemoglobin has gone from 8.5 up to 11.3 in the last 6 months.  He did have gastritis and intestinal  metaplasia without dysplasia with Dr. Lebron February 2022 with atrophic gastric body.  Iron, B12, folate, CEA pending at time of dictation.  We will have my nurse practitioner call him the results of the pending lab and if the CEA is rising he may need to see me sooner but otherwise the plan is to repeat his tumor markers and labs and CTs again in 6 months and will do so every 6 months through January 2024.  He is contemplating reversal of the ostomy and I left a message with Dr. Kelly that Avatrombopag might be helpful but his platelets are close to 60,000 which is the general goal for most preoperative clearances but I will defer to her on that and of course the portal hypertension itself increases the risk of surgery simply from vascular engorgement and an MRI liver is planned by the liver surgeons to see if he can safely come off of the Coumadin that they are managing through the  Coumadin clinic.  Asked him to touch base with Dr. Lebron as to when to get the next EGD relative to the intestinal metaplasia on EGD.    -10/13/2022 MRI of the abdomen at  was stable, no new or suspicious liver lesions.    -3/14/2023 CT chest, abdomen and pelvis with no evidence of recurrent or metastatic disease.  Stable postsurgical changes from bowel resection and right lower quadrant ostomy.  Soft tissue in the presacral space similar to prior study.  Cirrhosis of the liver with splenomegaly and multiple right renal varices.  Cholelithiasis. CEA 1.05.  CBC is unremarkable, platelet count stable at 75,000.  CMP creatinine stable at 1.28, normal AST and ALT of 34/33, total bilirubin 1.3.    -3/20/2023 [facility] clinic follow-up: Mr. Arevalo continues to do well, no evidence of recurrent rectal cancer on clinical exam and no new worrisome symptoms.  Current CT chest, abdomen and pelvis from 3/14/2023 showed no evidence of recurrence, stable postsurgical changes and cirrhosis.  CEA normal at 1.05.  Platelet count is stable at  75,000.  He continues to follow with GI clinic at  in light of his cirrhosis, he is now off of Coumadin.  He has not had follow-up with Dr. Kelly, he was due to see her in September for repeat flexible sigmoidoscopy but he states when he called the office they did not show that he needed an appointment.  I have asked him to contact her office now to get an appointment.  He also had questions regarding his ileostomy reversal but I defer to Dr. Kelly for that discussion.  He is now off of Coumadin.  He is asking whether or not he should consider having his reversal at  due to his high risk with cirrhosis but again I have asked him to discuss this with Dr. Kelly.  We will continue surveillance per NCCN guidelines with repeat CT chest, abdomen and pelvis and serum testing again in 6 months and I have ordered that today.  We plan on surveillance through January 2024.    -6/9/2023  hepatology  [facility] APRN follow-up  cirrhosis off Coumadin since November 2022 had ultrasound abdomen this day.  MELD score 11.  There ordering alpha-fetoprotein and MRI abdomen along with iron indices.  Presumably  due to weight, diabetes, hyperlipidemia, hypertension.  Tylenol as needed.  They plan MRI abdomen in 3-4 months to assess liver RADS 3 lesion.  If stable x2 years then they will continue ultrasound abdomen for HCC screening.  They recommended follow-up EGD March 2024 with Dr. Lebron.  Alpha-fetoprotein this day normal 2.7 With normal ferritin 33 and iron normal 80 with normal total iron-binding capacity and normal saturation 19% with normal total iron-binding capacity and transferrin.    -9/13/2023 CT chest abdomen pelvis compared to March 2023 showed postsurgical changes without recurrence in the chest abdomen or pelvis.  Cirrhotic portal hypertensive findings including splenomegaly with large mesenteric veins draining into the right renal vein.  Hemoglobin 12.6 up from 11.6 in June and stable from March 12 0.8 with  normochromic normocytic indices.  Platelet count 69,000 where it fluctuates around 70,000 give her take 10,000.  AST 51 bilirubin 1.8 glucose 189 otherwise unremarkable CMP.  CEA normal 1.49.  This is up from 0.87 a year ago but was as high as 1.55 November 2019.    - 9/21/2023 [facility] hematology oncology follow-up: Per patient, patient did flexible sigmoidoscopy in May.  From the CAT scan and blood and tumor marker standpoint, nothing to suggest recurrence.  CEA waxes and wanes but no significant rise.  He has persistent mild anemia and thrombocytopenia with his portal hypertension from [place] cirrhosis being monitored by hepatology at .  They plan MRI as outlined above to follow-up on prior liver mass but nothing on current CTs other than chronic cirrhotic portal hypertensive variceal changes.  From the rectal cancer standpoint we will repeat his CBC CMP CEA and CTs again in 6 months and at that point he will be more than 5 years out from diagnosis and we would stop routine oncologic laboratory and imaging follow-up from the rectal cancer standpoint.  Dr. Kelly is not inclined towards reversal of his ostomy.  I asked him to [facility] back with Dr. Kelly as to need for surveillance colonoscopy proximal to the ostomy and not just sigmoidoscopy.    -11/17/2023 MRI abdomen 10 mm liver RADS 3 intermediate probability for hepatocellular carcinoma with splenomegaly and no ascites.    -3/28/2024 white count 3050 hemoglobin 13.3 platelets 62,000.  Glucose 299 creatinine 0.72 otherwise unremarkable CMP.  CEA pending.    -3/28/2024 [facility] hematology oncology follow-up: With reference to his cirrhosis and screening for hepatocellular carcinoma he is following with hepatology at  and his labs are fairly stable.  We will call him his CEA results from today and if rising we will push for CTs which this time around were denied and we will check his tumor marker with labs and CT prior to return in 6 months.  I have asked him to get  back with Dr. Kelly for repeat endoscopy.    -7/8/2024 open cholecystectomy    -9/23/2024 CT chest, abdomen and pelvis with no evidence of disease recurrence, interval cholecystectomy.  CEA 1.88.  -9/27/2024 [facility] follow-up: Hesham is doing well, he has no evidence of recurrent colon cancer on clinical exam and no new worrisome symptoms.  Current CT chest, abdomen and pelvis showed no evidence of disease recurrence, his CEA remains normal at 1.88.  He was not able to get his ileocolonoscopy this year due to complications with his gallbladder, he underwent open cholecystectomy 7/8/2024.  I have put in a referral to get him back to Dr. Lebron.  In regards to his history of rectal cancer he has completed 5 years surveillance per NCCN guidelines.  I did check with research, clinical trial in 1048 is closed.  He continues to follow with hepatology at , he is scheduled for MRI and follow-up in November.  We will plan on seeing him back in 6 months for follow-up.  No plans on routine imaging or labs in the absence of new symptoms.     -10/7/2024 received notice from [employer] that patient was not due for colonoscopy until 09/2026.    - 2/17/2025 MRI abdomen cirrhotic liver protocol at  showed unchanged moderate splenomegaly without ascites.  Cirrhotic liver with unchanged perfusional LR 3 focus segment 3 and no suspicious LR 4 or LR 5 lesions.  White count 3270 hemoglobin 11.5 platelets 68,000.  Hepatology at  following.  Ultrasound liver screening ordered.  Liver lesion stable x 2 years on ultrasound alternating with MRI abdomen for hepatocellular carcinoma screening.  No signs or symptoms of GI bleed.  May not repeat EGD as long as he is on carvedilol.  They will see him back around 8/24/2025    - 4/15/2025 [facility] follow-up: Patient fit.  Following with hepatology and Dr. Lebron relative to his history of rectal cancer and  cirrhosis.  At this junction no routine oncologic serum or  radiographic testing for follow-up.  He will see my nurse practitioner once a year and knows to call sooner if signs or symptoms arise.  He sees hepatology at least twice a year.  He is pancytopenic but there is nothing for us to do and overall the counts are fairly stable.    Total time of care today inclusive of time spent today prior to his arrival reviewing interval communication from Dr. Lebron regarding timing of next colonoscopy and note from hepatologist at  and after visit instituting this plan took 35 minutes patient care time throughout the day today.  Ousmane Moeller MD    04/15/2025

## 2025-04-15 NOTE — PROGRESS NOTES
CHIEF COMPLAINT: History of rectal cancer with no change in color caliber or consistency of his stools    Problem List:  Oncology/Hematology History Overview Note   1.  Stage IIIB rectal carcinoma clinical T3b N1 aM0  2.  Protracted postoperative course due to abdominal pelvic abscess following neoadjuvant Xeloda radiation  3.  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  B12 deficiency anemia  5.  Iron deficiency anemia  6.  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  Per note review from office visit with Dr. Ignacia Donnelly -10/14/2022 MRI liver protocol at  showed patent portal vein, superior mesenteric vein, and splenic vein with resolution of previous near occlusive SMV portal vein thrombus.  Completed treatment with warfarin.  No hepatic thrombosis and repeat imaging.  Coumadin discontinued.  7.  Intestinal metaplasia on EGD    -11/30/18 CT abdomen pelvis and chest x-ray negative for metastasis.  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    -12/6/18 MRI of rectum showed T3b with suspicious right internal iliac lymph node and CT chest noncontrast negative except for gallstones  -Per Dr. Kelly, CEA was normal.  -12/18/18 saw me for the first time.  I reviewed her case in our multidisciplinary conference and went over that in detail with her.  She had presented with hematochezia.  Dr. Kelly repeated endoscopy for more exact detailing of the primary location and this appears to be rectal on MRI and endoscopy.  Plan is for randomization on clinical trial N 1048 to neoadjuvant FOLFOX versus standard chemoradiation.  We'll get him to radiation and our research staff for randomization and get his  baseline CBC and CMP.    -1/4/19 followed up: Randomized to the Xeloda radiation arm.  He will get that and then 4-6 weeks later had his surgery, and then follow that with 6 months of adjuvant FOLFOX per protocol.  My research assistant met with him today.  He has a Xeloda prescription.  -3/4/2019 MRI: Good response with T2, less likely T3 with spiculation N0 disease.  -4/23/2019 pathology: Laparoscopic assisted converted to lower anterior resection open with stapled coloanal anastomosis and diverting loop ileostomy with liver biopsy showed residual adenoma with focal high-grade dysplasia but no residual invasive carcinoma.  0 out of 17 lymph nodes.  Mild steatosis on liver biopsy with bridging fibrosis.  -5/9/2019 to 5/15/2019 admitted with abdominal pelvic abscess status post CT-guided percutaneous drain placement presenting with sepsis improved with antibiotics and drainage.  Temp of 104 on presentation.  2 weeks of protracted IV antibiotics with Radames Sosa planned at discharge.  -7/11/2019 follow-up visit:Start of adjuvant therapy delayed due to the above postoperative pelvic abscess.  This was treated surgically initially but then by CT-guided percutaneous drainage as above.  Serial follow-up with Radames Sosa including CTs and antibiotics eventually cleared him adequately to consider resumption of plans for adjuvant therapy.  CTs of the pelvis done on 6/5/2019, 6/13/2019, 6/26/2019 monitoring for this abscess and possible drainage but not able to drain due to decreasing size and minimal residual edema with CT 7/10/2019 showingStable to slight decrease in size of presacral perirectal abscess with presacral edema measuring 3.9 x 1.9 previously 4.1 x 2.5 cm. No new sites of focal fluid collection or loculated fluid.  Hence, modified FOLFOX 6 started at this junction.  -9/30/2019 office visit: Oxaliplatin discontinued due to ongoing thrombocytopenia.  Platelet count today 59,000.  We will continue course  #5 and 6 with 5-FU and leucovorin alone to complete adjuvant therapy.  -10/23/2019 completed adjuvant chemotherapy.  -10/28/2019 CT chest abdomen pelvis with contrast showed decreased and fluid in the presacral fat.  Decrease in surrounding soft tissue stranding.  Gallstone seen with enlargement of spleen.  Stranding of the mesenteric roots with varices in the mesentery slightly increased compared to July 2019.  Chest is unremarkable.  CEA 1.53 with bilirubin 1.4, AST 46, alkaline phosphatase 135, white count 2500, hemoglobin 9.8, and platelets 62,000 on 10/21/2019.  -11/2019 colonoscopy with Dr. Lebron negative according to patient.  -2/23/2020: Saw Dr. Lebron regarding [place] cirrhosis-induced portal hypertension.  Did not recommend TIPS.  Started nadolol  -2/25/20 20 CT chest abdomen pelvis negative for metastasis but with portal hypertension and varices.  CEA 1.1.  ALT 51.  White count 3970 with hemoglobin 10.9.  -6/2/2020 CT abdomen and pelvis with stable postsurgical changes, no evidence of disease progression, CEA 0.86.  -8/28/2020 ileocolonoscopy  -9/8/2020 CT abdomen and pelvis without evidence of disease recurrence, CEA 0.87.  -3/9/2021 CT abdomen and pelvis stable changes, no evidence of progression of disease or recurrence.  CEA 1.21  -8/30/2021 CT chest, abdomen and pelvis with nonspecific small area of groundglass nodularity near the periphery of the right lower lobe favored infectious/inflammatory, otherwise CT scan stable without specific evidence of disease progression.  Of note, patient did have Covid infection 2 months ago.  Currently no respiratory concerns.  Has seen Dr. Gerardo at  for evaluation regarding his desire for ileostomy reversal, he reports that he had liver ultrasound that showed questionable liver lesion, he is scheduled for additional CT on 9/24 and then follow-up with Dr. Gerardo.  I discussed with the patient that current CT scan shows no abnormalities in the liver, he does have  known cirrhosis with [place].  We will continue surveillance as per NCCN guidelines with repeat CT scans and serum testing in 6 months.  CEA currently normal at 1.13, platelet count stable at 73,000.    -2/24/2022 follow-up Dr. Racheal Gerardo Hepatology.  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    -2/28/2022 data from : Ferritin low at 12 with iron low at 38 and decreased transferrin saturation 8% with increased total iron-binding capacity 498 and increased transferrin 398 consistent with iron deficiency.  B12 level immeasurably low less than 150.  Folic acid normal 15.5.  Alpha-fetoprotein 2.5.  INR 2.3.  Hemoglobin 8.9 MCV 77 with white count 3250 and platelets 77k.    -3/10/2022 data:  White count stable 3230.  Hemoglobin down to 8.5 with MCV down to 76.3 with platelets stable 72,000.  CEA 1.22.  AST 48 with bilirubin 1.4Glucose 156.  Otherwise unremarkable CMP.   CT chest abdomen pelvisShows resolution of right lower lobe groundglass opacities.  Fatty liver infiltration.  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  Cholelithiasis    -3/18/2022 [facility] medical oncology follow-up visit: I reviewed multiple data sets from  and current CTs and labs and CEA from [facility].  Both images of the scans as well as reports reviewed and went over the above with the patient.  He started B12 already and has iron deficiency anemia as well documented at  and I have started him on ferrous sulfate 325 twice a day every other day with vitamin C and we will in 6 months repeat his CTs and CBC and CEA per NCCN guidelines from the cancer standpoint.  He will continue to follow with  liver specialists from the portal hypertension standpoint and is currently on Coumadin.  He had  EGD and colonoscopy according to the patient yesterday at  and the varices were improved.  There was still some thrombus in the current CT but I suspect it is improving.  Coumadin is a Catch-22 and may actually complicate variceal bleeding but it also may diminish the likelihood of bleeding if it helps decompress the portal system by treating the thrombus and I leave that decision to the capable hands of his gastroenterologist/liver specialist at     - 8/26/2022 [facility] liver clinic note.  [place] cirrhosis.  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  Following with anticoagulation clinic.  They plan to arrange for MRI liver protocol to assess for possible resolution of thrombus as surgeon planning ileostomy reversal once liver clinic clears him for surgery.  AST 49.  ALT 29.  Bilirubin 1.0.  Alkaline phosphatase 52.  White count 2980.  Hemoglobin 11.4.  Platelets 77,000.  Alpha-fetoprotein 2.6.  INR 2.0.    -9/12/2022 CT chest abdomen pelvis shows cirrhotic changes with portal hypertension stable no ascites.  No acute cardiopulmonary process.  No metastatic disease.  Stable presacral soft tissue thickening posttreatment related most likely.  Spleen is enlarged.      -9/16/2022 [facility] medical oncology follow-up visit: I reviewed the above liver clinic note and CT images and reports with patient.  No evidence of recurrence.  Today's white count is 2740 with hemoglobin 11.3 platelets 64,000 generally with platelets in the 70,000's over the last year.  MCV however has improved now 87.8 on iron and B12 in the hemoglobin has gone from 8.5 up to 11.3 in the last 6 months.  He did have gastritis and intestinal metaplasia without dysplasia with Dr. Lebron February 2022 with atrophic gastric body.  Iron, B12, folate, CEA pending at time of dictation.  We will have my nurse practitioner call him the results of the pending lab and if the CEA  is rising he may need to see me sooner but otherwise the plan is to repeat his tumor markers and labs and CTs again in 6 months and will do so every 6 months through January 2024.  He is contemplating reversal of the ostomy and I left a message with Dr. Kelly that Avatrombopag might be helpful but his platelets are close to 60,000 which is the general goal for most preoperative clearances but I will defer to her on that and of course the portal hypertension itself increases the risk of surgery simply from vascular engorgement and an MRI liver is planned by the liver surgeons to see if he can safely come off of the Coumadin that they are managing through the  Coumadin clinic.  Asked him to touch base with Dr. Lebron as to when to get the next EGD relative to the intestinal metaplasia on EGD.    -10/13/2022 MRI of the abdomen at  was stable, no new or suspicious liver lesions.    -3/14/2023 CT chest, abdomen and pelvis with no evidence of recurrent or metastatic disease.  Stable postsurgical changes from bowel resection and right lower quadrant ostomy.  Soft tissue in the presacral space similar to prior study.  Cirrhosis of the liver with splenomegaly and multiple right renal varices.  Cholelithiasis. CEA 1.05.  CBC is unremarkable, platelet count stable at 75,000.  CMP creatinine stable at 1.28, normal AST and ALT of 34/33, total bilirubin 1.3.    -3/20/2023 [facility] clinic follow-up: Mr. Arevalo continues to do well, no evidence of recurrent rectal cancer on clinical exam and no new worrisome symptoms.  Current CT chest, abdomen and pelvis from 3/14/2023 showed no evidence of recurrence, stable postsurgical changes and cirrhosis.  CEA normal at 1.05.  Platelet count is stable at 75,000.  He continues to follow with GI clinic at  in light of his cirrhosis, he is now off of Coumadin.  He has not had follow-up with Dr. Kelly, he was due to see her in September for repeat flexible sigmoidoscopy but he states  when he called the office they did not show that he needed an appointment.  I have asked him to contact her office now to get an appointment.  He also had questions regarding his ileostomy reversal but I defer to Dr. Kelly for that discussion.  He is now off of Coumadin.  He is asking whether or not he should consider having his reversal at  due to his high risk with cirrhosis but again I have asked him to discuss this with Dr. Kelly.  We will continue surveillance per NCCN guidelines with repeat CT chest, abdomen and pelvis and serum testing again in 6 months and I have ordered that today.  We plan on surveillance through January 2024.    -[facility]20[facility]  [facility]  cirrhosis off Coumadin since November 2022 had ultrasound abdomen this day.  MELD score 11.  There ordering alpha-fetoprotein and MRI abdomen along with iron indices.  Presumably  due to weight, diabetes, hyperlipidemia, hypertension.  Tylenol as needed.  They plan MRI abdomen in 3-4 months to assess liver RADS 3 lesion.  If stable x2 years then they will continue ultrasound abdomen for HCC screening.  They recommended follow-up EGD March 2024 with Dr. Lebron.  Alpha-fetoprotein this day normal 2.7 With normal ferritin 33 and iron normal 80 with normal total iron-binding capacity and normal saturation 19% with normal total iron-binding capacity and transferrin.    -9/13/2023 CT chest abdomen pelvis compared to March 2023 showed postsurgical changes without recurrence in the chest abdomen or pelvis.  Cirrhotic portal hypertensive findings including splenomegaly with large mesenteric veins draining into the right renal vein.  Hemoglobin 12.6 up from 11.6 in June and stable from March 12 0.8 with normochromic normocytic indices.  Platelet count 69,000 where it fluctuates around 70,000 give her take 10,000.  AST 51 bilirubin 1.8 glucose 189 otherwise unremarkable CMP.  CEA normal 1.49.  This is up from 0.87 a year ago but was as  high as 1.55 November 2019.    - 9/21/2023 [facility] oncology follow-up: Per patient, patient did flexible sigmoidoscopy in May.  From the CAT scan and blood and tumor marker standpoint, nothing to suggest recurrence.  CEA waxes and wanes but no significant rise.  He has persistent mild anemia and thrombocytopenia with his portal hypertension from [place] cirrhosis being monitored by hepatology at .  They plan MRI as outlined above to follow-up on prior liver mass but nothing on current CTs other than chronic cirrhotic portal hypertensive variceal changes.  From the rectal cancer standpoint we will repeat his CBC CMP CEA and CTs again in 6 months and at that point he will be more than 5 years out from diagnosis and we would stop routine oncologic laboratory and imaging follow-up from the rectal cancer standpoint.  Dr. Kelly is not inclined towards reversal of his ostomy.  I asked him to [place] back with Dr. Kelly as to need for surveillance colonoscopy proximal to the ostomy and not just sigmoidoscopy.    -11/17/2023 MRI abdomen 10 mm liver RADS 3 intermediate probability for hepatocellular carcinoma with splenomegaly and no ascites.    -3/28/2024 white count 3050 hemoglobin 13.3 platelets 62,000.  Glucose 299 creatinine 0.72 otherwise unremarkable CMP.  CEA pending.    -3/28/2024 [facility] oncology follow-up: With reference to his cirrhosis and screening for hepatocellular carcinoma he is following with hepatology at  and his labs are fairly stable.  We will call him his CEA results from today and if rising we will push for CTs which this time around were denied and we will check his tumor marker with labs and CT prior to return in 6 months.  I have asked him to get back with Dr. Kelly for repeat endoscopy.    -7/8/2024 open cholecystectomy    -9/23/2024 CT chest, abdomen and pelvis with no evidence of disease recurrence, interval cholecystectomy.  CEA 1.88.  -9/27/2024 [facility] clinic  follow-up: Hesham is doing well, he has no evidence of recurrent colon cancer on clinical exam and no new worrisome symptoms.  Current CT chest, abdomen and pelvis showed no evidence of disease recurrence, his CEA remains normal at 1.88.  He was not able to get his ileocolonoscopy this year due to complications with his gallbladder, he underwent open cholecystectomy 7/8/2024.  I have put in a referral to get him back to Dr. Lebron.  In regards to his history of rectal cancer he has completed 5 years surveillance per NCCN guidelines.  I did check with research, clinical trial in 1048 is closed.  He continues to follow with hepatology at , he is scheduled for MRI and follow-up in November.  We will plan on seeing him back in 6 months for follow-up.  No plans on routine imaging or labs in the absence of new symptoms.     -10/7/2024 received notice from [facility] that patient was not due for colonoscopy until 09/2026.    - 2/17/2025 MRI abdomen cirrhotic liver protocol at  showed unchanged moderate splenomegaly without ascites.  Cirrhotic liver with unchanged perfusional LR 3 focus segment 3 and no suspicious LR 4 or LR 5 lesions.     Rectal carcinoma (Resolved)   12/18/2018 Initial Diagnosis    Rectal carcinoma (CMS/HCC)     1/7/2019 - 2/4/2019 Chemotherapy    Xeloda radiation on protocol     1/8/2019 - 2/14/2019 Radiation    Radiation OncologyTreatment Course:  Hesham Arevalo received 5040 cGy in 28 fractions to pelvis via External Beam Radiation - EBRT.     3/4/2019 Imaging    MRI of the pelvis:  IMPRESSIONS:  MRI rectal cancer T category is: T2, LESS LIKELY EARLY T3 SPICULATIONS  Maximum EMD of invasion is: 0  Minimum tumor to MRF distance is: 12 MM  Low rectal tumor component: NO  Mesorectal nodes/tumor deposits: NO  EMVI: NO  Extramesorectal nodes: NO     4/23/2019 Surgery    Surgery rectosigmoidectomy pathology report:  Final Diagnosis    1. RECTOSIGMOID COLON, RECTOSIGMOID RESECTION:  Residual adenoma with focal  high grade dysplasia with no residual invasive carcinoma identified post-treatment (see comment).  Seventeen lymph nodes negative for carcinoma (0/17)   2. LIVER, CORE NEEDLE BIOPSY:  Mild steatosis with mild chronic inflammation and increased fibrosis including bridging fibrosis (Stage 3-4) (see comment)        COLON AND RECTUM TEMPLATE:  TYPE OF SPECIMEN/PROCEDURE: Rectosigmoid resection.   SPECIMEN INTEGRITY:  Intact.  TUMOR SITE:  Rectum.  TUMOR SIZE (greatest dimension):  Indeterminate  TUMOR LOCATION (applicable only to rectal primaries): Entirely below anterior peritoneal reflection.  MACROSCOPIC INTACTNESS OF MESORECTUM (If applicable): Intact.  MACROSCOPIC TUMOR PERFORATION: Not identified.  TUMOR CONFIGURATION:  Ulcerated.  HISTOLOGIC TYPE:  Adenocarcinoma.  HISTOLOGIC GRADE:  G3-4 (per previous biopsy).  MICROSCOPIC DEPTH OF TUMOR INVASION/EXTENSION:  No residual tumor identified.  PERITONEAL INVOLVEMENT:  Not identified.  LYMPHVASCULAR INVASION:  Not identified.  PERINEURAL INVASION: Not identified.  SURGICAL MARGINS: Uninvolved.  STATUS AND DISTANCE FROM PROXIMAL MUCOSAL MARGIN:  Uninvolved, 13.0 cm.  STATUS AND DISTANCE FROM DISTAL MUCOSAL MARGIN: Uninvolved, 1.2 cm.  STATUS AND DISTANCE FROM MESENTERIC MARGIN: Not applicable.  STATUS AND DISTANCE FROM RADIAL MARGIN: Uninvolved, 1.7 cm.  DISTANCE FROM DENTATE LINE (RECTAL TUMOR ONLY):  Indeterminate.  TUMOR DEPOSITS (DISCONTINUOUS EXTRAMURAL EXTENSION):  Not identified.  NUMBER OF LYMPH NODES INVOLVED BY METASTATIC NEOPLASM: 0.  NUMBER OF REGIONAL LYMPH NODES EXAMINED: 17.  EXTRANODAL EXTENSION:  Not applicable.  TREATMENT EFFECT:  Present, no viable cancer cells identified (complete response, score 0).  ADDITIONAL PATHOLOGIC FINDINGS:  None.  MSI TESTING:  No evidence of MSI based on reported IHC on outside biopsy  OTHER ANCILLARY STUDIES (BRAF, KRAS, ETC.):  Should be performed on initial diagnostic biopsy if needed.  AJCC PATHOLOGIC STAGE:   (COMPLETED BY PATHOLOGIST, BASED ONLY ON TISSUE FINDINGS, MORE EXTENSIVE DISEASE MAY NOT BE KNOWN TO THE PATHOLOGIST)  ypT=  0  ypN=  0  AJCC PATHOLOGIC STAGE:  y0.                5/15/2019 Adverse Reaction    -5/9/2019 to 5/15/2019 admitted with abdominal pelvic abscess status post CT-guided percutaneous drain placement presenting with sepsis improved with antibiotics and drainage.  Temp of 104 on presentation.  2 weeks of protracted IV antibiotics with Radames Sosa planned at discharge     7/22/2019 - 10/23/2019 Chemotherapy    OP COLON mFOLFOX6 OXALIplatin / Leucovorin / Fluorouracil  Start of adjuvant therapy delayed due to the above postoperative pelvic abscess.  This was treated surgically initially but then by CT-guided percutaneous drainage as above.  Serial follow-up with Radames Sosa including CTs and antibiotics eventually cleared him adequately to consider resumption of plans for adjuvant therapy.  CTs of the pelvis done on 6/5/2019, 6/13/2019, 6/26/2019 monitoring for this abscess and possible drainage but not able to drain due to decreasing size and minimal residual edema with CT 7/10/2019 showingStable to slight decrease in size of presacral perirectal abscess with presacral edema measuring 3.9 x 1.9 previously 4.1 x 2.5 cm. No new sites of focal fluid collection or loculated fluid.     9/30/2019 Adverse Reaction    Oxaliplatin discontinued due to ongoing thrombocytopenia.     10/28/2019 Imaging    10/28/2019 CT chest abdomen pelvis with contrast showed decreased and fluid in the presacral fat.  Decrease in surrounding soft tissue stranding.  Gallstone seen with enlargement of spleen.  Stranding of the mesenteric roots with varices in the mesentery slightly increased compared to July 2019.  Chest is unremarkable.  CEA 1.53 with bilirubin 1.4, AST 46, alkaline phosphatase 135, white count 2500, hemoglobin 9.8, and platelets 62,000 on 10/21/2019.     8/28/2020 Procedure    Ileocolonoscopy      3/10/2022 Imaging    -3/10/2022 data:  White count stable 3230.  Hemoglobin down to 8.5 with MCV down to 76.3 with platelets stable 72,000.  CEA 1.22.  AST 48 with bilirubin 1.4Glucose 156.  Otherwise unremarkable CMP.   CT chest abdomen pelvisShows resolution of right lower lobe groundglass opacities.  Fatty liver infiltration.  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  Cholelithiasis     9/16/2022 Imaging    CT chest abdomen pelvis shows cirrhotic changes with portal hypertension stable no ascites.  No acute cardiopulmonary process.  No metastatic disease.  Stable presacral soft tissue thickening posttreatment related most likely.  Spleen is enlarged.     Rectal cancer (Resolved)   4/23/2019 Initial Diagnosis    Rectal cancer     7/22/2019 - 1/23/2020 Chemotherapy    OP CENTRAL VENOUS ACCESS DEVICE CARE AND MAINTENANCE         HISTORY OF PRESENT ILLNESS:  The patient is a 59 y.o. male, here for follow up on management of history of rectal cancer.  No change in color caliber or consistency of stools    Past Medical History:   Diagnosis Date    Arthritis     knees     Cancer 11/2018    colon with resection    Cholelithiasis     Disease of thyroid gland     Fatty liver     Hashimoto's disease     History of radiation therapy 02/14/2019    rectal cancer    Hypertension     Ileostomy in place     Prediabetes     Psoriasis     Wears glasses      Past Surgical History:   Procedure Laterality Date    CHOLECYSTECTOMY N/A 7/8/2024    Procedure: OPEN CHOLECYSTECTOMY;  Surgeon: Konstantin Garcia MD;  Location:  [facility];  Service: General;  Laterality: N/A;    COLON RESECTION N/A 4/23/2019    Procedure: LAPAROSCOPIC LOWER ANTERIOR RESECTION WITH DIVERTING LOOP ILEOOSTOMY, SPLENIC FLEIXURE MOBILIZATION AND LIVER BIOPSY, AND PROCTOSCOPY;  Surgeon: Pau Kelly MD;  Location:  [facility];  Service: General     "COLONOSCOPY      2018    ILEOSTOMY  04/2019    LIVER BIOPSY  2003    VASECTOMY  1998    VENOUS ACCESS DEVICE (PORT) INSERTION N/A 7/18/2019    Procedure: PORT PLACEMENT;  Surgeon: Franky Cazares MD;  Location: [facility];  Service: General       No Known Allergies    Family History and Social History reviewed and changed as necessary    REVIEW OF SYSTEM:   Hemostatically stable.  No new somatic complaints.    PHYSICAL EXAM:  No jaundice icterus or pallor.  No petechiae or ecchymoses.  No abdominal tenderness or shifting dullness    Vitals:    04/15/25 0903   BP: 130/67   Pulse: 59   Resp: 18   Temp: 98.2 °F (36.8 °C)   SpO2: 98%   Weight: 84.8 kg (187 lb)   Height: 185.4 cm (73\")     Vitals:    04/15/25 0903   PainSc: 0-No pain          ECOG score: 0           Vitals reviewed.    ECOG: (0) Fully Active - Able to Carry On All Pre-disease Performance Without Restriction    Lab Results   Component Value Date    HGB 11.2 (L) 03/13/2025    HCT 34.7 (L) 03/13/2025    MCV 85 03/13/2025    PLT 71 (L) 03/13/2025    WBC 3.4 03/13/2025    NEUTROABS 2.0 03/13/2025    LYMPHSABS 0.7 03/13/2025    MONOSABS 0.4 03/13/2025    EOSABS 0.2 03/13/2025    BASOSABS 0.1 03/13/2025       Lab Results   Component Value Date    GLUCOSE 271 (H) 03/13/2025    BUN 19 03/13/2025    CREATININE 1.09 03/13/2025     (L) 03/13/2025    K 4.5 03/13/2025    CL 97 03/13/2025    CO2 22 03/13/2025    CALCIUM 9.2 03/13/2025    PROTEINTOT 7.0 03/13/2025    ALBUMIN 3.5 (L) 03/13/2025    BILITOT 1.4 (H) 03/13/2025    ALKPHOS 93 03/13/2025    AST 57 (H) 03/13/2025    ALT 34 03/13/2025             ASSESSMENT & PLAN:  1.  Stage IIIB rectal carcinoma clinical T3b N1 aM0  2.  Protracted postoperative course due to abdominal pelvic abscess following neoadjuvant Xeloda radiation  3.  Hepatic steatosis/cirrhosis resulting in portal hypertension with bridging fibrosis on liver biopsy at time of colon surgery and mesenteric varices on CT some splenic " enlargement possibly exacerbating thrombocytopenia and anemia on chemotherapy  4.  B12 deficiency anemia  5.  Iron deficiency anemia  6.  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  Per note review from office visit with Dr. Ignacia Donnelly -10/14/2022 MRI liver protocol at  showed patent portal vein, superior mesenteric vein, and splenic vein with resolution of previous near occlusive SMV portal vein thrombus.  Completed treatment with warfarin.  No hepatic thrombosis and repeat imaging.  Coumadin discontinued.  7.  Intestinal metaplasia on EGD    -11/30/18 CT abdomen pelvis and chest x-ray negative for metastasis.  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    -12/6/18 MRI of rectum showed T3b with suspicious right internal iliac lymph node and CT chest noncontrast negative except for gallstones  -Per Dr. Kelly, CEA was normal.  -12/18/18 saw me for the first time.  I reviewed her case in our multidisciplinary conference and went over that in detail with her.  She had presented with hematochezia.  Dr. Kelly repeated endoscopy for more exact detailing of the primary location and this appears to be rectal on MRI and endoscopy.  Plan is for randomization on clinical trial N 1048 to neoadjuvant FOLFOX versus standard chemoradiation.  We'll get him to radiation and our research staff for randomization and get his baseline CBC and CMP.    -1/4/19 followed up: Randomized to the Xeloda radiation arm.  He will get that and then 4-6 weeks later had his surgery, and then follow that with 6 months of adjuvant FOLFOX per protocol.  My research assistant met with him today.  He has a Xeloda prescription.  -3/4/2019 MRI: Good response with T2, less likely T3 with spiculation N0 disease.  -4/23/2019 pathology: Laparoscopic assisted converted to lower anterior resection open with stapled coloanal anastomosis and  diverting loop ileostomy with liver biopsy showed residual adenoma with focal high-grade dysplasia but no residual invasive carcinoma.  0 out of 17 lymph nodes.  Mild steatosis on liver biopsy with bridging fibrosis.  -5/9/2019 to 5/15/2019 admitted with abdominal pelvic abscess status post CT-guided percutaneous drain placement presenting with sepsis improved with antibiotics and drainage.  Temp of 104 on presentation.  2 weeks of protracted IV antibiotics with Radames Sosa planned at discharge.  -7/11/2019 follow-up visit:Start of adjuvant therapy delayed due to the above postoperative pelvic abscess.  This was treated surgically initially but then by CT-guided percutaneous drainage as above.  Serial follow-up with Radames Sosa including CTs and antibiotics eventually cleared him adequately to consider resumption of plans for adjuvant therapy.  CTs of the pelvis done on 6/5/2019, 6/13/2019, 6/26/2019 monitoring for this abscess and possible drainage but not able to drain due to decreasing size and minimal residual edema with CT 7/10/2019 showingStable to slight decrease in size of presacral perirectal abscess with presacral edema measuring 3.9 x 1.9 previously 4.1 x 2.5 cm. No new sites of focal fluid collection or loculated fluid.  Hence, modified FOLFOX 6 started at this junction.  -9/30/2019 office visit: Oxaliplatin discontinued due to ongoing thrombocytopenia.  Platelet count today 59,000.  We will continue course #5 and 6 with 5-FU and leucovorin alone to complete adjuvant therapy.  -10/23/2019 completed adjuvant chemotherapy.  -10/28/2019 CT chest abdomen pelvis with contrast showed decreased and fluid in the presacral fat.  Decrease in surrounding soft tissue stranding.  Gallstone seen with enlargement of spleen.  Stranding of the mesenteric roots with varices in the mesentery slightly increased compared to July 2019.  Chest is unremarkable.  CEA 1.53 with bilirubin 1.4, AST 46, alkaline phosphatase  135, white count 2500, hemoglobin 9.8, and platelets 62,000 on 10/21/2019.  -11/2019 colonoscopy with Dr. Lebron negative according to patient.  -2/23/2020: Saw Dr. Lebron regarding [place] cirrhosis-induced portal hypertension.  Did not recommend TIPS.  Started nadolol  -2/25/20 20 CT chest abdomen pelvis negative for metastasis but with portal hypertension and varices.  CEA 1.1.  ALT 51.  White count 3970 with hemoglobin 10.9.  -6/2/2020 CT abdomen and pelvis with stable postsurgical changes, no evidence of disease progression, CEA 0.86.  -8/28/2020 ileocolonoscopy  -9/8/2020 CT abdomen and pelvis without evidence of disease recurrence, CEA 0.87.  -3/9/2021 CT abdomen and pelvis stable changes, no evidence of progression of disease or recurrence.  CEA 1.21  -8/30/2021 CT chest, abdomen and pelvis with nonspecific small area of groundglass nodularity near the periphery of the right lower lobe favored infectious/inflammatory, otherwise CT scan stable without specific evidence of disease progression.  Of note, patient did have Covid infection 2 months ago.  Currently no respiratory concerns.  Has seen Dr. Gerardo at  for evaluation regarding his desire for ileostomy reversal, he reports that he had liver ultrasound that showed questionable liver lesion, he is scheduled for additional CT on 9/24 and then follow-up with Dr. Gerardo.  I discussed with the patient that current CT scan shows no abnormalities in the liver, he does have known cirrhosis with [place].  We will continue surveillance as per NCCN guidelines with repeat CT scans and serum testing in 6 months.  CEA currently normal at 1.13, platelet count stable at 73,000.    -2/24/2022 follow-up Dr. Racheal Gerardo Hepatology.  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    -2/28/2022 data from : Ferritin low at 12 with iron  low at 38 and decreased transferrin saturation 8% with increased total iron-binding capacity 498 and increased transferrin 398 consistent with iron deficiency.  B12 level immeasurably low less than 150.  Folic acid normal 15.5.  Alpha-fetoprotein 2.5.  INR 2.3.  Hemoglobin 8.9 MCV 77 with white count 3250 and platelets 77k.    -3/10/2022 data:  White count stable 3230.  Hemoglobin down to 8.5 with MCV down to 76.3 with platelets stable 72,000.  CEA 1.22.  AST 48 with bilirubin 1.4Glucose 156.  Otherwise unremarkable CMP.   CT chest abdomen pelvisShows resolution of right lower lobe groundglass opacities.  Fatty liver infiltration.  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  Cholelithiasis    -3/18/2022 [facility] medical oncology follow-up visit: I reviewed multiple data sets from  and current CTs and labs and CEA from [facility].  Both images of the scans as well as reports reviewed and went over the above with the patient.  He started B12 already and has iron deficiency anemia as well documented at  and I have started him on ferrous sulfate 325 twice a day every other day with vitamin C and we will in 6 months repeat his CTs and CBC and CEA per NCCN guidelines from the cancer standpoint.  He will continue to follow with  liver specialists from the portal hypertension standpoint and is currently on Coumadin.  He had EGD and colonoscopy according to the patient yesterday at  and the varices were improved.  There was still some thrombus in the current CT but I suspect it is improving.  Coumadin is a Catch-22 and may actually complicate variceal bleeding but it also may diminish the likelihood of bleeding if it helps decompress the portal system by treating the thrombus and I leave that decision to the capable hands of his gastroenterologist/liver specialist at     - 8/26/2022 [facility]  liver clinic note.  [place] cirrhosis.  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  Following with anticoagulation clinic.  They plan to arrange for MRI liver protocol to assess for possible resolution of thrombus as surgeon planning ileostomy reversal once liver clinic clears him for surgery.  AST 49.  ALT 29.  Bilirubin 1.0.  Alkaline phosphatase 52.  White count 2980.  Hemoglobin 11.4.  Platelets 77,000.  Alpha-fetoprotein 2.6.  INR 2.0.    -9/12/2022 CT chest abdomen pelvis shows cirrhotic changes with portal hypertension stable no ascites.  No acute cardiopulmonary process.  No metastatic disease.  Stable presacral soft tissue thickening posttreatment related most likely.  Spleen is enlarged.      -9/16/2022 [facility] medical oncology follow-up visit: I reviewed the above liver clinic note and CT images and reports with patient.  No evidence of recurrence.  Today's white count is 2740 with hemoglobin 11.3 platelets 64,000 generally with platelets in the 70,000's over the last year.  MCV however has improved now 87.8 on iron and B12 in the hemoglobin has gone from 8.5 up to 11.3 in the last 6 months.  He did have gastritis and intestinal metaplasia without dysplasia with Dr. Lebron February 2022 with atrophic gastric body.  Iron, B12, folate, CEA pending at time of dictation.  We will have my nurse practitioner call him the results of the pending lab and if the CEA is rising he may need to see me sooner but otherwise the plan is to repeat his tumor markers and labs and CTs again in 6 months and will do so every 6 months through January 2024.  He is contemplating reversal of the ostomy and I left a message with Dr. Kelly that Avatrombopag might be helpful but his platelets are close to 60,000 which is the general goal for most preoperative clearances but I will defer to her on that and of course the portal hypertension itself increases the risk of  surgery simply from vascular engorgement and an MRI liver is planned by the liver surgeons to see if he can safely come off of the Coumadin that they are managing through the  Coumadin clinic.  Asked him to touch base with Dr. Lebron as to when to get the next EGD relative to the intestinal metaplasia on EGD.    -10/13/2022 MRI of the abdomen at  was stable, no new or suspicious liver lesions.    -3/14/2023 CT chest, abdomen and pelvis with no evidence of recurrent or metastatic disease.  Stable postsurgical changes from bowel resection and right lower quadrant ostomy.  Soft tissue in the presacral space similar to prior study.  Cirrhosis of the liver with splenomegaly and multiple right renal varices.  Cholelithiasis. CEA 1.05.  CBC is unremarkable, platelet count stable at 75,000.  CMP creatinine stable at 1.28, normal AST and ALT of 34/33, total bilirubin 1.3.    -3/20/2023 [facility] Oncology clinic follow-up: Mr. Arevalo continues to do well, no evidence of recurrent rectal cancer on clinical exam and no new worrisome symptoms.  Current CT chest, abdomen and pelvis from 3/14/2023 showed no evidence of recurrence, stable postsurgical changes and cirrhosis.  CEA normal at 1.05.  Platelet count is stable at 75,000.  He continues to follow with GI clinic at  in light of his cirrhosis, he is now off of Coumadin.  He has not had follow-up with Dr. Kelly, he was due to see her in September for repeat flexible sigmoidoscopy but he states when he called the office they did not show that he needed an appointment.  I have asked him to contact her office now to get an appointment.  He also had questions regarding his ileostomy reversal but I defer to Dr. Kelly for that discussion.  He is now off of Coumadin.  He is asking whether or not he should consider having his reversal at  due to his high risk with cirrhosis but again I have asked him to discuss this with Dr. Kelly.  We will continue surveillance per NCCN guidelines with  repeat CT chest, abdomen and pelvis and serum testing again in 6 months and I have ordered that today.  We plan on surveillance through January 2024.    -6/9/2023  hepatology  Cony MACARIO follow-up  cirrhosis off Coumadin since November 2022 had ultrasound abdomen this day.  MELD score 11.  There ordering alpha-fetoprotein and MRI abdomen along with iron indices.  Presumably  due to weight, diabetes, hyperlipidemia, hypertension.  Tylenol as needed.  They plan MRI abdomen in 3-4 months to assess liver RADS 3 lesion.  If stable x2 years then they will continue ultrasound abdomen for HCC screening.  They recommended follow-up EGD March 2024 with Dr. Lebron.  Alpha-fetoprotein this day normal 2.7 With normal ferritin 33 and iron normal 80 with normal total iron-binding capacity and normal saturation 19% with normal total iron-binding capacity and transferrin.    -9/13/2023 CT chest abdomen pelvis compared to March 2023 showed postsurgical changes without recurrence in the chest abdomen or pelvis.  Cirrhotic portal hypertensive findings including splenomegaly with large mesenteric veins draining into the right renal vein.  Hemoglobin 12.6 up from 11.6 in June and stable from March 12 0.8 with normochromic normocytic indices.  Platelet count 69,000 where it fluctuates around 70,000 give her take 10,000.  AST 51 bilirubin 1.8 glucose 189 otherwise unremarkable CMP.  CEA normal 1.49.  This is up from 0.87 a year ago but was as high as 1.55 November 2019.    - 9/21/2023 [facility] hematology oncology follow-up: Per patient, patient did flexible sigmoidoscopy in May.  From the CAT scan and blood and tumor marker standpoint, nothing to suggest recurrence.  CEA waxes and wanes but no significant rise.  He has persistent mild anemia and thrombocytopenia with his portal hypertension from  cirrhosis being monitored by hepatology at .  They plan MRI as outlined above to follow-up on prior liver mass but nothing on  current CTs other than chronic cirrhotic portal hypertensive variceal changes.  From the rectal cancer standpoint we will repeat his CBC CMP CEA and CTs again in 6 months and at that point he will be more than 5 years out from diagnosis and we would stop routine oncologic laboratory and imaging follow-up from the rectal cancer standpoint.  Dr. Kelly is not inclined towards reversal of his ostomy.  I asked him to [place] back with Dr. Kelly as to need for surveillance colonoscopy proximal to the ostomy and not just sigmoidoscopy.    -11/17/2023 MRI abdomen 10 mm liver RADS 3 intermediate probability for hepatocellular carcinoma with splenomegaly and no ascites.    -3/28/2024 white count 3050 hemoglobin 13.3 platelets 62,000.  Glucose 299 creatinine 0.72 otherwise unremarkable CMP.  CEA pending.    -3/28/2024 [facility] oncology follow-up: With reference to his cirrhosis and screening for hepatocellular carcinoma he is following with hepatology at  and his labs are fairly stable.  We will call him his CEA results from today and if rising we will push for CTs which this time around were denied and we will check his tumor marker with labs and CT prior to return in 6 months.  I have asked him to get back with Dr. Kelly for repeat endoscopy.    -7/8/2024 open cholecystectomy    -9/23/2024 CT chest, abdomen and pelvis with no evidence of disease recurrence, interval cholecystectomy.  CEA 1.88.  -9/27/2024 [facility] oncology clinic follow-up: Hesham is doing well, he has no evidence of recurrent colon cancer on clinical exam and no new worrisome symptoms.  Current CT chest, abdomen and pelvis showed no evidence of disease recurrence, his CEA remains normal at 1.88.  He was not able to get his ileocolonoscopy this year due to complications with his gallbladder, he underwent open cholecystectomy 7/8/2024.  I have put in a referral to get him back to Dr. Lebron.  In regards to his history of rectal cancer he has completed 5  years surveillance per NCCN guidelines.  I did check with research, clinical trial in 1048 is closed.  He continues to follow with hepatology at , he is scheduled for MRI and follow-up in November.  We will plan on seeing him back in 6 months for follow-up.  No plans on routine imaging or labs in the absence of new symptoms.     -10/7/2024 received notice from [facility] that patient was not due for colonoscopy until 09/2026.    - 2/17/2025 MRI abdomen cirrhotic liver protocol at  showed unchanged moderate splenomegaly without ascites.  Cirrhotic liver with unchanged perfusional LR 3 focus segment 3 and no suspicious LR 4 or LR 5 lesions.  White count 3270 hemoglobin 11.5 platelets 68,000.  Hepatology at  following.  Ultrasound liver screening ordered.  Liver lesion stable x 2 years on ultrasound alternating with MRI abdomen for hepatocellular carcinoma screening.  No signs or symptoms of GI bleed.  May not repeat EGD as long as he is on carvedilol.  They will see him back around 8/24/2025    - 4/15/2025 [facility] hematology follow-up: Patient fit.  Following with hepatology and Dr. Lebron relative to his history of rectal cancer and  cirrhosis.  At this junction no routine oncologic serum or radiographic testing for follow-up.  He will see my nurse practitioner once a year and knows to call sooner if signs or symptoms arise.  He sees hepatology at least twice a year.  He is pancytopenic but there is nothing for us to do and overall the counts are fairly stable.    Total time of care today inclusive of time spent today prior to his arrival reviewing interval communication from Dr. Lebron regarding timing of next colonoscopy and note from hepatologist at  and after visit instituting this plan took 35 minutes patient care time throughout the day today.  Ousmane Moeller MD    04/15/2025

## 2025-04-22 ENCOUNTER — TELEPHONE (OUTPATIENT)
Dept: CASE MANAGEMENT | Facility: OTHER | Age: 60
End: 2025-04-22
Payer: COMMERCIAL

## 2025-04-23 ENCOUNTER — PATIENT OUTREACH (OUTPATIENT)
Dept: CASE MANAGEMENT | Facility: OTHER | Age: 60
End: 2025-04-23
Payer: COMMERCIAL

## 2025-04-23 DIAGNOSIS — I10 ESSENTIAL HYPERTENSION: ICD-10-CM

## 2025-04-23 DIAGNOSIS — Z79.4 TYPE 2 DIABETES MELLITUS WITH HYPERGLYCEMIA, WITH LONG-TERM CURRENT USE OF INSULIN: Primary | ICD-10-CM

## 2025-04-23 DIAGNOSIS — K75.81 NASH (NONALCOHOLIC STEATOHEPATITIS): ICD-10-CM

## 2025-04-23 DIAGNOSIS — E11.65 TYPE 2 DIABETES MELLITUS WITH HYPERGLYCEMIA, WITH LONG-TERM CURRENT USE OF INSULIN: Primary | ICD-10-CM

## 2025-04-23 NOTE — OUTREACH NOTE
AMBULATORY CASE MANAGEMENT NOTE    Names and Relationships of Patient/Support Persons:  -     CCM Interim Update    Spoke with Hesham for CCM update. He was able to  his dexcom. He has not had the chance to put it on yet. He plans to get with his daughter and get her to help assist. Offered assistance if they need help.     He reports that he has only had to go up to 16U of insulin since starting it. His a.m. blood sugars have been good. This  morning it was 125. Most of the times in the morning his blood sugars have been in the 90's. One morning it was 79. He is watching what he eats. He is not eating sugar and has increased lean meats and vegetables.     Plan will be that once he gets the dexcom on to check in with CCM a few days later once he starts getting data.         Education Documentation  No documentation found.        Olena EUCEDA  Ambulatory Case Management    4/23/2025, 10:21 EDT

## 2025-04-29 ENCOUNTER — PATIENT OUTREACH (OUTPATIENT)
Dept: CASE MANAGEMENT | Facility: OTHER | Age: 60
End: 2025-04-29
Payer: COMMERCIAL

## 2025-04-29 DIAGNOSIS — K75.81 NASH (NONALCOHOLIC STEATOHEPATITIS): ICD-10-CM

## 2025-04-29 DIAGNOSIS — I10 ESSENTIAL HYPERTENSION: ICD-10-CM

## 2025-04-29 DIAGNOSIS — E11.65 TYPE 2 DIABETES MELLITUS WITH HYPERGLYCEMIA, WITH LONG-TERM CURRENT USE OF INSULIN: Primary | ICD-10-CM

## 2025-04-29 DIAGNOSIS — Z79.4 TYPE 2 DIABETES MELLITUS WITH HYPERGLYCEMIA, WITH LONG-TERM CURRENT USE OF INSULIN: Primary | ICD-10-CM

## 2025-04-29 NOTE — OUTREACH NOTE
[facility] End of Month Documentation    This Chronic Medical Management Care Plan for Hesham Arevalo Jr., 59 y.o. male, has been a new plan of care implemented; established and a new plan of care implemented for the month of April.  A cumulative time of 72  minutes was spent on this patient record this month, including phone call with patient; electronic communication with primary care provider; chart review; phone call with pharmacist.    Regarding the patient's problems: has low anterior resection with loop ileostomy, liver biopsy and proctoscopy 4/23/19; Other specified hypothyroidism; Essential hypertension; Prediabetes; Acute postoperative pain; Abdominopelvic abscess; Fever; Urine retention; Intra-abdominal abscess; Hypomagnesemia, replaced; Thrombocytopenia; History of rectal cancer; Other dietary vitamin B12 deficiency anemia; Abdominal pain with nonbilious vomiting; Hyperbilirubinemia; Cholelithiasis; Abdominal pain; Hyponatremia; Type 2 diabetes mellitus without complication, without long-term current use of insulin; Cirrhosis of liver; Acute cholecystitis; Acute hepatitis; Acute pancreatitis; and Gallstone pancreatitis on their problem list., the following items were addressed: medical records; medications and any changes can be found within the plan section of the note.  A detailed listing of time spent for chronic care management is tracked within each outreach encounter.  Current medications include:  has a current medication list which includes the following prescription(s): accu-chek softclix lancets, freestyle lite, carvedilol, dexcom g7 sensor, cyanocobalamin, dapagliflozin propanediol, ezetimibe, ferrous sulfate, glucose blood, insulin glargine, levothyroxine, lisinopril, metformin, boost glucose control, and ra anti-diarrheal. and the patient is reported to be patient is compliant with medication protocol,  Medications are reported to be non-effective in controlling symptoms and changes have been  made to the medication protocol, A1C 11.8.  Regarding these diagnoses, referrals were made to the following provider(s):  n/a.  All notes on chart for PCP to review.    The patient was monitored remotely for blood pressure; weight; blood glucose; medications.    The patient's physical needs include:  physical healthcare.     The patient's mental support needs include:  needs met    The patient's cognitive support needs include:  not applicable    The patient's psychosocial support needs include:  need for increased support    The patient's functional needs include: physical healthcare; DME, dexcom    The patient's environmental needs include:  not applicable    Care Plan overall comments:  established    Refer to previous outreach notes for more information on the areas listed above.    Monthly Billing Diagnoses  (E11.65,  Z79.4) Type 2 diabetes mellitus with hyperglycemia, with long-term current use of insulin    (I10) Essential hypertension    (K75.81)  (nonalcoholic steatohepatitis)    Medications   Medications have been reconciled    Care Plan progress this month:      Recently Modified Care Plans Updates made since 3/29/2025 12:00 AM      No recently modified care plans.             Diabetes Type 2       Protect Myself From Diabetes Complications       Start:  04/02/25         My Protect Myself From Diabetes Complications To Do List       Start:  04/02/25       Why is this important?Having diabetes puts you at risk for complications, such as kidney disease, heart disease, nerve damage and eye or dental problems.You can manage your risk by making healthy lifestyle choices and getting regular checkups.            Monitor My Blood Sugar       Start:  04/02/25         My Blood Sugar Management To Do List       Start:  04/02/25       Why is this important?Checking your blood sugar (glucose) at home helps to keep it from getting very high or very low.Writing the results in a diary or log, or using an hdruv, helps  your diabetes care provider know how to care for you.Your blood sugar (glucose) log should have the time, date and results.Also write down the amount of insulin or other medicine that you take.       Initial    My Blood Sugar Management To Do List: take the blood sugar (glucose) log to all provider visits taken at 04/02/25            Learn About Diabetes       Start:  04/02/25         My Learn About Diabetes To Do List       Start:  04/02/25       Why is this important?Learning about diabetes can help you to manage it.       Initial    My Learn About Diabetes To Do List: ask questions;tell someone when I don't understand taken at 04/02/25            Perform Foot Care       Start:  04/02/25         My Foot Care To Do List       Start:  04/02/25       Why is this important?Good foot care is very important when you have diabetes.There are many things you can do to keep your feet healthy and catch a problem early.            Manage My Stress       Start:  04/02/25         My Stress Management To Do List       Start:  04/02/25       Why is this important?When you are stressed down or upset your body reacts too.            Find Ways to Be Active       Start:  04/02/25         My Activity To Do List       Start:  04/02/25       Why is this important?Being more active can help you to manage your blood sugar (glucose) levels.Being active can also help to prevent diabetes complications.            Optimal Care Coordination of a Patient Experiencing Diabetes, Type 2       Start:  04/02/25         Alleviate Barriers to Glycemic Management       Start:  04/02/25          Initial    Alleviate Barriers to Glycemic Management: A1C testing facilitated;blood glucose monitoring encouraged;use of blood glucose monitoring log promoted referred for CGM - dexcom taken at 04/02/25         Monitor and Manage Follow-Up for Comorbidities       Start:  04/02/25            Optimize Functional Ability       Start:  04/02/25            Support  Wellbeing and Self-Management Success       Start:  04/02/25              Hypertension       Make Healthy Diet Choices (Progressing)       Start:  04/02/25         My Healthy Diet To Do List       Start:  04/02/25       Why is this important?Taking small steps to change how you eat is a good place to start.            Track and Manage My Blood Pressure       Start:  04/02/25         My Blood Pressure Management To Do List       Start:  04/02/25       Why is this important?You may not be able to feel high blood pressure, but it can still hurt your body.High blood pressure can cause heart or kidney problems. It can also cause a stroke.Checking your blood pressure at home and at different times of the day can help to control blood pressure.            Become More Active       Start:  04/02/25         My Activity To Do List       Start:  04/02/25       Why is this important?It is easy to come up with reasons not to exercise.Taking small steps to be more active, like a short walk or taking the stairs, is a good place to start.            Stop or Cut Down Tobacco/Nicotine Use       Start:  04/02/25         Reduce My Tobacco To Do List       Start:  04/02/25       Why is this important?To stop or cut down it is important to have support from a person or group of people that you can count on.You will also need to think about the things that make you feel like using, then plan for how to handle them.When you are ready to cut back or stop using tobacco/nicotine, making a personal plan will be very helpful.            [place] My Medicine       Start:  04/02/25         My Medication Management To Do List       Start:  04/02/25       Why is this important?It is important to take your medicine so that you can control your condition and prevent problems.Even if you do not feel your high blood pressure, it is still important to take your medicine.Call the office if you cannot afford the medicine or if you have questions about it.             Manage My Stress       Start:  04/02/25         My Stress Management To Do List       Start:  04/02/25       Why is this important?The changes that you are asked to make may be hard to do.When you are stressed, down or upset, your body reacts too. For example, your blood pressure may get higher.            Optimal Care Coordination of a Patient Experiencing Hypertension       Start:  04/02/25         Identify and Monitor Blood Pressure Elevation       Start:  04/02/25            Mutually Develop and Foster Achievement of Patient Goals       Start:  04/02/25            Facilitate Adherence to Lifestyle Change       Start:  04/02/25            Alleviate Barriers to Medication Regimen       Start:  04/02/25                Current Specialty Plan of Care Status signed by both patient and provider    Instructions   Patient was provided an electronic copy of care plan  CCM services were explained and offered and patient has accepted these services.  Patient has given their written consent to receive CCM services and understands that this includes the authorization of electronic communication of medical information with the other treating providers.  Patient understands that they may stop CCM services at any time and these changes will be effective at the end of the calendar month and will effectively revocate the agreement of CCM services.  Patient understands that only one practitioner can furnish and be paid for CCM services during one calendar month.  Patient also understands that there may be co-payment or deductible fees in association with CCM services.  Patient will continue with at least monthly follow-up calls with the Ambulatory .    Olena EUCEDA  Ambulatory Case Management    4/29/2025, 10:27 EDT

## 2025-04-30 DIAGNOSIS — E11.65 TYPE 2 DIABETES MELLITUS WITH HYPERGLYCEMIA, WITHOUT LONG-TERM CURRENT USE OF INSULIN: ICD-10-CM

## 2025-04-30 RX ORDER — INSULIN GLARGINE 100 [IU]/ML
INJECTION, SOLUTION SUBCUTANEOUS
Qty: 45 ML | Refills: 0 | Status: SHIPPED | OUTPATIENT
Start: 2025-04-30

## 2025-05-02 ENCOUNTER — TELEPHONE (OUTPATIENT)
Dept: CASE MANAGEMENT | Facility: OTHER | Age: 60
End: 2025-05-02
Payer: COMMERCIAL

## 2025-05-06 DIAGNOSIS — E11.65 TYPE 2 DIABETES MELLITUS WITH HYPERGLYCEMIA, WITHOUT LONG-TERM CURRENT USE OF INSULIN: ICD-10-CM

## 2025-05-06 RX ORDER — INSULIN GLARGINE 100 [IU]/ML
INJECTION, SOLUTION SUBCUTANEOUS
Qty: 45 ML | Refills: 0 | OUTPATIENT
Start: 2025-05-06

## 2025-05-07 ENCOUNTER — PATIENT OUTREACH (OUTPATIENT)
Dept: CASE MANAGEMENT | Facility: OTHER | Age: 60
End: 2025-05-07
Payer: COMMERCIAL

## 2025-05-07 DIAGNOSIS — I10 ESSENTIAL HYPERTENSION: ICD-10-CM

## 2025-05-07 DIAGNOSIS — Z79.4 TYPE 2 DIABETES MELLITUS WITH HYPERGLYCEMIA, WITH LONG-TERM CURRENT USE OF INSULIN: Primary | ICD-10-CM

## 2025-05-07 DIAGNOSIS — E11.65 TYPE 2 DIABETES MELLITUS WITH HYPERGLYCEMIA, WITH LONG-TERM CURRENT USE OF INSULIN: Primary | ICD-10-CM

## 2025-05-07 DIAGNOSIS — K75.81 NASH (NONALCOHOLIC STEATOHEPATITIS): ICD-10-CM

## 2025-05-07 RX ORDER — INSULIN GLARGINE 100 [IU]/ML
20 INJECTION, SOLUTION SUBCUTANEOUS DAILY
Qty: 45 ML | Refills: 1 | Status: SHIPPED | OUTPATIENT
Start: 2025-05-07

## 2025-05-07 NOTE — OUTREACH NOTE
AMBULATORY CASE MANAGEMENT NOTE    Names and Relationships of Patient/Support Persons: Contact: Mickey Hesham Jordan .; Relationship: Self -     CCM Interim Update    Spoke with patient for CCM update. He is doing well. His blood sugars have continued to improve. His fasting blood sugar has ranged . He is not eating any sugary foods or pasta. He did eat potatoes one day and was surprised at how much that increased his blood sugar.     He has had a lot going on in his life recently. He has not put his dexcom on. He is hoping that his daughter will assist him with that soon. Discussed the benefits of CGM.     He has increased his physical activity. He is walking in his neighborhood.     Care Coordination    Hesham is in need of refill of his insulin. He is currently using 16U of insulin and needs his script updated to reflect. CCM able to refill insulin per protocol.      Education Documentation  No documentation found.        Olena EUCEDA  Ambulatory Case Management    5/7/2025, 10:17 EDT

## 2025-05-13 ENCOUNTER — LAB (OUTPATIENT)
Dept: FAMILY MEDICINE CLINIC | Facility: CLINIC | Age: 60
End: 2025-05-13
Payer: COMMERCIAL

## 2025-05-13 DIAGNOSIS — E11.65 TYPE 2 DIABETES MELLITUS WITH HYPERGLYCEMIA, WITHOUT LONG-TERM CURRENT USE OF INSULIN: ICD-10-CM

## 2025-05-13 DIAGNOSIS — E03.9 ACQUIRED HYPOTHYROIDISM: ICD-10-CM

## 2025-05-14 LAB
ALBUMIN SERPL-MCNC: 3.3 G/DL (ref 3.9–4.9)
ALP SERPL-CCNC: 85 IU/L (ref 44–121)
ALT SERPL-CCNC: 24 IU/L (ref 0–44)
AST SERPL-CCNC: 44 IU/L (ref 0–40)
BILIRUB SERPL-MCNC: 1 MG/DL (ref 0–1.2)
BUN SERPL-MCNC: 16 MG/DL (ref 8–27)
BUN/CREAT SERPL: 17 (ref 10–24)
CALCIUM SERPL-MCNC: 8.8 MG/DL (ref 8.6–10.2)
CHLORIDE SERPL-SCNC: 107 MMOL/L (ref 96–106)
CO2 SERPL-SCNC: 20 MMOL/L (ref 20–29)
CREAT SERPL-MCNC: 0.92 MG/DL (ref 0.76–1.27)
EGFRCR SERPLBLD CKD-EPI 2021: 95 ML/MIN/1.73
GLOBULIN SER CALC-MCNC: 3.2 G/DL (ref 1.5–4.5)
GLUCOSE SERPL-MCNC: 124 MG/DL (ref 70–99)
HBA1C MFR BLD: 7.9 % (ref 4.8–5.6)
POTASSIUM SERPL-SCNC: 4.3 MMOL/L (ref 3.5–5.2)
PROT SERPL-MCNC: 6.5 G/DL (ref 6–8.5)
SODIUM SERPL-SCNC: 140 MMOL/L (ref 134–144)
TSH SERPL DL<=0.005 MIU/L-ACNC: 7.52 UIU/ML (ref 0.45–4.5)

## 2025-05-15 NOTE — ADDENDUM NOTE
Addended by: ISABELLA THOMAS on: 5/7/2025 09:41 AM     Modules accepted: Orders     Lab Facility: 0 Bill For Surgical Tray: no Expected Date Of Service: 05/15/2025 Billing Type: Third-Party Bill Performing Laboratory: [id number]

## 2025-05-20 ENCOUNTER — OFFICE VISIT (OUTPATIENT)
Dept: FAMILY MEDICINE CLINIC | Facility: CLINIC | Age: 60
End: 2025-05-20
Payer: COMMERCIAL

## 2025-05-20 VITALS
OXYGEN SATURATION: 100 % | WEIGHT: 192.9 LBS | HEIGHT: 73 IN | DIASTOLIC BLOOD PRESSURE: 72 MMHG | BODY MASS INDEX: 25.57 KG/M2 | RESPIRATION RATE: 12 BRPM | HEART RATE: 58 BPM | SYSTOLIC BLOOD PRESSURE: 132 MMHG

## 2025-05-20 DIAGNOSIS — E11.65 TYPE 2 DIABETES MELLITUS WITH HYPERGLYCEMIA, WITHOUT LONG-TERM CURRENT USE OF INSULIN: Primary | ICD-10-CM

## 2025-05-20 DIAGNOSIS — Z79.899 HIGH RISK MEDICATION USE: ICD-10-CM

## 2025-05-20 DIAGNOSIS — E03.9 ACQUIRED HYPOTHYROIDISM: ICD-10-CM

## 2025-05-20 DIAGNOSIS — I10 PRIMARY HYPERTENSION: ICD-10-CM

## 2025-05-20 PROCEDURE — 99214 OFFICE O/P EST MOD 30 MIN: CPT | Performed by: FAMILY MEDICINE

## 2025-05-20 RX ORDER — PEN NEEDLE, DIABETIC 31 GX5/16"
NEEDLE, DISPOSABLE MISCELLANEOUS
COMMUNITY
Start: 2025-03-24

## 2025-05-20 RX ORDER — LEVOTHYROXINE SODIUM 50 UG/1
50 TABLET ORAL
Qty: 90 TABLET | Refills: 0 | Status: SHIPPED | OUTPATIENT
Start: 2025-05-20

## 2025-05-20 NOTE — PROGRESS NOTES
Follow Up Office Visit      Patient Name: Hesham Arevalo Jr.  : 1965   MRN: [id number]     Chief Complaint:    Chief Complaint   Patient presents with    Diabetes     2 MO FOLLOW UP         History of Present Illness: Hesham Arevalo Jr. is a 59 y.o. male who is here today to   follow-up on diabetes go over blood work.  He relates he has been doing well    History of Present Illness  The patient is a 59-year-old male who presents for follow-up of diabetes, thyroid management, and psoriasis.    He reports a significant improvement in his A1c levels, which have decreased from 11.8 to 7.9. His insulin dosage has remained stable at 16 units daily for over a month, with no fluctuations. He administers Lantus insulin between 6:00 and 7:00 PM and monitors his blood glucose levels at 7:00 AM daily. His fasting blood glucose levels are consistently below 150. He continues to take Farxiga 10 mg. He reports no hypoglycemic episodes or symptoms such as weakness or shakiness. He has made dietary modifications, including eliminating sugar from his diet and avoiding sweetened beverages. He has not identified any specific foods that cause a spike in his blood glucose levels, although he notes a slight increase when consuming fried foods. He maintains an active lifestyle, walking approximately three-quarters of a mile around his neighborhood each night and engaging in yard work.    He has been compliant with his levothyroxine 25 mcg daily regimen. He recalls a previous episode of lethargy when his dosage was increased, which was subsequently reduced. He takes ferrous sulfate at lunchtime and has been advised against taking it concurrently with his morning medications.    He has psoriasis and experiences some itching from that.    He has an ileostomy.    FAMILY HISTORY  His sister had issues with low blood sugar, leading to blackouts and a car wreck. His father also had issues with low blood sugar.  Review of  Systems   Constitutional: Negative for fatigue and fever.   Respiratory: Negative for cough and shortness of breath.    Cardiovascular: Negative for chest pain and palpitations.   Skin: Negative for rash or itching      Subjective      Review of Systems:   Review of Systems    Past Medical History:   Past Medical History:   Diagnosis Date    Arthritis     knees     Cancer 11/2018    colon with resection    Cholelithiasis     Diabetes mellitus 2025        Disease of thyroid gland     Fatty liver     Hashimoto's disease     History of radiation therapy 02/14/2019    rectal cancer    Hypertension     Hypothyroidism 1998    Ileostomy in place     Prediabetes     Psoriasis     Wears glasses        Past Surgical History:   Past Surgical History:   Procedure Laterality Date    CHOLECYSTECTOMY N/A 7/8/2024    Procedure: OPEN CHOLECYSTECTOMY;  Surgeon: Konstantin Garcia MD;  Location:  [facility];  Service: General;  Laterality: N/A;    COLON RESECTION N/A 4/23/2019    Procedure: LAPAROSCOPIC LOWER ANTERIOR RESECTION WITH DIVERTING LOOP ILEOOSTOMY, SPLENIC FLEIXURE MOBILIZATION AND LIVER BIOPSY, AND PROCTOSCOPY;  Surgeon: Pau Kelly MD;  Location:  [facility];  Service: General    COLONOSCOPY      2018    ILEOSTOMY  04/2019    LIVER BIOPSY  2003    VASECTOMY  1998    VENOUS ACCESS DEVICE (PORT) INSERTION N/A 7/18/2019    Procedure: PORT PLACEMENT;  Surgeon: Franky Cazares MD;  Location:  [facility];  Service: General       Family History:   Family History   Problem Relation Age of Onset    Breast cancer Mother     Cancer Mother         Breast Cancer Double Masectomy    Cancer Father         Bladder cancer    Diabetes type II Sister     Diabetes Sister     Diabetes Sister        Social History:   Social History     Socioeconomic History    Marital status:    Tobacco Use    Smoking status: Former     Current packs/day: 0.00     Average packs/day: 1 pack/day for 14.0 years (14.0 ttl pk-yrs)     Types:  Cigarettes     Start date: 1979     Quit date:      Years since quittin.4    Smokeless tobacco: Never   Vaping Use    Vaping status: Never Used   Substance and Sexual Activity    Alcohol use: No    Drug use: No    Sexual activity: Defer       Medications:     Current Outpatient Medications:     Accu-Chek Softclix Lancets lancets, Use as instructed to test blood glucose twice daily, Disp: 100 each, Rfl: 1    B-D ULTRAFINE III SHORT PEN 31G X 8 MM misc, , Disp: , Rfl:     Blood Glucose Monitoring Suppl (FreeStyle Lite) w/Device kit, Use Monitor as directed by provider As Needed to check Blood glucose., Disp: 1 kit, Rfl: 0    carvedilol (COREG) 6.25 MG tablet, Take 1 tablet by mouth 2 (Two) Times a Day With Meals., Disp: 180 tablet, Rfl: 3    Continuous Glucose Sensor (Dexcom G7 Sensor) misc, Use 1 applicator Continuous., Disp: 3 each, Rfl: 5    cyanocobalamin 1000 MCG/ML injection, Inject 1 mL into the appropriate muscle as directed by prescriber Every 30 (Thirty) Days., Disp: 3 mL, Rfl: 3    dapagliflozin Propanediol 10 MG tablet, Take 10 mg by mouth Daily., Disp: 90 tablet, Rfl: 1    ezetimibe (ZETIA) 10 MG tablet, Take 1 tablet by mouth Daily., Disp: , Rfl:     ferrous sulfate (FeroSul) 325 (65 FE) MG tablet, Take 1 tablet by mouth Every Other Day., Disp: 90 tablet, Rfl: 1    glucose blood test strip, Use as instructed to test blood glucose twice daily, Disp: 100 each, Rfl: 1    Insulin Glargine (Lantus SoloStar) 100 UNIT/ML injection pen, Inject 20 Units under the skin into the appropriate area as directed Daily., Disp: 45 mL, Rfl: 1    lisinopril (PRINIVIL,ZESTRIL) 10 MG tablet, Take 1 tablet by mouth Daily., Disp: 90 tablet, Rfl: 1    metFORMIN (GLUCOPHAGE) 1000 MG tablet, Take 1 tablet by mouth 2 (Two) Times a Day With Meals., Disp: 180 tablet, Rfl: 1    Nutritional Supplements (Boost Glucose Control) liquid, Take 237 mL by mouth 3 (Three) Times a Day., Disp: , Rfl:     RA ANTI-DIARRHEAL 2 MG  "tablet, Take 1 tablet by mouth Daily., Disp: , Rfl: 0    levothyroxine (Synthroid) 50 MCG tablet, Take 1 tablet by mouth Every Morning. TSH in 7 weeks on this new dose, Disp: 90 tablet, Rfl: 0    Allergies:   No Known Allergies    Objective     Physical Exam:  Vital Signs:   Vitals:    05/20/25 1448   BP: 132/72   Pulse: 58   Resp: 12   SpO2: 100%   Weight: 87.5 kg (192 lb 14.4 oz)   Height: 185.4 cm (73\")   PainSc: 0-No pain     Facility age limit for growth %evla is 20 years.  Body mass index is 25.45 kg/m².     Physical Exam  Vitals and nursing note reviewed.   Constitutional:       Appearance: Normal appearance.   HENT:      Head: Normocephalic and atraumatic.      Nose: Nose normal.   Cardiovascular:      Rate and Rhythm: Normal rate and regular rhythm.   Pulmonary:      Effort: Pulmonary effort is normal.      Breath sounds: Normal breath sounds.   Abdominal:      Palpations: Abdomen is soft.      Tenderness: There is no abdominal tenderness.      Comments: Ileostomy bag present right side   Musculoskeletal:         General: Normal range of motion.      Cervical back: Normal range of motion and neck supple.      Right lower leg: No edema.      Left lower leg: No edema.   Skin:     General: Skin is warm and dry.   Neurological:      General: No focal deficit present.      Mental Status: He is alert.   Psychiatric:         Mood and Affect: Mood normal.         Behavior: Behavior normal.         Procedures    PHQ-9 Total Score:      Assessment / Plan      Assessment/Plan:   Diagnoses and all orders for this visit:    1. Type 2 diabetes mellitus with hyperglycemia, without long-term current use of insulin (Primary)  -     Comprehensive Metabolic Panel; Future  -     Hemoglobin A1c; Future    2. Acquired hypothyroidism  -     levothyroxine (Synthroid) 50 MCG tablet; Take 1 tablet by mouth Every Morning. TSH in 7 weeks on this new dose  Dispense: 90 tablet; Refill: 0  -     TSH; Future    3. High risk medication " use    4. Primary hypertension         Assessment & Plan  1. Diabetes Mellitus.  - His A1c has significantly improved from 11.8 to 7.9.  - He is currently on Lantus 16 units once a day and Farxiga 10 mg. Fasting sugars are below 150.  - He is advised to continue his current regimen and dietary practices. If he experiences symptoms of hypoglycemia such as weakness or shakiness, he should consume sugar tablets or 4 ounces of orange juice or a sugar-containing cola beverage to rapidly elevate his blood glucose levels.  - Follow-up in 3 months.    2. Thyroid Management.  - His TSH levels remain slightly elevated.  - The dosage of levothyroxine will be increased from 25 mcg to 50 mcg daily.  - He is advised to avoid concurrent intake of levothyroxine with vitamins or iron supplements, ensuring a minimum gap of 4 hours between these medications. A follow-up TSH test will be conducted in 7 weeks to monitor the response to the adjusted levothyroxine dosage.  - Follow-up in 3 months.    Labs reviewed with him    Continue good diet exercise    Follow-up in 3 months    BMI is >= 25 and <30. (Overweight) The following options were offered after discussion;: exercise counseling/recommendations and nutrition counseling/recommendations      Follow Up:   Return in about 3 months (around 8/13/2025) for Recheck, Labs prior next visit.        Patient or patient representative verbalized consent for the use of Ambient Listening during the visit with  Martell Young MD for chart documentation. 5/20/2025  15:53 EDT    Martell Young MD  [facility]   Portions of note created with Dragon voice recognition technology

## 2025-05-21 ENCOUNTER — TELEPHONE (OUTPATIENT)
Dept: CASE MANAGEMENT | Facility: OTHER | Age: 60
End: 2025-05-21
Payer: COMMERCIAL

## 2025-05-27 ENCOUNTER — TELEPHONE (OUTPATIENT)
Dept: CASE MANAGEMENT | Facility: OTHER | Age: 60
End: 2025-05-27
Payer: COMMERCIAL

## 2025-05-27 NOTE — TELEPHONE ENCOUNTER
Attempted to reach [place] for CCM update. Left message for return call.     This is 2nd UTR attempt.

## 2025-06-02 ENCOUNTER — TELEPHONE (OUTPATIENT)
Dept: CASE MANAGEMENT | Facility: OTHER | Age: 60
End: 2025-06-02
Payer: COMMERCIAL

## 2025-06-02 NOTE — TELEPHONE ENCOUNTER
Attempted to reach patient for CCM update. Left message for return call.    This is third UTR attempt. Will change to monitoring.

## 2025-06-18 DIAGNOSIS — E11.65 TYPE 2 DIABETES MELLITUS WITH HYPERGLYCEMIA, WITHOUT LONG-TERM CURRENT USE OF INSULIN: ICD-10-CM

## 2025-06-18 RX ORDER — EZETIMIBE 10 MG/1
10 TABLET ORAL DAILY
Qty: 90 TABLET | Refills: 0 | OUTPATIENT
Start: 2025-06-18

## 2025-06-18 RX ORDER — INSULIN GLARGINE 100 [IU]/ML
20 INJECTION, SOLUTION SUBCUTANEOUS DAILY
Qty: 45 ML | Refills: 0 | Status: SHIPPED | OUTPATIENT
Start: 2025-06-18

## 2025-06-18 RX ORDER — EZETIMIBE 10 MG/1
10 TABLET ORAL DAILY
Qty: 90 TABLET | Refills: 0 | Status: SHIPPED | OUTPATIENT
Start: 2025-06-18

## 2025-06-18 NOTE — TELEPHONE ENCOUNTER
Caller: Hesham Arevalo Jr.    Relationship: Self    Best call back number: [phone]     Requested Prescriptions:   Requested Prescriptions     Pending Prescriptions Disp Refills    ezetimibe (ZETIA) 10 MG tablet       Sig: Take 1 tablet by mouth Daily.    Insulin Glargine (Lantus SoloStar) 100 UNIT/ML injection pen 45 mL 1     Sig: Inject 20 Units under the skin into the appropriate area as directed Daily.        Pharmacy where request should be sent: [facility] [address] [phone] [facility] [phone]      Last office visit with prescribing clinician: 5/20/2025   Last telemedicine visit with prescribing clinician: Visit date not found   Next office visit with prescribing clinician: 8/19/2025     Does the patient have less than a 3 day supply:  [x] Yes  [] No    Would you like a call back once the refill request has been completed: [] Yes [x] No    If the office needs to give you a call back, can they leave a voicemail: [] Yes [x] No    Jessica Britt Rep   06/18/25 08:45 EDT

## 2025-06-18 NOTE — TELEPHONE ENCOUNTER
Rx Refill Note    Requested Prescriptions     Pending Prescriptions Disp Refills    ezetimibe (ZETIA) 10 MG tablet 90 tablet 0     Sig: Take 1 tablet by mouth Daily.    Insulin Glargine (Lantus SoloStar) 100 UNIT/ML injection pen 45 mL 0     Sig: Inject 20 Units under the skin into the appropriate area as directed Daily.        Last office visit with prescribing clinician: 5/20/2025      Next office visit with prescribing clinician: 8/19/2025   Last labs:   Last refill: needs   Pharmacy (be sure to add in Epic). correct

## 2025-07-02 ENCOUNTER — PATIENT OUTREACH (OUTPATIENT)
Dept: CASE MANAGEMENT | Facility: OTHER | Age: 60
End: 2025-07-02
Payer: COMMERCIAL

## 2025-07-02 DIAGNOSIS — Z79.4 TYPE 2 DIABETES MELLITUS WITH HYPERGLYCEMIA, WITH LONG-TERM CURRENT USE OF INSULIN: Primary | ICD-10-CM

## 2025-07-02 DIAGNOSIS — K75.81 NASH (NONALCOHOLIC STEATOHEPATITIS): ICD-10-CM

## 2025-07-02 DIAGNOSIS — I10 ESSENTIAL HYPERTENSION: ICD-10-CM

## 2025-07-02 DIAGNOSIS — E11.65 TYPE 2 DIABETES MELLITUS WITH HYPERGLYCEMIA, WITH LONG-TERM CURRENT USE OF INSULIN: Primary | ICD-10-CM

## 2025-07-02 NOTE — OUTREACH NOTE
AMBULATORY CASE MANAGEMENT NOTE    Names and Relationships of Patient/Support Persons: Contact: Mickey Hesham Jordan .; Relationship: Self -     CCM Interim Update    Spoke with Mr. Arevalo for CCM update. He states that he has been doing well. He was able to setup his dexcom and is using it. He states that he likes it and has not had any problems with it.    He is having much better glycemic control. He is still using 16U of insulin. He is not eating any sugar. He has been drinking sugar free drinks. He is limiting his bread. His primary vegetables are green beans, broccoli and cauliflower. His fasting blood sugars are ranging 102-120.     His goal would be to continue to work on diet to control his sugars with less insulin use. He is noticing that at times blood sugars will raise to 200's 2 hours after eating. Instructed patient to keep a log of what he has eaten previously to these episodes. We will discuss to see if can drill down on possible dietary changes.       Education Documentation  No documentation found.        Olena EUCEDA  Ambulatory Case Management    7/2/2025, 09:20 EDT

## 2025-07-16 ENCOUNTER — TELEPHONE (OUTPATIENT)
Dept: CASE MANAGEMENT | Facility: OTHER | Age: 60
End: 2025-07-16
Payer: COMMERCIAL

## 2025-07-21 ENCOUNTER — TELEPHONE (OUTPATIENT)
Dept: CASE MANAGEMENT | Facility: OTHER | Age: 60
End: 2025-07-21
Payer: COMMERCIAL

## 2025-07-21 NOTE — TELEPHONE ENCOUNTER
Attempted to reach Mr. Arevalo for CCM update. Left message for return call.     This is second utr attempt.

## 2025-07-24 DIAGNOSIS — E11.65 TYPE 2 DIABETES MELLITUS WITH HYPERGLYCEMIA, WITHOUT LONG-TERM CURRENT USE OF INSULIN: ICD-10-CM

## 2025-07-24 RX ORDER — DAPAGLIFLOZIN 10 MG/1
1 TABLET, FILM COATED ORAL DAILY
Qty: 30 TABLET | Refills: 0 | Status: SHIPPED | OUTPATIENT
Start: 2025-07-24

## 2025-07-29 RX ORDER — PEN NEEDLE, DIABETIC 32GX 5/32"
NEEDLE, DISPOSABLE MISCELLANEOUS SEE ADMIN INSTRUCTIONS
Qty: 100 EACH | Refills: 0 | Status: SHIPPED | OUTPATIENT
Start: 2025-07-29

## 2025-07-31 ENCOUNTER — APPOINTMENT (OUTPATIENT)
Dept: CT IMAGING | Facility: HOSPITAL | Age: 60
End: 2025-07-31
Payer: COMMERCIAL

## 2025-07-31 ENCOUNTER — HOSPITAL ENCOUNTER (EMERGENCY)
Facility: HOSPITAL | Age: 60
Discharge: HOME | End: 2025-07-31
Attending: EMERGENCY MEDICINE | Admitting: EMERGENCY MEDICINE
Payer: COMMERCIAL

## 2025-07-31 VITALS
WEIGHT: 190 LBS | BODY MASS INDEX: 25.18 KG/M2 | OXYGEN SATURATION: 97 % | DIASTOLIC BLOOD PRESSURE: 75 MMHG | RESPIRATION RATE: 18 BRPM | SYSTOLIC BLOOD PRESSURE: 155 MMHG | HEIGHT: 73 IN | TEMPERATURE: 98.3 F | HEART RATE: 69 BPM

## 2025-07-31 DIAGNOSIS — N20.0 LEFT NEPHROLITHIASIS: ICD-10-CM

## 2025-07-31 DIAGNOSIS — R31.0 GROSS HEMATURIA: Primary | ICD-10-CM

## 2025-07-31 LAB
ALBUMIN SERPL-MCNC: 3.6 G/DL (ref 3.5–5.2)
ALBUMIN/GLOB SERPL: 1 G/DL
ALP SERPL-CCNC: 82 U/L (ref 39–117)
ALT SERPL W P-5'-P-CCNC: 20 U/L (ref 1–41)
ANION GAP SERPL CALCULATED.3IONS-SCNC: 9 MMOL/L (ref 5–15)
APTT PPP: 33.3 SECONDS (ref 60–90)
AST SERPL-CCNC: 47 U/L (ref 1–40)
BACTERIA UR QL AUTO: ABNORMAL /HPF
BASOPHILS # BLD AUTO: 0.04 10*3/MM3 (ref 0–0.2)
BASOPHILS NFR BLD AUTO: 1 % (ref 0–1.5)
BILIRUB SERPL-MCNC: 1.3 MG/DL (ref 0–1.2)
BILIRUB UR QL STRIP: ABNORMAL
BUN SERPL-MCNC: 14.4 MG/DL (ref 8–23)
BUN/CREAT SERPL: 13.8 (ref 7–25)
CALCIUM SPEC-SCNC: 8.8 MG/DL (ref 8.6–10.5)
CHLORIDE SERPL-SCNC: 106 MMOL/L (ref 98–107)
CLARITY UR: ABNORMAL
CO2 SERPL-SCNC: 24 MMOL/L (ref 22–29)
COLOR UR: ABNORMAL
CREAT SERPL-MCNC: 1.04 MG/DL (ref 0.76–1.27)
DEPRECATED RDW RBC AUTO: 52.6 FL (ref 37–54)
EGFRCR SERPLBLD CKD-EPI 2021: 81.7 ML/MIN/1.73
EOSINOPHIL # BLD AUTO: 0.33 10*3/MM3 (ref 0–0.4)
EOSINOPHIL NFR BLD AUTO: 8.3 % (ref 0.3–6.2)
ERYTHROCYTE [DISTWIDTH] IN BLOOD BY AUTOMATED COUNT: 17 % (ref 12.3–15.4)
GLOBULIN UR ELPH-MCNC: 3.5 GM/DL
GLUCOSE SERPL-MCNC: 107 MG/DL (ref 65–99)
GLUCOSE UR STRIP-MCNC: ABNORMAL MG/DL
HCT VFR BLD AUTO: 32.2 % (ref 37.5–51)
HGB BLD-MCNC: 10.3 G/DL (ref 13–17.7)
HGB UR QL STRIP.AUTO: ABNORMAL
HYALINE CASTS UR QL AUTO: ABNORMAL /LPF
IMM GRANULOCYTES # BLD AUTO: 0.01 10*3/MM3 (ref 0–0.05)
IMM GRANULOCYTES NFR BLD AUTO: 0.3 % (ref 0–0.5)
INR PPP: 1.16 (ref 0.89–1.12)
KETONES UR QL STRIP: NEGATIVE
LEUKOCYTE ESTERASE UR QL STRIP.AUTO: ABNORMAL
LYMPHOCYTES # BLD AUTO: 0.78 10*3/MM3 (ref 0.7–3.1)
LYMPHOCYTES NFR BLD AUTO: 19.6 % (ref 19.6–45.3)
MCH RBC QN AUTO: 27.1 PG (ref 26.6–33)
MCHC RBC AUTO-ENTMCNC: 32 G/DL (ref 31.5–35.7)
MCV RBC AUTO: 84.7 FL (ref 79–97)
MONOCYTES # BLD AUTO: 0.36 10*3/MM3 (ref 0.1–0.9)
MONOCYTES NFR BLD AUTO: 9.1 % (ref 5–12)
NEUTROPHILS NFR BLD AUTO: 2.45 10*3/MM3 (ref 1.7–7)
NEUTROPHILS NFR BLD AUTO: 61.7 % (ref 42.7–76)
NITRITE UR QL STRIP: NEGATIVE
NRBC BLD AUTO-RTO: 0 /100 WBC (ref 0–0.2)
PH UR STRIP.AUTO: <=5 [PH] (ref 5–8)
PLATELET # BLD AUTO: 66 10*3/MM3 (ref 140–450)
PMV BLD AUTO: 10.4 FL (ref 6–12)
POTASSIUM SERPL-SCNC: 4.5 MMOL/L (ref 3.5–5.2)
PROT SERPL-MCNC: 7.1 G/DL (ref 6–8.5)
PROT UR QL STRIP: ABNORMAL
PROTHROMBIN TIME: 15.6 SECONDS (ref 12.2–15.3)
RBC # BLD AUTO: 3.8 10*6/MM3 (ref 4.14–5.8)
RBC # UR STRIP: ABNORMAL /HPF
REF LAB TEST METHOD: ABNORMAL
SODIUM SERPL-SCNC: 139 MMOL/L (ref 136–145)
SP GR UR STRIP: >1.03 (ref 1–1.03)
SQUAMOUS #/AREA URNS HPF: ABNORMAL /HPF
UROBILINOGEN UR QL STRIP: ABNORMAL
WBC # UR STRIP: ABNORMAL /HPF
WBC NRBC COR # BLD AUTO: 3.97 10*3/MM3 (ref 3.4–10.8)

## 2025-07-31 PROCEDURE — 36415 COLL VENOUS BLD VENIPUNCTURE: CPT

## 2025-07-31 PROCEDURE — 85025 COMPLETE CBC W/AUTO DIFF WBC: CPT | Performed by: EMERGENCY MEDICINE

## 2025-07-31 PROCEDURE — 85730 THROMBOPLASTIN TIME PARTIAL: CPT | Performed by: EMERGENCY MEDICINE

## 2025-07-31 PROCEDURE — 81001 URINALYSIS AUTO W/SCOPE: CPT

## 2025-07-31 PROCEDURE — 85610 PROTHROMBIN TIME: CPT | Performed by: EMERGENCY MEDICINE

## 2025-07-31 PROCEDURE — 80053 COMPREHEN METABOLIC PANEL: CPT | Performed by: EMERGENCY MEDICINE

## 2025-07-31 PROCEDURE — 99284 EMERGENCY DEPT VISIT MOD MDM: CPT

## 2025-07-31 PROCEDURE — 74176 CT ABD & PELVIS W/O CONTRAST: CPT

## 2025-08-01 NOTE — DISCHARGE INSTRUCTIONS
Push fluids.  If you become obstructed and cannot pass any urine please return to the emergency department.    Follow-up with on-call Dr. Tuan Page, I have given you his number attached to the chart.  Please call tomorrow to set up an appointment which is nonemergent.  Definitely follow-up with him though as you likely will need a bladder scan (cystoscopy) in order to rule out more serious causes of your bloody urine.

## 2025-08-01 NOTE — ED PROVIDER NOTES
EMERGENCY DEPARTMENT ENCOUNTER    Pt Name: Hesham Arevalo Jr.  MRN: [id number]  Pt :   1965  Room Number:  VR03/V3  Date of encounter:  2025  PCP: Martell Young MD  ED Provider: Brent Rivas MD    Historian: Patient      HPI:  Chief Complaint: Bloody urine        Context: Hesham Arevalo Jr. is a 59 y.o. male who presents to the ED c/o bloody urine for 2 days which then stop for 1 day but 24 hours later has restarted with a moderate amount of blood and some clots.  The patient has no real pain with this.  He denies prior history of hematuria.  He does have a history of nephrolithiasis.  He does not take blood thinners and has had no recent trauma.  He has no dysuria, hematuria, urinary frequency or fever/chills.      PAST MEDICAL HISTORY  Past Medical History:   Diagnosis Date    Arthritis     knees     Cancer 2018    colon with resection    Cholelithiasis     Diabetes mellitus         Disease of thyroid gland     Fatty liver     Hashimoto's disease     History of radiation therapy 2019    rectal cancer    Hypertension     Hypothyroidism     Ileostomy in place     Prediabetes     Psoriasis     Wears glasses          PAST SURGICAL HISTORY  Past Surgical History:   Procedure Laterality Date    CHOLECYSTECTOMY N/A 2024    Procedure: OPEN CHOLECYSTECTOMY;  Surgeon: Konstantin Garcia MD;  Location:  [facility];  Service: General;  Laterality: N/A;    COLON RESECTION N/A 2019    Procedure: LAPAROSCOPIC LOWER ANTERIOR RESECTION WITH DIVERTING LOOP ILEOOSTOMY, SPLENIC FLEIXURE MOBILIZATION AND LIVER BIOPSY, AND PROCTOSCOPY;  Surgeon: Pau Kelly MD;  Location:  [facility];  Service: General    COLONOSCOPY      2018    ILEOSTOMY  2019    LIVER BIOPSY  2003    VASECTOMY      VENOUS ACCESS DEVICE (PORT) INSERTION N/A 2019    Procedure: PORT PLACEMENT;  Surgeon: Franky Cazares MD;  Location:  [facility];  Service: General         FAMILY  HISTORY  Family History   Problem Relation Age of Onset    Breast cancer Mother     Cancer Mother         Breast Cancer Double Masectomy    Cancer Father         Bladder cancer    Diabetes type II Sister     Diabetes Sister     Diabetes Sister          SOCIAL HISTORY  Social History     Socioeconomic History    Marital status:    Tobacco Use    Smoking status: Former     Current packs/day: 0.00     Average packs/day: 1 pack/day for 14.0 years (14.0 ttl pk-yrs)     Types: Cigarettes     Start date: 1979     Quit date:      Years since quittin.6    Smokeless tobacco: Never   Vaping Use    Vaping status: Never Used   Substance and Sexual Activity    Alcohol use: No    Drug use: No    Sexual activity: Defer         ALLERGIES  Patient has no known allergies.        REVIEW OF SYSTEMS  Review of Systems       All systems reviewed and negative except for those discussed in HPI.       PHYSICAL EXAM    I have reviewed the triage vital signs and nursing notes.    ED Triage Vitals [25]   Temp Heart Rate Resp BP SpO2   98.3 °F (36.8 °C) 69 18 155/75 97 %      Temp src Heart Rate Source Patient Position BP Location FiO2 (%)   -- -- -- -- --       Physical Exam  GENERAL:   Appears pleasant nontoxic.  I evaluated him in our triage area as we have no beds for immediate rooming given over capacity as far as patients versus beds  HENT: Nares patent.  EYES: No scleral icterus.  CV: Regular rhythm, regular rate.  No murmurs gallops rub  RESPIRATORY: Normal effort.  No audible wheezes, rales or rhonchi.  Clear to auscultation  ABDOMEN: Soft, nontender to deep palpation throughout  MUSCULOSKELETAL: No deformities.   NEURO: Alert, moves all extremities, follows commands.  SKIN: Warm, dry, no rash visualized.      LAB RESULTS  Recent Results (from the past 24 hours)   Urinalysis With Microscopic If Indicated (No Culture) - Urine, Clean Catch    Collection Time: 25  7:47 PM    Specimen: Urine, Clean Catch    Result Value Ref Range    Color, UA Jen (A) Yellow, Straw    Appearance, UA Turbid (A) Clear    pH, UA <=5.0 5.0 - 8.0    Specific Gravity, UA >1.030 (H) 1.005 - 1.030    Glucose, UA >=1000 mg/dL (3+) (A) Negative    Ketones, UA Negative Negative    Bilirubin, UA Small (1+) (A) Negative    Blood, UA Large (3+) (A) Negative    Protein,  mg/dL (2+) (A) Negative    Leuk Esterase, UA Small (1+) (A) Negative    Nitrite, UA Negative Negative    Urobilinogen, UA 0.2 E.U./dL 0.2 - 1.0 E.U./dL   Urinalysis, Microscopic Only - Urine, Clean Catch    Collection Time: 07/31/25  7:47 PM    Specimen: Urine, Clean Catch   Result Value Ref Range    RBC, UA Too Numerous to Count (A) None Seen, 0-2 /HPF    WBC, UA 6-10 (A) None Seen, 0-2 /HPF    Bacteria, UA None Seen None Seen /HPF    Squamous Epithelial Cells, UA 0-2 None Seen, 0-2 /HPF    Hyaline Casts, UA None Seen None Seen /LPF    Methodology Automated Microscopy    Comprehensive Metabolic Panel    Collection Time: 07/31/25  9:20 PM    Specimen: Blood   Result Value Ref Range    Glucose 107 (H) 65 - 99 mg/dL    BUN 14.4 8.0 - 23.0 mg/dL    Creatinine 1.04 0.76 - 1.27 mg/dL    Sodium 139 136 - 145 mmol/L    Potassium 4.5 3.5 - 5.2 mmol/L    Chloride 106 98 - 107 mmol/L    CO2 24.0 22.0 - 29.0 mmol/L    Calcium 8.8 8.6 - 10.5 mg/dL    Total Protein 7.1 6.0 - 8.5 g/dL    Albumin 3.6 3.5 - 5.2 g/dL    ALT (SGPT) 20 1 - 41 U/L    AST (SGOT) 47 (H) 1 - 40 U/L    Alkaline Phosphatase 82 39 - 117 U/L    Total Bilirubin 1.3 (H) 0.0 - 1.2 mg/dL    Globulin 3.5 gm/dL    A/G Ratio 1.0 g/dL    BUN/Creatinine Ratio 13.8 7.0 - 25.0    Anion Gap 9.0 5.0 - 15.0 mmol/L    eGFR 81.7 >60.0 mL/min/1.73   CBC Auto Differential    Collection Time: 07/31/25  9:20 PM    Specimen: Blood   Result Value Ref Range    WBC 3.97 3.40 - 10.80 10*3/mm3    RBC 3.80 (L) 4.14 - 5.80 10*6/mm3    Hemoglobin 10.3 (L) 13.0 - 17.7 g/dL    Hematocrit 32.2 (L) 37.5 - 51.0 %    MCV 84.7 79.0 - 97.0 fL    MCH 27.1  26.6 - 33.0 pg    MCHC 32.0 31.5 - 35.7 g/dL    RDW 17.0 (H) 12.3 - 15.4 %    RDW-SD 52.6 37.0 - 54.0 fl    MPV 10.4 6.0 - 12.0 fL    Platelets 66 (L) 140 - 450 10*3/mm3    Neutrophil % 61.7 42.7 - 76.0 %    Lymphocyte % 19.6 19.6 - 45.3 %    Monocyte % 9.1 5.0 - 12.0 %    Eosinophil % 8.3 (H) 0.3 - 6.2 %    Basophil % 1.0 0.0 - 1.5 %    Immature Grans % 0.3 0.0 - 0.5 %    Neutrophils, Absolute 2.45 1.70 - 7.00 10*3/mm3    Lymphocytes, Absolute 0.78 0.70 - 3.10 10*3/mm3    Monocytes, Absolute 0.36 0.10 - 0.90 10*3/mm3    Eosinophils, Absolute 0.33 0.00 - 0.40 10*3/mm3    Basophils, Absolute 0.04 0.00 - 0.20 10*3/mm3    Immature Grans, Absolute 0.01 0.00 - 0.05 10*3/mm3    nRBC 0.0 0.0 - 0.2 /100 WBC   Protime-INR    Collection Time: 07/31/25  9:20 PM    Specimen: Blood   Result Value Ref Range    Protime 15.6 (H) 12.2 - 15.3 Seconds    INR 1.16 (H) 0.89 - 1.12   aPTT    Collection Time: 07/31/25  9:20 PM    Specimen: Blood   Result Value Ref Range    PTT 33.3 (L) 60.0 - 90.0 seconds       If labs were ordered, I independently reviewed the results and considered them in treating the patient.        RADIOLOGY  CT Abdomen Pelvis Without Contrast  Result Date: 7/31/2025  CT ABDOMEN PELVIS WO CONTRAST Date of Exam: 7/31/2025 9:03 PM EDT Indication: gross hematuria. Comparison: CT abdomen pelvis 9/23/2024, 9/23/2023. Technique: Axial CT images were obtained of the abdomen and pelvis without the administration of contrast. Reconstructed coronal and sagittal images were also obtained. Automated exposure control and iterative construction methods were used. Findings: Visualized lung bases appear clear without focal airspace consolidation. No pleural or pericardial effusion. Cirrhotic morphology of the liver. No distinct focal liver lesion is evident on this noncontrast exam. Cholecystectomy. No distinct biliary ductal dilatation. No findings of acute pancreatitis. Splenomegaly measuring 15 cm in craniocaudal dimension,  unchanged. No distinct adrenal nodule. Kidneys are symmetric in size without hydronephrosis. 4 mm nonobstructing left lower pole renal stone. Urinary bladder is grossly unremarkable. Mild prostatomegaly. Postsurgical changes of the rectum with ill-defined soft tissue thickening within the presacral space, unchanged dating back to 2023. Normal appendix. No bowel obstruction. Right lower quadrant ileostomy. No free air. No free fluid or organized fluid collection. Unchanged enlarged cardiophrenic lymph node measuring 1.1 cm in short axis (series 2 image 19. No new or progressive lymphadenopathy. No abdominal aortic aneurysm. Atherosclerosis. Mesenteric collaterals. No acute body wall abnormality. No acute or suspicious osseous lesion.     Impression: No acute abdominopelvic findings on this noncontrast exam. Nonobstructing left renal stone, which may be related to patient's reported hematuria. Cirrhosis with sequela of portal hypertension including splenomegaly and portosystemic collateralization. No ascites. Postsurgical changes of the rectum with soft tissue thickening in the presacral space, unchanged over multiple priors. Additional stable chronic/ancillary findings as above. Electronically Signed: Abelino Best MD  7/31/2025 9:41 PM EDT  Workstation ID: [id number]      I ordered and independently reviewed the above noted radiographic studies.      I viewed images of CT abdomen pelvis which showed left renal pelvis stone per my independent interpretation.    See radiologist's dictation for official interpretation.        PROCEDURES    Procedures    No orders to display       MEDICATIONS GIVEN IN ER    Medications - No data to display      MEDICAL DECISION MAKING, PROGRESS, and CONSULTS    All labs, if obtained, have been independently reviewed by me.  All radiology studies, if obtained, have been reviewed by me and the radiologist dictating the report.  All EKGs, if obtained, have been independently viewed and  interpreted by me/my attending physician.      Discussion below represents my analysis of pertinent findings related to patient's condition, differential diagnosis, treatment plan and final disposition.                                          Differential diagnosis:    Renal stone leading to hematuria versus hemorrhagic cystitis versus bladder cancer, etc.      Additional sources:      - External (non-ED) record review: Outpatient MRI abdomen dated 2/17/2025 showed a cirrhotic liver among other findings.    - Chronic or social conditions impacting care: Former smoker        Orders placed during this visit:  Orders Placed This Encounter   Procedures    CT Abdomen Pelvis Without Contrast    Urinalysis With Microscopic If Indicated (No Culture) - Urine, Clean Catch    Urinalysis, Microscopic Only - Urine, Clean Catch    Comprehensive Metabolic Panel    CBC Auto Differential    Protime-INR    aPTT    Moved to a discharge area out of the lobby and notify me so I can talk with the patient before discharge  Misc Nursing Order (Specify)    CBC & Differential         Additional orders considered but not ordered:  IV contrast CT scan abdomen pelvis    ED Course:    Consultants: Dr. Page, on-call urology    ED Course as of 08/01/25 0000   Thu Jul 31, 2025 2139 I have paged Dr. Page, urology to discuss this case further. [MS]   2202 I spoke about this patient with Dr. Page.  He recommends outpatient follow-up in his clinic on with likely cystoscopy being performed.  We discussed prophylactic antibiotics and a he normally does not use them.  I do not feel they are indicated as well. [MS]   2300 The patient was accidentally discharged by nursing staff prior to my being able to speak with him.  I telephoned him while he was on his way home and spoke with him about all findings and recommendations.  I stressed the need to follow-up with urology on an outpatient basis.  He will call tomorrow morning. [MS]      ED Course User  Index  [MS] Brent Rivas MD              Shared Decision Making:  After my consideration of clinical presentation and any laboratory/radiology studies obtained, I discussed the findings with the patient/patient representative who is in agreement with the treatment plan and the final disposition.   Risks and benefits of discharge and/or observation/admission were discussed.       AS OF 00:00 EDT VITALS:    BP - 155/75  HR - 69  TEMP - 98.3 °F (36.8 °C)  O2 SATS - 97%                  DIAGNOSIS  Final diagnoses:   Gross hematuria   Left nephrolithiasis         DISPOSITION  DISCHARGE    Patient discharged in stable condition.    Reviewed implications of results, diagnosis, meds, responsibility to follow up, warning signs and symptoms of possible worsening, potential complications and reasons to return to ER.    Patient/Family voiced understanding of above instructions.    Discussed plan for discharge, as there is no emergent indication for admission.  Pt/family is agreeable and understands need for follow up and possible repeat testing.  Pt/family is aware that discharge does not mean that nothing is wrong but that it indicates no emergency is currently present that requires admission and they must continue care with follow-up as given below or with a physician of their choice.     FOLLOW-UP  Tuan Page MD  [address] [place] [id number] [phone]    In 1 week      Martell Young MD  [address] [facility] [address]  [phone]          [facility] EMERGENCY DEPARTMENT  [address] [facility] [id number] [phone]    IF YOU HAVE ANY CONCERN OF WORSENING CONDITION         Medication List      No changes were made to your prescriptions during this visit.             Please note that portions of this document were completed with voice recognition software.        Brent Rivas MD  08/01/25 0000

## 2025-08-06 ENCOUNTER — TELEPHONE (OUTPATIENT)
Dept: FAMILY MEDICINE CLINIC | Facility: CLINIC | Age: 60
End: 2025-08-06
Payer: COMMERCIAL

## 2025-08-11 ENCOUNTER — PATIENT OUTREACH (OUTPATIENT)
Dept: CASE MANAGEMENT | Facility: OTHER | Age: 60
End: 2025-08-11
Payer: COMMERCIAL

## 2025-08-12 ENCOUNTER — LAB (OUTPATIENT)
Dept: FAMILY MEDICINE CLINIC | Facility: CLINIC | Age: 60
End: 2025-08-12
Payer: COMMERCIAL

## 2025-08-12 DIAGNOSIS — E03.9 ACQUIRED HYPOTHYROIDISM: ICD-10-CM

## 2025-08-12 DIAGNOSIS — E11.65 TYPE 2 DIABETES MELLITUS WITH HYPERGLYCEMIA, WITHOUT LONG-TERM CURRENT USE OF INSULIN: ICD-10-CM

## 2025-08-13 LAB
ALBUMIN SERPL-MCNC: 3.5 G/DL (ref 3.9–4.9)
ALP SERPL-CCNC: 79 IU/L (ref 44–121)
ALT SERPL-CCNC: 24 IU/L (ref 0–44)
AST SERPL-CCNC: 46 IU/L (ref 0–40)
BILIRUB SERPL-MCNC: 1.3 MG/DL (ref 0–1.2)
BUN SERPL-MCNC: 15 MG/DL (ref 8–27)
BUN/CREAT SERPL: 13 (ref 10–24)
CALCIUM SERPL-MCNC: 8.7 MG/DL (ref 8.6–10.2)
CHLORIDE SERPL-SCNC: 108 MMOL/L (ref 96–106)
CO2 SERPL-SCNC: 20 MMOL/L (ref 20–29)
CREAT SERPL-MCNC: 1.16 MG/DL (ref 0.76–1.27)
EGFRCR SERPLBLD CKD-EPI 2021: 72 ML/MIN/1.73
GLOBULIN SER CALC-MCNC: 3.3 G/DL (ref 1.5–4.5)
GLUCOSE SERPL-MCNC: 122 MG/DL (ref 70–99)
HBA1C MFR BLD: 6.5 % (ref 4.8–5.6)
POTASSIUM SERPL-SCNC: 4.4 MMOL/L (ref 3.5–5.2)
PROT SERPL-MCNC: 6.8 G/DL (ref 6–8.5)
SODIUM SERPL-SCNC: 141 MMOL/L (ref 134–144)
TSH SERPL DL<=0.005 MIU/L-ACNC: 10.5 UIU/ML (ref 0.45–4.5)

## 2025-08-15 RX ORDER — ACYCLOVIR 400 MG/1
1 TABLET ORAL CONTINUOUS
Qty: 3 EACH | Refills: 0 | Status: SHIPPED | OUTPATIENT
Start: 2025-08-15

## 2025-08-18 ENCOUNTER — HOSPITAL ENCOUNTER (EMERGENCY)
Facility: HOSPITAL | Age: 60
Discharge: HOME | End: 2025-08-18
Attending: EMERGENCY MEDICINE | Admitting: EMERGENCY MEDICINE
Payer: COMMERCIAL

## 2025-08-18 ENCOUNTER — ANCILLARY PROCEDURE (OUTPATIENT)
Dept: EMERGENCY DEPT | Facility: HOSPITAL | Age: 60
End: 2025-08-18
Payer: COMMERCIAL

## 2025-08-18 VITALS
RESPIRATION RATE: 16 BRPM | SYSTOLIC BLOOD PRESSURE: 139 MMHG | DIASTOLIC BLOOD PRESSURE: 73 MMHG | HEART RATE: 72 BPM | WEIGHT: 190 LBS | HEIGHT: 73 IN | OXYGEN SATURATION: 99 % | BODY MASS INDEX: 25.18 KG/M2 | TEMPERATURE: 98.2 F

## 2025-08-18 DIAGNOSIS — R33.8 ACUTE URINARY RETENTION: Primary | ICD-10-CM

## 2025-08-18 LAB
BACTERIA UR QL AUTO: ABNORMAL /HPF
BILIRUB UR QL STRIP: NEGATIVE
CLARITY UR: ABNORMAL
COLOR UR: ABNORMAL
GLUCOSE UR STRIP-MCNC: ABNORMAL MG/DL
HGB UR QL STRIP.AUTO: ABNORMAL
HYALINE CASTS UR QL AUTO: ABNORMAL /LPF
KETONES UR QL STRIP: NEGATIVE
LEUKOCYTE ESTERASE UR QL STRIP.AUTO: ABNORMAL
NITRITE UR QL STRIP: NEGATIVE
PH UR STRIP.AUTO: <=5 [PH] (ref 5–8)
PROT UR QL STRIP: ABNORMAL
RBC # UR STRIP: ABNORMAL /HPF
REF LAB TEST METHOD: ABNORMAL
SP GR UR STRIP: 1.02 (ref 1–1.03)
SQUAMOUS #/AREA URNS HPF: ABNORMAL /HPF
UROBILINOGEN UR QL STRIP: ABNORMAL
WBC # UR STRIP: ABNORMAL /HPF

## 2025-08-18 PROCEDURE — 99284 EMERGENCY DEPT VISIT MOD MDM: CPT

## 2025-08-18 PROCEDURE — 76775 US EXAM ABDO BACK WALL LIM: CPT | Performed by: EMERGENCY MEDICINE

## 2025-08-18 PROCEDURE — 81001 URINALYSIS AUTO W/SCOPE: CPT | Performed by: EMERGENCY MEDICINE

## 2025-08-19 ENCOUNTER — OFFICE VISIT (OUTPATIENT)
Dept: FAMILY MEDICINE CLINIC | Facility: CLINIC | Age: 60
End: 2025-08-19
Payer: COMMERCIAL

## 2025-08-19 VITALS
OXYGEN SATURATION: 100 % | BODY MASS INDEX: 25.64 KG/M2 | HEIGHT: 73 IN | DIASTOLIC BLOOD PRESSURE: 74 MMHG | HEART RATE: 66 BPM | WEIGHT: 193.5 LBS | SYSTOLIC BLOOD PRESSURE: 138 MMHG

## 2025-08-19 DIAGNOSIS — E03.9 ACQUIRED HYPOTHYROIDISM: ICD-10-CM

## 2025-08-19 DIAGNOSIS — E78.2 MIXED HYPERLIPIDEMIA: Primary | ICD-10-CM

## 2025-08-19 DIAGNOSIS — I10 PRIMARY HYPERTENSION: ICD-10-CM

## 2025-08-19 DIAGNOSIS — Z79.899 HIGH RISK MEDICATION USE: ICD-10-CM

## 2025-08-19 DIAGNOSIS — E11.65 TYPE 2 DIABETES MELLITUS WITH HYPERGLYCEMIA, WITHOUT LONG-TERM CURRENT USE OF INSULIN: ICD-10-CM

## 2025-08-19 PROCEDURE — 99214 OFFICE O/P EST MOD 30 MIN: CPT | Performed by: FAMILY MEDICINE

## 2025-08-19 RX ORDER — LEVOTHYROXINE SODIUM 75 UG/1
75 TABLET ORAL
Qty: 90 TABLET | Refills: 0 | Status: SHIPPED | OUTPATIENT
Start: 2025-08-19

## 2025-08-19 RX ORDER — DAPAGLIFLOZIN 10 MG/1
1 TABLET, FILM COATED ORAL DAILY
Qty: 90 TABLET | Refills: 1 | Status: SHIPPED | OUTPATIENT
Start: 2025-08-19

## 2025-08-19 RX ORDER — EZETIMIBE 10 MG/1
10 TABLET ORAL DAILY
Qty: 90 TABLET | Refills: 1 | Status: SHIPPED | OUTPATIENT
Start: 2025-08-19

## 2025-08-19 RX ORDER — LISINOPRIL 10 MG/1
10 TABLET ORAL DAILY
Qty: 90 TABLET | Refills: 1 | Status: SHIPPED | OUTPATIENT
Start: 2025-08-19

## 2025-08-22 ENCOUNTER — PATIENT OUTREACH (OUTPATIENT)
Dept: CASE MANAGEMENT | Facility: OTHER | Age: 60
End: 2025-08-22
Payer: COMMERCIAL

## (undated) DEVICE — APPL CHLORAPREP W/TINT 26ML BLU

## (undated) DEVICE — 450 ML BOTTLE OF 0.05% CHLORHEXIDINE GLUCONATE IN 99.95% STERILE WATER FOR IRRIGATION, USP AND APPLICATOR.: Brand: IRRISEPT ANTIMICROBIAL WOUND LAVAGE

## (undated) DEVICE — JACKSON-PRATT 100CC BULB RESERVOIR: Brand: CARDINAL HEALTH

## (undated) DEVICE — COVER,MAYO STAND,XL,STERILE: Brand: MEDLINE

## (undated) DEVICE — SKIN AFFIX SURG ADHESIVE 72/CS 0.55ML: Brand: MEDLINE

## (undated) DEVICE — [HIGH FLOW INSUFFLATOR,  DO NOT USE IF PACKAGE IS DAMAGED,  KEEP DRY,  KEEP AWAY FROM SUNLIGHT,  PROTECT FROM HEAT AND RADIOACTIVE SOURCES.]: Brand: PNEUMOSURE

## (undated) DEVICE — SYS SKIN CLS DERMABOND PRINEO W/22CM MESH TP

## (undated) DEVICE — TOTAL TRAY, 16FR 10ML SIL FOLEY, URN: Brand: MEDLINE

## (undated) DEVICE — SUT PDS 1 CTX 36IN VIO PDP371T

## (undated) DEVICE — MAX-CORE® DISPOSABLE CORE BIOPSY INSTRUMENT, 14G X 16CM: Brand: MAX-CORE

## (undated) DEVICE — CATHETER,URETHRAL,REDRUBBER,STRL,12FR: Brand: MEDLINE INDUSTRIES, INC.

## (undated) DEVICE — CLTH CLENS READYCLEANSE PERI CARE PK/5

## (undated) DEVICE — MARYLAND JAW LAPAROSCOPIC SEALER/DIVIDER COATED: Brand: LIGASURE

## (undated) DEVICE — SAFESECURE,SECUREMENT,FOLEY CATH,STERILE: Brand: MEDLINE

## (undated) DEVICE — ANTIBACTERIAL UNDYED BRAIDED (POLYGLACTIN 910), SYNTHETIC ABSORBABLE SUTURE: Brand: COATED VICRYL

## (undated) DEVICE — INTENDED FOR TISSUE SEPARATION, AND OTHER PROCEDURES THAT REQUIRE A SHARP SURGICAL BLADE TO PUNCTURE OR CUT.: Brand: BARD-PARKER ® STAINLESS STEEL BLADES

## (undated) DEVICE — 3M™ IOBAN™ 2 ANTIMICROBIAL INCISE DRAPE 6650EZ: Brand: IOBAN™ 2

## (undated) DEVICE — SUT PROLN 2/0 V7 36IN 8977H

## (undated) DEVICE — LEX GENERAL ABDOMINAL SPLIT: Brand: MEDLINE INDUSTRIES, INC.

## (undated) DEVICE — GLV SURG SENSICARE PI ORTHO SZ7.5 LF STRL

## (undated) DEVICE — SYR LUERLOK 30CC

## (undated) DEVICE — SUT SILK 2/0 TIES 18IN A185H

## (undated) DEVICE — COVER,LIGHT HANDLE,FLX,1/PK: Brand: MEDLINE INDUSTRIES, INC.

## (undated) DEVICE — SUT SILK 3/0 TIES 18IN A184H

## (undated) DEVICE — BLAKE SILICONE DRAIN, 19 FR ROUND, HUBLESS WITH 1/4" BENDABLE TROCAR: Brand: BLAKE

## (undated) DEVICE — GLV SURG BIOGEL LTX PF 7 1/2

## (undated) DEVICE — IRRIGATOR BULB ASEPTO 60CC STRL

## (undated) DEVICE — ACCESS PLATFORM FOR MINIMALLY INVASIVE SURGERY.: Brand: GELPORT® LAPAROSCOPIC  SYSTEM

## (undated) DEVICE — GLV SURG SENSICARE MICRO PF LF 6 STRL

## (undated) DEVICE — GLV SURG PREMIERPRO MIC LTX PF SZ8 BRN

## (undated) DEVICE — ENDOPATH XCEL UNIVERSAL TROCAR STABLILITY SLEEVES: Brand: ENDOPATH XCEL

## (undated) DEVICE — ROD OS LP SURFIT 2 1/2IN

## (undated) DEVICE — APPL CHLORAPREP TINTED 26ML TEAL

## (undated) DEVICE — PROVIDES A STERILE INTERFACE BETWEEN THE OPERATING ROOM SURGICAL LAMPS (NON-STERILE) AND THE SURGEON OR NURSE (STERILE).: Brand: STERION®CLAMP COVER FABRIC

## (undated) DEVICE — APPL HEMOS FOR DELIVERY FLOSEAL

## (undated) DEVICE — ENCORE® LATEX MICRO SIZE 6.5, STERILE LATEX POWDER-FREE SURGICAL GLOVE: Brand: ENCORE

## (undated) DEVICE — DRAIN JACKSON PRATT 10FR 1/8END: Brand: CARDINAL HEALTH

## (undated) DEVICE — ENCORE® LATEX MICRO SIZE 7, STERILE LATEX POWDER-FREE SURGICAL GLOVE: Brand: ENCORE

## (undated) DEVICE — ENDOPATH XCEL BLADELESS TROCARS WITH STABILITY SLEEVES: Brand: ENDOPATH XCEL

## (undated) DEVICE — DRSNG SURESITE WNDW 4X4.5

## (undated) DEVICE — ELECTRD BLD EZ CLN MOD XLNG 2.75IN

## (undated) DEVICE — TRAP FLD MINIVAC MEGADYNE 100ML

## (undated) DEVICE — DECANT BG O JET

## (undated) DEVICE — PK MINOR SPLT 10

## (undated) DEVICE — PREMIUM DRY TRAY LF: Brand: MEDLINE INDUSTRIES, INC.

## (undated) DEVICE — [HIGH FLOW HEATED INSUFFLATOR TUBING,  DO NOT USE IF PACKAGE IS DAMAGED]

## (undated) DEVICE — THE ECHELON FLEX POWERED PLUS ARTICULATING ENDOSCOPIC LINEAR CUTTERS ARE STERILE, SINGLE PATIENT USE INSTRUMENTS THAT SIMULTANEOUSLYCUT AND STAPLE TISSUE. THERE ARE SIX STAGGERED ROWS OF STAPLES, THREE ON EITHER SIDE OF THE CUT LINE. THE ECHELON FLEX 45 POWERED PLUSINSTRUMENTS HAVE A STAPLE LINE THAT IS APPROXIMATELY 45 MM LONG AND A CUT LINE THAT IS APPROXIMATELY 42 MM LONG. THE SHAFT CAN ROTATE FREELYIN BOTH DIRECTIONS AND AN ARTICULATION MECHANISM ENABLES THE DISTAL PORTION OF THE SHAFT TO PIVOT TO FACILITATE LATERAL ACCESS TO THE OPERATIVESITE.THE INSTRUMENTS ARE PACKAGED WITH A PRIMARY LITHIUM BATTERY PACK THAT MUST BE INSTALLED PRIOR TO USE. THERE ARE SPECIFIC REQUIREMENTS FORDISPOSING OF THE BATTERY PACK. REFER TO THE BATTERY PACK DISPOSAL SECTION.THE INSTRUMENTS ARE PACKAGED WITHOUT A RELOAD AND MUST BE LOADED PRIOR TO USE. A STAPLE RETAINING CAP ON THE RELOAD PROTECTS THE STAPLE LEGPOINTS DURING SHIPPING AND TRANSPORTATION. THE INSTRUMENTS’ LOCK-OUT FEATURE IS DESIGNED TO PREVENT A USED OR IMPROPERLY INSTALLED RELOADFROM BEING REFIRED OR AN INSTRUMENT FROM BEING FIRED WITHOUT A RELOAD.: Brand: ECHELON FLEX

## (undated) DEVICE — SUT ETHLN 3/0 KS 30IN 627H

## (undated) DEVICE — COVER,MAYO STAND,STERILE: Brand: MEDLINE

## (undated) DEVICE — SUT VIC 0 TIES 18IN J912G

## (undated) DEVICE — DRAPE,UNDERBUTTOCKS,PCH,STERILE: Brand: MEDLINE

## (undated) DEVICE — SUT MNCRYL PLS ANTIB UD 4/0 PS2 18IN

## (undated) DEVICE — FLTR PLUMEPORT LAP W/CONN STRL

## (undated) DEVICE — A RAPID TEST FOR THE QUALITATIVE DETECTION OF RESPIRATORY SYNCYTIAL VIRUS (RSV) IN NASAL WASH AND NASOPHARYNGEAL SWAB SPECIMENS.: Brand: BINAXNOW RSV

## (undated) DEVICE — SUT PDS LP 1 TP1 96IN VIO PDP880GA

## (undated) DEVICE — ENCORE® LATEX MICRO SIZE 6, STERILE LATEX POWDER-FREE SURGICAL GLOVE: Brand: ENCORE

## (undated) DEVICE — TOWEL,OR,DSP,ST,BLUE,STD,8/PK,10PK/CS: Brand: MEDLINE

## (undated) DEVICE — PK LAP LASR CHOLE 10

## (undated) DEVICE — ENDOCUT SCISSOR TIP, DISPOSABLE: Brand: RENEW

## (undated) DEVICE — PENCL E/S HNDSWCH ROCKRBTN HOLSTR 10FT

## (undated) DEVICE — SPNG LAP PREWSH SFTPK 18X18IN STRL PK/5

## (undated) DEVICE — PATIENT RETURN ELECTRODE, SINGLE-USE, CONTACT QUALITY MONITORING, ADULT, WITH 9FT CORD, FOR PATIENTS WEIGING OVER 33LBS. (15KG): Brand: MEGADYNE

## (undated) DEVICE — LAPAROSCOPIC SCISSORS: Brand: EPIX LAPAROSCOPIC SCISSORS

## (undated) DEVICE — ELECTRD BLD EXT EDGE 1P COAT 6.5IN STRL

## (undated) DEVICE — TRY SKINPREP DRYPREP

## (undated) DEVICE — TOWEL,OR,DSP,ST,BLUE,STD,4/PK,20PK/CS: Brand: MEDLINE

## (undated) DEVICE — MEDI-VAC YANKAUER SUCTION HANDLE: Brand: CARDINAL HEALTH

## (undated) DEVICE — KT POSTN TRENDELENBURG DLX WING PAD TABL STRAP HDRST XL 1P/U

## (undated) DEVICE — GLV SURG DERMASSURE GRN LF PF 7.0

## (undated) DEVICE — SUT SILK 2/0 PS 18IN 1588H

## (undated) DEVICE — SUT MNCRYL PLS ANTIB UD 3/0 PS2 27IN

## (undated) DEVICE — BLANKT WARM UPPR/BDY ARM/OUT 57X196CM

## (undated) DEVICE — LEGGINGS, PAIR, 29X43, STERILE: Brand: MEDLINE

## (undated) DEVICE — TUBING, SUCTION, 1/4" X 10', STRAIGHT: Brand: MEDLINE

## (undated) DEVICE — MEDI-VAC NON-CONDUCTIVE SUCTION TUBING: Brand: CARDINAL HEALTH

## (undated) DEVICE — SNAP KOVER: Brand: UNBRANDED

## (undated) DEVICE — DRSNG WND BORDR/ADHS NONADHR/GZ LF 4X4IN STRL

## (undated) DEVICE — VISUALIZATION SYSTEM: Brand: CLEARIFY